# Patient Record
Sex: MALE | Race: BLACK OR AFRICAN AMERICAN | NOT HISPANIC OR LATINO | URBAN - METROPOLITAN AREA
[De-identification: names, ages, dates, MRNs, and addresses within clinical notes are randomized per-mention and may not be internally consistent; named-entity substitution may affect disease eponyms.]

---

## 2018-09-17 ENCOUNTER — EMERGENCY (EMERGENCY)
Facility: HOSPITAL | Age: 48
LOS: 0 days | Discharge: LEFT BEFORE DISCHARGE | End: 2018-09-17
Attending: EMERGENCY MEDICINE | Admitting: EMERGENCY MEDICINE

## 2018-09-17 VITALS
DIASTOLIC BLOOD PRESSURE: 90 MMHG | OXYGEN SATURATION: 98 % | WEIGHT: 225.09 LBS | HEIGHT: 66 IN | TEMPERATURE: 98 F | RESPIRATION RATE: 19 BRPM | HEART RATE: 100 BPM | SYSTOLIC BLOOD PRESSURE: 156 MMHG

## 2018-09-17 DIAGNOSIS — Z91.013 ALLERGY TO SEAFOOD: ICD-10-CM

## 2018-09-17 DIAGNOSIS — Z91.018 ALLERGY TO OTHER FOODS: ICD-10-CM

## 2018-09-17 DIAGNOSIS — R19.7 DIARRHEA, UNSPECIFIED: ICD-10-CM

## 2018-09-17 DIAGNOSIS — Z88.0 ALLERGY STATUS TO PENICILLIN: ICD-10-CM

## 2018-09-17 DIAGNOSIS — Z88.8 ALLERGY STATUS TO OTHER DRUGS, MEDICAMENTS AND BIOLOGICAL SUBSTANCES STATUS: ICD-10-CM

## 2018-09-17 DIAGNOSIS — R10.30 LOWER ABDOMINAL PAIN, UNSPECIFIED: ICD-10-CM

## 2018-09-17 RX ORDER — SODIUM CHLORIDE 9 MG/ML
1000 INJECTION INTRAMUSCULAR; INTRAVENOUS; SUBCUTANEOUS ONCE
Qty: 0 | Refills: 0 | Status: DISCONTINUED | OUTPATIENT
Start: 2018-09-17 | End: 2018-09-17

## 2018-09-17 NOTE — ED PROVIDER NOTE - PROGRESS NOTE DETAILS
patient requesting to eat. patient states pain is gone patient wants to sign out. patient states he does not need labs . patient understands risk The patient wishes to leave against medical advice.  I have discussed the risks, benefits and alternatives (including the possibility of worsening of disease, pain, permanent disability, and/or death) with the patient and his/her family (if available).  The patient voices understanding of these risks, benefits, and alternatives and still wishes to sign out against medical advice.  The patient is awake, alert, oriented  x 3 and has demonstrated capacity to refuse/direct care.  I have advised the patient that they can and should return immediately should they develop any worse/different/additional symptoms, or if they change their mind and want to continue their care.

## 2018-09-17 NOTE — ED PROVIDER NOTE - MEDICAL DECISION MAKING DETAILS
diarrhea, soft abd without tendnerss, he is tolerating po. he refused evalution with labs, and we signed h im out ama.

## 2018-09-17 NOTE — ED PROVIDER NOTE - OBJECTIVE STATEMENT
this is 48 yo male presents to ed for evaluation of abdominal pain. patient states he is having diarrhea for 2 weeks on and off .

## 2018-09-17 NOTE — ED PROVIDER NOTE - ATTENDING CONTRIBUTION TO CARE
lower abd pain with diarrhea no fevers, no vomiting, he states he is hungry and wants to eat here. on exam he has no abd pain or tendnerss, my plan was to work up for diarrhea. check labs however patient refused any intervention, he is aox3, able to comprehend his decisions, he undrestands that he could have electorlyte abn, 2/2 to diarrhea or infectious cuase he understands that without labs we could not evaluate this. he will reutrn if any worsening symptoms.

## 2018-09-17 NOTE — ED PROVIDER NOTE - NS ED ROS FT
Review of Systems:  	•	CONSTITUTIONAL - no fever, no diaphoresis, no chills  	•	SKIN - no rash  	•	HEMATOLOGIC - no bleeding, no bruising  	•	EYES - no eye pain, no blurry vision  	•	ENT - no change in hearing, no sore throat, no ear pain or tinnitus  	•	RESPIRATORY - no shortness of breath, no cough  	•	CARDIAC - no chest pain, no palpitations  	•	GI - no abd pain, no nausea, no vomiting,  diarrhea, no constipation  	•	GENITO-URINARY - no discharge, no dysuria; no hematuria, no increased urinary frequency  	•	MUSCULOSKELETAL - no joint paint, no swelling, no redness  	•	NEUROLOGIC - no weakness, no headache, no paresthesias, no LOC  	•	PSYCH - no anxiety, non suicidal, non homicidal, no hallucination, no depression

## 2020-10-30 ENCOUNTER — EMERGENCY (EMERGENCY)
Facility: HOSPITAL | Age: 50
LOS: 0 days | Discharge: HOME | End: 2020-10-31
Attending: EMERGENCY MEDICINE | Admitting: STUDENT IN AN ORGANIZED HEALTH CARE EDUCATION/TRAINING PROGRAM
Payer: MEDICAID

## 2020-10-30 VITALS
TEMPERATURE: 98 F | SYSTOLIC BLOOD PRESSURE: 118 MMHG | DIASTOLIC BLOOD PRESSURE: 68 MMHG | RESPIRATION RATE: 18 BRPM | OXYGEN SATURATION: 99 % | HEART RATE: 117 BPM | HEIGHT: 66 IN

## 2020-10-30 VITALS — HEART RATE: 97 BPM | SYSTOLIC BLOOD PRESSURE: 119 MMHG | DIASTOLIC BLOOD PRESSURE: 58 MMHG

## 2020-10-30 DIAGNOSIS — Z88.0 ALLERGY STATUS TO PENICILLIN: ICD-10-CM

## 2020-10-30 DIAGNOSIS — R10.9 UNSPECIFIED ABDOMINAL PAIN: ICD-10-CM

## 2020-10-30 DIAGNOSIS — R10.13 EPIGASTRIC PAIN: ICD-10-CM

## 2020-10-30 DIAGNOSIS — R10.32 LEFT LOWER QUADRANT PAIN: ICD-10-CM

## 2020-10-30 DIAGNOSIS — Z88.8 ALLERGY STATUS TO OTHER DRUGS, MEDICAMENTS AND BIOLOGICAL SUBSTANCES: ICD-10-CM

## 2020-10-30 DIAGNOSIS — R10.12 LEFT UPPER QUADRANT PAIN: ICD-10-CM

## 2020-10-30 DIAGNOSIS — Z91.013 ALLERGY TO SEAFOOD: ICD-10-CM

## 2020-10-30 LAB
ALBUMIN SERPL ELPH-MCNC: 4.3 G/DL — SIGNIFICANT CHANGE UP (ref 3.5–5.2)
ALP SERPL-CCNC: 85 U/L — SIGNIFICANT CHANGE UP (ref 30–115)
ALT FLD-CCNC: 7 U/L — SIGNIFICANT CHANGE UP (ref 0–41)
ANION GAP SERPL CALC-SCNC: 10 MMOL/L — SIGNIFICANT CHANGE UP (ref 7–14)
APPEARANCE UR: CLEAR — SIGNIFICANT CHANGE UP
AST SERPL-CCNC: 10 U/L — SIGNIFICANT CHANGE UP (ref 0–41)
BASOPHILS # BLD AUTO: 0.02 K/UL — SIGNIFICANT CHANGE UP (ref 0–0.2)
BASOPHILS NFR BLD AUTO: 0.3 % — SIGNIFICANT CHANGE UP (ref 0–1)
BILIRUB DIRECT SERPL-MCNC: <0.2 MG/DL — SIGNIFICANT CHANGE UP (ref 0–0.2)
BILIRUB INDIRECT FLD-MCNC: >0 MG/DL — LOW (ref 0.2–1.2)
BILIRUB SERPL-MCNC: 0.2 MG/DL — SIGNIFICANT CHANGE UP (ref 0.2–1.2)
BILIRUB UR-MCNC: NEGATIVE — SIGNIFICANT CHANGE UP
BUN SERPL-MCNC: 8 MG/DL — LOW (ref 10–20)
CALCIUM SERPL-MCNC: 9.8 MG/DL — SIGNIFICANT CHANGE UP (ref 8.5–10.1)
CHLORIDE SERPL-SCNC: 105 MMOL/L — SIGNIFICANT CHANGE UP (ref 98–110)
CO2 SERPL-SCNC: 22 MMOL/L — SIGNIFICANT CHANGE UP (ref 17–32)
COLOR SPEC: COLORLESS — SIGNIFICANT CHANGE UP
CREAT SERPL-MCNC: 0.8 MG/DL — SIGNIFICANT CHANGE UP (ref 0.7–1.5)
DIFF PNL FLD: NEGATIVE — SIGNIFICANT CHANGE UP
EOSINOPHIL # BLD AUTO: 0.1 K/UL — SIGNIFICANT CHANGE UP (ref 0–0.7)
EOSINOPHIL NFR BLD AUTO: 1.5 % — SIGNIFICANT CHANGE UP (ref 0–8)
GLUCOSE SERPL-MCNC: 99 MG/DL — SIGNIFICANT CHANGE UP (ref 70–99)
GLUCOSE UR QL: NEGATIVE — SIGNIFICANT CHANGE UP
HCT VFR BLD CALC: 35 % — LOW (ref 42–52)
HGB BLD-MCNC: 11 G/DL — LOW (ref 14–18)
IMM GRANULOCYTES NFR BLD AUTO: 0.2 % — SIGNIFICANT CHANGE UP (ref 0.1–0.3)
KETONES UR-MCNC: NEGATIVE — SIGNIFICANT CHANGE UP
LACTATE SERPL-SCNC: 1.8 MMOL/L — SIGNIFICANT CHANGE UP (ref 0.7–2)
LEUKOCYTE ESTERASE UR-ACNC: NEGATIVE — SIGNIFICANT CHANGE UP
LIDOCAIN IGE QN: 38 U/L — SIGNIFICANT CHANGE UP (ref 7–60)
LYMPHOCYTES # BLD AUTO: 2.97 K/UL — SIGNIFICANT CHANGE UP (ref 1.2–3.4)
LYMPHOCYTES # BLD AUTO: 44.9 % — SIGNIFICANT CHANGE UP (ref 20.5–51.1)
MCHC RBC-ENTMCNC: 25.9 PG — LOW (ref 27–31)
MCHC RBC-ENTMCNC: 31.4 G/DL — LOW (ref 32–37)
MCV RBC AUTO: 82.5 FL — SIGNIFICANT CHANGE UP (ref 80–94)
MONOCYTES # BLD AUTO: 0.48 K/UL — SIGNIFICANT CHANGE UP (ref 0.1–0.6)
MONOCYTES NFR BLD AUTO: 7.3 % — SIGNIFICANT CHANGE UP (ref 1.7–9.3)
NEUTROPHILS # BLD AUTO: 3.04 K/UL — SIGNIFICANT CHANGE UP (ref 1.4–6.5)
NEUTROPHILS NFR BLD AUTO: 45.8 % — SIGNIFICANT CHANGE UP (ref 42.2–75.2)
NITRITE UR-MCNC: NEGATIVE — SIGNIFICANT CHANGE UP
NRBC # BLD: 0 /100 WBCS — SIGNIFICANT CHANGE UP (ref 0–0)
PH UR: 6 — SIGNIFICANT CHANGE UP (ref 5–8)
PLATELET # BLD AUTO: 291 K/UL — SIGNIFICANT CHANGE UP (ref 130–400)
POTASSIUM SERPL-MCNC: 4.8 MMOL/L — SIGNIFICANT CHANGE UP (ref 3.5–5)
POTASSIUM SERPL-SCNC: 4.8 MMOL/L — SIGNIFICANT CHANGE UP (ref 3.5–5)
PROT SERPL-MCNC: 7.4 G/DL — SIGNIFICANT CHANGE UP (ref 6–8)
PROT UR-MCNC: NEGATIVE — SIGNIFICANT CHANGE UP
RBC # BLD: 4.24 M/UL — LOW (ref 4.7–6.1)
RBC # FLD: 14.8 % — HIGH (ref 11.5–14.5)
SODIUM SERPL-SCNC: 137 MMOL/L — SIGNIFICANT CHANGE UP (ref 135–146)
SP GR SPEC: 1 — LOW (ref 1.01–1.03)
UROBILINOGEN FLD QL: SIGNIFICANT CHANGE UP
WBC # BLD: 6.62 K/UL — SIGNIFICANT CHANGE UP (ref 4.8–10.8)
WBC # FLD AUTO: 6.62 K/UL — SIGNIFICANT CHANGE UP (ref 4.8–10.8)

## 2020-10-30 PROCEDURE — 93010 ELECTROCARDIOGRAM REPORT: CPT

## 2020-10-30 PROCEDURE — 76705 ECHO EXAM OF ABDOMEN: CPT | Mod: 26

## 2020-10-30 PROCEDURE — 74177 CT ABD & PELVIS W/CONTRAST: CPT | Mod: 26

## 2020-10-30 PROCEDURE — 99285 EMERGENCY DEPT VISIT HI MDM: CPT

## 2020-10-30 RX ORDER — SODIUM CHLORIDE 9 MG/ML
1000 INJECTION, SOLUTION INTRAVENOUS ONCE
Refills: 0 | Status: COMPLETED | OUTPATIENT
Start: 2020-10-30 | End: 2020-10-30

## 2020-10-30 RX ORDER — MORPHINE SULFATE 50 MG/1
4 CAPSULE, EXTENDED RELEASE ORAL ONCE
Refills: 0 | Status: DISCONTINUED | OUTPATIENT
Start: 2020-10-30 | End: 2020-10-30

## 2020-10-30 RX ADMIN — MORPHINE SULFATE 4 MILLIGRAM(S): 50 CAPSULE, EXTENDED RELEASE ORAL at 22:59

## 2020-10-30 RX ADMIN — SODIUM CHLORIDE 1000 MILLILITER(S): 9 INJECTION, SOLUTION INTRAVENOUS at 18:56

## 2020-10-30 RX ADMIN — MORPHINE SULFATE 4 MILLIGRAM(S): 50 CAPSULE, EXTENDED RELEASE ORAL at 18:56

## 2020-10-30 NOTE — ED PROVIDER NOTE - PHYSICAL EXAMINATION
CONSTITUTIONAL: Well-developed; well-nourished; in no acute distress.   SKIN: warm, dry  HEAD: Normocephalic; atraumatic.  EYES: PERRL, EOMI, normal sclera and conjunctiva   ENT: No nasal discharge; airway clear.  NECK: Supple; non tender.  CARD:  Regular rate and rhythm.   RESP: NO inc WOB   ABD:+TTP mostly in epigastric region but also LUQ and LLQ as well as RLQ   EXT: Normal ROM.    LYMPH: No acute cervical adenopathy.  NEURO: Alert, oriented, grossly unremarkable  PSYCH: Cooperative, appropriate.

## 2020-10-30 NOTE — ED ADULT NURSE REASSESSMENT NOTE - NS ED NURSE REASSESS COMMENT FT1
Pt assessed. Awaiting for CT and US. Plan of care reviewed with pt and verbalized understanding. Will cont to monitor.

## 2020-10-30 NOTE — ED PROVIDER NOTE - ATTENDING CONTRIBUTION TO CARE
49 yo m hx dm, htn, asthma presents w/ abd pain. pain diffuse mid abdomen radiating around. no nausea/vomiting. sx going on for 2-3 days and worsening. pt previous seen at [facility], [place] and pt unsure what was wrong. no diarrhea or constipation. +decreased PO intake. + dysuria    vss  gen- NAD, aaox3  card-rrr  lungs-ctab, no wheezing or rhonchi  abd-mild diffuse abd tenderness, no guarding or rebound, no CVAT b/l  neuro- full str/sensation, cn ii-xii grossly intact, normal coordination and gait    will check belly labs, ctap, ua   r/o hb disease, pancreatitis, colitis

## 2020-10-30 NOTE — ED PROVIDER NOTE - PATIENT PORTAL LINK FT
You can access the FollowMyHealth Patient Portal offered by [facility] by registering at the following website: [email or web address]. By joining [employer]’s FollowMyHealth portal, you will also be able to view your health information using other applications (apps) compatible with our system.

## 2020-10-30 NOTE — ED PROVIDER NOTE - OBJECTIVE STATEMENT
Pt is a 51 yo m hx dm, htn, asthma presents w/ abd pain. Pt reports diffuse abdominal pain for the last 2-3 days. Pt describes pain as epigastric and radiates around to his flank. No nausea/vomiting. Pt reports sxs have been going on for 2-3 days and is getting worse . Pt was previous seen at [facility] for similar pain in [place] and was told he had either pancreatitis or appendicitis ( does not remember) but pt did not have any surgical procedures at this time. Denies any diarrhea or constipation but is endorsing dark green stool and urinary frequency

## 2020-10-30 NOTE — ED PROVIDER NOTE - NSDCPRINTRESULTS_ED_ALL_ED











AYESHA TREJO DO               Sep 24, 2020 08:57
Patient requests all Lab and Radiology Results on their Discharge Instructions

## 2020-10-30 NOTE — ED PROVIDER NOTE - CLINICAL SUMMARY MEDICAL DECISION MAKING FREE TEXT BOX
CT negative for intra abdominal pathology.  VSS.  No signs of sepsis.  Tolerating PO.  D/C home.  Strict return instructions discussed.

## 2020-10-31 LAB
CULTURE RESULTS: SIGNIFICANT CHANGE UP
SPECIMEN SOURCE: SIGNIFICANT CHANGE UP

## 2020-10-31 NOTE — ED ADULT NURSE REASSESSMENT NOTE - NS ED NURSE REASSESS COMMENT FT1
As per MD Juan, pt to be discharged. Pt to discharged back to [address] [place] [address]. Transportation for the pt to get back to home, set up by collaboration of primary RN and ED . As per MD Juan, pt to be discharged. Pt to be discharged back to [address] [place] [address], [facility]. Pt reports he has anxiety which is why he is at [facility]. Pt denies any hallucinations. Pt denies any homicidal or suicidal ideation. Pt reports he does not feel down or depressed. Pt updated on continuing plan of care and verbalized an understanding. Transportation for the pt to get back to home safely, set up by collaboration of primary RN and ED .

## 2020-11-02 ENCOUNTER — EMERGENCY (EMERGENCY)
Facility: HOSPITAL | Age: 50
LOS: 0 days | Discharge: HOME | End: 2020-11-02
Attending: EMERGENCY MEDICINE | Admitting: EMERGENCY MEDICINE
Payer: MEDICAID

## 2020-11-02 VITALS
SYSTOLIC BLOOD PRESSURE: 115 MMHG | RESPIRATION RATE: 17 BRPM | HEART RATE: 113 BPM | HEIGHT: 66 IN | OXYGEN SATURATION: 100 % | DIASTOLIC BLOOD PRESSURE: 61 MMHG | TEMPERATURE: 99 F

## 2020-11-02 DIAGNOSIS — Z91.013 ALLERGY TO SEAFOOD: ICD-10-CM

## 2020-11-02 DIAGNOSIS — R10.9 UNSPECIFIED ABDOMINAL PAIN: ICD-10-CM

## 2020-11-02 DIAGNOSIS — Z88.0 ALLERGY STATUS TO PENICILLIN: ICD-10-CM

## 2020-11-02 DIAGNOSIS — Z91.018 ALLERGY TO OTHER FOODS: ICD-10-CM

## 2020-11-02 PROCEDURE — 99284 EMERGENCY DEPT VISIT MOD MDM: CPT

## 2020-11-02 RX ORDER — DIPHENHYDRAMINE HYDROCHLORIDE AND LIDOCAINE HYDROCHLORIDE AND ALUMINUM HYDROXIDE AND MAGNESIUM HYDRO
30 KIT ONCE
Refills: 0 | Status: COMPLETED | OUTPATIENT
Start: 2020-11-02 | End: 2020-11-02

## 2020-11-02 RX ORDER — ACETAMINOPHEN 500 MG
650 TABLET ORAL EVERY 6 HOURS
Refills: 0 | Status: DISCONTINUED | OUTPATIENT
Start: 2020-11-02 | End: 2020-11-02

## 2020-11-02 RX ORDER — SODIUM CHLORIDE 9 MG/ML
1000 INJECTION INTRAMUSCULAR; INTRAVENOUS; SUBCUTANEOUS ONCE
Refills: 0 | Status: DISCONTINUED | OUTPATIENT
Start: 2020-11-02 | End: 2020-11-02

## 2020-11-02 RX ADMIN — Medication 650 MILLIGRAM(S): at 17:35

## 2020-11-02 RX ADMIN — Medication 650 MILLIGRAM(S): at 18:35

## 2020-11-02 RX ADMIN — DIPHENHYDRAMINE HYDROCHLORIDE AND LIDOCAINE HYDROCHLORIDE AND ALUMINUM HYDROXIDE AND MAGNESIUM HYDRO 30 MILLILITER(S): KIT at 17:35

## 2020-11-02 NOTE — ED PROVIDER NOTE - CARE PROVIDER_API CALL
Yenifer Ureña (MD)  Gastroenterology  [address] [place] [address]  Phone: [phone]  Fax: [phone]  Follow Up Time:

## 2020-11-02 NOTE — ED PROVIDER NOTE - ATTENDING CONTRIBUTION TO CARE
49 yo male  PMH anxiety, psych disease , DM, HTN, asthma c/o abdominal pain for over a week , had similar symptoms early in the summer evaluated at another hospital at the time.  Pain is mid-abdominal , non-radiating  associated with ?nausea.  Denies vomiting, diarrhea , LBM this morning", no black or bloody stools, no weight loss, fever, chills, CP, SOB, no urinary complaint,  Ate breakfast this morning and took his meds.  He was his "gallstone taken out". Patient was seen in this ED on 10/30/20 for same complaints, had  extensive work up including labs, CT scan, and RUQ sono, all were negative ( no gallstones seen ).  Well-appearing male, NAD, PERRL, missing dentitions, mmm, supple neck, nml work of breathing, lungs CTA b/l, RRR, well-perfused extremities, no midline spine or CVA ttp, abdomen soft, no rebound or guarding, no leg edema or calf ttp, awake and alert, cooperative.  Given recent negative work up, no change in symptoms, will give Tylenol, hurricane solution, PO challenge and dispo,  Will give info to follow up with GI.  Patient is happy with the plan. 49 yo male  PMH anxiety, psych disease , DM, HTN, asthma c/o abdominal pain for over a week , had similar symptoms early in the summer evaluated at another hospital at the time.  Pain is mid-abdominal , non-radiating  associated with ?nausea.  Denies vomiting, diarrhea , LBM this morning", no black or bloody stools, no weight loss, fever, chills, CP, SOB, no urinary complaint, no SI/HI. Ate breakfast this morning and took his meds.  He was his "gallstone taken out". Patient was seen in this ED on 10/30/20 for same complaints, had  extensive work up including labs, CT scan, and RUQ sono, all were negative ( no gallstones seen ).  Well-appearing male, NAD, PERRL, missing dentitions, mmm, supple neck, nml work of breathing, lungs CTA b/l, RRR, well-perfused extremities, no midline spine or CVA ttp, abdomen soft, no rebound or guarding, no leg edema or calf ttp, awake and alert, cooperative.  Given recent negative work up, no change in symptoms, will give Tylenol, hurricane solution, PO challenge and dispo,  Will give info to follow up with GI.  Patient is happy with the plan.

## 2020-11-02 NOTE — ED PROVIDER NOTE - PHYSICAL EXAMINATION
CONST: NAD  EYES: Sclera and conjunctiva clear.   ENT: No nasal discharge. Oropharynx normal appearing, no erythema or exudates. No abscess or swelling. Uvula midline.   NECK: Non-tender, no meningeal signs. normal ROM. supple   CARD: S1 S2; No jvd  RESP: Equal BS B/L, No wheezes, rhonchi or rales. No distress  GI: Soft, non-tender, non-distended. no cva tenderness. normal BS  MS: Normal ROM in all extremities. pulses 2 +. no calf tenderness or swelling  SKIN: Warm, dry, no acute rashes. Good turgor  NEURO: A&Ox3, No focal deficits. Strength 5/5 with no sensory deficits. Steady gait. Finger to nose intact. Negative pronator drift

## 2020-11-02 NOTE — ED PROVIDER NOTE - CLINICAL SUMMARY MEDICAL DECISION MAKING FREE TEXT BOX
Patient with "abdominal pain", reassuring exam, negative work up 3 days ago for same symptoms.  Tolerating PO, abdomen without rebound or guarding, afebrile.

## 2020-11-02 NOTE — ED PROVIDER NOTE - OBJECTIVE STATEMENT
50y M pmh dm, htn, asthma presents for eval of abd pain. Pt has multiple weeks of intermittent mild aching generalized abd pain, no aggravating or relieving factors. Pt had neg CT and US. Denies fever, ha, cp, sob, weakness, numbness, dysuria, hematuria, n/v/d/c

## 2020-11-02 NOTE — ED PROVIDER NOTE - PATIENT PORTAL LINK FT
You can access the FollowMyHealth Patient Portal offered by [facility] by registering at the following website: [email or web address]. By joining [employer]’s FollowMyHealth portal, you will also be able to view your health information using other applications (apps) compatible with our system.

## 2020-11-02 NOTE — ED ADULT NURSE NOTE - OBJECTIVE STATEMENT
came in c/o LLQ abdominal pain along with pounding headache. Patient was here with same complaints another day ,got full scan , Denied any Nausea/vomiting/diarrhea .

## 2020-11-02 NOTE — ED ADULT TRIAGE NOTE - CHIEF COMPLAINT QUOTE
Pt BIBA from group home complaining of abdominal pain, headache, and nausea x 1 week. reports decreased appetite.

## 2020-11-23 ENCOUNTER — APPOINTMENT (OUTPATIENT)
Dept: GASTROENTEROLOGY | Facility: CLINIC | Age: 50
End: 2020-11-23

## 2021-12-08 ENCOUNTER — EMERGENCY (EMERGENCY)
Facility: HOSPITAL | Age: 51
LOS: 0 days | Discharge: HOME | End: 2021-12-09
Attending: EMERGENCY MEDICINE | Admitting: EMERGENCY MEDICINE
Payer: MEDICAID

## 2021-12-08 VITALS
HEIGHT: 66 IN | DIASTOLIC BLOOD PRESSURE: 88 MMHG | HEART RATE: 106 BPM | TEMPERATURE: 98 F | SYSTOLIC BLOOD PRESSURE: 152 MMHG | OXYGEN SATURATION: 99 % | RESPIRATION RATE: 19 BRPM | WEIGHT: 160.06 LBS

## 2021-12-08 DIAGNOSIS — F41.9 ANXIETY DISORDER, UNSPECIFIED: ICD-10-CM

## 2021-12-08 DIAGNOSIS — Z91.013 ALLERGY TO SEAFOOD: ICD-10-CM

## 2021-12-08 DIAGNOSIS — Z88.8 ALLERGY STATUS TO OTHER DRUGS, MEDICAMENTS AND BIOLOGICAL SUBSTANCES STATUS: ICD-10-CM

## 2021-12-08 DIAGNOSIS — Z91.018 ALLERGY TO OTHER FOODS: ICD-10-CM

## 2021-12-08 DIAGNOSIS — Z88.0 ALLERGY STATUS TO PENICILLIN: ICD-10-CM

## 2021-12-08 PROCEDURE — 99284 EMERGENCY DEPT VISIT MOD MDM: CPT

## 2021-12-08 NOTE — ED ADULT TRIAGE NOTE - NS ED NURSE DIRECT TO ROOM YN
Spoke to patient using 2 identifiers. Per Larence Heimlich, NP, discussed the following with patient: coronary calcium score is elevated, higher than he would have expected. Has been noncompliant in the past with taking statin therapy. Recently refilled Lipitor 20 mg. Will need to achieve a goal LDL for him of 70 to prevent progressive atherosclerotic heart disease which runs in his family. Pt stated he takes Lipitor about 3-4 weekly. Emphasized to take Lipitor as prescribed. Repeat labs in 3 mos. Patient verbalized understanding.
Venessa: Please call patient coronary calcium score is elevated, higher than I would have expected. He has been noncompliant in the past with taking statin therapy. I recently refilled Lipitor 20 mg. Please see if he is taking Lipitor, repeat labs in 90 days. Will need to achieve a goal LDL for him of 70 to prevent progressive atherosclerotic heart disease which runs in his family. I may also see him in person today to tell him but in the event that I do not please call.
No

## 2021-12-09 RX ORDER — ALPRAZOLAM 0.25 MG
0.5 TABLET ORAL ONCE
Refills: 0 | Status: DISCONTINUED | OUTPATIENT
Start: 2021-12-09 | End: 2021-12-09

## 2021-12-09 RX ADMIN — Medication 0.5 MILLIGRAM(S): at 01:36

## 2021-12-09 NOTE — ED PROVIDER NOTE - OBJECTIVE STATEMENT
50 yo hx of anxiety sts feeling anxious, requesting medication. no cp, sob, ab pain, n, v, fever. no si hi. no avh.

## 2021-12-09 NOTE — ED PROVIDER NOTE - NS ED ROS FT
CONSTITUTIONAL: Negatve   SKIN: Negatve   HEAD: Negatve   EYES: Negatve   ENT: Negatve   NECK: Negatve   CARD:Negatve   RESP:Negatve   ABD: Negatve   EXT: Negatve  LYMPH: Negatve   NEURO: Negatve   PSYCH:: see hpi

## 2021-12-09 NOTE — ED ADULT NURSE NOTE - OBJECTIVE STATEMENT
Pt presented to ED BIBA with complaints of feeling anxious,, hasn't eaten and very hungry.  Pt from [address]. Pt denies paiun cp sob . NAD noted. no other complaints at this time.

## 2021-12-09 NOTE — ED PROVIDER NOTE - PATIENT PORTAL LINK FT
You can access the FollowMyHealth Patient Portal offered by [facility] by registering at the following website: [email or web address]. By joining [employer]’s FollowMyHealth portal, you will also be able to view your health information using other applications (apps) compatible with our system.

## 2021-12-21 ENCOUNTER — EMERGENCY (EMERGENCY)
Facility: HOSPITAL | Age: 51
LOS: 0 days | Discharge: LEFT BEFORE DISCHARGE | End: 2021-12-21
Attending: STUDENT IN AN ORGANIZED HEALTH CARE EDUCATION/TRAINING PROGRAM | Admitting: STUDENT IN AN ORGANIZED HEALTH CARE EDUCATION/TRAINING PROGRAM
Payer: MEDICAID

## 2021-12-21 VITALS
HEART RATE: 106 BPM | TEMPERATURE: 96 F | SYSTOLIC BLOOD PRESSURE: 124 MMHG | DIASTOLIC BLOOD PRESSURE: 74 MMHG | OXYGEN SATURATION: 98 % | RESPIRATION RATE: 20 BRPM | HEIGHT: 66 IN

## 2021-12-21 DIAGNOSIS — Z88.0 ALLERGY STATUS TO PENICILLIN: ICD-10-CM

## 2021-12-21 DIAGNOSIS — Z20.822 CONTACT WITH AND (SUSPECTED) EXPOSURE TO COVID-19: ICD-10-CM

## 2021-12-21 DIAGNOSIS — Z91.013 ALLERGY TO SEAFOOD: ICD-10-CM

## 2021-12-21 DIAGNOSIS — Z88.8 ALLERGY STATUS TO OTHER DRUGS, MEDICAMENTS AND BIOLOGICAL SUBSTANCES: ICD-10-CM

## 2021-12-21 DIAGNOSIS — Z91.018 ALLERGY TO OTHER FOODS: ICD-10-CM

## 2021-12-21 DIAGNOSIS — R07.0 PAIN IN THROAT: ICD-10-CM

## 2021-12-21 DIAGNOSIS — J02.9 ACUTE PHARYNGITIS, UNSPECIFIED: ICD-10-CM

## 2021-12-21 LAB
ALBUMIN SERPL ELPH-MCNC: 4 G/DL — SIGNIFICANT CHANGE UP (ref 3.5–5.2)
ALP SERPL-CCNC: 79 U/L — SIGNIFICANT CHANGE UP (ref 30–115)
ALT FLD-CCNC: 12 U/L — SIGNIFICANT CHANGE UP (ref 0–41)
ANION GAP SERPL CALC-SCNC: 15 MMOL/L — HIGH (ref 7–14)
AST SERPL-CCNC: 35 U/L — SIGNIFICANT CHANGE UP (ref 0–41)
BASOPHILS # BLD AUTO: 0.02 K/UL — SIGNIFICANT CHANGE UP (ref 0–0.2)
BASOPHILS NFR BLD AUTO: 0.3 % — SIGNIFICANT CHANGE UP (ref 0–1)
BILIRUB SERPL-MCNC: 0.3 MG/DL — SIGNIFICANT CHANGE UP (ref 0.2–1.2)
BUN SERPL-MCNC: 16 MG/DL — SIGNIFICANT CHANGE UP (ref 10–20)
CALCIUM SERPL-MCNC: 8.7 MG/DL — SIGNIFICANT CHANGE UP (ref 8.5–10.1)
CHLORIDE SERPL-SCNC: 105 MMOL/L — SIGNIFICANT CHANGE UP (ref 98–110)
CO2 SERPL-SCNC: 20 MMOL/L — SIGNIFICANT CHANGE UP (ref 17–32)
CREAT SERPL-MCNC: 0.9 MG/DL — SIGNIFICANT CHANGE UP (ref 0.7–1.5)
EOSINOPHIL # BLD AUTO: 0.14 K/UL — SIGNIFICANT CHANGE UP (ref 0–0.7)
EOSINOPHIL NFR BLD AUTO: 2 % — SIGNIFICANT CHANGE UP (ref 0–8)
GLUCOSE SERPL-MCNC: 81 MG/DL — SIGNIFICANT CHANGE UP (ref 70–99)
HCT VFR BLD CALC: 38.1 % — LOW (ref 42–52)
HGB BLD-MCNC: 11.8 G/DL — LOW (ref 14–18)
IMM GRANULOCYTES NFR BLD AUTO: 0.3 % — SIGNIFICANT CHANGE UP (ref 0.1–0.3)
LYMPHOCYTES # BLD AUTO: 2.17 K/UL — SIGNIFICANT CHANGE UP (ref 1.2–3.4)
LYMPHOCYTES # BLD AUTO: 31.7 % — SIGNIFICANT CHANGE UP (ref 20.5–51.1)
MCHC RBC-ENTMCNC: 26 PG — LOW (ref 27–31)
MCHC RBC-ENTMCNC: 31 G/DL — LOW (ref 32–37)
MCV RBC AUTO: 83.9 FL — SIGNIFICANT CHANGE UP (ref 80–94)
MONOCYTES # BLD AUTO: 0.69 K/UL — HIGH (ref 0.1–0.6)
MONOCYTES NFR BLD AUTO: 10.1 % — HIGH (ref 1.7–9.3)
NEUTROPHILS # BLD AUTO: 3.81 K/UL — SIGNIFICANT CHANGE UP (ref 1.4–6.5)
NEUTROPHILS NFR BLD AUTO: 55.6 % — SIGNIFICANT CHANGE UP (ref 42.2–75.2)
NRBC # BLD: 0 /100 WBCS — SIGNIFICANT CHANGE UP (ref 0–0)
PLATELET # BLD AUTO: 259 K/UL — SIGNIFICANT CHANGE UP (ref 130–400)
POTASSIUM SERPL-MCNC: 6.6 MMOL/L — CRITICAL HIGH (ref 3.5–5)
POTASSIUM SERPL-SCNC: 6.6 MMOL/L — CRITICAL HIGH (ref 3.5–5)
PROT SERPL-MCNC: 7.2 G/DL — SIGNIFICANT CHANGE UP (ref 6–8)
RAPID RVP RESULT: DETECTED
RBC # BLD: 4.54 M/UL — LOW (ref 4.7–6.1)
RBC # FLD: 18.1 % — HIGH (ref 11.5–14.5)
RV+EV RNA SPEC QL NAA+PROBE: DETECTED
SARS-COV-2 RNA SPEC QL NAA+PROBE: SIGNIFICANT CHANGE UP
SODIUM SERPL-SCNC: 140 MMOL/L — SIGNIFICANT CHANGE UP (ref 135–146)
WBC # BLD: 6.85 K/UL — SIGNIFICANT CHANGE UP (ref 4.8–10.8)
WBC # FLD AUTO: 6.85 K/UL — SIGNIFICANT CHANGE UP (ref 4.8–10.8)

## 2021-12-21 PROCEDURE — 99285 EMERGENCY DEPT VISIT HI MDM: CPT

## 2021-12-21 PROCEDURE — 70491 CT SOFT TISSUE NECK W/DYE: CPT | Mod: 26,MA

## 2021-12-21 RX ORDER — DEXAMETHASONE 0.5 MG/5ML
10 ELIXIR ORAL ONCE
Refills: 0 | Status: COMPLETED | OUTPATIENT
Start: 2021-12-21 | End: 2021-12-21

## 2021-12-21 NOTE — ED PROVIDER NOTE - ATTENDING CONTRIBUTION TO CARE
51M with pmh JACKELINE presents with sore throat, pain with swallowing solids and now liquids. He reports pain is on the bottom of his mouth, however not easily localized. His voice also sounds "funny" to him, but denies tongue swelling, fever, vomiting, stridor, wheezing.     vitals noted in triage, , otherwise wnl.     Gen - NAD, Head - NCAT, Oropharynx - uvula midline, no PTA, no tongue elevation, mild drooling noted, no teeth present, no purulent drainage, patient reporting pain to palpation of submandibular space however no focal lymphadenopathy appreciated, no fluctuance, MMM, Heart - RRR, no m/g/r, Lungs - CTAB, no w/c/r    a/p:  throat pain, drooling, voice change?  pt afebrile, protecting airway, not ill appearing  labs, ct neck soft tissue  decadron  reassess

## 2021-12-21 NOTE — ED PROVIDER NOTE - PHYSICAL EXAMINATION
CONSTITUTIONAL: Well-developed; well-nourished; in no acute distress.   SKIN: warm, dry  HEAD: Normocephalic; atraumatic.  EYES: no conjunctival injection. EOMI.   ENT: No nasal discharge; airway clear. MMM. edentulous. +increased secretions but speaking full sentences   NECK: Supple; non tender.  CARD: S1, S2 normal; Regular rate and rhythm.   RESP: No wheezes, rales or rhonchi.  ABD: soft ntnd.   EXT: Normal ROM.  No lower extremity edema.   LYMPH: No acute cervical adenopathy.  NEURO: Alert, oriented, grossly unremarkable.  PSYCH: Cooperative, appropriate.

## 2021-12-21 NOTE — ED PROVIDER NOTE - OBJECTIVE STATEMENT
52 y/o M PMHx DM, schizophrenia presents to ED with sore throat and cough x 2 days. Pt reports drooling and voice changes but repots may be baseline due to psych meds. No vomiting, no fevers. Pt vaccinated against covid.

## 2021-12-21 NOTE — ED PROVIDER NOTE - PROGRESS NOTE DETAILS
JR: patient endorsed to Dr. Cain- plan f/u labs, ct and reassess Authored by Kathrin Donaldson DO: Pt eloped from ED. Multiple attempts made to find patient at time of elopement. Call placed to inform patient of results, unable to contact patient, attempted to reach patient but phone number left was with his old residence who does not have his phone number.

## 2021-12-24 ENCOUNTER — EMERGENCY (EMERGENCY)
Facility: HOSPITAL | Age: 51
LOS: 0 days | Discharge: HOME | End: 2021-12-24
Attending: STUDENT IN AN ORGANIZED HEALTH CARE EDUCATION/TRAINING PROGRAM | Admitting: STUDENT IN AN ORGANIZED HEALTH CARE EDUCATION/TRAINING PROGRAM
Payer: MEDICAID

## 2021-12-24 VITALS
TEMPERATURE: 99 F | OXYGEN SATURATION: 100 % | HEIGHT: 66 IN | HEART RATE: 120 BPM | DIASTOLIC BLOOD PRESSURE: 67 MMHG | SYSTOLIC BLOOD PRESSURE: 120 MMHG | RESPIRATION RATE: 18 BRPM

## 2021-12-24 VITALS — HEART RATE: 89 BPM

## 2021-12-24 DIAGNOSIS — Z91.013 ALLERGY TO SEAFOOD: ICD-10-CM

## 2021-12-24 DIAGNOSIS — R05.9 COUGH, UNSPECIFIED: ICD-10-CM

## 2021-12-24 DIAGNOSIS — Z88.8 ALLERGY STATUS TO OTHER DRUGS, MEDICAMENTS AND BIOLOGICAL SUBSTANCES STATUS: ICD-10-CM

## 2021-12-24 DIAGNOSIS — Z88.0 ALLERGY STATUS TO PENICILLIN: ICD-10-CM

## 2021-12-24 PROCEDURE — 99283 EMERGENCY DEPT VISIT LOW MDM: CPT

## 2021-12-24 PROCEDURE — 71045 X-RAY EXAM CHEST 1 VIEW: CPT | Mod: 26

## 2021-12-24 NOTE — ED PROVIDER NOTE - PATIENT PORTAL LINK FT
You can access the FollowMyHealth Patient Portal offered by [facility] by registering at the following website: [email or web address]. By joining [employer]’s FollowMyHealth portal, you will also be able to view your health information using other applications (apps) compatible with our system.

## 2021-12-24 NOTE — ED PROVIDER NOTE - ATTENDING CONTRIBUTION TO CARE
50 y/o male with hx Bipolar, Schizophrenia, Anxiety presents for eval of cough. pt was in ER 3 days ago and tested positive for enterovirus. no fevers/sob.    vss  gen- NAD, aaox3  card-rrr  lungs-ctab, no wheezing or rhonchi  neuro- full str/sensation, cn ii-xii grossly intact, normal coordination and gait    well appearing, no resp distress  screening xr

## 2021-12-24 NOTE — ED PROVIDER NOTE - OBJECTIVE STATEMENT
52 y/o male with hx Bipolar, Schizophrenia, Anxiety presents to the ED c/o "I have a cough for 3 days." no cp/ sob/ fever

## 2021-12-31 ENCOUNTER — EMERGENCY (EMERGENCY)
Facility: HOSPITAL | Age: 51
LOS: 0 days | Discharge: HOME | End: 2021-12-31
Attending: STUDENT IN AN ORGANIZED HEALTH CARE EDUCATION/TRAINING PROGRAM | Admitting: STUDENT IN AN ORGANIZED HEALTH CARE EDUCATION/TRAINING PROGRAM
Payer: MEDICAID

## 2021-12-31 VITALS
TEMPERATURE: 98 F | OXYGEN SATURATION: 96 % | RESPIRATION RATE: 18 BRPM | HEIGHT: 66 IN | HEART RATE: 118 BPM | SYSTOLIC BLOOD PRESSURE: 121 MMHG | DIASTOLIC BLOOD PRESSURE: 76 MMHG | WEIGHT: 179.9 LBS

## 2021-12-31 VITALS
RESPIRATION RATE: 17 BRPM | OXYGEN SATURATION: 98 % | TEMPERATURE: 99 F | HEART RATE: 96 BPM | SYSTOLIC BLOOD PRESSURE: 110 MMHG | DIASTOLIC BLOOD PRESSURE: 58 MMHG

## 2021-12-31 DIAGNOSIS — Z88.8 ALLERGY STATUS TO OTHER DRUGS, MEDICAMENTS AND BIOLOGICAL SUBSTANCES: ICD-10-CM

## 2021-12-31 DIAGNOSIS — R07.89 OTHER CHEST PAIN: ICD-10-CM

## 2021-12-31 DIAGNOSIS — R51.9 HEADACHE, UNSPECIFIED: ICD-10-CM

## 2021-12-31 DIAGNOSIS — R00.0 TACHYCARDIA, UNSPECIFIED: ICD-10-CM

## 2021-12-31 DIAGNOSIS — Z91.018 ALLERGY TO OTHER FOODS: ICD-10-CM

## 2021-12-31 DIAGNOSIS — M79.10 MYALGIA, UNSPECIFIED SITE: ICD-10-CM

## 2021-12-31 DIAGNOSIS — I10 ESSENTIAL (PRIMARY) HYPERTENSION: ICD-10-CM

## 2021-12-31 DIAGNOSIS — U07.1 COVID-19: ICD-10-CM

## 2021-12-31 DIAGNOSIS — E11.9 TYPE 2 DIABETES MELLITUS WITHOUT COMPLICATIONS: ICD-10-CM

## 2021-12-31 DIAGNOSIS — Z88.0 ALLERGY STATUS TO PENICILLIN: ICD-10-CM

## 2021-12-31 DIAGNOSIS — R05.1 ACUTE COUGH: ICD-10-CM

## 2021-12-31 DIAGNOSIS — Z91.013 ALLERGY TO SEAFOOD: ICD-10-CM

## 2021-12-31 LAB — SARS-COV-2 RNA SPEC QL NAA+PROBE: DETECTED

## 2021-12-31 PROCEDURE — 99284 EMERGENCY DEPT VISIT MOD MDM: CPT

## 2021-12-31 PROCEDURE — 71045 X-RAY EXAM CHEST 1 VIEW: CPT | Mod: 26

## 2021-12-31 PROCEDURE — 93010 ELECTROCARDIOGRAM REPORT: CPT

## 2021-12-31 RX ORDER — ACETAMINOPHEN 500 MG
650 TABLET ORAL ONCE
Refills: 0 | Status: COMPLETED | OUTPATIENT
Start: 2021-12-31 | End: 2021-12-31

## 2021-12-31 RX ORDER — AZITHROMYCIN 500 MG/1
1 TABLET, FILM COATED ORAL
Qty: 6 | Refills: 0
Start: 2021-12-31 | End: 2022-01-04

## 2021-12-31 RX ADMIN — Medication 650 MILLIGRAM(S): at 07:55

## 2021-12-31 NOTE — ED PROVIDER NOTE - PROGRESS NOTE DETAILS
MQ: Cough, body aches, headache, chills, chest discomfort x 1 week, + rhinovirus, will obtain covid swab, ecg, cxr, and reassess MQ: ecg, cxr nl, will d/c

## 2021-12-31 NOTE — ED PROVIDER NOTE - NSICDXPASTMEDICALHX_GEN_ALL_CORE_FT
PAST MEDICAL HISTORY:  Psychiatric complaint      PAST MEDICAL HISTORY:  DM (diabetes mellitus)     HTN (hypertension)     Psychiatric complaint

## 2021-12-31 NOTE — ED PROVIDER NOTE - NS ED ROS FT
Review of Systems:  •	CONSTITUTIONAL - No fever, No diaphoresis, No weight change  •	SKIN - No rash  •	HEMATOLOGIC - No abnormal bleeding or bruising  •	EYES - No eye pain, No blurred vision  •	ENT - No change in hearing, No sore throat, No neck pain, No rhinorrhea, No ear pain  •	RESPIRATORY - + shortness of breath, + cough  •	CARDIAC -+ chest discomfort, No palpitations  •	GI - No abdominal pain, No nausea, No vomiting, No diarrhea, No constipation, No bright red blood per rectum or melena. No flank pain  •                 - No dysuria, frequency, hematuria.   •	ENDO - No polydypsia, No polyuria, No heat/cold intolerance  •	MUSCULOSKELETAL - No joint paint, No swelling, No back pain, + body aches  •	NEUROLOGIC - No numbness, No focal weakness, + headache, No dizziness  All other systems negative, unless specified in HPI

## 2021-12-31 NOTE — ED PROVIDER NOTE - NSFOLLOWUPINSTRUCTIONS_ED_ALL_ED_FT
[de-identified] : Patient is a 44 year old female who presents with left sided ear fullness and jaw clicking. Patient denies otalgia, otorrhea, tinnitus, and change in hearing. The jaw clicking is long standing and she uses a mouth guard with little relief. The clicking is worse in the morning. She has a history of chronic migraines, she has migraines 4-5 times a week and they last half a day. She takes Advil or Tylenol PM to relieve symptoms.  Cough    Coughing is a reflex that clears your throat and your airways. Coughing helps to heal and protect your lungs. It is normal to cough occasionally, but a cough that happens with other symptoms or lasts a long time may be a sign of a condition that needs treatment. Coughing may be caused by infections, asthma or COPD, smoking, postnasal drip, gastroesophageal reflux, as well as other medical conditions. Take medicines only as instructed by your health care provider. Avoid environments or triggers that causes you to cough at work or at home.    SEEK IMMEDIATE MEDICAL CARE IF YOU HAVE ANY OF THE FOLLOWING SYMPTOMS: coughing up blood, shortness of breath, rapid heart rate, chest pain, unexplained weight loss or night sweats.    Chest Pain    Chest pain can be caused by many different conditions which may or may not be dangerous. Causes include heartburn, lung infections, heart attack, blood clot in lungs, skin infections, strain or damage to muscle, cartilage, or bones, etc. In addition to a history and physical examination, an electrocardiogram (ECG) or other lab tests may have been performed to determine the cause of your chest pain. Follow up with your primary care provider or with a cardiologist as instructed.     SEEK IMMEDIATE MEDICAL CARE IF YOU HAVE ANY OF THE FOLLOWING SYMPTOMS: worsening chest pain, coughing up blood, unexplained back/neck/jaw pain, severe abdominal pain, dizziness or lightheadedness, fainting, shortness of breath, sweaty or clammy skin, vomiting, or racing heart beat. These symptoms may represent a serious problem that is an emergency. Do not wait to see if the symptoms will go away. Get medical help right away. Call 911 and do not drive yourself to the hospital.      Please follow up with your primary care doctor in 1 to 2 weeks.

## 2021-12-31 NOTE — ED PROVIDER NOTE - CLINICAL SUMMARY MEDICAL DECISION MAKING FREE TEXT BOX
.    50 y/o M pmh DM, HTN p/w cough, myalgia, tussive chest dicomfort, sob, HA, subjective fever and chills. Exam as noted. pt nad, no hypoxia, no acute events.. COVID swab sent. EKG sinus tach, no stemi. CXR clear.  IMP: viral illness.  Pt stable for dc w/ continued oupt w/up, pmd f/up, and care as discussed.  Pt understands plan and signs and symptoms for ED return. DC home.     .

## 2021-12-31 NOTE — ED PROVIDER NOTE - PATIENT PORTAL LINK FT
You can access the FollowMyHealth Patient Portal offered by [facility] by registering at the following website: [email or web address]. By joining [employer]’s FollowMyHealth portal, you will also be able to view your health information using other applications (apps) compatible with our system.

## 2021-12-31 NOTE — ED PROVIDER NOTE - OBJECTIVE STATEMENT
Patient is a 52 yo male with PMHx of DM, HTN c/o cough, body aches, headache, and chills x 1 week. Patient started with mild cough, mild midsternal intermittent sharp chest discomfort, increased SOB, mild, diffuse, crampy body aches, diffuse mild dull headache, and chills 1 week ago. For past couple of weeks, has had recurrent ED visits for similar symptoms, was diagnosed over 1 week ago with rhinovirus. Denies fever, abdominal pain, n/v/d. Fully vaccinated for COVID. No known sick contacts.

## 2021-12-31 NOTE — ED PROVIDER NOTE - PHYSICAL EXAMINATION
CONSTITUTIONAL: Well-developed; well-nourished; in no acute distress.   SKIN: warm, dry  HEAD: Normocephalic; atraumatic.  EYES: no conjunctival injection. PERRL.   ENT: No nasal discharge; airway clear.  NECK: Supple; non tender.  CARD: S1, S2 normal; no murmurs, gallops, or rubs. Regular rate and rhythm.   RESP: No respiratory accessory muscle use. No wheezes, rales or rhonchi.  ABD: soft ntnd  EXT: Normal ROM.  No clubbing, cyanosis or edema.   LYMPH: No acute cervical adenopathy.  NEURO: Alert, oriented, grossly unremarkable. Normal sensation and full strength in all 4 extremities.  PSYCH: Cooperative, appropriate.

## 2021-12-31 NOTE — ED PROVIDER NOTE - CARE PLAN
1 Principal Discharge DX:	Cough  Secondary Diagnosis:	Headache  Secondary Diagnosis:	Body aches  Secondary Diagnosis:	Chest discomfort

## 2022-02-08 ENCOUNTER — EMERGENCY (EMERGENCY)
Facility: HOSPITAL | Age: 52
LOS: 0 days | Discharge: HOME | End: 2022-02-09
Attending: EMERGENCY MEDICINE | Admitting: EMERGENCY MEDICINE
Payer: MEDICAID

## 2022-02-08 VITALS
HEART RATE: 105 BPM | SYSTOLIC BLOOD PRESSURE: 149 MMHG | TEMPERATURE: 98 F | HEIGHT: 66 IN | DIASTOLIC BLOOD PRESSURE: 96 MMHG | OXYGEN SATURATION: 99 % | WEIGHT: 149.91 LBS | RESPIRATION RATE: 18 BRPM

## 2022-02-08 DIAGNOSIS — M25.511 PAIN IN RIGHT SHOULDER: ICD-10-CM

## 2022-02-08 DIAGNOSIS — Z91.018 ALLERGY TO OTHER FOODS: ICD-10-CM

## 2022-02-08 DIAGNOSIS — E11.9 TYPE 2 DIABETES MELLITUS WITHOUT COMPLICATIONS: ICD-10-CM

## 2022-02-08 DIAGNOSIS — Z88.8 ALLERGY STATUS TO OTHER DRUGS, MEDICAMENTS AND BIOLOGICAL SUBSTANCES STATUS: ICD-10-CM

## 2022-02-08 DIAGNOSIS — Z88.0 ALLERGY STATUS TO PENICILLIN: ICD-10-CM

## 2022-02-08 DIAGNOSIS — I10 ESSENTIAL (PRIMARY) HYPERTENSION: ICD-10-CM

## 2022-02-08 DIAGNOSIS — F17.200 NICOTINE DEPENDENCE, UNSPECIFIED, UNCOMPLICATED: ICD-10-CM

## 2022-02-08 PROCEDURE — 99284 EMERGENCY DEPT VISIT MOD MDM: CPT

## 2022-02-08 RX ORDER — ACETAMINOPHEN 500 MG
975 TABLET ORAL ONCE
Refills: 0 | Status: COMPLETED | OUTPATIENT
Start: 2022-02-08 | End: 2022-02-08

## 2022-02-08 RX ADMIN — Medication 975 MILLIGRAM(S): at 22:27

## 2022-02-08 NOTE — ED PROVIDER NOTE - PHYSICAL EXAMINATION
VITAL SIGNS: I have reviewed nursing notes and confirm.  CONSTITUTIONAL: Well-developed; well-nourished; in no acute distress.  SKIN: Skin exam is warm and dry, no acute rash.  HEAD: Normocephalic; atraumatic.  EYES: PERRL, EOM intact; conjunctiva and sclera clear.  ENT: No nasal discharge; airway clear.   CARD: RRR, no murmur  RESP: No wheezes, rales or rhonchi.  ABD: Normal bowel sounds; soft; non-distended; non-tender  EXT: Normal ROM. No deformity, swelling, has ttp over anterior R shoulder, good pulses  NEURO: Alert, oriented. Grossly unremarkable. No focal deficits, 5/5 strength at shoulder, elbow, wrist, 5/5  strength  PSYCH: Cooperative, appropriate.

## 2022-02-08 NOTE — ED PROVIDER NOTE - PATIENT PORTAL LINK FT
You can access the FollowMyHealth Patient Portal offered by [facility] by registering at the following website: [email or web address]. By joining [employer]’s FollowMyHealth portal, you will also be able to view your health information using other applications (apps) compatible with our system.

## 2022-02-08 NOTE — ED PROVIDER NOTE - CLINICAL SUMMARY MEDICAL DECISION MAKING FREE TEXT BOX
XR without fracture or dislocation, patient's pain improved with tylenol. Will dc home with follow up with ortho, return precautions, prn tylenol

## 2022-02-08 NOTE — ED PROVIDER NOTE - NSFOLLOWUPINSTRUCTIONS_ED_ALL_ED_FT
Take tylenol as needed for pain.     Call to make an appointment to follow up in the orthopedic clinic.      Shoulder Pain    WHAT YOU NEED TO KNOW:    Shoulder pain is a common problem that can affect your daily activities. Pain can be caused by a problem within your shoulder, such as soreness of a tendon or bursa. A tendon is a cord of tough tissue that connects your muscles to your bones. The bursa is a fluid-filled sac that acts as a cushion between a bone and a tendon. Shoulder pain may also be caused by pain that spreads to your shoulder from another part of your body.      DISCHARGE INSTRUCTIONS:    Return to the emergency department if:   •You have severe pain.      •You cannot move your arm or shoulder.      •You have numbness or tingling in your shoulder or arm.      Contact your healthcare provider if:   •Your pain gets worse or does not go away with treatment.      •You have trouble moving your arm or shoulder.      •You have questions or concerns about your condition or care.      Medicines: You may need any of the following:   •Acetaminophen decreases pain and fever. It is available without a doctor's order. Ask how much to take and how often to take it. Follow directions. Read the labels of all other medicines you are using to see if they also contain acetaminophen, or ask your doctor or pharmacist. Acetaminophen can cause liver damage if not taken correctly. Do not use more than 4 grams (4,000 milligrams) total of acetaminophen in one day.       •NSAIDs, such as ibuprofen, help decrease swelling, pain, and fever. This medicine is available with or without a doctor's order. NSAIDs can cause stomach bleeding or kidney problems in certain people. If you take blood thinner medicine, always ask your healthcare provider if NSAIDs are safe for you. Always read the medicine label and follow directions.      •Take your medicine as directed. Contact your healthcare provider if you think your medicine is not helping or if you have side effects. Tell him of her if you are allergic to any medicine. Keep a list of the medicines, vitamins, and herbs you take. Include the amounts, and when and why you take them. Bring the list or the pill bottles to follow-up visits. Carry your medicine list with you in case of an emergency.      Manage your symptoms:   •Apply ice on your shoulder for 20 to 30 minutes every 2 hours or as directed. Use an ice pack, or put crushed ice in a plastic bag. Cover it with a towel before you apply it to your shoulder. Ice helps prevent tissue damage and decreases swelling and pain.      •Apply heat if ice does not help your symptoms. Apply heat on your shoulder for 20 to 30 minutes every 2 hours for as many days as directed. Heat helps decrease pain and muscle spasms.      •Limit activities as directed. Try to avoid repeated overhead movements.      •Go to physical or occupational therapy as directed. A physical therapist teaches you exercises to help improve movement and strength, and to decrease pain. An occupational therapist teaches you skills to help with your daily activities.       Prevent shoulder pain:   •Maintain a good range of motion in your shoulder. Ask your healthcare provider which exercises you should do on a regular basis after you have healed.       •Stretch and strengthen your shoulder. Use proper technique during exercises and sports.      Follow up with your healthcare provider or orthopedist as directed: Write down your questions so you remember to ask them during your visits.

## 2022-02-08 NOTE — ED PROVIDER NOTE - NS ED ROS FT
Constitutional: no fever, chills, no recent weight loss, change in appetite or malaise  Cardiac: No chest pain, SOB or edema.  Respiratory: No cough or respiratory distress  GI: No nausea, vomiting, diarrhea or abdominal pain.  : No dysuria, frequency, urgency or hematuria  MS: see HPI  Neuro: No headache or weakness. No LOC.  Skin: No skin rash.  Except as documented in the HPI, all other systems are negative.

## 2022-02-08 NOTE — ED PROVIDER NOTE - OBJECTIVE STATEMENT
51-year-old male with history of hypertension, DM 2 psychiatric illness, residing at [facility], presents the ED with right shoulder pain x1 day. Patient notes he developed atraumatic shoulder pain around 10 PM last night.  Pain is described as throbbing, localized to anterior right shoulder.  Pain is not worse with movement, heavy lifting.  Pain does not radiate down the arm.  No associated numbness or tingling to hand, no associated decreased  strength.  Denies recent fall, heavy lifting, new activity.  Patient has not taken any medication for this pain.

## 2022-02-08 NOTE — ED ADULT TRIAGE NOTE - CHIEF COMPLAINT QUOTE
Pt c/o of R shoulder pain. Pt states pain started today when he woke up. Denies any trauma to the area.

## 2022-02-08 NOTE — ED PROVIDER NOTE - MDM PATIENT STATEMENT FOR ADDL TREATMENT
complains of pain/discomfort Patient with one or more new problems requiring additional work-up/treatment.

## 2022-02-08 NOTE — ED PROVIDER NOTE - NSFOLLOWUPCLINICS_GEN_ALL_ED_FT
[facility]  Orthpedic  [address], [place]   Phone: [phone]  Fax:   Follow Up Time: 7-10 Days

## 2022-02-09 PROBLEM — I10 ESSENTIAL (PRIMARY) HYPERTENSION: Chronic | Status: ACTIVE | Noted: 2021-12-31

## 2022-02-09 PROBLEM — E11.9 TYPE 2 DIABETES MELLITUS WITHOUT COMPLICATIONS: Chronic | Status: ACTIVE | Noted: 2021-12-31

## 2022-02-09 PROCEDURE — 73030 X-RAY EXAM OF SHOULDER: CPT | Mod: 26,RT

## 2022-04-18 ENCOUNTER — EMERGENCY (EMERGENCY)
Facility: HOSPITAL | Age: 52
LOS: 0 days | Discharge: HOME | End: 2022-04-19
Attending: STUDENT IN AN ORGANIZED HEALTH CARE EDUCATION/TRAINING PROGRAM | Admitting: STUDENT IN AN ORGANIZED HEALTH CARE EDUCATION/TRAINING PROGRAM
Payer: MEDICAID

## 2022-04-18 VITALS — HEART RATE: 88 BPM

## 2022-04-18 VITALS
HEART RATE: 107 BPM | SYSTOLIC BLOOD PRESSURE: 138 MMHG | RESPIRATION RATE: 18 BRPM | OXYGEN SATURATION: 99 % | DIASTOLIC BLOOD PRESSURE: 85 MMHG | HEIGHT: 66 IN | TEMPERATURE: 100 F

## 2022-04-18 DIAGNOSIS — Z91.013 ALLERGY TO SEAFOOD: ICD-10-CM

## 2022-04-18 DIAGNOSIS — R10.10 UPPER ABDOMINAL PAIN, UNSPECIFIED: ICD-10-CM

## 2022-04-18 DIAGNOSIS — Z88.0 ALLERGY STATUS TO PENICILLIN: ICD-10-CM

## 2022-04-18 DIAGNOSIS — F20.9 SCHIZOPHRENIA, UNSPECIFIED: ICD-10-CM

## 2022-04-18 DIAGNOSIS — I10 ESSENTIAL (PRIMARY) HYPERTENSION: ICD-10-CM

## 2022-04-18 DIAGNOSIS — Z88.8 ALLERGY STATUS TO OTHER DRUGS, MEDICAMENTS AND BIOLOGICAL SUBSTANCES: ICD-10-CM

## 2022-04-18 DIAGNOSIS — E11.9 TYPE 2 DIABETES MELLITUS WITHOUT COMPLICATIONS: ICD-10-CM

## 2022-04-18 LAB
ALBUMIN SERPL ELPH-MCNC: 4.3 G/DL — SIGNIFICANT CHANGE UP (ref 3.5–5.2)
ALP SERPL-CCNC: 77 U/L — SIGNIFICANT CHANGE UP (ref 30–115)
ALT FLD-CCNC: 10 U/L — SIGNIFICANT CHANGE UP (ref 0–41)
ANION GAP SERPL CALC-SCNC: 13 MMOL/L — SIGNIFICANT CHANGE UP (ref 7–14)
AST SERPL-CCNC: 20 U/L — SIGNIFICANT CHANGE UP (ref 0–41)
BASOPHILS # BLD AUTO: 0.02 K/UL — SIGNIFICANT CHANGE UP (ref 0–0.2)
BASOPHILS NFR BLD AUTO: 0.3 % — SIGNIFICANT CHANGE UP (ref 0–1)
BILIRUB SERPL-MCNC: <0.2 MG/DL — SIGNIFICANT CHANGE UP (ref 0.2–1.2)
BUN SERPL-MCNC: 9 MG/DL — LOW (ref 10–20)
CALCIUM SERPL-MCNC: 9.8 MG/DL — SIGNIFICANT CHANGE UP (ref 8.5–10.1)
CHLORIDE SERPL-SCNC: 101 MMOL/L — SIGNIFICANT CHANGE UP (ref 98–110)
CO2 SERPL-SCNC: 22 MMOL/L — SIGNIFICANT CHANGE UP (ref 17–32)
CREAT SERPL-MCNC: 0.8 MG/DL — SIGNIFICANT CHANGE UP (ref 0.7–1.5)
EGFR: 108 ML/MIN/1.73M2 — SIGNIFICANT CHANGE UP
EOSINOPHIL # BLD AUTO: 0.07 K/UL — SIGNIFICANT CHANGE UP (ref 0–0.7)
EOSINOPHIL NFR BLD AUTO: 1.1 % — SIGNIFICANT CHANGE UP (ref 0–8)
GLUCOSE SERPL-MCNC: 89 MG/DL — SIGNIFICANT CHANGE UP (ref 70–99)
HCT VFR BLD CALC: 38.9 % — LOW (ref 42–52)
HGB BLD-MCNC: 12.4 G/DL — LOW (ref 14–18)
IMM GRANULOCYTES NFR BLD AUTO: 0.2 % — SIGNIFICANT CHANGE UP (ref 0.1–0.3)
LACTATE SERPL-SCNC: 2.5 MMOL/L — HIGH (ref 0.7–2)
LIDOCAIN IGE QN: 86 U/L — HIGH (ref 7–60)
LYMPHOCYTES # BLD AUTO: 2.81 K/UL — SIGNIFICANT CHANGE UP (ref 1.2–3.4)
LYMPHOCYTES # BLD AUTO: 42.8 % — SIGNIFICANT CHANGE UP (ref 20.5–51.1)
MCHC RBC-ENTMCNC: 26.1 PG — LOW (ref 27–31)
MCHC RBC-ENTMCNC: 31.9 G/DL — LOW (ref 32–37)
MCV RBC AUTO: 81.7 FL — SIGNIFICANT CHANGE UP (ref 80–94)
MONOCYTES # BLD AUTO: 0.64 K/UL — HIGH (ref 0.1–0.6)
MONOCYTES NFR BLD AUTO: 9.8 % — HIGH (ref 1.7–9.3)
NEUTROPHILS # BLD AUTO: 3.01 K/UL — SIGNIFICANT CHANGE UP (ref 1.4–6.5)
NEUTROPHILS NFR BLD AUTO: 45.8 % — SIGNIFICANT CHANGE UP (ref 42.2–75.2)
NRBC # BLD: 0 /100 WBCS — SIGNIFICANT CHANGE UP (ref 0–0)
PLATELET # BLD AUTO: 283 K/UL — SIGNIFICANT CHANGE UP (ref 130–400)
POTASSIUM SERPL-MCNC: 5.3 MMOL/L — HIGH (ref 3.5–5)
POTASSIUM SERPL-SCNC: 5.3 MMOL/L — HIGH (ref 3.5–5)
PROT SERPL-MCNC: 7.7 G/DL — SIGNIFICANT CHANGE UP (ref 6–8)
RBC # BLD: 4.76 M/UL — SIGNIFICANT CHANGE UP (ref 4.7–6.1)
RBC # FLD: 15.9 % — HIGH (ref 11.5–14.5)
SODIUM SERPL-SCNC: 136 MMOL/L — SIGNIFICANT CHANGE UP (ref 135–146)
WBC # BLD: 6.56 K/UL — SIGNIFICANT CHANGE UP (ref 4.8–10.8)
WBC # FLD AUTO: 6.56 K/UL — SIGNIFICANT CHANGE UP (ref 4.8–10.8)

## 2022-04-18 PROCEDURE — 99285 EMERGENCY DEPT VISIT HI MDM: CPT

## 2022-04-18 PROCEDURE — 76705 ECHO EXAM OF ABDOMEN: CPT | Mod: 26

## 2022-04-18 RX ORDER — SUCRALFATE 1 G
1 TABLET ORAL ONCE
Refills: 0 | Status: COMPLETED | OUTPATIENT
Start: 2022-04-18 | End: 2022-04-18

## 2022-04-18 RX ORDER — FAMOTIDINE 10 MG/ML
20 INJECTION INTRAVENOUS ONCE
Refills: 0 | Status: COMPLETED | OUTPATIENT
Start: 2022-04-18 | End: 2022-04-18

## 2022-04-18 RX ORDER — SODIUM CHLORIDE 9 MG/ML
1000 INJECTION, SOLUTION INTRAVENOUS ONCE
Refills: 0 | Status: COMPLETED | OUTPATIENT
Start: 2022-04-18 | End: 2022-04-18

## 2022-04-18 RX ORDER — DIPHENHYDRAMINE HYDROCHLORIDE AND LIDOCAINE HYDROCHLORIDE AND ALUMINUM HYDROXIDE AND MAGNESIUM HYDRO
10 KIT ONCE
Refills: 0 | Status: COMPLETED | OUTPATIENT
Start: 2022-04-18 | End: 2022-04-18

## 2022-04-18 RX ADMIN — FAMOTIDINE 20 MILLIGRAM(S): 10 INJECTION INTRAVENOUS at 19:35

## 2022-04-18 RX ADMIN — SODIUM CHLORIDE 1000 MILLILITER(S): 9 INJECTION, SOLUTION INTRAVENOUS at 19:35

## 2022-04-18 RX ADMIN — DIPHENHYDRAMINE HYDROCHLORIDE AND LIDOCAINE HYDROCHLORIDE AND ALUMINUM HYDROXIDE AND MAGNESIUM HYDRO 10 MILLILITER(S): KIT at 19:35

## 2022-04-18 RX ADMIN — Medication 1 GRAM(S): at 19:35

## 2022-04-18 NOTE — ED PROVIDER NOTE - NSFOLLOWUPCLINICS_GEN_ALL_ED_FT
A Family Medicine Doctor  Family Medicine  .  [place]   Phone:   Fax:   Follow Up Time: 1-3 Days    [address]   Phone: [phone]  Fax:   Follow Up Time: 1-3 Days

## 2022-04-18 NOTE — ED PROVIDER NOTE - OBJECTIVE STATEMENT
50 y/o M pmh HTN, DM, schizophrenia (as per chart), p/w intermittent upper abd pain x2d. No n/v/d. NO fever, no radiation. NO trauma. Pt has no eye complaints. Pt is poor historian.

## 2022-04-18 NOTE — ED PROVIDER NOTE - NS ED ROS FT
Constitutional:  See HPI  Eyes:  No visual changes  ENMT: No neck pain or stiffness  Cardiac:  No chest pain  Respiratory:  No cough or respiratory distress.   GI:  See hPI  :  No dysuria, frequency or burning.  MS:  No back pain.  Neuro:  No headache   Skin:  No skin rash  Except as documented in the HPI,  all other systems are negative

## 2022-04-18 NOTE — ED PROVIDER NOTE - NSFOLLOWUPINSTRUCTIONS_ED_ALL_ED_FT
No Abdominal Pain    Many things can cause abdominal pain. Usually, abdominal pain is not caused by a disease and will improve without treatment. Your health care provider will do a physical exam and possibly order blood tests and imaging to help determine the seriousness of your pain. However, in many cases, no cause may be found and you may need further testing as an outpatient. Monitor your abdominal pain for any changes.     SEEK IMMEDIATE MEDICAL CARE IF YOU HAVE THE FOLLOWING SYMPTOMS: worsening abdominal pain, vomiting, diarrhea, inability to have bowel movements or pass gas, black or bloody stool, fever accompanying chest pain or back pain, or dizziness/lightheadedness.

## 2022-04-18 NOTE — ED PROVIDER NOTE - PHYSICAL EXAMINATION
CONSTITUTIONAL: NAD  SKIN: Warm dry  HEAD: NCAT  EYES: NL inspection  ENT: MMM  NECK: Supple; non tender.  CARD: RRR  RESP: CTAB  ABD: Soft, + ttp upper abd, no r/g  EXT: no pedal edema  NEURO: Grossly unremarkable, + tremor noted, old as per pt.  PSYCH: Cooperative

## 2022-04-18 NOTE — ED PROVIDER NOTE - PATIENT PORTAL LINK FT
You can access the FollowMyHealth Patient Portal offered by [facility] by registering at the following website: [email or web address]. By joining [facility]’s FollowMyHealth portal, you will also be able to view your health information using other applications (apps) compatible with our system.

## 2022-04-18 NOTE — ED PROVIDER NOTE - PROGRESS NOTE DETAILS
Kacy: On reassessment, pt stating he is hungry and no longer has pain. On exam, abdomen soft, nontender, and nondistended. Will DC

## 2022-04-18 NOTE — ED PROVIDER NOTE - CARE PROVIDER_API CALL
Aren Donaldson (DO)  Gastroenterology  [address] [place] [id number]  Phone: [phone]  Fax: [phone]  Follow Up Time: 1-3 Days

## 2022-04-18 NOTE — ED PROVIDER NOTE - CLINICAL SUMMARY MEDICAL DECISION MAKING FREE TEXT BOX
.    pt w/ abd pain. pain resolved. labs reviewed. US neg for acute pathology. Pt PO tolerant. No acute events. Pt stable for dc w/ continued oupt w/up, pmd f/up, and care as discussed.  Pt understands plan and signs and symptoms for ED return. DC home.     .

## 2022-04-23 ENCOUNTER — INPATIENT (INPATIENT)
Facility: HOSPITAL | Age: 52
LOS: 4 days | Discharge: HOME | End: 2022-04-28
Attending: PSYCHIATRY & NEUROLOGY | Admitting: PSYCHIATRY & NEUROLOGY
Payer: MEDICAID

## 2022-04-23 VITALS
HEIGHT: 66 IN | OXYGEN SATURATION: 99 % | SYSTOLIC BLOOD PRESSURE: 134 MMHG | DIASTOLIC BLOOD PRESSURE: 98 MMHG | TEMPERATURE: 99 F | WEIGHT: 199.96 LBS | RESPIRATION RATE: 16 BRPM | HEART RATE: 100 BPM

## 2022-04-23 DIAGNOSIS — F25.0 SCHIZOAFFECTIVE DISORDER, BIPOLAR TYPE: ICD-10-CM

## 2022-04-23 LAB
ALBUMIN SERPL ELPH-MCNC: 4.5 G/DL — SIGNIFICANT CHANGE UP (ref 3.5–5.2)
ALP SERPL-CCNC: 83 U/L — SIGNIFICANT CHANGE UP (ref 30–115)
ALT FLD-CCNC: 9 U/L — SIGNIFICANT CHANGE UP (ref 0–41)
AMPHET UR-MCNC: NEGATIVE — SIGNIFICANT CHANGE UP
ANION GAP SERPL CALC-SCNC: 15 MMOL/L — HIGH (ref 7–14)
APAP SERPL-MCNC: <5 UG/ML — LOW (ref 10–30)
APPEARANCE UR: CLEAR — SIGNIFICANT CHANGE UP
AST SERPL-CCNC: 18 U/L — SIGNIFICANT CHANGE UP (ref 0–41)
BARBITURATES UR SCN-MCNC: NEGATIVE — SIGNIFICANT CHANGE UP
BASOPHILS # BLD AUTO: 0.01 K/UL — SIGNIFICANT CHANGE UP (ref 0–0.2)
BASOPHILS NFR BLD AUTO: 0.1 % — SIGNIFICANT CHANGE UP (ref 0–1)
BENZODIAZ UR-MCNC: NEGATIVE — SIGNIFICANT CHANGE UP
BILIRUB SERPL-MCNC: 0.3 MG/DL — SIGNIFICANT CHANGE UP (ref 0.2–1.2)
BILIRUB UR-MCNC: NEGATIVE — SIGNIFICANT CHANGE UP
BUN SERPL-MCNC: 7 MG/DL — LOW (ref 10–20)
CALCIUM SERPL-MCNC: 9.7 MG/DL — SIGNIFICANT CHANGE UP (ref 8.5–10.1)
CHLORIDE SERPL-SCNC: 102 MMOL/L — SIGNIFICANT CHANGE UP (ref 98–110)
CO2 SERPL-SCNC: 22 MMOL/L — SIGNIFICANT CHANGE UP (ref 17–32)
COCAINE METAB.OTHER UR-MCNC: NEGATIVE — SIGNIFICANT CHANGE UP
COLOR SPEC: COLORLESS — SIGNIFICANT CHANGE UP
CREAT SERPL-MCNC: 0.8 MG/DL — SIGNIFICANT CHANGE UP (ref 0.7–1.5)
DIFF PNL FLD: NEGATIVE — SIGNIFICANT CHANGE UP
DRUG SCREEN 1, URINE RESULT: SIGNIFICANT CHANGE UP
EGFR: 108 ML/MIN/1.73M2 — SIGNIFICANT CHANGE UP
EOSINOPHIL # BLD AUTO: 0.04 K/UL — SIGNIFICANT CHANGE UP (ref 0–0.7)
EOSINOPHIL NFR BLD AUTO: 0.5 % — SIGNIFICANT CHANGE UP (ref 0–8)
ETHANOL SERPL-MCNC: <10 MG/DL — SIGNIFICANT CHANGE UP
FENTANYL UR QL: NEGATIVE — SIGNIFICANT CHANGE UP
GLUCOSE BLDC GLUCOMTR-MCNC: 111 MG/DL — HIGH (ref 70–99)
GLUCOSE SERPL-MCNC: 136 MG/DL — HIGH (ref 70–99)
GLUCOSE UR QL: NEGATIVE — SIGNIFICANT CHANGE UP
HCT VFR BLD CALC: 39.5 % — LOW (ref 42–52)
HGB BLD-MCNC: 12.4 G/DL — LOW (ref 14–18)
IMM GRANULOCYTES NFR BLD AUTO: 0.3 % — SIGNIFICANT CHANGE UP (ref 0.1–0.3)
KETONES UR-MCNC: NEGATIVE — SIGNIFICANT CHANGE UP
LEUKOCYTE ESTERASE UR-ACNC: NEGATIVE — SIGNIFICANT CHANGE UP
LYMPHOCYTES # BLD AUTO: 2.46 K/UL — SIGNIFICANT CHANGE UP (ref 1.2–3.4)
LYMPHOCYTES # BLD AUTO: 31 % — SIGNIFICANT CHANGE UP (ref 20.5–51.1)
MCHC RBC-ENTMCNC: 25.8 PG — LOW (ref 27–31)
MCHC RBC-ENTMCNC: 31.4 G/DL — LOW (ref 32–37)
MCV RBC AUTO: 82.1 FL — SIGNIFICANT CHANGE UP (ref 80–94)
METHADONE UR-MCNC: NEGATIVE — SIGNIFICANT CHANGE UP
MONOCYTES # BLD AUTO: 0.82 K/UL — HIGH (ref 0.1–0.6)
MONOCYTES NFR BLD AUTO: 10.3 % — HIGH (ref 1.7–9.3)
NEUTROPHILS # BLD AUTO: 4.58 K/UL — SIGNIFICANT CHANGE UP (ref 1.4–6.5)
NEUTROPHILS NFR BLD AUTO: 57.8 % — SIGNIFICANT CHANGE UP (ref 42.2–75.2)
NITRITE UR-MCNC: NEGATIVE — SIGNIFICANT CHANGE UP
NRBC # BLD: 0 /100 WBCS — SIGNIFICANT CHANGE UP (ref 0–0)
OPIATES UR-MCNC: NEGATIVE — SIGNIFICANT CHANGE UP
OXYCODONE UR-MCNC: NEGATIVE — SIGNIFICANT CHANGE UP
PCP UR-MCNC: NEGATIVE — SIGNIFICANT CHANGE UP
PH UR: 6.5 — SIGNIFICANT CHANGE UP (ref 5–8)
PLATELET # BLD AUTO: 216 K/UL — SIGNIFICANT CHANGE UP (ref 130–400)
POTASSIUM SERPL-MCNC: 5.3 MMOL/L — HIGH (ref 3.5–5)
POTASSIUM SERPL-SCNC: 5.3 MMOL/L — HIGH (ref 3.5–5)
PROPOXYPHENE QUALITATIVE URINE RESULT: NEGATIVE — SIGNIFICANT CHANGE UP
PROT SERPL-MCNC: 7.5 G/DL — SIGNIFICANT CHANGE UP (ref 6–8)
PROT UR-MCNC: NEGATIVE — SIGNIFICANT CHANGE UP
RBC # BLD: 4.81 M/UL — SIGNIFICANT CHANGE UP (ref 4.7–6.1)
RBC # FLD: 15.6 % — HIGH (ref 11.5–14.5)
SALICYLATES SERPL-MCNC: <0.3 MG/DL — LOW (ref 4–30)
SARS-COV-2 RNA SPEC QL NAA+PROBE: SIGNIFICANT CHANGE UP
SODIUM SERPL-SCNC: 139 MMOL/L — SIGNIFICANT CHANGE UP (ref 135–146)
SP GR SPEC: 1 — LOW (ref 1.01–1.03)
THC UR QL: NEGATIVE — SIGNIFICANT CHANGE UP
UROBILINOGEN FLD QL: SIGNIFICANT CHANGE UP
WBC # BLD: 7.93 K/UL — SIGNIFICANT CHANGE UP (ref 4.8–10.8)
WBC # FLD AUTO: 7.93 K/UL — SIGNIFICANT CHANGE UP (ref 4.8–10.8)

## 2022-04-23 PROCEDURE — 90791 PSYCH DIAGNOSTIC EVALUATION: CPT

## 2022-04-23 PROCEDURE — 99285 EMERGENCY DEPT VISIT HI MDM: CPT

## 2022-04-23 PROCEDURE — 93010 ELECTROCARDIOGRAM REPORT: CPT

## 2022-04-23 RX ORDER — CLOZAPINE 150 MG/1
400 TABLET, ORALLY DISINTEGRATING ORAL AT BEDTIME
Refills: 0 | Status: DISCONTINUED | OUTPATIENT
Start: 2022-04-24 | End: 2022-04-28

## 2022-04-23 RX ORDER — ATORVASTATIN CALCIUM 80 MG/1
40 TABLET, FILM COATED ORAL AT BEDTIME
Refills: 0 | Status: DISCONTINUED | OUTPATIENT
Start: 2022-04-24 | End: 2022-04-28

## 2022-04-23 RX ORDER — LEVOTHYROXINE SODIUM 125 MCG
25 TABLET ORAL DAILY
Refills: 0 | Status: DISCONTINUED | OUTPATIENT
Start: 2022-04-24 | End: 2022-04-28

## 2022-04-23 RX ORDER — DIVALPROEX SODIUM 500 MG/1
750 TABLET, DELAYED RELEASE ORAL AT BEDTIME
Refills: 0 | Status: DISCONTINUED | OUTPATIENT
Start: 2022-04-24 | End: 2022-04-28

## 2022-04-23 RX ORDER — METOPROLOL TARTRATE 50 MG
25 TABLET ORAL DAILY
Refills: 0 | Status: DISCONTINUED | OUTPATIENT
Start: 2022-04-24 | End: 2022-04-28

## 2022-04-23 RX ORDER — RISPERIDONE 4 MG/1
3 TABLET ORAL AT BEDTIME
Refills: 0 | Status: DISCONTINUED | OUTPATIENT
Start: 2022-04-24 | End: 2022-04-28

## 2022-04-23 RX ORDER — DIVALPROEX SODIUM 500 MG/1
500 TABLET, DELAYED RELEASE ORAL DAILY
Refills: 0 | Status: DISCONTINUED | OUTPATIENT
Start: 2022-04-24 | End: 2022-04-28

## 2022-04-23 RX ORDER — OLANZAPINE 15 MG/1
5 TABLET, FILM COATED ORAL EVERY 6 HOURS
Refills: 0 | Status: DISCONTINUED | OUTPATIENT
Start: 2022-04-24 | End: 2022-04-28

## 2022-04-23 RX ORDER — CLOZAPINE 150 MG/1
300 TABLET, ORALLY DISINTEGRATING ORAL DAILY
Refills: 0 | Status: DISCONTINUED | OUTPATIENT
Start: 2022-04-24 | End: 2022-04-28

## 2022-04-23 RX ORDER — MIDAZOLAM HYDROCHLORIDE 1 MG/ML
2 INJECTION, SOLUTION INTRAMUSCULAR; INTRAVENOUS ONCE
Refills: 0 | Status: DISCONTINUED | OUTPATIENT
Start: 2022-04-23 | End: 2022-04-23

## 2022-04-23 RX ORDER — HYDROXYZINE HCL 10 MG
50 TABLET ORAL EVERY 6 HOURS
Refills: 0 | Status: DISCONTINUED | OUTPATIENT
Start: 2022-04-23 | End: 2022-04-28

## 2022-04-23 RX ORDER — HALOPERIDOL DECANOATE 100 MG/ML
5 INJECTION INTRAMUSCULAR ONCE
Refills: 0 | Status: DISCONTINUED | OUTPATIENT
Start: 2022-04-23 | End: 2022-04-23

## 2022-04-23 RX ADMIN — Medication 50 MILLIGRAM(S): at 17:36

## 2022-04-23 RX ADMIN — MIDAZOLAM HYDROCHLORIDE 2 MILLIGRAM(S): 1 INJECTION, SOLUTION INTRAMUSCULAR; INTRAVENOUS at 19:15

## 2022-04-23 NOTE — ED BEHAVIORAL HEALTH ASSESSMENT NOTE - DETAILS
verbal handoff Patient cut self this morning while experiencing command auditory hallucinations to do so allergies to haldol and thorazine - pt and staff unable to clarify reactions Patient feels as though he might become violent if he were to return to [facility] today; he does not have a plan or intent, states he doesn't want to hurt anyone spoke with [facility] staff

## 2022-04-23 NOTE — ED PROVIDER NOTE - CLINICAL SUMMARY MEDICAL DECISION MAKING FREE TEXT BOX
Patient remained hemodynamically stable during the course of ED stay. Discussed with patient about the results of the diagnostic studies. Psychiatry consult obtained. Patient was evaluated by psychiatry and as recommended, is admitted to P in stable condition.

## 2022-04-23 NOTE — ED PROVIDER NOTE - OBJECTIVE STATEMENT
50 yo M with pmhx of schizophrenia presenting for suicidal attempt - patient tried to cut his left wrist. States he stopped taking his meds this week. Reports having hallucination/voices telling him to kill himself, people, and have sex with others. No cp, sob, fever, chills, abdominal pain, nausea, vomiting, diarrhea, back pain, urinary symptoms, headache, dizziness, paresthesias, or weakness.

## 2022-04-23 NOTE — ED ADULT NURSE NOTE - NSIMPLEMENTINTERV_GEN_ALL_ED
Implemented All Fall Risk Interventions:  [facility] to call system. Call bell, personal items and telephone within reach. Instruct patient to call for assistance. Room bathroom lighting operational. Non-slip footwear when patient is off stretcher. Physically safe environment: no spills, clutter or unnecessary equipment. Stretcher in lowest position, wheels locked, appropriate side rails in place. Provide visual cue, wrist band, yellow gown, etc. Monitor gait and stability. Monitor for mental status changes and reorient to person, place, and time. Review medications for side effects contributing to fall risk. Reinforce activity limits and safety measures with patient and family.

## 2022-04-23 NOTE — ED BEHAVIORAL HEALTH ASSESSMENT NOTE - RISK ASSESSMENT
High Acute Suicide Risk Risk factors: hx of schizophrenia, hx of multiple suicide attempts and IPP hospitalizations, poor social support, poor coping skills, recent act of self harm, active CAH to hurt self and others, hx of treatment resistant schizophrenia      Protective: future oriented, care seeking

## 2022-04-23 NOTE — ED PROVIDER NOTE - NS ED ROS FT
Review of Systems:  	•	CONSTITUTIONAL - no fever, no diaphoresis, no chills  	•	SKIN - no rash  	•	HEMATOLOGIC - no bleeding, no bruising  	•	EYES - no eye pain, no blurry vision  	•	ENT - no congestion  	•	RESPIRATORY - no shortness of breath, no cough  	•	CARDIAC - no chest pain, no palpitations  	•	GI - no abd pain, no nausea, no vomiting, no diarrhea, no constipation  	•	GENITO-URINARY - no dysuria; no hematuria, no increased urinary frequency  	•	MUSCULOSKELETAL - no joint paint, no swelling, no redness  	•	NEUROLOGIC - no weakness, no headache, no paresthesias, no LOC  	•	PSYCH - no anxiety, + suicidal, + homicidal, + hallucination, no depression  	All other ROS are negative except as documented in HPI.

## 2022-04-23 NOTE — ED BEHAVIORAL HEALTH ASSESSMENT NOTE - SUMMARY
SUDHA PARKER is a 51M domiciled at [facility] [address], with PMHx of hypothyroidism, diabetes, constipation, HTN, HLD, PPHx of schizoaffective disorder bipolar type, multiple IPP hospitalizations (last admission was at [place] from 2/22/22-3/3/22) hx of multiple suicide attempts.  Patient was BIBEMS from [facility] after he used a razor to superficially cut his wrist and told staff that he was hearing command auditory hallucinations that told him to do so.      Upon evaluation, patient is suffering from breakthrough psychotic symptoms of command auditory hallucinations to kill self or others, as well as visual hallucinations and depressive symptoms; these are likely related to a decompensation of schizophrenia secondary to psychosocial stressors of his residence.  These issues have led to the patient cutting himself today, which may have been out of frustration or with suicidal intent; however patient's insight is limited at this time.      Patient is requesting, and would benefit from, inpatient psychiatric admission for safety, stabilization and treatment. Patient has been compliant with medication and should be continued on his current regimen.    Plan:  - Admit to IPP at [facility] on 9.39 legal status, pending negative COVID status  - Continue home meds below, and confirm last Invega Sustenna Injection date with [facility] staff  - F/u CBC with diff, CMP  - Lipid panel, a1c, TSH  - Urinalysis, UDS  - PRNs: olanzapine 5mg Q6hrs for agitation, ativan 2mg Q6hrs for anxiety/atarax 50mg Q6hrs for anxiety    Medications:  - Depakote 500mg Qam/750mg Qhs  - Clozaril 300mg Qam/400mg Qhs  - Risperdal 3mg Qhs  - ativan 2mg Qhs  - Invega Sustenna 78mg IM [id number] (per staff pt may have received injection last month but unable to confirm exact date)  - Levothyroxine 25mcg Qdaily  - Metoprolol 25mg Qdaily  - metformin 1000mg Q12:00, Q17:00  - atorvastatin 40mg Qhs  - bisacodyl 5mg Qhs

## 2022-04-23 NOTE — ED PROVIDER NOTE - PHYSICAL EXAMINATION
VITAL SIGNS: I have reviewed nursing notes and confirm.  CONSTITUTIONAL: Well-developed; well-nourished; in no acute distress.  SKIN: +Superficial abrasion to left wrist. Remainder of skin exam is warm and dry, no acute rash.  HEAD: Normocephalic; atraumatic.  EYES: PERRL, EOM intact; conjunctiva and sclera clear.  ENT: No nasal discharge; airway clear.   NECK: Supple; non tender.  CARD: S1, S2 normal; no murmurs, gallops, or rubs. Regular rate and rhythm.  RESP: Clear to auscultation bilaterally. No wheezes, rales or rhonchi.  ABD: Normal bowel sounds; soft; non-distended; non-tender.   EXT: Normal ROM. No edema.  LYMPH: No acute cervical adenopathy.  NEURO: Alert, oriented. Grossly unremarkable. No focal deficits.  PSYCH: Cooperative, appropriate.

## 2022-04-23 NOTE — ED PROVIDER NOTE - PROGRESS NOTE DETAILS
pending EMS tranport - pt calm - signed out to Dr. Cardenas Patient remained stable in ED. Patient is awake, alert, sitting comfortably on the stretcher, appears comfortable. Patient denies any symptoms, is calm/co-operative. Patient was updated on the efforts of getting the transport to transfer to formerly [facility] unit.

## 2022-04-23 NOTE — ED ADULT NURSE NOTE - OBJECTIVE STATEMENT
Pt from [place] psychiatry unit with C/O hearing voices to kill  him self and others ,pt"  noted with self cutting left wrist with shaving razor" .

## 2022-04-23 NOTE — ED ADULT TRIAGE NOTE - CHIEF COMPLAINT QUOTE
Pt. sent in from [address] [place] [address] for cutting left wrist with shaving razor. States he has HI and SI.

## 2022-04-23 NOTE — ED PROVIDER NOTE - ATTENDING APP SHARED VISIT CONTRIBUTION OF CARE
Patient is a 51-year-old male with history of psychiatric disorder sent in from [facility] for self-harm attempt.  He endorsed to them that he had suicidal or homicidal ideation.  On arrival to ED patient is refusing to speak with staff.  Only pointing at superficial injuries on right wrist.    Exam: Normal gait, clear speech, nonfocal neuro exam, regular rate and rhythm, lungs clear to auscultation, soft nontender abdomen, superficial injuries to right wrist, calm  Plan: Labs, EKG, psych evaluation Patient is a 51-year-old male with history of psychiatric disorder sent in from [facility] for self-harm attempt.  He endorsed to them that he had suicidal or homicidal ideation.  On arrival to ED patient is refusing to speak with staff.  Only pointing at superficial injuries on left wrist.    Exam: Normal gait, clear speech, nonfocal neuro exam, regular rate and rhythm, lungs clear to auscultation, soft nontender abdomen, superficial injuries to left wrist, calm  Plan: Labs, EKG, psych evaluation

## 2022-04-23 NOTE — ED BEHAVIORAL HEALTH ASSESSMENT NOTE - CASE SUMMARY
Patient evaluated with resident, agree with note and plan.  NOTE: patient on 3 antipsychotics, medications were confirmed with residence; consider tapering risperdal and raising Invega Sustenna.

## 2022-04-23 NOTE — ED BEHAVIORAL HEALTH ASSESSMENT NOTE - HPI (INCLUDE ILLNESS QUALITY, SEVERITY, DURATION, TIMING, CONTEXT, MODIFYING FACTORS, ASSOCIATED SIGNS AND SYMPTOMS)
SUDHA PARKER is a 51M domiciled at [facility] [address], with PMHx of hypothyroidism, diabetes, constipation, HTN, HLD, PPHx of schizoaffective disorder bipolar type, multiple IPP hospitalizations (last admission was at [place] from 2/22/22-3/3/22) hx of multiple suicide attempts.  Patient was BIBEMS from [facility] after he used a razor to superficially cut his wrist and told staff that he was hearing command auditory hallucinations that told him to do so.      Upon approach, pt has labile affect, he is irritable but cooperative.  Appears to be reacting to internal stimuli.  Thought process is tangential and perseverative.      Patient tells the writer that he cut his wrist today because he as hearing voice that told him to do so; he endorses hearing voices that tell him to kill himself and also hearing voices that "tell him everyone is against him."  He states this has been happening on and off for "a long time".  He endorses that he takes his medications daily; he recalls taking clozapine and Depakote but is unable to remember other medications.  He states that his living situation at [facility] has been stressful due to "all the noise".  Patient cannot elaborate if the noises that are troubling him are from other residents, or from auditory hallucinations; in any case he appears very distressed by this.  Patient asks the writer on 2 accounts whether he is hearing music right now or if it's "the noises again".  He states that recently he has been feeling that he needs to "keep his distance from people because he's afraid he might hurt them."  He tells the writer that he "needs to get away from it all."   He endorses visual hallucinations of "shadows".  He endorses having suicidal thoughts that come and go, denies having a plan at this time or intent.  Denies any plan for hurting or killing someone else at this time, denies intent or any specific person he wants to harm.  He endorses feeling depressed with low mood, hopelessness, insomnia.  Denies manic symptoms.      Patient endorses that he has had multiple suicide attempts by hanging, cutting, and other modalities; when asked about further specifics he becomes irritable and walks out of the room and paces around the ED.  Patient states that has been hospitalized multiple times, including IPP at [place] and [place].      Patient denies alcohol, tobacco, or other substance use.  Patient states he has no family or friends.  He consents for writer to contact [facility] for collateral.      Spoke with [facility] staff who confirm that the pt was discharged from [facility] on 3/3/22 after being there for about 1 week; they state that he was admitted for similar issues of suicidal ideations and auditory hallucinations.  Since discharge he has been having intermittent SI and AH and then today he started to harm himself.  Staff confirms the pt has been compliant with medications.      Medication List:  - Depakote 500mg Qam/750mg Qhs  - Clozaril 300mg Qam/400mg Qhs  - Risperdal 3mg Qhs  - ativan 2mg Qhs  - Invega Sustenna 78mg IM [id number] (per staff pt may have received injection last month but unable to confirm exact date)  - Levothyroxine 25mcg Qdaily  - Metoprolol 25mg Qdaily  - metformin 1000mg Q12:00, Q17:00  - atorvastatin 40mg Qhs  - bisacodyl 5mg Qhs

## 2022-04-23 NOTE — ED ADULT NURSE NOTE - CHIEF COMPLAINT QUOTE
Pt. sent in from [address] [place] [address] for cutting left wrist with shaving razor. States he has HI and SI.

## 2022-04-23 NOTE — ED BEHAVIORAL HEALTH ASSESSMENT NOTE - DESCRIPTION
see HPI pt presented with superficial L Wrist laceration; per ED team the laceration does not require sutures/repair. hypothyroidism, HTN, HLD, constipation

## 2022-04-23 NOTE — ED PROVIDER NOTE - NS ED ATTENDING STATEMENT MOD
This was a shared visit with the AIDEN. I reviewed and verified the documentation and independently performed the documented:

## 2022-04-24 LAB
CHOLEST SERPL-MCNC: 176 MG/DL — SIGNIFICANT CHANGE UP
HDLC SERPL-MCNC: 37 MG/DL — LOW
LIPID PNL WITH DIRECT LDL SERPL: 119 MG/DL — HIGH
NON HDL CHOLESTEROL: 139 MG/DL — HIGH
TRIGL SERPL-MCNC: 98 MG/DL — SIGNIFICANT CHANGE UP
VALPROATE SERPL-MCNC: 29 UG/ML — LOW (ref 50–100)

## 2022-04-24 PROCEDURE — 99231 SBSQ HOSP IP/OBS SF/LOW 25: CPT

## 2022-04-24 RX ORDER — DIPHENHYDRAMINE HCL 50 MG
50 CAPSULE ORAL ONCE
Refills: 0 | Status: COMPLETED | OUTPATIENT
Start: 2022-04-24 | End: 2022-04-24

## 2022-04-24 RX ADMIN — Medication 2 MILLIGRAM(S): at 01:23

## 2022-04-24 RX ADMIN — Medication 2 MILLIGRAM(S): at 20:17

## 2022-04-24 RX ADMIN — CLOZAPINE 400 MILLIGRAM(S): 150 TABLET, ORALLY DISINTEGRATING ORAL at 20:17

## 2022-04-24 RX ADMIN — Medication 25 MICROGRAM(S): at 08:45

## 2022-04-24 RX ADMIN — CLOZAPINE 300 MILLIGRAM(S): 150 TABLET, ORALLY DISINTEGRATING ORAL at 09:32

## 2022-04-24 RX ADMIN — Medication 50 MILLIGRAM(S): at 01:24

## 2022-04-24 RX ADMIN — Medication 50 MILLIGRAM(S): at 01:23

## 2022-04-24 RX ADMIN — RISPERIDONE 3 MILLIGRAM(S): 4 TABLET ORAL at 20:18

## 2022-04-24 RX ADMIN — ATORVASTATIN CALCIUM 40 MILLIGRAM(S): 80 TABLET, FILM COATED ORAL at 20:16

## 2022-04-24 RX ADMIN — Medication 5 MILLIGRAM(S): at 20:18

## 2022-04-24 RX ADMIN — Medication 2 MILLIGRAM(S): at 01:24

## 2022-04-24 RX ADMIN — Medication 25 MILLIGRAM(S): at 09:33

## 2022-04-24 RX ADMIN — DIVALPROEX SODIUM 750 MILLIGRAM(S): 500 TABLET, DELAYED RELEASE ORAL at 20:17

## 2022-04-24 RX ADMIN — DIVALPROEX SODIUM 500 MILLIGRAM(S): 500 TABLET, DELAYED RELEASE ORAL at 09:33

## 2022-04-24 NOTE — PATIENT PROFILE BEHAVIORAL HEALTH - FALL HARM RISK - UNIVERSAL INTERVENTIONS
Bed in lowest position, wheels locked, appropriate side rails in place/Call bell, personal items and telephone in reach/Instruct patient to call for assistance before getting out of bed or chair/Non-slip footwear when patient is out of bed/[facility] to call system/Physically safe environment - no spills, clutter or unnecessary equipment/Purposeful Proactive Rounding/Room/bathroom lighting operational, light cord in reach

## 2022-04-24 NOTE — PROGRESS NOTE BEHAVIORAL HEALTH - NSBHCHARTREVIEWLAB_PSY_A_CORE FT
Valproic Acid Level, Serum: 29.0 ug/mL (04.24.22 @ 08:14)   04-23    139  |  102  |  7<L>  ----------------------------<  136<H>  5.3<H>   |  22  |  0.8    Ca    9.7      23 Apr 2022 10:46    TPro  7.5  /  Alb  4.5  /  TBili  0.3  /  DBili  x   /  AST  18  /  ALT  9   /  AlkPhos  83  04-23                        12.4   7.93  )-----------( 216      ( 23 Apr 2022 10:46 )             39.5

## 2022-04-24 NOTE — H&P ADULT - ASSESSMENT
SCHIZOPHRENIA  SUICIDE IDEATIONS  MANAGEMENT  PER  PSYCHIATRY      DM   FU  FS===COVERAGE    HTN  2 GM NA  DIET    WILL FOLLOW SCHIZOPHRENIA  SUICIDE IDEATIONS  MANAGEMENT  PER  PSYCHIATRY      DM  metformin    FU  FS===COVERAGE    HTN  , toprol xl , 2 GM NA  DIET    hld -- lipitor    WILL FOLLOW

## 2022-04-24 NOTE — PROGRESS NOTE BEHAVIORAL HEALTH - NSBHCHARTREVIEWINVESTIGATE_PSY_A_CORE FT
Ventricular Rate 113 BPM    Atrial Rate 113 BPM    P-R Interval 140 ms    QRS Duration 100 ms    Q-T Interval 346 ms    QTC Calculation(Bazett) 474 ms    P Axis 72 degrees    R Axis 41 degrees    T Axis 10 degrees    Diagnosis Line Sinus tachycardia  Otherwise normal ECG

## 2022-04-24 NOTE — H&P ADULT - HISTORY OF PRESENT ILLNESS
· Chief Complaint: The patient is a 51y Male complaining of psychiatric evaluation.  · HPI Objective Statement: 50 yo M with pmhx of schizophrenia presenting for suicidal attempt - patient tried to cut his left wrist. States he stopped taking his meds this week. Reports having hallucination/voices telling him to kill himself, people, and have sex with others. No cp, sob, fever, chills, abdominal pain, nausea, vomiting, diarrhea, back pain, urinary symptoms, headache, dizziness, paresthesias, or weakness.

## 2022-04-24 NOTE — H&P ADULT - NSHPPHYSICALEXAM_GEN_ALL_CORE
Vital Signs Last 24 Hrs  T(C): 37 (23 Apr 2022 19:00), Max: 37.1 (23 Apr 2022 10:06)  T(F): 98.6 (23 Apr 2022 19:00), Max: 98.8 (23 Apr 2022 13:54)  HR: 88 (23 Apr 2022 19:00) (85 - 100)  BP: 122/70 (23 Apr 2022 19:00) (115/56 - 134/98)  BP(mean): --  RR: 20 (23 Apr 2022 19:00) (16 - 20)  SpO2: 98% (23 Apr 2022 19:00) (98% - 100%)

## 2022-04-24 NOTE — H&P ADULT - NSHPLABSRESULTS_GEN_ALL_CORE
12.4   7.93  )-----------( 216      ( 23 Apr 2022 10:46 )             39.5   04-23    139  |  102  |  7<L>  ----------------------------<  136<H>  5.3<H>   |  22  |  0.8    Ca    9.7      23 Apr 2022 10:46    TPro  7.5  /  Alb  4.5  /  TBili  0.3  /  DBili  x   /  AST  18  /  ALT  9   /  AlkPhos  83  04-23

## 2022-04-25 DIAGNOSIS — E03.9 HYPOTHYROIDISM, UNSPECIFIED: ICD-10-CM

## 2022-04-25 DIAGNOSIS — K59.00 CONSTIPATION, UNSPECIFIED: ICD-10-CM

## 2022-04-25 DIAGNOSIS — I10 ESSENTIAL (PRIMARY) HYPERTENSION: ICD-10-CM

## 2022-04-25 DIAGNOSIS — E11.9 TYPE 2 DIABETES MELLITUS WITHOUT COMPLICATIONS: ICD-10-CM

## 2022-04-25 DIAGNOSIS — E78.5 HYPERLIPIDEMIA, UNSPECIFIED: ICD-10-CM

## 2022-04-25 LAB
A1C WITH ESTIMATED AVERAGE GLUCOSE RESULT: 6.5 % — HIGH (ref 4–5.6)
ESTIMATED AVERAGE GLUCOSE: 140 MG/DL — HIGH (ref 68–114)
GLUCOSE BLDC GLUCOMTR-MCNC: 110 MG/DL — HIGH (ref 70–99)
GLUCOSE BLDC GLUCOMTR-MCNC: 149 MG/DL — HIGH (ref 70–99)
TSH SERPL-MCNC: 4.06 UIU/ML — SIGNIFICANT CHANGE UP (ref 0.27–4.2)

## 2022-04-25 PROCEDURE — 99231 SBSQ HOSP IP/OBS SF/LOW 25: CPT

## 2022-04-25 RX ORDER — METFORMIN HYDROCHLORIDE 850 MG/1
1000 TABLET ORAL
Refills: 0 | Status: DISCONTINUED | OUTPATIENT
Start: 2022-04-25 | End: 2022-04-28

## 2022-04-25 RX ADMIN — DIVALPROEX SODIUM 500 MILLIGRAM(S): 500 TABLET, DELAYED RELEASE ORAL at 08:48

## 2022-04-25 RX ADMIN — ATORVASTATIN CALCIUM 40 MILLIGRAM(S): 80 TABLET, FILM COATED ORAL at 20:30

## 2022-04-25 RX ADMIN — CLOZAPINE 400 MILLIGRAM(S): 150 TABLET, ORALLY DISINTEGRATING ORAL at 20:30

## 2022-04-25 RX ADMIN — Medication 2 MILLIGRAM(S): at 20:31

## 2022-04-25 RX ADMIN — DIVALPROEX SODIUM 750 MILLIGRAM(S): 500 TABLET, DELAYED RELEASE ORAL at 20:30

## 2022-04-25 RX ADMIN — METFORMIN HYDROCHLORIDE 1000 MILLIGRAM(S): 850 TABLET ORAL at 20:30

## 2022-04-25 RX ADMIN — RISPERIDONE 3 MILLIGRAM(S): 4 TABLET ORAL at 20:31

## 2022-04-25 RX ADMIN — OLANZAPINE 5 MILLIGRAM(S): 15 TABLET, FILM COATED ORAL at 13:37

## 2022-04-25 RX ADMIN — Medication 25 MICROGRAM(S): at 06:52

## 2022-04-25 RX ADMIN — Medication 25 MILLIGRAM(S): at 08:48

## 2022-04-25 RX ADMIN — CLOZAPINE 300 MILLIGRAM(S): 150 TABLET, ORALLY DISINTEGRATING ORAL at 08:48

## 2022-04-25 RX ADMIN — Medication 5 MILLIGRAM(S): at 20:30

## 2022-04-25 NOTE — PROGRESS NOTE BEHAVIORAL HEALTH - NSBHFUPIPCHARTREVFT_PSY_A_CORE
SUDHA PARKER is a 51M domiciled at [facility] [address], with PMHx of hypothyroidism, diabetes, constipation, HTN, HLD, PPHx of schizoaffective disorder bipolar type, multiple IPP hospitalizations (last admission was at [place] from 2/22/22-3/3/22) hx of multiple suicide attempts.  Patient was BIBEMS from [facility] after he used a razor to superficially cut his wrist and told staff that he was hearing command auditory hallucinations that told him to do so.      Upon approach, pt has labile affect, he is irritable but cooperative.  Appears to be reacting to internal stimuli.  Thought process is tangential and perseverative.      Patient tells the writer that he cut his wrist today because he as hearing voice that told him to do so; he endorses hearing voices that tell him to kill himself and also hearing voices that "tell him everyone is against him."  He states this has been happening on and off for "a long time".  He endorses that he takes his medications daily; he recalls taking clozapine and Depakote but is unable to remember other medications.  He states that his living situation at [facility] has been stressful due to "all the noise".  Patient cannot elaborate if the noises that are troubling him are from other residents, or from auditory hallucinations; in any case he appears very distressed by this.  Patient asks the writer on 2 accounts whether he is hearing music right now or if it's "the noises again".  He states that recently he has been feeling that he needs to "keep his distance from people because he's afraid he might hurt them."  He tells the writer that he "needs to get away from it all."   He endorses visual hallucinations of "shadows".  He endorses having suicidal thoughts that come and go, denies having a plan at this time or intent.  Denies any plan for hurting or killing someone else at this time, denies intent or any specific person he wants to harm.  He endorses feeling depressed with low mood, hopelessness, insomnia.  Denies manic symptoms.      Patient endorses that he has had multiple suicide attempts by hanging, cutting, and other modalities; when asked about further specifics he becomes irritable and walks out of the room and paces around the ED.  Patient states that has been hospitalized multiple times, including IPP at [place] and [place].      Patient denies alcohol, tobacco, or other substance use.  Patient states he has no family or friends.  He consents for writer to contact [facility] for collateral.      Spoke with [facility] staff who confirm that the pt was discharged from [facility] on 3/3/22 after being there for about 1 week; they state that he was admitted for similar issues of suicidal ideations and auditory hallucinations.  Since discharge he has been having intermittent SI and AH and then today he started to harm himself.  Staff confirms the pt has been compliant with medications.      Medication List:  - Depakote 500mg Qam/750mg Qhs  - Clozaril 300mg Qam/400mg Qhs  - Risperdal 3mg Qhs  - ativan 2mg Qhs  - Invega Sustenna 78mg IM [id number] (per staff pt may have received injection last month but unable to confirm exact date)  - Levothyroxine 25mcg Qdaily  - Metoprolol 25mg Qdaily  - metformin 1000mg Q12:00, Q17:00  - atorvastatin 40mg Qhs  - bisacodyl 5mg Qhs

## 2022-04-26 LAB — GLUCOSE BLDC GLUCOMTR-MCNC: 87 MG/DL — SIGNIFICANT CHANGE UP (ref 70–99)

## 2022-04-26 PROCEDURE — 99231 SBSQ HOSP IP/OBS SF/LOW 25: CPT

## 2022-04-26 RX ADMIN — CLOZAPINE 400 MILLIGRAM(S): 150 TABLET, ORALLY DISINTEGRATING ORAL at 20:14

## 2022-04-26 RX ADMIN — Medication 2 MILLIGRAM(S): at 17:57

## 2022-04-26 RX ADMIN — Medication 2 MILLIGRAM(S): at 20:18

## 2022-04-26 RX ADMIN — DIVALPROEX SODIUM 750 MILLIGRAM(S): 500 TABLET, DELAYED RELEASE ORAL at 20:17

## 2022-04-26 RX ADMIN — Medication 5 MILLIGRAM(S): at 20:14

## 2022-04-26 RX ADMIN — DIVALPROEX SODIUM 500 MILLIGRAM(S): 500 TABLET, DELAYED RELEASE ORAL at 08:24

## 2022-04-26 RX ADMIN — RISPERIDONE 3 MILLIGRAM(S): 4 TABLET ORAL at 20:13

## 2022-04-26 RX ADMIN — METFORMIN HYDROCHLORIDE 1000 MILLIGRAM(S): 850 TABLET ORAL at 20:14

## 2022-04-26 RX ADMIN — ATORVASTATIN CALCIUM 40 MILLIGRAM(S): 80 TABLET, FILM COATED ORAL at 20:14

## 2022-04-26 RX ADMIN — Medication 25 MILLIGRAM(S): at 08:24

## 2022-04-26 RX ADMIN — METFORMIN HYDROCHLORIDE 1000 MILLIGRAM(S): 850 TABLET ORAL at 08:23

## 2022-04-26 RX ADMIN — OLANZAPINE 5 MILLIGRAM(S): 15 TABLET, FILM COATED ORAL at 13:41

## 2022-04-26 RX ADMIN — Medication 25 MICROGRAM(S): at 06:27

## 2022-04-26 RX ADMIN — CLOZAPINE 300 MILLIGRAM(S): 150 TABLET, ORALLY DISINTEGRATING ORAL at 08:23

## 2022-04-26 NOTE — BH SAFETY PLAN - WARNING SIGN 2
I get tired of always putting up with other peoples moods where I live at [place] and I got depressed.

## 2022-04-27 VITALS
DIASTOLIC BLOOD PRESSURE: 77 MMHG | SYSTOLIC BLOOD PRESSURE: 139 MMHG | RESPIRATION RATE: 16 BRPM | TEMPERATURE: 97 F | HEART RATE: 102 BPM

## 2022-04-27 PROCEDURE — 99231 SBSQ HOSP IP/OBS SF/LOW 25: CPT

## 2022-04-27 RX ORDER — BENZTROPINE MESYLATE 1 MG
1 TABLET ORAL
Refills: 0 | Status: DISCONTINUED | OUTPATIENT
Start: 2022-04-27 | End: 2022-04-28

## 2022-04-27 RX ORDER — BENZTROPINE MESYLATE 1 MG
1 TABLET ORAL EVERY 12 HOURS
Refills: 0 | Status: DISCONTINUED | OUTPATIENT
Start: 2022-04-27 | End: 2022-04-28

## 2022-04-27 RX ADMIN — ATORVASTATIN CALCIUM 40 MILLIGRAM(S): 80 TABLET, FILM COATED ORAL at 20:11

## 2022-04-27 RX ADMIN — DIVALPROEX SODIUM 750 MILLIGRAM(S): 500 TABLET, DELAYED RELEASE ORAL at 20:10

## 2022-04-27 RX ADMIN — DIVALPROEX SODIUM 500 MILLIGRAM(S): 500 TABLET, DELAYED RELEASE ORAL at 08:39

## 2022-04-27 RX ADMIN — Medication 1 MILLIGRAM(S): at 14:18

## 2022-04-27 RX ADMIN — CLOZAPINE 400 MILLIGRAM(S): 150 TABLET, ORALLY DISINTEGRATING ORAL at 20:10

## 2022-04-27 RX ADMIN — Medication 25 MICROGRAM(S): at 05:39

## 2022-04-27 RX ADMIN — Medication 5 MILLIGRAM(S): at 20:10

## 2022-04-27 RX ADMIN — Medication 1 MILLIGRAM(S): at 20:10

## 2022-04-27 RX ADMIN — Medication 2 MILLIGRAM(S): at 20:10

## 2022-04-27 RX ADMIN — METFORMIN HYDROCHLORIDE 1000 MILLIGRAM(S): 850 TABLET ORAL at 08:38

## 2022-04-27 RX ADMIN — CLOZAPINE 300 MILLIGRAM(S): 150 TABLET, ORALLY DISINTEGRATING ORAL at 08:37

## 2022-04-27 RX ADMIN — Medication 25 MILLIGRAM(S): at 08:38

## 2022-04-27 RX ADMIN — RISPERIDONE 3 MILLIGRAM(S): 4 TABLET ORAL at 20:10

## 2022-04-27 RX ADMIN — METFORMIN HYDROCHLORIDE 1000 MILLIGRAM(S): 850 TABLET ORAL at 20:11

## 2022-04-27 NOTE — DISCHARGE NOTE BEHAVIORAL HEALTH - MEDICATION SUMMARY - MEDICATIONS TO STOP TAKING
I will STOP taking the medications listed below when I get home from the hospital:    Azithromycin 5 Day Dose Pack 250 mg oral tablet  -- 1 tab(s) by mouth once a day   -- Do not take dairy products, antacids, or iron preparations within one hour of this medication.  Finish all this medication unless otherwise directed by prescriber.

## 2022-04-27 NOTE — DISCHARGE NOTE BEHAVIORAL HEALTH - NSBHDCSIGEVENTSFT_PSY_A_CORE
patient had one episode of smacking himself on the head on admission day; he was placed on 1:1 constant observation and responded well to verbal redirection and had no further episodes

## 2022-04-27 NOTE — DISCHARGE NOTE BEHAVIORAL HEALTH - NS MD DC FALL RISK RISK
For information on Fall & Injury Prevention, visit: [email or web address] OR  [email or web address] OR  https://www.cdc.gov/steadi/patient.html

## 2022-04-27 NOTE — PROGRESS NOTE BEHAVIORAL HEALTH - PROBLEM/PLAN-6
Pt had steroid injection done today. Tonight her blood sugar was 595. Called her primary who told her to take another glyburide at 2330. Rechecked blood sugar at 0030 and it was 525.  
DISPLAY PLAN FREE TEXT

## 2022-04-27 NOTE — DISCHARGE NOTE BEHAVIORAL HEALTH - MEDICATION SUMMARY - MEDICATIONS TO TAKE
I will START or STAY ON the medications listed below when I get home from the hospital:    divalproex sodium 250 mg oral delayed release tablet  -- Take 2 tabs by mouth in the morning and 3 tab(s) by mouth at bedtime x 14 days or until MD tells you otherwise   -- Indication: For Schizoaffective disorder, bipolar type    LORazepam 2 mg oral tablet  -- 1 tab(s) by mouth once a day (at bedtime) x 14 days or until MD tells you otherwise  -- Indication: For Schizoaffective disorder, bipolar type    metFORMIN 1000 mg oral tablet  -- 1 tab(s) by mouth 2 times a day x 14 days or until MD tells you otherwise   -- Indication: For DM (diabetes mellitus)    atorvastatin 40 mg oral tablet  -- 1 tab(s) by mouth once a day (at bedtime) x 14 days or until MD tells you otherwise   -- Indication: For HLD (hyperlipidemia)    risperiDONE 3 mg oral tablet  -- 1 tab(s) by mouth once a day (at bedtime) x 14 days or until MD tells you otherwise    -- Indication: For Schizoaffective disorder, bipolar type    cloZAPine 100 mg oral tablet  -- Take 3 tabs by mouth in the morning and 4 tabs by mouth at bedtime x 14 days or until MD tells you otherwise   -- Indication: For Schizoaffective disorder, bipolar type    metoprolol succinate 25 mg oral tablet, extended release  -- 1 tab(s) by mouth once a day x 14 days or until MD tells you otherwise   -- Indication: For HTN (hypertension)    bisacodyl 5 mg oral delayed release tablet  -- 1 tab(s) by mouth once a day (at bedtime) x 14 days or until MD tells you otherwise   -- Indication: For Constipation    levothyroxine 25 mcg (0.025 mg) oral tablet  -- 1 tab(s) by mouth once a day x 14 days or until MD tells you otherwise   -- Indication: For Hypothyroidism   I will START or STAY ON the medications listed below when I get home from the hospital:    divalproex sodium 250 mg oral delayed release tablet  -- Take 2 tabs by mouth in the morning and 3 tab(s) by mouth at bedtime x 14 days or until MD tells you otherwise   -- Indication: For Schizoaffective disorder, bipolar type    metFORMIN 1000 mg oral tablet  -- 1 tab(s) by mouth 2 times a day x 14 days or until MD tells you otherwise   -- Indication: For DM (diabetes mellitus)    atorvastatin 40 mg oral tablet  -- 1 tab(s) by mouth once a day (at bedtime) x 14 days or until MD tells you otherwise   -- Indication: For HLD (hyperlipidemia)    benztropine 1 mg oral tablet  -- 1 tab(s) by mouth 2 times a day x 14 days or until provider tells you otherwise  -- Indication: For Schizoaffective disorder, bipolar type    risperiDONE 3 mg oral tablet  -- 1 tab(s) by mouth once a day (at bedtime) x 14 days or until MD tells you otherwise    -- Indication: For Schizoaffective disorder, bipolar type    cloZAPine 100 mg oral tablet  -- Take 3 tabs by mouth in the morning and 4 tabs by mouth at bedtime x 14 days or until MD tells you otherwise   -- Indication: For Schizoaffective disorder, bipolar type    metoprolol succinate 25 mg oral tablet, extended release  -- 1 tab(s) by mouth once a day x 14 days or until MD tells you otherwise   -- Indication: For HTN (hypertension)    bisacodyl 5 mg oral delayed release tablet  -- 1 tab(s) by mouth once a day (at bedtime) x 14 days or until MD tells you otherwise   -- Indication: For Constipation    levothyroxine 25 mcg (0.025 mg) oral tablet  -- 1 tab(s) by mouth once a day x 14 days or until MD tells you otherwise   -- Indication: For Hypothyroidism

## 2022-04-27 NOTE — PROGRESS NOTE BEHAVIORAL HEALTH - NSBHADMITIPBHPROVFT_PSY_A_CORE
spoke with Dr. Pearson today 4/27/22 about plan for discharge tomorrow; she expressed understanding and was updated on plan; all questions were answered.

## 2022-04-27 NOTE — PROGRESS NOTE BEHAVIORAL HEALTH - AXIS III
hypothyroidism, diabetes, constipation, HTN, HLD

## 2022-04-27 NOTE — DISCHARGE NOTE BEHAVIORAL HEALTH - HPI (INCLUDE ILLNESS QUALITY, SEVERITY, DURATION, TIMING, CONTEXT, MODIFYING FACTORS, ASSOCIATED SIGNS AND SYMPTOMS)
SUDHA PARKER is a 51M domiciled at [facility] [address], with PMHx of hypothyroidism, diabetes, constipation, HTN, HLD, PPHx of schizoaffective disorder bipolar type, multiple IPP hospitalizations (last admission was at [place] from 2/22/22-3/3/22) hx of multiple suicide attempts.  Patient was BIBEMS from [facility] after he used a razor to superficially cut his wrist and told staff that he was hearing command auditory hallucinations that told him to do so.      Upon approach, pt has labile affect, he is irritable but cooperative.  Appears to be reacting to internal stimuli.  Thought process is tangential and perseverative.      Patient tells the writer that he cut his wrist today because he as hearing voice that told him to do so; he endorses hearing voices that tell him to kill himself and also hearing voices that "tell him everyone is against him."  He states this has been happening on and off for "a long time".  He endorses that he takes his medications daily; he recalls taking clozapine and Depakote but is unable to remember other medications.  He states that his living situation at [facility] has been stressful due to "all the noise".  Patient cannot elaborate if the noises that are troubling him are from other residents, or from auditory hallucinations; in any case he appears very distressed by this.  Patient asks the writer on 2 accounts whether he is hearing music right now or if it's "the noises again".  He states that recently he has been feeling that he needs to "keep his distance from people because he's afraid he might hurt them."  He tells the writer that he "needs to get away from it all."   He endorses visual hallucinations of "shadows".  He endorses having suicidal thoughts that come and go, denies having a plan at this time or intent.  Denies any plan for hurting or killing someone else at this time, denies intent or any specific person he wants to harm.  He endorses feeling depressed with low mood, hopelessness, insomnia.  Denies manic symptoms.      Patient endorses that he has had multiple suicide attempts by hanging, cutting, and other modalities; when asked about further specifics he becomes irritable and walks out of the room and paces around the ED.  Patient states that has been hospitalized multiple times, including IPP at [place] and [place].      Patient denies alcohol, tobacco, or other substance use.  Patient states he has no family or friends.  He consents for writer to contact [facility] for collateral.      Spoke with [facility] staff who confirm that the pt was discharged from [facility] on 3/3/22 after being there for about 1 week; they state that he was admitted for similar issues of suicidal ideations and auditory hallucinations.  Since discharge he has been having intermittent SI and AH and then today he started to harm himself.  Staff confirms the pt has been compliant with medications.      Medication List:  - Depakote 500mg Qam/750mg Qhs  - Clozaril 300mg Qam/400mg Qhs  - Risperdal 3mg Qhs  - ativan 2mg Qhs  - Invega Sustenna 78mg IM [id number] (per staff pt may have received injection last month but unable to confirm exact date)  - Levothyroxine 25mcg Qdaily  - Metoprolol 25mg Qdaily  - metformin 1000mg Q12:00, Q17:00  - atorvastatin 40mg Qhs  - bisacodyl 5mg Qhs

## 2022-04-27 NOTE — DISCHARGE NOTE BEHAVIORAL HEALTH - NSBHDCLABSFT_PSY_A_CORE
Follow recommendations from outpatient provider to obtain regular blood work and testing to monitor potential adverse effects of clozapine

## 2022-04-27 NOTE — PROGRESS NOTE BEHAVIORAL HEALTH - SUMMARY
SUDHA PARKER is a 51M domiciled at [facility] [address], with PMHx of hypothyroidism, diabetes, constipation, HTN, HLD, PPHx of schizoaffective disorder bipolar type, multiple IPP hospitalizations (last admission was at [place] from 2/22/22-3/3/22) hx of multiple suicide attempts.  Patient was BIBEMS from [facility] after he used a razor to superficially cut his wrist and told staff that he was hearing command auditory hallucinations that told him to do so.    Upon interview today patient appears improved in mood and is denying suicidal ideations, homicidal ideations.  He continues to have CAH to kill himself which is likely baseline for him; he endorses better control over these hallucinations.  He continues to require IPP hospitalization for safety and stabilization.
SUDAH PARKER is a 51M domiciled at [facility] [address], with PMHx of hypothyroidism, diabetes, constipation, HTN, HLD, PPHx of schizoaffective disorder bipolar type, multiple IPP hospitalizations (last admission was at [place] from 2/22/22-3/3/22) hx of multiple suicide attempts.  Patient was BIBEMS from [facility] after he used a razor to superficially cut his wrist and told staff that he was hearing command auditory hallucinations that told him to do so.    Upon interview today patient appears improved in mood and is denying suicidal ideations, homicidal ideations, or AVH.  He continues to require IPP hospitalization for safety and stabilization.
SUDHA PARKER is a 51M domiciled at [facility] [address], with PMHx of hypothyroidism, diabetes, constipation, HTN, HLD, PPHx of schizoaffective disorder bipolar type, multiple IPP hospitalizations (last admission was at [place] from 2/22/22-3/3/22) hx of multiple suicide attempts.  Patient was BIBEMS from [facility] after he used a razor to superficially cut his wrist and told staff that he was hearing command auditory hallucinations that told him to do so.    Upon interview today patient appears improved in mood and is denying suicidal ideations, intent or plan; denies homicidal ideations.  Denies CAH to kill self or others and endorses improved ability to ignore auditory hallucinations when they arise.  He continues to require IPP hospitalization for safety and stabilization; will discharge tomorrow pending continued improvement.
SUDHA PARKER is a 51M domiciled at [facility] [address], with PMHx of hypothyroidism, diabetes, constipation, HTN, HLD, PPHx of schizoaffective disorder bipolar type, multiple IPP hospitalizations (last admission was at [place] from 2/22/22-3/3/22) hx of multiple suicide attempts.  Patient was BIBEMS from [facility] after he used a razor to superficially cut his wrist and told staff that he was hearing command auditory hallucinations that told him to do so.  Upon interview today, Pt was unwilling to engage. He denied acute safety concerns or acute distress.

## 2022-04-27 NOTE — PROGRESS NOTE BEHAVIORAL HEALTH - NSBHFUPINTERVALHXFT_PSY_A_CORE
Pt was seen for psychiatric follow up. Upon interview, Pt reported he was tired and didn't wish to speak. He denied any acute safety concerns, pain, or concerns about medication side effects. He refused further questions putting his head under the blanket.
Patient evaluated at bedside, chart reviewed, per nursing staff no overnight events, no PRNs required.      Upon approach, patient is calm and cooperative. Affect is euthymic.  Thought process tangential.      Patient tells the writer that he slept well and has been doing his best to ignore the voices that "come and go"; he states the voices still tell him to kill himself but he is "doing better at ignoring them."  Patient states that his mood also "comes and goes" but overall he is doing well.  Patient denies plan or intent for suicide.  He denies any further head hitting episodes.  Denies HI, plan, or intent.  Denies visual hallucinations.  Denies manic symptoms. depressive symptoms.  He has been spending time watching TV, reading, and listening to music while on the unit.
Patient evaluated at bedside, chart reviewed, per nursing staff no overnight events, no PRNs required.      Upon approach, patient is calm and cooperative, laying in bed.  Thought process linear, affect ranges from depressed to elevated.     Patient endorses that he slept well last night, endorses "good" mood today.  Denies suicidal ideations, plan or intent; continues ot endorse auditory hallucinations that "sound like noise."  Denies CAH.  Denies visual hallucinations. He has been visible on the unit; no further episodes of hitting himself.  Denies manic symptoms and depressive symptoms.  Patient states he wants to go home.
Patient evaluated at bedside, chart reviewed, per nursing staff pt is on 1:1 constant observation for head-banging/self injury.      Upon approach, patient is calm and cooperative.  Poorly related.  Speech is difficult to understand due to poor articulation.  Poor eye contact.  Thought process is linear.     Patient tells the writer that he is feeling better today, stating that he no longer is having suicidal ideations and has not had any urges to cut.  Regarding head-banging episode that occurred last night, pt does not directly answer the question and responds with incoherent mumbling.  He was asked if he wants the 1:1 at bedside and continues to not answer questions directly.  Patient denies SI, HI, AVH.  Denies manic symptoms.  No delusions elicited.

## 2022-04-27 NOTE — PROGRESS NOTE BEHAVIORAL HEALTH - CASE SUMMARY
no behavior issues or s/h ideation.  Pt reported "stiff tongue"; had received haldol IM earlier in admission.  Given po cogentin with good results. Continue tx plan
no behavior issues or overt psychosis.  Continue tx plan and d/c planning
No behavior issues or s/h ideation.  Will continue to assess; pt will return to residence on discharge

## 2022-04-27 NOTE — DISCHARGE NOTE BEHAVIORAL HEALTH - NSBHDCMEDICALFT_PSY_A_CORE
pt was continued on statin for hyperlipidemia, bowel regimen for constipation, levothyroxine for hypothyroidism, metoprolol for hypertension, and metformin for diabetes.  Medical problems were in good control on home regimen.

## 2022-04-27 NOTE — DISCHARGE NOTE BEHAVIORAL HEALTH - NSBHDCHANDOFFFT_PSY_A_CORE
spoke with Dr. Pearson on 4/27/22 regarding treatment course and discharge plan, all questions answered

## 2022-04-27 NOTE — DISCHARGE NOTE BEHAVIORAL HEALTH - NSBHDCMEDSFT_PSY_A_CORE
O-Z Flap Text: The defect edges were debeveled with a #15 scalpel blade.  Given the location of the defect, shape of the defect and the proximity to free margins an O-Z flap was deemed most appropriate.  Using a sterile surgical marker, an appropriate transposition flap was drawn incorporating the defect and placing the expected incisions within the relaxed skin tension lines where possible. The area thus outlined was incised deep to adipose tissue with a #15 scalpel blade.  The skin margins were undermined to an appropriate distance in all directions utilizing iris scissors. Patient was restarted on his home medications:   - Depakote 500mg Qam/750mg Qhs  - Clozaril 300mg Qam/400mg Qhs  - Risperdal 3mg Qhs  - ativan 2mg Qhs    Patient's last injection of Invega Sustenna was confirmed to be on 4/5/22; next injection due 5/3/22.

## 2022-04-27 NOTE — DISCHARGE NOTE BEHAVIORAL HEALTH - NSBHDCSWCOMMENTSFT_PSY_A_CORE
Discharge summary faxed to [facility] ([phone]) and [facility] ([phone]) on 4/28 at 11am

## 2022-04-27 NOTE — DISCHARGE NOTE BEHAVIORAL HEALTH - NSBHDCSUICFCTRMIT_PSY_A_CORE
patient was engaged in group and supportive therapies, engaged in safety planning.  Treatment team explored his internal strengths and provided education on how to utilize external supports

## 2022-04-27 NOTE — PROGRESS NOTE BEHAVIORAL HEALTH - NSBHCHARTREVIEWVS_PSY_A_CORE FT
Vital Signs Last 24 Hrs  T(C): 35.6 (25 Apr 2022 08:23), Max: 36.1 (24 Apr 2022 17:30)  T(F): 96.1 (25 Apr 2022 08:23), Max: 97 (24 Apr 2022 17:30)  HR: 115 (25 Apr 2022 08:23) (100 - 115)  BP: 133/91 (25 Apr 2022 08:23) (132/65 - 137/81)  BP(mean): --  RR: 18 (25 Apr 2022 08:23) (18 - 19)  SpO2: --
Vital Signs Last 24 Hrs  T(C): 35.9 (25 Apr 2022 19:33), Max: 35.9 (25 Apr 2022 19:33)  T(F): 96.6 (25 Apr 2022 19:33), Max: 96.6 (25 Apr 2022 19:33)  HR: 103 (25 Apr 2022 19:33) (103 - 103)  BP: 113/83 (25 Apr 2022 19:33) (113/83 - 113/83)  BP(mean): --  RR: --  SpO2: --
ICU Vital Signs Last 24 Hrs  T(C): 36.8 (24 Apr 2022 00:40), Max: 37 (23 Apr 2022 19:00)  T(F): 98.2 (24 Apr 2022 00:40), Max: 98.6 (23 Apr 2022 19:00)  HR: 89 (24 Apr 2022 00:40) (88 - 89)  BP: 165/92 (24 Apr 2022 00:40) (122/70 - 165/92)  BP(mean): --  ABP: --  ABP(mean): --  RR: 20 (23 Apr 2022 19:00) (20 - 20)  SpO2: 98% (23 Apr 2022 19:00) (98% - 98%)
Vital Signs Last 24 Hrs  T(C): 36.1 (27 Apr 2022 08:41), Max: 36.1 (27 Apr 2022 08:41)  T(F): 96.9 (27 Apr 2022 08:41), Max: 96.9 (27 Apr 2022 08:41)  HR: 99 (27 Apr 2022 08:41) (99 - 115)  BP: 101/64 (27 Apr 2022 08:41) (101/64 - 136/77)  BP(mean): --  RR: 16 (27 Apr 2022 08:41) (16 - 16)  SpO2: --

## 2022-04-28 LAB — GLUCOSE BLDC GLUCOMTR-MCNC: 106 MG/DL — HIGH (ref 70–99)

## 2022-04-28 PROCEDURE — 99238 HOSP IP/OBS DSCHRG MGMT 30/<: CPT

## 2022-04-28 RX ORDER — CLOZAPINE 150 MG/1
3 TABLET, ORALLY DISINTEGRATING ORAL
Qty: 100 | Refills: 0
Start: 2022-04-28 | End: 2022-05-11

## 2022-04-28 RX ORDER — METFORMIN HYDROCHLORIDE 850 MG/1
1 TABLET ORAL
Qty: 28 | Refills: 0
Start: 2022-04-28 | End: 2022-05-11

## 2022-04-28 RX ORDER — METOPROLOL TARTRATE 50 MG
1 TABLET ORAL
Qty: 14 | Refills: 0
Start: 2022-04-28 | End: 2022-05-11

## 2022-04-28 RX ORDER — ATORVASTATIN CALCIUM 80 MG/1
1 TABLET, FILM COATED ORAL
Qty: 14 | Refills: 0
Start: 2022-04-28 | End: 2022-05-11

## 2022-04-28 RX ORDER — BENZTROPINE MESYLATE 1 MG
1 TABLET ORAL
Qty: 28 | Refills: 0
Start: 2022-04-28 | End: 2022-05-11

## 2022-04-28 RX ORDER — LEVOTHYROXINE SODIUM 125 MCG
1 TABLET ORAL
Qty: 14 | Refills: 0
Start: 2022-04-28 | End: 2022-05-11

## 2022-04-28 RX ORDER — DIVALPROEX SODIUM 500 MG/1
3 TABLET, DELAYED RELEASE ORAL
Qty: 72 | Refills: 0
Start: 2022-04-28 | End: 2022-05-11

## 2022-04-28 RX ORDER — RISPERIDONE 4 MG/1
1 TABLET ORAL
Qty: 14 | Refills: 0
Start: 2022-04-28 | End: 2022-05-11

## 2022-04-28 RX ADMIN — Medication 25 MICROGRAM(S): at 05:52

## 2022-04-28 RX ADMIN — Medication 1 MILLIGRAM(S): at 08:22

## 2022-04-28 RX ADMIN — CLOZAPINE 300 MILLIGRAM(S): 150 TABLET, ORALLY DISINTEGRATING ORAL at 08:22

## 2022-04-28 RX ADMIN — DIVALPROEX SODIUM 500 MILLIGRAM(S): 500 TABLET, DELAYED RELEASE ORAL at 08:22

## 2022-04-28 RX ADMIN — METFORMIN HYDROCHLORIDE 1000 MILLIGRAM(S): 850 TABLET ORAL at 08:22

## 2022-04-29 ENCOUNTER — APPOINTMENT (OUTPATIENT)
Dept: GASTROENTEROLOGY | Facility: CLINIC | Age: 52
End: 2022-04-29

## 2022-05-03 DIAGNOSIS — E03.9 HYPOTHYROIDISM, UNSPECIFIED: ICD-10-CM
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
## 2022-05-03 DIAGNOSIS — R45.851 SUICIDAL IDEATIONS: ICD-10-CM

## 2022-05-03 DIAGNOSIS — I10 ESSENTIAL (PRIMARY) HYPERTENSION: ICD-10-CM

## 2022-05-03 DIAGNOSIS — K59.00 CONSTIPATION, UNSPECIFIED: ICD-10-CM

## 2022-05-03 DIAGNOSIS — F25.0 SCHIZOAFFECTIVE DISORDER, BIPOLAR TYPE: ICD-10-CM

## 2022-05-03 DIAGNOSIS — S61.512A LACERATION WITHOUT FOREIGN BODY OF LEFT WRIST, INITIAL ENCOUNTER: ICD-10-CM

## 2022-05-03 DIAGNOSIS — E78.5 HYPERLIPIDEMIA, UNSPECIFIED: ICD-10-CM
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
## 2022-05-03 DIAGNOSIS — Z91.51 PERSONAL HISTORY OF SUICIDAL BEHAVIOR: ICD-10-CM

## 2022-06-20 NOTE — ED PROVIDER NOTE - NS ED ROS FT
Review of Systems:  CONSTITUTIONAL: No fever, No diaphoresis   SKIN: No rash  HEMATOLOGIC: No abnormal bleeding   EYES: No eye pain, No blurred vision  ENT: +sore throat, No neck pain, No rhinorrhea, No ear pain  RESPIRATORY: No shortness of breath, +cough  CARDIAC: No chest pain, No palpitations  GI: No abdominal pain, No nausea, No vomiting  : No dysuria, frequency, hematuria.   MUSCULOSKELETAL: No joint paint, No swelling, No back pain  NEUROLOGIC: No numbness, No focal weakness, No headache, No dizziness  All other systems negative, unless specified in HPI denies

## 2022-06-23 NOTE — ED ADULT NURSE NOTE - NSSEPSISSUSPECTED_ED_A_ED
[employer] [facility]        NAME: Debra Stuart is a 47 y o  female  : 1968    MRN: [id number]  DATE: 2022  TIME: 9:12 PM    Assessment and Plan   Tick bite of left knee, initial encounter [M03 221L, W57  XXXA]  1  Tick bite of left knee, initial encounter           Patient Instructions   Wash your soap and water  Look for signs of infection including redness, swelling, discharge, fevers, chills, or persistent symptoms  Follow up with PCP in 3-5 days  Proceed to  ER if symptoms worsen  Chief Complaint     Chief Complaint   Patient presents with    Tick Removal     Embedded tick to left leg x tonight  History of Present Illness       Patient is a 81-year-old female with significant past medical history of hypertension diabetes presents the office complaining of tick attached to the posterior medial aspect of her left leg noticed about 2 hours ago  Review of Systems   Review of Systems   Constitutional: Negative for fever  Skin: Negative for color change           Current Medications       Current Outpatient Medications:     albuterol (PROVENTIL HFA,VENTOLIN HFA) 90 mcg/act inhaler, Inhale 2 puffs every 4 (four) hours as needed, Disp: , Rfl:     amLODIPine (NORVASC) 10 mg tablet, Take 10 mg by mouth daily, Disp: , Rfl:     hydrochlorothiazide (HYDRODIURIL) 12 5 mg tablet, Take 12 5 mg by mouth daily, Disp: , Rfl:     lisinopril (ZESTRIL) 40 mg tablet, Take 80 mg by mouth daily, Disp: , Rfl:     metoprolol tartrate (LOPRESSOR) 50 mg tablet, Take 50 mg by mouth 2 (two) times a day, Disp: , Rfl:     Current Allergies     Allergies as of 2022 - Reviewed 2022   Allergen Reaction Noted    Latex Hives 2021    Metformin Diarrhea and Other (See Comments) 2021            The following portions of the patient's history were reviewed and updated as appropriate: allergies, current medications, past family history, past medical history, past social history, past surgical history and problem list      Past Medical History:   Diagnosis Date    Diabetes mellitus ([facility] [address] )     Hypertension        History reviewed  No pertinent surgical history  History reviewed  No pertinent family history  Medications have been verified  Objective   /93   Pulse 92   Temp 98 6 °F (37 °C)   Resp 20   SpO2 97%   No LMP recorded  Physical Exam     Physical Exam  Vitals and nursing note reviewed  Constitutional:       Appearance: She is well-developed  HENT:      Head: Normocephalic and atraumatic  Right Ear: External ear normal       Left Ear: External ear normal       Nose: Nose normal    Eyes:      General: Lids are normal       Conjunctiva/sclera: Conjunctivae normal    Skin:     General: Skin is warm and dry  Capillary Refill: Capillary refill takes less than 2 seconds  Comments: Dog tick attached to medial posterior aspect of left leg  No notable surrounding erythema or swelling  Neurological:      Mental Status: She is alert  Dog tick successfully removed fully intact using zurdo dish soap and cotton swab  No

## 2022-07-17 NOTE — ED ADULT TRIAGE NOTE - DOMESTIC TRAVEL HIGH RISK QUESTION
EMERGENCY DEPARTMENT ENCOUNTER    Pt Name: Mónica Carrillo  MRN: [id number]  Pt :   1942  Room Number:    Date of encounter:  2022  PCP: Molina Domínguez MD  ED Provider: PAOLO Duggan    Historian: EMS and medical records      HPI:  Chief Complaint:  ams         Context: Mónica Carrillo is a 80 y.o. female who presents to the ED via EMS who was called to the scene of the patient's residential nursing home by staff when she was found to be unresponsive this morning.  EMS observed her vital signs to be normal with normal glucose.  She responded to an ammonia capsule inhalant.  She arrives pleasantly confused and at her baseline mentation.  She does not know where she is today or why and she denies any discomfort or complaint.    Review of systems is unobtainable due to patient's dementia.      PAST MEDICAL HISTORY  Past Medical History:   Diagnosis Date   • Dementia (HCC)    • Hypertension    • Mood disorder (HCC)    • Thyroid disease          PAST SURGICAL HISTORY  No past surgical history on file.      FAMILY HISTORY  Family History   Problem Relation Age of Onset   • No Known Problems Mother    • No Known Problems Father          SOCIAL HISTORY  Social History     Socioeconomic History   • Marital status:          ALLERGIES  Patient has no known allergies.        REVIEW OF SYSTEMS  Review of Systems   Unobtainable due to dementia.      PHYSICAL EXAM    I have reviewed the triage vital signs and nursing notes.    ED Triage Vitals [22 0835]   Temp Heart Rate Resp BP SpO2   98.7 °F (37.1 °C) 64 16 125/64 95 %      Temp src Heart Rate Source Patient Position BP Location FiO2 (%)   Oral Monitor -- -- --       Physical Exam  GENERAL:   Appears chronically ill elder with normal vital signs.  She is smiling and pleasant and without complaint today.  She is oriented to her name and birthdate only.  HENT: Nares patent.  EYES: No scleral icterus.  CV: Regular rhythm, regular rate.  No  tachycardia.  RESPIRATORY: Normal effort.  No audible wheezes, rales or rhonchi.  ABDOMEN: Soft, nontender  MUSCULOSKELETAL: No deformities.   NEURO: Alert, moves all extremities, follows commands.  SKIN: Warm, dry, no rash visualized.  Patient has a stage II ulcer in the sacral region.  She is wearing padded boots on both heels.        LAB RESULTS  Recent Results (from the past 24 hour(s))   ECG 12 Lead    Collection Time: 07/17/22  8:31 AM   Result Value Ref Range    QT Interval 404 ms    QTC Interval 416 ms   Comprehensive Metabolic Panel    Collection Time: 07/17/22  8:39 AM    Specimen: Blood   Result Value Ref Range    Glucose 103 (H) 65 - 99 mg/dL    BUN 15 8 - 23 mg/dL    Creatinine 0.83 0.57 - 1.00 mg/dL    Sodium 140 136 - 145 mmol/L    Potassium 4.4 3.5 - 5.2 mmol/L    Chloride 105 98 - 107 mmol/L    CO2 29.0 22.0 - 29.0 mmol/L    Calcium 9.3 8.6 - 10.5 mg/dL    Total Protein 8.3 6.0 - 8.5 g/dL    Albumin 3.00 (L) 3.50 - 5.20 g/dL    ALT (SGPT) 16 1 - 33 U/L    AST (SGOT) 23 1 - 32 U/L    Alkaline Phosphatase 95 39 - 117 U/L    Total Bilirubin 0.2 0.0 - 1.2 mg/dL    Globulin 5.3 gm/dL    A/G Ratio 0.6 g/dL    BUN/Creatinine Ratio 18.1 7.0 - 25.0    Anion Gap 6.0 5.0 - 15.0 mmol/L    eGFR 71.8 >60.0 mL/min/1.73   Troponin    Collection Time: 07/17/22  8:39 AM    Specimen: Blood   Result Value Ref Range    Troponin T 0.017 0.000 - 0.030 ng/mL   Magnesium    Collection Time: 07/17/22  8:39 AM    Specimen: Blood   Result Value Ref Range    Magnesium 1.8 1.6 - 2.4 mg/dL   Green Top (Gel)    Collection Time: 07/17/22  8:39 AM   Result Value Ref Range    Extra Tube Hold for add-ons.    Lavender Top    Collection Time: 07/17/22  8:39 AM   Result Value Ref Range    Extra Tube hold for add-on    Gold Top - SST    Collection Time: 07/17/22  8:39 AM   Result Value Ref Range    Extra Tube Hold for add-ons.    Light Blue Top    Collection Time: 07/17/22  8:39 AM   Result Value Ref Range    Extra Tube Hold for add-ons.     CBC Auto Differential    Collection Time: 07/17/22  8:39 AM    Specimen: Blood   Result Value Ref Range    WBC 7.59 3.40 - 10.80 10*3/mm3    RBC 3.36 (L) 3.77 - 5.28 10*6/mm3    Hemoglobin 9.7 (L) 12.0 - 15.9 g/dL    Hematocrit 31.1 (L) 34.0 - 46.6 %    MCV 92.6 79.0 - 97.0 fL    MCH 28.9 26.6 - 33.0 pg    MCHC 31.2 (L) 31.5 - 35.7 g/dL    RDW 18.7 (H) 12.3 - 15.4 %    RDW-SD 63.7 (H) 37.0 - 54.0 fl    MPV 9.2 6.0 - 12.0 fL    Platelets 245 140 - 450 10*3/mm3    Neutrophil % 41.4 (L) 42.7 - 76.0 %    Lymphocyte % 44.5 19.6 - 45.3 %    Monocyte % 7.8 5.0 - 12.0 %    Eosinophil % 5.4 0.3 - 6.2 %    Basophil % 0.5 0.0 - 1.5 %    Immature Grans % 0.4 0.0 - 0.5 %    Neutrophils, Absolute 3.14 1.70 - 7.00 10*3/mm3    Lymphocytes, Absolute 3.38 (H) 0.70 - 3.10 10*3/mm3    Monocytes, Absolute 0.59 0.10 - 0.90 10*3/mm3    Eosinophils, Absolute 0.41 (H) 0.00 - 0.40 10*3/mm3    Basophils, Absolute 0.04 0.00 - 0.20 10*3/mm3    Immature Grans, Absolute 0.03 0.00 - 0.05 10*3/mm3    nRBC 0.0 0.0 - 0.2 /100 WBC   Lactic Acid, Plasma    Collection Time: 07/17/22  8:39 AM    Specimen: Blood   Result Value Ref Range    Lactate 1.1 0.5 - 2.0 mmol/L   Urinalysis With Microscopic If Indicated (No Culture) - Urine, Catheter    Collection Time: 07/17/22  9:29 AM    Specimen: Urine, Catheter   Result Value Ref Range    Color, UA Yellow Yellow, Straw    Appearance, UA Turbid (A) Clear    pH, UA 5.5 5.0 - 8.0    Specific Gravity, UA 1.016 1.001 - 1.030    Glucose, UA Negative Negative    Ketones, UA Negative Negative    Bilirubin, UA Negative Negative    Blood, UA Moderate (2+) (A) Negative    Protein,  mg/dL (2+) (A) Negative    Leuk Esterase, UA Large (3+) (A) Negative    Nitrite, UA Negative Negative    Urobilinogen, UA 0.2 E.U./dL 0.2 - 1.0 E.U./dL       If labs were ordered, I independently reviewed the results.        RADIOLOGY  CT Head Without Contrast    Result Date: 7/17/2022  CT HEAD WO CONTRAST-  Date of Exam: 7/17/2022  8:48 AM  Indication: ams.  Altered mental status.  Comparison: None available.  Technique:  Without contrast, contiguous axial CT images of the head were obtained from skull base to vertex.  Coronal and sagittal reconstructions were performed.  Automated exposure control and iterative reconstruction methods were used.  FINDINGS There is a large region of primarily white matter hypodensity in the left anterior frontal region. This may be due to chronic ischemic change. There are no prior studies available for comparison. Vasogenic edema from neoplasm could have a similar appearance. This could be further evaluated by MRI with and without contrast, if clinically warranted. There is periventricular white matter hypodensity most commonly secondary to chronic small vessel ischemic change. There is mild parenchymal volume loss. The ventricles are appropriate in size and configuration for degree of volume loss and patient age. There is no evidence of mass effect, midline shift, acute hemorrhage. No abnormal fluid collections are identified. Atherosclerotic vascular calcification is present. There is opacification of the left maxillary sinus and associated periosteal thickening. There is an air-fluid level in the left sphenoid sinus. Correlate clinically for acute sinusitis. The mastoid air cells and middle ear appear clear. No acute calvarial defects are seen. The orbits are unremarkable.       1. Large region of white matter hypodensity in the anterior left frontal region may be due to chronic ischemic change. Vasogenic edema from neoplasm could potentially also cause this appearance. There are no prior studies available for comparison. Further evaluation by MRI with and without contrast media value, if clinically warranted. 2. Parenchymal volume loss and probable chronic small vessel ischemic change. 3. Small air-fluid level in the left sphenoid sinus may indicate acute sinusitis. Correlate clinically. There is complete  opacification of the visualized left maxillary sinus, of unknown chronicity. Brain MRI is more sensitive to evaluate for acute or subacute infarcts and to evaluate for intracranial metastatic disease.  This report was finalized on 7/17/2022 9:14 AM by Thanh Rae MD.      XR Chest 1 View    Result Date: 7/17/2022  DATE OF EXAM: 7/17/2022 8:29 AM  PROCEDURE: XR CHEST 1 VW-  INDICATIONS: Weak/Dizzy/AMS triage protocol  COMPARISON: 4/5/2022  TECHNIQUE: Single radiographic view of the chest was obtained.  FINDINGS: There are linear opacities in the right mid and upper lung which may be due to scarring or atelectasis. There is left basilar opacity which is obscured by the overlying enlarged heart border, and may be due to pneumonia or atelectasis. Question small amount of left pleural fluid. No pneumothorax is seen. Cardiomegaly is present. There is pulmonary vascular congestion. Aortic vascular calcification is present.       1. Cardiomegaly with pulmonary vascular congestion. 2. Linear areas of consolidation in the right upper lung may be due to scarring or atelectasis. 3. left basilar opacities mostly obscured by overlying left heart border may be due to pneumonia or atelectasis. Question small left effusion.  This report was finalized on 7/17/2022 8:54 AM by Thanh Rae MD.        PROCEDURES    Procedures    ECG 12 Lead   Preliminary Result   Test Reason : AMS   Blood Pressure :   */*   mmHG   Vent. Rate :  64 BPM     Atrial Rate :  64 BPM      P-R Int : 170 ms          QRS Dur :  88 ms       QT Int : 404 ms       P-R-T Axes :  57 -33   3 degrees      QTc Int : 416 ms      Normal sinus rhythm with sinus arrhythmia   Left axis deviation   Abnormal ECG   When compared with ECG of 04-APR-2022 00:32,   No significant change was found      Referred By: EDMD           Confirmed By:           MEDICATIONS GIVEN IN ER    Medications   sodium chloride 0.9 % flush 10 mL (has no administration in time range)   sodium chloride  0.9 % flush 10 mL (has no administration in time range)   cefTRIAXone (ROCEPHIN) 1 g/100 mL 0.9% NS (MBP) (has no administration in time range)   sodium chloride 0.9 % bolus 500 mL (500 mL Intravenous New Bag 7/17/22 0930)         ED Course as of 07/17/22 1029   Sun Jul 17, 2022   0952 Patient's work-up is most remarkable for urinary tract infection in the context of altered mental status.  She has had no hypotension or tachycardia nor fever.  White count and lactic acid are normal.  She has questionable infiltrate on chest x-ray.  Blood cultures are pending.  Rocephin has been initiated. [MS]      ED Course User Index  [MS] Isabel Chacon APRN           AS OF 10:29 EDT VITALS:    BP - 119/63  HR - 66  TEMP - 98.7 °F (37.1 °C) (Oral)  O2 SATS - 94%                  DIAGNOSIS  Final diagnoses:   Urinary tract infection without hematuria, site unspecified         DISPOSITION    Admitted              Isabel Chacon APRN  07/17/22 1029     No

## 2022-08-12 NOTE — CHART NOTE - NSCHARTNOTEFT_GEN_A_CORE
Pt d/c'd to his residence in [facility].  No s/h ideation ; no behavior issues.
Social Work Discharge Note:    Patient is for discharge today.  He is alert and oriented x3.  Mood has improved.  Anxiety has decreased.  Insight and judgment have improved.  Suicidal/homicidal ideation denied.    Patient will be discharged to his residence – [address] on the [address] of [facility].  He will resume mental health treatment at the [facility] clinic.      The best way to contact patient is to call his residence [phone]    Patient is aware and agreeable to discharge today
Social Work Note:    Juma remains on the unit for continued treatment, safety, and observation.  Treatment team meets with patient daily.  He has been calm, cooperative, and in good behavioral control.  Patient remains disorganized and tangential.  He endorses some auditory hallucinations that “come and go”.  He denies suicidal ideation.  Patient has been medication compliant and denies side effects.  He remains on 1:1 constant observation for safety.    Once stable for discharge, patient will return to his [facility] residence – [address] of [facility].  He will resume mental health treatment at the [facility] clinic.      At this time patient is not psychiatrically stable for discharge.      Mental Status Exam:    Mood – Depressed  Sleep – Good  Appetite – Good  ADLs – Fair  Thought Process – Disorganized/Tangential   Observation – Constant 1:1
Social Work Admit Note:    Patient is a 51 years of age male who was admitted for evaluation of psychotic symptoms and self injurious behavior.  At the time of assessment in the emergency department, patient informed that he cut his wrist today because he was hearing voices that told him to do so.  He said the voices also tell him to kill himself and that everyone is against him.  Patient endorsed having visual hallucinations of “shadows”.  He informed that he has suicidal thoughts that come and go.  Patient verbalized feeling depressed with symptoms such as low mood, hopelessness, and insomnia. He was admitted to [facility] for safety and stabilization.      In the community, patient is domiciled at [address] [facility].  He is single marital status and has no known dependents.  Patient is unemployed; on disability.   He has a past psychiatric history of schizoaffective disorder bipolar type.  He has multiple prior [facility] hospitalizations (most recently at [place] from 2/22/22-3/3/22).  Patient is actively engaged in treatment at the [facility] clinic with psychiatrist Dr. Pearson.  Patient reports he has been compliant with medications and treatment.        Sexual History – Patient identifies as heterosexual     Family relationships and history – Unable to assess    Leisure Activity Assessment – Watching TV    Community Supports –  None known    Employment – Patient is unemployed; on disability     Substance Use Assessment – Patient denies    	  History of suicidality or self- injurious behaviors – Yes    Significant Loses – None identified     Life Goals – Patient verbalized goal of improved mental health.            will continue to meet with patient 1:1 and with treatment team daily.  Discharge plan is for continued mental health treatment in an outpatient setting.
For information on Fall & Injury Prevention, visit: [email or web address] OR  [email or web address] OR  https://www.cdc.gov/steadi/patient.html

## 2022-08-21 ENCOUNTER — EMERGENCY (EMERGENCY)
Facility: HOSPITAL | Age: 52
LOS: 0 days | Discharge: HOME | End: 2022-08-22
Attending: EMERGENCY MEDICINE | Admitting: EMERGENCY MEDICINE

## 2022-08-21 VITALS
TEMPERATURE: 100 F | DIASTOLIC BLOOD PRESSURE: 69 MMHG | HEIGHT: 68 IN | RESPIRATION RATE: 15 BRPM | HEART RATE: 115 BPM | OXYGEN SATURATION: 98 % | WEIGHT: 193.57 LBS | SYSTOLIC BLOOD PRESSURE: 145 MMHG

## 2022-08-21 DIAGNOSIS — Z88.0 ALLERGY STATUS TO PENICILLIN: ICD-10-CM

## 2022-08-21 DIAGNOSIS — Z23 ENCOUNTER FOR IMMUNIZATION: ICD-10-CM

## 2022-08-21 DIAGNOSIS — E03.9 HYPOTHYROIDISM, UNSPECIFIED: ICD-10-CM

## 2022-08-21 DIAGNOSIS — E11.9 TYPE 2 DIABETES MELLITUS WITHOUT COMPLICATIONS: ICD-10-CM

## 2022-08-21 DIAGNOSIS — F31.9 BIPOLAR DISORDER, UNSPECIFIED: ICD-10-CM
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
## 2022-08-21 DIAGNOSIS — Z91.013 ALLERGY TO SEAFOOD: ICD-10-CM

## 2022-08-21 DIAGNOSIS — E78.5 HYPERLIPIDEMIA, UNSPECIFIED: ICD-10-CM

## 2022-08-21 DIAGNOSIS — S41.112A LACERATION WITHOUT FOREIGN BODY OF LEFT UPPER ARM, INITIAL ENCOUNTER: ICD-10-CM
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
## 2022-08-21 DIAGNOSIS — Z79.84 LONG TERM (CURRENT) USE OF ORAL HYPOGLYCEMIC DRUGS: ICD-10-CM

## 2022-08-21 DIAGNOSIS — F25.0 SCHIZOAFFECTIVE DISORDER, BIPOLAR TYPE: ICD-10-CM

## 2022-08-21 DIAGNOSIS — K59.00 CONSTIPATION, UNSPECIFIED: ICD-10-CM

## 2022-08-21 DIAGNOSIS — I10 ESSENTIAL (PRIMARY) HYPERTENSION: ICD-10-CM

## 2022-08-21 DIAGNOSIS — Z91.018 ALLERGY TO OTHER FOODS: ICD-10-CM

## 2022-08-21 DIAGNOSIS — R45.851 SUICIDAL IDEATIONS: ICD-10-CM

## 2022-08-21 DIAGNOSIS — Z88.8 ALLERGY STATUS TO OTHER DRUGS, MEDICAMENTS AND BIOLOGICAL SUBSTANCES STATUS: ICD-10-CM

## 2022-08-21 LAB
ANION GAP SERPL CALC-SCNC: 13 MMOL/L — SIGNIFICANT CHANGE UP (ref 7–14)
APAP SERPL-MCNC: <5 UG/ML — LOW (ref 10–30)
BASOPHILS # BLD AUTO: 0.02 K/UL — SIGNIFICANT CHANGE UP (ref 0–0.2)
BASOPHILS NFR BLD AUTO: 0.3 % — SIGNIFICANT CHANGE UP (ref 0–1)
BUN SERPL-MCNC: 10 MG/DL — SIGNIFICANT CHANGE UP (ref 10–20)
CALCIUM SERPL-MCNC: 9.7 MG/DL — SIGNIFICANT CHANGE UP (ref 8.5–10.1)
CHLORIDE SERPL-SCNC: 102 MMOL/L — SIGNIFICANT CHANGE UP (ref 98–110)
CO2 SERPL-SCNC: 22 MMOL/L — SIGNIFICANT CHANGE UP (ref 17–32)
CREAT SERPL-MCNC: 0.9 MG/DL — SIGNIFICANT CHANGE UP (ref 0.7–1.5)
EGFR: 104 ML/MIN/1.73M2 — SIGNIFICANT CHANGE UP
EOSINOPHIL # BLD AUTO: 0.08 K/UL — SIGNIFICANT CHANGE UP (ref 0–0.7)
EOSINOPHIL NFR BLD AUTO: 1.2 % — SIGNIFICANT CHANGE UP (ref 0–8)
ETHANOL SERPL-MCNC: <10 MG/DL — SIGNIFICANT CHANGE UP
GLUCOSE SERPL-MCNC: 167 MG/DL — HIGH (ref 70–99)
HCT VFR BLD CALC: 36.6 % — LOW (ref 42–52)
HGB BLD-MCNC: 11.5 G/DL — LOW (ref 14–18)
IMM GRANULOCYTES NFR BLD AUTO: 0.3 % — SIGNIFICANT CHANGE UP (ref 0.1–0.3)
LYMPHOCYTES # BLD AUTO: 2.78 K/UL — SIGNIFICANT CHANGE UP (ref 1.2–3.4)
LYMPHOCYTES # BLD AUTO: 41.4 % — SIGNIFICANT CHANGE UP (ref 20.5–51.1)
MCHC RBC-ENTMCNC: 26.1 PG — LOW (ref 27–31)
MCHC RBC-ENTMCNC: 31.4 G/DL — LOW (ref 32–37)
MCV RBC AUTO: 83 FL — SIGNIFICANT CHANGE UP (ref 80–94)
MONOCYTES # BLD AUTO: 0.63 K/UL — HIGH (ref 0.1–0.6)
MONOCYTES NFR BLD AUTO: 9.4 % — HIGH (ref 1.7–9.3)
NEUTROPHILS # BLD AUTO: 3.19 K/UL — SIGNIFICANT CHANGE UP (ref 1.4–6.5)
NEUTROPHILS NFR BLD AUTO: 47.4 % — SIGNIFICANT CHANGE UP (ref 42.2–75.2)
NRBC # BLD: 0 /100 WBCS — SIGNIFICANT CHANGE UP (ref 0–0)
PLATELET # BLD AUTO: 205 K/UL — SIGNIFICANT CHANGE UP (ref 130–400)
POTASSIUM SERPL-MCNC: 4.2 MMOL/L — SIGNIFICANT CHANGE UP (ref 3.5–5)
POTASSIUM SERPL-SCNC: 4.2 MMOL/L — SIGNIFICANT CHANGE UP (ref 3.5–5)
RBC # BLD: 4.41 M/UL — LOW (ref 4.7–6.1)
RBC # FLD: 17.5 % — HIGH (ref 11.5–14.5)
SALICYLATES SERPL-MCNC: <0.3 MG/DL — LOW (ref 4–30)
SARS-COV-2 RNA SPEC QL NAA+PROBE: SIGNIFICANT CHANGE UP
SODIUM SERPL-SCNC: 137 MMOL/L — SIGNIFICANT CHANGE UP (ref 135–146)
WBC # BLD: 6.72 K/UL — SIGNIFICANT CHANGE UP (ref 4.8–10.8)
WBC # FLD AUTO: 6.72 K/UL — SIGNIFICANT CHANGE UP (ref 4.8–10.8)

## 2022-08-21 PROCEDURE — 73130 X-RAY EXAM OF HAND: CPT | Mod: 26,LT

## 2022-08-21 PROCEDURE — 93010 ELECTROCARDIOGRAM REPORT: CPT

## 2022-08-21 PROCEDURE — 70450 CT HEAD/BRAIN W/O DYE: CPT | Mod: 26,MA

## 2022-08-21 PROCEDURE — 99285 EMERGENCY DEPT VISIT HI MDM: CPT

## 2022-08-21 RX ORDER — TETANUS TOXOID, REDUCED DIPHTHERIA TOXOID AND ACELLULAR PERTUSSIS VACCINE, ADSORBED 5; 2.5; 8; 8; 2.5 [IU]/.5ML; [IU]/.5ML; UG/.5ML; UG/.5ML; UG/.5ML
0.5 SUSPENSION INTRAMUSCULAR ONCE
Refills: 0 | Status: COMPLETED | OUTPATIENT
Start: 2022-08-21 | End: 2022-08-21

## 2022-08-21 RX ADMIN — TETANUS TOXOID, REDUCED DIPHTHERIA TOXOID AND ACELLULAR PERTUSSIS VACCINE, ADSORBED 0.5 MILLILITER(S): 5; 2.5; 8; 8; 2.5 SUSPENSION INTRAMUSCULAR at 18:52

## 2022-08-21 NOTE — ED PROVIDER NOTE - NS ED ROS FT
CONST: No fever, chills or bodyaches  EYES: No pain, redness, drainage or visual changes.  ENT: No ear pain or discharge, nasal discharge or congestion. No sore throat  CARD: No chest pain, palpitations  RESP: No SOB, cough, hemoptysis. No hx of asthma or COPD  GI: No abdominal pain, N/V/D  : No urinary symptoms  MS: No joint pain, back pain or extremity pain/injury  SKIN: No rashes  NEURO: No headache, dizziness, paresthesias or LOC  PSYCH: (+) suicidal thoughts

## 2022-08-21 NOTE — ED BEHAVIORAL HEALTH ASSESSMENT NOTE - DETAILS
allergies to haldol and thorazine - pt and staff unable to clarify reactions d/w ed provider per hpi Patient feels as though he might become violent if he were to return to TLR today; he does not have a plan or intent, states he doesn't want to hurt anyone

## 2022-08-21 NOTE — ED PROVIDER NOTE - PHYSICAL EXAMINATION
Physical Exam    Vital Signs: I have reviewed the initial vital signs.  Constitutional: well-nourished, appears stated age, no acute distress  Eyes: Conjunctiva pink, Sclera clear  Cardiovascular: S1 and S2, regular rate, regular rhythm, well-perfused extremities, radial pulses equal and 2+ b/l.   Respiratory: unlabored respiratory effort, clear to auscultation bilaterally no wheezing, rales and rhonchi. pt is speaking full sentences. no accessory muscle use.   Musculoskeletal: FROM of b/l upper and lower extremities.   Integumentary: warm, dry, no rash. (+) multiple new linear cuts to the left ventral forearm.   Neurologic: awake, alert, cranial nerves II-XII grossly intact, extremities’ motor and sensory functions grossly intact. steady gait.   Psychiatric: pt is being aggressive in the ed, and banged his head against the door in his room twice.

## 2022-08-21 NOTE — ED ADULT TRIAGE NOTE - CHIEF COMPLAINT QUOTE
Pt presented to ED with glass in arm. Pt states he hurt himself and he kept getting agitated and upset because no one was believing him. Pt admits to SI/ denies HI. 1:1 initiated in triage.

## 2022-08-21 NOTE — ED PROVIDER NOTE - OBJECTIVE STATEMENT
50 y/o male with a PMH of hypothyroidism, diabetes, constipation, HTN, HLD, PPHx of schizoaffective disorder bipolar type, multiple IPP hospitalizations (last admitted to psych April 2022), and hx of multiple suicide attempts. 50 y/o male with a PMH of hypothyroidism, diabetes, constipation, HTN, HLD, PPHx of schizoaffective disorder bipolar type, multiple IPP hospitalizations (last admitted to psych April 2022), and hx of multiple suicide attempts presents to the ED from [facility] [address] for evaluation of suicidal thoughts. pt reports he was talking to a female friend at [place] and he was telling her about his past and she started laughing at him. pt reports he then used his left hadn to punch a "plastic window". pt reports he took a "can opener" slammed it on a desk and broke the desk. pt reports he wants to hurt himself. pt has his left forearm wrapped with fresh linear cuts on them he reports he did to himself from punching the window. pt is right hand dominant. pt reports he is hearing voices in his head but won't tell me what they say, pt becomes quiet and stares when I ask him. pt is upset and slammed his head twice against the door in his room in the ED. pt reports he will hurt someone if they try to mess with him. pt denies fever, chills, cough, illicit drug use, use of alcohol, chest pain, or sob.

## 2022-08-21 NOTE — ED PROVIDER NOTE - PATIENT PORTAL LINK FT
You can access the FollowMyHealth Patient Portal offered by [facility] by registering at the following website: [email or web address]. By joining [employer]’s FollowMyHealth portal, you will also be able to view your health information using other applications (apps) compatible with our system.

## 2022-08-21 NOTE — ED PROVIDER NOTE - PROGRESS NOTE DETAILS
FF: pt seen by telepsych, still pending collaterals to determine dispo for pt. FF: when I cut off pt dressing over the forearm wounds with my sheers he asked if he can take the sheers from me. pt seen by telepsych, still pending collaterals to determine dispo for pt. pt endorsed to resident latricia. MM: Patient received in sign out. Pending telepsych. MM: Patient received in sign out. Pending telepsych.    AV:  Telepsych is attempting to gain collateral information to assist in disposition. Dr. Kam: received sign out from Dr. Amaya   Pending psych

## 2022-08-21 NOTE — ED BEHAVIORAL HEALTH ASSESSMENT NOTE - DESCRIPTION
pt presented with superficial L Wrist laceration; per ED team the laceration does not require sutures/repair.    see bh note for full ED course. hypothyroidism, HTN, HLD, constipation see HPI

## 2022-08-21 NOTE — ED BEHAVIORAL HEALTH ASSESSMENT NOTE - HPI (INCLUDE ILLNESS QUALITY, SEVERITY, DURATION, TIMING, CONTEXT, MODIFYING FACTORS, ASSOCIATED SIGNS AND SYMPTOMS)
SUDHA PARKER is a 52M domiciled at [facility], with PMHx of hypothyroidism, diabetes, constipation, HTN, HLD, PPHx of schizoaffective disorder bipolar type, multiple IPP hospitalizations (last admission was at [facility] from 2/22/22-3/3/22) hx of multiple suicide attempts.  Patient was BIBEMS from [facility] after he used a piece of glass to superficially cut his l forearm (not requiring stitches/repair) also banged his head on wall in ED.   on interview pt is calm and cooperative. states that a female peer made him angry today when she "brushed him off," he felt insulted, and as a result cut his arm superficially with a piece of glass.   he denies attempting suicide. he denies current si/intent/plan. reports he also broke a window out of anger.   he says he feels better now, no longer feels like committing self harm or violence.   denies cah/hi/vh. says he has chronic voices "in the background" that don't bother him.   denies manic sx's .   Patient endorses that he has had multiple suicide attempts by hanging, cutting, and other modalities   Patient states that has been hospitalized multiple times, including IPP at [facility] and [facility].        Patient denies alcohol, tobacco, or other substance use.  Patient states he has no family or friends.  He consents for writer to contact [facility] for collateral.    [facility] called pt's residence for collateral - see  note.     med list   - Depakote 500mg Qam/750mg Qhs  - Clozaril 300mg Qam/400mg Qhs  - Risperdal 3mg Qhs  - ativan 2mg Qhs  - Invega Sustenna 78mg IM [id number] (per staff pt may have received injection last month but unable to confirm exact date)  - Levothyroxine 25mcg Qdaily  - Metoprolol 25mg Qdaily  - metformin 1000mg Q12:00, Q17:00  - atorvastatin 40mg Qhs  - bisacodyl 5mg Qhs SUDHA PARKER is a 52M domiciled at [facility], with PMHx of hypothyroidism, diabetes, constipation, HTN, HLD, PPHx of schizoaffective disorder bipolar type, multiple IPP hospitalizations (last admission was at [place] from 2/22/22-3/3/22) hx of multiple suicide attempts.  Patient was BIBEMS from [facility] after he used a piece of glass to superficially cut his l forearm (not requiring stitches/repair), self reports breaking a window at residence, and also banged his head on wall in ED.   on interview pt is calm and cooperative. though concrete and oddly related.   states that a female peer made him angry today when she "brushed him off," he felt insulted, and as a result cut his arm superficially with a piece of glass. also broke a window, he says, out of anger.  he denies attempting suicide. he denies current si/intent/plan.   he says he feels better now, no longer feels like committing self harm or violence.   denies cah/hi/vh. says he has chronic voices "in the background" that don't bother him.   denies manic sx's .   Patient endorses that he has had multiple suicide attempts by hanging, cutting, and other modalities  Patient states that has been hospitalized multiple times, including IPP at [place] and [place].    Patient denies alcohol, tobacco, or other substance use.  Patient states he has no family or friends.  He consents for writer to contact [facility] for collateral.    [facility] called pt's residence for collateral - see  note.   med list   - Depakote 500mg Qam/750mg Qhs  - Clozaril 300mg Qam/400mg Qhs  - Risperdal 3mg Qhs  - ativan 2mg Qhs  - Invega Sustenna 78mg IM [id number] (per staff pt may have received injection last month but unable to confirm exact date)  - Levothyroxine 25mcg Qdaily  - Metoprolol 25mg Qdaily  - metformin 1000mg Q12:00, Q17:00  - atorvastatin 40mg Qhs  - bisacodyl 5mg Qhs

## 2022-08-21 NOTE — ED BEHAVIORAL HEALTH ASSESSMENT NOTE - SUMMARY
SUDHA PARKER is a 51M domiciled at [facility], with PMHx of hypothyroidism, diabetes, constipation, HTN, HLD, PPHx of schizoaffective disorder bipolar type, multiple IPP hospitalizations (last admission was at [place] from 2/22/22-3/3/22) hx of multiple suicide attempts.  Patient was BIBEMS from [facility] after he used a piece of glass superficially to cut forearm, and also was known to be banging head on wall today.   on evaluation pt denies si/intent/plan. he seems to have cut himself impulsively and out of frustration when a peer "brushed him off." while it seems unlikely that pt currently has a true elevated suicide risk that would warrant hospitalization, he does have a hx of self harm and violence, and therefore collateral information is necessary to complete risk assessment. hold for collateral from residence (message left at two numbers by [facility]). SUDHA PARKER is a 52M domiciled at [facility], with PMHx of hypothyroidism, diabetes, constipation, HTN, HLD, PPHx of schizoaffective disorder bipolar type, multiple IPP hospitalizations (last admission was at [place] from 2/22/22-3/3/22) hx of multiple suicide attempts.  Patient was BIBEMS from [facility] after he used a piece of glass to superficially cut his l forearm (not requiring stitches/repair), self reports breaking a window at residence, and also banged his head on wall in ED.   on evaluation pt denies si/intent/plan. he seems to have cut himself impulsively and out of frustration when a peer "brushed him off." while it seems unlikely that pt currently has a true elevated suicide risk that would warrant hospitalization, he does have a hx of self harm (both cutting and banging head today) and violence (breaking a window), and therefore collateral information is necessary to complete risk/safety assessment. hold for collateral from residence (message left at two numbers by [facility]).

## 2022-08-21 NOTE — ED BEHAVIORAL HEALTH NOTE - BEHAVIORAL HEALTH NOTE
“Collateral (Name, relationship to patient) has requested that the information provided remain confidential: Yes [  ] No [  ]     Collateral (Name, relationship to patient) has provided information that patient is/may be unaware of: Yes [  ] No [  ]”          “Patient gives permission to obtain collateral from  [facility] residence_____:     (  x) Yes     (  )  No     Rationale for overriding objection               (  ) Lack of capacity. Details: ________               (  ) Assessing risk of danger to self/others. Details: ________            Rationale for selecting specific collateral source               (  ) Potential to impact risk of danger to self/others and no alternative equivalent. Details: _____”          [facility] attempted to outreach and obtain collateral from pt’s residence. [facility] outreached the following numbers [phone] and [phone] and left voicemails on both with a call back number.         ------------------------------------------------     COVID Exposure Screen- collateral (i.e. third-party, chart review, belongings, etc; include EMS and ED staff)      ---------------------------------------------------     1.    Has the patient had a COVID-19 test in the last 90 days? Unknown.      2. Has the patient tested positive for COVID-19 antibodies? Unknown.      3.Has the patient received 2 doses of the COVID-19 vaccine?  Unknown.      4. In the past 10 days, has the patient been around anyone with a positive COVID-19 test?* Unknown.      5.Has the patient been out of [place] within the past 10 days? NO.

## 2022-08-21 NOTE — ED PROVIDER NOTE - NSFOLLOWUPINSTRUCTIONS_ED_ALL_ED_FT
Patient can follow up with outpatient psychiatrist Dr. Pearson, at [facility], [address] [place] [address], phone number [phone].

## 2022-08-21 NOTE — ED BEHAVIORAL HEALTH ASSESSMENT NOTE - RISK ASSESSMENT
Unable to determine Suicide Risk Risk factors: hx of schizophrenia, hx of multiple suicide attempts and IPP hospitalizations, poor social support, poor coping skills, recent act of self harm, active CAH to hurt self and others, hx of treatment resistant schizophrenia      Protective: future oriented, care seeking

## 2022-08-21 NOTE — ED BEHAVIORAL HEALTH NOTE - BEHAVIORAL HEALTH NOTE
===================      PRE-HOSPITAL COURSE      ===================      SOURCE:  Secondhand EMR documentation.       DETAILS:  Patient presents self to ED; chief complaint SI/SIB of cutting arm with glass.     ===========      ED COURSE:      ============      SOURCE:  RN and secondhand EMR documentation.       ARRIVAL:  Patient was cooperative with hospital protocol and allowed for gowning/wanding without incident. Patient presents with fair hygiene/grooming. Patient placed on 1:1 In private room for consult.       BELONGINGS:  None notable.      BEHAVIOR: Blood provided for routine labs without noted incident; urine not provided as of yet. Patient presently denying SI/HI while in ED; endorsed CAH to cut self. Patient is alert, oriented, and makes eye contact; speech of normal volume and rate accompanied by logical thought process. Patient has been in hospital bed while in ED; has eaten food and has been resting in hospital bed.   TREATMENT: Patient did not require medication intervention while in ED; did receive tDap vaccine .5mL IM.   VISITORS:  Patient presently unaccompanied by social supports while in ED.

## 2022-08-22 VITALS
HEART RATE: 100 BPM | SYSTOLIC BLOOD PRESSURE: 135 MMHG | OXYGEN SATURATION: 98 % | TEMPERATURE: 98 F | RESPIRATION RATE: 18 BRPM | DIASTOLIC BLOOD PRESSURE: 85 MMHG

## 2022-08-22 DIAGNOSIS — F19.90 OTHER PSYCHOACTIVE SUBSTANCE USE, UNSPECIFIED, UNCOMPLICATED: ICD-10-CM

## 2022-08-22 DIAGNOSIS — F25.9 SCHIZOAFFECTIVE DISORDER, UNSPECIFIED: ICD-10-CM

## 2022-08-22 PROBLEM — F20.9 SCHIZOPHRENIA, UNSPECIFIED: Chronic | Status: ACTIVE | Noted: 2022-04-24

## 2022-08-22 LAB
COVID-19 SPIKE DOMAIN AB INTERP: POSITIVE
COVID-19 SPIKE DOMAIN ANTIBODY RESULT: >250 U/ML — HIGH
SARS-COV-2 IGG+IGM SERPL QL IA: >250 U/ML — HIGH
SARS-COV-2 IGG+IGM SERPL QL IA: POSITIVE

## 2022-08-22 PROCEDURE — 99214 OFFICE O/P EST MOD 30 MIN: CPT | Mod: GC

## 2022-08-22 RX ORDER — ATORVASTATIN CALCIUM 80 MG/1
40 TABLET, FILM COATED ORAL ONCE
Refills: 0 | Status: COMPLETED | OUTPATIENT
Start: 2022-08-22 | End: 2022-08-22

## 2022-08-22 RX ORDER — BENZTROPINE MESYLATE 1 MG
1 TABLET ORAL ONCE
Refills: 0 | Status: COMPLETED | OUTPATIENT
Start: 2022-08-22 | End: 2022-08-22

## 2022-08-22 RX ORDER — METFORMIN HYDROCHLORIDE 850 MG/1
1000 TABLET ORAL ONCE
Refills: 0 | Status: COMPLETED | OUTPATIENT
Start: 2022-08-22 | End: 2022-08-22

## 2022-08-22 RX ORDER — RISPERIDONE 4 MG/1
3 TABLET ORAL ONCE
Refills: 0 | Status: COMPLETED | OUTPATIENT
Start: 2022-08-22 | End: 2022-08-22

## 2022-08-22 RX ORDER — CLOZAPINE 150 MG/1
300 TABLET, ORALLY DISINTEGRATING ORAL ONCE
Refills: 0 | Status: COMPLETED | OUTPATIENT
Start: 2022-08-22 | End: 2022-08-22

## 2022-08-22 RX ORDER — DIVALPROEX SODIUM 500 MG/1
500 TABLET, DELAYED RELEASE ORAL ONCE
Refills: 0 | Status: COMPLETED | OUTPATIENT
Start: 2022-08-22 | End: 2022-08-22

## 2022-08-22 RX ORDER — DIVALPROEX SODIUM 500 MG/1
750 TABLET, DELAYED RELEASE ORAL ONCE
Refills: 0 | Status: COMPLETED | OUTPATIENT
Start: 2022-08-22 | End: 2022-08-22

## 2022-08-22 RX ADMIN — RISPERIDONE 3 MILLIGRAM(S): 4 TABLET ORAL at 01:49

## 2022-08-22 RX ADMIN — DIVALPROEX SODIUM 750 MILLIGRAM(S): 500 TABLET, DELAYED RELEASE ORAL at 01:50

## 2022-08-22 RX ADMIN — METFORMIN HYDROCHLORIDE 1000 MILLIGRAM(S): 850 TABLET ORAL at 01:50

## 2022-08-22 RX ADMIN — DIVALPROEX SODIUM 500 MILLIGRAM(S): 500 TABLET, DELAYED RELEASE ORAL at 08:05

## 2022-08-22 RX ADMIN — Medication 1 MILLIGRAM(S): at 01:50

## 2022-08-22 RX ADMIN — CLOZAPINE 300 MILLIGRAM(S): 150 TABLET, ORALLY DISINTEGRATING ORAL at 08:06

## 2022-08-22 RX ADMIN — ATORVASTATIN CALCIUM 40 MILLIGRAM(S): 80 TABLET, FILM COATED ORAL at 01:49

## 2022-08-22 NOTE — BH CONSULTATION LIAISON PROGRESS NOTE - NSBHCHARTREVIEWVS_PSY_A_CORE FT
Vital Signs Last 24 Hrs  T(C): 36.6 (22 Aug 2022 07:27), Max: 37.6 (21 Aug 2022 17:04)  T(F): 97.9 (22 Aug 2022 07:27), Max: 99.6 (21 Aug 2022 17:04)  HR: 103 (22 Aug 2022 07:27) (81 - 115)  BP: 139/82 (22 Aug 2022 07:27) (132/81 - 156/96)  BP(mean): 104 (22 Aug 2022 07:27) (104 - 104)  RR: 18 (22 Aug 2022 07:27) (15 - 18)  SpO2: 98% (22 Aug 2022 07:27) (97% - 98%)    Parameters below as of 22 Aug 2022 07:27  Patient On (Oxygen Delivery Method): room air

## 2022-08-22 NOTE — BH CONSULTATION LIAISON PROGRESS NOTE - NSBHASSESSMENTFT_PSY_ALL_CORE
Patient is a 51 yo Male domiciled at [place] [address] with PPHx of schizoaffective disorder bipolar type, substance use disorder, and hx of multiple suicide attempts who presented to ED for episode of self harm and vague passive suicidal ideation. Patient was BIBEMS after he used a piece of glass to superficially cut his l forearm (not requiring stitches/repair), self reports breaking a window at residence, and also banged his head on wall in ED overnight. Patient was seen by telepsych but collateral was not able to be obtained. Collateral was obtained this morning which confirmed    At this point, patient is not considered an acute danger to himself or others.     Patient was informed that he should call 911 or return to the ED if he develops worsening depression or suicidal ideations or if he feels that he is a danger to himself or others. Patient verbalized understanding. Patient is a 51 yo Male domiciled at [place] [address] with PPHx of schizoaffective disorder bipolar type, substance use disorder, and hx of multiple suicide attempts who presented to ED for episode of self harm and vague passive suicidal ideation. Patient was BIBEMS after he used a piece of glass to superficially cut his l forearm (not requiring stitches/repair), self reports breaking a window at residence, and also banged his head on wall in ED overnight. Patient was seen by telepsych but collateral was not able to be obtained.   Today , patient does not appear acutely psychotic , manic or depressed . he denies having current suicidal ideations, intent or plan. it seems that his actions are behavioral in the context of interpersonal conflicts with another client at his residence . Patient seems to be complaint with his medication , is future oriented and seems remorsefully of his actions. he has since been in good behavioral control.   At this point, patient is not considered an acute danger to himself or others and does not need inpatient psychiatric admission. Patient is cleared by the psychiatry team with the plan for him to follow up with his outpatient psychiatrist Dr Pearson and his therapist. In the mean time, he can continue his current medications as precribed . Patient was informed that he should call 911 or return to the ED if he develops worsening depression or suicidal ideations or if he feels that he is a danger to himself or others. Patient verbalized understanding.

## 2022-08-22 NOTE — ED ADULT NURSE REASSESSMENT NOTE - NS ED NURSE REASSESS COMMENT FT1
Pt a&ox4, in no distress. Denies pain. Vitals stable. Safety maintained. Awaiting psych. 1:1 continues.

## 2022-08-22 NOTE — BH SAFETY PLAN - ENVIRONMENT SAFETY 1:
"I can talk to the staff at my home if I want to hurt myself. They helped encourage me not to hurt myself before."

## 2022-08-22 NOTE — BH CONSULTATION LIAISON PROGRESS NOTE - NSBHATTESTCOMMENTATTENDFT_PSY_A_CORE
Mr Levi is a 52 year old man with a history of  schizoaffective disorder  and  substance use disorder,  who presented to ED for the evaluation of possible suicidal ideations. Patient had apparently scratched his arm with a bottle and had started banging his head on the wall while in the ED .   Patient denies having current suicidal ideations, intent or plan. While in the ED he has been in good behavioral control. It appears that his actions at his residence and his initial presentation in the ED were as a result of his poor impuld control and poor frustration tolerance in coping with his perceived stressors. patient appears to have chronic symptoms of psychosis , and seems to have a pattern of behaviors where by he makes suicidal statements and gestures in the context of his stressors .   For now, patient however does not appear to have acute symptoms of psychosis, cirilo or depression. He denies having current suicidal ideations, intent or plan.  At this time, patient is not considered an imminent danger to himself or others and does not need inpatient psychiatric hospitalization. Patient is due for his Invega Sustenna in 2 days , and can continue his other psychotropic medications as prescribed by his outpatient psychiatrist Dr Pearson at his next appointment . We recommend that patient continues supportive psychotherapy to help patient address his stressors and also to help him develop better coping skills and better frustration tolerance .

## 2022-08-22 NOTE — BH CONSULTATION LIAISON PROGRESS NOTE - NSBHCHARTREVIEWLAB_PSY_A_CORE FT
11.5   6.72  )-----------( 205      ( 21 Aug 2022 18:40 )             36.6       08-21    137  |  102  |  10  ----------------------------<  167<H>  4.2   |  22  |  0.9    Ca    9.7      21 Aug 2022 18:40

## 2022-08-22 NOTE — BH CONSULTATION LIAISON PROGRESS NOTE - NSBHCONSULTRECOMMENDOTHER_PSY_A_CORE FT
1. Patient can be discharged back to [place] [address] once medically clear    2. Patient should continue current psychotropic medications    3.  1. Patient can be discharged back to [place] [address] once medically clear    2. Patient should continue current psychotropic medications    3. Patient should continue outpatient Psychiatric Care with Dr. Pearson at [facility]

## 2022-08-22 NOTE — BH CONSULTATION LIAISON PROGRESS NOTE - NSBHCONSULTFOLLOWAFTERCARE_PSY_A_CORE FT
Patient can follow up with outpatient psychiatrist Dr. Pearson, at [facility], [address] [place] [address], phone number [phone].

## 2022-08-22 NOTE — BH CONSULTATION LIAISON PROGRESS NOTE - NSBHFUPINTERVALHXFT_PSY_A_CORE
Patient seen and interviewed with 1:1 at bedside    Collateral staff at [facility] [] who reported that patient no longer resides at [facility] and now lives at [place] [address] since August 4th. Patient has history of suicidal statements without follow through and history of self harming behavior with goal of going to ED. Reported compliance with medication with Invega injection given 7/27 with next dose due 8/24. Patient following up with Dr. Pearson.  Patient seen and interviewed with 1:1 at bedside    Collateral staff at [facility] [[phone]] who reported that patient no longer resides at [facility] and now lives at [place] [address] since August 4th. Patient has history of suicidal statements without follow through and history of self harming behavior with goal of going to ED. Reported compliance with medication with Invega injection given 7/27 with next dose due 8/24. Patient following up with Dr. Pearson.  Patient seen and interviewed with 1:1 at bedside  Patient reports that he did not want to kill himself . he reports that he was upset because a female client had rejected his romantic advances and he decided to try to cut himself . patient reports that he is taking his medication and is following up with Dr Pearson and will be getting his next his injection soon. Patient states " the will tell me when I should go get the shot". patient reports that he wants to go justine. he denies acute symptoms of depression , psychosis or cirilo . He denies having current suicidal thoughts , intent or plan.   According to 1 to 1 staff , patient has been in good behavioral control , not agitated .   Collateral staff at [facility] [[phone]] who reported that patient no longer resides at [facility] and now lives at [place] [address] since August 4th. Patient has history of suicidal statements without follow through and history of self harming behavior with goal of going to ED. Reported compliance with medication with Invega Susstenna injection given 7/27 with next dose due 8/24. Patient following up with Dr. Pearson.

## 2022-09-19 ENCOUNTER — EMERGENCY (EMERGENCY)
Facility: HOSPITAL | Age: 52
LOS: 0 days | Discharge: HOME | End: 2022-09-20
Attending: EMERGENCY MEDICINE | Admitting: EMERGENCY MEDICINE

## 2022-09-19 VITALS
HEART RATE: 103 BPM | HEIGHT: 68 IN | WEIGHT: 220.02 LBS | SYSTOLIC BLOOD PRESSURE: 156 MMHG | OXYGEN SATURATION: 97 % | DIASTOLIC BLOOD PRESSURE: 85 MMHG | TEMPERATURE: 98 F | RESPIRATION RATE: 17 BRPM

## 2022-09-19 DIAGNOSIS — R53.83 OTHER FATIGUE: ICD-10-CM

## 2022-09-19 DIAGNOSIS — T73.0XXA STARVATION, INITIAL ENCOUNTER: ICD-10-CM

## 2022-09-19 DIAGNOSIS — F17.200 NICOTINE DEPENDENCE, UNSPECIFIED, UNCOMPLICATED: ICD-10-CM

## 2022-09-19 DIAGNOSIS — Z88.0 ALLERGY STATUS TO PENICILLIN: ICD-10-CM
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
## 2022-09-19 DIAGNOSIS — R10.9 UNSPECIFIED ABDOMINAL PAIN: ICD-10-CM

## 2022-09-19 DIAGNOSIS — Z79.84 LONG TERM (CURRENT) USE OF ORAL HYPOGLYCEMIC DRUGS: ICD-10-CM

## 2022-09-19 DIAGNOSIS — Z91.013 ALLERGY TO SEAFOOD: ICD-10-CM

## 2022-09-19 DIAGNOSIS — Z88.8 ALLERGY STATUS TO OTHER DRUGS, MEDICAMENTS AND BIOLOGICAL SUBSTANCES STATUS: ICD-10-CM

## 2022-09-19 DIAGNOSIS — F20.9 SCHIZOPHRENIA, UNSPECIFIED: ICD-10-CM

## 2022-09-19 DIAGNOSIS — I10 ESSENTIAL (PRIMARY) HYPERTENSION: ICD-10-CM
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
## 2022-09-19 DIAGNOSIS — F19.19 OTHER PSYCHOACTIVE SUBSTANCE ABUSE WITH UNSPECIFIED PSYCHOACTIVE SUBSTANCE-INDUCED DISORDER: ICD-10-CM

## 2022-09-19 DIAGNOSIS — Z91.014 ALLERGY TO MAMMALIAN MEATS: ICD-10-CM

## 2022-09-19 DIAGNOSIS — E11.9 TYPE 2 DIABETES MELLITUS WITHOUT COMPLICATIONS: ICD-10-CM

## 2022-09-19 PROCEDURE — 99284 EMERGENCY DEPT VISIT MOD MDM: CPT

## 2022-09-20 PROBLEM — F19.90 OTHER PSYCHOACTIVE SUBSTANCE USE, UNSPECIFIED, UNCOMPLICATED: Chronic | Status: ACTIVE | Noted: 2022-08-22

## 2022-09-20 NOTE — ED PROVIDER NOTE - ATTENDING APP SHARED VISIT CONTRIBUTION OF CARE
pt co ab pain, mid, mild. resolved. pt sts hasn't eaten in 24 hours, is hungry and would like food. no n, v, d, fever orchills. no gu sx.    pt in nad, anict, ctab, rrr, ab soft, nt, nd. nml bs.

## 2022-09-20 NOTE — ED PROVIDER NOTE - NS ED ROS FT
Constitutional: no fever, chills, no recent weight loss, change in appetite or malaise  Eyes: no redness/discharge/pain/vision changes  ENT: no rhinorrhea/ear pain/sore throat  Cardiac: No chest pain, SOB or edema.  Respiratory: No cough or respiratory distress  GI: No nausea, vomiting, diarrhea  : No dysuria, frequency, urgency or hematuria  MS: no pain to back or extremities, no loss of ROM, no weakness  Neuro: No headache or weakness. No LOC.  Skin: No skin rash.  Except as documented in the HPI, all other systems are negative.

## 2022-09-20 NOTE — ED PROVIDER NOTE - PHYSICAL EXAMINATION
CONSTITUTIONAL: Well-appearing; well-nourished; in no apparent distress.   NECK: Supple; non-tender; no cervical lymphadenopathy.  CARDIOVASCULAR: Normal S1, S2; no murmurs, rubs, or gallops.   RESPIRATORY: Normal chest excursion with respiration  GI/: non-distended; non-tender; no palpable organomegaly.   MS: No CVA tenderness. Normal ROM in all four extremities; non-tender to palpation; distal pulses are normal.   SKIN: Normal for age and race; warm; dry; good turgor; no apparent lesions or exudate.   NEURO/PSYCH: A & O x 4; grossly unremarkable. mood and manner are appropriate.

## 2022-09-20 NOTE — ED ADULT NURSE NOTE - CAS TRG GEN SKIN CONDITION
1. Test blood sugar 2 time(s) per day. Pair up before breakfast and bedtime or before and 2 hours after the largest meal.  2. Increase Metformin Extended Release to 1000 mg twice per day (2 tablets twice per day).  3. Continue to eat 3 meals and 3 snacks per day. To help gain weight try snacks with 30-45 grams of carbohydrate. Adding protein to meals and snacks will help to stabilize your blood sugar.  4. Continue to avoid sugary beverages, juice and sweets.  5. Include high fiber, whole grain foods instead of processed white foods. Healthier choices are whole grain cereals, whole wheat pasta, brown or wild rice and whole wheat bread. Try [employer]'s brand of whole wheat pasta in a black box.   6. Aim for foods with 3 grams or more fiber per serving. Limit added sugars to 20 grams or less per day.  7. Stay active every day; try not to sit for more than 30 minutes and lift light weights will help build muscle.  8. Next telephone visit on 9/3/2020 at 9:30.    Blood sugar goals:  Before meals   1-2 hours after meals: less 180  Bedtime:     A1c: less than 7.0% (estimated average blood sugar of 154 mg/dl)    REMINDERS  If blood sugar is above 200 that is too high. To lower blood sugar drink a glass of water and move more.  If blood sugars are less than 70 that is too low. Treat with 15 grams of carbohydrate; wait 15 minutes and re-test blood sugar. See Understanding Diabetes handout   Warm

## 2022-09-20 NOTE — ED PROVIDER NOTE - NSICDXPASTMEDICALHX_GEN_ALL_CORE_FT
PAST MEDICAL HISTORY:  DM (diabetes mellitus)     HTN (hypertension)     Schizophrenia     Substance use disorder

## 2022-09-20 NOTE — ED PROVIDER NOTE - OBJECTIVE STATEMENT
pt undomiciled with recent visit to ED for BH issues presents to ED reporting abd pain, but sleeping on presentation. sts has not eaten since last night and has not been able to sleep. requesting hot food and to rest. Denies fever/chill/HA/dizziness/chest pain/palpitation/sob/n/v/d/ black stool/bloody stool/urinary sxs

## 2022-09-20 NOTE — ED PROVIDER NOTE - PATIENT PORTAL LINK FT
You can access the FollowMyHealth Patient Portal offered by [facility] by registering at the following website: [email or web address]. By joining [employer]’s FollowMyHealth portal, you will also be able to view your health information using other applications (apps) compatible with our system.

## 2022-09-20 NOTE — ED PROVIDER NOTE - NSFOLLOWUPINSTRUCTIONS_ED_ALL_ED_FT
Medical Screening Exam  A medical screening exam helps determine whether or not you need emergency medical treatment.    During the medical screening exam, a health care provider does a short physical exam and medical history to assess:    Your current symptoms.  Your overall health.    Depending on your symptoms, you may need additional tests.    What are the possible outcomes of a medical screening exam?  Your medical screening exam may determine that:    You do not need emergency treatment at this time.  You need treatment right away.  You need to be transferred to another medical center.    When should I seek medical care?  If you have a regular health care provider, make an appointment for a follow-up visit with him or her. If you do not have a regular health care provider, ask about resources in your community.    Get help right away if:  Your condition may change over time. If your condition gets worse or you develop new or troubling symptoms before you see your health care provider, go to an emergency department right away.    In an emergency:     Call 911 or have someone drive you to the nearest hospital.  Do not drive yourself.  This information is not intended to replace advice given to you by your health care provider. Make sure you discuss any questions you have with your health care provider.

## 2022-10-07 ENCOUNTER — EMERGENCY (EMERGENCY)
Facility: HOSPITAL | Age: 52
LOS: 0 days | Discharge: HOME | End: 2022-10-07
Attending: STUDENT IN AN ORGANIZED HEALTH CARE EDUCATION/TRAINING PROGRAM | Admitting: STUDENT IN AN ORGANIZED HEALTH CARE EDUCATION/TRAINING PROGRAM

## 2022-10-07 VITALS
HEART RATE: 120 BPM | HEIGHT: 68 IN | RESPIRATION RATE: 20 BRPM | DIASTOLIC BLOOD PRESSURE: 79 MMHG | OXYGEN SATURATION: 96 % | TEMPERATURE: 98 F | SYSTOLIC BLOOD PRESSURE: 128 MMHG

## 2022-10-07 VITALS — OXYGEN SATURATION: 99 % | RESPIRATION RATE: 18 BRPM | HEART RATE: 98 BPM

## 2022-10-07 DIAGNOSIS — Z88.0 ALLERGY STATUS TO PENICILLIN: ICD-10-CM

## 2022-10-07 DIAGNOSIS — R09.81 NASAL CONGESTION: ICD-10-CM

## 2022-10-07 DIAGNOSIS — Z88.8 ALLERGY STATUS TO OTHER DRUGS, MEDICAMENTS AND BIOLOGICAL SUBSTANCES STATUS: ICD-10-CM

## 2022-10-07 DIAGNOSIS — R05.1 ACUTE COUGH: ICD-10-CM

## 2022-10-07 DIAGNOSIS — E78.5 HYPERLIPIDEMIA, UNSPECIFIED: ICD-10-CM

## 2022-10-07 DIAGNOSIS — B34.9 VIRAL INFECTION, UNSPECIFIED: ICD-10-CM

## 2022-10-07 DIAGNOSIS — E11.9 TYPE 2 DIABETES MELLITUS WITHOUT COMPLICATIONS: ICD-10-CM

## 2022-10-07 DIAGNOSIS — I10 ESSENTIAL (PRIMARY) HYPERTENSION: ICD-10-CM

## 2022-10-07 DIAGNOSIS — Z20.822 CONTACT WITH AND (SUSPECTED) EXPOSURE TO COVID-19: ICD-10-CM

## 2022-10-07 DIAGNOSIS — Z79.84 LONG TERM (CURRENT) USE OF ORAL HYPOGLYCEMIC DRUGS: ICD-10-CM

## 2022-10-07 DIAGNOSIS — Z91.018 ALLERGY TO OTHER FOODS: ICD-10-CM

## 2022-10-07 DIAGNOSIS — J02.9 ACUTE PHARYNGITIS, UNSPECIFIED: ICD-10-CM

## 2022-10-07 DIAGNOSIS — Z91.013 ALLERGY TO SEAFOOD: ICD-10-CM

## 2022-10-07 LAB
FLUAV AG NPH QL: SIGNIFICANT CHANGE UP
FLUBV AG NPH QL: SIGNIFICANT CHANGE UP
RSV RNA NPH QL NAA+NON-PROBE: SIGNIFICANT CHANGE UP
SARS-COV-2 RNA SPEC QL NAA+PROBE: SIGNIFICANT CHANGE UP

## 2022-10-07 PROCEDURE — 71046 X-RAY EXAM CHEST 2 VIEWS: CPT | Mod: 26

## 2022-10-07 PROCEDURE — 99284 EMERGENCY DEPT VISIT MOD MDM: CPT

## 2022-10-07 RX ORDER — IBUPROFEN 200 MG
600 TABLET ORAL ONCE
Refills: 0 | Status: COMPLETED | OUTPATIENT
Start: 2022-10-07 | End: 2022-10-07

## 2022-10-07 RX ORDER — DEXAMETHASONE 0.5 MG/5ML
10 ELIXIR ORAL ONCE
Refills: 0 | Status: COMPLETED | OUTPATIENT
Start: 2022-10-07 | End: 2022-10-07

## 2022-10-07 RX ADMIN — Medication 600 MILLIGRAM(S): at 16:05

## 2022-10-07 RX ADMIN — Medication 10 MILLIGRAM(S): at 16:05

## 2022-10-07 NOTE — ED PROVIDER NOTE - PATIENT PORTAL LINK FT
You can access the FollowMyHealth Patient Portal offered by [facility] by registering at the following website: [email or web address]. By joining [employer]’s FollowMyHealth portal, you will also be able to view your health information using other applications (apps) compatible with our system.

## 2022-10-07 NOTE — ED ADULT NURSE NOTE - NSIMPLEMENTINTERV_GEN_ALL_ED
Implemented All Universal Safety Interventions:  [place] to call system. Call bell, personal items and telephone within reach. Instruct patient to call for assistance. Room bathroom lighting operational. Non-slip footwear when patient is off stretcher. Physically safe environment: no spills, clutter or unnecessary equipment. Stretcher in lowest position, wheels locked, appropriate side rails in place.

## 2022-10-07 NOTE — ED PROVIDER NOTE - NS ED ROS FT
Review of Systems:  	•	CONSTITUTIONAL - no fever, no diaphoresis, no chills  	•	SKIN - no rash  	•	HEMATOLOGIC - no bleeding, no bruising  	•	EYES - no eye pain, no blurry vision  	•	ENT - +sore throat, +rhinorrhea, +congestion  	•	RESPIRATORY - no shortness of breath, +cough  	•	CARDIAC - no chest pain, no palpitations  	•	GI - no abd pain, no nausea, no vomiting, no diarrhea, no constipation  	•	GENITO-URINARY - no dysuria; no hematuria, no increased urinary frequency  	•	MUSCULOSKELETAL - no joint paint, no swelling, no redness  	•	NEUROLOGIC - no weakness, no headache, no paresthesias, no LOC  	•	PSYCH - no anxiety, no depression  	All other ROS are negative except as documented in HPI.

## 2022-10-07 NOTE — ED PROVIDER NOTE - CLINICAL SUMMARY MEDICAL DECISION MAKING FREE TEXT BOX
52-year-old male presents with sore throat congestion.  No chest pain shortness of breath fevers chills nausea vomiting.  Well appearing, NAD, non toxic. NCAT PERRLA EOMI neck supple non tender normal wob cta bl rrr abdomen s nt nd no rebound no guarding WWPx4 neuro non focal.  Most likely secondary to viral infection.  Well appearing, NAD, non toxic. NCAT PERRLA EOMI neck supple non tender normal wob cta bl rrr abdomen s nt nd no rebound no guarding WWPx4 neuro non focal

## 2022-10-07 NOTE — ED PROVIDER NOTE - OBJECTIVE STATEMENT
51 yo M with 52-year-old male with past medical history of diabetes, hypertension, hyperlipidemia presenting for evaluation of sore throat, cough, congestion x2 days.  Symptoms are moderate.  No alleviating or aggravating factors. No cp, sob, fever, chills, abdominal pain, nausea, vomiting, diarrhea, back pain, urinary symptoms, headache, dizziness, paresthesias, or weakness.

## 2022-10-07 NOTE — ED PROVIDER NOTE - PHYSICAL EXAMINATION
VITAL SIGNS: I have reviewed nursing notes and confirm.  CONSTITUTIONAL: Patient is in no acute distress.  SKIN: Skin exam is warm and dry, no acute rash.  HEAD: Normocephalic; atraumatic.  EYES: PERRL, EOM intact; conjunctiva and sclera clear.  ENT: Mild erythema to oropharynx.  No exudates appreciated. No nasal discharge; airway clear.   NECK: Supple; non tender.  CARD: S1, S2 normal; no murmurs, gallops, or rubs. Regular rate and rhythm.  RESP: Clear to auscultation bilaterally. No wheezes, rales or rhonchi.  ABD: Normal bowel sounds; soft; non-distended; non-tender.   EXT: Normal ROM. No edema.  LYMPH: No acute cervical adenopathy.  NEURO: Alert, oriented. Grossly unremarkable. No focal deficits.  PSYCH: Cooperative, appropriate.

## 2022-10-31 ENCOUNTER — EMERGENCY (EMERGENCY)
Facility: HOSPITAL | Age: 52
LOS: 0 days | Discharge: HOME | End: 2022-10-31
Attending: STUDENT IN AN ORGANIZED HEALTH CARE EDUCATION/TRAINING PROGRAM | Admitting: STUDENT IN AN ORGANIZED HEALTH CARE EDUCATION/TRAINING PROGRAM

## 2022-10-31 VITALS
HEIGHT: 68 IN | SYSTOLIC BLOOD PRESSURE: 145 MMHG | HEART RATE: 110 BPM | RESPIRATION RATE: 18 BRPM | TEMPERATURE: 98 F | DIASTOLIC BLOOD PRESSURE: 90 MMHG | OXYGEN SATURATION: 100 %

## 2022-10-31 VITALS
SYSTOLIC BLOOD PRESSURE: 139 MMHG | HEART RATE: 99 BPM | DIASTOLIC BLOOD PRESSURE: 85 MMHG | RESPIRATION RATE: 18 BRPM | OXYGEN SATURATION: 100 %

## 2022-10-31 DIAGNOSIS — K62.5 HEMORRHAGE OF ANUS AND RECTUM: ICD-10-CM

## 2022-10-31 DIAGNOSIS — I10 ESSENTIAL (PRIMARY) HYPERTENSION: ICD-10-CM

## 2022-10-31 DIAGNOSIS — K59.00 CONSTIPATION, UNSPECIFIED: ICD-10-CM

## 2022-10-31 DIAGNOSIS — Z79.84 LONG TERM (CURRENT) USE OF ORAL HYPOGLYCEMIC DRUGS: ICD-10-CM

## 2022-10-31 DIAGNOSIS — Z91.018 ALLERGY TO OTHER FOODS: ICD-10-CM

## 2022-10-31 DIAGNOSIS — F41.9 ANXIETY DISORDER, UNSPECIFIED: ICD-10-CM

## 2022-10-31 DIAGNOSIS — E11.9 TYPE 2 DIABETES MELLITUS WITHOUT COMPLICATIONS: ICD-10-CM

## 2022-10-31 DIAGNOSIS — Z88.8 ALLERGY STATUS TO OTHER DRUGS, MEDICAMENTS AND BIOLOGICAL SUBSTANCES STATUS: ICD-10-CM

## 2022-10-31 DIAGNOSIS — Z91.013 ALLERGY TO SEAFOOD: ICD-10-CM

## 2022-10-31 DIAGNOSIS — F31.9 BIPOLAR DISORDER, UNSPECIFIED: ICD-10-CM

## 2022-10-31 DIAGNOSIS — E78.5 HYPERLIPIDEMIA, UNSPECIFIED: ICD-10-CM

## 2022-10-31 DIAGNOSIS — Z88.0 ALLERGY STATUS TO PENICILLIN: ICD-10-CM

## 2022-10-31 PROCEDURE — 99284 EMERGENCY DEPT VISIT MOD MDM: CPT

## 2022-10-31 RX ORDER — POLYETHYLENE GLYCOL 3350 17 G/17G
17 POWDER, FOR SOLUTION ORAL
Qty: 119 | Refills: 0
Start: 2022-10-31 | End: 2022-11-06

## 2022-10-31 NOTE — ED PROVIDER NOTE - PROVIDER TOKENS
FREE:[LAST:[your pmd in 1-3 days],PHONE:[(   )    -],FAX:[(   )    -]],PROVIDER:[TOKEN:[[id number]],FOLLOWUP:[1-3 Days]]

## 2022-10-31 NOTE — ED PROVIDER NOTE - PATIENT PORTAL LINK FT
You can access the FollowMyHealth Patient Portal offered by [facility] by registering at the following website: [email or web address]. By joining [employer]’s FollowMyHealth portal, you will also be able to view your health information using other applications (apps) compatible with our system.

## 2022-10-31 NOTE — ED PROVIDER NOTE - CLINICAL SUMMARY MEDICAL DECISION MAKING FREE TEXT BOX
small amount of blood on TP after straining, normal abd/rectal exam  will send meds for constipation  no e/o gib or acute abd process

## 2022-10-31 NOTE — ED PROVIDER NOTE - PROGRESS NOTE DETAILS
CHELSEA: pt requesting food. tolerating po. Reviewed  necessity for follow up. Counseled on red flags and to return for them.  Patient appears well on discharge.

## 2022-10-31 NOTE — ED PROVIDER NOTE - PHYSICAL EXAMINATION
PHYSICAL EXAM:    GENERAL: Alert, appears stated age, well appearing, non-toxic  SKIN: Warm, pink and dry. MMM.   HEAD: NC, AT   EYE: Normal lids/conjunctiva  ENT: Normal hearing, patent oropharynx  NECK: +supple. No meningismus, or JVD  Pulm: Bilateral BS, normal resp effort, no wheezes, stridor, or retractions  CV: RRR, no M/R/G, 2+and = radial pulses  Abd: soft, non-tender, non-distended, no rebound/guarding. no CVA tenderness.  RECTAL: anorectal area without hemorrhoids/other lesions. stool soft. light brown stool.  Mskel: no erythema, cyanosis, edema. no calf tenderness  Neuro: AAOx3, normal gait

## 2022-10-31 NOTE — ED PROVIDER NOTE - CARE PROVIDER_API CALL
your pmd in 1-3 days,   Phone: (   )    -  Fax: (   )    -  Follow Up Time:     Ronnie Farias)  Gastroenterology; Internal Medicine  [address] [place] [address]  Phone: [phone]  Fax: [phone]  Follow Up Time: 1-3 Days

## 2022-10-31 NOTE — ED PROVIDER NOTE - CARE PROVIDERS DIRECT ADDRESSES
,DirectAddress_Unknown,[email or web address]

## 2022-10-31 NOTE — ED PROVIDER NOTE - OBJECTIVE STATEMENT
52-year-old male with PMH DM on metformin, HTN, HLD, bipolar disorder, anxiety presents with constipation x today, after straining today saw some bright red blood on toilet paper, not in toilet.  no palliating/provoking factors.   No abdominal pain, back pain, nausea, vomiting, diarrhea, urinary symptoms, fever, chest pain, shortness of breath, any other symptoms.

## 2022-10-31 NOTE — ED PROVIDER NOTE - NS ED ROS FT
Review of Systems    Constitutional: (-) fever   Eyes/ENT: (-) vision changes  Cardiovascular: (-) chest pain, (-) syncope (-) palpitations  Respiratory: (-) cough, (-) shortness of breath  Gastrointestinal: (-) vomiting, (-) diarrhea  (-) abdominal pain see hpi   Genitourinary:  (-) dysuria   Musculoskeletal: (-) neck pain, (-) back pain, (-) leg pain/swelling  Integumentary: (-) rash, (-) edema  Neurological: (-) headache, (-) confusion  Hematologic: (-) easy bruising

## 2022-10-31 NOTE — ED ADULT NURSE NOTE - NSIMPLEMENTINTERV_GEN_ALL_ED
Implemented All Universal Safety Interventions:  [place] to call system. Call bell, personal items and telephone within reach. Instruct patient to call for assistance. Room bathroom lighting operational. Non-slip footwear when patient is off stretcher. Physically safe environment: no spills, clutter or unnecessary equipment. Stretcher in lowest position, wheels locked, appropriate side rails in place.

## 2022-11-10 ENCOUNTER — EMERGENCY (EMERGENCY)
Facility: HOSPITAL | Age: 52
LOS: 0 days | Discharge: HOME | End: 2022-11-10
Attending: EMERGENCY MEDICINE | Admitting: EMERGENCY MEDICINE

## 2022-11-10 VITALS
SYSTOLIC BLOOD PRESSURE: 142 MMHG | TEMPERATURE: 97 F | WEIGHT: 190.04 LBS | OXYGEN SATURATION: 98 % | RESPIRATION RATE: 17 BRPM | HEIGHT: 68 IN | HEART RATE: 116 BPM | DIASTOLIC BLOOD PRESSURE: 93 MMHG

## 2022-11-10 VITALS — HEART RATE: 91 BPM

## 2022-11-10 DIAGNOSIS — Z88.0 ALLERGY STATUS TO PENICILLIN: ICD-10-CM

## 2022-11-10 DIAGNOSIS — M25.531 PAIN IN RIGHT WRIST: ICD-10-CM

## 2022-11-10 DIAGNOSIS — E11.9 TYPE 2 DIABETES MELLITUS WITHOUT COMPLICATIONS: ICD-10-CM

## 2022-11-10 DIAGNOSIS — F20.9 SCHIZOPHRENIA, UNSPECIFIED: ICD-10-CM

## 2022-11-10 DIAGNOSIS — Z88.8 ALLERGY STATUS TO OTHER DRUGS, MEDICAMENTS AND BIOLOGICAL SUBSTANCES STATUS: ICD-10-CM

## 2022-11-10 DIAGNOSIS — Z91.014 ALLERGY TO MAMMALIAN MEATS: ICD-10-CM

## 2022-11-10 DIAGNOSIS — I10 ESSENTIAL (PRIMARY) HYPERTENSION: ICD-10-CM

## 2022-11-10 DIAGNOSIS — S62.91XA UNSPECIFIED FRACTURE OF RIGHT WRIST AND HAND, INITIAL ENCOUNTER FOR CLOSED FRACTURE: ICD-10-CM
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
## 2022-11-10 DIAGNOSIS — Z91.013 ALLERGY TO SEAFOOD: ICD-10-CM

## 2022-11-10 DIAGNOSIS — Z79.84 LONG TERM (CURRENT) USE OF ORAL HYPOGLYCEMIC DRUGS: ICD-10-CM
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
## 2022-11-10 PROCEDURE — 99283 EMERGENCY DEPT VISIT LOW MDM: CPT

## 2022-11-10 PROCEDURE — 73110 X-RAY EXAM OF WRIST: CPT | Mod: 26,RT

## 2022-11-10 RX ORDER — IBUPROFEN 200 MG
600 TABLET ORAL ONCE
Refills: 0 | Status: COMPLETED | OUTPATIENT
Start: 2022-11-10 | End: 2022-11-10

## 2022-11-10 RX ADMIN — Medication 600 MILLIGRAM(S): at 20:10

## 2022-11-10 NOTE — ED PROVIDER NOTE - PATIENT PORTAL LINK FT
You can access the FollowMyHealth Patient Portal offered by [facility] by registering at the following website: [email or web address]. By joining [employer]’s FollowMyHealth portal, you will also be able to view your health information using other applications (apps) compatible with our system.
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
## 2022-11-10 NOTE — ED PROVIDER NOTE - CARE PROVIDER_API CALL
Mitchel Santiago (MD)  Orthopaedic Surgery  [address] [place] [address]  Phone: [phone]  Fax: [phone]  Follow Up Time:

## 2022-11-10 NOTE — ED PROVIDER NOTE - CARE PROVIDERS DIRECT ADDRESSES
[email or web address]

## 2022-11-10 NOTE — ED ADULT TRIAGE NOTE - BMI (KG/M2)
28.9 Sski Pregnancy And Lactation Text: This medication is Pregnancy Category D and isn't considered safe during pregnancy. It is excreted in breast milk.

## 2022-11-10 NOTE — ED PROVIDER NOTE - PHYSICAL EXAMINATION
CONST: Well appearing in NAD  EYES: Sclera and conjunctiva clear.   ENT: No nasal discharge. Oropharynx normal appearing, no erythema or exudates. No abscess or swelling. Uvula midline.   NECK: Non-tender, no meningeal signs. normal ROM. supple   CARD: S1 S2; No jvd  RESP: Equal BS B/L, No wheezes, rhonchi or rales. No distress  GI: Soft, non-tender, non-distended. normal BS  MS: Normal ROM in all extremities. pulses 2 +. no calf tenderness or swelling  SKIN: Warm, dry, no acute rashes. Good turgor  NEURO: A&Ox3, No focal deficits. Strength 5/5 with no sensory deficits.
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
## 2022-11-10 NOTE — ED PROVIDER NOTE - NS ED ROS FT
Constitutional: (-) fever  Eyes/ENT: (-) blurry vision, (-) epistaxis  Cardiovascular: (-) chest pain, (-) syncope  Respiratory: (-) cough, (-) shortness of breath  Gastrointestinal: (-) vomiting, (-) diarrhea  : (-) dysuria, (-) hematuria  Musculoskeletal: (+) R wrist pain, (-) neck pain, (-) back pain  Integumentary: (-) rash, (-) edema  Neurological: (-) headache, (-) altered mental status  Allergic/Immunologic: (-) pruritus

## 2022-11-10 NOTE — ED PROVIDER NOTE - CLINICAL SUMMARY MEDICAL DECISION MAKING FREE TEXT BOX
52-year-old male presenting for right wrist pain.  No obvious fractures on x-ray.  Patient placed in Ace bandage and discharged home with strict return precautions.

## 2022-12-06 ENCOUNTER — OUTPATIENT (OUTPATIENT)
Dept: OUTPATIENT SERVICES | Facility: HOSPITAL | Age: 52
LOS: 1 days | Discharge: HOME | End: 2022-12-06

## 2022-12-06 ENCOUNTER — APPOINTMENT (OUTPATIENT)
Dept: OPHTHALMOLOGY | Facility: CLINIC | Age: 52
End: 2022-12-06

## 2022-12-06 PROCEDURE — 92133 CPTRZD OPH DX IMG PST SGM ON: CPT | Mod: 26

## 2022-12-10 ENCOUNTER — EMERGENCY (EMERGENCY)
Facility: HOSPITAL | Age: 52
LOS: 0 days | Discharge: HOME | End: 2022-12-10
Attending: EMERGENCY MEDICINE | Admitting: EMERGENCY MEDICINE

## 2022-12-10 VITALS
OXYGEN SATURATION: 99 % | HEART RATE: 109 BPM | SYSTOLIC BLOOD PRESSURE: 149 MMHG | RESPIRATION RATE: 18 BRPM | DIASTOLIC BLOOD PRESSURE: 91 MMHG

## 2022-12-10 VITALS
HEART RATE: 118 BPM | SYSTOLIC BLOOD PRESSURE: 137 MMHG | OXYGEN SATURATION: 99 % | RESPIRATION RATE: 18 BRPM | DIASTOLIC BLOOD PRESSURE: 96 MMHG | TEMPERATURE: 98 F | HEIGHT: 68 IN

## 2022-12-10 DIAGNOSIS — Z91.013 ALLERGY TO SEAFOOD: ICD-10-CM

## 2022-12-10 DIAGNOSIS — R07.89 OTHER CHEST PAIN: ICD-10-CM

## 2022-12-10 DIAGNOSIS — I10 ESSENTIAL (PRIMARY) HYPERTENSION: ICD-10-CM

## 2022-12-10 DIAGNOSIS — F41.9 ANXIETY DISORDER, UNSPECIFIED: ICD-10-CM

## 2022-12-10 DIAGNOSIS — Z79.84 LONG TERM (CURRENT) USE OF ORAL HYPOGLYCEMIC DRUGS: ICD-10-CM

## 2022-12-10 DIAGNOSIS — Z88.0 ALLERGY STATUS TO PENICILLIN: ICD-10-CM

## 2022-12-10 DIAGNOSIS — Z86.59 PERSONAL HISTORY OF OTHER MENTAL AND BEHAVIORAL DISORDERS: ICD-10-CM

## 2022-12-10 DIAGNOSIS — R00.0 TACHYCARDIA, UNSPECIFIED: ICD-10-CM

## 2022-12-10 DIAGNOSIS — Z88.4 ALLERGY STATUS TO ANESTHETIC AGENT: ICD-10-CM

## 2022-12-10 DIAGNOSIS — Z88.8 ALLERGY STATUS TO OTHER DRUGS, MEDICAMENTS AND BIOLOGICAL SUBSTANCES: ICD-10-CM

## 2022-12-10 DIAGNOSIS — E11.9 TYPE 2 DIABETES MELLITUS WITHOUT COMPLICATIONS: ICD-10-CM

## 2022-12-10 DIAGNOSIS — F20.9 SCHIZOPHRENIA, UNSPECIFIED: ICD-10-CM

## 2022-12-10 DIAGNOSIS — Z88.5 ALLERGY STATUS TO NARCOTIC AGENT: ICD-10-CM

## 2022-12-10 DIAGNOSIS — Z91.014 ALLERGY TO MAMMALIAN MEATS: ICD-10-CM

## 2022-12-10 LAB
ALBUMIN SERPL ELPH-MCNC: 5.1 G/DL — SIGNIFICANT CHANGE UP (ref 3.5–5.2)
ALP SERPL-CCNC: 92 U/L — SIGNIFICANT CHANGE UP (ref 30–115)
ALT FLD-CCNC: 12 U/L — SIGNIFICANT CHANGE UP (ref 0–41)
ANION GAP SERPL CALC-SCNC: 14 MMOL/L — SIGNIFICANT CHANGE UP (ref 7–14)
APTT BLD: 40.1 SEC — HIGH (ref 27–39.2)
AST SERPL-CCNC: 16 U/L — SIGNIFICANT CHANGE UP (ref 0–41)
BASOPHILS # BLD AUTO: 0.02 K/UL — SIGNIFICANT CHANGE UP (ref 0–0.2)
BASOPHILS NFR BLD AUTO: 0.4 % — SIGNIFICANT CHANGE UP (ref 0–1)
BILIRUB SERPL-MCNC: <0.2 MG/DL — SIGNIFICANT CHANGE UP (ref 0.2–1.2)
BUN SERPL-MCNC: 12 MG/DL — SIGNIFICANT CHANGE UP (ref 10–20)
CALCIUM SERPL-MCNC: 10.6 MG/DL — HIGH (ref 8.4–10.4)
CHLORIDE SERPL-SCNC: 103 MMOL/L — SIGNIFICANT CHANGE UP (ref 98–110)
CO2 SERPL-SCNC: 28 MMOL/L — SIGNIFICANT CHANGE UP (ref 17–32)
CREAT SERPL-MCNC: 0.9 MG/DL — SIGNIFICANT CHANGE UP (ref 0.7–1.5)
EGFR: 103 ML/MIN/1.73M2 — SIGNIFICANT CHANGE UP
EOSINOPHIL # BLD AUTO: 0.06 K/UL — SIGNIFICANT CHANGE UP (ref 0–0.7)
EOSINOPHIL NFR BLD AUTO: 1.1 % — SIGNIFICANT CHANGE UP (ref 0–8)
GLUCOSE SERPL-MCNC: 106 MG/DL — HIGH (ref 70–99)
HCT VFR BLD CALC: 41 % — LOW (ref 42–52)
HGB BLD-MCNC: 13 G/DL — LOW (ref 14–18)
IMM GRANULOCYTES NFR BLD AUTO: 0.2 % — SIGNIFICANT CHANGE UP (ref 0.1–0.3)
INR BLD: 1.03 RATIO — SIGNIFICANT CHANGE UP (ref 0.65–1.3)
LYMPHOCYTES # BLD AUTO: 2.42 K/UL — SIGNIFICANT CHANGE UP (ref 1.2–3.4)
LYMPHOCYTES # BLD AUTO: 45 % — SIGNIFICANT CHANGE UP (ref 20.5–51.1)
MAGNESIUM SERPL-MCNC: 1.8 MG/DL — SIGNIFICANT CHANGE UP (ref 1.8–2.4)
MCHC RBC-ENTMCNC: 26.4 PG — LOW (ref 27–31)
MCHC RBC-ENTMCNC: 31.7 G/DL — LOW (ref 32–37)
MCV RBC AUTO: 83.2 FL — SIGNIFICANT CHANGE UP (ref 80–94)
MONOCYTES # BLD AUTO: 0.57 K/UL — SIGNIFICANT CHANGE UP (ref 0.1–0.6)
MONOCYTES NFR BLD AUTO: 10.6 % — HIGH (ref 1.7–9.3)
NEUTROPHILS # BLD AUTO: 2.3 K/UL — SIGNIFICANT CHANGE UP (ref 1.4–6.5)
NEUTROPHILS NFR BLD AUTO: 42.7 % — SIGNIFICANT CHANGE UP (ref 42.2–75.2)
NRBC # BLD: 0 /100 WBCS — SIGNIFICANT CHANGE UP (ref 0–0)
NT-PROBNP SERPL-SCNC: 226 PG/ML — SIGNIFICANT CHANGE UP (ref 0–300)
PLATELET # BLD AUTO: 294 K/UL — SIGNIFICANT CHANGE UP (ref 130–400)
POTASSIUM SERPL-MCNC: 4.3 MMOL/L — SIGNIFICANT CHANGE UP (ref 3.5–5)
POTASSIUM SERPL-SCNC: 4.3 MMOL/L — SIGNIFICANT CHANGE UP (ref 3.5–5)
PROT SERPL-MCNC: 8.3 G/DL — HIGH (ref 6–8)
PROTHROM AB SERPL-ACNC: 11.7 SEC — SIGNIFICANT CHANGE UP (ref 9.95–12.87)
RBC # BLD: 4.93 M/UL — SIGNIFICANT CHANGE UP (ref 4.7–6.1)
RBC # FLD: 17.7 % — HIGH (ref 11.5–14.5)
SODIUM SERPL-SCNC: 145 MMOL/L — SIGNIFICANT CHANGE UP (ref 135–146)
TROPONIN T SERPL-MCNC: <0.01 NG/ML — SIGNIFICANT CHANGE UP
WBC # BLD: 5.38 K/UL — SIGNIFICANT CHANGE UP (ref 4.8–10.8)
WBC # FLD AUTO: 5.38 K/UL — SIGNIFICANT CHANGE UP (ref 4.8–10.8)

## 2022-12-10 PROCEDURE — 99284 EMERGENCY DEPT VISIT MOD MDM: CPT

## 2022-12-10 PROCEDURE — 71045 X-RAY EXAM CHEST 1 VIEW: CPT | Mod: 26

## 2022-12-10 PROCEDURE — 93010 ELECTROCARDIOGRAM REPORT: CPT

## 2022-12-10 RX ORDER — ALPRAZOLAM 0.25 MG
0.25 TABLET ORAL ONCE
Refills: 0 | Status: DISCONTINUED | OUTPATIENT
Start: 2022-12-10 | End: 2022-12-10

## 2022-12-10 RX ORDER — SODIUM CHLORIDE 9 MG/ML
1000 INJECTION INTRAMUSCULAR; INTRAVENOUS; SUBCUTANEOUS ONCE
Refills: 0 | Status: COMPLETED | OUTPATIENT
Start: 2022-12-10 | End: 2022-12-10

## 2022-12-10 RX ADMIN — Medication 0.25 MILLIGRAM(S): at 19:35

## 2022-12-10 RX ADMIN — SODIUM CHLORIDE 1000 MILLILITER(S): 9 INJECTION INTRAMUSCULAR; INTRAVENOUS; SUBCUTANEOUS at 17:02

## 2022-12-10 RX ADMIN — SODIUM CHLORIDE 1000 MILLILITER(S): 9 INJECTION INTRAMUSCULAR; INTRAVENOUS; SUBCUTANEOUS at 19:12

## 2022-12-10 NOTE — ED ADULT NURSE NOTE - OBJECTIVE STATEMENT
Pt presents to the ED c/o midsternal chest pain x 3days which is worst when he lays down at nights. Pt states chest pain is associated with SOB. Pt's skin is warm and dry, EKG was done, pt placed on cardiac monitor, PIV placed. No SOB noted, b/l breath sounds are clear.

## 2022-12-10 NOTE — ED PROVIDER NOTE - OBJECTIVE STATEMENT
52-year-old male past medical history of hypertension, diabetes, schizophrenia presents for evaluation of chest pain.  Patient endorses having an episode of mild aching sternal chest pain while lying in bed x2 days ago that spontaneously resolved after short time.  No inciting factors.  Denies fever, headache, cough, shortness of breath, abdominal pain, weakness, numbness, vomiting, diarrhea.

## 2022-12-10 NOTE — ED PROVIDER NOTE - ATTENDING APP SHARED VISIT CONTRIBUTION OF CARE
53 yo f with pmh of htn, schizophrenia, dm, presents with c/o chest pain x 2 days.  pt says he gets it before he goes to sleep.  pt denies any chest pain now.  denies sob.  no leg pain or swelling.  pt says he feels anxious all the time and has trouble sleeping at night.  pt says he walks the halls of his facility at night because he can't sleep.  pt feels anxious now being in ED.  no abd pain, n/v/d.  pt says he missed his last clozeril injection but will get it on Monday.  exam: nad, ncat, perrl, eomi, mmm, rrr, ctab, abd soft, nt, nd aox3, no calf tenderness, no pitting edema imp: pt with c/o resolved chest pain and anxiety, will check ekg, labs, cxr, trop and trial of xanax

## 2022-12-10 NOTE — ED PROVIDER NOTE - PROGRESS NOTE DETAILS
Pt endorses increased anxiety being in the hospital and 2/2 to pt roommate coding and dying next to him. Requesting to leave.

## 2022-12-10 NOTE — ED ADULT NURSE NOTE - NSIMPLEMENTINTERV_GEN_ALL_ED
Implemented All Universal Safety Interventions:  [place] to call system. Call bell, personal items and telephone within reach. Instruct patient to call for assistance. Room bathroom lighting operational. Non-slip footwear when patient is off stretcher. Physically safe environment: no spills, clutter or unnecessary equipment. Stretcher in lowest position, wheels locked, appropriate side rails in place.

## 2022-12-10 NOTE — ED PROVIDER NOTE - PATIENT PORTAL LINK FT
You can access the FollowMyHealth Patient Portal offered by [facility] by registering at the following website: [email or web address]. By joining [employer]’s FollowMyHealth portal, you will also be able to view your health information using other applications (apps) compatible with our system.
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
## 2022-12-10 NOTE — ED PROVIDER NOTE - PHYSICAL EXAMINATION
CONST: Well appearing in NAD  EYES: Sclera and conjunctiva clear.   ENT: No nasal discharge. Oropharynx normal appearing, no erythema or exudates. No abscess or swelling. Uvula midline.   NECK: Non-tender, no meningeal signs. normal ROM. supple   CARD: tachycardiac S1 S2; No jvd  RESP: Equal BS B/L, No wheezes, rhonchi or rales. No distress  GI: Soft, non-tender, non-distended. no cva tenderness. normal BS  MS: Normal ROM in all extremities. pulses 2 +. no calf tenderness or swelling  SKIN: Warm, dry, no acute rashes. Good turgor  NEURO: A&Ox3, No focal deficits. Strength 5/5 with no sensory deficits.

## 2022-12-29 ENCOUNTER — EMERGENCY (EMERGENCY)
Facility: HOSPITAL | Age: 52
LOS: 0 days | Discharge: HOME | End: 2022-12-29
Attending: EMERGENCY MEDICINE | Admitting: EMERGENCY MEDICINE
Payer: MEDICAID

## 2022-12-29 VITALS
DIASTOLIC BLOOD PRESSURE: 90 MMHG | WEIGHT: 149.91 LBS | SYSTOLIC BLOOD PRESSURE: 163 MMHG | HEIGHT: 68 IN | TEMPERATURE: 98 F | HEART RATE: 118 BPM | RESPIRATION RATE: 17 BRPM | OXYGEN SATURATION: 97 %

## 2022-12-29 DIAGNOSIS — Z91.013 ALLERGY TO SEAFOOD: ICD-10-CM

## 2022-12-29 DIAGNOSIS — G47.00 INSOMNIA, UNSPECIFIED: ICD-10-CM

## 2022-12-29 DIAGNOSIS — Z88.0 ALLERGY STATUS TO PENICILLIN: ICD-10-CM

## 2022-12-29 DIAGNOSIS — E78.5 HYPERLIPIDEMIA, UNSPECIFIED: ICD-10-CM

## 2022-12-29 DIAGNOSIS — E11.9 TYPE 2 DIABETES MELLITUS WITHOUT COMPLICATIONS: ICD-10-CM

## 2022-12-29 DIAGNOSIS — Z88.8 ALLERGY STATUS TO OTHER DRUGS, MEDICAMENTS AND BIOLOGICAL SUBSTANCES STATUS: ICD-10-CM

## 2022-12-29 DIAGNOSIS — Z59.48 OTHER SPECIFIED LACK OF ADEQUATE FOOD: ICD-10-CM

## 2022-12-29 DIAGNOSIS — F19.19 OTHER PSYCHOACTIVE SUBSTANCE ABUSE WITH UNSPECIFIED PSYCHOACTIVE SUBSTANCE-INDUCED DISORDER: ICD-10-CM

## 2022-12-29 DIAGNOSIS — F20.9 SCHIZOPHRENIA, UNSPECIFIED: ICD-10-CM

## 2022-12-29 DIAGNOSIS — Z79.84 LONG TERM (CURRENT) USE OF ORAL HYPOGLYCEMIC DRUGS: ICD-10-CM

## 2022-12-29 PROCEDURE — 99283 EMERGENCY DEPT VISIT LOW MDM: CPT

## 2022-12-29 RX ADMIN — Medication 2 MILLIGRAM(S): at 19:12

## 2022-12-29 SDOH — ECONOMIC STABILITY - FOOD INSECURITY: OTHER SPECIFIED LACK OF ADEQUATE FOOD: Z59.48

## 2022-12-29 NOTE — ED PROVIDER NOTE - OBJECTIVE STATEMENT
51 yo male, pmh of dm, hld, schizophrenia, presents to ed for difficulty sleeping, several days has not been able to take rx ativan. no other complaints, no pain or radiation. Denies fever, chills, cp, sob, neck pain, visual changes, nvd, dizziness, numbness, tingling, si/hi.

## 2022-12-29 NOTE — ED PROVIDER NOTE - PATIENT PORTAL LINK FT
You can access the FollowMyHealth Patient Portal offered by [facility] by registering at the following website: [email or web address]. By joining [employer]’s FollowMyHealth portal, you will also be able to view your health information using other applications (apps) compatible with our system.

## 2022-12-29 NOTE — ED ADULT TRIAGE NOTE - CHIEF COMPLAINT QUOTE
Pt here from [address] c/o  insomnia x 5 days. Pt denies SI/ HI/ AH/ VH. Pt states " I haven't been able to get my meds for 1 month. "

## 2022-12-29 NOTE — ED PROVIDER NOTE - CLINICAL SUMMARY MEDICAL DECISION MAKING FREE TEXT BOX
52-year-old male history as above presenting for evaluation of insomnia.  Patient states that he is unable to sleep as he has not been able to take his home Ativan.  No other acute complaints.  No SI HI AV hallucinations.  Requesting food.  Well appearing, NAD, non toxic. NCAT PERRLA EOMI neck supple non tender normal wob cta bl rrr abdomen s nt nd no rebound no guarding WWPx4 neuro non focal.  iSTOP reviewed.  Patient with active prescription for 30-day supply sent on December 23.  Discussed with patient.  Aware he can pick it up in the morning.  We will give 1 dose here.  Comfortable with discharge and follow-up outpatient, strict return precautions given. Endorses understanding of all of this and aware that they can return at any time for new or concerning symptoms. No further questions or concerns at this time

## 2022-12-29 NOTE — ED ADULT TRIAGE NOTE - NS ED NURSE AMBULANCES
[facility] Size Of Lesion In Cm (Optional): 0 Morphology Per Location (Optional): SCC treated with Mohs by Dr. King on 3/7/2019 Detail Level: Detailed Morphology Per Location (Optional): BCC treated with Mohs by Dr. King on 11/30/2018

## 2022-12-29 NOTE — ED PROVIDER NOTE - PHYSICAL EXAMINATION
Physical Exam    Vital Signs: I have reviewed the initial vital signs.  Constitutional: appears stated age, no acute distress  Eyes: Conjunctiva pink, Sclera clear,  Cardiovascular: S1 and S2, regular rate, regular rhythm, well-perfused extremities, radial pulses equal and 2+, pedal pulses 2+ and equal  Respiratory: unlabored respiratory effort, clear to auscultation bilaterally no wheezing, rales and rhonchi  Gastrointestinal: soft, non-tender abdomen, no pulsatile mass, normal bowl sounds  Musculoskeletal: supple neck, no lower extremity edema, no midline tenderness  Integumentary: warm, dry, no rash  Neurologic: awake, alert, cranial nerves II-XII grossly intact, extremities’ motor and sensory functions grossly intact  Psychiatric: appropriate mood, appropriate affect

## 2023-01-14 ENCOUNTER — EMERGENCY (EMERGENCY)
Facility: HOSPITAL | Age: 53
LOS: 0 days | Discharge: HOME | End: 2023-01-14
Attending: STUDENT IN AN ORGANIZED HEALTH CARE EDUCATION/TRAINING PROGRAM
Payer: MEDICAID

## 2023-01-14 VITALS
DIASTOLIC BLOOD PRESSURE: 98 MMHG | HEIGHT: 68 IN | SYSTOLIC BLOOD PRESSURE: 144 MMHG | TEMPERATURE: 99 F | HEART RATE: 110 BPM | OXYGEN SATURATION: 97 % | RESPIRATION RATE: 18 BRPM

## 2023-01-14 DIAGNOSIS — Z88.0 ALLERGY STATUS TO PENICILLIN: ICD-10-CM

## 2023-01-14 DIAGNOSIS — E11.9 TYPE 2 DIABETES MELLITUS WITHOUT COMPLICATIONS: ICD-10-CM

## 2023-01-14 DIAGNOSIS — R00.2 PALPITATIONS: ICD-10-CM

## 2023-01-14 DIAGNOSIS — Z79.84 LONG TERM (CURRENT) USE OF ORAL HYPOGLYCEMIC DRUGS: ICD-10-CM

## 2023-01-14 DIAGNOSIS — Z88.8 ALLERGY STATUS TO OTHER DRUGS, MEDICAMENTS AND BIOLOGICAL SUBSTANCES STATUS: ICD-10-CM

## 2023-01-14 DIAGNOSIS — F17.200 NICOTINE DEPENDENCE, UNSPECIFIED, UNCOMPLICATED: ICD-10-CM

## 2023-01-14 DIAGNOSIS — R00.0 TACHYCARDIA, UNSPECIFIED: ICD-10-CM

## 2023-01-14 DIAGNOSIS — F41.9 ANXIETY DISORDER, UNSPECIFIED: ICD-10-CM

## 2023-01-14 DIAGNOSIS — F20.9 SCHIZOPHRENIA, UNSPECIFIED: ICD-10-CM

## 2023-01-14 DIAGNOSIS — Z88.5 ALLERGY STATUS TO NARCOTIC AGENT: ICD-10-CM

## 2023-01-14 DIAGNOSIS — Z91.013 ALLERGY TO SEAFOOD: ICD-10-CM

## 2023-01-14 DIAGNOSIS — Z91.014 ALLERGY TO MAMMALIAN MEATS: ICD-10-CM

## 2023-01-14 LAB
ALBUMIN SERPL ELPH-MCNC: 4.6 G/DL — SIGNIFICANT CHANGE UP (ref 3.5–5.2)
ALP SERPL-CCNC: 86 U/L — SIGNIFICANT CHANGE UP (ref 30–115)
ALT FLD-CCNC: 7 U/L — SIGNIFICANT CHANGE UP (ref 0–41)
ANION GAP SERPL CALC-SCNC: 8 MMOL/L — SIGNIFICANT CHANGE UP (ref 7–14)
AST SERPL-CCNC: 12 U/L — SIGNIFICANT CHANGE UP (ref 0–41)
BASOPHILS # BLD AUTO: 0.01 K/UL — SIGNIFICANT CHANGE UP (ref 0–0.2)
BASOPHILS NFR BLD AUTO: 0.1 % — SIGNIFICANT CHANGE UP (ref 0–1)
BILIRUB SERPL-MCNC: 0.2 MG/DL — SIGNIFICANT CHANGE UP (ref 0.2–1.2)
BUN SERPL-MCNC: 10 MG/DL — SIGNIFICANT CHANGE UP (ref 10–20)
CALCIUM SERPL-MCNC: 9.8 MG/DL — SIGNIFICANT CHANGE UP (ref 8.4–10.4)
CHLORIDE SERPL-SCNC: 103 MMOL/L — SIGNIFICANT CHANGE UP (ref 98–110)
CO2 SERPL-SCNC: 25 MMOL/L — SIGNIFICANT CHANGE UP (ref 17–32)
CREAT SERPL-MCNC: 0.9 MG/DL — SIGNIFICANT CHANGE UP (ref 0.7–1.5)
EGFR: 103 ML/MIN/1.73M2 — SIGNIFICANT CHANGE UP
EOSINOPHIL # BLD AUTO: 0.05 K/UL — SIGNIFICANT CHANGE UP (ref 0–0.7)
EOSINOPHIL NFR BLD AUTO: 0.7 % — SIGNIFICANT CHANGE UP (ref 0–8)
GLUCOSE SERPL-MCNC: 118 MG/DL — HIGH (ref 70–99)
HCT VFR BLD CALC: 39.8 % — LOW (ref 42–52)
HGB BLD-MCNC: 12.4 G/DL — LOW (ref 14–18)
IMM GRANULOCYTES NFR BLD AUTO: 0.1 % — SIGNIFICANT CHANGE UP (ref 0.1–0.3)
LYMPHOCYTES # BLD AUTO: 2.24 K/UL — SIGNIFICANT CHANGE UP (ref 1.2–3.4)
LYMPHOCYTES # BLD AUTO: 31 % — SIGNIFICANT CHANGE UP (ref 20.5–51.1)
MCHC RBC-ENTMCNC: 25.8 PG — LOW (ref 27–31)
MCHC RBC-ENTMCNC: 31.2 G/DL — LOW (ref 32–37)
MCV RBC AUTO: 82.9 FL — SIGNIFICANT CHANGE UP (ref 80–94)
MONOCYTES # BLD AUTO: 0.6 K/UL — SIGNIFICANT CHANGE UP (ref 0.1–0.6)
MONOCYTES NFR BLD AUTO: 8.3 % — SIGNIFICANT CHANGE UP (ref 1.7–9.3)
NEUTROPHILS # BLD AUTO: 4.32 K/UL — SIGNIFICANT CHANGE UP (ref 1.4–6.5)
NEUTROPHILS NFR BLD AUTO: 59.8 % — SIGNIFICANT CHANGE UP (ref 42.2–75.2)
NRBC # BLD: 0 /100 WBCS — SIGNIFICANT CHANGE UP (ref 0–0)
PLATELET # BLD AUTO: 263 K/UL — SIGNIFICANT CHANGE UP (ref 130–400)
POTASSIUM SERPL-MCNC: 4.3 MMOL/L — SIGNIFICANT CHANGE UP (ref 3.5–5)
POTASSIUM SERPL-SCNC: 4.3 MMOL/L — SIGNIFICANT CHANGE UP (ref 3.5–5)
PROT SERPL-MCNC: 7.8 G/DL — SIGNIFICANT CHANGE UP (ref 6–8)
RBC # BLD: 4.8 M/UL — SIGNIFICANT CHANGE UP (ref 4.7–6.1)
RBC # FLD: 16.2 % — HIGH (ref 11.5–14.5)
SODIUM SERPL-SCNC: 136 MMOL/L — SIGNIFICANT CHANGE UP (ref 135–146)
TROPONIN T SERPL-MCNC: <0.01 NG/ML — SIGNIFICANT CHANGE UP
WBC # BLD: 7.23 K/UL — SIGNIFICANT CHANGE UP (ref 4.8–10.8)
WBC # FLD AUTO: 7.23 K/UL — SIGNIFICANT CHANGE UP (ref 4.8–10.8)

## 2023-01-14 PROCEDURE — 93010 ELECTROCARDIOGRAM REPORT: CPT

## 2023-01-14 PROCEDURE — 71046 X-RAY EXAM CHEST 2 VIEWS: CPT | Mod: 26

## 2023-01-14 PROCEDURE — 99285 EMERGENCY DEPT VISIT HI MDM: CPT

## 2023-01-14 NOTE — ED PROVIDER NOTE - OBJECTIVE STATEMENT
52 year old male with pmhx of schizophrenia presents to ED after having palpitations.  Patient admits was watching the news tonight and saw that Lisa Snyder  and he developed anxiety.  Patient admits symptoms resolved within a couple minutes.  Patient denies chest pain shortness of breath cough fever abdominal pain or nausea vomiting diarrhea

## 2023-01-14 NOTE — ED PROVIDER NOTE - CONSIDERATION OF ADMISSION OBSERVATION
however no chest pain, no signs of ACS, ekg and trop unremarkable. Consideration of Admission/Observation

## 2023-01-14 NOTE — ED PROVIDER NOTE - CARE PLAN
1 Principal Discharge DX:	Palpitations   Principal Discharge DX:	Palpitations  Assessment and plan of treatment:	Plan- monitor ekg labs reassess.

## 2023-01-14 NOTE — ED PROVIDER NOTE - ATTENDING APP SHARED VISIT CONTRIBUTION OF CARE
51 yo M PMHX HTN, DM, schizophrenia presents to ED for evaluation of palpitations. Pt reports he was watching the news tonight, watched news that Lisa Snyder  and developed anxiety and palpitations. Pt reports this was brief and now has resolved. Denies any symptoms at this time. No fever, nausea, vomiting, chest pain, sob, cough, headache, dizziness, numbness/tingling, abdominal pain, diarrhea,  weakness, edema, calf pain or swelling, or rash.     Vital Signs: I have reviewed the initial vital signs.  Constitutional: Patient in no acute distress.  Integumentary: No rash.  ENT: MMM  NECK: Supple.   Cardiovascular: RRR, radial pulses 2/4 bilaterally.   Respiratory: Breath sounds CTA b/l, no wheezing or crackles, good air exchange, good resp effort and excursion, no accessory muscle use, no stridor.  Gastrointestinal: Abdomen soft, non-tender, non-distended.   Musculoskeletal: FROM, no edema, no calf pain/swelling/erythema.  Neurologic: Awake and alert, follows commands, no FND.   Psych: No SI or HI. cooperative.

## 2023-01-14 NOTE — ED PROVIDER NOTE - PATIENT PORTAL LINK FT
You can access the FollowMyHealth Patient Portal offered by [facility] by registering at the following website: [email or web address]. By joining [employer]’s FollowMyHealth portal, you will also be able to view your health information using other applications (apps) compatible with our system.

## 2023-01-14 NOTE — ED PROVIDER NOTE - CLINICAL SUMMARY MEDICAL DECISION MAKING FREE TEXT BOX
51 yo M presented w/ palpitations that have resolved. No active chest pain. Labs and EKG were ordered and reviewed.  Imaging was ordered and reviewed by me.  No signs of PTX or infiltrates. Patient's records (prior hospital, ED visit) were reviewed. Escalation to admission/observation was considered. However patient feels much better and is comfortable with discharge. Patient to be discharged from ED. Any available test results were discussed with patient and/or family. Verbal instructions given, including instructions to return to ED immediately for any new, worsening, or concerning symptoms. Patient endorsed understanding. Written discharge instructions additionally given, including follow-up plan.

## 2023-01-14 NOTE — ED ADULT NURSE NOTE - NSFALLRSKINDICATORS_ED_ALL_ED
Patient Name: [place]  YOB: 1969          MRN :  [id number]  Date:  11/30/2021  Referring Physician:  Jin Amaral    Discharge Summary  Pt has attended 24 visits in Physical Therapy.    Subjective:   Patient states that he has no Date____________________    Certification From: 76/68/2382  To:2/28/2022 no

## 2023-01-15 VITALS
DIASTOLIC BLOOD PRESSURE: 86 MMHG | RESPIRATION RATE: 18 BRPM | SYSTOLIC BLOOD PRESSURE: 147 MMHG | OXYGEN SATURATION: 100 % | TEMPERATURE: 98 F | HEART RATE: 101 BPM

## 2023-01-17 ENCOUNTER — APPOINTMENT (OUTPATIENT)
Dept: OPHTHALMOLOGY | Facility: CLINIC | Age: 53
End: 2023-01-17

## 2023-03-02 ENCOUNTER — EMERGENCY (EMERGENCY)
Facility: HOSPITAL | Age: 53
LOS: 0 days | Discharge: HOME | End: 2023-03-03
Attending: STUDENT IN AN ORGANIZED HEALTH CARE EDUCATION/TRAINING PROGRAM
Payer: MEDICAID

## 2023-03-02 VITALS
DIASTOLIC BLOOD PRESSURE: 84 MMHG | HEIGHT: 68 IN | HEART RATE: 127 BPM | OXYGEN SATURATION: 99 % | TEMPERATURE: 100 F | RESPIRATION RATE: 18 BRPM | SYSTOLIC BLOOD PRESSURE: 130 MMHG

## 2023-03-02 DIAGNOSIS — Z88.0 ALLERGY STATUS TO PENICILLIN: ICD-10-CM

## 2023-03-02 DIAGNOSIS — Z88.8 ALLERGY STATUS TO OTHER DRUGS, MEDICAMENTS AND BIOLOGICAL SUBSTANCES STATUS: ICD-10-CM

## 2023-03-02 DIAGNOSIS — Z79.84 LONG TERM (CURRENT) USE OF ORAL HYPOGLYCEMIC DRUGS: ICD-10-CM

## 2023-03-02 DIAGNOSIS — R00.0 TACHYCARDIA, UNSPECIFIED: ICD-10-CM

## 2023-03-02 DIAGNOSIS — R51.9 HEADACHE, UNSPECIFIED: ICD-10-CM

## 2023-03-02 DIAGNOSIS — F20.9 SCHIZOPHRENIA, UNSPECIFIED: ICD-10-CM

## 2023-03-02 DIAGNOSIS — R05.9 COUGH, UNSPECIFIED: ICD-10-CM

## 2023-03-02 DIAGNOSIS — R68.83 CHILLS (WITHOUT FEVER): ICD-10-CM

## 2023-03-02 DIAGNOSIS — E11.9 TYPE 2 DIABETES MELLITUS WITHOUT COMPLICATIONS: ICD-10-CM

## 2023-03-02 DIAGNOSIS — I10 ESSENTIAL (PRIMARY) HYPERTENSION: ICD-10-CM

## 2023-03-02 DIAGNOSIS — J02.9 ACUTE PHARYNGITIS, UNSPECIFIED: ICD-10-CM

## 2023-03-02 DIAGNOSIS — Z88.5 ALLERGY STATUS TO NARCOTIC AGENT: ICD-10-CM

## 2023-03-02 DIAGNOSIS — Z91.014 ALLERGY TO MAMMALIAN MEATS: ICD-10-CM

## 2023-03-02 DIAGNOSIS — E03.9 HYPOTHYROIDISM, UNSPECIFIED: ICD-10-CM

## 2023-03-02 DIAGNOSIS — Z88.4 ALLERGY STATUS TO ANESTHETIC AGENT: ICD-10-CM

## 2023-03-02 DIAGNOSIS — Z91.013 ALLERGY TO SEAFOOD: ICD-10-CM

## 2023-03-02 DIAGNOSIS — M79.10 MYALGIA, UNSPECIFIED SITE: ICD-10-CM

## 2023-03-02 DIAGNOSIS — R09.81 NASAL CONGESTION: ICD-10-CM

## 2023-03-02 PROCEDURE — 87086 URINE CULTURE/COLONY COUNT: CPT

## 2023-03-02 PROCEDURE — 85025 COMPLETE CBC W/AUTO DIFF WBC: CPT

## 2023-03-02 PROCEDURE — 80053 COMPREHEN METABOLIC PANEL: CPT

## 2023-03-02 PROCEDURE — 93005 ELECTROCARDIOGRAM TRACING: CPT

## 2023-03-02 PROCEDURE — 99284 EMERGENCY DEPT VISIT MOD MDM: CPT

## 2023-03-02 PROCEDURE — 71045 X-RAY EXAM CHEST 1 VIEW: CPT

## 2023-03-02 PROCEDURE — 99285 EMERGENCY DEPT VISIT HI MDM: CPT | Mod: 25

## 2023-03-02 PROCEDURE — 36415 COLL VENOUS BLD VENIPUNCTURE: CPT

## 2023-03-02 PROCEDURE — 81001 URINALYSIS AUTO W/SCOPE: CPT

## 2023-03-02 RX ORDER — SODIUM CHLORIDE 9 MG/ML
1000 INJECTION INTRAMUSCULAR; INTRAVENOUS; SUBCUTANEOUS ONCE
Refills: 0 | Status: COMPLETED | OUTPATIENT
Start: 2023-03-02 | End: 2023-03-02

## 2023-03-02 RX ORDER — ACETAMINOPHEN 500 MG
650 TABLET ORAL ONCE
Refills: 0 | Status: COMPLETED | OUTPATIENT
Start: 2023-03-02 | End: 2023-03-02

## 2023-03-02 RX ADMIN — SODIUM CHLORIDE 1000 MILLILITER(S): 9 INJECTION INTRAMUSCULAR; INTRAVENOUS; SUBCUTANEOUS at 23:48

## 2023-03-02 RX ADMIN — Medication 650 MILLIGRAM(S): at 23:48

## 2023-03-02 NOTE — ED PROVIDER NOTE - CLINICAL SUMMARY MEDICAL DECISION MAKING FREE TEXT BOX
,    Pt w/myalgia. All available lab tests, imaging tests, and EKGs independently reviewed and interpreted by me. cxr no focal consolidation. No acute ED events. pt felt better. Pt stable for dc w/ outpt f/up, and care as discussed.  Pt understands plan and signs and symptoms for ED return. DC home.     .

## 2023-03-02 NOTE — ED PROVIDER NOTE - PHYSICAL EXAMINATION
CONST: NAD  HEAD:  normocephalic, atraumatic  EYES:  conjunctivae without injection, drainage or discharge  ENMT:  nasal mucosa moist; mouth moist without ulcerations or lesions; throat moist without erythema, exudate, ulcerations or lesions  NECK:  supple  CARDIAC:  regular rate and rhythm, normal S1 and S2, no murmurs, rubs or gallops  RESP:  respiratory rate and effort appear normal for age; lungs are clear to auscultation bilaterally; no rales or wheezes  ABDOMEN:  soft, nontender, nondistended  MUSCULOSKELETAL/NEURO: awake and alert, normal movement, normal tone  SKIN:  normal skin color for age and race, well-perfused; warm and dry

## 2023-03-02 NOTE — ED PROVIDER NOTE - PATIENT PORTAL LINK FT
You can access the FollowMyHealth Patient Portal offered by [facility] by registering at the following website: [email or web address]. By joining [employer]’s FollowMyHealth portal, you will also be able to view your health information using other applications (apps) compatible with our system.

## 2023-03-02 NOTE — ED PROVIDER NOTE - OBJECTIVE STATEMENT
Pt is a 51 y/o male with PMH of schizophrenia, HTN, DM and hypothyroidism presenting for body aches since 5pm. Pt reports chills, myalgias, headache, cough, congestion and sore throat. Did not take any motrin or tylenol. No SOB, chest pain, vomiting or diarrhea.

## 2023-03-02 NOTE — ED PROVIDER NOTE - NSFOLLOWUPINSTRUCTIONS_ED_ALL_ED_FT
Musculoskeletal Pain    WHAT YOU NEED TO KNOW:    What do I need to know about musculoskeletal pain? Musculoskeletal pain can occur in muscles, bones, ligaments, tendons, or nerves. The pain can be dull, achy, or sharp. You may have pain and tenderness to the touch as well. The pain can occur anywhere in your body. Musculoskeletal pain can be from an injury, or a medical condition such as polymyositis.    How is the cause of musculoskeletal pain diagnosed? Your healthcare provider will ask about past medical conditions or injuries. He or she may touch, press, bend, stretch, or move the painful area. Tell your provider if the pain is constant or goes away and comes back. Tell him or her if the pain is dull, sharp, or achy. Also tell him or her if the pain wakes you from sleep. You may also need any of the following tests:  •X-ray, MRI, or CT scan pictures may show an injury or health condition that is causing your pain. Contrast liquid may be given to help the area show up better in the pictures. Tell the healthcare provider if you have ever had an allergic reaction to contrast liquid. Do not enter the MRI room with anything metal. Metal can cause serious injury. Tell the provider if you have any metal in or on your body.      •Ultrasound pictures may show problems in your muscles or tissues that are causing your pain.      How is musculoskeletal pain treated?   •NSAIDs help decrease swelling and pain or fever. This medicine is available with or without a doctor's order. NSAIDs can cause stomach bleeding or kidney problems in certain people. If you take blood thinner medicine, always ask your healthcare provider if NSAIDs are safe for you. Always read the medicine label and follow directions.      •Acetaminophen decreases pain and fever. It is available without a doctor's order. Ask how much to take and how often to take it. Follow directions. Read the labels of all other medicines you are using to see if they also contain acetaminophen, or ask your doctor or pharmacist. Acetaminophen can cause liver damage if not taken correctly.      •Muscle relaxers help relax your muscles to decrease pain and muscle spasms.      •Steroids may be given to decrease redness, pain, and swelling.      What can I do to manage my symptoms?   •Rest as directed. Avoid activity that causes pain. You may be able to return to normal activity when you can move without pain. Follow directions for rest and activity. You are at risk for injury for 3 weeks after your symptoms go away.      •Ice the painful area to decrease pain and swelling. Use an ice pack, or put ice in a plastic bag and cover it with a towel. Always put a cloth between the ice and your skin. Apply the ice as often as directed for the first 24 to 48 hours.      •Apply compression to the area, if directed. Your healthcare provider may want you to use a splint, brace, or elastic bandage. Compression helps decrease pain and swelling in an arm or leg. A splint, brace, or bandage will also help protect the painful area when you move around.  How to Wrap an Elastic Bandage           •Elevate a painful arm or leg to reduce swelling and pain. Elevate your limb while you are sitting or lying. Prop a painful leg on pillows to keep it above the level of your heart.         Elevate Leg           When should I seek immediate care?   •You have severe pain when you move the area.      •You lose feeling in the area.      •You have new or worse pain or swelling in the area. Your skin may feel tight.      When should I call my doctor?   •You have a fever.      •You have pain that does not get better with treatment.      •You have trouble sleeping because of your pain.      •Your painful area becomes more tender, red, and warm to the touch.      •You have less movement of the painful area.      •You have questions or concerns about your condition or care.      CARE AGREEMENT:    You have the right to help plan your care. Learn about your health condition and how it may be treated. Discuss treatment options with your healthcare providers to decide what care you want to receive. You always have the right to refuse treatment

## 2023-03-02 NOTE — ED PROVIDER NOTE - CONSIDERATION OF ADMISSION OBSERVATION
Given HPI, PMH, PE, w/up and ED course admission/ OBS not indicated. Consideration of Admission/Observation

## 2023-03-02 NOTE — ED PROVIDER NOTE - ATTENDING CONTRIBUTION TO CARE
52-year-old male with PMH schizophrenia HTN, DM, hypothyroidism presents for evaluation of feeling weakness and body aches that started 5 PM.  Patient reporting myalgias and chills associated with nasal congestion, sore throat and dry cough. +mild headache.  No vomiting or diarrhea, abdominal pain, rash, focal weakness or paresthesias.  Tolerating p.o. as usual.  No OTC  meds taken.    VITAL SIGNS: noted  CONSTITUTIONAL: Well-developed; well-nourished; in no acute distress  HEAD: Normocephalic; atraumatic  EYES: PERRL, EOM intact; conjunctiva and sclera clear  ENT: No nasal discharge; airway clear. MMM  NECK: Supple; non tender. No anterior cervical lymphadenopathy noted  CARD: S1, S2 normal; no murmurs, gallops, or rubs. Regular rate and rhythm  RESP: CTAB/L, no wheezes, rales or rhonchi  ABD: Normal bowel sounds; soft; non-distended; non-tender; no hepatosplenomegaly. No CVA tenderness  EXT: Normal ROM. No calf tenderness or edema. Distal pulses intact  NEURO: Alert, oriented. Grossly unremarkable. No focal deficits  SKIN: Skin exam is warm and dry, no acute rash  MS: No midline spinal tenderness

## 2023-03-03 VITALS — SYSTOLIC BLOOD PRESSURE: 107 MMHG | HEART RATE: 94 BPM | DIASTOLIC BLOOD PRESSURE: 61 MMHG

## 2023-03-03 LAB
ALBUMIN SERPL ELPH-MCNC: 4 G/DL — SIGNIFICANT CHANGE UP (ref 3.5–5.2)
ALP SERPL-CCNC: 85 U/L — SIGNIFICANT CHANGE UP (ref 30–115)
ALT FLD-CCNC: 7 U/L — SIGNIFICANT CHANGE UP (ref 0–41)
ANION GAP SERPL CALC-SCNC: 12 MMOL/L — SIGNIFICANT CHANGE UP (ref 7–14)
APPEARANCE UR: CLEAR — SIGNIFICANT CHANGE UP
AST SERPL-CCNC: 12 U/L — SIGNIFICANT CHANGE UP (ref 0–41)
BACTERIA # UR AUTO: NEGATIVE — SIGNIFICANT CHANGE UP
BASOPHILS # BLD AUTO: 0.03 K/UL — SIGNIFICANT CHANGE UP (ref 0–0.2)
BASOPHILS NFR BLD AUTO: 0.2 % — SIGNIFICANT CHANGE UP (ref 0–1)
BILIRUB SERPL-MCNC: 0.2 MG/DL — SIGNIFICANT CHANGE UP (ref 0.2–1.2)
BILIRUB UR-MCNC: NEGATIVE — SIGNIFICANT CHANGE UP
BUN SERPL-MCNC: 9 MG/DL — LOW (ref 10–20)
CALCIUM SERPL-MCNC: 9.5 MG/DL — SIGNIFICANT CHANGE UP (ref 8.4–10.5)
CHLORIDE SERPL-SCNC: 97 MMOL/L — LOW (ref 98–110)
CO2 SERPL-SCNC: 26 MMOL/L — SIGNIFICANT CHANGE UP (ref 17–32)
COLOR SPEC: YELLOW — SIGNIFICANT CHANGE UP
CREAT SERPL-MCNC: 0.9 MG/DL — SIGNIFICANT CHANGE UP (ref 0.7–1.5)
DIFF PNL FLD: NEGATIVE — SIGNIFICANT CHANGE UP
EGFR: 103 ML/MIN/1.73M2 — SIGNIFICANT CHANGE UP
EOSINOPHIL # BLD AUTO: 0.08 K/UL — SIGNIFICANT CHANGE UP (ref 0–0.7)
EOSINOPHIL NFR BLD AUTO: 0.5 % — SIGNIFICANT CHANGE UP (ref 0–8)
EPI CELLS # UR: 0 /HPF — SIGNIFICANT CHANGE UP (ref 0–5)
GLUCOSE SERPL-MCNC: 92 MG/DL — SIGNIFICANT CHANGE UP (ref 70–99)
GLUCOSE UR QL: NEGATIVE — SIGNIFICANT CHANGE UP
HCT VFR BLD CALC: 38.3 % — LOW (ref 42–52)
HGB BLD-MCNC: 12 G/DL — LOW (ref 14–18)
HYALINE CASTS # UR AUTO: 0 /LPF — SIGNIFICANT CHANGE UP (ref 0–7)
IMM GRANULOCYTES NFR BLD AUTO: 0.4 % — HIGH (ref 0.1–0.3)
KETONES UR-MCNC: SIGNIFICANT CHANGE UP
LEUKOCYTE ESTERASE UR-ACNC: NEGATIVE — SIGNIFICANT CHANGE UP
LYMPHOCYTES # BLD AUTO: 20.6 % — SIGNIFICANT CHANGE UP (ref 20.5–51.1)
LYMPHOCYTES # BLD AUTO: 3.26 K/UL — SIGNIFICANT CHANGE UP (ref 1.2–3.4)
MCHC RBC-ENTMCNC: 25 PG — LOW (ref 27–31)
MCHC RBC-ENTMCNC: 31.3 G/DL — LOW (ref 32–37)
MCV RBC AUTO: 79.8 FL — LOW (ref 80–94)
MONOCYTES # BLD AUTO: 1.65 K/UL — HIGH (ref 0.1–0.6)
MONOCYTES NFR BLD AUTO: 10.4 % — HIGH (ref 1.7–9.3)
NEUTROPHILS # BLD AUTO: 10.75 K/UL — HIGH (ref 1.4–6.5)
NEUTROPHILS NFR BLD AUTO: 67.9 % — SIGNIFICANT CHANGE UP (ref 42.2–75.2)
NITRITE UR-MCNC: NEGATIVE — SIGNIFICANT CHANGE UP
NRBC # BLD: 0 /100 WBCS — SIGNIFICANT CHANGE UP (ref 0–0)
PH UR: 6 — SIGNIFICANT CHANGE UP (ref 5–8)
PLATELET # BLD AUTO: 234 K/UL — SIGNIFICANT CHANGE UP (ref 130–400)
POTASSIUM SERPL-MCNC: 4.1 MMOL/L — SIGNIFICANT CHANGE UP (ref 3.5–5)
POTASSIUM SERPL-SCNC: 4.1 MMOL/L — SIGNIFICANT CHANGE UP (ref 3.5–5)
PROT SERPL-MCNC: 7.3 G/DL — SIGNIFICANT CHANGE UP (ref 6–8)
PROT UR-MCNC: ABNORMAL
RBC # BLD: 4.8 M/UL — SIGNIFICANT CHANGE UP (ref 4.7–6.1)
RBC # FLD: 16.2 % — HIGH (ref 11.5–14.5)
RBC CASTS # UR COMP ASSIST: 4 /HPF — SIGNIFICANT CHANGE UP (ref 0–4)
SODIUM SERPL-SCNC: 135 MMOL/L — SIGNIFICANT CHANGE UP (ref 135–146)
SP GR SPEC: 1.02 — SIGNIFICANT CHANGE UP (ref 1.01–1.03)
UROBILINOGEN FLD QL: ABNORMAL
WBC # BLD: 15.84 K/UL — HIGH (ref 4.8–10.8)
WBC # FLD AUTO: 15.84 K/UL — HIGH (ref 4.8–10.8)
WBC UR QL: 1 /HPF — SIGNIFICANT CHANGE UP (ref 0–5)

## 2023-03-03 PROCEDURE — 71045 X-RAY EXAM CHEST 1 VIEW: CPT | Mod: 26

## 2023-03-03 PROCEDURE — 93010 ELECTROCARDIOGRAM REPORT: CPT

## 2023-03-03 RX ORDER — SODIUM CHLORIDE 9 MG/ML
1000 INJECTION, SOLUTION INTRAVENOUS ONCE
Refills: 0 | Status: COMPLETED | OUTPATIENT
Start: 2023-03-03 | End: 2023-03-03

## 2023-03-03 RX ADMIN — SODIUM CHLORIDE 1000 MILLILITER(S): 9 INJECTION, SOLUTION INTRAVENOUS at 04:38

## 2023-03-04 LAB
CULTURE RESULTS: SIGNIFICANT CHANGE UP
SPECIMEN SOURCE: SIGNIFICANT CHANGE UP

## 2023-03-06 ENCOUNTER — EMERGENCY (EMERGENCY)
Facility: HOSPITAL | Age: 53
LOS: 0 days | Discharge: HOME | End: 2023-03-07
Attending: EMERGENCY MEDICINE
Payer: MEDICAID

## 2023-03-06 VITALS — HEART RATE: 98 BPM

## 2023-03-06 VITALS
OXYGEN SATURATION: 98 % | HEIGHT: 68 IN | RESPIRATION RATE: 19 BRPM | SYSTOLIC BLOOD PRESSURE: 138 MMHG | HEART RATE: 112 BPM | TEMPERATURE: 98 F | DIASTOLIC BLOOD PRESSURE: 96 MMHG

## 2023-03-06 DIAGNOSIS — Z79.84 LONG TERM (CURRENT) USE OF ORAL HYPOGLYCEMIC DRUGS: ICD-10-CM

## 2023-03-06 DIAGNOSIS — E11.9 TYPE 2 DIABETES MELLITUS WITHOUT COMPLICATIONS: ICD-10-CM
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
## 2023-03-06 DIAGNOSIS — Z91.018 ALLERGY TO OTHER FOODS: ICD-10-CM

## 2023-03-06 DIAGNOSIS — F20.9 SCHIZOPHRENIA, UNSPECIFIED: ICD-10-CM

## 2023-03-06 DIAGNOSIS — S09.90XA UNSPECIFIED INJURY OF HEAD, INITIAL ENCOUNTER: ICD-10-CM

## 2023-03-06 DIAGNOSIS — E03.9 HYPOTHYROIDISM, UNSPECIFIED: ICD-10-CM

## 2023-03-06 DIAGNOSIS — R45.4 IRRITABILITY AND ANGER: ICD-10-CM

## 2023-03-06 DIAGNOSIS — I10 ESSENTIAL (PRIMARY) HYPERTENSION: ICD-10-CM

## 2023-03-06 DIAGNOSIS — Z88.0 ALLERGY STATUS TO PENICILLIN: ICD-10-CM

## 2023-03-06 DIAGNOSIS — Z88.8 ALLERGY STATUS TO OTHER DRUGS, MEDICAMENTS AND BIOLOGICAL SUBSTANCES STATUS: ICD-10-CM

## 2023-03-06 DIAGNOSIS — Z91.013 ALLERGY TO SEAFOOD: ICD-10-CM
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
## 2023-03-06 PROCEDURE — 82962 GLUCOSE BLOOD TEST: CPT

## 2023-03-06 PROCEDURE — 99285 EMERGENCY DEPT VISIT HI MDM: CPT

## 2023-03-06 PROCEDURE — 99283 EMERGENCY DEPT VISIT LOW MDM: CPT

## 2023-03-06 RX ORDER — DIVALPROEX SODIUM 500 MG/1
250 TABLET, DELAYED RELEASE ORAL ONCE
Refills: 0 | Status: COMPLETED | OUTPATIENT
Start: 2023-03-06 | End: 2023-03-06

## 2023-03-06 RX ORDER — DIVALPROEX SODIUM 500 MG/1
500 TABLET, DELAYED RELEASE ORAL ONCE
Refills: 0 | Status: COMPLETED | OUTPATIENT
Start: 2023-03-06 | End: 2023-03-06

## 2023-03-06 RX ORDER — CLOZAPINE 150 MG/1
100 TABLET, ORALLY DISINTEGRATING ORAL AT BEDTIME
Refills: 0 | Status: DISCONTINUED | OUTPATIENT
Start: 2023-03-06 | End: 2023-03-07

## 2023-03-06 RX ORDER — METFORMIN HYDROCHLORIDE 850 MG/1
1000 TABLET ORAL ONCE
Refills: 0 | Status: COMPLETED | OUTPATIENT
Start: 2023-03-06 | End: 2023-03-06

## 2023-03-06 RX ADMIN — CLOZAPINE 100 MILLIGRAM(S): 150 TABLET, ORALLY DISINTEGRATING ORAL at 21:53

## 2023-03-06 RX ADMIN — DIVALPROEX SODIUM 500 MILLIGRAM(S): 500 TABLET, DELAYED RELEASE ORAL at 21:53

## 2023-03-06 RX ADMIN — Medication 2 MILLIGRAM(S): at 21:52

## 2023-03-06 RX ADMIN — DIVALPROEX SODIUM 250 MILLIGRAM(S): 500 TABLET, DELAYED RELEASE ORAL at 21:53

## 2023-03-06 RX ADMIN — METFORMIN HYDROCHLORIDE 1000 MILLIGRAM(S): 850 TABLET ORAL at 21:53

## 2023-03-06 NOTE — ED PROVIDER NOTE - NSFOLLOWUPINSTRUCTIONS_ED_ALL_ED_FT
You have been observed in the ER for nearly 5 hours after the incident, and have no signs of any neurological deficits. No signs of head injury or concussion based on your exam. You have a history of anger issues and need to continue your anger management sessions that you are enrolled in. No new meds need to be added to your regimen. You have gotten all your 9pm medications in the ER today. Follow up with your staff physician as needed. Your blood sugar is normal here. You have eaten dinner without complication here.    Closed Head Injury    Closed head injury in an injury to your head that may or may not involve a traumatic brain injury (TBI). Symptoms of TBI can be short or long lasting and include headache, dizziness, interference with memory or speech, fatigue, confusion, changes in sleep, mood changes, nausea, depression/anxiety, and dulling of senses. Make sure to obtain proper rest which includes getting plenty of sleep, avoiding excessive visual stimulation, and avoiding activities that may cause physical or mental stress. Avoid any situation where there is potential for another head injury including sports.    SEEK MEDICAL CARE IF YOU HAVE THE FOLLOWING SYMPTOMS: unusual drowsiness, vomiting, severe dizziness, seizures, lightheadedness, muscular weakness, different pupil sizes, visual changes, or clear or bloody discharge from your ears or nose.

## 2023-03-06 NOTE — ED PROVIDER NOTE - CLINICAL SUMMARY MEDICAL DECISION MAKING FREE TEXT BOX
52-year-old male from [facility] with past medical history of schizophrenia, DM, HTN, anxiety, HLD, aggressive/anger issues, hypothyroid, presents to the ED for angry outburst after an argument with fellow resident, which led him to bang his head against the wall at 6 PM today.  Patient states that he had borrowed another resident's phone, but had trouble getting the Wi-Fi activated on it.  He therefore lent  the phone to yet another resident who stated he could fix it and get the Wi-Fi activated on the phone.  However pt states this person held onto the phone for 5 to 6 hours just so that he can play with the games/watch cartoons on it.  This made the patient extremely upset and admittedly overreact to the situation, which he states happens at times with his anger management issues.  States he began banging his head against the wall, staff intervened and he requested to come to the ER for a break from the situation.  Denies any use of anticoagulants (which I confirmed based on his list of meds from the facility), denies any LOC, denies any visual changes/numbness/weakness/seizures/incontinence/neck pain/headache/ complaints/nausea/vomiting/diarrhea/abdominal pain/chest pain/shortness of breath.  Patient is now calm and requesting to go back to the facility.    On exam patient NAD, NCAT, PERRL, EOMI.  No tenderness to the skull with palpation.  No midline C-spine tenderness. NO chest wall tenderness. Lungs CTA B. Heart: Regular S1-S2.  Abdomen: Soft, ND/NT, + BS, no mass.  EXT: No calf tenderness, distal pulse intact.  Neuro: Alert and oriented x3, no motor or sensory deficit, gait is normal, no focal neuro deficits. Pt ate in the ER without any complications and blood sugar 103.     A/P patient had a angry outburst after a argument with fellow resident.  Patient at this point very calm and states he is already enrolled in outpatient anger management program.  Is due for his evening doses of medications which we will give here in the ED.  Now observed for about 4+ hours since the incident (which occurred at 6 pm) and has no focal neuro deficits, or neurological complaints.  Patient does not need a CT scan based on his history and evaluation.  Pending transfer back to his facility

## 2023-03-06 NOTE — ED PROVIDER NOTE - IV ALTEPLASE INCLUSION HIDDEN
Monitored by specialist- no acute findings meriting change in the plan   Seeing Dr. Linden Stout with bariatrics show

## 2023-03-06 NOTE — ED PROVIDER NOTE - OBJECTIVE STATEMENT
53 y/o male with a PMH of schizophrenia, HTN, DM and hypothyroidism presents to the ED sent in from [facility] due to aggressive outburst. pt reports he has anger issues and goes to anger management classes which helps him. pt reports h was borrowing a female residents phone and then gave it to another resident to fix but the resident kept the phone for about 5 hours so he got mad and began to bang things, and hit his head on the wall. pt reports this has happened before when he gets angry but now the female resident got the phone back and he has calmed down. pt denies suicidal or homicidal ideations, visual or auditory hallucinations, loc, headache, dizziness, visual changes, neck pain, back pain, chest pain, sob, weakness, numbness, or tingling. I spoke with yina who confirmed story.

## 2023-03-06 NOTE — ED PROVIDER NOTE - ATTENDING APP SHARED VISIT CONTRIBUTION OF CARE
52-year-old male from [facility] with past medical history of schizophrenia, DM, HTN, anxiety, HLD, aggressive/anger issues, hypothyroid, presents to the ED for angry outburst after an argument with fellow resident, which led him to bang his head against the wall at 6 PM today.  Patient states that he had borrowed another resident's phone, but had trouble getting the Wi-Fi activated on it.  He therefore lent  the phone to yet another resident who stated he could fix it and get the Wi-Fi activated on the phone.  However pt states this person held onto the phone for 5 to 6 hours just so that he can play with the games/watch cartoons on it.  This made the patient extremely upset and admittedly overreact to the situation, which he states happens at times with his anger management issues.  States he began banging his head against the wall, staff intervened and he requested to come to the ER for a break from the situation.  Denies any use of anticoagulants (which I confirmed based on his list of meds from the facility), denies any LOC, denies any visual changes/numbness/weakness/seizures/incontinence/neck pain/headache/ complaints/nausea/vomiting/diarrhea/abdominal pain/chest pain/shortness of breath.  Patient is now calm and requesting to go back to the facility.    On exam patient NAD, NCAT, PERRL, EOMI.  No tenderness to the skull with palpation.  No midline C-spine tenderness. NO chest wall tenderness. Lungs CTA B. Heart: Regular S1-S2.  Abdomen: Soft, ND/NT, + BS, no mass.  EXT: No calf tenderness, distal pulse intact.  Neuro: Alert and oriented x3, no motor or sensory deficit, gait is normal, no focal neuro deficits.    A/P patient had a angry outburst after a argument with fellow resident.  Patient at this point very calm and states he is already enrolled in outpatient anger management program.  Is due for his evening doses of medications which we will give here in the ED.  Now observed for about 4+ hours since the incident (which occurred at 6 pm) and has no focal neuro deficits, or neurological complaints.  Patient does not need a CT scan based on his history and evaluation.  Pending transfer back to his facility    ALL: haldol, navane, pcn, thorazine, seafood  Meds: metformin, levothyroxine, lorazepam, atorvastatin, bisacodyl, clozapine, divalproex, docusate, metoprolol, tylenol  [facility]

## 2023-03-06 NOTE — ED PROVIDER NOTE - PHYSICAL EXAMINATION
Physical Exam    Vital Signs: I have reviewed the initial vital signs.  Constitutional: well-nourished, appears stated age, no acute distress  Eyes: Conjunctiva pink, Sclera clear, PERRLA, EOMI without pain.   Cardiovascular: S1 and S2, regular rate, regular rhythm, well-perfused extremities, radial pulses equal and 2+ b/l.   Respiratory: unlabored respiratory effort, clear to auscultation bilaterally no wheezing, rales and rhonchi. pt is speaking full sentences. no accessory muscle use.   Musculoskeletal: supple neck, FROM of b/l upper and lower extremities.   Integumentary: warm, dry, no rash  Neurologic: skull is atraumatic normocephalic. awake, alert, cranial nerves II-XII grossly intact, extremities’ motor and sensory functions grossly intact. steady gait.   Psychiatric: appropriate mood, appropriate affect

## 2023-03-06 NOTE — ED PROVIDER NOTE - PATIENT PORTAL LINK FT
You can access the FollowMyHealth Patient Portal offered by [facility] by registering at the following website: [email or web address]. By joining [email or web address] FollowMyHealth portal, you will also be able to view your health information using other applications (apps) compatible with our system.

## 2023-03-17 ENCOUNTER — INPATIENT (INPATIENT)
Facility: HOSPITAL | Age: 53
LOS: 17 days | Discharge: HOME | DRG: 750 | End: 2023-04-04
Attending: PSYCHIATRY & NEUROLOGY | Admitting: PSYCHIATRY & NEUROLOGY
Payer: MEDICAID

## 2023-03-17 ENCOUNTER — EMERGENCY (EMERGENCY)
Facility: HOSPITAL | Age: 53
LOS: 0 days | Discharge: HOME | End: 2023-03-17
Attending: EMERGENCY MEDICINE
Payer: MEDICAID

## 2023-03-17 VITALS
TEMPERATURE: 97 F | RESPIRATION RATE: 18 BRPM | HEART RATE: 103 BPM | SYSTOLIC BLOOD PRESSURE: 107 MMHG | OXYGEN SATURATION: 99 % | DIASTOLIC BLOOD PRESSURE: 67 MMHG

## 2023-03-17 VITALS
TEMPERATURE: 98 F | OXYGEN SATURATION: 98 % | HEART RATE: 111 BPM | HEIGHT: 68 IN | RESPIRATION RATE: 18 BRPM | SYSTOLIC BLOOD PRESSURE: 163 MMHG | DIASTOLIC BLOOD PRESSURE: 93 MMHG

## 2023-03-17 VITALS
HEART RATE: 112 BPM | HEIGHT: 68 IN | OXYGEN SATURATION: 99 % | RESPIRATION RATE: 20 BRPM | DIASTOLIC BLOOD PRESSURE: 88 MMHG | SYSTOLIC BLOOD PRESSURE: 130 MMHG | TEMPERATURE: 99 F

## 2023-03-17 DIAGNOSIS — F31.9 BIPOLAR DISORDER, UNSPECIFIED: ICD-10-CM

## 2023-03-17 DIAGNOSIS — E11.9 TYPE 2 DIABETES MELLITUS WITHOUT COMPLICATIONS: ICD-10-CM

## 2023-03-17 DIAGNOSIS — F25.0 SCHIZOAFFECTIVE DISORDER, BIPOLAR TYPE: ICD-10-CM

## 2023-03-17 DIAGNOSIS — R45.1 RESTLESSNESS AND AGITATION: ICD-10-CM

## 2023-03-17 DIAGNOSIS — F41.9 ANXIETY DISORDER, UNSPECIFIED: ICD-10-CM

## 2023-03-17 DIAGNOSIS — I10 ESSENTIAL (PRIMARY) HYPERTENSION: ICD-10-CM

## 2023-03-17 DIAGNOSIS — R45.851 SUICIDAL IDEATIONS: ICD-10-CM

## 2023-03-17 DIAGNOSIS — Z91.013 ALLERGY TO SEAFOOD: ICD-10-CM

## 2023-03-17 DIAGNOSIS — Z91.018 ALLERGY TO OTHER FOODS: ICD-10-CM

## 2023-03-17 DIAGNOSIS — E78.5 HYPERLIPIDEMIA, UNSPECIFIED: ICD-10-CM

## 2023-03-17 DIAGNOSIS — E03.9 HYPOTHYROIDISM, UNSPECIFIED: ICD-10-CM

## 2023-03-17 DIAGNOSIS — Z88.0 ALLERGY STATUS TO PENICILLIN: ICD-10-CM

## 2023-03-17 DIAGNOSIS — Z88.8 ALLERGY STATUS TO OTHER DRUGS, MEDICAMENTS AND BIOLOGICAL SUBSTANCES STATUS: ICD-10-CM

## 2023-03-17 DIAGNOSIS — Z79.84 LONG TERM (CURRENT) USE OF ORAL HYPOGLYCEMIC DRUGS: ICD-10-CM

## 2023-03-17 LAB
ALBUMIN SERPL ELPH-MCNC: 4.2 G/DL — SIGNIFICANT CHANGE UP (ref 3.5–5.2)
ALP SERPL-CCNC: 93 U/L — SIGNIFICANT CHANGE UP (ref 30–115)
ALT FLD-CCNC: 8 U/L — SIGNIFICANT CHANGE UP (ref 0–41)
ANION GAP SERPL CALC-SCNC: 12 MMOL/L — SIGNIFICANT CHANGE UP (ref 7–14)
ANION GAP SERPL CALC-SCNC: 13 MMOL/L — SIGNIFICANT CHANGE UP (ref 7–14)
APAP SERPL-MCNC: <5 UG/ML — LOW (ref 10–30)
APAP SERPL-MCNC: <5 UG/ML — LOW (ref 10–30)
APPEARANCE UR: CLEAR — SIGNIFICANT CHANGE UP
AST SERPL-CCNC: 23 U/L — SIGNIFICANT CHANGE UP (ref 0–41)
BASOPHILS # BLD AUTO: 0.01 K/UL — SIGNIFICANT CHANGE UP (ref 0–0.2)
BASOPHILS # BLD AUTO: 0.03 K/UL — SIGNIFICANT CHANGE UP (ref 0–0.2)
BASOPHILS NFR BLD AUTO: 0.2 % — SIGNIFICANT CHANGE UP (ref 0–1)
BASOPHILS NFR BLD AUTO: 0.3 % — SIGNIFICANT CHANGE UP (ref 0–1)
BILIRUB SERPL-MCNC: 0.3 MG/DL — SIGNIFICANT CHANGE UP (ref 0.2–1.2)
BILIRUB UR-MCNC: NEGATIVE — SIGNIFICANT CHANGE UP
BUN SERPL-MCNC: 6 MG/DL — LOW (ref 10–20)
BUN SERPL-MCNC: 9 MG/DL — LOW (ref 10–20)
CALCIUM SERPL-MCNC: 10.1 MG/DL — SIGNIFICANT CHANGE UP (ref 8.4–10.5)
CALCIUM SERPL-MCNC: 9.8 MG/DL — SIGNIFICANT CHANGE UP (ref 8.4–10.5)
CHLORIDE SERPL-SCNC: 100 MMOL/L — SIGNIFICANT CHANGE UP (ref 98–110)
CHLORIDE SERPL-SCNC: 102 MMOL/L — SIGNIFICANT CHANGE UP (ref 98–110)
CO2 SERPL-SCNC: 23 MMOL/L — SIGNIFICANT CHANGE UP (ref 17–32)
CO2 SERPL-SCNC: 23 MMOL/L — SIGNIFICANT CHANGE UP (ref 17–32)
COLOR SPEC: SIGNIFICANT CHANGE UP
CREAT SERPL-MCNC: 0.6 MG/DL — LOW (ref 0.7–1.5)
CREAT SERPL-MCNC: 0.7 MG/DL — SIGNIFICANT CHANGE UP (ref 0.7–1.5)
DIFF PNL FLD: NEGATIVE — SIGNIFICANT CHANGE UP
EGFR: 112 ML/MIN/1.73M2 — SIGNIFICANT CHANGE UP
EGFR: 117 ML/MIN/1.73M2 — SIGNIFICANT CHANGE UP
EOSINOPHIL # BLD AUTO: 0.16 K/UL — SIGNIFICANT CHANGE UP (ref 0–0.7)
EOSINOPHIL # BLD AUTO: 0.18 K/UL — SIGNIFICANT CHANGE UP (ref 0–0.7)
EOSINOPHIL NFR BLD AUTO: 2 % — SIGNIFICANT CHANGE UP (ref 0–8)
EOSINOPHIL NFR BLD AUTO: 2.7 % — SIGNIFICANT CHANGE UP (ref 0–8)
ETHANOL SERPL-MCNC: <10 MG/DL — SIGNIFICANT CHANGE UP
ETHANOL SERPL-MCNC: <10 MG/DL — SIGNIFICANT CHANGE UP
GLUCOSE SERPL-MCNC: 82 MG/DL — SIGNIFICANT CHANGE UP (ref 70–99)
GLUCOSE SERPL-MCNC: 85 MG/DL — SIGNIFICANT CHANGE UP (ref 70–99)
GLUCOSE UR QL: NEGATIVE — SIGNIFICANT CHANGE UP
HCT VFR BLD CALC: 38 % — LOW (ref 42–52)
HCT VFR BLD CALC: 38 % — LOW (ref 42–52)
HGB BLD-MCNC: 12 G/DL — LOW (ref 14–18)
HGB BLD-MCNC: 12.1 G/DL — LOW (ref 14–18)
IMM GRANULOCYTES NFR BLD AUTO: 0.2 % — SIGNIFICANT CHANGE UP (ref 0.1–0.3)
IMM GRANULOCYTES NFR BLD AUTO: 0.2 % — SIGNIFICANT CHANGE UP (ref 0.1–0.3)
KETONES UR-MCNC: SIGNIFICANT CHANGE UP
LEUKOCYTE ESTERASE UR-ACNC: NEGATIVE — SIGNIFICANT CHANGE UP
LYMPHOCYTES # BLD AUTO: 2.67 K/UL — SIGNIFICANT CHANGE UP (ref 1.2–3.4)
LYMPHOCYTES # BLD AUTO: 3.93 K/UL — HIGH (ref 1.2–3.4)
LYMPHOCYTES # BLD AUTO: 42.6 % — SIGNIFICANT CHANGE UP (ref 20.5–51.1)
LYMPHOCYTES # BLD AUTO: 44.4 % — SIGNIFICANT CHANGE UP (ref 20.5–51.1)
MCHC RBC-ENTMCNC: 25.4 PG — LOW (ref 27–31)
MCHC RBC-ENTMCNC: 25.4 PG — LOW (ref 27–31)
MCHC RBC-ENTMCNC: 31.6 G/DL — LOW (ref 32–37)
MCHC RBC-ENTMCNC: 31.8 G/DL — LOW (ref 32–37)
MCV RBC AUTO: 79.7 FL — LOW (ref 80–94)
MCV RBC AUTO: 80.3 FL — SIGNIFICANT CHANGE UP (ref 80–94)
MONOCYTES # BLD AUTO: 0.46 K/UL — SIGNIFICANT CHANGE UP (ref 0.1–0.6)
MONOCYTES # BLD AUTO: 0.75 K/UL — HIGH (ref 0.1–0.6)
MONOCYTES NFR BLD AUTO: 7.7 % — SIGNIFICANT CHANGE UP (ref 1.7–9.3)
MONOCYTES NFR BLD AUTO: 8.1 % — SIGNIFICANT CHANGE UP (ref 1.7–9.3)
NEUTROPHILS # BLD AUTO: 2.7 K/UL — SIGNIFICANT CHANGE UP (ref 1.4–6.5)
NEUTROPHILS # BLD AUTO: 4.32 K/UL — SIGNIFICANT CHANGE UP (ref 1.4–6.5)
NEUTROPHILS NFR BLD AUTO: 44.8 % — SIGNIFICANT CHANGE UP (ref 42.2–75.2)
NEUTROPHILS NFR BLD AUTO: 46.8 % — SIGNIFICANT CHANGE UP (ref 42.2–75.2)
NITRITE UR-MCNC: NEGATIVE — SIGNIFICANT CHANGE UP
NRBC # BLD: 0 /100 WBCS — SIGNIFICANT CHANGE UP (ref 0–0)
NRBC # BLD: 0 /100 WBCS — SIGNIFICANT CHANGE UP (ref 0–0)
PH UR: 6 — SIGNIFICANT CHANGE UP (ref 5–8)
PLATELET # BLD AUTO: 268 K/UL — SIGNIFICANT CHANGE UP (ref 130–400)
PLATELET # BLD AUTO: 285 K/UL — SIGNIFICANT CHANGE UP (ref 130–400)
POTASSIUM SERPL-MCNC: 4.4 MMOL/L — SIGNIFICANT CHANGE UP (ref 3.5–5)
POTASSIUM SERPL-MCNC: 5.3 MMOL/L — HIGH (ref 3.5–5)
POTASSIUM SERPL-SCNC: 4.4 MMOL/L — SIGNIFICANT CHANGE UP (ref 3.5–5)
POTASSIUM SERPL-SCNC: 5.3 MMOL/L — HIGH (ref 3.5–5)
PROT SERPL-MCNC: 7.7 G/DL — SIGNIFICANT CHANGE UP (ref 6–8)
PROT UR-MCNC: NEGATIVE — SIGNIFICANT CHANGE UP
RBC # BLD: 4.73 M/UL — SIGNIFICANT CHANGE UP (ref 4.7–6.1)
RBC # BLD: 4.77 M/UL — SIGNIFICANT CHANGE UP (ref 4.7–6.1)
RBC # FLD: 15.9 % — HIGH (ref 11.5–14.5)
RBC # FLD: 15.9 % — HIGH (ref 11.5–14.5)
SALICYLATES SERPL-MCNC: <0.3 MG/DL — LOW (ref 4–30)
SALICYLATES SERPL-MCNC: <0.3 MG/DL — LOW (ref 4–30)
SODIUM SERPL-SCNC: 135 MMOL/L — SIGNIFICANT CHANGE UP (ref 135–146)
SODIUM SERPL-SCNC: 138 MMOL/L — SIGNIFICANT CHANGE UP (ref 135–146)
SP GR SPEC: 1.01 — SIGNIFICANT CHANGE UP (ref 1.01–1.03)
UROBILINOGEN FLD QL: SIGNIFICANT CHANGE UP
WBC # BLD: 6.01 K/UL — SIGNIFICANT CHANGE UP (ref 4.8–10.8)
WBC # BLD: 9.23 K/UL — SIGNIFICANT CHANGE UP (ref 4.8–10.8)
WBC # FLD AUTO: 6.01 K/UL — SIGNIFICANT CHANGE UP (ref 4.8–10.8)
WBC # FLD AUTO: 9.23 K/UL — SIGNIFICANT CHANGE UP (ref 4.8–10.8)

## 2023-03-17 PROCEDURE — 36415 COLL VENOUS BLD VENIPUNCTURE: CPT

## 2023-03-17 PROCEDURE — 99285 EMERGENCY DEPT VISIT HI MDM: CPT

## 2023-03-17 PROCEDURE — 80307 DRUG TEST PRSMV CHEM ANLYZR: CPT

## 2023-03-17 PROCEDURE — 70450 CT HEAD/BRAIN W/O DYE: CPT | Mod: 26,MA

## 2023-03-17 PROCEDURE — 90792 PSYCH DIAG EVAL W/MED SRVCS: CPT | Mod: GC

## 2023-03-17 PROCEDURE — 80048 BASIC METABOLIC PNL TOTAL CA: CPT

## 2023-03-17 PROCEDURE — 81003 URINALYSIS AUTO W/O SCOPE: CPT

## 2023-03-17 PROCEDURE — 85025 COMPLETE CBC W/AUTO DIFF WBC: CPT

## 2023-03-17 NOTE — ED BEHAVIORAL HEALTH ASSESSMENT NOTE - DESCRIPTION
Sign out from telepsychiatry in the morning. Seen by psychiatry during the day. Domiciled at [facility] for the last 6 months. Previously at [place] [id number] hypothyroidism, HTN, HLD

## 2023-03-17 NOTE — ED BEHAVIORAL HEALTH ASSESSMENT NOTE - HPI (INCLUDE ILLNESS QUALITY, SEVERITY, DURATION, TIMING, CONTEXT, MODIFYING FACTORS, ASSOCIATED SIGNS AND SYMPTOMS)
Juma jennings a 53 y/o male, domiciled at [facility], disabled, single, no children, PMH of HTN, HLD, DM, and per chart hypothyroidism, past psychiatric history of schizoaffective disorder bipolar type, multiple IPP admissions (last confirmed admission was at [facility] in April 2022), per chart hx of multiple suicide attempts, no past substance use history, presents to the ED BIBEMS after handing a note to staff at [facility] stating he wants God to take him following a dispute with an individual at patient's once weekly day program at [facility]. Psychiatry consulted for mental health evaluation.    Upon approach, patient was seated comfortably, noticeably there was near-constant of his lips in a twitching motion that worsened when patient got more anxious, also mild tremor of his thumb and index finger at rest in both hands. Patient also spoke slowly, with impaired articulation, and appeared to have a prolonged stutter when starting some sentences. When asked why he came to the hospital, patient states that he is scared of a man named Cyrus, who gave him 4 pairs of sneakers last summer, because he owes him money. When asked how long he had been scared for, he states since last summer. When asked why he decided to come to the hospital today, patient reports he does not like his current residence at [facility]. He reports he has been there for 6 months, after previously residing and subsequently graduating from [place] [address]. States he does not like [facility] because there is no curfew and people can go in and out at night. States he likes [facility] because there was the 9pm curfew and he felt safer there. Now, patient reports he came to the hospital because he would like to return to [facility]. Reports he has not told staff at [facility] about wanting to go back.    Patient denies that anyone has ever assaulted or accosted him at [facility], but reports sometimes having to turn the water off when at home when he thinks someone is returning back to the residence, in order to hear their footsteps to confirm. He then reports that he has been going to a day program once per week on Thursdays for anger management, and yesterday someone there had given him trouble but patient did not elaborate. When asked again about Cyrus, patient states he has not seen him since the summer and has never actually been threatened by him. States other people can also see him. Patient confirms also hearing voices, but stated he did not know what they were saying. He states he also sees things others don't see, but again could not specify what exactly. He reports sometimes having thoughts that random people on the bus are looking at him even though he does not know them. When asked if he feels depressed, patient reports, "I am scared. I don't want to go back to [facility]." States he has not been sleeping well while at [facility] due to not liking it. Denies suicidal ideation, but showed the flexor surface of his forearms when asked about previous suicide attempts (superficial scars of the left forearm were present). Patient states he did not know the last time he attempted to harm himself. States he was first diagnosed with Bipolar and Schizophrenia when he was 3 years old and has over 40 IPP admissions, with the last being at [facility] but he could not remember when. States he smokes 1 cigarette per day and denies any alcohol, cannabis, or other substance use. Gave consent to speak to [facility] and stated the interviewer can inform them of his desire to go back to [facility]. Juma jennings a 53 y/o male, domiciled at [facility], disabled, single, no children, PMH of HTN, HLD, DM, and per chart hypothyroidism, past psychiatric history of schizoaffective disorder bipolar type, multiple IPP admissions (last confirmed admission was at [facility] in April 2022), per chart hx of multiple suicide attempts, no past substance use history, presents to the ED BIBEMS after handing a note to staff at [facility] stating he wants God to take him following a dispute with an individual at patient's once weekly day program at [facility]. Psychiatry consulted for mental health evaluation.    Upon approach, patient was seated comfortably, noticeably there was near-constant of his lips in a twitching motion that worsened when patient got more anxious, also mild tremor of his thumb and index finger at rest in both hands. Patient also spoke slowly, with impaired articulation, and appeared to have a prolonged stutter when starting some sentences. When asked why he came to the hospital, patient states that he is scared of a man named Cyrus, who gave him 4 pairs of sneakers last summer, because he owes him money. When asked how long he had been scared for, he states since last summer. When asked why he decided to come to the hospital today, patient reports he does not like his current residence at [facility]. He reports he has been there for 6 months, after previously residing and subsequently graduating from [place] [id number]. States he does not like [facility] because there is no curfew and people can go in and out at night. States he likes [facility] because there was the 9pm curfew and he felt safer there. Now, patient reports he came to the hospital because he would like to return to [facility]. Reports he has not told staff at [facility] about wanting to go back.    Patient denies that anyone has ever assaulted or accosted him at [facility], but reports sometimes having to turn the water off when at home when he thinks someone is returning back to the residence, in order to hear their footsteps to confirm. He then reports that he has been going to a day program once per week on Thursdays for anger management, and yesterday someone there had given him trouble but patient did not elaborate. When asked again about Cyrus, patient states he has not seen him since the summer and has never actually been threatened by him. States other people can also see him. Patient confirms also hearing voices, but stated he did not know what they were saying. He states he also sees things others don't see, but again could not specify what exactly. He reports sometimes having thoughts that random people on the bus are looking at him even though he does not know them. When asked if he feels depressed, patient reports, "I am scared. I don't want to go back to [facility]." States he has not been sleeping well while at [facility] due to not liking it. Denies suicidal ideation, but showed the flexor surface of his forearms when asked about previous suicide attempts (superficial scars of the left forearm were present). Patient states he did not know the last time he attempted to harm himself. Denies homicidal ideation or thoughts of wanting to hurt others. States he was first diagnosed with Bipolar and Schizophrenia when he was 3 years old and has over 40 IPP admissions, with the last being at [facility] but he could not remember when. States he smokes 1 cigarette per day and denies any alcohol, cannabis, or other substance use. Gave consent to speak to [facility] and stated the interviewer can inform them of his desire to go back to [facility].    Spoke with [facility]  ([phone]).   -[facility] yesterday patient went to his weekly day program at [facility], where there someone had given him trouble, they started arguing and talking about him. States patient started acting angryand eventually EMS was called and taken to [facility] ED. States patient started banging his head, including on a mirror outside the ED. [facility] cleared him and patient returned to [facility]. Reports patient then handed staff a note stating "please God take me now I don't know better." [facility] staff asked him if he wanted to go back to the ED and patient said yes.  - reports no suicide attempts or self-harm since at least Jan 2023. Reports before then there were incidents where patient broke a plastic spoon and a contreras and scratched himself. Reports patient has been compliant with medication and is following with Dr. Pearson. Reports they were not aware of request to go back to [facility], but will discuss at the next staff meeting.  -Endorses patient has a pattern of where he will get into an argument with someone at [facility], became angry, and go to the ED. State they do not have records of last IPP admission.

## 2023-03-17 NOTE — ED PROVIDER NOTE - CLINICAL SUMMARY MEDICAL DECISION MAKING FREE TEXT BOX
.    51 y/o M, pmh as noted, here for aggression. Pt calm and cooperative in ED. All available lab tests, imaging tests, and EKGs independently reviewed and interpreted by me. Pt seen by psychiatry. Pt cleared by their service for dc w/ outpt fup. Pt stable for DC.    .

## 2023-03-17 NOTE — ED PROVIDER NOTE - OBJECTIVE STATEMENT
52-year-old male with PMH DM, hypothyroidism, schizophrenia presents for evaluation of cutting his left wrist.  Patient expresses suicidal ideation, states he cut himself with a can and he was trying to hurt himself.  States he has thoughts of pushing his bed against the wall and lining and on fire and everything in his room while he is in there.  Feels he wants to harm himself that his meds are not working and his therapy is not working.  Reports mild headache after head banging yesterday, no vomiting or dizziness.  Denies any chest pain, shortness of breath, fevers or chills.

## 2023-03-17 NOTE — ED PROVIDER NOTE - NSFOLLOWUPINSTRUCTIONS_ED_ALL_ED_FT
Anxiety    Generalized anxiety disorder (JACKELINE) is a mental disorder. It is defined as anxiety that is not necessarily related to specific events or activities or is out of proportion to said events. Symptoms include restlessness, fatigue, difficulty concentrations, irritability and difficulty concentrating. It interferes with life functions, including relationships, work, and school. If you were started on a medication make sure to take exactly as prescribed and follow up with a psychiatrist.    SEEK IMMEDIATE MEDICAL CARE IF YOU HAVE THE FOLLOWING SYMPTOMS: thoughts about hurting killing yourself, thoughts about hurting or killing somebody else, hallucinations, or worsening depression.

## 2023-03-17 NOTE — ED BEHAVIORAL HEALTH ASSESSMENT NOTE - OTHER PAST PSYCHIATRIC HISTORY (INCLUDE DETAILS REGARDING ONSET, COURSE OF ILLNESS, INPATIENT/OUTPATIENT TREATMENT)
Psychiatric history of schizoaffective disorder bipolar type, multiple IPP admissions (last confirmed admission was at [facility] in April 2022), per chart hx of multiple suicide attempts. Per chart on multiple medications in the past. In August 2022, psychiatry note signifies patient was on    - Depakote 500mg Qam/750mg Qhs  - Clozaril 300mg Qam/400mg Qhs  - Risperdal 3mg Qhs  - ativan 2mg Qhs  - Invega Sustenna 78mg IM [id number] (per staff pt may have received injection last month but unable to confirm exact date)  - Levothyroxine 25mcg Qdaily

## 2023-03-17 NOTE — ED BEHAVIORAL HEALTH ASSESSMENT NOTE - DETAILS
Denies suicidal ideation, but showed the flexor surface of his forearms when asked about previous suicide attempts (superficial scars of the left forearm were present). Patient states he did not know the last time he attempted to harm himself. None Patient reports allergies to fish, pork, Haldol, Ativan - states reactions are his throat closing up.

## 2023-03-17 NOTE — ED PROVIDER NOTE - PATIENT PORTAL LINK FT
You can access the FollowMyHealth Patient Portal offered by [facility] by registering at the following website: [email or web address]. By joining [facility]’s FollowMyHealth portal, you will also be able to view your health information using other applications (apps) compatible with our system.

## 2023-03-17 NOTE — ED BEHAVIORAL HEALTH ASSESSMENT NOTE - NSBHATTESTCOMMENTATTENDFT_PSY_A_CORE
52 year old male with chronic schizophrenia seen in  the ED-, patient is observed to be at not acute distressed, he complains of being unsafe at [facility]. He denies being suicidal and he is not experiencing any homicidal ideation. During the evaluation, there is notable underlying internal preoccupation, but overall no abnormal behavior indicated for decompensation of schizophrenia or psychosis. His presentation to the ED, was most likely related to poor coping skills not acute decompensation of psychosis. At this time there is no indication for inpatient psychiatry admission. Patient is advised to continue his home medications, [facility] is contacted, and staff is made aware of the situation regarding this patient's request to be transfer to [facility] where he feels safer, although no violence history toward patient is reported. This appears to be related to paranoia, but overall level of functioning will be improved in a place where paranoia is less intense.

## 2023-03-17 NOTE — ED BEHAVIORAL HEALTH ASSESSMENT NOTE - ADDITIONAL DETAILS ALL
Denies suicidal ideation, but showed the flexor surface of his forearms when asked about previous suicide attempts (superficial scars of the left forearm were present). Patient states he did not know the last time he attempted to harm himself.

## 2023-03-17 NOTE — ED PROVIDER NOTE - PROGRESS NOTE DETAILS
MS- Patient endorsed to Dr. Gleason MS- Patient has been stable this morning, sleeping. Psychiatry at bedside in the ED. MS- Patient seen and cleared by psychiatry

## 2023-03-17 NOTE — ED PROVIDER NOTE - OBJECTIVE STATEMENT
52 yold male to ED Pmhx schizophrenia, Htn, Dm Hypothyroidism c/o suicidal ideations and periods of agitation; pt states "he does'nt trust himself around people: pt was in [facility] ED earlier - for same - agitation/aggression; pt stated he was seen by psychiatrist and d/c to go back to [facility]; pt with prior episodes of SI attempts(cutting wrists) denies alcohol or drug abuse; pt non compliant with psych meds x 4 days;

## 2023-03-17 NOTE — ED PROVIDER NOTE - PROGRESS NOTE DETAILS
Spoke with nurse manager TM at [phone], states patient has had 4 visits to ED in the past week with multiple disturbances and facility, outbursts, head banging.  Feels meds are not working well for him.  Patient has been expressing suicidal ideations and today was cutting himself.  Lives at [facility] facility, [phone].  Nurse manager Tia recommending admission at this time. Spoke with telepsychiatry, will evaluate pt. Patient evaluated earlier by telepsych but was unable to complete the interview.  Started to Agitated and wanting to bang her head and was unable to complete evaluation.  Spoke with telepsychiatry attending who requested patient be held in ED until a.m. for further evaluation by daytime psychiatry team. Patient given Ativan 2 mg IM has been comfortable in the ED in no acute distress.  Pt s/o to Dr. Barakat to follow up AM psych, reassess and dispo. Yuval: Sign out received from Dr. Wadsworth.  Patient presented for evaluation of self injures behavior.  Was unable to be evaluated by telepsych due to him banging his head during the interview process.  Psychiatric evaluation deferred to daytime team. Yuval: Endorsed to Dr. Gleason. F/up psych eval

## 2023-03-17 NOTE — ED PROVIDER NOTE - NS ED ROS FT
Constitutional: see HPI  Eyes:  No visual changes, eye pain or discharge.  ENMT:  No hearing changes, pain, discharge or infections. No neck pain or stiffness.  Cardiac:  No chest pain, SOB or edema. No chest pain with exertion.  Respiratory:  No cough or respiratory distress. No hemoptysis. No history of asthma or RAD.  GI:  No nausea, vomiting, diarrhea or abdominal pain.  :  No dysuria, frequency or burning.  MS:  No myalgia, muscle weakness, joint pain or back pain.  Neuro:  No headache or weakness.  No LOC.  Skin:  No skin rash.   Endocrine:+ thyroid disease , no hx diabetes.   PSYCH: expresses SI

## 2023-03-17 NOTE — ED BEHAVIORAL HEALTH ASSESSMENT NOTE - CURRENT MEDICATION
In August 2022, psychiatry note signifies patient was on    - Depakote 500mg Qam/750mg Qhs  - Clozaril 300mg Qam/400mg Qhs  - Risperdal 3mg Qhs  - ativan 2mg Qhs  - Invega Sustenna 78mg IM [id number] (per staff pt may have received injection last month but unable to confirm exact date)  - Levothyroxine 25mcg Qdaily

## 2023-03-17 NOTE — ED PROVIDER NOTE - PHYSICAL EXAMINATION
VITAL SIGNS: noted  CONSTITUTIONAL: Well-developed; well-nourished; in no acute distress  HEAD: Normocephalic; atraumatic, small healing abrasion to top of occiput  EYES: PERRL, EOM intact; conjunctiva and sclera clear  ENT: No nasal discharge; airway clear. MMM  NECK: Supple; non tender.    CARD: S1, S2 normal; no murmurs, gallops, or rubs. Regular rate and rhythm  RESP: CTAB/L, no wheezes, rales or rhonchi  ABD: Normal bowel sounds; soft; non-distended; non-tender; no hepatosplenomegaly. No CVA tenderness  EXT: Normal ROM UE and LE. No calf tenderness or edema. Distal pulses intact  NEURO: Alert, oriented. Grossly unremarkable. No focal deficits  SKIN: Skin exam is warm and dry, several superficial scratches to left wrist, no lacerations or active bleeding. Wrist with FROM  MS: No midline spinal tenderness

## 2023-03-17 NOTE — ED PROVIDER NOTE - CLINICAL SUMMARY MEDICAL DECISION MAKING FREE TEXT BOX
.    Pt w/ self injurious behavior and aggression.  Pt seen by tele-psyche overnight, then by [facility] team.   Recommend admission.  Pt cooperative in ED this morning.  Will admit to psychiatry.    .

## 2023-03-17 NOTE — ED ADULT TRIAGE NOTE - CHIEF COMPLAINT QUOTE
haseeb WALDROP from [address] [place] [address] psych facility for suicidal ideations s/p altercation in program, states he has a plan that he wrote on paper but will not disclose details, pamela soto, was just d/anjana from [facility] for same complaint, t1:1 initiated in triage

## 2023-03-17 NOTE — ED PROVIDER NOTE - CONSIDERATION OF ADMISSION OBSERVATION
Consideration of Admission/Observation Given HPI, PMH, PE, w/up and ED course, will admit/ place in OBS.

## 2023-03-17 NOTE — ED BEHAVIORAL HEALTH ASSESSMENT NOTE - RISK ASSESSMENT
Risk factors - psychotic disorder, mood disorder    Protective factors - residential stability, in treatment

## 2023-03-17 NOTE — ED ADULT NURSE NOTE - EXTENSIONS OF SELF_ADULT
Demetra from [facility] calls stating pt is actively suicidal admitting to DON that he would hang himself with his feeding tube tubing.  They are looking for an order to send him to ER for further evaluation.     Per Dr. Racquel luque to send to ER. Demetra advised of same.   
None

## 2023-03-17 NOTE — ED BEHAVIORAL HEALTH ASSESSMENT NOTE - REFERRAL / APPOINTMENT DETAILS
Follow up with [facility], [address] [place] [id number], [phone], psychiatrist Dr. Pearson

## 2023-03-17 NOTE — ED ADULT NURSE NOTE - CHIEF COMPLAINT QUOTE
haseeb WALDROP from [address] [place] [address] psych facility for suicidal ideations s/p altercation in program, states he has a plan that he wrote on paper but will not disclose details, pamela soto, was just d/anjana from [facility] for same complaint, t1:1 initiated in triage

## 2023-03-17 NOTE — ED BEHAVIORAL HEALTH ASSESSMENT NOTE - SUMMARY
Juma jennings a 51 y/o male, domiciled at [facility], disabled, single, no children, PMH of HTN, HLD, DM, and per chart hypothyroidism, past psychiatric history of schizoaffective disorder bipolar type, multiple IPP admissions (last confirmed admission was at [facility] in April 2022), per chart hx of multiple suicide attempts, no past substance use history, presents to the ED BIBEMS after handing a note to staff at [facility] stating he wants God to take him following a dispute with an individual at patient's once weekly day program at [facility]. Psychiatry consulted for mental health evaluation.    On evaluation, patient does not appear acutely depressed, suicidal, homicidal, psychotic, manic. There does not seem to be an acute decompensation of his primary psychiatric disorder (which seems to be a psychotic disorder). There seems to be a large behavioral component of patient's behavior, namely being afraid of his new residence due to their open door policy and preferring to go back to his old residence at [place] [address]. He does not appear to be a danger to himself or others warranting inpatient psychiatric admission. Patient has outpatient follow up available with his psychiatrist Dr. Pearson. Psychiatrically cleared.    Recommendations:  - Treat and Release  - Follow up with [facility], [address] [place] [id number], [phone], psychiatrist Dr. Pearson

## 2023-03-17 NOTE — ED BEHAVIORAL HEALTH ASSESSMENT NOTE - OTHER
Patient's Residence - [facility] - [phone] Roommate Tardive dyskinetic movements of the upper lip "Scared"

## 2023-03-17 NOTE — ED ADULT TRIAGE NOTE - CHIEF COMPLAINT QUOTE
Pt from [address] [place] [address] for cutting himself, pt has lacerations to L arm, placed on 1:1 observation

## 2023-03-17 NOTE — ED PROVIDER NOTE - INPATIENT RECORD SUMMARY
Patient Instructions by Josie De Guzman MD at 05/18/18 03:48 PM     Author:  Josie De Guzman MD Service:  (none) Author Type:  Physician     Filed:  05/18/18 04:07 PM Encounter Date:  5/18/2018 Status:  Addendum     :  Josie De Guzman MD (Physician)            PATIENT INFORMATION   -- Please call [phone] to schedule your CT  CT Instructions:    IF YOUR INSURANCE REQUIRES PRE AUTHORIZATION, PLEASE WAIT 48-72 HOURS TO CALL TO SCHEDULE YOUR CT. Please call [phone] to schedule your CT. CT Schedulers are available Monday through Friday, 8:00 a.m. to 5:00 p.m. Please proceed to CT/MRI department in the lower level ten minutes prior to to your appointment. This will allow sufficient time for us to prepare for your exam and to begin your exam as close to the appointment time as possible. If you need to change your appointment or have questions, please call us at [phone]. What is CT Scanning? CT scanning, also called computerized tomography or âCATâ scan, is an x-ray test used for diagnosis. Scans are taken from a series of different angles and arranged by a computer to show a cross-sectional view of organs in the body. When is it used? CT scans are used when your health care provider needs more detailed information than regular x-rays provide. How do I prepare for a CT scan? No preparation is necessary unless your health care provider gives you special instructions. For example, if you are having a CT scan of your abdomen or pelvis, you should not eat solid food for five hours before the scan and will probably need to drink oral contrast.  Before the test, you may be asked to remove dental wear, piercings, hair clips, or jewelry you are wearing. If you have a mediport, you will be asked to have the port accessed prior to your CT appointment. What happens during the procedure?   During your scan, you will lie down on a moving table, which will slide you into the scanner. The scanner can move around you to change the angles of the x-rays. Inside the scanning machine, multiple x-ray beams are passed very quickly through your body at different angles. The images are projected onto a TV screen and prepared for your health care provider to examine. A solution of dye (also called contrast) may be injected into a vein, or you may be asked to drink a different type of dye. This allows the scanner to show areas as the dye passes through your body. Your CT scan will last approximately 15 to 30 minutes. The scanning procedure is painless, but you will be required to remain in one position for a period of time. You can talk to the technologist at any time during the procedure. What are the risks associated with this procedure? In this procedure, your body is exposed to a very small amount of radiation. If you are pregnant, you should not have a CT scan without first discussing the possible risks with your health care provider. During this procedure, the dye may cause warm feelings, a flushed face, headache, or a salty taste in the mouth. Rarely, it can cause nausea and vomiting, or more severe reactions. Be sure to tell your health care provider if you are allergic to any medicines or chemicals such as iodine. Follow-Up  -- July 10 8:40 am    Additional Educational Resources: For additional resources regarding your symptoms, diagnosis, or further health information, please visit the Health Resources section on [email or web address] or the Online Health Resources section in [facility].     Make appointment with physical therapy  After IV acyclovir completed      Revision History        User Key Date/Time User Provider Type Action    > [N/A] 05/18/18 04:07 PM Karen Tabares MD Physician Addend     [N/A] 05/18/18 03:55 PM Karen Tabares MD Physician Sign Psychiatry notes reviewed from February 2023 which showed evaluation for suicidal ideation and superficial cuts

## 2023-03-18 DIAGNOSIS — Z91.89 OTHER SPECIFIED PERSONAL RISK FACTORS, NOT ELSEWHERE CLASSIFIED: ICD-10-CM

## 2023-03-18 DIAGNOSIS — F25.0 SCHIZOAFFECTIVE DISORDER, BIPOLAR TYPE: ICD-10-CM

## 2023-03-18 LAB — SARS-COV-2 RNA SPEC QL NAA+PROBE: SIGNIFICANT CHANGE UP

## 2023-03-18 PROCEDURE — 83036 HEMOGLOBIN GLYCOSYLATED A1C: CPT

## 2023-03-18 PROCEDURE — 83735 ASSAY OF MAGNESIUM: CPT

## 2023-03-18 PROCEDURE — 86140 C-REACTIVE PROTEIN: CPT

## 2023-03-18 PROCEDURE — 84443 ASSAY THYROID STIM HORMONE: CPT

## 2023-03-18 PROCEDURE — 80053 COMPREHEN METABOLIC PANEL: CPT

## 2023-03-18 PROCEDURE — 84484 ASSAY OF TROPONIN QUANT: CPT

## 2023-03-18 PROCEDURE — 70450 CT HEAD/BRAIN W/O DYE: CPT

## 2023-03-18 PROCEDURE — 80159 DRUG ASSAY CLOZAPINE: CPT

## 2023-03-18 PROCEDURE — 80164 ASSAY DIPROPYLACETIC ACD TOT: CPT

## 2023-03-18 PROCEDURE — 36415 COLL VENOUS BLD VENIPUNCTURE: CPT

## 2023-03-18 PROCEDURE — 90792 PSYCH DIAG EVAL W/MED SRVCS: CPT | Mod: 95

## 2023-03-18 PROCEDURE — 80061 LIPID PANEL: CPT

## 2023-03-18 PROCEDURE — 93010 ELECTROCARDIOGRAM REPORT: CPT

## 2023-03-18 PROCEDURE — 93005 ELECTROCARDIOGRAM TRACING: CPT

## 2023-03-18 PROCEDURE — 85025 COMPLETE CBC W/AUTO DIFF WBC: CPT

## 2023-03-18 RX ORDER — LEVOTHYROXINE SODIUM 125 MCG
25 TABLET ORAL ONCE
Refills: 0 | Status: COMPLETED | OUTPATIENT
Start: 2023-03-18 | End: 2023-03-18

## 2023-03-18 RX ORDER — METFORMIN HYDROCHLORIDE 850 MG/1
1000 TABLET ORAL ONCE
Refills: 0 | Status: COMPLETED | OUTPATIENT
Start: 2023-03-18 | End: 2023-03-18

## 2023-03-18 RX ORDER — METFORMIN HYDROCHLORIDE 850 MG/1
1000 TABLET ORAL
Refills: 0 | Status: DISCONTINUED | OUTPATIENT
Start: 2023-03-18 | End: 2023-04-04

## 2023-03-18 RX ORDER — METOPROLOL TARTRATE 50 MG
25 TABLET ORAL DAILY
Refills: 0 | Status: DISCONTINUED | OUTPATIENT
Start: 2023-03-18 | End: 2023-04-04

## 2023-03-18 RX ORDER — DIVALPROEX SODIUM 500 MG/1
500 TABLET, DELAYED RELEASE ORAL ONCE
Refills: 0 | Status: COMPLETED | OUTPATIENT
Start: 2023-03-18 | End: 2023-03-18

## 2023-03-18 RX ORDER — METOPROLOL TARTRATE 50 MG
25 TABLET ORAL ONCE
Refills: 0 | Status: COMPLETED | OUTPATIENT
Start: 2023-03-18 | End: 2023-03-18

## 2023-03-18 RX ORDER — CLOZAPINE 150 MG/1
100 TABLET, ORALLY DISINTEGRATING ORAL
Refills: 0 | Status: DISCONTINUED | OUTPATIENT
Start: 2023-03-18 | End: 2023-03-29

## 2023-03-18 RX ORDER — ATORVASTATIN CALCIUM 80 MG/1
40 TABLET, FILM COATED ORAL AT BEDTIME
Refills: 0 | Status: DISCONTINUED | OUTPATIENT
Start: 2023-03-18 | End: 2023-04-04

## 2023-03-18 RX ORDER — ACETAMINOPHEN 500 MG
325 TABLET ORAL EVERY 6 HOURS
Refills: 0 | Status: DISCONTINUED | OUTPATIENT
Start: 2023-03-18 | End: 2023-04-04

## 2023-03-18 RX ORDER — DIVALPROEX SODIUM 500 MG/1
500 TABLET, DELAYED RELEASE ORAL DAILY
Refills: 0 | Status: DISCONTINUED | OUTPATIENT
Start: 2023-03-18 | End: 2023-04-04

## 2023-03-18 RX ORDER — DIVALPROEX SODIUM 500 MG/1
750 TABLET, DELAYED RELEASE ORAL AT BEDTIME
Refills: 0 | Status: DISCONTINUED | OUTPATIENT
Start: 2023-03-18 | End: 2023-04-04

## 2023-03-18 RX ORDER — CLOZAPINE 150 MG/1
100 TABLET, ORALLY DISINTEGRATING ORAL ONCE
Refills: 0 | Status: COMPLETED | OUTPATIENT
Start: 2023-03-18 | End: 2023-03-18

## 2023-03-18 RX ORDER — LEVOTHYROXINE SODIUM 125 MCG
25 TABLET ORAL DAILY
Refills: 0 | Status: DISCONTINUED | OUTPATIENT
Start: 2023-03-18 | End: 2023-04-04

## 2023-03-18 RX ORDER — SENNA PLUS 8.6 MG/1
1 TABLET ORAL DAILY
Refills: 0 | Status: DISCONTINUED | OUTPATIENT
Start: 2023-03-18 | End: 2023-04-04

## 2023-03-18 RX ADMIN — ATORVASTATIN CALCIUM 40 MILLIGRAM(S): 80 TABLET, FILM COATED ORAL at 20:41

## 2023-03-18 RX ADMIN — Medication 2 MILLIGRAM(S): at 18:21

## 2023-03-18 RX ADMIN — Medication 5 MILLIGRAM(S): at 20:41

## 2023-03-18 RX ADMIN — CLOZAPINE 100 MILLIGRAM(S): 150 TABLET, ORALLY DISINTEGRATING ORAL at 20:15

## 2023-03-18 RX ADMIN — Medication 2 MILLIGRAM(S): at 20:16

## 2023-03-18 RX ADMIN — DIVALPROEX SODIUM 750 MILLIGRAM(S): 500 TABLET, DELAYED RELEASE ORAL at 20:13

## 2023-03-18 RX ADMIN — Medication 2 MILLIGRAM(S): at 02:00

## 2023-03-18 RX ADMIN — METFORMIN HYDROCHLORIDE 1000 MILLIGRAM(S): 850 TABLET ORAL at 20:13

## 2023-03-18 NOTE — ED BEHAVIORAL HEALTH NOTE - BEHAVIORAL HEALTH NOTE
==================  PRE-HOSPITAL COURSE  ===================  SOURCE:  RN and secondhand ED documentation  DETAILS: BIB EMS from residence for self harming behavior.   =========  ED COURSE  =========  SOURCE:  RN Neeraj and secondhand ED documentation.  ARRIVAL:  Per chart and RN, patient arrived via EMS. Per RN, patient was calm upon arrival, and cooperative with triage process.  BELONGINGS:  Per RN, patient arrived with belongings. All belongings were provided to hospital security, and patient currently in a gown with a 1:1 staff member.  BEHAVIOR: RN described patient to be calm and cooperative, presenting with linear thought process, AAOx3, presenting with normal mood and congruent affect, remains in behavioral and impulse control, is not violent/aggressive. RN stated that the patient is denies SI/HI/A/VH. RN stated that there are superficial lacerations to L forearm, no other visible marks, bruises, or lacerations on the body. RN stated that the patient appears to be adequately groomed, maintains fair hygiene, and reports fair ADLs, ambulates without assistance.   TREATMENT:  Per chart and RN, patient did not receive PRN medications.   VISITORS:  Per RN, no visitors at bedside.         COVID Exposure Screen- collateral (i.e. third-party, chart review, belongings, etc; include EMS and ED staff)   ---------------------------------------------------  1. Has the patient had a COVID-19 test in the last 90 days? Unknown.   2. Has the patient tested positive for COVID-19 antibodies? Unknown.   3. Has the patient received 2 doses of the COVID-19 vaccine? Unknown  4. In the past 10 days, has the patient been around anyone with a positive COVID-19 test?* Unknown.   5. Has the patient been out of [place] within the past 10 days? Unknown

## 2023-03-18 NOTE — ED BEHAVIORAL HEALTH ASSESSMENT NOTE - SUMMARY
The cami is a 52-year-old man, single, disabled, lives at [facility], with PMH of HTN, HLD, DM, hypothyroidism, with PPH of Schizoaffective Disorder Bipolar Type, multiple prior hospitalizations (most recently to [facility] in 4/2022), multiple suicide attempts, no substance use history, multiple ED visits in the past week for similar presentation, who was BIBEMS from home due to self harm.    The patient presents for self harm with sharp edges of a can in the setting of multiple ED visits in the past week in the context of possible non-adherence to medication and dissatisfaction with current living environment. The patient was noted to be head banging during evaluation. He was noted at previous ED visit to have possible secondary gain and poor coping skills. He was not able to sit for entire evaluation at this time, was provided PO Ativan to help calm. He is NOT psychiatrically cleared and in need of re-evaluation.    Plan:  -- hold to re-assess  -- give Ativan 2mg PO q4h PRN for anxiety/agitation  -- confirm home meds with [facility]

## 2023-03-18 NOTE — BH SAFETY PLAN - ENVIRONMENT SAFETY 1:
One way I would make my environment safe would be to call for help once I get into one of these moods.

## 2023-03-18 NOTE — BH SAFETY PLAN - WARNING SIGN 2
Another warning sign of my stresses would be  Another warning sign of my stresses would be I would pace around a lot.

## 2023-03-18 NOTE — BH SAFETY PLAN - WARNING SIGN 3
Also warning sign of my stresses would be Also warning sign of my stresses would be I would constantly rub my head.

## 2023-03-18 NOTE — BH SAFETY PLAN - INTERNAL COPING STRATEGY 3
Also another  way I would cope with my stresses would be Also another  way I would cope with my stresses would be to watch wrestling, [employer] of [employer].

## 2023-03-18 NOTE — ED BEHAVIORAL HEALTH ASSESSMENT NOTE - OTHER PAST PSYCHIATRIC HISTORY (INCLUDE DETAILS REGARDING ONSET, COURSE OF ILLNESS, INPATIENT/OUTPATIENT TREATMENT)
Diagnoses: Schizoaffective Disorder, Bipolar Type     Inpatient: multiple IPP admissions (last confirmed admission was at [facility] [place] in April 2022)     Outpatient: Dr. Pearson.

## 2023-03-18 NOTE — BH SAFETY PLAN - THE ONE THING THAT IS MOST IMPORTANT TO ME AND WORTH LIVING FOR IS:
The one thing that is most important to me and worth living for would be  The one thing that is most important to me and worth living for would be respect, caring and curiosity.

## 2023-03-18 NOTE — ED BEHAVIORAL HEALTH PROGRESS NOTE - SUMMARY
Juma jennings a 51 y/o male, domiciled at [facility], disabled, single, no children, PMH of HTN, HLD, DM, and per chart hypothyroidism, past psychiatric history of schizoaffective disorder bipolar type, multiple IPP admissions (last confirmed admission was at [facility] in April 2022), per chart hx of multiple suicide attempts, no past substance use history, presents to the ED BIBEMS after handing a note to staff at [facility] stating he wants God to take him following a dispute with an individual at patient's once weekly day program at [facility]. Psychiatry consulted for mental health evaluation.    Telepsychiatry assessment overnight: The patient presents for self harm with sharp edges of a can in the setting of multiple ED visits in the past week in the context of possible non-adherence to medication and dissatisfaction with current living environment. The patient was noted to be head banging during evaluation. He was noted at previous ED visit to have possible secondary gain and poor coping skills. He was not able to sit for entire evaluation at this time, was provided PO Ativan to help calm. He is NOT psychiatrically cleared and in need of re-evaluation.

## 2023-03-18 NOTE — BH SAFETY PLAN - INTERNAL COPING STRATEGY 2
Another way I would cope with my stresses would be  Another way I would cope with my stresses would be to listen some music.

## 2023-03-18 NOTE — ED BEHAVIORAL HEALTH PROGRESS NOTE - MEDICAL ISSUES AND PLAN (INCLUDE STANDING AND PRN MEDICATION)
Lipitor - for HLD, Metoprolol - for HTN, Dulcolax - for constipation, Colace - for constipation, Levothyroxine - for hypothyroidism, Metformin - for DM

## 2023-03-18 NOTE — ED ADULT NURSE NOTE - CHIEF COMPLAINT QUOTE
Pt from [address] [place] [address] for cutting himself, pt has lacerations to L arm, placed on 1:1 observation

## 2023-03-18 NOTE — ED BEHAVIORAL HEALTH PROGRESS NOTE - NSBHATTESTCOMMENTATTENDFT_PSY_A_CORE
Mr Levi ia a 52 year old man with a history of schizoaffective disorder bipolar type who presented to the ED  for the evaluation of worsening depression and suicidal ideations.  Patient had presented to the ED yesterday for similar reasons.   Patient seems to have significant depressed mood , had very poor coping skills. It is not likely that he has suicidal ideations however it seems that he is having significant distress in the context of his worsening paranoia and feeling unsafe in his residence . It is unclear if the debt that he is reporting is based on reality or is a delusion.   At this time, patient is considered a danger to himself and others and needs inpatient psychiatric hospitalization for medication management and symptoms stabilization.  We recommend restarting patient on all his psychotropic and medical medications since staff of his residence report medication compliance. We however recommend a Clozaril and Depakote levels . We also recommend confirming the date that  patient is due for his Invega Deep .

## 2023-03-18 NOTE — BH PATIENT PROFILE - HOME MEDICATIONS
esomeprazole 20 mg oral delayed release capsule , 1 cap(s) orally once a day  metoprolol succinate 50 mg oral tablet, extended release , 1 tab(s) orally once a day  gabapentin 100 mg oral capsule , 1 cap(s) orally 3 times a day  aspirin 81 mg oral tablet, chewable , 1 tab(s) orally once a day  PROzac 40 mg oral capsule , 1 cap(s) orally once a day  oxyCODONE 5 mg oral tablet , 1 tab(s) orally every 6 hours, As Needed  allopurinol 100 mg oral tablet , 1 milligram(s) orally once a day  torsemide 20 mg oral tablet , 1 tab(s) orally once a day  atorvastatin 40 mg oral tablet , 40 milligram(s) orally once a day  hydrALAZINE 50 mg oral tablet , 50 milligram(s) orally 2 times a day  sodium bicarbonate 650 mg oral tablet , 650 milligram(s) orally 2 times a day  Fleet Enema 19 g-7 g rectal enema , 133 milliliter(s) rectally once   MiraLax oral powder for reconstitution , 17 gram(s) orally once a day   benztropine 1 mg oral tablet , 1 tab(s) orally 2 times a day x 14 days or until provider tells you otherwise  cloZAPine 100 mg oral tablet , Take 3 tabs orally in the morning and 4 tabs orally at bedtime x 14 days or until MD tells you otherwise   divalproex sodium 250 mg oral delayed release tablet , Take 2 tabs orally in the morning and 3 tab(s) orally at bedtime x 14 days or until MD tells you otherwise   risperiDONE 3 mg oral tablet , 1 tab(s) orally once a day (at bedtime) x 14 days or until MD tells you otherwise    metoprolol succinate 25 mg oral tablet, extended release , 1 tab(s) orally once a day x 14 days or until MD tells you otherwise   bisacodyl 5 mg oral delayed release tablet , 1 tab(s) orally once a day (at bedtime) x 14 days or until MD tells you otherwise   levothyroxine 25 mcg (0.025 mg) oral tablet , 1 tab(s) orally once a day x 14 days or until MD tells you otherwise   atorvastatin 40 mg oral tablet , 1 tab(s) orally once a day (at bedtime) x 14 days or until MD tells you otherwise   metFORMIN 1000 mg oral tablet , 1 tab(s) orally 2 times a day x 14 days or until MD tells you otherwise

## 2023-03-18 NOTE — BH PATIENT PROFILE - NSVRISKTHREATS_PSY_ALL_CORE
"Pt arrives w/ complaint of cast problem. Pt reports he has a torn ligament on his L hand. Reports he had a cast placed today and that I began to come apart at home. No other complaints aside from needing \"cast tighter\". Pt is A&O x 4. CMS intact.       "
No

## 2023-03-18 NOTE — ED BEHAVIORAL HEALTH ASSESSMENT NOTE - DESCRIPTION
Lives at [facility] for the past 6 months, previously was at [facility] hypothyroidism, HTN, HLD Patient was initially calm and cooperative, though on interview, became more aggressive, was exhibiting self harming behavior by hitting head with palms and banging head against the wall. 2mg Ativan PO was ordered as patient agreed to take.

## 2023-03-18 NOTE — BH PATIENT PROFILE - NSVRISKABUSE_PSY_ALL_CORE
Detail Level: Detailed
Quality 131: Pain Assessment And Follow-Up: Pain assessment using a standardized tool is documented as negative, no follow-up plan required
Quality 130: Documentation Of Current Medications In The Medical Record: Current Medications Documented
Quality 110: Preventive Care And Screening: Influenza Immunization: Influenza Immunization previously received during influenza season
Quality 402: Tobacco Use And Help With Quitting Among Adolescents: Patient screened for tobacco and never smoked
actual
No

## 2023-03-18 NOTE — BH SAFETY PLAN - ENVIRONMENT SAFETY 2:
Another way I would make my environment safe would be to stay away from certain people, places and things.

## 2023-03-18 NOTE — ED BEHAVIORAL HEALTH ASSESSMENT NOTE - DETAILS
spoke to ED attending pending dispo patient reports SI, presented after harming self with sharp edges of can by cutting his wrist Haldol and Thorazine and Navane causes anaphylaxis

## 2023-03-18 NOTE — ED BEHAVIORAL HEALTH PROGRESS NOTE - RISK ASSESSMENT
Upon evaluation this morning, patient presents with depressed mood, anxious disposition, paranoid thoughts that a man named Cyrus will harm him if he does not provide him money, self-mutilation, and distress and fear about returning home. He appears to be a danger to himself for returning home and would benefit from inpatient psychiatric admission for safety, stabilization, and medication management as indicated.

## 2023-03-18 NOTE — ED BEHAVIORAL HEALTH PROGRESS NOTE - DETAILS:
Patient seen and examined. Chart reviewed. Handoff from telepsychiatry in the morning. Patient initially seen in the ED overnight and cleared by the psychiatric team, however per ED team patient cut a soda bottle in half and very superficially scratched his right wrist but did not specify his attention. Patient was sent home from the ED, but was sent back to the ED from his residence. During telepsychiatry evaluation overnight, patient apparently was "became agitated, started slamming his head with his palms, then walked towards the wall and started head banging" and was held for reassessment in the morning.    Upon approach this morning, patient was observed seated in bed, casually eating breakfast. Patient confirms he has fear returning to [place] because he owes a man money from sneakers, but has been unable to get sneakers to him because  Patient seen and examined. Chart reviewed. Handoff from telepsychiatry in the morning. Patient initially seen in the ED overnight and cleared by the psychiatric team, however per ED team patient cut a soda bottle in half and very superficially scratched his right wrist but did not specify his attention. Patient was sent home from the ED, but was sent back to the ED from his residence. During telepsychiatry evaluation overnight, patient apparently was "became agitated, started slamming his head with his palms, then walked towards the wall and started head banging" and was held for reassessment in the morning.    Upon approach this morning, patient was observed seated in bed, casually eating breakfast. Patient confirms he has fear returning to [place] because he owes a man money from sneakers, but has been unable to get the moine Patient seen and examined. Chart reviewed. Handoff from telepsychiatry in the morning. Patient initially seen in the ED overnight and cleared by the psychiatric team, however per ED team patient cut a soda bottle in half and very superficially scratched his right wrist but did not specify his attention. Patient was sent home from the ED, but was sent back to the ED from his residence. During telepsychiatry evaluation overnight, patient apparently was "became agitated, started slamming his head with his palms, then walked towards the wall and started head banging" and was held for reassessment in the morning.     Upon approach this morning, patient was observed seated in bed, 1:1 CO present nearby. Patient was tearful during the exam, appearing scared. Patient confirms he has fear returning to his residence at [place] because he cannot find the man named Cyrus to whom he owes money. States he feels depressed and fearful. Affirms self-harming since being in the ED yesterday. States he does not feel like his medications are working.     Attempted to call [facility] ([phone]). No answer and left voicemail.  Patient seen and examined. Chart reviewed. Handoff from telepsychiatry in the morning. Patient initially seen in the ED overnight and cleared by the psychiatric team, however per ED team patient cut a soda bottle in half and very superficially scratched his right wrist but did not specify his attention. Patient was sent home from the ED, but was sent back to the ED from his residence. During telepsychiatry evaluation overnight, patient apparently was "became agitated, started slamming his head with his palms, then walked towards the wall and started head banging" and was held for reassessment in the morning.     Upon approach this morning, patient was observed seated in bed, 1:1 CO present nearby. Patient was tearful during the exam, appearing scared. Patient confirms he has fear returning to his residence at [place] because he cannot find the man named Cyrus to whom he owes money. States he feels depressed and fearful. Affirms self-harming since being in the ED yesterday. States he does not feel like his medications are working.     Attempted to call [facility] ([phone]). No answer and left voicemail.   Reviewed patient's physical chart which had paperwork from [facility].  [facility] paperwork diagnoses include Schizoaffective disorder, bipolar type, Type 2 DM without complications, Infectious gastroenteritis and colitis, HLD, Essential (primary) HTN.    [facility] current list of medications on paperwork:  (1) Atorvastatin 40mg - 1 tablet at 9am  (2) Bisacodyl EC 5mg (Dulcolax) - 1 tablet at 9pm  (3) Clozapine 100mg 2 tablets - 1 tab at 9am and 1 tab at 9pm  (4) Divalproex Sodium DR 250mg (Depakote) - 1 tablet at 9pm  (5) Divalproex Sodium DR 500mg (Depakote) - 1 tab at 1pm and 1 tab at 9pm  (6) Docusate sodium 100mg (Colace) - 3 tablets at 9am  (7) Levothyroxine 25 micrograms (Synthroid) - 1 tablet at 9am  (8) Lorazepam 2mg (Ativan) - 1 tablet at 9pm  (9) Metformin HCL 1000mg (Glucophage) - 2 tablets per day  (10) Metoprolol Succinate ER 25mg (Lopressor) - 1 tab at 9am  (11) Acetaminophen 325mg - PRN up to 4 tablets per day  (12) Invega Sustenna 156mg/0.5mL (Paliperidone Palmitate) 156mg - 1 injection per month (*Not written when last received)

## 2023-03-18 NOTE — ED BEHAVIORAL HEALTH ASSESSMENT NOTE - CURRENT MEDICATION
In August 2022, psychiatry note signifies patient was on       - Depakote 500mg Qam/750mg Qhs     - Clozaril 300mg Qam/400mg Qhs     - Risperdal 3mg Qhs     - ativan 2mg Qhs     - Invega Sustenna 78mg IM [id number] (per staff pt may have received injection last month but unable to confirm exact date)     - Levothyroxine 25mcg Qdaily

## 2023-03-18 NOTE — ED BEHAVIORAL HEALTH NOTE - BEHAVIORAL HEALTH NOTE
Patient [place] permission to obtain [place] [id number] [place] [facility]:  (  ) Yes  ( x )  No  Rationale for overriding objection            (  ) Lack of capacity. Details: ________            ( x ) Assessing risk of danger to self/others. Details: Pt endorsing SI, brought in for NSSIB, need to assess for safety and ability to return to facility.  Rationale for selecting specific collateral source            (  ) Potential to impact risk of danger to self/others and no alternative equivalent. Details: _____    Collateral ([place] [id number] overnight resident counselor) has requested that the information provided remain confidential: Yes [ x ] No [  ]  Collateral ([place] [address] overnight resident counselor) has provided information that patient is/may be unaware of: Yes [  ] No [ x ]       FOR EACH PERSON  •	NAME: Ana Luisa  •	NUMBER: [phone]  •	RELATIONSHIP: [place] [address] overnight resident counselor  •	RELIABILITY: Good  •	COMMENTS: Counselor reports pt has been off baseline, had multiple ED visits within the last few days, wrote note endorsing passive SI, and cut L wrist with can. Counselor is advocating for inpatient admission. Counselor reports pt is able to return to residence if discharged.      DEMOGRAPHICS 53 yo M, single, domiciled at [facility], disabled, non-caregiver.     HPI (SEE TIMELINE ABOVE)  •	BASELINE FUNCTIONING: able to care for self, attends day program, takes medication by self and is compliant, does not endorse SI  •	DATE HPI STARTED: 3/16  •	DECOMPENSATION: Counselor reports pt came back from day program on 3/16, appeared off baseline, was banging his head on the wall. Staff activated 911, pt was taken to [email or web address] ED, discharged, and returned to residence around 1:45AM on 3/17. Counselor reports pt wrote of a note for staff shortly after returning stating “God please take me out” and asked staff to put note in his file. When questioned by staff about the note, pt requested for medical attention and reported feeling that his medications were not working. Staff activated 911, patient requested EMS to take him to [facility] as he felt [facility] [[email or web address] was not helpful. Counselor reports pt was transported to [facility], discharged, and returned to facility around 6:20PM. Counselor reports shortly after pt took a the lid of a metal can and cut his L wrist.   •	SUICIDALITY: Denies  •	VIOLENCE: Denies  •	SUBSTANCE: Denies    PAST PSYCHIATRIC HISTORY    •	DATE PAST PSYCHIATRIC HISTORY STARTED: Unknown  •	MAIN PSYCHIATRIC DIAGNOSIS: Unknown – face sheet and med list requested  •	PSYCHIATRIC HOSPITALIZATIONS: Unknown  •	PRIOR ILLNESS: Counselor reports pt attends day treatment program and is in treatment with psychiatrist from program. Counselor reports facility supervises pt’s medication and pt appears to be medication compliant.   •	SUICIDALITY: Denies  •	VIOLENCE: Denies  •	SUBSTANCE USE: Denies     OTHER HISTORY  •	CURRENT MEDICATION: face sheet and med list requested  •	MEDICAL HISTORY: face sheet and med list requested  •	ALLERGIES: face sheet and med list requested   •	LEGAL ISSUES: None known  •	FIREARM ACCESS: Denies  •	SOCIAL HISTORY: Unknown  •	FAMILY HISTORY: Unknown

## 2023-03-18 NOTE — BH SAFETY PLAN - INTERNAL COPING STRATEGY 1
One way I would cope with my stresses would be  One way I would cope with my stresses would be to take my medications

## 2023-03-18 NOTE — ED BEHAVIORAL HEALTH PROGRESS NOTE - CASE SUMMARY/FORMULATION (CLEARLY DOCUMENT RATIONALE FOR DISPOSITION CHANGE)
Juma jennings a 53 y/o male, domiciled at [facility], disabled, single, no children, PMH of HTN, HLD, DM, and per chart hypothyroidism, past psychiatric history of schizoaffective disorder bipolar type, multiple IPP admissions (last confirmed admission was at [facility] in April 2022), per chart hx of multiple suicide attempts, no past substance use history, presents to the ED BIBEMS after handing a note to staff at [facility] stating he wants God to take him following a dispute with an individual at patient's once weekly day program at [facility]. Psychiatry consulted for mental health evaluation.    Upon evaluation this morning, patient presents with depressed mood, anxious disposition, paranoid thoughts that a man named Cyrus will harm him if he does not provide him money, self-mutilation, and distress and fear about returning home. He appears to be a danger to himself for returning home and would benefit from inpatient psychiatric admission for safety, stabilization, and medication management as indicated. Juma jennings a 53 y/o male, domiciled at [facility], disabled, single, no children, PMH of HTN, HLD, DM, and per chart hypothyroidism, past psychiatric history of schizoaffective disorder bipolar type, multiple IPP admissions (last confirmed admission was at [facility] in April 2022), per chart hx of multiple suicide attempts, no past substance use history, presents to the ED BIBEMS after handing a note to staff at [facility] stating he wants God to take him following a dispute with an individual at patient's once weekly day program at [facility]. Psychiatry consulted for mental health evaluation.    Upon evaluation this morning, patient presents with depressed mood, anxious disposition, paranoid thoughts that a man named Cyrus will harm him if he does not provide him money, self-mutilation, and distress and fear about returning home. He appears to be a danger to himself for returning home and would benefit from inpatient psychiatric admission for safety, stabilization, and medication management as indicated.    Recommendations:  - Admit to inpatient psychiatry    - On the IPP unit:  -- > Resume home med Clozapine 100mg BID for Schizoaffective disorder, bipolar tyle  -- > Resume home med Depakote 500mg qAM  -- > Resume home med Depakote 750mg qhs  -- > Resume home med Ativan 2mg qhs for anxiety  -- > Will need to resume home med Invega Sustenna 156mg qmonthly injection. Will need to confirm first when last received.  -- > Atorvastatin 40mg qhs for HLD  -- > Dulcolax 5mg qhs for constipation  -- > Colace 100mg 3 tablets qAM for constipation  -- > Levothyroxine 25 micrograms qAM for hypothyroidism  -- > Metformin HCL 1000mg BID for DM  -- > Metoprolol Succinate ER 25mg qAM for HTN  -- > PRN Acetaminophen 325mg q6hrs for pain  -- > For severe agitation not responding to behavioral intervention, may give Haldol 5 mg PO q6hrs PRN, Ativan 2 mg PO q6hrs PRN, Benadryl 50 mg PO q6hrs PRN, with escalation to IM if patient refusing PO and remains an imminent danger to self or others. If IM antipsychotic is administered, please perform follow-up ECG for QTc monitoring.    - Give morning meds while in the ED since patient is pending transport:  (1) Clozapine 100mg - one dose now  (2) Depakote 500mg - one dose now  (3) Levothyroxine 25 micrograms - one dose now  (4) Metformin HCL 1000mg - one dose now  (5) Metoprolol Succinate ER 25mg - one dose now Juma jennings a 51 y/o male, domiciled at [facility], disabled, single, no children, PMH of HTN, HLD, DM, and per chart hypothyroidism, past psychiatric history of schizoaffective disorder bipolar type, multiple IPP admissions (last confirmed admission was at [facility] in April 2022), per chart hx of multiple suicide attempts, no past substance use history, presents to the ED BIBEMS after handing a note to staff at [facility] stating he wants God to take him following a dispute with an individual at patient's once weekly day program at [facility]. Psychiatry consulted for mental health evaluation.    Upon evaluation this morning, patient presents with depressed mood, anxious disposition, paranoid thoughts that a man named Cyrus will harm him if he does not provide him money, self-mutilation, and distress and fear about returning home. He appears to be a danger to himself for returning home and would benefit from inpatient psychiatric admission for safety, stabilization, and medication management as indicated.    Recommendations:  - Admit to inpatient psychiatry    - On the IPP unit:  -- > Resume home med Clozapine 100mg BID for Schizoaffective disorder, bipolar tyle  -- > Resume home med Depakote 500mg qAM  -- > Resume home med Depakote 750mg qhs  -- > Resume home med Ativan 2mg qhs for anxiety  -- > Will need to resume home med Invega Sustenna 156mg qmonthly injection. Will need to confirm first when last received.  -- > Atorvastatin 40mg qhs for HLD  -- > Dulcolax 5mg qhs for constipation  -- > Colace 100mg 3 tablets qAM for constipation  -- > Levothyroxine 25 micrograms qAM for hypothyroidism  -- > Metformin HCL 1000mg BID for DM  -- > Metoprolol Succinate ER 25mg qAM for HTN  -- > PRN Acetaminophen 325mg q6hrs for pain  -- > For severe agitation not responding to behavioral intervention, may give Ativan 2 mg PO q6hrs PRN, with escalation to IM if patient refusing PO and remains an imminent danger to self or others. If IM antipsychotic is administered, please perform follow-up ECG for QTc monitoring.    - Give morning meds while in the ED since patient is pending transport:  (1) Clozapine 100mg - one dose now  (2) Depakote 500mg - one dose now  (3) Levothyroxine 25 micrograms - one dose now  (4) Metformin HCL 1000mg - one dose now  (5) Metoprolol Succinate ER 25mg - one dose now Juma jennings a 53 y/o male, domiciled at [facility], disabled, single, no children, PMH of HTN, HLD, DM, and per chart hypothyroidism, past psychiatric history of schizoaffective disorder bipolar type, multiple IPP admissions (last confirmed admission was at [facility] in April 2022), per chart hx of multiple suicide attempts, no past substance use history, presents to the ED BIBEMS after handing a note to staff at [facility] stating he wants God to take him following a dispute with an individual at patient's once weekly day program at [facility]. Psychiatry consulted for mental health evaluation.    Upon evaluation this morning, patient presents with depressed mood, anxious disposition, paranoid thoughts that a man named Cyrus will harm him if he does not provide him money, self-mutilation, and distress and fear about returning home. He appears to be a danger to himself for returning home and would benefit from inpatient psychiatric admission for safety, stabilization, and medication management as indicated.    Recommendations:  - Admit to inpatient psychiatry    - On the IPP unit:  -- > Resume home med Clozapine 100mg BID for Schizoaffective disorder, bipolar tyle  -- > Resume home med Depakote 500mg qAM  -- > Resume home med Depakote 750mg qhs  -- > Resume home med Ativan 2mg qhs for anxiety  -- > Will need to resume home med Invega Sustenna 156mg qmonthly injection. Will need to confirm first when last received.  -- > Atorvastatin 40mg qhs for HLD  -- > Dulcolax 5mg qhs for constipation  -- > Start PRN Senna 1 tablet qdaily for constipation (Home med is Colace 100mg 3 tablets qAM for constipation)  -- > Levothyroxine 25 micrograms qAM for hypothyroidism  -- > Metformin HCL 1000mg BID for DM  -- > Metoprolol Succinate ER 25mg qAM for HTN  -- > PRN Acetaminophen 325mg q6hrs for pain  -- > For severe agitation not responding to behavioral intervention, may give Ativan 2 mg PO q6hrs PRN, with escalation to IM if patient refusing PO and remains an imminent danger to self or others. If IM antipsychotic is administered, please perform follow-up ECG for QTc monitoring.    - Give morning meds while in the ED since patient is pending transport:  (1) Clozapine 100mg - one dose now  (2) Depakote 500mg - one dose now  (3) Levothyroxine 25 micrograms - one dose now  (4) Metformin HCL 1000mg - one dose now  (5) Metoprolol Succinate ER 25mg - one dose now

## 2023-03-18 NOTE — BH SAFETY PLAN - WARNING SIGN 1
Patient is unable to participate in safety plan  A warning sign of my stresses would be  A warning sign of my stresses would be I wont shower.

## 2023-03-18 NOTE — ED BEHAVIORAL HEALTH ASSESSMENT NOTE - HPI (INCLUDE ILLNESS QUALITY, SEVERITY, DURATION, TIMING, CONTEXT, MODIFYING FACTORS, ASSOCIATED SIGNS AND SYMPTOMS)
The cami is a 52-year-old man, single, disabled, lives at [facility], with PMH of HTN, HLD, DM, hypothyroidism, with PPH of Schizoaffective Disorder Bipolar Type, multiple prior hospitalizations (most recently to [facility] in 4/2022), multiple suicide attempts, no substance use history, multiple ED visits in the past week for similar presentation, who was BIBEMS from home due to self harm.    The patient was seen holding his head in his hands and rocking, limited in following directions. He became agitated, started slamming his head with his palms, then walked towards the wall and started head banging. He was verbally redirectable by staff initially but upon continuation of interview, the patient stopped answering questions and began posturing aggressively towards staff in the room and the interview had to be cut short.    From what could be gathered by the patient, he states that he has not been taking his medications for the past 6 days but couldn't state for what reason. He states that when he was released earlier this morning from the ED, he went back home and went into the garbage area and took out a can and broke it apart and slit his wrist with the sharp edges. When asked why he did it, he was not able to elaborate. He appeared to be internally preoccupied and distracted. He was not able to tolerate the rest of interview.    Collateral from [facility] in separate note.

## 2023-03-18 NOTE — ED BEHAVIORAL HEALTH ASSESSMENT NOTE - RISK ASSESSMENT
Risk: prior hospitalizations, prior SA, poor social support, poor adherence to treatment  Protective: help-seeking tendencies

## 2023-03-18 NOTE — BH SAFETY PLAN - DISTRACTION PLACE 1
One place that is safe and will provide a distraction for me would be  One place that is safe and will provide a distraction for me would be around a lot of people.

## 2023-03-19 LAB
ALBUMIN SERPL ELPH-MCNC: 4.4 G/DL — SIGNIFICANT CHANGE UP (ref 3.5–5.2)
ALP SERPL-CCNC: 96 U/L — SIGNIFICANT CHANGE UP (ref 30–115)
ALT FLD-CCNC: 8 U/L — SIGNIFICANT CHANGE UP (ref 0–41)
ANION GAP SERPL CALC-SCNC: 11 MMOL/L — SIGNIFICANT CHANGE UP (ref 7–14)
AST SERPL-CCNC: 12 U/L — SIGNIFICANT CHANGE UP (ref 0–41)
BASOPHILS # BLD AUTO: 0.03 K/UL — SIGNIFICANT CHANGE UP (ref 0–0.2)
BASOPHILS NFR BLD AUTO: 0.5 % — SIGNIFICANT CHANGE UP (ref 0–1)
BILIRUB SERPL-MCNC: 0.2 MG/DL — SIGNIFICANT CHANGE UP (ref 0.2–1.2)
BUN SERPL-MCNC: 6 MG/DL — LOW (ref 10–20)
CALCIUM SERPL-MCNC: 9.9 MG/DL — SIGNIFICANT CHANGE UP (ref 8.4–10.5)
CHLORIDE SERPL-SCNC: 101 MMOL/L — SIGNIFICANT CHANGE UP (ref 98–110)
CHOLEST SERPL-MCNC: 161 MG/DL — SIGNIFICANT CHANGE UP
CO2 SERPL-SCNC: 27 MMOL/L — SIGNIFICANT CHANGE UP (ref 17–32)
CREAT SERPL-MCNC: 0.7 MG/DL — SIGNIFICANT CHANGE UP (ref 0.7–1.5)
EGFR: 112 ML/MIN/1.73M2 — SIGNIFICANT CHANGE UP
EOSINOPHIL # BLD AUTO: 0.18 K/UL — SIGNIFICANT CHANGE UP (ref 0–0.7)
EOSINOPHIL NFR BLD AUTO: 3.2 % — SIGNIFICANT CHANGE UP (ref 0–8)
GLUCOSE SERPL-MCNC: 97 MG/DL — SIGNIFICANT CHANGE UP (ref 70–99)
HCT VFR BLD CALC: 40 % — LOW (ref 42–52)
HDLC SERPL-MCNC: 27 MG/DL — LOW
HGB BLD-MCNC: 12.5 G/DL — LOW (ref 14–18)
IMM GRANULOCYTES NFR BLD AUTO: 0.2 % — SIGNIFICANT CHANGE UP (ref 0.1–0.3)
LIPID PNL WITH DIRECT LDL SERPL: 111 MG/DL — HIGH
LYMPHOCYTES # BLD AUTO: 2.76 K/UL — SIGNIFICANT CHANGE UP (ref 1.2–3.4)
LYMPHOCYTES # BLD AUTO: 49.4 % — SIGNIFICANT CHANGE UP (ref 20.5–51.1)
MAGNESIUM SERPL-MCNC: 1.6 MG/DL — LOW (ref 1.8–2.4)
MCHC RBC-ENTMCNC: 25.1 PG — LOW (ref 27–31)
MCHC RBC-ENTMCNC: 31.3 G/DL — LOW (ref 32–37)
MCV RBC AUTO: 80.2 FL — SIGNIFICANT CHANGE UP (ref 80–94)
MONOCYTES # BLD AUTO: 0.66 K/UL — HIGH (ref 0.1–0.6)
MONOCYTES NFR BLD AUTO: 11.8 % — HIGH (ref 1.7–9.3)
NEUTROPHILS # BLD AUTO: 1.95 K/UL — SIGNIFICANT CHANGE UP (ref 1.4–6.5)
NEUTROPHILS NFR BLD AUTO: 34.9 % — LOW (ref 42.2–75.2)
NON HDL CHOLESTEROL: 134 MG/DL — HIGH
NRBC # BLD: 0 /100 WBCS — SIGNIFICANT CHANGE UP (ref 0–0)
PLATELET # BLD AUTO: 291 K/UL — SIGNIFICANT CHANGE UP (ref 130–400)
POTASSIUM SERPL-MCNC: 4.5 MMOL/L — SIGNIFICANT CHANGE UP (ref 3.5–5)
POTASSIUM SERPL-SCNC: 4.5 MMOL/L — SIGNIFICANT CHANGE UP (ref 3.5–5)
PROT SERPL-MCNC: 7.8 G/DL — SIGNIFICANT CHANGE UP (ref 6–8)
RBC # BLD: 4.99 M/UL — SIGNIFICANT CHANGE UP (ref 4.7–6.1)
RBC # FLD: 15.9 % — HIGH (ref 11.5–14.5)
SODIUM SERPL-SCNC: 139 MMOL/L — SIGNIFICANT CHANGE UP (ref 135–146)
TRIGL SERPL-MCNC: 116 MG/DL — SIGNIFICANT CHANGE UP
VALPROATE SERPL-MCNC: 57 UG/ML — SIGNIFICANT CHANGE UP (ref 50–100)
WBC # BLD: 5.59 K/UL — SIGNIFICANT CHANGE UP (ref 4.8–10.8)
WBC # FLD AUTO: 5.59 K/UL — SIGNIFICANT CHANGE UP (ref 4.8–10.8)

## 2023-03-19 PROCEDURE — 99232 SBSQ HOSP IP/OBS MODERATE 35: CPT

## 2023-03-19 RX ADMIN — DIVALPROEX SODIUM 500 MILLIGRAM(S): 500 TABLET, DELAYED RELEASE ORAL at 08:33

## 2023-03-19 RX ADMIN — METFORMIN HYDROCHLORIDE 1000 MILLIGRAM(S): 850 TABLET ORAL at 20:02

## 2023-03-19 RX ADMIN — Medication 5 MILLIGRAM(S): at 20:03

## 2023-03-19 RX ADMIN — CLOZAPINE 100 MILLIGRAM(S): 150 TABLET, ORALLY DISINTEGRATING ORAL at 08:32

## 2023-03-19 RX ADMIN — Medication 25 MILLIGRAM(S): at 08:32

## 2023-03-19 RX ADMIN — Medication 2 MILLIGRAM(S): at 20:04

## 2023-03-19 RX ADMIN — Medication 25 MICROGRAM(S): at 08:32

## 2023-03-19 RX ADMIN — METFORMIN HYDROCHLORIDE 1000 MILLIGRAM(S): 850 TABLET ORAL at 08:32

## 2023-03-19 RX ADMIN — ATORVASTATIN CALCIUM 40 MILLIGRAM(S): 80 TABLET, FILM COATED ORAL at 20:03

## 2023-03-19 RX ADMIN — DIVALPROEX SODIUM 750 MILLIGRAM(S): 500 TABLET, DELAYED RELEASE ORAL at 20:02

## 2023-03-19 RX ADMIN — CLOZAPINE 100 MILLIGRAM(S): 150 TABLET, ORALLY DISINTEGRATING ORAL at 20:03

## 2023-03-19 NOTE — BH INPATIENT PSYCHIATRY PROGRESS NOTE - NSBHMETABOLIC_PSY_ALL_CORE_FT
BMI: BMI (kg/m2): 28 (03-18-23 @ 17:42)  HbA1c: A1C with Estimated Average Glucose Result: 6.5 % (04-24-22 @ 08:14)    Glucose: POCT Blood Glucose.: 103 mg/dL (03-06-23 @ 21:05)    BP: 116/74 (03-19-23 @ 08:25) (116/74 - 149/89)  Lipid Panel: Date/Time: 03-19-23 @ 07:55  Cholesterol, Serum: 161  Direct LDL: --  HDL Cholesterol, Serum: 27  Total Cholesterol/HDL Ration Measurement: --  Triglycerides, Serum: 116

## 2023-03-19 NOTE — BH INPATIENT PSYCHIATRY PROGRESS NOTE - NSBHCHARTREVIEWVS_PSY_A_CORE FT
Vital Signs Last 24 Hrs  T(C): 36.2 (03-19-23 @ 08:25), Max: 37.3 (03-18-23 @ 17:42)  T(F): 97.1 (03-19-23 @ 08:25), Max: 99.1 (03-18-23 @ 17:42)  HR: 97 (03-19-23 @ 08:25) (97 - 107)  BP: 116/74 (03-19-23 @ 08:25) (116/74 - 149/89)  BP(mean): --  RR: 18 (03-19-23 @ 08:25) (18 - 18)  SpO2: --

## 2023-03-19 NOTE — CONSULT NOTE ADULT - ASSESSMENT
#schizoaffective/ Bipolar - self injurious behavior/ suicide attempts in past  - medication management and treatment per psych team   - monitor qtc for change in meds   - non compliance with meds   - please check on tdap status and administer if not up to date.     #HTN - stable - cont current meds  #DMII - on metformin  -cont carb consistent diet  #hypothyroidism - on synthroid     recall prn

## 2023-03-19 NOTE — CONSULT NOTE ADULT - SUBJECTIVE AND OBJECTIVE BOX
HOSPITALIST CONSULT for IPP History and Physical     KEITHSUDHA  52y, Male  Allergy: fish (Unknown)  Haldol (Unknown)  Navane (Unknown)  penicillin (Unknown)  pork (Unknown)  Thorazine (Unknown)      CHIEF COMPLAINT:     HPI:    HPI:  The cami is a 52-year-old man, single, disabled, lives at [facility], with PMH of HTN, HLD, DM, hypothyroidism, with PPH of Schizoaffective Disorder Bipolar Type, multiple prior hospitalizations (most recently to [facility] in 4/2022), multiple suicide attempts, no substance use history, multiple ED visits in the past week for similar presentation, who was BIBEMS from home due to self harm.    The patient was seen holding his head in his hands and rocking, limited in following directions. He became agitated, started slamming his head with his palms, then walked towards the wall and started head banging. He was verbally redirectable by staff initially but upon continuation of interview, the patient stopped answering questions and began posturing aggressively towards staff in the room and the interview had to be cut short.    From what could be gathered by the patient, he states that he has not been taking his medications for the past 6 days but couldn't state for what reason. He states that when he was released earlier this morning from the ED, he went back home and went into the garbage area and took out a can and broke it apart and slit his wrist with the sharp edges. When asked why he did it, he was not able to elaborate. He appeared to be internally preoccupied and distracted. He was not able to tolerate the rest of interview.    Collateral from [facility] in separate note. (18 Mar 2023 02:17)    FAMILY HISTORY:    PAST MEDICAL & SURGICAL HISTORY:  DM (diabetes mellitus)      HTN (hypertension)      Schizophrenia      Substance use disorder      No significant past surgical history          SOCIAL HISTORY  Social History:      Home Medications:  allopurinol 100 mg oral tablet: 1 milligram(s) orally once a day (18 Mar 2023 17:24)  aspirin 81 mg oral tablet, chewable: 1 tab(s) orally once a day (18 Mar 2023 17:24)  atorvastatin 40 mg oral tablet: 40 milligram(s) orally once a day (18 Mar 2023 17:24)  esomeprazole 20 mg oral delayed release capsule: 1 cap(s) orally once a day (18 Mar 2023 17:24)  gabapentin 100 mg oral capsule: 1 cap(s) orally 3 times a day (18 Mar 2023 17:24)  hydrALAZINE 50 mg oral tablet: 50 milligram(s) orally 2 times a day (18 Mar 2023 17:24)  metoprolol succinate 50 mg oral tablet, extended release: 1 tab(s) orally once a day (18 Mar 2023 17:24)  oxyCODONE 5 mg oral tablet: 1 tab(s) orally every 6 hours, As Needed (18 Mar 2023 17:24)  PROzac 40 mg oral capsule: 1 cap(s) orally once a day (18 Mar 2023 17:24)  sodium bicarbonate 650 mg oral tablet: 650 milligram(s) orally 2 times a day (18 Mar 2023 17:24)  torsemide 20 mg oral tablet: 1 tab(s) orally once a day (18 Mar 2023 17:24)      ROS  General: Denies fevers, chills, nightsweats, weight loss  HEENT: Denies headache, rhinorrhea, sore throat, eye pain  CV: Denies CP, palpitations  PULM: Denies SOB, cough  GI: Denies abdominal pain, diarrhea  : Denies dysuria, hematuria  MSK: Denies arthralgias  SKIN: Denies rash   NEURO: Denies paresthesias, weakness  PSYCH: Denies depression    VITALS:  T(F): 97.1, Max: 99.1 (03-18-23 @ 17:42)  HR: 97  BP: 116/74  RR: 18Vital Signs Last 24 Hrs  T(C): 36.2 (19 Mar 2023 08:25), Max: 37.3 (18 Mar 2023 17:42)  T(F): 97.1 (19 Mar 2023 08:25), Max: 99.1 (18 Mar 2023 17:42)  HR: 97 (19 Mar 2023 08:25) (97 - 107)  BP: 116/74 (19 Mar 2023 08:25) (116/74 - 149/89)  BP(mean): --  RR: 18 (19 Mar 2023 08:25) (17 - 18)  SpO2: 97% (18 Mar 2023 14:17) (97% - 97%)    Parameters below as of 18 Mar 2023 14:17  Patient On (Oxygen Delivery Method): room air        PHYSICAL EXAM:  Gen: NAD, resting in bed  HEENT: Normocephalic, atraumatic  Neck: supple, no lymphadenopathy  CV: Regular rate & regular rhythm  Lungs: CTABL no wheeze  Abdomen: Soft, NTND+ BS present  Ext: Warm, well perfused no CCE  Neuro: non focal, awake, CN II-XII intact   Skin: no rash, no erythema  Psych: no SI, HI, Hallucination     TESTS & MEASUREMENTS:                        12.5   5.59  )-----------( 291      ( 19 Mar 2023 07:55 )             40.0     03-19    139  |  101  |  6<L>  ----------------------------<  97  4.5   |  27  |  0.7    Ca    9.9      19 Mar 2023 07:55  Mg     1.6     03-19    TPro  7.8  /  Alb  4.4  /  TBili  0.2  /  DBili  x   /  AST  12  /  ALT  8   /  AlkPhos  96  03-19      LIVER FUNCTIONS - ( 19 Mar 2023 07:55 )  Alb: 4.4 g/dL / Pro: 7.8 g/dL / ALK PHOS: 96 U/L / ALT: 8 U/L / AST: 12 U/L / GGT: x               COVID-19 PCR: NotDetec (17 Mar 2023 23:39)      QRS axis to [] ° and NSR at a rate of [] BPM. There was no atrial enlargement. There was no ventricular hypertrophy. There were no ST-T changes and all intervals were normal.      INFECTIOUS DISEASES TESTING      RADIOLOGY & ADDITIONAL TESTS:  I have personally reviewed the last Chest xray  CXR      CT      CARDIOLOGY TESTING  12 Lead ECG:   Ventricular Rate 108 BPM    Atrial Rate 108 BPM    P-R Interval 146 ms    QRS Duration 100 ms    Q-T Interval 342 ms    QTC Calculation(Bazett) 458 ms    P Axis 68 degrees    R Axis 11 degrees    T Axis 33 degrees    Diagnosis Line Sinus tachycardia    otherwise unremarkable    Confirmed by MARLEN MESSER, ESTEFANY ([id number]) on 3/19/2023 8:38:23 AM (03-18-23 @ 19:07)  12 Lead ECG:   Ventricular Rate 107 BPM    Atrial Rate 107 BPM    P-R Interval 144 ms    QRS Duration 98 ms    Q-T Interval 350 ms    QTC Calculation(Bazett) 467 ms    P Axis 71 degrees    R Axis -5 degrees    T Axis 34 degrees    Diagnosis Line Sinus tachycardia  Moderate voltage criteria for LVH, may be normal variant  Nonspecific T wave abnormality  Abnormal ECG    Confirmed by Ernst Ohara ([id number]) on 3/18/2023 8:48:59 AM (03-17-23 @ 21:58)      MEDICATIONS  (STANDING):  atorvastatin 40 milliGRAM(s) Oral at bedtime  bisacodyl 5 milliGRAM(s) Oral at bedtime  cloZAPine 100 milliGRAM(s) Oral once  cloZAPine 100 milliGRAM(s) Oral two times a day  divalproex  milliGRAM(s) Oral once  divalproex  milliGRAM(s) Oral daily  divalproex  milliGRAM(s) Oral at bedtime  levothyroxine 25 MICROGram(s) Oral daily  levothyroxine 25 MICROGram(s) Oral once  LORazepam     Tablet 2 milliGRAM(s) Oral at bedtime  metFORMIN 1000 milliGRAM(s) Oral two times a day  metFORMIN 1000 milliGRAM(s) Oral once  metoprolol succinate ER 25 milliGRAM(s) Oral daily  metoprolol succinate ER 25 milliGRAM(s) Oral once    MEDICATIONS  (PRN):  acetaminophen     Tablet .. 325 milliGRAM(s) Oral every 6 hours PRN Mild Pain (1 - 3), Moderate Pain (4 - 6)  LORazepam     Tablet 2 milliGRAM(s) Oral every 6 hours PRN Agitation  senna 1 Tablet(s) Oral daily PRN Constipation      ANTIBIOTICS:      All available historical data has been reviewed    ASSESSMENT  52y M admitted with Other specified personal risk factor not elsewhere classified        PROBLEMS

## 2023-03-19 NOTE — BH INPATIENT PSYCHIATRY PROGRESS NOTE - CURRENT MEDICATION
MEDICATIONS  (STANDING):  atorvastatin 40 milliGRAM(s) Oral at bedtime  bisacodyl 5 milliGRAM(s) Oral at bedtime  cloZAPine 100 milliGRAM(s) Oral two times a day  cloZAPine 100 milliGRAM(s) Oral once  divalproex  milliGRAM(s) Oral daily  divalproex  milliGRAM(s) Oral at bedtime  divalproex  milliGRAM(s) Oral once  levothyroxine 25 MICROGram(s) Oral daily  levothyroxine 25 MICROGram(s) Oral once  LORazepam     Tablet 2 milliGRAM(s) Oral at bedtime  metFORMIN 1000 milliGRAM(s) Oral two times a day  metFORMIN 1000 milliGRAM(s) Oral once  metoprolol succinate ER 25 milliGRAM(s) Oral daily  metoprolol succinate ER 25 milliGRAM(s) Oral once    MEDICATIONS  (PRN):  acetaminophen     Tablet .. 325 milliGRAM(s) Oral every 6 hours PRN Mild Pain (1 - 3), Moderate Pain (4 - 6)  LORazepam     Tablet 2 milliGRAM(s) Oral every 6 hours PRN Agitation  senna 1 Tablet(s) Oral daily PRN Constipation

## 2023-03-19 NOTE — BH INPATIENT PSYCHIATRY PROGRESS NOTE - PRN MEDS
MEDICATIONS  (PRN):  acetaminophen     Tablet .. 325 milliGRAM(s) Oral every 6 hours PRN Mild Pain (1 - 3), Moderate Pain (4 - 6)  LORazepam     Tablet 2 milliGRAM(s) Oral every 6 hours PRN Agitation  senna 1 Tablet(s) Oral daily PRN Constipation

## 2023-03-19 NOTE — BH INPATIENT PSYCHIATRY PROGRESS NOTE - NSBHASSESSSUMMFT_PSY_ALL_CORE
Mr Levi ia a 52 year old man with a history of schizoaffective disorder bipolar type who presented to the ED  for the evaluation of worsening depression and suicidal ideations.  Patient had presented to the ED yesterday for similar reasons.   Patient seems to have significant depressed mood , had very poor coping skills. It is not likely that he has suicidal ideations however it seems that he is having significant distress in the context of his worsening paranoia and feeling unsafe in his residence . It is unclear if the debt that he is reporting is based on reality or is a delusion.

## 2023-03-20 LAB
A1C WITH ESTIMATED AVERAGE GLUCOSE RESULT: 6.5 % — HIGH (ref 4–5.6)
CLOZAPINE SERPL-MCNC: 109 NG/ML — LOW (ref 350–600)
ESTIMATED AVERAGE GLUCOSE: 140 MG/DL — HIGH (ref 68–114)
TSH SERPL-MCNC: 1.77 UIU/ML — SIGNIFICANT CHANGE UP (ref 0.27–4.2)

## 2023-03-20 PROCEDURE — 99232 SBSQ HOSP IP/OBS MODERATE 35: CPT

## 2023-03-20 RX ADMIN — Medication 2 MILLIGRAM(S): at 20:08

## 2023-03-20 RX ADMIN — DIVALPROEX SODIUM 500 MILLIGRAM(S): 500 TABLET, DELAYED RELEASE ORAL at 08:00

## 2023-03-20 RX ADMIN — CLOZAPINE 100 MILLIGRAM(S): 150 TABLET, ORALLY DISINTEGRATING ORAL at 20:01

## 2023-03-20 RX ADMIN — Medication 25 MICROGRAM(S): at 08:00

## 2023-03-20 RX ADMIN — ATORVASTATIN CALCIUM 40 MILLIGRAM(S): 80 TABLET, FILM COATED ORAL at 20:01

## 2023-03-20 RX ADMIN — METFORMIN HYDROCHLORIDE 1000 MILLIGRAM(S): 850 TABLET ORAL at 08:01

## 2023-03-20 RX ADMIN — METFORMIN HYDROCHLORIDE 1000 MILLIGRAM(S): 850 TABLET ORAL at 20:02

## 2023-03-20 RX ADMIN — DIVALPROEX SODIUM 750 MILLIGRAM(S): 500 TABLET, DELAYED RELEASE ORAL at 20:02

## 2023-03-20 RX ADMIN — Medication 5 MILLIGRAM(S): at 20:01

## 2023-03-20 RX ADMIN — Medication 25 MILLIGRAM(S): at 08:01

## 2023-03-20 RX ADMIN — CLOZAPINE 100 MILLIGRAM(S): 150 TABLET, ORALLY DISINTEGRATING ORAL at 08:00

## 2023-03-20 NOTE — BH INPATIENT PSYCHIATRY ASSESSMENT NOTE - CURRENT MEDICATION
MEDICATIONS  (STANDING):  atorvastatin 40 milliGRAM(s) Oral at bedtime  bisacodyl 5 milliGRAM(s) Oral at bedtime  cloZAPine 100 milliGRAM(s) Oral two times a day  divalproex  milliGRAM(s) Oral daily  divalproex  milliGRAM(s) Oral at bedtime  levothyroxine 25 MICROGram(s) Oral daily  LORazepam     Tablet 2 milliGRAM(s) Oral at bedtime  metFORMIN 1000 milliGRAM(s) Oral two times a day  metoprolol succinate ER 25 milliGRAM(s) Oral daily    MEDICATIONS  (PRN):  acetaminophen     Tablet .. 325 milliGRAM(s) Oral every 6 hours PRN Mild Pain (1 - 3), Moderate Pain (4 - 6)  LORazepam     Tablet 2 milliGRAM(s) Oral every 6 hours PRN Agitation  senna 1 Tablet(s) Oral daily PRN Constipation

## 2023-03-20 NOTE — BH INPATIENT PSYCHIATRY ASSESSMENT NOTE - HPI (INCLUDE ILLNESS QUALITY, SEVERITY, DURATION, TIMING, CONTEXT, MODIFYING FACTORS, ASSOCIATED SIGNS AND SYMPTOMS)
Juma jennings a 51 y/o male, domiciled at [facility], disabled, single, no children, PMH of HTN, HLD, DM, hypothyroidism, past psychiatric history of schizoaffective disorder bipolar type, multiple IPP admissions (last confirmed admission was at [facility] [place] in April 2022), per chart hx of multiple suicide attempts, no past substance use history, presents to the ED BIBEMS after handing a note to staff at [facility] stating he wants God to take him following a dispute with an individual at patient's once weekly day program at [facility].     Upon initial assessment in ED 3/18, patient was seen holding his head in his hands and rocking, limited in following directions. He became agitated, started slamming his head with his palms, then walked towards the wall and started head banging. He was verbally redirectable by staff initially but upon continuation of interview, the patient stopped answering questions and began posturing aggressively towards staff in the room. Later that same day, patient was tearful during the exam, appearing scared. Patient confirms he has fear returning to his residence at [place] because he cannot find the man named Cyrus to whom he owes money. States he feels depressed and fearful. Affirms self-harming since being in the ED yesterday. States he does not feel like his medications are working.     Collateral obtained on 3/20/23 from patient's residence at [facility], director Mala Beverly [phone]. Patient was reportedly sent to ED because a handwritten note was found saying patient wanted God to kill him. Later that same day 3/17, patient was found to cut his wrist using a soda can. A day prior, 3/16, patient was at [facility] and was reported to be aggressive and agitated. He was sent to [facility] but was discharged. Earlier on 3/7, patient was reported to have suicidal ideation and was banging his head on the wall.     Patient's reported updated medications are as follows confirmed by medical director at [facility] and [facility]:  - Lipitor 40mg daily  - Dulcolax 5mg qhs  - Clozaril 500mg daily (100mg 9am, 400mg qhs)  - Depakote 250mg qhs  - Depakote 500mg BID (1pm and 9pm)  - colace 300mg at 9am  - synthroid 25mcg daily  - ativan 2mg qhs  - metformin 1000mg BID  - lopressor 25mg AM  - invega sustenna 156 mg monthly (last given March 10th 2023, next due April 6th 2023) Juma Levi is a 51 y/o male, domiciled at [facility], disabled, single, no children, PMH of HTN, HLD, DM, hypothyroidism, past psychiatric history of schizoaffective disorder bipolar type, multiple IPP admissions (last confirmed admission was at [facility] in April 2022), per chart hx of multiple suicide attempts, no past substance use history, presents to the ED BIBEMS after handing a note to staff at [facility] stating he wants God to take him following a dispute with an individual at patient's once weekly day program at [facility].     Upon initial assessment in ED 3/18, patient was seen holding his head in his hands and rocking, limited in following directions. He became agitated, started slamming his head with his palms, then walked towards the wall and started head banging. He was verbally redirectable by staff initially but upon continuation of interview, the patient stopped answering questions and began posturing aggressively towards staff in the room. Later that same day, patient was tearful during the exam, appearing scared. Patient confirms he has fear returning to his residence at [place] because he cannot find the man named Cyrus to whom he owes money. States he feels depressed and fearful. Affirms self-harming since being in the ED yesterday. States he does not feel like his medications are working.     Collateral obtained on 3/20/23 from patient's residence at [facility], director Mlaa Beverly [phone]. Patient was reportedly sent to ED because a handwritten note was found saying patient wanted God to kill him. Later that same day 3/17, patient was found to cut his wrist using a soda can. A day prior, 3/16, patient was at [facility] and was reported to be aggressive and agitated. He was sent to [facility] but was discharged. Earlier on 3/7, patient was reported to have suicidal ideation and was banging his head on the wall.     Patient's reported updated medications are as follows confirmed by medical director at [facility] and [facility]:  - Lipitor 40mg daily  - Dulcolax 5mg qhs  - Clozaril 500mg daily (100mg 9am, 400mg qhs)  - Depakote 250mg qhs  - Depakote 500mg BID (1pm and 9pm)  - colace 300mg at 9am  - synthroid 25mcg daily  - ativan 2mg qhs  - metformin 1000mg BID  - lopressor 25mg AM  - invega sustenna 156 mg monthly (last given March 10th 2023, next due April 6th 2023)

## 2023-03-20 NOTE — BH INPATIENT PSYCHIATRY ASSESSMENT NOTE - NSBHCHARTREVIEWINVESTIGATE_PSY_A_CORE FT
Ventricular Rate 108 BPM    Atrial Rate 108 BPM    P-R Interval 146 ms    QRS Duration 100 ms    Q-T Interval 342 ms    QTC Calculation(Bazett) 458 ms    P Axis 68 degrees    R Axis 11 degrees    T Axis 33 degrees    Diagnosis Line Sinus tachycardia    otherwise unremarkable    Confirmed by MARLEN MESSER, ESTEFANY ([id number]) on 3/19/2023 8:38:23 AM

## 2023-03-20 NOTE — BH INPATIENT PSYCHIATRY ASSESSMENT NOTE - NSBHASSESSSUMMFT_PSY_ALL_CORE
Juma jennings a 51 y/o male, domiciled at [facility], disabled, single, no children, PMH of HTN, HLD, T2DM, hypothyroidism, past psychiatric history of schizoaffective disorder bipolar type, multiple IPP admissions (last confirmed admission was at [facility] in April 2022), per chart hx of multiple suicide attempts, no past substance use history, presents to the ED BIBEMS after handing a note to staff at [facility] stating he wants God to take him following a dispute with an individual at patient's once weekly day program at [facility].     On approach, patient reports he is thinking a lot more clearly due to the medications he was given. He reports he was in a "panic" while he was at [facility], and was having "bad thoughts" which were distressing to him and telling him he made mistakes and was "no good." He reports to normally hear voices, but stopped taking his medications because he felt they weren't working. Patient eagerly asks about workshops on the unit and says "when you're not busy, the mind is a devil's workshop." Patient reports he is eating well, denies SI, HI and AH, reports feeling safe on the unit.     #Schizoaffective disorder, bipolar type  - patiently currently on clozaril 100mg BID; home dose is Clozaril 500mg daily (100mg 9am, 400mg qhs)  - c/w home Depakote 500mg daily with additional Depakote 750mg qhs  - c/w Dulcolax 5mg qhs for constipation (home colace 300mg at 9am replaced with PRN senna)  - c/w ativan 2mg qhs (home medication)  - c/w invega sustenna 156 mg monthly (last given March 10th 2023, next due April 6th 2023)    #HTN  - c/w home lopressor 25mg AM    #HLD  - Lipitor 40mg daily    #Hypothyroidism  - c/w home synthroid 25mcg daily    #T2DM  - c/w home metformin 1000mg BID Juma jennings a 53 y/o male, domiciled at [facility], disabled, single, no children, PMH of HTN, HLD, T2DM, hypothyroidism, past psychiatric history of schizoaffective disorder bipolar type, multiple IPP admissions (last confirmed admission was at [facility] in April 2022), per chart hx of multiple suicide attempts, no past substance use history, presents to the ED BIBEMS after handing a note to staff at [facility] stating he wants God to take him following a dispute with an individual at patient's once weekly day program at [facility].     On approach, patient reports he is thinking a lot more clearly due to the medications he was given. He reports he was in a "panic" while he was at [facility], and was having "bad thoughts" which were distressing to him and telling him he made mistakes and was "no good." He reports to normally hear voices, but stopped taking his medications because he felt they weren't working. Patient eagerly asks about workshops on the unit and says "when you're not busy, the mind is a devil's workshop." Patient reports he is eating well, denies SI, HI and AH, reports feeling safe on the unit.     #Schizoaffective disorder, bipolar type  - patiently currently on clozaril 100mg BID; home dose is Clozaril 500mg daily (100mg 9am, 400mg qhs)-- clozaril level 109 on 3/19  - c/w home Depakote 500mg daily with additional Depakote 750mg qhs  - c/w Dulcolax 5mg qhs for constipation (home colace 300mg at 9am replaced with PRN senna)  - c/w ativan 2mg qhs (home medication)  - c/w invega sustenna 156 mg monthly (last given March 10th 2023, next due April 6th 2023)    #HTN  - c/w home lopressor 25mg AM    #HLD  - Lipitor 40mg daily    #Hypothyroidism  - c/w home synthroid 25mcg daily    #T2DM  - c/w home metformin 1000mg BID Juma Levi is a 51 y/o male, domiciled at [facility], disabled, single, no children, PMH of HTN, HLD, T2DM, hypothyroidism, past psychiatric history of schizoaffective disorder bipolar type, multiple IPP admissions (last confirmed admission was at [facility] in April 2022), per chart hx of multiple suicide attempts, no past substance use history, presents to the ED BIBEMS after handing a note to staff at [facility] stating he wants God to take him following a dispute with an individual at patient's once weekly day program at [facility].     On approach, patient reports he is thinking a lot more clearly due to the medications he was given. He reports he was in a "panic" while he was at [facility], and was having "bad thoughts" which were distressing to him and telling him he made mistakes and was "no good." He reports to normally hear voices, but stopped taking his medications because he felt they weren't working. Patient eagerly asks about workshops on the unit and says "when you're not busy, the mind is a devil's workshop." Patient reports he is eating well, denies SI, HI and AH, reports feeling safe on the unit.     #Schizoaffective disorder, bipolar type  - patiently currently on clozaril 100mg BID; home dose is Clozaril 500mg daily (100mg 9am, 400mg qhs)-- clozaril level 109 on 3/19  - c/w home Depakote 500mg daily with additional Depakote 750mg qhs  - c/w Dulcolax 5mg qhs for constipation (home colace 300mg at 9am replaced with PRN senna)  - c/w ativan 2mg qhs (home medication)  - c/w invega sustenna 156 mg monthly (last given March 10th 2023, next due April 6th 2023)    #HTN  - c/w home lopressor 25mg AM    #HLD  - Lipitor 40mg daily    #Hypothyroidism  - c/w home synthroid 25mcg daily    #T2DM  - c/w home metformin 1000mg BID Juma Levi is a 51 y/o male, domiciled at [facility], disabled, single, no children, PMH of HTN, HLD, T2DM, hypothyroidism, past psychiatric history of schizoaffective disorder bipolar type, multiple IPP admissions (last confirmed admission was at [facility] in April 2022), per chart hx of multiple suicide attempts, no past substance use history, presents to the ED BIBEMS after handing a note to staff at [facility] stating he wants God to take him following a dispute with an individual at patient's once weekly day program at [facility].     On approach, patient reports he is thinking a lot more clearly due to the medications he was given. He reports he was in a "panic" while he was at [facility], and was having "bad thoughts" which were distressing to him and telling him he made mistakes and was "no good." He reports to normally hear voices, but stopped taking his medications because he felt they weren't working. Patient eagerly asks about workshops on the unit and says "when you're not busy, the mind is a devil's workshop." Patient reports he is eating well, denies SI, HI and AH, reports feeling safe on the unit.     #Schizoaffective disorder, bipolar type  - patiently currently on clozaril 100mg BID; home dose is Clozaril 500mg daily (100mg 9am, 400mg qhs)  - clozaril level 109 on 3/19; ANC 1950 on 3/19-- repeat CBC ordered 3/26  - c/w home Depakote 500mg daily with additional Depakote 750mg qhs  - c/w Dulcolax 5mg qhs for constipation (home colace 300mg at 9am replaced with PRN senna)  - c/w ativan 2mg qhs (home medication)  - c/w invega sustenna 156 mg monthly (last given March 10th 2023, next due April 6th 2023)    #HTN  - c/w home lopressor 25mg AM    #HLD  - Lipitor 40mg daily    #Hypothyroidism  - c/w home synthroid 25mcg daily    #T2DM  - c/w home metformin 1000mg BID Juma Levi is a 53 y/o male, domiciled at [facility], disabled, single, no children, PMH of HTN, HLD, T2DM, hypothyroidism, past psychiatric history of schizoaffective disorder bipolar type, multiple IPP admissions (last confirmed admission was at [facility] in April 2022), per chart hx of multiple suicide attempts, no past substance use history, presents to the ED BIBEMS after handing a note to staff at [facility] stating he wants God to take him following a dispute with an individual at patient's once weekly day program at [facility].     On approach, patient reports he is thinking a lot more clearly due to the medications he was given. He reports he was in a "panic" while he was at [facility], and was having "bad thoughts" which were distressing to him and telling him he made mistakes and was "no good." He reports to normally hear voices, but stopped taking his medications because he felt they weren't working. Patient eagerly asks about workshops on the unit and says "when you're not busy, the mind is a devil's workshop." Patient reports he is eating well, denies SI, HI and AH, reports feeling safe on the unit.     #Schizoaffective disorder, bipolar type  - patiently currently on clozaril 100mg BID; home dose is Clozaril 500mg daily (100mg 9am, 400mg qhs)-- plan to increase to 150mg qhs starting 3/22  - clozaril level 109 on 3/19; ANC 1950 on 3/19-- repeat CBC and clozaril level ordered 3/26  - c/w home Depakote 500mg daily with additional Depakote 750mg qhs; depakote level 57 on 3/19  - c/w Dulcolax 5mg qhs for constipation (home colace 300mg at 9am replaced with PRN senna)  - c/w ativan 2mg qhs (home medication)  - c/w invega sustenna 156 mg monthly (last given March 10th 2023, next due April 6th 2023)    #HTN  - c/w home lopressor 25mg AM    #HLD  - Lipitor 40mg daily    #Hypothyroidism  - c/w home synthroid 25mcg daily    #T2DM  - c/w home metformin 1000mg BID

## 2023-03-20 NOTE — BH INPATIENT PSYCHIATRY ASSESSMENT NOTE - VIOLENCE RISK FACTORS:
Feeling of being under threat and being unable to control threat/Command hallucinations for violent behavior

## 2023-03-20 NOTE — BH INPATIENT PSYCHIATRY ASSESSMENT NOTE - OTHER PAST PSYCHIATRIC HISTORY (INCLUDE DETAILS REGARDING ONSET, COURSE OF ILLNESS, INPATIENT/OUTPATIENT TREATMENT)
Diagnoses: Schizoaffective Disorder, Bipolar Type     Inpatient: multiple IPP admissions (last confirmed admission was at [facility] in April 2022)     Outpatient: Dr. Pearson.

## 2023-03-20 NOTE — BH INPATIENT PSYCHIATRY ASSESSMENT NOTE - NSSUICPROTFACT_PSY_ALL_CORE
Your recent PSA is undetectable and testosterone level is normal.  Okay to use Cialis as prescribed and follow-up in the office in 6 to 12 months. Should have both a EVA and PSA on a yearly basis.     Sincerely,  Adali Lugo MD Identifies reasons for living

## 2023-03-20 NOTE — BH INPATIENT PSYCHIATRY ASSESSMENT NOTE - NSBHMETABOLIC_PSY_ALL_CORE_FT
BMI: BMI (kg/m2): 28 (03-18-23 @ 17:42)  HbA1c: A1C with Estimated Average Glucose Result: 6.5 % (03-19-23 @ 07:55)    Glucose: POCT Blood Glucose.: 103 mg/dL (03-06-23 @ 21:05)    BP: 123/89 (03-20-23 @ 08:58) (116/74 - 149/89)  Lipid Panel: Date/Time: 03-19-23 @ 07:55  Cholesterol, Serum: 161  Direct LDL: --  HDL Cholesterol, Serum: 27  Total Cholesterol/HDL Ration Measurement: --  Triglycerides, Serum: 116   BMI: BMI (kg/m2): 28 (03-18-23 @ 17:42)  HbA1c: A1C with Estimated Average Glucose Result: 6.5 % (03-19-23 @ 07:55)    Glucose: POCT Blood Glucose.: 103 mg/dL (03-06-23 @ 21:05)    BP: 127/74 (03-20-23 @ 16:05) (116/74 - 149/89)  Lipid Panel: Date/Time: 03-19-23 @ 07:55  Cholesterol, Serum: 161  Direct LDL: --  HDL Cholesterol, Serum: 27  Total Cholesterol/HDL Ration Measurement: --  Triglycerides, Serum: 116

## 2023-03-20 NOTE — BH INPATIENT PSYCHIATRY ASSESSMENT NOTE - NSBHCHARTREVIEWLAB_PSY_A_CORE FT
03-19    139  |  101  |  6<L>  ----------------------------<  97  4.5   |  27  |  0.7    Ca    9.9      19 Mar 2023 07:55  Mg     1.6     03-19    TPro  7.8  /  Alb  4.4  /  TBili  0.2  /  DBili  x   /  AST  12  /  ALT  8   /  AlkPhos  96  03-19                         12.5   5.59  )-----------( 291      ( 19 Mar 2023 07:55 )             40.0

## 2023-03-20 NOTE — BH INPATIENT PSYCHIATRY ASSESSMENT NOTE - NSBHCHARTREVIEWVS_PSY_A_CORE FT
Vital Signs Last 24 Hrs  T(C): 36.4 (03-20-23 @ 08:58), Max: 36.4 (03-20-23 @ 08:58)  T(F): 97.5 (03-20-23 @ 08:58), Max: 97.5 (03-20-23 @ 08:58)  HR: 117 (03-20-23 @ 08:58) (117 - 117)  BP: 123/89 (03-20-23 @ 08:58) (123/89 - 123/89)  BP(mean): --  RR: 16 (03-20-23 @ 08:58) (16 - 16)  SpO2: --     Vital Signs Last 24 Hrs  T(C): 36.4 (03-20-23 @ 16:05), Max: 36.4 (03-20-23 @ 08:58)  T(F): 97.6 (03-20-23 @ 16:05), Max: 97.6 (03-20-23 @ 16:05)  HR: 116 (03-20-23 @ 16:05) (116 - 117)  BP: 127/74 (03-20-23 @ 16:05) (123/89 - 127/74)  BP(mean): --  RR: 16 (03-20-23 @ 16:05) (16 - 16)  SpO2: --

## 2023-03-20 NOTE — BH INPATIENT PSYCHIATRY ASSESSMENT NOTE - RISK ASSESSMENT
Risk factors: previous reported suicide attempts, current or past psychiatric diagnoses, possible medication non-compliance  Protective factors: residential stability, identifies reasons for living, help-seeking behaviors, Judaism beliefs against suicide

## 2023-03-20 NOTE — UM REPORT PROGRESS NOTE - NSUMRMPROVIDER_GEN_A_CORE FT
3/20/2023 Karissa Dennison [place]  ID#[id number]  [place]  [phone]    3/20- Faxed clinicals to [phone]. Auth# [id number]. Received a call back from JENNIFER Christensen ([phone]). Approved 6 days from 3/18-3/23. Review on 3/23. Leave clinicals on [facility]. 3/20/2023 Karissa Dennison [place]  ID#[id number]  [place]  [phone]    3/20- Faxed clinicals to [phone]. Auth# [id number]. Received a call back from JENNIFER Christensen ([phone]). Approved 6 days from 3/18-3/23. Review on 3/23. Leave clinicals on [facility] vm. 3/20/2023 Karissa Levi  ID#[id number]  [place]  [phone]    3/20- Faxed clinicals to [phone]. Auth# [id number]. Received a call back from JENNIFER Christensen ([phone]). Approved 6 days from 3/18-3/23. Review on 3/23. Leave clinicals on [facility] .  3/23/23 10:40am Karissa - left updated clinicals on [facility] voicemail [phone] - waiting for determination 3/20/2023 Karissa Levi  ID#[id number]  [place]  [phone]    3/20- Faxed clinicals to [phone]. Auth# [id number]. Received a call back from JENNIFER Christensen ([phone]). Approved 6 days from 3/18-3/23. Review on 3/23. Leave clinicals on [facility] .  3/23/23 10:40am Karissa - left updated clinicals on [facility] voicemail [phone] - waiting for determination  3/23/23 - Karissa - received call from Dev - patient approved for additional 7 days - 3/24-3/30 LCD 3/30 with review that day  3/20/2023 Karissa Dennison [place]  ID#[id number]  [place]  [phone]    3/20- Faxed clinicals to [phone]. [facility]# [id number]. Received a call back from JENNIFER Christensen ([phone]). Approved 6 days from 3/18-3/23. Review on 3/23. [facility]  3/23/23 10:40am [phone] - waiting for determination    3/23/23 - Karissa - received call from Dev - patient approved for additional 7 days - 3/24-3/30 LCD 3/30 with review that day     3/30- Left updated clinicals on [facility] ([phone])   3/20/2023 Karissajustin Dennison [place]  ID#[id number]  [place]  [phone]    3/20- Faxed clinicals to [phone]. Auth# [id number]. Received a call back from JENNIFER Christensen ([phone]). Approved 6 days from 3/18-3/23. Review on 3/23. Leave clinicals on [facility] .  3/23/23 10:40am Karissa - left updated clinicals on [facility] voicemail [phone] - waiting for determination    3/23/23 - Karissa - received call from Dev - patient approved for additional 7 days - 3/24-3/30 LCD 3/30 with review that day     3/30- Kathi approved 7 additional days from 3/31-4/6 review on 4/6 with Kathi ([phone]).

## 2023-03-20 NOTE — BH INPATIENT PSYCHIATRY ASSESSMENT NOTE - NSDCCRITERIA_PSY_ALL_CORE
- No longer danger to self or others  - Compliant with medications  - Proper adjustments to psychotropic medications made

## 2023-03-21 PROCEDURE — 99231 SBSQ HOSP IP/OBS SF/LOW 25: CPT

## 2023-03-21 RX ORDER — CLOZAPINE 150 MG/1
50 TABLET, ORALLY DISINTEGRATING ORAL AT BEDTIME
Refills: 0 | Status: DISCONTINUED | OUTPATIENT
Start: 2023-03-22 | End: 2023-03-24

## 2023-03-21 RX ADMIN — Medication 25 MILLIGRAM(S): at 08:16

## 2023-03-21 RX ADMIN — METFORMIN HYDROCHLORIDE 1000 MILLIGRAM(S): 850 TABLET ORAL at 08:16

## 2023-03-21 RX ADMIN — Medication 5 MILLIGRAM(S): at 20:08

## 2023-03-21 RX ADMIN — DIVALPROEX SODIUM 750 MILLIGRAM(S): 500 TABLET, DELAYED RELEASE ORAL at 20:08

## 2023-03-21 RX ADMIN — ATORVASTATIN CALCIUM 40 MILLIGRAM(S): 80 TABLET, FILM COATED ORAL at 20:08

## 2023-03-21 RX ADMIN — METFORMIN HYDROCHLORIDE 1000 MILLIGRAM(S): 850 TABLET ORAL at 20:08

## 2023-03-21 RX ADMIN — Medication 2 MILLIGRAM(S): at 20:10

## 2023-03-21 RX ADMIN — CLOZAPINE 100 MILLIGRAM(S): 150 TABLET, ORALLY DISINTEGRATING ORAL at 20:09

## 2023-03-21 RX ADMIN — CLOZAPINE 100 MILLIGRAM(S): 150 TABLET, ORALLY DISINTEGRATING ORAL at 08:15

## 2023-03-21 RX ADMIN — DIVALPROEX SODIUM 500 MILLIGRAM(S): 500 TABLET, DELAYED RELEASE ORAL at 08:16

## 2023-03-21 NOTE — BH INPATIENT PSYCHIATRY PROGRESS NOTE - NSBHFUPINTERVALHXFT_PSY_A_CORE
Patient was seen and evaluated this morning. Chart was reviewed and no acute events were noted. No PRN medications were administered overnight. Patient was seen walking around the unit prior to rounds. Upon approach, patient is sitting in bed comfortably. Patient is alert and oriented and engages with interviewer. Patient is cooperative and answers all questions appropriately. Patient reports feeling much better, eating better, and sleeping better. Patient denies any medication side effects. Patient clarifies that at [facility], he had thoughts he wanted to burn a mattress but did not act on this and did not tell anyone at the time that he wanted to do so. Patient denies any current active suicidal ideation, intent or plan and denies any homicidal ideation. Patient denies any persecutory delusions and denies any auditory or visual hallucinations.

## 2023-03-21 NOTE — BH INPATIENT PSYCHIATRY PROGRESS NOTE - NSBHCHARTREVIEWVS_PSY_A_CORE FT
Vital Signs Last 24 Hrs  T(C): 36.7 (03-21-23 @ 09:59), Max: 36.8 (03-20-23 @ 19:54)  T(F): 98.1 (03-21-23 @ 09:59), Max: 98.2 (03-20-23 @ 19:54)  HR: 117 (03-21-23 @ 09:59) (115 - 136)  BP: 131/75 (03-21-23 @ 09:59) (127/74 - 136/91)  BP(mean): --  RR: 18 (03-21-23 @ 09:59) (16 - 18)  SpO2: 98% (03-21-23 @ 09:59) (98% - 99%)

## 2023-03-21 NOTE — BH INPATIENT PSYCHIATRY PROGRESS NOTE - NSBHMETABOLIC_PSY_ALL_CORE_FT
BMI: BMI (kg/m2): 28 (03-18-23 @ 17:42)  HbA1c: A1C with Estimated Average Glucose Result: 6.5 % (03-19-23 @ 07:55)    Glucose: POCT Blood Glucose.: 103 mg/dL (03-06-23 @ 21:05)    BP: 131/75 (03-21-23 @ 09:59) (116/74 - 149/89)  Lipid Panel: Date/Time: 03-19-23 @ 07:55  Cholesterol, Serum: 161  Direct LDL: --  HDL Cholesterol, Serum: 27  Total Cholesterol/HDL Ration Measurement: --  Triglycerides, Serum: 116

## 2023-03-21 NOTE — BH INPATIENT PSYCHIATRY PROGRESS NOTE - NSBHASSESSSUMMFT_PSY_ALL_CORE
Juma Levi is a 51 y/o male, domiciled at [facility], disabled, single, no children, PMH of HTN, HLD, T2DM, hypothyroidism, past psychiatric history of schizoaffective disorder bipolar type, multiple IPP admissions (last confirmed admission was at [facility] in April 2022), per chart hx of multiple suicide attempts, no past substance use history, presents to the ED BIBEMS after handing a note to staff at [facility] stating he wants God to take him following a dispute with an individual at patient's once weekly day program at [facility].     #Schizoaffective disorder, bipolar type  - patiently currently on clozaril 100mg BID; home dose is Clozaril 500mg daily (100mg 9am, 400mg qhs)-- plan to increase to 150mg qhs starting 3/22  - clozaril level 109 on 3/19; ANC 1950 on 3/19-- repeat CBC and clozaril level ordered 3/26  - c/w home Depakote 500mg daily with additional Depakote 750mg qhs; depakote level 57 on 3/19  - c/w Dulcolax 5mg qhs for constipation (home colace 300mg at 9am replaced with PRN senna)  - c/w ativan 2mg qhs (home medication)  - c/w invega sustenna 156 mg monthly (last given March 10th 2023, next due April 6th 2023)    #HTN  - c/w home lopressor 25mg AM    #HLD  - Lipitor 40mg daily    #Hypothyroidism  - c/w home synthroid 25mcg daily    #T2DM  - c/w home metformin 1000mg BID    #Provider Handoff  - plan to increase clozaril bedtime dose to 150mg on 3/22-- slowly titrate back to 400mg qhs (home dose)  - repeat CBC and clozaril level ordered 3/26

## 2023-03-22 PROCEDURE — 99231 SBSQ HOSP IP/OBS SF/LOW 25: CPT

## 2023-03-22 PROCEDURE — 70450 CT HEAD/BRAIN W/O DYE: CPT | Mod: 26

## 2023-03-22 RX ADMIN — Medication 25 MILLIGRAM(S): at 09:16

## 2023-03-22 RX ADMIN — Medication 2 MILLIGRAM(S): at 20:35

## 2023-03-22 RX ADMIN — METFORMIN HYDROCHLORIDE 1000 MILLIGRAM(S): 850 TABLET ORAL at 09:16

## 2023-03-22 RX ADMIN — DIVALPROEX SODIUM 750 MILLIGRAM(S): 500 TABLET, DELAYED RELEASE ORAL at 19:40

## 2023-03-22 RX ADMIN — Medication 5 MILLIGRAM(S): at 19:41

## 2023-03-22 RX ADMIN — Medication 2 MILLIGRAM(S): at 19:41

## 2023-03-22 RX ADMIN — CLOZAPINE 100 MILLIGRAM(S): 150 TABLET, ORALLY DISINTEGRATING ORAL at 09:16

## 2023-03-22 RX ADMIN — CLOZAPINE 50 MILLIGRAM(S): 150 TABLET, ORALLY DISINTEGRATING ORAL at 19:42

## 2023-03-22 RX ADMIN — CLOZAPINE 100 MILLIGRAM(S): 150 TABLET, ORALLY DISINTEGRATING ORAL at 19:41

## 2023-03-22 RX ADMIN — ATORVASTATIN CALCIUM 40 MILLIGRAM(S): 80 TABLET, FILM COATED ORAL at 19:41

## 2023-03-22 RX ADMIN — METFORMIN HYDROCHLORIDE 1000 MILLIGRAM(S): 850 TABLET ORAL at 19:42

## 2023-03-22 RX ADMIN — DIVALPROEX SODIUM 500 MILLIGRAM(S): 500 TABLET, DELAYED RELEASE ORAL at 09:16

## 2023-03-22 RX ADMIN — Medication 25 MICROGRAM(S): at 06:24

## 2023-03-22 NOTE — BH INPATIENT PSYCHIATRY PROGRESS NOTE - NSBHMETABOLIC_PSY_ALL_CORE_FT
BMI: BMI (kg/m2): 28 (03-18-23 @ 17:42)  HbA1c: A1C with Estimated Average Glucose Result: 6.5 % (03-19-23 @ 07:55)    Glucose: POCT Blood Glucose.: 103 mg/dL (03-06-23 @ 21:05)    BP: 117/75 (03-22-23 @ 06:07) (117/75 - 136/91)  Lipid Panel: Date/Time: 03-19-23 @ 07:55  Cholesterol, Serum: 161  Direct LDL: --  HDL Cholesterol, Serum: 27  Total Cholesterol/HDL Ration Measurement: --  Triglycerides, Serum: 116

## 2023-03-22 NOTE — BH INPATIENT PSYCHIATRY PROGRESS NOTE - NSBHFUPINTERVALHXFT_PSY_A_CORE
Patient was seen and evaluated this morning. Chart was reviewed and no acute events were noted. No PRN medications were administered overnight. Patient was seen walking around the unit prior to rounds. Upon approach, patient is sitting in bed comfortably. Patient is alert and oriented and engages with interviewer. Patient is cooperative and answers all questions appropriately. Patient reports feeling much better, eating better, and sleeping better. Patient denies any medication side effects. Patient clarifies that at [facility], he had thoughts he wanted to burn a mattress but did not act on this and did not tell anyone at the time that he wanted to do so. Patient denies any current active suicidal ideation, intent or plan and denies any homicidal ideation. Patient denies any persecutory delusions and denies any auditory or visual hallucinations.

## 2023-03-22 NOTE — BH INPATIENT PSYCHIATRY PROGRESS NOTE - NSBHASSESSSUMMFT_PSY_ALL_CORE
Juma Levi is a 51 y/o male, domiciled at [facility], disabled, single, no children, PMH of HTN, HLD, T2DM, hypothyroidism, past psychiatric history of schizoaffective disorder bipolar type, multiple IPP admissions (last confirmed admission was at [facility] in April 2022), per chart hx of multiple suicide attempts, no past substance use history, presents to the ED BIBEMS after handing a note to staff at [facility] stating he wants God to take him following a dispute with an individual at patient's once weekly day program at [facility].     #Schizoaffective disorder, bipolar type  - patiently currently on clozaril 100mg BID; home dose is Clozaril 500mg daily (100mg 9am, 400mg qhs)-- plan to increase to 150mg qhs starting 3/22  - clozaril level 109 on 3/19; ANC 1950 on 3/19-- repeat CBC and clozaril level ordered 3/26  - c/w home Depakote 500mg daily with additional Depakote 750mg qhs; depakote level 57 on 3/19  - c/w Dulcolax 5mg qhs for constipation (home colace 300mg at 9am replaced with PRN senna)  - c/w ativan 2mg qhs (home medication)  - c/w invega sustenna 156 mg monthly (last given March 10th 2023, next due April 6th 2023)    #HTN  - c/w home lopressor 25mg AM    #HLD  - Lipitor 40mg daily    #Hypothyroidism  - c/w home synthroid 25mcg daily    #T2DM  - c/w home metformin 1000mg BID    #Provider Handoff  - plan to increase clozaril bedtime dose to 150mg on 3/22-- slowly titrate back to 400mg qhs (home dose)  - repeat CBC and clozaril level ordered 3/26 Juma Levi is a 51 y/o male, domiciled at [facility], disabled, single, no children, PMH of HTN, HLD, T2DM, hypothyroidism, past psychiatric history of schizoaffective disorder bipolar type, multiple IPP admissions (last confirmed admission was at [facility] in April 2022), per chart hx of multiple suicide attempts, no past substance use history, presents to the ED BIBEMS after handing a note to staff at [facility] stating he wants God to take him following a dispute with an individual at patient's once weekly day program at [facility].     3–22-2-23.  See HPI.  Patient experienced a brief succinct episode of self-harmful  behavior which he attributed to "voices".  Not clear if they are actually commands or somehow make him feel self-destructive.    #Schizoaffective disorder, bipolar type  - patiently currently on clozaril 100mg BID; home dose is Clozaril 500mg daily (100mg 9am, 400mg qhs)-- plan to increase to 150mg qhs starting 3/22  - clozaril level 109 on 3/19; ANC 1950 on 3/19-- repeat CBC and clozaril level ordered 3/26  - c/w home Depakote 500mg daily with additional Depakote 750mg qhs; depakote level 57 on 3/19  - c/w Dulcolax 5mg qhs for constipation (home colace 300mg at 9am replaced with PRN senna)  - c/w ativan 2mg qhs (home medication)  - c/w invega sustenna 156 mg monthly (last given March 10th 2023, next due April 6th 2023)    #HTN  - c/w home lopressor 25mg AM    #HLD  - Lipitor 40mg daily    #Hypothyroidism  - c/w home synthroid 25mcg daily    #T2DM  - c/w home metformin 1000mg BID    #Provider Handoff  - plan to increase clozaril bedtime dose to 150mg on 3/22-- slowly titrate back to 400mg qhs (home dose)  - repeat CBC and clozaril level ordered 3/26

## 2023-03-22 NOTE — PROVIDER CONTACT NOTE (CHANGE IN STATUS NOTIFICATION) - SITUATION
Pt threw juice and began banging head against wall in hallway of  with no warning signs and completely unprovoked. Staff immediately intervened and pt stated he was hearing voices to harm himself. Also began hitting his own head. Scheduled medication given early.

## 2023-03-22 NOTE — BH INPATIENT PSYCHIATRY PROGRESS NOTE - NSBHCHARTREVIEWVS_PSY_A_CORE FT
Vital Signs Last 24 Hrs  T(C): 36.3 (03-22-23 @ 06:07), Max: 36.7 (03-21-23 @ 15:41)  T(F): 97.4 (03-22-23 @ 06:07), Max: 98 (03-21-23 @ 15:41)  HR: 109 (03-22-23 @ 06:07) (107 - 109)  BP: 117/75 (03-22-23 @ 06:07) (117/75 - 132/69)  BP(mean): --  RR: 20 (03-22-23 @ 06:07) (18 - 20)  SpO2: --

## 2023-03-22 NOTE — BH INPATIENT PSYCHIATRY PROGRESS NOTE - CURRENT MEDICATION
MEDICATIONS  (STANDING):  atorvastatin 40 milliGRAM(s) Oral at bedtime  bisacodyl 5 milliGRAM(s) Oral at bedtime  cloZAPine 50 milliGRAM(s) Oral at bedtime  cloZAPine 100 milliGRAM(s) Oral two times a day  divalproex  milliGRAM(s) Oral daily  divalproex  milliGRAM(s) Oral at bedtime  levothyroxine 25 MICROGram(s) Oral daily  LORazepam     Tablet 2 milliGRAM(s) Oral at bedtime  metFORMIN 1000 milliGRAM(s) Oral two times a day  metoprolol succinate ER 25 milliGRAM(s) Oral daily    MEDICATIONS  (PRN):  acetaminophen     Tablet .. 325 milliGRAM(s) Oral every 6 hours PRN Mild Pain (1 - 3), Moderate Pain (4 - 6)  LORazepam     Tablet 2 milliGRAM(s) Oral every 6 hours PRN Agitation  senna 1 Tablet(s) Oral daily PRN Constipation

## 2023-03-22 NOTE — BH INPATIENT PSYCHIATRY PROGRESS NOTE - NSBHASSESSSUMMFT_PSY_ALL_CORE
Juma Levi is a 51 y/o male, domiciled at [facility], disabled, single, no children, PMH of HTN, HLD, T2DM, hypothyroidism, past psychiatric history of schizoaffective disorder bipolar type, multiple IPP admissions (last confirmed admission was at [facility] in April 2022), per chart hx of multiple suicide attempts, no past substance use history, presents to the ED BIBEMS after handing a note to staff at [facility] stating he wants God to take him following a dispute with an individual at patient's once weekly day program at [facility].     #Schizoaffective disorder, bipolar type  - patiently currently on clozaril 100mg BID; home dose is Clozaril 500mg daily (100mg 9am, 400mg qhs)-- plan to increase to 150mg qhs starting 3/22  - clozaril level 109 on 3/19; ANC 1950 on 3/19-- repeat CBC and clozaril level ordered 3/26  - c/w home Depakote 500mg daily with additional Depakote 750mg qhs; depakote level 57 on 3/19  - c/w Dulcolax 5mg qhs for constipation (home colace 300mg at 9am replaced with PRN senna)  - c/w ativan 2mg qhs (home medication)  - c/w invega sustenna 156 mg monthly (last given March 10th 2023, next due April 6th 2023)    #HTN  - c/w home lopressor 25mg AM    #HLD  - Lipitor 40mg daily    #Hypothyroidism  - c/w home synthroid 25mcg daily    #T2DM  - c/w home metformin 1000mg BID    #Provider Handoff  - plan to increase clozaril bedtime dose to 150mg on 3/22-- slowly titrate back to 400mg qhs (home dose)  - repeat CBC and clozaril level ordered 3/26

## 2023-03-22 NOTE — BH INPATIENT PSYCHIATRY PROGRESS NOTE - NSBHFUPINTERVALHXFT_PSY_A_CORE
Patient was seen and evaluated this morning. Chart was reviewed and no acute events were noted. No PRN medications were administered overnight. Patient was seen walking around the unit prior to rounds. Upon approach, patient is sitting in bed comfortably. Patient is alert and oriented and engages with interviewer. Patient is cooperative and answers all questions appropriately. Patient reports feeling much better, eating better, and sleeping better. Patient denies any medication side effects. Patient clarifies that at [facility], he had thoughts he wanted to burn a mattress but did not act on this and did not tell anyone at the time that he wanted to do so. Patient denies any current active suicidal ideation, intent or plan and denies any homicidal ideation. Patient denies any persecutory delusions and denies any auditory or visual hallucinations.   This a.m. patient reported being in good spirits, feeling like he is in a safe place with "good people".  Midafternoon with no prior he stood up and banged his head  on the  door.   He was easily redirected and was tearful and unhappy reporting that "voices" had made him hit his head.  He received as needed medication and was supervised on a one-to-one basis.  He was tearful and held the hand of writer and other staff members who were reassuring him.  After a period of time he reported the voices were "less" and that he did not want to hurt himself.  He talked of being "bad" and writer and staff discussed the issue of his illness and ways in which she can resist urges to harm self.   He is offered 2 yogurts and a sandwich and gratefully accepts and eats them.  he remains on one-to-one supervision.  This a.m. patient reported being in good spirits, feeling like he is in a safe place with "good people".  Midafternoon with no prior he stood up and banged his head  on the  door.   He was easily redirected and was tearful and unhappy reporting that "voices" had made him hit his head.  Not clear if they are actually commands or somehow make him feel self-destructive.  He received as needed medication and was supervised on a one-to-one basis.  He was tearful and held the hand of writer and other staff members who were reassuring him.  After a period of time he reported the voices were "less" and that he did not want to hurt himself.  He talked of being "bad" and writer and staff discussed the issue of his illness and ways in which she can resist urges to harm self.   He is offered 2 yogurts and a sandwich and gratefully accepts and eats them.  he remains on one-to-one supervision.

## 2023-03-22 NOTE — BH INPATIENT PSYCHIATRY PROGRESS NOTE - NSBHMETABOLIC_PSY_ALL_CORE_FT
BMI: BMI (kg/m2): 28 (03-18-23 @ 17:42)  HbA1c: A1C with Estimated Average Glucose Result: 6.5 % (03-19-23 @ 07:55)    Glucose: POCT Blood Glucose.: 103 mg/dL (03-06-23 @ 21:05)    BP: 117/75 (03-22-23 @ 06:07) (117/75 - 136/91)  Lipid Panel: Date/Time: 03-19-23 @ 07:55  Cholesterol, Serum: 161  Direct LDL: --  HDL Cholesterol, Serum: 27  Total Cholesterol/HDL Ration Measurement: --  Triglycerides, Serum: 116   BMI: BMI (kg/m2): 28 (03-18-23 @ 17:42)  HbA1c: A1C with Estimated Average Glucose Result: 6.5 % (03-19-23 @ 07:55)    Glucose: POCT Blood Glucose.: 103 mg/dL (03-06-23 @ 21:05)    BP: 133/72 (03-22-23 @ 16:02) (117/75 - 136/91)  Lipid Panel: Date/Time: 03-19-23 @ 07:55  Cholesterol, Serum: 161  Direct LDL: --  HDL Cholesterol, Serum: 27  Total Cholesterol/HDL Ration Measurement: --  Triglycerides, Serum: 116

## 2023-03-22 NOTE — CHART NOTE - NSCHARTNOTEFT_GEN_A_CORE
Called by nurse regarding pt hitting his head against a wall 4 times. Seed and examined, sitting at the TV room with 1:1 sitting by him. c/o pain at the forehead. Denies HA, dizziness, blurry vision, cp and SOB.    Vital Signs Last 24 Hrs  T(C): 36.7 (22 Mar 2023 16:02), Max: 36.7 (22 Mar 2023 16:02)  T(F): 98 (22 Mar 2023 16:02), Max: 98 (22 Mar 2023 16:02)  HR: 107 (22 Mar 2023 16:02) (107 - 109)  BP: 133/72 (22 Mar 2023 16:02) (117/75 - 133/72)  BP(mean): --  RR: 18 (22 Mar 2023 16:02) (18 - 20)  SpO2: --    PHYSICAL EXAM:      Constitutional: NAD    Eyes: PERRLA, no conjuctivitis    Head: (+) TTP at forehead, no laceration, redness,    Neck: no lymphadenopathy    Respiratory: +air entry, no rales, no rhonchi, no wheezes    Cardiovascular: +S1 and S2, regular rate and rhythm    Gastrointestinal: +BS, soft, non-tender, not distended    Extremities:  no edema, no calf tenderness    Neurological: sensation intact, ROM equal B/L,     Skin: no rashes, normal turgor    Lymph Nodes: no lymphadenopathy      A/P:  -Cont 1:1  -CT of head w/o cont  -Will f/u

## 2023-03-22 NOTE — BH INPATIENT PSYCHIATRY PROGRESS NOTE - NSBHCHARTREVIEWVS_PSY_A_CORE FT
Vital Signs Last 24 Hrs  T(C): 36.3 (03-22-23 @ 06:07), Max: 36.7 (03-21-23 @ 15:41)  T(F): 97.4 (03-22-23 @ 06:07), Max: 98 (03-21-23 @ 15:41)  HR: 109 (03-22-23 @ 06:07) (107 - 109)  BP: 117/75 (03-22-23 @ 06:07) (117/75 - 132/69)  BP(mean): --  RR: 20 (03-22-23 @ 06:07) (18 - 20)  SpO2: --     Vital Signs Last 24 Hrs  T(C): 36.7 (22 Mar 2023 16:02), Max: 36.7 (22 Mar 2023 16:02)  T(F): 98 (22 Mar 2023 16:02), Max: 98 (22 Mar 2023 16:02)  HR: 107 (22 Mar 2023 16:02) (107 - 107)  BP: 133/72 (22 Mar 2023 16:02) (133/72 - 133/72)  BP(mean): --  RR: 18 (22 Mar 2023 16:02) (18 - 18)  SpO2: --       Vital Signs Last 24 Hrs  T(C): 36.7 (03-22-23 @ 16:02), Max: 36.7 (03-22-23 @ 16:02)  T(F): 98 (03-22-23 @ 16:02), Max: 98 (03-22-23 @ 16:02)  HR: 107 (03-22-23 @ 16:02) (107 - 107)  BP: 133/72 (03-22-23 @ 16:02) (133/72 - 133/72)  BP(mean): --  RR: 18 (03-22-23 @ 16:02) (18 - 18)  SpO2: --

## 2023-03-23 PROCEDURE — 99231 SBSQ HOSP IP/OBS SF/LOW 25: CPT

## 2023-03-23 RX ADMIN — DIVALPROEX SODIUM 500 MILLIGRAM(S): 500 TABLET, DELAYED RELEASE ORAL at 08:19

## 2023-03-23 RX ADMIN — Medication 25 MILLIGRAM(S): at 08:19

## 2023-03-23 RX ADMIN — Medication 25 MICROGRAM(S): at 05:52

## 2023-03-23 RX ADMIN — CLOZAPINE 50 MILLIGRAM(S): 150 TABLET, ORALLY DISINTEGRATING ORAL at 20:14

## 2023-03-23 RX ADMIN — Medication 2 MILLIGRAM(S): at 20:16

## 2023-03-23 RX ADMIN — CLOZAPINE 100 MILLIGRAM(S): 150 TABLET, ORALLY DISINTEGRATING ORAL at 08:19

## 2023-03-23 RX ADMIN — METFORMIN HYDROCHLORIDE 1000 MILLIGRAM(S): 850 TABLET ORAL at 21:13

## 2023-03-23 RX ADMIN — Medication 5 MILLIGRAM(S): at 20:05

## 2023-03-23 RX ADMIN — CLOZAPINE 100 MILLIGRAM(S): 150 TABLET, ORALLY DISINTEGRATING ORAL at 20:14

## 2023-03-23 RX ADMIN — METFORMIN HYDROCHLORIDE 1000 MILLIGRAM(S): 850 TABLET ORAL at 08:19

## 2023-03-23 RX ADMIN — DIVALPROEX SODIUM 750 MILLIGRAM(S): 500 TABLET, DELAYED RELEASE ORAL at 20:15

## 2023-03-23 RX ADMIN — ATORVASTATIN CALCIUM 40 MILLIGRAM(S): 80 TABLET, FILM COATED ORAL at 20:04

## 2023-03-23 NOTE — BH INPATIENT PSYCHIATRY PROGRESS NOTE - NSBHASSESSSUMMFT_PSY_ALL_CORE
Juma Levi is a 51 y/o male, domiciled at [facility], disabled, single, no children, PMH of HTN, HLD, T2DM, hypothyroidism, past psychiatric history of schizoaffective disorder bipolar type, multiple IPP admissions (last confirmed admission was at [facility] in April 2022), per chart hx of multiple suicide attempts, no past substance use history, presents to the ED BIBEMS after handing a note to staff at [facility] stating he wants God to take him following a dispute with an individual at patient's once weekly day program at [facility].     3-22-23: See HPI.  Patient experienced a brief succinct episode of self-harmful  behavior which he attributed to "voices".  Not clear if they are actually commands or somehow make him feel self-destructive.  3-23-23: Patient reports voices and thoughts of his mother's passing and poor support system made harm himself. He denies AVH today.    #Schizoaffective disorder, bipolar type  - patiently currently on clozaril 100mg AM and 150mg qhs; home dose is Clozaril 500mg daily (100mg 9am, 400mg qhs)-- increased night dose to 150mg starting 3/22  - clozaril level 109 on 3/19; ANC 1950 on 3/19-- repeat CBC and clozaril level ordered 3/26  - c/w home Depakote 500mg daily with additional Depakote 750mg qhs; depakote level 57 on 3/19  - c/w Dulcolax 5mg qhs for constipation (home colace 300mg at 9am replaced with PRN senna)  - c/w ativan 2mg qhs (home medication)  - c/w invega sustenna 156 mg monthly (last given March 10th 2023, next due April 6th 2023)    #HTN  - c/w home lopressor 25mg AM    #HLD  - Lipitor 40mg daily    #Hypothyroidism  - c/w home synthroid 25mcg daily    #T2DM  - c/w home metformin 1000mg BID    #Provider Handoff  - increases clozaril bedtime dose to 150mg on 3/22-- slowly titrate back to 400mg qhs (home dose)  - repeat CBC and clozaril level ordered 3/26

## 2023-03-23 NOTE — BH INPATIENT PSYCHIATRY PROGRESS NOTE - NSBHMETABOLIC_PSY_ALL_CORE_FT
BMI: BMI (kg/m2): 28 (03-18-23 @ 17:42)  HbA1c: A1C with Estimated Average Glucose Result: 6.5 % (03-19-23 @ 07:55)    Glucose: POCT Blood Glucose.: 103 mg/dL (03-06-23 @ 21:05)    BP: 109/60 (03-23-23 @ 15:39) (109/60 - 136/91)  Lipid Panel: Date/Time: 03-19-23 @ 07:55  Cholesterol, Serum: 161  Direct LDL: --  HDL Cholesterol, Serum: 27  Total Cholesterol/HDL Ration Measurement: --  Triglycerides, Serum: 116

## 2023-03-23 NOTE — BH INPATIENT PSYCHIATRY PROGRESS NOTE - NSBHCHARTREVIEWVS_PSY_A_CORE FT
Vital Signs Last 24 Hrs  T(C): 35.9 (03-23-23 @ 15:39), Max: 35.9 (03-23-23 @ 15:39)  T(F): 96.7 (03-23-23 @ 15:39), Max: 96.7 (03-23-23 @ 15:39)  HR: 105 (03-23-23 @ 15:39) (97 - 105)  BP: 109/60 (03-23-23 @ 15:39) (109/60 - 116/67)  BP(mean): --  RR: 16 (03-23-23 @ 15:39) (16 - 18)  SpO2: --

## 2023-03-24 PROCEDURE — 99231 SBSQ HOSP IP/OBS SF/LOW 25: CPT

## 2023-03-24 RX ORDER — CLOZAPINE 150 MG/1
100 TABLET, ORALLY DISINTEGRATING ORAL AT BEDTIME
Refills: 0 | Status: DISCONTINUED | OUTPATIENT
Start: 2023-03-24 | End: 2023-03-27

## 2023-03-24 RX ADMIN — METFORMIN HYDROCHLORIDE 1000 MILLIGRAM(S): 850 TABLET ORAL at 20:03

## 2023-03-24 RX ADMIN — ATORVASTATIN CALCIUM 40 MILLIGRAM(S): 80 TABLET, FILM COATED ORAL at 20:02

## 2023-03-24 RX ADMIN — CLOZAPINE 100 MILLIGRAM(S): 150 TABLET, ORALLY DISINTEGRATING ORAL at 20:03

## 2023-03-24 RX ADMIN — Medication 2 MILLIGRAM(S): at 20:04

## 2023-03-24 RX ADMIN — Medication 5 MILLIGRAM(S): at 20:02

## 2023-03-24 RX ADMIN — CLOZAPINE 100 MILLIGRAM(S): 150 TABLET, ORALLY DISINTEGRATING ORAL at 20:02

## 2023-03-24 RX ADMIN — DIVALPROEX SODIUM 500 MILLIGRAM(S): 500 TABLET, DELAYED RELEASE ORAL at 08:28

## 2023-03-24 RX ADMIN — Medication 25 MICROGRAM(S): at 06:03

## 2023-03-24 RX ADMIN — Medication 25 MILLIGRAM(S): at 08:28

## 2023-03-24 RX ADMIN — METFORMIN HYDROCHLORIDE 1000 MILLIGRAM(S): 850 TABLET ORAL at 08:27

## 2023-03-24 RX ADMIN — CLOZAPINE 100 MILLIGRAM(S): 150 TABLET, ORALLY DISINTEGRATING ORAL at 08:29

## 2023-03-24 RX ADMIN — DIVALPROEX SODIUM 750 MILLIGRAM(S): 500 TABLET, DELAYED RELEASE ORAL at 20:02

## 2023-03-24 NOTE — BH INPATIENT PSYCHIATRY PROGRESS NOTE - NSBHFUPINTERVALHXFT_PSY_A_CORE
Patient was seen and evaluated this morning. Chart was reviewed and no acute events were noted. No PRN medications were administered overnight.  Patient was seen and evaluated this morning. Chart was reviewed and no acute events were noted. No PRN medications were administered overnight. Upon approach, patient is lying in bed sleeping comfortably. Patient is somnolent and minimally engages with interviewer. Patient remained in bed until noon, and reports he will get up for lunch. Patient reports that he broke his sleep last night and when he breaks his sleep it is very hard for him to go back to sleep. He denies any thoughts of self-harm, or suicidal ideations. He denies any current auditory hallucinations. No acute complaints at this time.  Patient was seen and evaluated this morning. Chart was reviewed and no acute events were noted. No PRN medications were administered overnight. Upon approach, patient is lying in bed sleeping comfortably. Patient is somnolent and minimally engages with interviewer. Patient remained in bed until noon, and reports he will get up for lunch. Patient reports that he broke his sleep last night and when he breaks his sleep it is very hard for him to go back to sleep. He reports he was "asleep but I was awake" and that it happens quite often at night at home. He denies any thoughts of self-harm, or suicidal ideations. He denies any current auditory hallucinations. No acute complaints at this time.

## 2023-03-24 NOTE — BH INPATIENT PSYCHIATRY PROGRESS NOTE - NSBHCHARTREVIEWVS_PSY_A_CORE FT
Vital Signs Last 24 Hrs  T(C): 36.1 (03-24-23 @ 08:19), Max: 36.1 (03-24-23 @ 08:19)  T(F): 97 (03-24-23 @ 08:19), Max: 97 (03-24-23 @ 08:19)  HR: 91 (03-24-23 @ 08:19) (91 - 105)  BP: 135/63 (03-24-23 @ 08:19) (109/60 - 135/63)  BP(mean): --  RR: 18 (03-24-23 @ 08:19) (16 - 18)  SpO2: --     Vital Signs Last 24 Hrs  T(C): 36.1 (03-24-23 @ 08:19), Max: 36.1 (03-24-23 @ 08:19)  T(F): 97 (03-24-23 @ 08:19), Max: 97 (03-24-23 @ 08:19)  HR: 91 (03-24-23 @ 08:19) (91 - 96)  BP: 135/63 (03-24-23 @ 08:19) (121/79 - 135/63)  BP(mean): --  RR: 18 (03-24-23 @ 08:19) (18 - 18)  SpO2: --

## 2023-03-24 NOTE — BH INPATIENT PSYCHIATRY PROGRESS NOTE - NSBHASSESSSUMMFT_PSY_ALL_CORE
Juma Levi is a 51 y/o male, domiciled at [facility], disabled, single, no children, PMH of HTN, HLD, T2DM, hypothyroidism, past psychiatric history of schizoaffective disorder bipolar type, multiple IPP admissions (last confirmed admission was at [facility] in April 2022), per chart hx of multiple suicide attempts, no past substance use history, presents to the ED BIBEMS after handing a note to staff at [facility] stating he wants God to take him following a dispute with an individual at patient's once weekly day program at [facility].     3-22-23: See HPI.  Patient experienced a brief succinct episode of self-harmful  behavior which he attributed to "voices".  Not clear if they are actually commands or somehow make him feel self-destructive.  3-23-23: Patient reports voices and thoughts of his mother's passing and poor support system made harm himself. He denies AVH today.    #Schizoaffective disorder, bipolar type  - patiently currently on clozaril 100mg AM and 150mg qhs; home dose is Clozaril 500mg daily (100mg 9am, 400mg qhs)-- increased night dose to 150mg starting 3/22  - clozaril level 109 on 3/19; ANC 1950 on 3/19-- repeat CBC and clozaril level ordered 3/26  - c/w home Depakote 500mg daily with additional Depakote 750mg qhs; depakote level 57 on 3/19  - c/w Dulcolax 5mg qhs for constipation (home colace 300mg at 9am replaced with PRN senna)  - c/w ativan 2mg qhs (home medication)  - c/w invega sustenna 156 mg monthly (last given March 10th 2023, next due April 6th 2023)    #HTN  - c/w home lopressor 25mg AM    #HLD  - Lipitor 40mg daily    #Hypothyroidism  - c/w home synthroid 25mcg daily    #T2DM  - c/w home metformin 1000mg BID    #Provider Handoff  - increases clozaril bedtime dose to 150mg on 3/22-- slowly titrate back to 400mg qhs (home dose)  - repeat CBC and clozaril level ordered 3/26 Juma Levi is a 53 y/o male, domiciled at [facility], disabled, single, no children, PMH of HTN, HLD, T2DM, hypothyroidism, past psychiatric history of schizoaffective disorder bipolar type, multiple IPP admissions (last confirmed admission was at [facility] in April 2022), per chart hx of multiple suicide attempts, no past substance use history, presents to the ED BIBEMS after handing a note to staff at [facility] stating he wants God to take him following a dispute with an individual at patient's once weekly day program at [facility].     3-22-23: See HPI.  Patient experienced a brief succinct episode of self-harmful  behavior which he attributed to "voices".  Not clear if they are actually commands or somehow make him feel self-destructive.  3-23-23: Patient reports voices and thoughts of his mother's passing and poor support system made him harm himself yesterday. He denies AVH today.  3-24-23: Patient remained in bed until noon. Poor sleep. Denies AVH or suicidal ideation.    #Schizoaffective disorder, bipolar type  - patiently currently on clozaril 100mg AM and 150mg qhs; home dose is Clozaril 500mg daily (100mg 9am, 400mg qhs)-- increased night dose to 150mg starting 3/22  - clozaril level 109 on 3/19; ANC 1950 on 3/19-- repeat CBC and clozaril level ordered 3/26  - c/w home Depakote 500mg daily with additional Depakote 750mg qhs; depakote level 57 on 3/19  - c/w Dulcolax 5mg qhs for constipation (home colace 300mg at 9am replaced with PRN senna)  - c/w ativan 2mg qhs (home medication)  - c/w invega sustenna 156 mg monthly (last given March 10th 2023, next due April 6th 2023)    #HTN  - c/w home lopressor 25mg AM    #HLD  - Lipitor 40mg daily    #Hypothyroidism  - c/w home synthroid 25mcg daily    #T2DM  - c/w home metformin 1000mg BID    #Provider Handoff  - increases clozaril bedtime dose to 150mg on 3/22-- slowly titrate back to 400mg qhs (home dose)  - repeat CBC and clozaril level ordered 3/26 Juma Levi is a 51 y/o male, domiciled at [facility], disabled, single, no children, PMH of HTN, HLD, T2DM, hypothyroidism, past psychiatric history of schizoaffective disorder bipolar type, multiple IPP admissions (last confirmed admission was at [facility] in April 2022), per chart hx of multiple suicide attempts, no past substance use history, presents to the ED BIBEMS after handing a note to staff at [facility] stating he wants God to take him following a dispute with an individual at patient's once weekly day program at [facility].     3-22-23: See HPI.  Patient experienced a brief succinct episode of self-harmful  behavior which he attributed to "voices".  Not clear if they are actually commands or somehow make him feel self-destructive.  3-23-23: Patient reports voices and thoughts of his mother's passing and poor support system made him harm himself yesterday. He denies AVH today.  3-24-23: Patient remained in bed until noon. Poor sleep. Denies AVH or suicidal ideation.    #Schizoaffective disorder, bipolar type  - patiently currently on clozaril 100mg AM and 150mg qhs; home dose is Clozaril 500mg daily (100mg 9am, 400mg qhs)-- increased night dose to 150mg starting 3/22, increased to 200mg 3/24  - clozaril level 109 on 3/19; ANC 1950 on 3/19-- repeat CBC and clozaril level ordered 3/26  - c/w home Depakote 500mg daily with additional Depakote 750mg qhs; depakote level 57 on 3/19  - c/w Dulcolax 5mg qhs for constipation (home colace 300mg at 9am replaced with PRN senna)  - c/w ativan 2mg qhs (home medication)  - c/w invega sustenna 156 mg monthly (last given March 10th 2023, next due April 6th 2023)    #HTN  - c/w home lopressor 25mg AM    #HLD  - Lipitor 40mg daily    #Hypothyroidism  - c/w home synthroid 25mcg daily    #T2DM  - c/w home metformin 1000mg BID    #Provider Handoff  - increases clozaril bedtime dose to 200mg on 3/24-- slowly titrate back to 400mg qhs (home dose)  - repeat CBC and clozaril level ordered 3/26

## 2023-03-24 NOTE — BH INPATIENT PSYCHIATRY PROGRESS NOTE - NSBHMETABOLIC_PSY_ALL_CORE_FT
BMI: BMI (kg/m2): 28 (03-18-23 @ 17:42)  HbA1c: A1C with Estimated Average Glucose Result: 6.5 % (03-19-23 @ 07:55)    Glucose: POCT Blood Glucose.: 103 mg/dL (03-06-23 @ 21:05)    BP: 135/63 (03-24-23 @ 08:19) (109/60 - 135/63)  Lipid Panel: Date/Time: 03-19-23 @ 07:55  Cholesterol, Serum: 161  Direct LDL: --  HDL Cholesterol, Serum: 27  Total Cholesterol/HDL Ration Measurement: --  Triglycerides, Serum: 116

## 2023-03-25 RX ADMIN — DIVALPROEX SODIUM 500 MILLIGRAM(S): 500 TABLET, DELAYED RELEASE ORAL at 09:00

## 2023-03-25 RX ADMIN — DIVALPROEX SODIUM 750 MILLIGRAM(S): 500 TABLET, DELAYED RELEASE ORAL at 20:02

## 2023-03-25 RX ADMIN — Medication 25 MILLIGRAM(S): at 09:00

## 2023-03-25 RX ADMIN — METFORMIN HYDROCHLORIDE 1000 MILLIGRAM(S): 850 TABLET ORAL at 20:02

## 2023-03-25 RX ADMIN — Medication 2 MILLIGRAM(S): at 20:02

## 2023-03-25 RX ADMIN — CLOZAPINE 100 MILLIGRAM(S): 150 TABLET, ORALLY DISINTEGRATING ORAL at 20:02

## 2023-03-25 RX ADMIN — CLOZAPINE 100 MILLIGRAM(S): 150 TABLET, ORALLY DISINTEGRATING ORAL at 09:00

## 2023-03-25 RX ADMIN — METFORMIN HYDROCHLORIDE 1000 MILLIGRAM(S): 850 TABLET ORAL at 09:00

## 2023-03-25 RX ADMIN — Medication 25 MICROGRAM(S): at 06:22

## 2023-03-25 RX ADMIN — Medication 5 MILLIGRAM(S): at 20:02

## 2023-03-25 RX ADMIN — ATORVASTATIN CALCIUM 40 MILLIGRAM(S): 80 TABLET, FILM COATED ORAL at 20:01

## 2023-03-26 RX ADMIN — CLOZAPINE 100 MILLIGRAM(S): 150 TABLET, ORALLY DISINTEGRATING ORAL at 20:04

## 2023-03-26 RX ADMIN — Medication 25 MICROGRAM(S): at 06:32

## 2023-03-26 RX ADMIN — Medication 5 MILLIGRAM(S): at 20:03

## 2023-03-26 RX ADMIN — DIVALPROEX SODIUM 500 MILLIGRAM(S): 500 TABLET, DELAYED RELEASE ORAL at 08:15

## 2023-03-26 RX ADMIN — Medication 25 MILLIGRAM(S): at 08:14

## 2023-03-26 RX ADMIN — ATORVASTATIN CALCIUM 40 MILLIGRAM(S): 80 TABLET, FILM COATED ORAL at 20:03

## 2023-03-26 RX ADMIN — DIVALPROEX SODIUM 750 MILLIGRAM(S): 500 TABLET, DELAYED RELEASE ORAL at 20:04

## 2023-03-26 RX ADMIN — METFORMIN HYDROCHLORIDE 1000 MILLIGRAM(S): 850 TABLET ORAL at 08:14

## 2023-03-26 RX ADMIN — METFORMIN HYDROCHLORIDE 1000 MILLIGRAM(S): 850 TABLET ORAL at 20:03

## 2023-03-26 RX ADMIN — CLOZAPINE 100 MILLIGRAM(S): 150 TABLET, ORALLY DISINTEGRATING ORAL at 08:15

## 2023-03-27 LAB
BASOPHILS # BLD AUTO: 0.03 K/UL — SIGNIFICANT CHANGE UP (ref 0–0.2)
BASOPHILS NFR BLD AUTO: 0.4 % — SIGNIFICANT CHANGE UP (ref 0–1)
CLOZAPINE SERPL-MCNC: 316 NG/ML — LOW (ref 350–600)
EOSINOPHIL # BLD AUTO: 0.2 K/UL — SIGNIFICANT CHANGE UP (ref 0–0.7)
EOSINOPHIL NFR BLD AUTO: 2.4 % — SIGNIFICANT CHANGE UP (ref 0–8)
HCT VFR BLD CALC: 36.7 % — LOW (ref 42–52)
HGB BLD-MCNC: 11.4 G/DL — LOW (ref 14–18)
IMM GRANULOCYTES NFR BLD AUTO: 0.7 % — HIGH (ref 0.1–0.3)
LYMPHOCYTES # BLD AUTO: 3.34 K/UL — SIGNIFICANT CHANGE UP (ref 1.2–3.4)
LYMPHOCYTES # BLD AUTO: 40.8 % — SIGNIFICANT CHANGE UP (ref 20.5–51.1)
MCHC RBC-ENTMCNC: 25.3 PG — LOW (ref 27–31)
MCHC RBC-ENTMCNC: 31.1 G/DL — LOW (ref 32–37)
MCV RBC AUTO: 81.6 FL — SIGNIFICANT CHANGE UP (ref 80–94)
MONOCYTES # BLD AUTO: 0.77 K/UL — HIGH (ref 0.1–0.6)
MONOCYTES NFR BLD AUTO: 9.4 % — HIGH (ref 1.7–9.3)
NEUTROPHILS # BLD AUTO: 3.78 K/UL — SIGNIFICANT CHANGE UP (ref 1.4–6.5)
NEUTROPHILS NFR BLD AUTO: 46.3 % — SIGNIFICANT CHANGE UP (ref 42.2–75.2)
NRBC # BLD: 0 /100 WBCS — SIGNIFICANT CHANGE UP (ref 0–0)
PLATELET # BLD AUTO: 277 K/UL — SIGNIFICANT CHANGE UP (ref 130–400)
RBC # BLD: 4.5 M/UL — LOW (ref 4.7–6.1)
RBC # FLD: 16.1 % — HIGH (ref 11.5–14.5)
WBC # BLD: 8.18 K/UL — SIGNIFICANT CHANGE UP (ref 4.8–10.8)
WBC # FLD AUTO: 8.18 K/UL — SIGNIFICANT CHANGE UP (ref 4.8–10.8)

## 2023-03-27 PROCEDURE — 99233 SBSQ HOSP IP/OBS HIGH 50: CPT

## 2023-03-27 RX ORDER — CLOZAPINE 150 MG/1
150 TABLET, ORALLY DISINTEGRATING ORAL AT BEDTIME
Refills: 0 | Status: DISCONTINUED | OUTPATIENT
Start: 2023-03-27 | End: 2023-03-29

## 2023-03-27 RX ADMIN — Medication 5 MILLIGRAM(S): at 20:00

## 2023-03-27 RX ADMIN — Medication 25 MILLIGRAM(S): at 08:24

## 2023-03-27 RX ADMIN — CLOZAPINE 150 MILLIGRAM(S): 150 TABLET, ORALLY DISINTEGRATING ORAL at 20:00

## 2023-03-27 RX ADMIN — Medication 25 MICROGRAM(S): at 06:26

## 2023-03-27 RX ADMIN — METFORMIN HYDROCHLORIDE 1000 MILLIGRAM(S): 850 TABLET ORAL at 20:00

## 2023-03-27 RX ADMIN — CLOZAPINE 100 MILLIGRAM(S): 150 TABLET, ORALLY DISINTEGRATING ORAL at 20:00

## 2023-03-27 RX ADMIN — ATORVASTATIN CALCIUM 40 MILLIGRAM(S): 80 TABLET, FILM COATED ORAL at 20:00

## 2023-03-27 RX ADMIN — METFORMIN HYDROCHLORIDE 1000 MILLIGRAM(S): 850 TABLET ORAL at 08:25

## 2023-03-27 RX ADMIN — DIVALPROEX SODIUM 750 MILLIGRAM(S): 500 TABLET, DELAYED RELEASE ORAL at 20:00

## 2023-03-27 RX ADMIN — DIVALPROEX SODIUM 500 MILLIGRAM(S): 500 TABLET, DELAYED RELEASE ORAL at 08:24

## 2023-03-27 RX ADMIN — CLOZAPINE 100 MILLIGRAM(S): 150 TABLET, ORALLY DISINTEGRATING ORAL at 08:24

## 2023-03-27 NOTE — BH INPATIENT PSYCHIATRY PROGRESS NOTE - NSBHCHARTREVIEWVS_PSY_A_CORE FT
Vital Signs Last 24 Hrs  T(C): 35.7 (03-27-23 @ 08:39), Max: 35.8 (03-26-23 @ 15:51)  T(F): 96.3 (03-27-23 @ 08:39), Max: 96.4 (03-26-23 @ 15:51)  HR: 82 (03-27-23 @ 08:39) (82 - 101)  BP: 136/80 (03-27-23 @ 08:39) (127/79 - 136/80)  BP(mean): --  RR: 18 (03-27-23 @ 08:39) (18 - 18)  SpO2: --     Vital Signs Last 24 Hrs  T(C): 35.7 (03-27-23 @ 08:39), Max: 35.7 (03-27-23 @ 08:39)  T(F): 96.3 (03-27-23 @ 08:39), Max: 96.3 (03-27-23 @ 08:39)  HR: 82 (03-27-23 @ 08:39) (82 - 82)  BP: 136/80 (03-27-23 @ 08:39) (136/80 - 136/80)  BP(mean): --  RR: 18 (03-27-23 @ 08:39) (18 - 18)  SpO2: --

## 2023-03-27 NOTE — BH INPATIENT PSYCHIATRY PROGRESS NOTE - CURRENT MEDICATION
MEDICATIONS  (STANDING):  atorvastatin 40 milliGRAM(s) Oral at bedtime  bisacodyl 5 milliGRAM(s) Oral at bedtime  cloZAPine 100 milliGRAM(s) Oral two times a day  cloZAPine 100 milliGRAM(s) Oral at bedtime  divalproex  milliGRAM(s) Oral daily  divalproex  milliGRAM(s) Oral at bedtime  levothyroxine 25 MICROGram(s) Oral daily  metFORMIN 1000 milliGRAM(s) Oral two times a day  metoprolol succinate ER 25 milliGRAM(s) Oral daily    MEDICATIONS  (PRN):  acetaminophen     Tablet .. 325 milliGRAM(s) Oral every 6 hours PRN Mild Pain (1 - 3), Moderate Pain (4 - 6)  senna 1 Tablet(s) Oral daily PRN Constipation   MEDICATIONS  (STANDING):  atorvastatin 40 milliGRAM(s) Oral at bedtime  bisacodyl 5 milliGRAM(s) Oral at bedtime  cloZAPine 150 milliGRAM(s) Oral at bedtime  cloZAPine 100 milliGRAM(s) Oral two times a day  divalproex  milliGRAM(s) Oral daily  divalproex  milliGRAM(s) Oral at bedtime  levothyroxine 25 MICROGram(s) Oral daily  metFORMIN 1000 milliGRAM(s) Oral two times a day  metoprolol succinate ER 25 milliGRAM(s) Oral daily    MEDICATIONS  (PRN):  acetaminophen     Tablet .. 325 milliGRAM(s) Oral every 6 hours PRN Mild Pain (1 - 3), Moderate Pain (4 - 6)  senna 1 Tablet(s) Oral daily PRN Constipation

## 2023-03-27 NOTE — BH INPATIENT PSYCHIATRY PROGRESS NOTE - PRN MEDS
MEDICATIONS  (PRN):  acetaminophen     Tablet .. 325 milliGRAM(s) Oral every 6 hours PRN Mild Pain (1 - 3), Moderate Pain (4 - 6)  senna 1 Tablet(s) Oral daily PRN Constipation

## 2023-03-27 NOTE — BH INPATIENT PSYCHIATRY PROGRESS NOTE - NSBHMETABOLIC_PSY_ALL_CORE_FT
BMI: BMI (kg/m2): 28 (03-18-23 @ 17:42)  HbA1c: A1C with Estimated Average Glucose Result: 6.5 % (03-19-23 @ 07:55)    Glucose: POCT Blood Glucose.: 103 mg/dL (03-06-23 @ 21:05)    BP: 136/80 (03-27-23 @ 08:39) (113/77 - 136/80)  Lipid Panel: Date/Time: 03-19-23 @ 07:55  Cholesterol, Serum: 161  Direct LDL: --  HDL Cholesterol, Serum: 27  Total Cholesterol/HDL Ration Measurement: --  Triglycerides, Serum: 116

## 2023-03-27 NOTE — BH INPATIENT PSYCHIATRY PROGRESS NOTE - NSBHASSESSSUMMFT_PSY_ALL_CORE
Juma Levi is a 51 y/o male, domiciled at [facility], disabled, single, no children, PMH of HTN, HLD, T2DM, hypothyroidism, past psychiatric history of schizoaffective disorder bipolar type, multiple IPP admissions (last confirmed admission was at [facility] in April 2022), per chart hx of multiple suicide attempts, no past substance use history, presents to the ED BIBEMS after handing a note to staff at [facility] stating he wants God to take him following a dispute with an individual at patient's once weekly day program at [facility].     3-22-23: See HPI.  Patient experienced a brief succinct episode of self-harmful  behavior which he attributed to "voices".  Not clear if they are actually commands or somehow make him feel self-destructive.  3-23-23: Patient reports voices and thoughts of his mother's passing and poor support system made him harm himself yesterday. He denies AVH today.  3-24-23: Patient remained in bed until noon. Poor sleep. Denies AVH or suicidal ideation.    #Schizoaffective disorder, bipolar type  - patiently currently on clozaril 100mg AM and 250mg qhs; home dose is Clozaril 500mg daily (100mg 9am, 400mg qhs)-- increased night dose to 150mg 3/22, 200mg 3/24, 250mg 3/27  - clozaril level 109 on 3/19; ANC 1950 on 3/19, ANC 3780 3/27  - c/w home Depakote 500mg daily with additional Depakote 750mg qhs; depakote level 57 on 3/19  - c/w Dulcolax 5mg qhs for constipation (home colace 300mg at 9am replaced with PRN senna)  - c/w ativan 2mg qhs (home medication)  - c/w invega sustenna 156 mg monthly (last given March 10th 2023, next due April 6th 2023)    #HTN  - c/w home lopressor 25mg AM    #HLD  - Lipitor 40mg daily    #Hypothyroidism  - c/w home synthroid 25mcg daily    #T2DM  - c/w home metformin 1000mg BID    #Provider Handoff  - increases clozaril bedtime dose to 250mg on 3/27-- slowly titrate back to 400mg qhs (home dose)

## 2023-03-27 NOTE — BH INPATIENT PSYCHIATRY PROGRESS NOTE - OTHER
patient missing many teeth likely secondary to missing teeth flat affect patient staring without blinking for long period of interview much less productive than yesterday patient does not appear internally preoccupied

## 2023-03-27 NOTE — BH INPATIENT PSYCHIATRY PROGRESS NOTE - NSBHFUPINTERVALHXFT_PSY_A_CORE
Patient was seen and evaluated this morning. Chart was reviewed and no acute events were noted. No PRN medications were administered overnight. Upon approach, patient is lying in bed comfortably. Patient lethargic in morning, but responsive and engages in groups throughout the day.     Spoke with Dr. Pearson [phone] at [facility]. Patient is very psychotic when not at full dose of clozaril (100mg AM + 400mg qhs). Informed that patient frequently stays on inpatient units for brief periods of time. Patient was seen and evaluated this morning. Chart was reviewed and no acute events were noted. No PRN medications were administered overnight. Upon approach, patient is lying in bed comfortably. Patient lethargic in morning. Patient has very flat affect, minimally responsive. In middle of interview patient got into bed and put blanket over his head. When asked if he wanted provider to leave, he responded no.     Spoke with Dr. Pearson [phone] at [facility]. She reports patient is very psychotic when not at full dose of clozaril (100mg AM + 400mg qhs). Informed that patient frequently stays on inpatient units for brief periods of time.

## 2023-03-28 PROCEDURE — 99231 SBSQ HOSP IP/OBS SF/LOW 25: CPT

## 2023-03-28 RX ORDER — DIPHENHYDRAMINE HCL 50 MG
25 CAPSULE ORAL EVERY 4 HOURS
Refills: 0 | Status: DISCONTINUED | OUTPATIENT
Start: 2023-03-28 | End: 2023-04-04

## 2023-03-28 RX ORDER — HYDROXYZINE HCL 10 MG
50 TABLET ORAL EVERY 6 HOURS
Refills: 0 | Status: DISCONTINUED | OUTPATIENT
Start: 2023-03-28 | End: 2023-04-04

## 2023-03-28 RX ADMIN — METFORMIN HYDROCHLORIDE 1000 MILLIGRAM(S): 850 TABLET ORAL at 20:21

## 2023-03-28 RX ADMIN — METFORMIN HYDROCHLORIDE 1000 MILLIGRAM(S): 850 TABLET ORAL at 08:46

## 2023-03-28 RX ADMIN — Medication 50 MILLIGRAM(S): at 20:19

## 2023-03-28 RX ADMIN — Medication 25 MILLIGRAM(S): at 21:28

## 2023-03-28 RX ADMIN — CLOZAPINE 150 MILLIGRAM(S): 150 TABLET, ORALLY DISINTEGRATING ORAL at 20:20

## 2023-03-28 RX ADMIN — DIVALPROEX SODIUM 750 MILLIGRAM(S): 500 TABLET, DELAYED RELEASE ORAL at 20:20

## 2023-03-28 RX ADMIN — Medication 25 MICROGRAM(S): at 06:30

## 2023-03-28 RX ADMIN — Medication 325 MILLIGRAM(S): at 21:28

## 2023-03-28 RX ADMIN — Medication 325 MILLIGRAM(S): at 22:28

## 2023-03-28 RX ADMIN — CLOZAPINE 100 MILLIGRAM(S): 150 TABLET, ORALLY DISINTEGRATING ORAL at 20:20

## 2023-03-28 RX ADMIN — ATORVASTATIN CALCIUM 40 MILLIGRAM(S): 80 TABLET, FILM COATED ORAL at 20:19

## 2023-03-28 RX ADMIN — DIVALPROEX SODIUM 500 MILLIGRAM(S): 500 TABLET, DELAYED RELEASE ORAL at 08:46

## 2023-03-28 RX ADMIN — Medication 5 MILLIGRAM(S): at 20:20

## 2023-03-28 RX ADMIN — Medication 25 MILLIGRAM(S): at 08:46

## 2023-03-28 RX ADMIN — CLOZAPINE 100 MILLIGRAM(S): 150 TABLET, ORALLY DISINTEGRATING ORAL at 08:45

## 2023-03-28 NOTE — BH INPATIENT PSYCHIATRY PROGRESS NOTE - NSBHCHARTREVIEWVS_PSY_A_CORE FT
Vital Signs Last 24 Hrs  T(C): --  T(F): --  HR: 101 (03-28-23 @ 09:59) (101 - 101)  BP: 123/83 (03-28-23 @ 09:59) (123/83 - 123/83)  BP(mean): --  RR: 18 (03-28-23 @ 09:59) (18 - 18)  SpO2: --     Vital Signs Last 24 Hrs  T(C): 35.6 (03-28-23 @ 16:04), Max: 35.6 (03-28-23 @ 16:04)  T(F): 96.1 (03-28-23 @ 16:04), Max: 96.1 (03-28-23 @ 16:04)  HR: 88 (03-28-23 @ 20:28) (88 - 102)  BP: 134/69 (03-28-23 @ 20:28) (116/66 - 134/69)  BP(mean): --  RR: 19 (03-28-23 @ 20:28) (18 - 19)  SpO2: --

## 2023-03-28 NOTE — BH INPATIENT PSYCHIATRY PROGRESS NOTE - NSBHASSESSSUMMFT_PSY_ALL_CORE
Juma Levi is a 51 y/o male, domiciled at [facility], disabled, single, no children, PMH of HTN, HLD, T2DM, hypothyroidism, past psychiatric history of schizoaffective disorder bipolar type, multiple IPP admissions (last confirmed admission was at [facility] in April 2022), per chart hx of multiple suicide attempts, no past substance use history, presents to the ED BIBEMS after handing a note to staff at [facility] stating he wants God to take him following a dispute with an individual at patient's once weekly day program at [facility].     3-22-23: See HPI.  Patient experienced a brief succinct episode of self-harmful  behavior which he attributed to "voices".  Not clear if they are actually commands or somehow make him feel self-destructive.  3-23-23: Patient reports voices and thoughts of his mother's passing and poor support system made him harm himself yesterday. He denies AVH today.  3-24-23: Patient remained in bed until noon. Poor sleep. Denies AVH or suicidal ideation.  3-28-23: more noticeable tardive dyskinesia; reporting blurry vision     #Schizoaffective disorder, bipolar type  - patiently currently on clozaril 100mg AM and 250mg qhs; home dose is Clozaril 500mg daily (100mg 9am, 400mg qhs)-- increased night dose to 150mg 3/22, 200mg 3/24, 250mg 3/27  - clozaril level 109 on 3/19; ANC 1950 on 3/19, ANC 3780 3/27  - c/w home Depakote 500mg daily with additional Depakote 750mg qhs; depakote level 57 on 3/19  - c/w Dulcolax 5mg qhs for constipation (home colace 300mg at 9am replaced with PRN senna)  - c/w ativan 2mg qhs (home medication)  - c/w invega sustenna 156 mg monthly (last given March 10th 2023, next due April 6th 2023)    #HTN  - c/w home lopressor 25mg AM    #HLD  - Lipitor 40mg daily    #Hypothyroidism  - c/w home synthroid 25mcg daily    #T2DM  - c/w home metformin 1000mg BID    #Provider Handoff  - increased clozaril bedtime dose to 250mg on 3/27-- slowly titrate back to 400mg qhs (home dose)

## 2023-03-28 NOTE — BH INPATIENT PSYCHIATRY PROGRESS NOTE - PRN MEDS
MEDICATIONS  (PRN):  acetaminophen     Tablet .. 325 milliGRAM(s) Oral every 6 hours PRN Mild Pain (1 - 3), Moderate Pain (4 - 6)  senna 1 Tablet(s) Oral daily PRN Constipation   MEDICATIONS  (PRN):  acetaminophen     Tablet .. 325 milliGRAM(s) Oral every 6 hours PRN Mild Pain (1 - 3), Moderate Pain (4 - 6)  diphenhydrAMINE 25 milliGRAM(s) Oral every 4 hours PRN periporal movements and discomfort  hydrOXYzine hydrochloride 50 milliGRAM(s) Oral every 6 hours PRN Anxiety  senna 1 Tablet(s) Oral daily PRN Constipation

## 2023-03-28 NOTE — BH INPATIENT PSYCHIATRY PROGRESS NOTE - NSBHMETABOLIC_PSY_ALL_CORE_FT
BMI: BMI (kg/m2): 28 (03-18-23 @ 17:42)  HbA1c: A1C with Estimated Average Glucose Result: 6.5 % (03-19-23 @ 07:55)    Glucose: POCT Blood Glucose.: 103 mg/dL (03-06-23 @ 21:05)    BP: 123/83 (03-28-23 @ 09:59) (122/63 - 136/80)  Lipid Panel: Date/Time: 03-19-23 @ 07:55  Cholesterol, Serum: 161  Direct LDL: --  HDL Cholesterol, Serum: 27  Total Cholesterol/HDL Ration Measurement: --  Triglycerides, Serum: 116   BMI: BMI (kg/m2): 28 (03-18-23 @ 17:42)  HbA1c: A1C with Estimated Average Glucose Result: 6.5 % (03-19-23 @ 07:55)    Glucose: POCT Blood Glucose.: 103 mg/dL (03-06-23 @ 21:05)    BP: 134/69 (03-28-23 @ 20:28) (116/66 - 136/80)  Lipid Panel: Date/Time: 03-19-23 @ 07:55  Cholesterol, Serum: 161  Direct LDL: --  HDL Cholesterol, Serum: 27  Total Cholesterol/HDL Ration Measurement: --  Triglycerides, Serum: 116

## 2023-03-28 NOTE — BH INPATIENT PSYCHIATRY PROGRESS NOTE - NSBHFUPINTERVALHXFT_PSY_A_CORE
Patient was seen and evaluated this morning. Chart was reviewed and no acute events were noted. No PRN medications were administered overnight. Upon approach, patient is lying in bed sleeping comfortably. Patient wakes up and is alert and oriented and engages with interviewer. Patient is cooperative and answers all questions appropriately. Patient reports that his vision has been blurry the last two days. He reports it has also been similarly blurry a couple of years ago. He reports his stomach hurts, but reports having bowel movements as recent as earlier this morning. He reports some trembling of his lips, but says that this has been going on for a while. He denies any auditory hallucinations. He denies any suicidal ideation or intent. He reports his mood is good.

## 2023-03-28 NOTE — DOWNTIME INTERRUPTION NOTE - WHICH MANUAL FORMS INITIATED?
See paper records for clinical information entered during sunrise downtime.
Please refer to paper records for clinical information entered during [facility] downtime

## 2023-03-28 NOTE — BH INPATIENT PSYCHIATRY PROGRESS NOTE - CURRENT MEDICATION
MEDICATIONS  (STANDING):  atorvastatin 40 milliGRAM(s) Oral at bedtime  bisacodyl 5 milliGRAM(s) Oral at bedtime  cloZAPine 100 milliGRAM(s) Oral two times a day  cloZAPine 150 milliGRAM(s) Oral at bedtime  divalproex  milliGRAM(s) Oral daily  divalproex  milliGRAM(s) Oral at bedtime  levothyroxine 25 MICROGram(s) Oral daily  metFORMIN 1000 milliGRAM(s) Oral two times a day  metoprolol succinate ER 25 milliGRAM(s) Oral daily    MEDICATIONS  (PRN):  acetaminophen     Tablet .. 325 milliGRAM(s) Oral every 6 hours PRN Mild Pain (1 - 3), Moderate Pain (4 - 6)  senna 1 Tablet(s) Oral daily PRN Constipation   MEDICATIONS  (STANDING):  atorvastatin 40 milliGRAM(s) Oral at bedtime  bisacodyl 5 milliGRAM(s) Oral at bedtime  cloZAPine 100 milliGRAM(s) Oral two times a day  cloZAPine 150 milliGRAM(s) Oral at bedtime  divalproex  milliGRAM(s) Oral daily  divalproex  milliGRAM(s) Oral at bedtime  levothyroxine 25 MICROGram(s) Oral daily  metFORMIN 1000 milliGRAM(s) Oral two times a day  metoprolol succinate ER 25 milliGRAM(s) Oral daily    MEDICATIONS  (PRN):  acetaminophen     Tablet .. 325 milliGRAM(s) Oral every 6 hours PRN Mild Pain (1 - 3), Moderate Pain (4 - 6)  diphenhydrAMINE 25 milliGRAM(s) Oral every 4 hours PRN periporal movements and discomfort  hydrOXYzine hydrochloride 50 milliGRAM(s) Oral every 6 hours PRN Anxiety  senna 1 Tablet(s) Oral daily PRN Constipation

## 2023-03-28 NOTE — BH INPATIENT PSYCHIATRY PROGRESS NOTE - OTHER
much more productive than yesterday patient missing many teeth patient does not appear internally preoccupied Tardive-like dyskinetic movements of the upper lip and tongue

## 2023-03-29 PROCEDURE — 99231 SBSQ HOSP IP/OBS SF/LOW 25: CPT

## 2023-03-29 RX ORDER — CLOZAPINE 150 MG/1
300 TABLET, ORALLY DISINTEGRATING ORAL AT BEDTIME
Refills: 0 | Status: DISCONTINUED | OUTPATIENT
Start: 2023-03-29 | End: 2023-03-31

## 2023-03-29 RX ORDER — CLOZAPINE 150 MG/1
100 TABLET, ORALLY DISINTEGRATING ORAL DAILY
Refills: 0 | Status: DISCONTINUED | OUTPATIENT
Start: 2023-03-29 | End: 2023-04-04

## 2023-03-29 RX ORDER — DIPHENHYDRAMINE HCL 50 MG
50 CAPSULE ORAL ONCE
Refills: 0 | Status: COMPLETED | OUTPATIENT
Start: 2023-03-29 | End: 2023-03-29

## 2023-03-29 RX ADMIN — Medication 25 MILLIGRAM(S): at 08:27

## 2023-03-29 RX ADMIN — METFORMIN HYDROCHLORIDE 1000 MILLIGRAM(S): 850 TABLET ORAL at 08:25

## 2023-03-29 RX ADMIN — DIVALPROEX SODIUM 750 MILLIGRAM(S): 500 TABLET, DELAYED RELEASE ORAL at 20:09

## 2023-03-29 RX ADMIN — Medication 25 MICROGRAM(S): at 06:34

## 2023-03-29 RX ADMIN — Medication 1 MILLIGRAM(S): at 15:42

## 2023-03-29 RX ADMIN — Medication 2 MILLIGRAM(S): at 20:08

## 2023-03-29 RX ADMIN — METFORMIN HYDROCHLORIDE 1000 MILLIGRAM(S): 850 TABLET ORAL at 20:08

## 2023-03-29 RX ADMIN — ATORVASTATIN CALCIUM 40 MILLIGRAM(S): 80 TABLET, FILM COATED ORAL at 20:08

## 2023-03-29 RX ADMIN — CLOZAPINE 100 MILLIGRAM(S): 150 TABLET, ORALLY DISINTEGRATING ORAL at 08:17

## 2023-03-29 RX ADMIN — Medication 5 MILLIGRAM(S): at 20:09

## 2023-03-29 RX ADMIN — Medication 50 MILLIGRAM(S): at 15:42

## 2023-03-29 RX ADMIN — DIVALPROEX SODIUM 500 MILLIGRAM(S): 500 TABLET, DELAYED RELEASE ORAL at 08:18

## 2023-03-29 RX ADMIN — CLOZAPINE 300 MILLIGRAM(S): 150 TABLET, ORALLY DISINTEGRATING ORAL at 20:09

## 2023-03-29 NOTE — BH INPATIENT PSYCHIATRY PROGRESS NOTE - NSBHMETABOLIC_PSY_ALL_CORE_FT
BMI: BMI (kg/m2): 28 (03-18-23 @ 17:42)  HbA1c: A1C with Estimated Average Glucose Result: 6.5 % (03-19-23 @ 07:55)    Glucose: POCT Blood Glucose.: 103 mg/dL (03-06-23 @ 21:05)    BP: 134/69 (03-28-23 @ 20:28) (116/66 - 136/80)  Lipid Panel: Date/Time: 03-19-23 @ 07:55  Cholesterol, Serum: 161  Direct LDL: --  HDL Cholesterol, Serum: 27  Total Cholesterol/HDL Ration Measurement: --  Triglycerides, Serum: 116

## 2023-03-29 NOTE — BH INPATIENT PSYCHIATRY PROGRESS NOTE - NSBHCHARTREVIEWVS_PSY_A_CORE FT
Vital Signs Last 24 Hrs  T(C): 35.6 (03-28-23 @ 16:04), Max: 35.6 (03-28-23 @ 16:04)  T(F): 96.1 (03-28-23 @ 16:04), Max: 96.1 (03-28-23 @ 16:04)  HR: 88 (03-28-23 @ 20:28) (88 - 102)  BP: 134/69 (03-28-23 @ 20:28) (116/66 - 134/69)  BP(mean): --  RR: 19 (03-28-23 @ 20:28) (19 - 19)  SpO2: --

## 2023-03-29 NOTE — BH INPATIENT PSYCHIATRY PROGRESS NOTE - PRN MEDS
MEDICATIONS  (PRN):  acetaminophen     Tablet .. 325 milliGRAM(s) Oral every 6 hours PRN Mild Pain (1 - 3), Moderate Pain (4 - 6)  diphenhydrAMINE 25 milliGRAM(s) Oral every 4 hours PRN periporal movements and discomfort  hydrOXYzine hydrochloride 50 milliGRAM(s) Oral every 6 hours PRN Anxiety  LORazepam     Tablet 1 milliGRAM(s) Oral two times a day PRN agitation/anxiety  senna 1 Tablet(s) Oral daily PRN Constipation

## 2023-03-29 NOTE — BH INPATIENT PSYCHIATRY PROGRESS NOTE - NSBHASSESSSUMMFT_PSY_ALL_CORE
Juma Levi is a 51 y/o male, domiciled at [facility], disabled, single, no children, PMH of HTN, HLD, T2DM, hypothyroidism, past psychiatric history of schizoaffective disorder bipolar type, multiple IPP admissions (last confirmed admission was at [facility] in April 2022), per chart hx of multiple suicide attempts, no past substance use history, presents to the ED BIBEMS after handing a note to staff at [facility] stating he wants God to take him following a dispute with an individual at patient's once weekly day program at [facility].     Patient has been showing improvement in mood and thought process. Plan to continue to titrate medication back to home dose, requiring further hospitalization at present time.    3-22-23: See HPI.  Patient experienced a brief succinct episode of self-harmful  behavior which he attributed to "voices".  Not clear if they are actually commands or somehow make him feel self-destructive.  3-23-23: Patient reports voices and thoughts of his mother's passing and poor support system made him harm himself yesterday. He denies AVH today.  3-24-23: Patient remained in bed until noon. Poor sleep. Denies AVH or suicidal ideation.  3-28-23: more noticeable tardive dyskinesia; reporting blurry vision     #Schizoaffective disorder, bipolar type  - patiently currently on clozaril 100mg AM and 250mg qhs; home dose is Clozaril 500mg daily (100mg 9am, 400mg qhs)-- increased night dose to 150mg 3/22, 200mg 3/24, 250mg 3/27  - clozaril level 109 on 3/19; ANC 1950 on 3/19, ANC 3780 3/27  - c/w home Depakote 500mg daily with additional Depakote 750mg qhs; depakote level 57 on 3/19  - c/w Dulcolax 5mg qhs for constipation (home colace 300mg at 9am replaced with PRN senna)  - c/w ativan 2mg qhs (home medication); 1mg PRN BID added (3/29)  - c/w invega sustenna 156 mg monthly (last given March 10th 2023, next due April 6th 2023)    #HTN  - c/w home lopressor 25mg AM    #HLD  - Lipitor 40mg daily    #Hypothyroidism  - c/w home synthroid 25mcg daily    #T2DM  - c/w home metformin 1000mg BID    #Provider Handoff  - increased clozaril bedtime dose to 250mg on 3/27-- slowly titrate back to 400mg qhs (home dose)

## 2023-03-29 NOTE — BH INPATIENT PSYCHIATRY PROGRESS NOTE - OTHER
Tardive-like dyskinetic movements of the upper lip and tongue   patient missing many teeth patient does not appear internally preoccupied much more productive than yesterday

## 2023-03-29 NOTE — BH INPATIENT PSYCHIATRY PROGRESS NOTE - NSBHFUPINTERVALHXFT_PSY_A_CORE
Patient was seen and evaluated this morning. Chart was reviewed and no acute events were noted. Patient received Benadryl and Atarax yesterday evening for episode of anxiety as reported by covering physician.    Upon approach, patient is lying in bed sleeping comfortably. Patient wakes up and is alert and oriented and engages with interviewer. Patient is cooperative and answers all questions appropriately. Patient reports "alright" mood with no recent depressed feelings or passive death wishes or suicidal ideations/intent/plan. He denies hearing any voices as well. He reports improvement in vision but still notes some blurriness throughout the day, and he is recommended to follow up with an eye doctor outpatient. He recalls "writing a poem" yesterday afternoon and concentrating really hard, and then suddenly feeling very anxious at which point he received the PRNs, which he reports helped.    On review of chart, Ativan had fallen off and was reordered today along with a PRN for severe anxiety.

## 2023-03-29 NOTE — BH INPATIENT PSYCHIATRY PROGRESS NOTE - CURRENT MEDICATION
MEDICATIONS  (STANDING):  atorvastatin 40 milliGRAM(s) Oral at bedtime  bisacodyl 5 milliGRAM(s) Oral at bedtime  cloZAPine 100 milliGRAM(s) Oral daily  cloZAPine 300 milliGRAM(s) Oral at bedtime  divalproex  milliGRAM(s) Oral daily  divalproex  milliGRAM(s) Oral at bedtime  levothyroxine 25 MICROGram(s) Oral daily  LORazepam     Tablet 2 milliGRAM(s) Oral at bedtime  metFORMIN 1000 milliGRAM(s) Oral two times a day  metoprolol succinate ER 25 milliGRAM(s) Oral daily    MEDICATIONS  (PRN):  acetaminophen     Tablet .. 325 milliGRAM(s) Oral every 6 hours PRN Mild Pain (1 - 3), Moderate Pain (4 - 6)  diphenhydrAMINE 25 milliGRAM(s) Oral every 4 hours PRN periporal movements and discomfort  hydrOXYzine hydrochloride 50 milliGRAM(s) Oral every 6 hours PRN Anxiety  LORazepam     Tablet 1 milliGRAM(s) Oral two times a day PRN agitation/anxiety  senna 1 Tablet(s) Oral daily PRN Constipation

## 2023-03-30 PROCEDURE — 99231 SBSQ HOSP IP/OBS SF/LOW 25: CPT

## 2023-03-30 RX ADMIN — METFORMIN HYDROCHLORIDE 1000 MILLIGRAM(S): 850 TABLET ORAL at 20:18

## 2023-03-30 RX ADMIN — ATORVASTATIN CALCIUM 40 MILLIGRAM(S): 80 TABLET, FILM COATED ORAL at 20:17

## 2023-03-30 RX ADMIN — Medication 25 MICROGRAM(S): at 06:25

## 2023-03-30 RX ADMIN — DIVALPROEX SODIUM 500 MILLIGRAM(S): 500 TABLET, DELAYED RELEASE ORAL at 09:01

## 2023-03-30 RX ADMIN — Medication 5 MILLIGRAM(S): at 20:17

## 2023-03-30 RX ADMIN — CLOZAPINE 300 MILLIGRAM(S): 150 TABLET, ORALLY DISINTEGRATING ORAL at 20:17

## 2023-03-30 RX ADMIN — DIVALPROEX SODIUM 750 MILLIGRAM(S): 500 TABLET, DELAYED RELEASE ORAL at 20:18

## 2023-03-30 RX ADMIN — Medication 25 MILLIGRAM(S): at 09:01

## 2023-03-30 RX ADMIN — CLOZAPINE 100 MILLIGRAM(S): 150 TABLET, ORALLY DISINTEGRATING ORAL at 09:00

## 2023-03-30 RX ADMIN — Medication 2 MILLIGRAM(S): at 20:16

## 2023-03-30 RX ADMIN — METFORMIN HYDROCHLORIDE 1000 MILLIGRAM(S): 850 TABLET ORAL at 09:01

## 2023-03-30 NOTE — BH INPATIENT PSYCHIATRY PROGRESS NOTE - NSBHFUPINTERVALHXFT_PSY_A_CORE
Patient was seen and evaluated this morning. Chart was reviewed, yesterday afternoon was banging head on nurses station window; given IM ativan and benadryl at that time as patient chose IM formulation for faster relief over PO. No PRN medications were administered overnight. Upon approach, patient is lying in bed sleeping comfortably. Patient is very somnolent and mumbles to provider. Returned to patient later in the morning and he was alert and oriented and engages with interviewer. Patient is cooperative and answers all questions appropriately. He reports yesterday he was over stimulated because of the events going on in the unit including karaoke. He reports he had many thoughts telling him to hurt himself. He reports doing much better now. Eating and sleeping well. Patient denies any current passive or active suicidal ideation. Patient denies any persecutory delusions and denies any auditory or visual hallucinations.

## 2023-03-30 NOTE — BH INPATIENT PSYCHIATRY PROGRESS NOTE - NSBHASSESSSUMMFT_PSY_ALL_CORE
Juma Levi is a 53 y/o male, domiciled at [facility], disabled, single, no children, PMH of HTN, HLD, T2DM, hypothyroidism, past psychiatric history of schizoaffective disorder bipolar type, multiple IPP admissions (last confirmed admission was at [facility] in April 2022), per chart hx of multiple suicide attempts, no past substance use history, presents to the ED BIBEMS after handing a note to staff at [facility] stating he wants God to take him following a dispute with an individual at patient's once weekly day program at [facility].     Patient has been showing improvement in mood and thought process. Plan to continue to titrate medication back to home dose, requiring further hospitalization at present time.    3-22-23: See HPI.  Patient experienced a brief succinct episode of self-harmful  behavior which he attributed to "voices".  Not clear if they are actually commands or somehow make him feel self-destructive.  3-23-23: Patient reports voices and thoughts of his mother's passing and poor support system made him harm himself yesterday. He denies AVH today.  3-24-23: Patient remained in bed until noon. Poor sleep. Denies AVH or suicidal ideation.  3-28-23: more noticeable tardive dyskinesia; reporting blurry vision   3-29-23: increased clozaril 250mgs qhs to 300mg qhs. Restarted ativan 2mg qhs standing for insomnia    #Schizoaffective disorder, bipolar type  - patiently currently on clozaril 100mg AM and 300mg qhs; home dose is Clozaril 500mg daily (100mg 9am, 400mg qhs)-- increased night dose to 150mg 3/22, 200mg 3/24, 250mg 3/27, 300mg 3/29  - clozaril level 109 on 3/19; ANC 1950 on 3/19, ANC 3780 3/27  - c/w home Depakote 500mg daily with additional Depakote 750mg qhs; depakote level 57 on 3/19  - c/w Dulcolax 5mg qhs for constipation (home colace 300mg at 9am replaced with PRN senna)  - c/w ativan 2mg qhs (home medication); 1mg PRN BID added (3/29)  - c/w invega sustenna 156 mg monthly (last given March 10th 2023, next due April 6th 2023)    #HTN  - c/w home lopressor 25mg AM    #HLD  - Lipitor 40mg daily    #Hypothyroidism  - c/w home synthroid 25mcg daily    #T2DM  - c/w home metformin 1000mg BID

## 2023-03-30 NOTE — BH INPATIENT PSYCHIATRY PROGRESS NOTE - NSBHCHARTREVIEWVS_PSY_A_CORE FT
Vital Signs Last 24 Hrs  T(C): 35.7 (03-29-23 @ 16:29), Max: 35.7 (03-29-23 @ 16:29)  T(F): 96.3 (03-29-23 @ 16:29), Max: 96.3 (03-29-23 @ 16:29)  HR: 98 (03-29-23 @ 16:29) (98 - 98)  BP: 98/76 (03-29-23 @ 16:29) (98/76 - 98/76)  BP(mean): --  RR: 18 (03-29-23 @ 16:29) (18 - 18)  SpO2: --

## 2023-03-30 NOTE — BH INPATIENT PSYCHIATRY PROGRESS NOTE - NSBHMETABOLIC_PSY_ALL_CORE_FT
BMI: BMI (kg/m2): 28 (03-18-23 @ 17:42)  HbA1c: A1C with Estimated Average Glucose Result: 6.5 % (03-19-23 @ 07:55)    Glucose: POCT Blood Glucose.: 103 mg/dL (03-06-23 @ 21:05)    BP: 98/76 (03-29-23 @ 16:29) (98/76 - 134/69)  Lipid Panel: Date/Time: 03-19-23 @ 07:55  Cholesterol, Serum: 161  Direct LDL: --  HDL Cholesterol, Serum: 27  Total Cholesterol/HDL Ration Measurement: --  Triglycerides, Serum: 116

## 2023-03-31 LAB
CRP SERPL-MCNC: 8.7 MG/L — HIGH
TROPONIN T SERPL-MCNC: <0.01 NG/ML — SIGNIFICANT CHANGE UP

## 2023-03-31 PROCEDURE — 99231 SBSQ HOSP IP/OBS SF/LOW 25: CPT

## 2023-03-31 RX ORDER — CLOZAPINE 150 MG/1
350 TABLET, ORALLY DISINTEGRATING ORAL AT BEDTIME
Refills: 0 | Status: COMPLETED | OUTPATIENT
Start: 2023-03-31 | End: 2023-04-01

## 2023-03-31 RX ORDER — CLOZAPINE 150 MG/1
400 TABLET, ORALLY DISINTEGRATING ORAL AT BEDTIME
Refills: 0 | Status: DISCONTINUED | OUTPATIENT
Start: 2023-04-02 | End: 2023-04-04

## 2023-03-31 RX ADMIN — DIVALPROEX SODIUM 500 MILLIGRAM(S): 500 TABLET, DELAYED RELEASE ORAL at 10:15

## 2023-03-31 RX ADMIN — METFORMIN HYDROCHLORIDE 1000 MILLIGRAM(S): 850 TABLET ORAL at 20:13

## 2023-03-31 RX ADMIN — CLOZAPINE 100 MILLIGRAM(S): 150 TABLET, ORALLY DISINTEGRATING ORAL at 10:15

## 2023-03-31 RX ADMIN — Medication 25 MILLIGRAM(S): at 10:16

## 2023-03-31 RX ADMIN — METFORMIN HYDROCHLORIDE 1000 MILLIGRAM(S): 850 TABLET ORAL at 10:15

## 2023-03-31 RX ADMIN — Medication 2 MILLIGRAM(S): at 20:14

## 2023-03-31 RX ADMIN — ATORVASTATIN CALCIUM 40 MILLIGRAM(S): 80 TABLET, FILM COATED ORAL at 20:12

## 2023-03-31 RX ADMIN — DIVALPROEX SODIUM 750 MILLIGRAM(S): 500 TABLET, DELAYED RELEASE ORAL at 20:13

## 2023-03-31 RX ADMIN — Medication 25 MICROGRAM(S): at 06:27

## 2023-03-31 RX ADMIN — Medication 5 MILLIGRAM(S): at 20:12

## 2023-03-31 RX ADMIN — CLOZAPINE 350 MILLIGRAM(S): 150 TABLET, ORALLY DISINTEGRATING ORAL at 20:11

## 2023-03-31 NOTE — BH INPATIENT PSYCHIATRY PROGRESS NOTE - NSBHCHARTREVIEWVS_PSY_A_CORE FT
Vital Signs Last 24 Hrs  T(C): 36.2 (03-30-23 @ 20:25), Max: 36.2 (03-30-23 @ 20:25)  T(F): 97.2 (03-30-23 @ 20:25), Max: 97.2 (03-30-23 @ 20:25)  HR: 104 (03-30-23 @ 20:25) (104 - 111)  BP: 109/72 (03-30-23 @ 20:25) (109/72 - 126/63)  BP(mean): --  RR: 18 (03-30-23 @ 20:25) (18 - 18)  SpO2: 99% (03-30-23 @ 16:19) (99% - 99%)

## 2023-03-31 NOTE — BH INPATIENT PSYCHIATRY PROGRESS NOTE - NSBHFUPINTERVALHXFT_PSY_A_CORE
Patient was seen and evaluated this morning. Chart was reviewed and no acute events were noted. No PRN medications were administered overnight. Upon approach, patient is lying in bed sleeping comfortably. Patient is somnolent and provider returned in afternoon. Patient found walking in unit and is alert and oriented and engages with interviewer. Patient is cooperative and answers all questions appropriately. Patient reports good sleep. Patient reports appetite slightly decreased and would like ensures. Patient requests fruit and cheese platter but told this likely not be available. Reports good bowel movements. Patient denies any current passive or active suicidal ideation. Patient denies any persecutory delusions and denies any auditory or visual hallucinations.

## 2023-03-31 NOTE — BH INPATIENT PSYCHIATRY PROGRESS NOTE - NSBHMETABOLIC_PSY_ALL_CORE_FT
BMI: BMI (kg/m2): 28 (03-18-23 @ 17:42)  HbA1c: A1C with Estimated Average Glucose Result: 6.5 % (03-19-23 @ 07:55)    Glucose: POCT Blood Glucose.: 103 mg/dL (03-06-23 @ 21:05)    BP: 109/72 (03-30-23 @ 20:25) (98/76 - 134/69)  Lipid Panel: Date/Time: 03-19-23 @ 07:55  Cholesterol, Serum: 161  Direct LDL: --  HDL Cholesterol, Serum: 27  Total Cholesterol/HDL Ration Measurement: --  Triglycerides, Serum: 116

## 2023-03-31 NOTE — BH INPATIENT PSYCHIATRY PROGRESS NOTE - NSBHPROGMEDSE_PSY_A_CORE FT
reports blurry vision, stomach hurting, possibly worsening tardive dyskinesia 
reports blurry vision, stomach hurting, possibly worsening tardive dyskinesia 
possible effect on appetite; although unclear if patient just want certain foods

## 2023-03-31 NOTE — BH INPATIENT PSYCHIATRY PROGRESS NOTE - NSBHASSESSSUMMFT_PSY_ALL_CORE
Juma Levi is a 51 y/o male, domiciled at [facility], disabled, single, no children, PMH of HTN, HLD, T2DM, hypothyroidism, past psychiatric history of schizoaffective disorder bipolar type, multiple IPP admissions (last confirmed admission was at [facility] in April 2022), per chart hx of multiple suicide attempts, no past substance use history, presents to the ED BIBEMS after handing a note to staff at [facility] stating he wants God to take him following a dispute with an individual at patient's once weekly day program at [facility].     Patient has been showing improvement in mood and thought process. Plan to continue to titrate medication back to home dose, requiring further hospitalization at present time.    3-22-23: See HPI.  Patient experienced a brief succinct episode of self-harmful  behavior which he attributed to "voices".  Not clear if they are actually commands or somehow make him feel self-destructive.  3-23-23: Patient reports voices and thoughts of his mother's passing and poor support system made him harm himself yesterday. He denies AVH today.  3-24-23: Patient remained in bed until noon. Poor sleep. Denies AVH or suicidal ideation.  3-28-23: more noticeable tardive dyskinesia; reporting blurry vision   3-29-23: increased clozaril 250mgs qhs to 300mg qhs. Restarted ativan 2mg qhs standing for insomnia  3-31-23: trop and CRP ordered today; increase clozaril 300mg qhs to 350 qhs-- plan for 400mg qhs 4/2    #Schizoaffective disorder, bipolar type  - patiently currently on clozaril 100mg AM and 350mg qhs; home dose is Clozaril 500mg daily (100mg 9am, 400mg qhs)-- increased night dose to 150mg 3/22, 200mg 3/24, 250mg 3/27, 300mg 3/29, 350mg 3/31  - clozaril level 109 on 3/19; ANC 1950 on 3/19, ANC 3780 3/27  - c/w home Depakote 500mg daily with additional Depakote 750mg qhs; depakote level 57 on 3/19  - c/w Dulcolax 5mg qhs for constipation (home colace 300mg at 9am replaced with PRN senna)  - c/w ativan 2mg qhs (home medication); 1mg PRN BID added (3/29)  - c/w invega sustenna 156 mg monthly (last given March 10th 2023, next due April 6th 2023)    #HTN  - c/w home lopressor 25mg AM    #HLD  - Lipitor 40mg daily    #Hypothyroidism  - c/w home synthroid 25mcg daily    #T2DM  - c/w home metformin 1000mg BID

## 2023-04-01 RX ADMIN — Medication 25 MICROGRAM(S): at 05:36

## 2023-04-01 RX ADMIN — CLOZAPINE 350 MILLIGRAM(S): 150 TABLET, ORALLY DISINTEGRATING ORAL at 20:07

## 2023-04-01 RX ADMIN — METFORMIN HYDROCHLORIDE 1000 MILLIGRAM(S): 850 TABLET ORAL at 20:07

## 2023-04-01 RX ADMIN — CLOZAPINE 100 MILLIGRAM(S): 150 TABLET, ORALLY DISINTEGRATING ORAL at 08:12

## 2023-04-01 RX ADMIN — DIVALPROEX SODIUM 750 MILLIGRAM(S): 500 TABLET, DELAYED RELEASE ORAL at 20:05

## 2023-04-01 RX ADMIN — Medication 25 MILLIGRAM(S): at 08:12

## 2023-04-01 RX ADMIN — Medication 5 MILLIGRAM(S): at 20:09

## 2023-04-01 RX ADMIN — Medication 2 MILLIGRAM(S): at 20:06

## 2023-04-01 RX ADMIN — ATORVASTATIN CALCIUM 40 MILLIGRAM(S): 80 TABLET, FILM COATED ORAL at 20:05

## 2023-04-01 RX ADMIN — METFORMIN HYDROCHLORIDE 1000 MILLIGRAM(S): 850 TABLET ORAL at 08:12

## 2023-04-01 RX ADMIN — DIVALPROEX SODIUM 500 MILLIGRAM(S): 500 TABLET, DELAYED RELEASE ORAL at 08:12

## 2023-04-02 PROCEDURE — 99231 SBSQ HOSP IP/OBS SF/LOW 25: CPT

## 2023-04-02 RX ADMIN — Medication 2 MILLIGRAM(S): at 20:15

## 2023-04-02 RX ADMIN — Medication 325 MILLIGRAM(S): at 07:04

## 2023-04-02 RX ADMIN — METFORMIN HYDROCHLORIDE 1000 MILLIGRAM(S): 850 TABLET ORAL at 20:13

## 2023-04-02 RX ADMIN — Medication 25 MILLIGRAM(S): at 08:43

## 2023-04-02 RX ADMIN — ATORVASTATIN CALCIUM 40 MILLIGRAM(S): 80 TABLET, FILM COATED ORAL at 20:13

## 2023-04-02 RX ADMIN — DIVALPROEX SODIUM 500 MILLIGRAM(S): 500 TABLET, DELAYED RELEASE ORAL at 08:43

## 2023-04-02 RX ADMIN — CLOZAPINE 100 MILLIGRAM(S): 150 TABLET, ORALLY DISINTEGRATING ORAL at 08:43

## 2023-04-02 RX ADMIN — Medication 325 MILLIGRAM(S): at 06:34

## 2023-04-02 RX ADMIN — METFORMIN HYDROCHLORIDE 1000 MILLIGRAM(S): 850 TABLET ORAL at 08:44

## 2023-04-02 RX ADMIN — Medication 5 MILLIGRAM(S): at 20:13

## 2023-04-02 RX ADMIN — CLOZAPINE 400 MILLIGRAM(S): 150 TABLET, ORALLY DISINTEGRATING ORAL at 20:13

## 2023-04-02 RX ADMIN — DIVALPROEX SODIUM 750 MILLIGRAM(S): 500 TABLET, DELAYED RELEASE ORAL at 20:14

## 2023-04-02 RX ADMIN — Medication 25 MICROGRAM(S): at 06:30

## 2023-04-03 LAB
BASOPHILS # BLD AUTO: 0.03 K/UL — SIGNIFICANT CHANGE UP (ref 0–0.2)
BASOPHILS NFR BLD AUTO: 0.4 % — SIGNIFICANT CHANGE UP (ref 0–1)
EOSINOPHIL # BLD AUTO: 0.17 K/UL — SIGNIFICANT CHANGE UP (ref 0–0.7)
EOSINOPHIL NFR BLD AUTO: 2.1 % — SIGNIFICANT CHANGE UP (ref 0–8)
HCT VFR BLD CALC: 38.7 % — LOW (ref 42–52)
HGB BLD-MCNC: 12 G/DL — LOW (ref 14–18)
IMM GRANULOCYTES NFR BLD AUTO: 1.1 % — HIGH (ref 0.1–0.3)
LYMPHOCYTES # BLD AUTO: 3.58 K/UL — HIGH (ref 1.2–3.4)
LYMPHOCYTES # BLD AUTO: 43.4 % — SIGNIFICANT CHANGE UP (ref 20.5–51.1)
MCHC RBC-ENTMCNC: 25.4 PG — LOW (ref 27–31)
MCHC RBC-ENTMCNC: 31 G/DL — LOW (ref 32–37)
MCV RBC AUTO: 82 FL — SIGNIFICANT CHANGE UP (ref 80–94)
MONOCYTES # BLD AUTO: 0.68 K/UL — HIGH (ref 0.1–0.6)
MONOCYTES NFR BLD AUTO: 8.2 % — SIGNIFICANT CHANGE UP (ref 1.7–9.3)
NEUTROPHILS # BLD AUTO: 3.7 K/UL — SIGNIFICANT CHANGE UP (ref 1.4–6.5)
NEUTROPHILS NFR BLD AUTO: 44.8 % — SIGNIFICANT CHANGE UP (ref 42.2–75.2)
NRBC # BLD: 0 /100 WBCS — SIGNIFICANT CHANGE UP (ref 0–0)
PLATELET # BLD AUTO: 242 K/UL — SIGNIFICANT CHANGE UP (ref 130–400)
RBC # BLD: 4.72 M/UL — SIGNIFICANT CHANGE UP (ref 4.7–6.1)
RBC # FLD: 16.9 % — HIGH (ref 11.5–14.5)
WBC # BLD: 8.25 K/UL — SIGNIFICANT CHANGE UP (ref 4.8–10.8)
WBC # FLD AUTO: 8.25 K/UL — SIGNIFICANT CHANGE UP (ref 4.8–10.8)

## 2023-04-03 PROCEDURE — 99231 SBSQ HOSP IP/OBS SF/LOW 25: CPT

## 2023-04-03 PROCEDURE — 93010 ELECTROCARDIOGRAM REPORT: CPT

## 2023-04-03 RX ORDER — SODIUM BICARBONATE 1 MEQ/ML
650 SYRINGE (ML) INTRAVENOUS
Qty: 0 | Refills: 0 | DISCHARGE

## 2023-04-03 RX ORDER — FLUOXETINE HCL 10 MG
1 CAPSULE ORAL
Qty: 0 | Refills: 0 | DISCHARGE

## 2023-04-03 RX ORDER — SENNA PLUS 8.6 MG/1
1 TABLET ORAL
Qty: 0 | Refills: 0 | DISCHARGE
Start: 2023-04-03

## 2023-04-03 RX ORDER — ESOMEPRAZOLE MAGNESIUM 40 MG/1
1 CAPSULE, DELAYED RELEASE ORAL
Qty: 0 | Refills: 0 | DISCHARGE

## 2023-04-03 RX ORDER — ASPIRIN/CALCIUM CARB/MAGNESIUM 324 MG
1 TABLET ORAL
Qty: 0 | Refills: 0 | DISCHARGE

## 2023-04-03 RX ORDER — DIVALPROEX SODIUM 500 MG/1
1 TABLET, DELAYED RELEASE ORAL
Qty: 0 | Refills: 0 | DISCHARGE
Start: 2023-04-03

## 2023-04-03 RX ORDER — GABAPENTIN 400 MG/1
1 CAPSULE ORAL
Qty: 0 | Refills: 0 | DISCHARGE

## 2023-04-03 RX ORDER — HYDRALAZINE HCL 50 MG
50 TABLET ORAL
Qty: 0 | Refills: 0 | DISCHARGE

## 2023-04-03 RX ORDER — ALLOPURINOL 300 MG
1 TABLET ORAL
Qty: 0 | Refills: 0 | DISCHARGE

## 2023-04-03 RX ORDER — OXYCODONE HYDROCHLORIDE 5 MG/1
1 TABLET ORAL
Qty: 0 | Refills: 0 | DISCHARGE

## 2023-04-03 RX ORDER — CLOZAPINE 150 MG/1
1 TABLET, ORALLY DISINTEGRATING ORAL
Qty: 0 | Refills: 0 | DISCHARGE
Start: 2023-04-03

## 2023-04-03 RX ORDER — ATORVASTATIN CALCIUM 80 MG/1
1 TABLET, FILM COATED ORAL
Qty: 0 | Refills: 0 | DISCHARGE
Start: 2023-04-03

## 2023-04-03 RX ORDER — DIVALPROEX SODIUM 500 MG/1
3 TABLET, DELAYED RELEASE ORAL
Qty: 0 | Refills: 0 | DISCHARGE
Start: 2023-04-03

## 2023-04-03 RX ORDER — ATORVASTATIN CALCIUM 80 MG/1
40 TABLET, FILM COATED ORAL
Qty: 0 | Refills: 0 | DISCHARGE

## 2023-04-03 RX ORDER — CLOZAPINE 150 MG/1
2 TABLET, ORALLY DISINTEGRATING ORAL
Qty: 0 | Refills: 0 | DISCHARGE
Start: 2023-04-03

## 2023-04-03 RX ORDER — METOPROLOL TARTRATE 50 MG
1 TABLET ORAL
Qty: 0 | Refills: 0 | DISCHARGE

## 2023-04-03 RX ORDER — METFORMIN HYDROCHLORIDE 850 MG/1
1 TABLET ORAL
Qty: 0 | Refills: 0 | DISCHARGE
Start: 2023-04-03

## 2023-04-03 RX ORDER — METOPROLOL TARTRATE 50 MG
1 TABLET ORAL
Qty: 0 | Refills: 0 | DISCHARGE
Start: 2023-04-03

## 2023-04-03 RX ORDER — LEVOTHYROXINE SODIUM 125 MCG
1 TABLET ORAL
Qty: 0 | Refills: 0 | DISCHARGE
Start: 2023-04-03

## 2023-04-03 RX ADMIN — ATORVASTATIN CALCIUM 40 MILLIGRAM(S): 80 TABLET, FILM COATED ORAL at 19:29

## 2023-04-03 RX ADMIN — Medication 25 MICROGRAM(S): at 06:23

## 2023-04-03 RX ADMIN — Medication 5 MILLIGRAM(S): at 19:29

## 2023-04-03 RX ADMIN — Medication 2 MILLIGRAM(S): at 19:30

## 2023-04-03 RX ADMIN — Medication 25 MILLIGRAM(S): at 08:20

## 2023-04-03 RX ADMIN — DIVALPROEX SODIUM 750 MILLIGRAM(S): 500 TABLET, DELAYED RELEASE ORAL at 19:28

## 2023-04-03 RX ADMIN — METFORMIN HYDROCHLORIDE 1000 MILLIGRAM(S): 850 TABLET ORAL at 19:28

## 2023-04-03 RX ADMIN — METFORMIN HYDROCHLORIDE 1000 MILLIGRAM(S): 850 TABLET ORAL at 08:20

## 2023-04-03 RX ADMIN — DIVALPROEX SODIUM 500 MILLIGRAM(S): 500 TABLET, DELAYED RELEASE ORAL at 08:20

## 2023-04-03 RX ADMIN — CLOZAPINE 100 MILLIGRAM(S): 150 TABLET, ORALLY DISINTEGRATING ORAL at 08:20

## 2023-04-03 RX ADMIN — CLOZAPINE 400 MILLIGRAM(S): 150 TABLET, ORALLY DISINTEGRATING ORAL at 19:28

## 2023-04-03 NOTE — BH INPATIENT PSYCHIATRY DISCHARGE NOTE - NSDCCPCAREPLAN_GEN_ALL_CORE_FT
PRINCIPAL DISCHARGE DIAGNOSIS  Diagnosis: Schizoaffective disorder, bipolar type  Assessment and Plan of Treatment:

## 2023-04-03 NOTE — BH INPATIENT PSYCHIATRY DISCHARGE NOTE - HOSPITAL COURSE
Juma Levi is a 53 y/o male, domiciled at [facility], disabled, single, no children, PMH of HTN, HLD, T2DM, hypothyroidism, past psychiatric history of schizoaffective disorder bipolar type, multiple IPP admissions (last confirmed admission was at [facility] in April 2022), per chart hx of multiple suicide attempts, no past substance use history, presents to the ED BIBEMS after handing a note to staff at [facility] stating he wants God to take him following a dispute with an individual at patient's once weekly day program at [facility].     Pt was seen, evaluated, chart reviewed.  As per nursing staff, no events overnight. On evaluation, pt reports that he is doing well. Offered no new complaints. Is compliant with medication, denies negative side effects. Endorsed good sleep and appetite. Denied A/V hallucinations. Denied paranoia. Denied suicidal/homicidal ideation, intent or plan. Pt is for discharge today. Agreeable with outpatient follow up.     3-22-23: See HPI.  Patient experienced a brief succinct episode of self-harmful  behavior which he attributed to "voices".  Not clear if they are actually commands or somehow make him feel self-destructive.  3-23-23: Patient reports voices and thoughts of his mother's passing and poor support system made him harm himself yesterday. He denies AVH today.  3-24-23: Patient remained in bed until noon. Poor sleep. Denies AVH or suicidal ideation.  3-28-23: more noticeable tardive dyskinesia; reporting blurry vision   3-29-23: increased clozaril 250mgs qhs to 300mg qhs. Restarted ativan 2mg qhs standing for insomnia  3-31-23: trop and CRP ordered today; increase clozaril 300mg qhs to 350 qhs-- plan for 400mg qhs 4/2 4-02-23: increased clozaril 350 qhs to 400mg qhs    #Schizoaffective disorder, bipolar type  - patiently currently on clozaril 100mg AM and 400mg qhs (home dose)-- increased night dose to 150mg 3/22, 200mg 3/24, 250mg 3/27, 300mg 3/29, 350mg 3/31, 400mg 4/2  - clozaril level 109 on 3/19; ANC 1950 on 3/19, ANC 3780 3/27  - c/w home Depakote 500mg daily with additional Depakote 750mg qhs; depakote level 57 on 3/19  - c/w Dulcolax 5mg qhs for constipation (home colace 300mg at 9am replaced with PRN senna)  - c/w ativan 2mg qhs (home medication); 1mg PRN BID added (3/29)  - c/w invega sustenna 156 mg monthly (last given March 10th 2023, next due April 6th 2023)     Juma Levi is a 53 y/o male, domiciled at [facility], disabled, single, no children, PMH of HTN, HLD, T2DM, hypothyroidism, past psychiatric history of schizoaffective disorder bipolar type, multiple IPP admissions (last confirmed admission was at [facility] in April 2022), per chart hx of multiple suicide attempts, no past substance use history, presents to the ED BIBEMS after handing a note to staff at [facility] stating he wants God to take him following a dispute with an individual at patient's once weekly day program at [facility].     Pt was seen, evaluated, chart reviewed.  As per nursing staff, no events overnight. On evaluation, pt reports that he is doing well. Offered no new complaints. Is compliant with medication, denies negative side effects. Endorsed good sleep and appetite. Denied A/V hallucinations. Denied paranoia. Denied suicidal/homicidal ideation, intent or plan. Pt is for discharge today. Agreeable with outpatient follow up.     3-22-23: See HPI.  Patient experienced a brief succinct episode of self-harmful  behavior which he attributed to "voices".  Not clear if they are actually commands or somehow make him feel self-destructive.  3-23-23: Patient reports voices and thoughts of his mother's passing and poor support system made him harm himself yesterday. He denies AVH today.  3-24-23: Patient remained in bed until noon. Poor sleep. Denies AVH or suicidal ideation.  3-28-23: more noticeable tardive dyskinesia; reporting blurry vision   3-29-23: increased clozaril 250mgs qhs to 300mg qhs. Restarted ativan 2mg qhs standing for insomnia  3-31-23: trop and CRP ordered today; increase clozaril 300mg qhs to 350 qhs-- plan for 400mg qhs 4/2 4-02-23: increase clozaril 350mg qhs to 400mg qhs  4-03-23: trop neg; CRP 8.7; EKG normal; pt denies any chest pain, afebrile, WBC WNL    #Schizoaffective disorder, bipolar type  - patiently currently on clozaril 100mg AM and 400mg qhs; (home dose)-- increased night dose to 150mg 3/22, 200mg 3/24, 250mg 3/27, 300mg 3/29, 350mg 3/31, 400mg 4/2  - clozaril level 109 on 3/19, 316 on 3/27; ANC 1950 on 3/19, ANC 3780 3/27, ANC 3700 4/3  - c/w home Depakote 500mg daily with additional Depakote 750mg qhs; depakote level 57 on 3/19  - c/w Dulcolax 5mg qhs for constipation (home colace 300mg at 9am replaced with PRN senna)  - c/w ativan 2mg qhs (home medication); 1mg PRN BID added (3/29)  - c/w invega sustenna 156 mg monthly (last given March 10th 2023, next due April 6th 2023)

## 2023-04-03 NOTE — BH INPATIENT PSYCHIATRY PROGRESS NOTE - NSTXCOPEDATEEST_PSY_ALL_CORE
109
20-Mar-2023

## 2023-04-03 NOTE — BH INPATIENT PSYCHIATRY PROGRESS NOTE - NSTXSUICIDDATETRGT_PSY_ALL_CORE
03-Apr-2023
27-Mar-2023
03-Apr-2023
27-Mar-2023
03-Apr-2023
27-Mar-2023

## 2023-04-03 NOTE — BH INPATIENT PSYCHIATRY PROGRESS NOTE - NSBHMSETHTPROC_PSY_A_CORE
Linear/Normal reasoning
Linear/Normal reasoning
Illogical/Impaired reasoning
Linear/Normal reasoning

## 2023-04-03 NOTE — BH INPATIENT PSYCHIATRY PROGRESS NOTE - NSBHMSESPEECH_PSY_A_CORE
SUBJECTIVE:   CC: Lencho Patel is an 62 year old male who presents for preventative health visit.       Patient has been advised of split billing requirements and indicates understanding: Yes  Healthy Habits:     Getting at least 3 servings of Calcium per day:  Yes    Bi-annual eye exam:  Yes    Dental care twice a year:  Yes    Sleep apnea or symptoms of sleep apnea:  None    Diet:  Diabetic    Frequency of exercise:  2-3 days/week    Duration of exercise:  Greater than 60 minutes    Taking medications regularly:  Yes    Medication side effects:  None    PHQ-2 Total Score: 0    Additional concerns today:  No          Pt would like to check his PSA levels.         Today's PHQ-2 Score:   PHQ-2 (  [employer]) 2020   Q1: Little interest or pleasure in doing things 0   Q2: Feeling down, depressed or hopeless 0   PHQ-2 Score 0   Q1: Little interest or pleasure in doing things Not at all   Q2: Feeling down, depressed or hopeless Not at all   PHQ-2 Score 0       Abuse: Current or Past(Physical, Sexual or Emotional)- No  Do you feel safe in your environment? Yes    Have you ever done Advance Care Planning? (For example, a Health Directive, POLST, or a discussion with a medical provider or your loved ones about your wishes): No, advance care planning information given to patient to review.  Patient plans to discuss their wishes with loved ones or provider.      Social History     Tobacco Use     Smoking status: Former Smoker     Packs/day: 0.50     Years: 15.00     Pack years: 7.50     Types: Cigarettes     Quit date: 2014     Years since quittin.7     Smokeless tobacco: Never Used   Substance Use Topics     Alcohol use: Yes     Alcohol/week: 0.0 standard drinks     Comment: Socially     If you drink alcohol do you typically have >3 drinks per day or >7 drinks per week? No    Alcohol Use 2020   Prescreen: >3 drinks/day or >7 drinks/week? No   Prescreen: >3 drinks/day or >7 drinks/week? -   AUDIT SCORE  
-       Last PSA:   PSA   Date Value Ref Range Status   07/10/2017 0.58 0 - 4 ug/L Final     Comment:     Assay Method:  Chemiluminescence using Siemens Vista analyzer       Reviewed orders with patient. Reviewed health maintenance and updated orders accordingly - Yes      Reviewed and updated as needed this visit by clinical staff                 Reviewed and updated as needed this visit by Provider                    Due for recheck of diabetic surveillance labs.  He continues on metformin monotherapy.    Review of Systems  CONSTITUTIONAL: NEGATIVE for fever, chills, change in weight  INTEGUMENTARY/SKIN: NEGATIVE for worrisome rashes, moles or lesions  EYES: NEGATIVE for vision changes or irritation  ENT: NEGATIVE for ear, mouth and throat problems  RESP: NEGATIVE for significant cough or SOB  CV: NEGATIVE for chest pain, palpitations or peripheral edema  GI: NEGATIVE for nausea, abdominal pain, heartburn, or change in bowel habits   male: negative for dysuria, hematuria, decreased urinary stream, erectile dysfunction, urethral discharge  MUSCULOSKELETAL: NEGATIVE for significant arthralgias or myalgia  NEURO: NEGATIVE for weakness, dizziness or paresthesias  PSYCHIATRIC: NEGATIVE for changes in mood or affect    OBJECTIVE:   There were no vitals taken for this visit.    Physical Exam  GENERAL: healthy, alert and no distress  NECK: no adenopathy, no asymmetry, masses, or scars and thyroid normal to palpation  RESP: lungs clear to auscultation - no rales, rhonchi or wheezes  CV: regular rate and rhythm, normal S1 S2, no S3 or S4, no murmur, click or rub, no peripheral edema and peripheral pulses strong  ABDOMEN: soft, nontender, no hepatosplenomegaly, no masses and bowel sounds normal  MS: no gross musculoskeletal defects noted, no edema        ASSESSMENT/PLAN:   1. Encounter for routine adult health examination without abnormal findings  Discussed cardiac disease risk factor modification including screening for 
"and treating HTN, lipids, DM, and avoidance of tobacco.  Also discussed age appropriate cancer screening recommendations including testicular, prostate, colon and lung cancer as dictated by age group.  Recommended low fat, low salt diet and moderation in any alcohol intake.  Recommended always using seatbelts when in a car.  Recommended never driving after drinking or riding with someone who has been drinking as well.     2. Screening for hyperlipidemia      3. Type 2 diabetes mellitus without complication, without long-term current use of insulin (H)    - HEMOGLOBIN A1C  - BASIC METABOLIC PANEL  - Lipid panel reflex to direct LDL Fasting  - Albumin Random Urine Quantitative with Creat Ratio  - TSH with free T4 reflex  - metFORMIN (GLUCOPHAGE) 1000 MG tablet; TAKE 1 TABLET BY MOUTH TWICE DAILY WITH MEALS  Dispense: 180 tablet; Refill: 3    4. Hypertriglyceridemia    - fenofibrate (TRIGLIDE/LOFIBRA) 160 MG tablet; TAKE 1 TABLET(160 MG) BY MOUTH DAILY  Dispense: 90 tablet; Refill: 3    5. Acute bronchitis with symptoms > 10 days    - albuterol (PROAIR HFA/PROVENTIL HFA/VENTOLIN HFA) 108 (90 Base) MCG/ACT inhaler; Inhale 1-2 puffs into the lungs every 4 hours as needed for shortness of breath / dyspnea  Dispense: 1 Inhaler; Refill: 11    6. Screening for prostate cancer    - PSA, screen    7. Encounter for immunization    - C RIV4 (FLUBLOK) VACCINE RECOMBINANT DNA PRSRV ANTIBIO FREE, IM [31283]    Patient has been advised of split billing requirements and indicates understanding: Yes  COUNSELING:   Reviewed preventive health counseling, as reflected in patient instructions    Estimated body mass index is 22.77 kg/m  as calculated from the following:    Height as of 8/19/19: 1.702 m (5' 7\").    Weight as of 8/19/19: 66 kg (145 lb 6.4 oz).         He reports that he quit smoking about 6 years ago. His smoking use included cigarettes. He has a 7.50 pack-year smoking history. He has never used smokeless "
tobacco.      Counseling Resources:  ATP IV Guidelines  Pooled Cohorts Equation Calculator  FRAX Risk Assessment  ICSI Preventive Guidelines  Dietary Guidelines for Americans, 2010  USDA's MyPlate  ASA Prophylaxis  Lung CA Screening    Jose Maxwell MD  [facility]  
Abnormal as indicated, otherwise normal...
detailed exam
warm and dry
Abnormal as indicated, otherwise normal...

## 2023-04-03 NOTE — BH INPATIENT PSYCHIATRY PROGRESS NOTE - NSBHATTESTBILLING_PSY_A_CORE
57827-Ubidriesxt OBS or IP - low complexity OR 25-34 mins
20802-Jgwgqrwdur OBS or IP - low complexity OR 25-34 mins
90003-Blwfjjeast OBS or IP - low complexity OR 25-34 mins
93322-Sgqqvbyzqn OBS or IP - low complexity OR 25-34 mins
47589-Nngehykpaj OBS or IP - low complexity OR 25-34 mins
67599-Bxdnnogylw OBS or IP - low complexity OR 25-34 mins
14738-Ftwxdgvnop OBS or IP - low complexity OR 25-34 mins
38536-Rggtnsgndw OBS or IP - low complexity OR 25-34 mins
98858-Vtagquklye OBS or IP - low complexity OR 25-34 mins
84800-Jbzxpfjqlk OBS or IP - low complexity OR 25-34 mins

## 2023-04-03 NOTE — BH TREATMENT PLAN - NSTXSUICIDINTERRN_PSY_ALL_CORE
RN to assess patients behavior and be alert for increased signs of worsening mood, impulsivity and agitation.  RN will monitor patient and provide support and comfort and provedie safety on unit.   RN to encourage medication compliance. Provide support and education as needed on Dx, coping skills, medication, and safety planning.   RN to Encourage daily ADL's. RN to Encourage group attendance  RN will assess and intervene for any suicidal ideations. Safety planning

## 2023-04-03 NOTE — BH INPATIENT PSYCHIATRY DISCHARGE NOTE - NSBHMETABOLIC_PSY_ALL_CORE_FT
BMI: BMI (kg/m2): 28 (03-18-23 @ 17:42)  HbA1c: A1C with Estimated Average Glucose Result: 6.5 % (03-19-23 @ 07:55)    Glucose: POCT Blood Glucose.: 103 mg/dL (03-06-23 @ 21:05)    BP: 118/64 (04-02-23 @ 16:09) (115/63 - 139/71)  Lipid Panel: Date/Time: 03-19-23 @ 07:55  Cholesterol, Serum: 161  Direct LDL: --  HDL Cholesterol, Serum: 27  Total Cholesterol/HDL Ration Measurement: --  Triglycerides, Serum: 116

## 2023-04-03 NOTE — BH INPATIENT PSYCHIATRY PROGRESS NOTE - NSBHMETABOLIC_PSY_ALL_CORE_FT
BMI: BMI (kg/m2): 28 (03-18-23 @ 17:42)  HbA1c: A1C with Estimated Average Glucose Result: 6.5 % (03-19-23 @ 07:55)    Glucose: POCT Blood Glucose.: 103 mg/dL (03-06-23 @ 21:05)    BP: 118/56 (04-03-23 @ 09:42) (115/63 - 139/71)  Lipid Panel: Date/Time: 03-19-23 @ 07:55  Cholesterol, Serum: 161  Direct LDL: --  HDL Cholesterol, Serum: 27  Total Cholesterol/HDL Ration Measurement: --  Triglycerides, Serum: 116

## 2023-04-03 NOTE — BH INPATIENT PSYCHIATRY DISCHARGE NOTE - NSBHDCMEDICALFT_PSY_A_CORE
#HTN  - c/w home lopressor 25mg AM    #HLD  - Lipitor 40mg daily    #Hypothyroidism  - c/w home synthroid 25mcg daily    #T2DM  - c/w home metformin 1000mg BID

## 2023-04-03 NOTE — BH INPATIENT PSYCHIATRY PROGRESS NOTE - NSTXPSYCHODATEEST_PSY_ALL_CORE
18-Mar-2023

## 2023-04-03 NOTE — BH INPATIENT PSYCHIATRY PROGRESS NOTE - NSBHFUPINTERVALHXFT_PSY_A_CORE
Patient was seen and evaluated this morning. Chart was reviewed and no acute events were noted. No PRN medications were administered overnight. Upon approach, patient is sitting in bed comfortably. Patient is alert and oriented and engages with interviewer. Patient is cooperative and answers all questions appropriately. Patient reports sleeping and eating well over the weekend. He reports no side effects from medications. He denies any chest pain, shortness or breath. Patient denies any current passive or active suicidal ideation. Patient denies any persecutory delusions and denies any auditory or visual hallucinations.

## 2023-04-03 NOTE — BH INPATIENT PSYCHIATRY DISCHARGE NOTE - NSDCMRMEDTOKEN_GEN_ALL_CORE_FT
allopurinol 100 mg oral tablet: 1 milligram(s) orally once a day  aspirin 81 mg oral tablet, chewable: 1 tab(s) orally once a day  atorvastatin 40 mg oral tablet: 1 tab(s) orally once a day (at bedtime) x 14 days or until MD tells you otherwise   atorvastatin 40 mg oral tablet: 40 milligram(s) orally once a day  benztropine 1 mg oral tablet: 1 tab(s) orally 2 times a day x 14 days or until provider tells you otherwise  bisacodyl 5 mg oral delayed release tablet: 1 tab(s) orally once a day (at bedtime) x 14 days or until MD tells you otherwise   cloZAPine 100 mg oral tablet: Take 3 tabs orally in the morning and 4 tabs orally at bedtime x 14 days or until MD tells you otherwise   divalproex sodium 250 mg oral delayed release tablet: Take 2 tabs orally in the morning and 3 tab(s) orally at bedtime x 14 days or until MD tells you otherwise   esomeprazole 20 mg oral delayed release capsule: 1 cap(s) orally once a day  Fleet Enema 19 g-7 g rectal enema: 133 milliliter(s) rectally once   gabapentin 100 mg oral capsule: 1 cap(s) orally 3 times a day  hydrALAZINE 50 mg oral tablet: 50 milligram(s) orally 2 times a day  levothyroxine 25 mcg (0.025 mg) oral tablet: 1 tab(s) orally once a day x 14 days or until MD tells you otherwise   metFORMIN 1000 mg oral tablet: 1 tab(s) orally 2 times a day x 14 days or until MD tells you otherwise   metoprolol succinate 25 mg oral tablet, extended release: 1 tab(s) orally once a day x 14 days or until MD tells you otherwise   metoprolol succinate 50 mg oral tablet, extended release: 1 tab(s) orally once a day  MiraLax oral powder for reconstitution: 17 gram(s) orally once a day   oxyCODONE 5 mg oral tablet: 1 tab(s) orally every 6 hours, As Needed  PROzac 40 mg oral capsule: 1 cap(s) orally once a day  risperiDONE 3 mg oral tablet: 1 tab(s) orally once a day (at bedtime) x 14 days or until MD tells you otherwise    sodium bicarbonate 650 mg oral tablet: 650 milligram(s) orally 2 times a day  torsemide 20 mg oral tablet: 1 tab(s) orally once a day   atorvastatin 40 mg oral tablet: 1 tab(s) orally once a day (at bedtime)  bisacodyl 5 mg oral delayed release tablet: 1 tab(s) orally once a day (at bedtime)  cloZAPine 100 mg oral tablet: 1 tab(s) orally once a day  cloZAPine 200 mg oral tablet: 2 tab(s) orally once a day (at bedtime)  divalproex sodium 250 mg oral delayed release tablet: 3 tab(s) orally once a day (at bedtime)  divalproex sodium 500 mg oral delayed release tablet: 1 tab(s) orally once a day  levothyroxine 25 mcg (0.025 mg) oral tablet: 1 tab(s) orally once a day  LORazepam 2 mg oral tablet: 1 tab(s) orally once a day (at bedtime)  metFORMIN 1000 mg oral tablet: 1 tab(s) orally 2 times a day  metoprolol succinate 25 mg oral tablet, extended release: 1 tab(s) orally once a day  MiraLax oral powder for reconstitution: 17 gram(s) orally once a day   senna leaf extract oral tablet: 1 tab(s) orally once a day As needed Constipation   atorvastatin 40 mg oral tablet: 1 tab(s) orally once a day (at bedtime) MDD: 40mg  bisacodyl 5 mg oral delayed release tablet: 1 tab(s) orally once a day (at bedtime) MDD: 5mg  cloZAPine 100 mg oral tablet: 1 tab(s) orally once a day MDD: 500mg  cloZAPine 200 mg oral tablet: 2 tab(s) orally once a day (at bedtime) MDD: 500mg  divalproex sodium 250 mg oral delayed release tablet: 3 tab(s) orally once a day (at bedtime) MDD: 1250mg  divalproex sodium 500 mg oral delayed release tablet: 1 tab(s) orally once a day MDD: 1250mg  levothyroxine 25 mcg (0.025 mg) oral tablet: 1 tab(s) orally once a day  LORazepam 2 mg oral tablet: 1 tab(s) orally once a day (at bedtime)  metFORMIN 1000 mg oral tablet: 1 tab(s) orally 2 times a day MDD: 2000mg  metoprolol succinate 25 mg oral tablet, extended release: 1 tab(s) orally once a day MDD: 25mg  MiraLax oral powder for reconstitution: 17 gram(s) orally once a day as needed for  constipation

## 2023-04-03 NOTE — BH INPATIENT PSYCHIATRY PROGRESS NOTE - NSBHFUPMEDSE_PSY_A_CORE
None known
Yes
Yes
None known
Yes
None known

## 2023-04-03 NOTE — BH TREATMENT PLAN - NSTXPSYCHOGOAL_PSY_ALL_CORE
Will be able to report experiencing hallucinations to staff
Will be able to report experiencing hallucinations to staff
Will engage in a 15 minute conversation with no irrational content

## 2023-04-03 NOTE — BH INPATIENT PSYCHIATRY PROGRESS NOTE - LEVEL OF CONSCIOUSNESS
Alert
Other
Alert
FAMILY HISTORY:  Father  Still living? Unknown  FH: hypertension, Age at diagnosis: Age Unknown  FHx: diabetes mellitus, Age at diagnosis: Age Unknown    Mother  Still living? Unknown  FH: hypertension, Age at diagnosis: Age Unknown  FHx: diabetes mellitus, Age at diagnosis: Age Unknown

## 2023-04-03 NOTE — BH INPATIENT PSYCHIATRY PROGRESS NOTE - NSTXPSYCHOGOAL_PSY_ALL_CORE
Will be able to report experiencing hallucinations to staff
Will engage in a 15 minute conversation with no irrational content
Will be able to report experiencing hallucinations to staff
Will engage in a 15 minute conversation with no irrational content
Will be able to report experiencing hallucinations to staff
Will engage in a 15 minute conversation with no irrational content
Will be able to report experiencing hallucinations to staff

## 2023-04-03 NOTE — BH INPATIENT PSYCHIATRY PROGRESS NOTE - NSTXCOPEDATETRGT_PSY_ALL_CORE
03-Apr-2023
17-Apr-2023
03-Apr-2023

## 2023-04-03 NOTE — BH INPATIENT PSYCHIATRY PROGRESS NOTE - NSBHMSEMOVE_PSY_A_CORE
No abnormal movements
Tremors/Other
Other
No abnormal movements
Other
No abnormal movements
Other

## 2023-04-03 NOTE — BH INPATIENT PSYCHIATRY PROGRESS NOTE - NSBHATTESTCOMMENTATTENDFT_PSY_A_CORE
Case discussed with resident I concur with findings and plan
Discussed case at length with resident.  I agree with findings and plan.
I have discussed case with resident and I concur with findings and plan.
   Case discussed with resident.  I concur with the findings and plan.
Case discussed with resident I concur with findings and plan
Discussed case with resident. I concur with findings and plan.
I HAVE DISCUSSED CASE WITH RESIDENT. I CONCUR WITH FINDINGS AND PLAN.

## 2023-04-03 NOTE — BH INPATIENT PSYCHIATRY PROGRESS NOTE - NSBHATTESTTYPEVISIT_PSY_A_CORE
Attending with Resident/Fellow/Student
Attending Only
Attending Only
Attending with Resident/Fellow/Student

## 2023-04-03 NOTE — BH DISCHARGE NOTE NURSING/SOCIAL WORK/PSYCH REHAB - PATIENT PORTAL LINK FT
You can access the FollowMyHealth Patient Portal offered by [facility] by registering at the following website: [email or web address]. By joining [facility] [employer]’s FollowMyHealth portal, you will also be able to view your health information using other applications (apps) compatible with our system.

## 2023-04-03 NOTE — BH INPATIENT PSYCHIATRY PROGRESS NOTE - NSBHMSEKNOWHOW_PSY_ALL_CORE
Vocabulary
Current Events
Vocabulary

## 2023-04-03 NOTE — BH INPATIENT PSYCHIATRY PROGRESS NOTE - NSBHCHARTREVIEWINVESTIGATE_PSY_A_CORE FT
Ventricular Rate 108 BPM    Atrial Rate 108 BPM    P-R Interval 146 ms    QRS Duration 100 ms    Q-T Interval 342 ms    QTC Calculation(Bazett) 458 ms    P Axis 68 degrees    R Axis 11 degrees    T Axis 33 degrees    Diagnosis Line Sinus tachycardia    otherwise unremarkable    Confirmed by MARLEN MESSER, ESTEFANY ([id number]) on 3/19/2023 8:38:23 AM
  ACC: [id number] EXAM:  CT BRAIN   ORDERED BY: RADHA KISSTOÑO     PROCEDURE DATE:  03/22/2023          INTERPRETATION:  CLINICAL INDICATION: Hit head against the wall.   Schizophrenia    Technique: CT of the head was performed without contrast.    Multiple contiguous axial images were acquired from the skullbase to the   vertex without the administration of intravenous contrast.  Coronal and   sagittal reformations were made.    COMPARISON:  prior head CT dated 3/17/2023    FINDINGS:    The ventricles and sulci are unremarkable in appearance.     There is no intraparenchymal hematoma, mass effect or midline shift. No   abnormal extra-axial fluid collections are present.    The calvarium is intact. Scattered sinus mucosal thickening in the   ethmoid and left maxillary and sphenoid sinuses as partially visualized.    IMPRESSION:  Stable exam. No acute intracranial pathology.    --- End of Report ---            RICHARD PALMER MD; Attending Radiologist  This document has been electronically signed. Mar 22 2023 10:16PM  
Ventricular Rate 108 BPM    Atrial Rate 108 BPM    P-R Interval 146 ms    QRS Duration 100 ms    Q-T Interval 342 ms    QTC Calculation(Bazett) 458 ms    P Axis 68 degrees    R Axis 11 degrees    T Axis 33 degrees    Diagnosis Line Sinus tachycardia    otherwise unremarkable    Confirmed by MARLEN MESSER, ESTEFANY ([id number]) on 3/19/2023 8:38:23 AM
Ventricular Rate 108 BPM    Atrial Rate 108 BPM    P-R Interval 146 ms    QRS Duration 100 ms    Q-T Interval 342 ms    QTC Calculation(Bazett) 458 ms    P Axis 68 degrees    R Axis 11 degrees    T Axis 33 degrees    Diagnosis Line Sinus tachycardia    otherwise unremarkable    Confirmed by MARLEN MESSER, ESTEFANY ([id number]) on 3/19/2023 8:38:23 AM
Ventricular Rate 94 BPM    Atrial Rate 94 BPM    P-R Interval 176 ms    QRS Duration 104 ms    Q-T Interval 370 ms    QTC Calculation(Bazett) 462 ms    P Axis 67 degrees    R Axis 21 degrees    T Axis 43 degrees    Diagnosis Line Normal sinus rhythm  Normal ECG    Confirmed by MIREYA MESSER, MARK ([id number]) on 4/3/2023 10:07:57 AM

## 2023-04-03 NOTE — BH TREATMENT PLAN - NSTXCOPEGOAL_PSY_ALL_CORE
Refill request on Propanolol   [facility] on [address]  
Identify and utilize 2 coping skills that meet their needs

## 2023-04-03 NOTE — BH INPATIENT PSYCHIATRY PROGRESS NOTE - NSBHMSETHTCONTENT_PSY_A_CORE
Unremarkable
Unremarkable
Suicidality
Unremarkable

## 2023-04-03 NOTE — BH INPATIENT PSYCHIATRY PROGRESS NOTE - NSBHMSEINTELL_PSY_A_CORE
Below Average

## 2023-04-03 NOTE — BH INPATIENT PSYCHIATRY PROGRESS NOTE - NSDCCRITERIA_PSY_ALL_CORE
- No longer danger to self or others  - Compliant with medications  - Proper adjustments to psychotropic medications made  
- No longer danger to self or others  - Compliant with medications  - Proper adjustments to psychotropic medications made  
thoughts will more organized   able to take care for self 
- No longer danger to self or others  - Compliant with medications  - Proper adjustments to psychotropic medications made  

## 2023-04-03 NOTE — BH INPATIENT PSYCHIATRY DISCHARGE NOTE - OTHER PAST PSYCHIATRIC HISTORY (INCLUDE DETAILS REGARDING ONSET, COURSE OF ILLNESS, INPATIENT/OUTPATIENT TREATMENT)
Diagnoses: Schizoaffective Disorder, Bipolar Type     Inpatient: multiple IPP admissions (last confirmed admission was at [facility] in April 2022)     Outpatient: Dr. Pearson.

## 2023-04-03 NOTE — BH INPATIENT PSYCHIATRY PROGRESS NOTE - NSICDXBHPRIMARYDX_PSY_ALL_CORE
Schizoaffective disorder, bipolar type   F25.0  

## 2023-04-03 NOTE — BH TREATMENT PLAN - NSTXCOPEINTERSW_PSY_ALL_CORE
SW will meet with patient to provide support, education, collateral and referrals. Patient will be encouraged to attend groups.
SW will meet with patient to provide support, education, referrals and collateral contacts.
SW will meet with patient to provide support, education, referrals and collateral contacts.

## 2023-04-03 NOTE — BH DISCHARGE NOTE NURSING/SOCIAL WORK/PSYCH REHAB - NSDCNEXTLEVELDATE_PSY_ALL_CORE
Anesthesia Evaluation     Patient summary reviewed and Nursing notes reviewed   NPO Solid Status: > 8 hours             Airway   Mallampati: II  TM distance: >3 FB  Neck ROM: full  no difficulty expected  Dental - normal exam   (+) poor dentition    Comment: 3 teeth, 1 loose      Pulmonary - negative pulmonary ROS and normal exam   Cardiovascular - normal exam    (+) pacemaker pacemaker,     ROS comment: Complete heart block    Neuro/Psych  (+) syncope,    GI/Hepatic/Renal/Endo - negative ROS     Musculoskeletal (-) negative ROS    Abdominal  - normal exam   Substance History   (+) drug use      Comment: Marijuana every other evening   OB/GYN negative ob/gyn ROS         Other                          Anesthesia Plan    ASA 4     general     intravenous induction     Anesthetic plan, risks, benefits, and alternatives have been provided, discussed and informed consent has been obtained with: patient.    Plan discussed with CRNA.      
04-Apr-2023 02:00

## 2023-04-03 NOTE — BH DISCHARGE NOTE NURSING/SOCIAL WORK/PSYCH REHAB - NSDCVIVACCINE_GEN_ALL_CORE_FT
Tdap; 21-Aug-2022 18:52; Kathrin Lebron (RN); [employer]; [id number] (Exp. Date: 22-Sep-2024); IntraMuscular; Deltoid Right.; 0.5 milliLiter(s); VIS (VIS Published: 09-May-2013, VIS Presented: 21-Aug-2022);

## 2023-04-03 NOTE — BH TREATMENT PLAN - NSTXPSYCHOINTERRN_PSY_ALL_CORE
RN will assess for command auditory hallucination and delsuional thoughts  RN will provide daily medication regimen  RN will offer PRN medication for worsening hallucinations  RN will ensure the enviroment is safe  RN will educate on coping skills/ encourage distractions to help manage auditory hallucination

## 2023-04-03 NOTE — BH INPATIENT PSYCHIATRY DISCHARGE NOTE - HPI (INCLUDE ILLNESS QUALITY, SEVERITY, DURATION, TIMING, CONTEXT, MODIFYING FACTORS, ASSOCIATED SIGNS AND SYMPTOMS)
Juma Levi is a 53 y/o male, domiciled at [facility], disabled, single, no children, PMH of HTN, HLD, DM, hypothyroidism, past psychiatric history of schizoaffective disorder bipolar type, multiple IPP admissions (last confirmed admission was at [facility] in April 2022), per chart hx of multiple suicide attempts, no past substance use history, presents to the ED BIBEMS after handing a note to staff at [facility] stating he wants God to take him following a dispute with an individual at patient's once weekly day program at [facility].     Upon initial assessment in ED 3/18, patient was seen holding his head in his hands and rocking, limited in following directions. He became agitated, started slamming his head with his palms, then walked towards the wall and started head banging. He was verbally redirectable by staff initially but upon continuation of interview, the patient stopped answering questions and began posturing aggressively towards staff in the room. Later that same day, patient was tearful during the exam, appearing scared. Patient confirms he has fear returning to his residence at [place] because he cannot find the man named Cyrus to whom he owes money. States he feels depressed and fearful. Affirms self-harming since being in the ED yesterday. States he does not feel like his medications are working.     Collateral obtained on 3/20/23 from patient's residence at [facility], director Mala Beverly [phone]. Patient was reportedly sent to ED because a handwritten note was found saying patient wanted God to kill him. Later that same day 3/17, patient was found to cut his wrist using a soda can. A day prior, 3/16, patient was at [facility] and was reported to be aggressive and agitated. He was sent to [facility] but was discharged. Earlier on 3/7, patient was reported to have suicidal ideation and was banging his head on the wall.     Patient's reported updated medications are as follows confirmed by medical director at [facility] and [facility]:  - Lipitor 40mg daily  - Dulcolax 5mg qhs  - Clozaril 500mg daily (100mg 9am, 400mg qhs)  - Depakote 250mg qhs  - Depakote 500mg BID (1pm and 9pm)  - colace 300mg at 9am  - synthroid 25mcg daily  - ativan 2mg qhs  - metformin 1000mg BID  - lopressor 25mg AM  - invega sustenna 156 mg monthly (last given March 10th 2023, next due April 6th 2023)

## 2023-04-03 NOTE — BH TREATMENT PLAN - NSTXPLANTHERAPYSESSIONSFT_PSY_ALL_CORE
03-22-23  Type of therapy: Self esteem,Transition planning  Type of session: Group  Level of patient participation: Engaged,Participates  Duration of participation: 45 minutes  Therapy conducted by: Social work  Therapy Summary: Patient attended the Social Work Group with SW and peers. The topic of setting and achieving goals was discussed and patients were asked to identify their own personal goals, including career, social/ family and attitude. The effects of mental health on goal obtainment were explored as well as the importance of mental health treatment follow up. Patients offered support and feedback while  facilitated the discussion.      Juma participated and discussed his goal to "be more social, meet new people" and continue to work on his mental health. He was open to peer feedback and support.    03-22-23  Type of therapy: Coping skills  Type of session: Group  Level of patient participation: Attentive,Engaged,Participates  Duration of participation: 45 minutes  Therapy conducted by: Social work  Therapy Summary: Patient attended the [Patient] Support Group with  and peers. The benefits and types of social supports were discussed as well as ways to build a strong support network.  Patients provided support and feedback while  facilitated the discussion.    Juma participated and discussed his desire to build his social support system.    03-27-23  Type of therapy: Coping skills,Creative arts therapy,Inspiration and motiviation,Music therapy,Self esteem,Stress management  Type of session: Group  Level of patient participation: Engaged,Participates  Duration of participation: 45 minutes  Therapy conducted by: Psych rehab  Therapy Summary: Pt may need remindres to remain focused .    03-28-23  Type of therapy: Dietary group,Educational/vocational,Health and fitness  Type of session: Group  Level of patient participation: Attentive,Engaged,Participates  Duration of participation: 30 minutes  Therapy conducted by: Nursing  Therapy Summary: Pt attended and participated in group.    03-28-23  Type of therapy: Psychoeducation  Type of session: Group  Level of patient participation: Participates  Duration of participation: 60 minutes  Therapy conducted by: Social work  Therapy Summary: Group session facilitated by SW intern to assist in developing greater insight into personal values and belief systems. Reflective questions were asked to prompt discussion and self-reflection. Through interactive group work participants gained greater insight into how their values and belief systems impact behavior and relationships.     Juma was able to explore and identify how his values have impacted his life and relationships in the past and identified the benefits of developing greater insight and the importance of self-reflection. Juma was an active and engaged participant in the group exercise and discussion.    03-28-23  Type of therapy: Daily living skills  Type of session: Group  Level of patient participation: Participates  Duration of participation: 60 minutes  Therapy conducted by: Social work  Therapy Summary: Group session facilitated by SW intern to highlight the importance of self-care during IPP hospitalization and post discharge.     Juma was engaged and present during the group session. Juma is able to recognize the importance of self-care and endorses realistic goals post discharge to maintain healthy habits when in the community.  
  03-28-23  Type of therapy: Dietary group,Educational/vocational,Health and fitness  Type of session: Group  Level of patient participation: Attentive,Engaged,Participates  Duration of participation: 30 minutes  Therapy conducted by: Nursing  Therapy Summary: Pt attended and participated in group.    03-28-23  Type of therapy: Psychoeducation  Type of session: Group  Level of patient participation: Participates  Duration of participation: 60 minutes  Therapy conducted by: Social work  Therapy Summary: Group session facilitated by SW intern to assist in developing greater insight into personal values and belief systems. Reflective questions were asked to prompt discussion and self-reflection. Through interactive group work participants gained greater insight into how their values and belief systems impact behavior and relationships.     Juma was able to explore and identify how his values have impacted his life and relationships in the past and identified the benefits of developing greater insight and the importance of self-reflection. Juma was an active and engaged participant in the group exercise and discussion.    03-28-23  Type of therapy: Daily living skills  Type of session: Group  Level of patient participation: Participates  Duration of participation: 60 minutes  Therapy conducted by: Social work  Therapy Summary: Group session facilitated by SW intern to highlight the importance of self-care during IPP hospitalization and post discharge.     Juma was engaged and present during the group session. Juma is able to recognize the importance of self-care and endorses realistic goals post discharge to maintain healthy habits when in the community.    03-29-23  Type of therapy: Dialectical behavior therapy,Coping skills  Type of session: Group  Level of patient participation: Participates  Duration of participation: 45 minutes  Therapy conducted by: Social work  Therapy Summary: Patient attended the Crisis Skills Group with  and peers. “Urge Surfing” was taught which helps with managing unwanted behaviors by acknowledging the urge, riding it out, managing triggers and using delay and distraction skills.     Juma participated in the group dialogue but, at times, sat quietly with his eyes closed. He appeared open to the therapeutic intervention. He discussed ways he can "delay and distract" such as writing poetry and listening to music. He remained present for the duration of the group.    03-31-23  Type of therapy: Dialectical behavior therapy,Coping skills  Type of session: Group  Level of patient participation: Engaged,Participates  Duration of participation: 45 minutes  Therapy conducted by: Social work  Therapy Summary: Patient attended the Crisis Skills Group with  and peers. The TIPP Skill was reviewed which identifies ways to help us cope with extreme emotions (by “tipping” our body chemistry). The following steps were reviewed: “Temperature, Intense Exercise, Paced Breathing, and Progressive Muscle Relaxation”.     Juma was an active participant. He appeared open to the therapeutic intervention. He required reminders, at times, to not interrupt peers when it was their time to share. He feels he can use "progressive muscle relaxation" to help cope with extreme emotions and remained present for the groups duration.    03-31-23  Type of therapy: Inspiration and motiviation,Self esteem  Type of session: Group  Level of patient participation: Attentive,Engaged,Participates  Duration of participation: 45 minutes  Therapy conducted by: Social work  Therapy Summary: Patient attended the Social Work group with SW and peers. The three good people, strengths-spotting exercise was reviewed to help patients enter the "strength-spotting mindset". Patients were encouraged to consider their own strengths and how they can be used to help overcome challenges. SW facilitated the group and patients provided feedback and support.    04-03-23  Type of therapy: Coping skills,Creative arts therapy,Leisure development,Stress management  Type of session: Group  Level of patient participation: Engaged,Participates  Duration of participation: 45 minutes  Therapy conducted by: Psych rehab  Therapy Summary: Pt is attending RT sessions , pt enjoys art and writing poetry.    04-03-23  Type of therapy: Dialectical behavior therapy,Coping skills  Type of session: Group  Level of patient participation: Engaged,Participates  Duration of participation: 45 minutes  Therapy conducted by: Social work  Therapy Summary: Patient attended the Crisis Skills Group with  and peers. The DBT Wise Mind ACCEPTS skill was reviewed (a Distress Tolerance skill that uses distraction to temporarily distance yourself from a distressing situation or emotion). The following skills were reviewed: “activities, contributing, comparisons, emotions, pushing away, thoughts, sensations”.     Juma was an active participant. He appeared open to the therapeutic intervention. He feels he can use activities and comparisons/ "gratitude" to help cope with stressors. He is encouraged to attend future sessions.  
  03-20-23  Type of therapy: Coping skills,Inspiration and motiviation,Leisure development,Self esteem,Stress management  Type of session: Individual  Level of patient participation: Participated with encouragement  Duration of participation: Less than 15 minutes  Therapy conducted by: Psych rehab  Therapy Summary: Pt will  need increased encouragement .    03-20-23  Type of therapy: Dialectical behavior therapy,Coping skills  Type of session: Group  Level of patient participation: Attentive,Engaged,Participates  Duration of participation: 45 minutes  Therapy conducted by: Social work  Therapy Summary: Patient attended the Crisis Skills Group with  and peers. The STOP Skill was reviewed which is a distress tolerance DBT Skill that helps one respond to stressors by staying in control of emotions and not acting solely on impulse. The following steps were reviewed: “Stop, Take a step back, Observe, Proceed effectively”.     Juma was an active participant. He appeared open to the therapeutic intervention. He was able to identify benefits of using the STOP skill. He remained present for the duration of the group and engaged well with his peers.    03-20-23  Type of therapy: Inspiration and motiviation,Self esteem  Type of session: Group  Level of patient participation: Attentive,Engaged,Participates  Duration of participation: 45 minutes  Therapy conducted by: Social work  Therapy Summary: Patient attended the Social Work group with SW and peers. The three good people, strengths-spotting exercise was reviewed to help patients enter the "strength-spotting mindset". Patients were encouraged to consider their own strengths and how they can be used to help overcome challenges. SW facilitated the group and patients provided feedback and support.     Juma was an active participant. He identified his mother as a person he admires (for her strength) as she was able to ask for help for her family when needed. He offered support and feedback to peers when appropriate.

## 2023-04-03 NOTE — BH DISCHARGE NOTE NURSING/SOCIAL WORK/PSYCH REHAB - NSCDUDCCRISIS_PSY_A_CORE
[facility]  1 (863) [facility] hospital[facility] ([phone])  Text "WELL" to 60827  Website: www.[employer]/.[facility] 1 (346) 621-HOPE (9841) Website: www.[facility]hor[facility].org/.National Suicide Prevention Lifeline 7 (329) 420-5299/.  [employer]  1 (725) LIFENET (712-5392)/988 Suicide and Crisis Lifeline

## 2023-04-03 NOTE — BH INPATIENT PSYCHIATRY PROGRESS NOTE - NSBHCONSBHPROVDETAILS_PSY_A_CORE  FT
Information obtained regarding patient's medications from [facility] director and [facility] clinic
Information obtained regarding patient's medications from [facility] director and [facility] clinic
Information obtained regarding patient's medications from [facility] director and [facility] clinic
Information obtained regarding patient's medications from [facility] director and [facility] clinic
Information obtained regarding patient's medications from [facility] director and [facility] clinic
Information obtained regarding patient's medications from [facility] director and [facility] clinic
Information obtained regarding patient's medications from [facility] director and [facility] clinic
Information obtained regarding patient's medications from [facility] director and [facility] clinic
Information obtained regarding patient's medications from [facility] director and [facility] clinic
Information obtained regarding patient's medications from [facility] director and [facility] clinic
Information obtained regarding patient's medications from [facility] director and [facility]

## 2023-04-03 NOTE — BH INPATIENT PSYCHIATRY PROGRESS NOTE - OTHER
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

## 2023-04-03 NOTE — BH INPATIENT PSYCHIATRY PROGRESS NOTE - NSBHCHARTREVIEWVS_PSY_A_CORE FT
Vital Signs Last 24 Hrs  T(C): 37.2 (04-03-23 @ 09:42), Max: 37.2 (04-03-23 @ 09:42)  T(F): 98.9 (04-03-23 @ 09:42), Max: 98.9 (04-03-23 @ 09:42)  HR: 90 (04-03-23 @ 09:42) (89 - 95)  BP: 118/56 (04-03-23 @ 09:42) (118/56 - 129/65)  BP(mean): --  RR: 18 (04-03-23 @ 09:42) (16 - 18)  SpO2: --

## 2023-04-03 NOTE — BH INPATIENT PSYCHIATRY PROGRESS NOTE - CURRENT MEDICATION
MEDICATIONS  (STANDING):  atorvastatin 40 milliGRAM(s) Oral at bedtime  bisacodyl 5 milliGRAM(s) Oral at bedtime  cloZAPine 400 milliGRAM(s) Oral at bedtime  cloZAPine 100 milliGRAM(s) Oral daily  divalproex  milliGRAM(s) Oral at bedtime  divalproex  milliGRAM(s) Oral daily  levothyroxine 25 MICROGram(s) Oral daily  LORazepam     Tablet 2 milliGRAM(s) Oral at bedtime  metFORMIN 1000 milliGRAM(s) Oral two times a day  metoprolol succinate ER 25 milliGRAM(s) Oral daily    MEDICATIONS  (PRN):  acetaminophen     Tablet .. 325 milliGRAM(s) Oral every 6 hours PRN Mild Pain (1 - 3), Moderate Pain (4 - 6)  diphenhydrAMINE 25 milliGRAM(s) Oral every 4 hours PRN periporal movements and discomfort  hydrOXYzine hydrochloride 50 milliGRAM(s) Oral every 6 hours PRN Anxiety  LORazepam     Tablet 1 milliGRAM(s) Oral two times a day PRN agitation/anxiety  senna 1 Tablet(s) Oral daily PRN Constipation

## 2023-04-03 NOTE — BH INPATIENT PSYCHIATRY PROGRESS NOTE - NSTXPSYCHODATETRGT_PSY_ALL_CORE
08-Apr-2023
27-Mar-2023
08-Apr-2023
08-Apr-2023
27-Mar-2023
08-Apr-2023
27-Mar-2023
08-Apr-2023
08-Apr-2023

## 2023-04-03 NOTE — BH INPATIENT PSYCHIATRY DISCHARGE NOTE - NSBHDCSIGEVENTSFT_PSY_A_CORE
3-22-23: patient banged head against wall requiring head CT which was benign (due to AH)  3-29-23: patient banged head against nursing window requiring PRNs (due to [facility])

## 2023-04-03 NOTE — BH INPATIENT PSYCHIATRY PROGRESS NOTE - NSBHASSESSSUMMFT_PSY_ALL_CORE
Juma Levi is a 51 y/o male, domiciled at [facility], disabled, single, no children, PMH of HTN, HLD, T2DM, hypothyroidism, past psychiatric history of schizoaffective disorder bipolar type, multiple IPP admissions (last confirmed admission was at [facility] in April 2022), per chart hx of multiple suicide attempts, no past substance use history, presents to the ED BIBEMS after handing a note to staff at [facility] stating he wants God to take him following a dispute with an individual at patient's once weekly day program at [facility].     Patient has been showing improvement in mood and thought process. Plan to continue to titrate medication back to home dose, requiring further hospitalization at present time.    3-22-23: See HPI.  Patient experienced a brief succinct episode of self-harmful  behavior which he attributed to "voices".  Not clear if they are actually commands or somehow make him feel self-destructive.  3-23-23: Patient reports voices and thoughts of his mother's passing and poor support system made him harm himself yesterday. He denies AVH today.  3-24-23: Patient remained in bed until noon. Poor sleep. Denies AVH or suicidal ideation.  3-28-23: more noticeable tardive dyskinesia; reporting blurry vision   3-29-23: increased clozaril 250mgs qhs to 300mg qhs. Restarted ativan 2mg qhs standing for insomnia  3-31-23: trop and CRP ordered today; increase clozaril 300mg qhs to 350 qhs-- plan for 400mg qhs 4/2 4-02-23: increase clozaril 350mg qhs to 400mg qhs  4-03-23: trop neg; CRP 8.7; EKG normal; pt denies any chest pain, afebrile, WBC WNL    #Schizoaffective disorder, bipolar type  - patiently currently on clozaril 100mg AM and 400mg qhs; (home dose)-- increased night dose to 150mg 3/22, 200mg 3/24, 250mg 3/27, 300mg 3/29, 350mg 3/31, 400mg 4/2  - clozaril level 109 on 3/19, 316 on 3/27; ANC 1950 on 3/19, ANC 3780 3/27, ANC 3700 4/3  - c/w home Depakote 500mg daily with additional Depakote 750mg qhs; depakote level 57 on 3/19  - c/w Dulcolax 5mg qhs for constipation (home colace 300mg at 9am replaced with PRN senna)  - c/w ativan 2mg qhs (home medication); 1mg PRN BID added (3/29)  - c/w invega sustenna 156 mg monthly (last given March 10th 2023, next due April 6th 2023)    #HTN  - c/w home lopressor 25mg AM    #HLD  - Lipitor 40mg daily    #Hypothyroidism  - c/w home synthroid 25mcg daily    #T2DM  - c/w home metformin 1000mg BID

## 2023-04-04 VITALS
TEMPERATURE: 98 F | DIASTOLIC BLOOD PRESSURE: 75 MMHG | RESPIRATION RATE: 18 BRPM | SYSTOLIC BLOOD PRESSURE: 132 MMHG | HEART RATE: 99 BPM

## 2023-04-04 PROCEDURE — 99238 HOSP IP/OBS DSCHRG MGMT 30/<: CPT

## 2023-04-04 RX ORDER — CLOZAPINE 150 MG/1
1 TABLET, ORALLY DISINTEGRATING ORAL
Qty: 14 | Refills: 0
Start: 2023-04-04 | End: 2023-04-17

## 2023-04-04 RX ORDER — DIVALPROEX SODIUM 500 MG/1
3 TABLET, DELAYED RELEASE ORAL
Qty: 42 | Refills: 0
Start: 2023-04-04 | End: 2023-04-17

## 2023-04-04 RX ORDER — DIVALPROEX SODIUM 500 MG/1
1 TABLET, DELAYED RELEASE ORAL
Qty: 14 | Refills: 0
Start: 2023-04-04 | End: 2023-04-17

## 2023-04-04 RX ORDER — POLYETHYLENE GLYCOL 3350 17 G/17G
17 POWDER, FOR SOLUTION ORAL
Qty: 1 | Refills: 0
Start: 2023-04-04 | End: 2023-04-17

## 2023-04-04 RX ORDER — ATORVASTATIN CALCIUM 80 MG/1
1 TABLET, FILM COATED ORAL
Qty: 14 | Refills: 0
Start: 2023-04-04 | End: 2023-04-17

## 2023-04-04 RX ORDER — CLOZAPINE 150 MG/1
2 TABLET, ORALLY DISINTEGRATING ORAL
Qty: 28 | Refills: 0
Start: 2023-04-04 | End: 2023-04-17

## 2023-04-04 RX ORDER — LEVOTHYROXINE SODIUM 125 MCG
1 TABLET ORAL
Qty: 14 | Refills: 0
Start: 2023-04-04 | End: 2023-04-17

## 2023-04-04 RX ORDER — METFORMIN HYDROCHLORIDE 850 MG/1
1 TABLET ORAL
Qty: 28 | Refills: 0
Start: 2023-04-04 | End: 2023-04-17

## 2023-04-04 RX ORDER — METOPROLOL TARTRATE 50 MG
1 TABLET ORAL
Qty: 14 | Refills: 0
Start: 2023-04-04 | End: 2023-04-17

## 2023-04-04 RX ADMIN — Medication 50 MILLIGRAM(S): at 00:09

## 2023-04-04 RX ADMIN — CLOZAPINE 100 MILLIGRAM(S): 150 TABLET, ORALLY DISINTEGRATING ORAL at 08:22

## 2023-04-04 RX ADMIN — Medication 25 MICROGRAM(S): at 05:10

## 2023-04-04 RX ADMIN — DIVALPROEX SODIUM 500 MILLIGRAM(S): 500 TABLET, DELAYED RELEASE ORAL at 08:22

## 2023-04-04 RX ADMIN — METFORMIN HYDROCHLORIDE 1000 MILLIGRAM(S): 850 TABLET ORAL at 08:23

## 2023-04-04 RX ADMIN — Medication 25 MILLIGRAM(S): at 08:23

## 2023-04-07 DIAGNOSIS — F17.210 NICOTINE DEPENDENCE, CIGARETTES, UNCOMPLICATED: ICD-10-CM

## 2023-04-07 DIAGNOSIS — E03.9 HYPOTHYROIDISM, UNSPECIFIED: ICD-10-CM

## 2023-04-07 DIAGNOSIS — E11.9 TYPE 2 DIABETES MELLITUS WITHOUT COMPLICATIONS: ICD-10-CM

## 2023-04-07 DIAGNOSIS — I10 ESSENTIAL (PRIMARY) HYPERTENSION: ICD-10-CM

## 2023-04-07 DIAGNOSIS — F22 DELUSIONAL DISORDERS: ICD-10-CM

## 2023-04-07 DIAGNOSIS — K00.0 ANODONTIA: ICD-10-CM

## 2023-04-07 DIAGNOSIS — E78.5 HYPERLIPIDEMIA, UNSPECIFIED: ICD-10-CM

## 2023-04-07 DIAGNOSIS — F19.99 OTHER PSYCHOACTIVE SUBSTANCE USE, UNSPECIFIED WITH UNSPECIFIED PSYCHOACTIVE SUBSTANCE-INDUCED DISORDER: ICD-10-CM

## 2023-04-07 DIAGNOSIS — S40.812A ABRASION OF LEFT UPPER ARM, INITIAL ENCOUNTER: ICD-10-CM

## 2023-04-07 DIAGNOSIS — Z91.89 OTHER SPECIFIED PERSONAL RISK FACTORS, NOT ELSEWHERE CLASSIFIED: ICD-10-CM

## 2023-04-07 DIAGNOSIS — Z91.014 ALLERGY TO MAMMALIAN MEATS: ICD-10-CM

## 2023-04-07 DIAGNOSIS — F25.0 SCHIZOAFFECTIVE DISORDER, BIPOLAR TYPE: ICD-10-CM

## 2023-04-07 DIAGNOSIS — Z91.013 ALLERGY TO SEAFOOD: ICD-10-CM

## 2023-04-07 DIAGNOSIS — R44.0 AUDITORY HALLUCINATIONS: ICD-10-CM
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
## 2023-04-07 DIAGNOSIS — H53.8 OTHER VISUAL DISTURBANCES: ICD-10-CM
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
## 2023-04-07 DIAGNOSIS — Z88.0 ALLERGY STATUS TO PENICILLIN: ICD-10-CM

## 2023-04-07 DIAGNOSIS — G47.00 INSOMNIA, UNSPECIFIED: ICD-10-CM

## 2023-04-07 NOTE — BH SOCIAL WORK CONFIRMATION FOLLOW UP NOTE - NSLINKEDTOLOC_PSY_ALL_CORE
Juma returned to his residence at [facility]. He missed his outpatient appointment at [facility] clinic on 4/5, [phone]. [facility] staff were made aware, stating Juma was doing well. Juma kept his rescheduled appointment at [facility] on 4/6.

## 2023-04-21 ENCOUNTER — EMERGENCY (EMERGENCY)
Facility: HOSPITAL | Age: 53
LOS: 0 days | Discharge: HOME | End: 2023-04-21
Attending: EMERGENCY MEDICINE
Payer: MEDICAID

## 2023-04-21 VITALS
DIASTOLIC BLOOD PRESSURE: 74 MMHG | TEMPERATURE: 98 F | OXYGEN SATURATION: 99 % | HEART RATE: 99 BPM | HEIGHT: 68 IN | SYSTOLIC BLOOD PRESSURE: 145 MMHG | WEIGHT: 190.04 LBS | RESPIRATION RATE: 14 BRPM

## 2023-04-21 DIAGNOSIS — Z91.018 ALLERGY TO OTHER FOODS: ICD-10-CM

## 2023-04-21 DIAGNOSIS — Z91.013 ALLERGY TO SEAFOOD: ICD-10-CM
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
## 2023-04-21 DIAGNOSIS — T73.0XXA STARVATION, INITIAL ENCOUNTER: ICD-10-CM

## 2023-04-21 DIAGNOSIS — Z87.898 PERSONAL HISTORY OF OTHER SPECIFIED CONDITIONS: ICD-10-CM

## 2023-04-21 DIAGNOSIS — Z88.8 ALLERGY STATUS TO OTHER DRUGS, MEDICAMENTS AND BIOLOGICAL SUBSTANCES: ICD-10-CM

## 2023-04-21 DIAGNOSIS — Z87.891 PERSONAL HISTORY OF NICOTINE DEPENDENCE: ICD-10-CM

## 2023-04-21 DIAGNOSIS — R10.33 PERIUMBILICAL PAIN: ICD-10-CM

## 2023-04-21 DIAGNOSIS — I10 ESSENTIAL (PRIMARY) HYPERTENSION: ICD-10-CM

## 2023-04-21 DIAGNOSIS — F20.9 SCHIZOPHRENIA, UNSPECIFIED: ICD-10-CM

## 2023-04-21 DIAGNOSIS — Z79.84 LONG TERM (CURRENT) USE OF ORAL HYPOGLYCEMIC DRUGS: ICD-10-CM

## 2023-04-21 DIAGNOSIS — Z88.0 ALLERGY STATUS TO PENICILLIN: ICD-10-CM
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
## 2023-04-21 DIAGNOSIS — E11.9 TYPE 2 DIABETES MELLITUS WITHOUT COMPLICATIONS: ICD-10-CM

## 2023-04-21 LAB
ALBUMIN SERPL ELPH-MCNC: 4.6 G/DL — SIGNIFICANT CHANGE UP (ref 3.5–5.2)
ALP SERPL-CCNC: 96 U/L — SIGNIFICANT CHANGE UP (ref 30–115)
ALT FLD-CCNC: 13 U/L — SIGNIFICANT CHANGE UP (ref 0–41)
ANION GAP SERPL CALC-SCNC: 13 MMOL/L — SIGNIFICANT CHANGE UP (ref 7–14)
AST SERPL-CCNC: 31 U/L — SIGNIFICANT CHANGE UP (ref 0–41)
BASOPHILS # BLD AUTO: 0.02 K/UL — SIGNIFICANT CHANGE UP (ref 0–0.2)
BASOPHILS NFR BLD AUTO: 0.4 % — SIGNIFICANT CHANGE UP (ref 0–1)
BILIRUB SERPL-MCNC: 0.5 MG/DL — SIGNIFICANT CHANGE UP (ref 0.2–1.2)
BUN SERPL-MCNC: 9 MG/DL — LOW (ref 10–20)
CALCIUM SERPL-MCNC: 10 MG/DL — SIGNIFICANT CHANGE UP (ref 8.4–10.5)
CHLORIDE SERPL-SCNC: 102 MMOL/L — SIGNIFICANT CHANGE UP (ref 98–110)
CO2 SERPL-SCNC: 24 MMOL/L — SIGNIFICANT CHANGE UP (ref 17–32)
CREAT SERPL-MCNC: 0.9 MG/DL — SIGNIFICANT CHANGE UP (ref 0.7–1.5)
EGFR: 103 ML/MIN/1.73M2 — SIGNIFICANT CHANGE UP
EOSINOPHIL # BLD AUTO: 0.06 K/UL — SIGNIFICANT CHANGE UP (ref 0–0.7)
EOSINOPHIL NFR BLD AUTO: 1.1 % — SIGNIFICANT CHANGE UP (ref 0–8)
GLUCOSE SERPL-MCNC: 100 MG/DL — HIGH (ref 70–99)
HCT VFR BLD CALC: 42.3 % — SIGNIFICANT CHANGE UP (ref 42–52)
HGB BLD-MCNC: 13.3 G/DL — LOW (ref 14–18)
IMM GRANULOCYTES NFR BLD AUTO: 0.2 % — SIGNIFICANT CHANGE UP (ref 0.1–0.3)
LACTATE SERPL-SCNC: 1.5 MMOL/L — SIGNIFICANT CHANGE UP (ref 0.7–2)
LIDOCAIN IGE QN: 49 U/L — SIGNIFICANT CHANGE UP (ref 7–60)
LYMPHOCYTES # BLD AUTO: 2.26 K/UL — SIGNIFICANT CHANGE UP (ref 1.2–3.4)
LYMPHOCYTES # BLD AUTO: 39.7 % — SIGNIFICANT CHANGE UP (ref 20.5–51.1)
MCHC RBC-ENTMCNC: 25.8 PG — LOW (ref 27–31)
MCHC RBC-ENTMCNC: 31.4 G/DL — LOW (ref 32–37)
MCV RBC AUTO: 82 FL — SIGNIFICANT CHANGE UP (ref 80–94)
MONOCYTES # BLD AUTO: 0.53 K/UL — SIGNIFICANT CHANGE UP (ref 0.1–0.6)
MONOCYTES NFR BLD AUTO: 9.3 % — SIGNIFICANT CHANGE UP (ref 1.7–9.3)
NEUTROPHILS # BLD AUTO: 2.81 K/UL — SIGNIFICANT CHANGE UP (ref 1.4–6.5)
NEUTROPHILS NFR BLD AUTO: 49.3 % — SIGNIFICANT CHANGE UP (ref 42.2–75.2)
NRBC # BLD: 0 /100 WBCS — SIGNIFICANT CHANGE UP (ref 0–0)
PLATELET # BLD AUTO: 266 K/UL — SIGNIFICANT CHANGE UP (ref 130–400)
PMV BLD: 9.7 FL — SIGNIFICANT CHANGE UP (ref 7.4–10.4)
POTASSIUM SERPL-MCNC: 6.5 MMOL/L — CRITICAL HIGH (ref 3.5–5)
POTASSIUM SERPL-SCNC: 6.5 MMOL/L — CRITICAL HIGH (ref 3.5–5)
PROT SERPL-MCNC: 8.3 G/DL — HIGH (ref 6–8)
RBC # BLD: 5.16 M/UL — SIGNIFICANT CHANGE UP (ref 4.7–6.1)
RBC # FLD: 18.4 % — HIGH (ref 11.5–14.5)
SODIUM SERPL-SCNC: 139 MMOL/L — SIGNIFICANT CHANGE UP (ref 135–146)
TROPONIN T SERPL-MCNC: <0.01 NG/ML — SIGNIFICANT CHANGE UP
WBC # BLD: 5.69 K/UL — SIGNIFICANT CHANGE UP (ref 4.8–10.8)
WBC # FLD AUTO: 5.69 K/UL — SIGNIFICANT CHANGE UP (ref 4.8–10.8)

## 2023-04-21 PROCEDURE — 83690 ASSAY OF LIPASE: CPT

## 2023-04-21 PROCEDURE — 85025 COMPLETE CBC W/AUTO DIFF WBC: CPT

## 2023-04-21 PROCEDURE — 90792 PSYCH DIAG EVAL W/MED SRVCS: CPT | Mod: 95

## 2023-04-21 PROCEDURE — 83605 ASSAY OF LACTIC ACID: CPT

## 2023-04-21 PROCEDURE — 84484 ASSAY OF TROPONIN QUANT: CPT

## 2023-04-21 PROCEDURE — 93010 ELECTROCARDIOGRAM REPORT: CPT

## 2023-04-21 PROCEDURE — 93005 ELECTROCARDIOGRAM TRACING: CPT

## 2023-04-21 PROCEDURE — 36415 COLL VENOUS BLD VENIPUNCTURE: CPT

## 2023-04-21 PROCEDURE — 99284 EMERGENCY DEPT VISIT MOD MDM: CPT | Mod: 25

## 2023-04-21 PROCEDURE — 80053 COMPREHEN METABOLIC PANEL: CPT

## 2023-04-21 PROCEDURE — 99285 EMERGENCY DEPT VISIT HI MDM: CPT

## 2023-04-21 PROCEDURE — 96374 THER/PROPH/DIAG INJ IV PUSH: CPT

## 2023-04-21 RX ORDER — FAMOTIDINE 10 MG/ML
20 INJECTION INTRAVENOUS ONCE
Refills: 0 | Status: COMPLETED | OUTPATIENT
Start: 2023-04-21 | End: 2023-04-21

## 2023-04-21 RX ADMIN — FAMOTIDINE 20 MILLIGRAM(S): 10 INJECTION INTRAVENOUS at 15:54

## 2023-04-21 RX ADMIN — Medication 30 MILLILITER(S): at 15:55

## 2023-04-21 NOTE — ED PROVIDER NOTE - PATIENT PORTAL LINK FT
You can access the FollowMyHealth Patient Portal offered by [facility] by registering at the following website: [email or web address]. By joining [employer]’s FollowMyHealth portal, you will also be able to view your health information using other applications (apps) compatible with our system. You can access the FollowMyHealth Patient Portal offered by [facility] by registering at the following website: [email or web address]. By joining [employer]’s FollowMyHealth portal, you will also be able to view your health information using other applications (apps) compatible with our system.

## 2023-04-21 NOTE — ED PROVIDER NOTE - PROGRESS NOTE DETAILS
pt waiting for transport back to [facility] and nurse informs me that he came up to the nurse's station stating he wanted to cut himself/kill himself and made actions with plastic knife and threw his food on the floor.  pt will now be on 1:1 and psych consult chachak: received sign out from Dr. Villalobos. Patient was pending transport back to his facility but then stated that he wanted to cut himself with a spork.  On reassessment patient now denying this.  And states that he does not want to hurt or kill himself or anybody else.  Not hearing command auditory hallucinations.  Clinically do not feel needs emergent psychiatry consult now.  We will transport back to his facility.

## 2023-04-21 NOTE — ED PROVIDER NOTE - PHYSICAL EXAMINATION
VITAL SIGNS: I have reviewed nursing notes and confirm.  CONSTITUTIONAL: Well-developed; well-nourished; in no acute distress.  SKIN: Skin exam is warm and dry, no acute rash.  HEAD: Normocephalic; atraumatic.  EYES: PERRL, EOM intact; conjunctiva and sclera clear.  ENT: No nasal discharge; airway clear. TMs clear.  NECK: Supple; non tender.  CARD: S1, S2 normal; no murmurs, gallops, or rubs. Regular rate and rhythm.  RESP: No wheezes, rales or rhonchi.  ABD: Normal bowel sounds; soft; non-distended; non-tender;  EXT: Normal ROM. No clubbing, cyanosis or edema.  NEURO: aox3, cn2-12 grossly normal,   PSYCH: Cooperative, appropriate. no si or hi

## 2023-04-21 NOTE — ED PROVIDER NOTE - NSFOLLOWUPINSTRUCTIONS_ED_ALL_ED_FT
PLEASE RESUME CLOZARIL DOSES IN THE EVENING.      F/U WITH DR. MEZA    Abdominal Pain    Many things can cause abdominal pain. Many times, abdominal pain is not caused by a disease and will improve without treatment. Your health care provider will do a physical exam to determine if there is a dangerous cause of your pain; blood tests and imaging may help determine the cause of your pain. However, in many cases, no cause may be found and you may need further testing as an outpatient. Monitor your abdominal pain for any changes.     SEEK IMMEDIATE MEDICAL CARE IF YOU HAVE ANY OF THE FOLLOWING SYMPTOMS: worsening abdominal pain, uncontrollable vomiting, profuse diarrhea, inability to have bowel movements or pass gas, black or bloody stools, fever accompanying chest pain or back pain, or fainting. These symptoms may represent a serious problem that is an emergency. Do not wait to see if the symptoms will go away. Get medical help right away. Call 911 and do not drive yourself to the hospital.

## 2023-04-21 NOTE — ED PROVIDER NOTE - OBJECTIVE STATEMENT
51 yo m with pmh of schizophrenia, dm, htn, presents with c/o periumbical abd pain.  pt says started today.  no n/v/d, no fever or chills.  no dysuria.  pt says missed his clozaril last night.  no si or hi.  now wants to eat.  denies cp or sob.

## 2023-04-21 NOTE — ED PROVIDER NOTE - CLINICAL SUMMARY MEDICAL DECISION MAKING FREE TEXT BOX
Pt with c/o periumbilical pain, benign abd exam, labs reassuring.  pt hungry and wants transport back to [facility].  attempted to call [facility] and was unable to speak to anybody regarding his direct patient care.

## 2023-04-21 NOTE — ED ADULT NURSE REASSESSMENT NOTE - NS ED NURSE REASSESS COMMENT FT1
PT is discharged to current address. PT walked out of the ER without waiting for transport. PT is A&Ox4 and walk steady gait. No IV line.

## 2023-04-21 NOTE — ED PROVIDER NOTE - NSFOLLOWUPCLINICS_GEN_ALL_ED_FT
[facility]  OP Mental Health  [address] [place] [id number]  Phone: [phone]  Fax:

## 2023-04-24 ENCOUNTER — INPATIENT (INPATIENT)
Facility: HOSPITAL | Age: 53
LOS: 14 days | Discharge: HOME | DRG: 750 | End: 2023-05-09
Attending: PSYCHIATRY & NEUROLOGY | Admitting: PSYCHIATRY & NEUROLOGY
Payer: MEDICAID

## 2023-04-24 VITALS — HEIGHT: 68 IN

## 2023-04-24 LAB
ALBUMIN SERPL ELPH-MCNC: 4.4 G/DL — SIGNIFICANT CHANGE UP (ref 3.5–5.2)
ALP SERPL-CCNC: 82 U/L — SIGNIFICANT CHANGE UP (ref 30–115)
ALT FLD-CCNC: 8 U/L — SIGNIFICANT CHANGE UP (ref 0–41)
ANION GAP SERPL CALC-SCNC: 10 MMOL/L — SIGNIFICANT CHANGE UP (ref 7–14)
APAP SERPL-MCNC: <5 UG/ML — LOW (ref 10–30)
APPEARANCE UR: CLEAR — SIGNIFICANT CHANGE UP
AST SERPL-CCNC: 14 U/L — SIGNIFICANT CHANGE UP (ref 0–41)
BASOPHILS # BLD AUTO: 0.02 K/UL — SIGNIFICANT CHANGE UP (ref 0–0.2)
BASOPHILS NFR BLD AUTO: 0.3 % — SIGNIFICANT CHANGE UP (ref 0–1)
BILIRUB DIRECT SERPL-MCNC: <0.2 MG/DL — SIGNIFICANT CHANGE UP (ref 0–0.3)
BILIRUB INDIRECT FLD-MCNC: >0.1 MG/DL — LOW (ref 0.2–1.2)
BILIRUB SERPL-MCNC: 0.3 MG/DL — SIGNIFICANT CHANGE UP (ref 0.2–1.2)
BILIRUB UR-MCNC: NEGATIVE — SIGNIFICANT CHANGE UP
BUN SERPL-MCNC: 11 MG/DL — SIGNIFICANT CHANGE UP (ref 10–20)
CALCIUM SERPL-MCNC: 9.6 MG/DL — SIGNIFICANT CHANGE UP (ref 8.4–10.5)
CHLORIDE SERPL-SCNC: 96 MMOL/L — LOW (ref 98–110)
CO2 SERPL-SCNC: 24 MMOL/L — SIGNIFICANT CHANGE UP (ref 17–32)
COLOR SPEC: COLORLESS — SIGNIFICANT CHANGE UP
CREAT SERPL-MCNC: 0.7 MG/DL — SIGNIFICANT CHANGE UP (ref 0.7–1.5)
DIFF PNL FLD: NEGATIVE — SIGNIFICANT CHANGE UP
EGFR: 112 ML/MIN/1.73M2 — SIGNIFICANT CHANGE UP
EOSINOPHIL # BLD AUTO: 0.11 K/UL — SIGNIFICANT CHANGE UP (ref 0–0.7)
EOSINOPHIL NFR BLD AUTO: 1.5 % — SIGNIFICANT CHANGE UP (ref 0–8)
ETHANOL SERPL-MCNC: <10 MG/DL — SIGNIFICANT CHANGE UP
GLUCOSE SERPL-MCNC: 86 MG/DL — SIGNIFICANT CHANGE UP (ref 70–99)
GLUCOSE UR QL: NEGATIVE — SIGNIFICANT CHANGE UP
HCT VFR BLD CALC: 37.2 % — LOW (ref 42–52)
HGB BLD-MCNC: 11.7 G/DL — LOW (ref 14–18)
IMM GRANULOCYTES NFR BLD AUTO: 0.3 % — SIGNIFICANT CHANGE UP (ref 0.1–0.3)
KETONES UR-MCNC: NEGATIVE — SIGNIFICANT CHANGE UP
LEUKOCYTE ESTERASE UR-ACNC: NEGATIVE — SIGNIFICANT CHANGE UP
LYMPHOCYTES # BLD AUTO: 3.24 K/UL — SIGNIFICANT CHANGE UP (ref 1.2–3.4)
LYMPHOCYTES # BLD AUTO: 44.4 % — SIGNIFICANT CHANGE UP (ref 20.5–51.1)
MCHC RBC-ENTMCNC: 25.2 PG — LOW (ref 27–31)
MCHC RBC-ENTMCNC: 31.5 G/DL — LOW (ref 32–37)
MCV RBC AUTO: 80.2 FL — SIGNIFICANT CHANGE UP (ref 80–94)
MONOCYTES # BLD AUTO: 0.7 K/UL — HIGH (ref 0.1–0.6)
MONOCYTES NFR BLD AUTO: 9.6 % — HIGH (ref 1.7–9.3)
NEUTROPHILS # BLD AUTO: 3.21 K/UL — SIGNIFICANT CHANGE UP (ref 1.4–6.5)
NEUTROPHILS NFR BLD AUTO: 43.9 % — SIGNIFICANT CHANGE UP (ref 42.2–75.2)
NITRITE UR-MCNC: NEGATIVE — SIGNIFICANT CHANGE UP
NRBC # BLD: 0 /100 WBCS — SIGNIFICANT CHANGE UP (ref 0–0)
PH UR: 6 — SIGNIFICANT CHANGE UP (ref 5–8)
PLATELET # BLD AUTO: 245 K/UL — SIGNIFICANT CHANGE UP (ref 130–400)
PMV BLD: 10.3 FL — SIGNIFICANT CHANGE UP (ref 7.4–10.4)
POTASSIUM SERPL-MCNC: 4.1 MMOL/L — SIGNIFICANT CHANGE UP (ref 3.5–5)
POTASSIUM SERPL-SCNC: 4.1 MMOL/L — SIGNIFICANT CHANGE UP (ref 3.5–5)
PROT SERPL-MCNC: 7.6 G/DL — SIGNIFICANT CHANGE UP (ref 6–8)
PROT UR-MCNC: NEGATIVE — SIGNIFICANT CHANGE UP
RBC # BLD: 4.64 M/UL — LOW (ref 4.7–6.1)
RBC # FLD: 17.7 % — HIGH (ref 11.5–14.5)
SALICYLATES SERPL-MCNC: <0.3 MG/DL — LOW (ref 4–30)
SARS-COV-2 RNA SPEC QL NAA+PROBE: SIGNIFICANT CHANGE UP
SODIUM SERPL-SCNC: 130 MMOL/L — LOW (ref 135–146)
SP GR SPEC: 1 — LOW (ref 1.01–1.03)
UROBILINOGEN FLD QL: SIGNIFICANT CHANGE UP
VALPROATE SERPL-MCNC: 67 UG/ML — SIGNIFICANT CHANGE UP (ref 50–100)
WBC # BLD: 7.3 K/UL — SIGNIFICANT CHANGE UP (ref 4.8–10.8)
WBC # FLD AUTO: 7.3 K/UL — SIGNIFICANT CHANGE UP (ref 4.8–10.8)

## 2023-04-24 RX ORDER — CLOZAPINE 150 MG/1
100 TABLET, ORALLY DISINTEGRATING ORAL ONCE
Refills: 0 | Status: COMPLETED | OUTPATIENT
Start: 2023-04-24 | End: 2023-04-24

## 2023-04-24 NOTE — ED ADULT TRIAGE NOTE - CHIEF COMPLAINT QUOTE
Pt presents to the ED BIBEMS from [address] w/ of SI/HI. PT was banging his head into the wall and endorses hearing voices to harm himself. Pt currently denies pain. Pt refused VS in triage. Pt presents to the ED BIBEMS from [address] w/ of SI/HI. PT was banging his head into the wall and endorses hearing voices to harm himself. Pt currently denies pain. Pt is currently calm. Pt refused VS in triage. 1:1 initiated in triage.

## 2023-04-25 DIAGNOSIS — F25.9 SCHIZOAFFECTIVE DISORDER, UNSPECIFIED: ICD-10-CM

## 2023-04-25 LAB — GLUCOSE BLDC GLUCOMTR-MCNC: 105 MG/DL — HIGH (ref 70–99)

## 2023-04-25 PROCEDURE — 86140 C-REACTIVE PROTEIN: CPT

## 2023-04-25 PROCEDURE — 93306 TTE W/DOPPLER COMPLETE: CPT

## 2023-04-25 PROCEDURE — 80159 DRUG ASSAY CLOZAPINE: CPT

## 2023-04-25 PROCEDURE — 84443 ASSAY THYROID STIM HORMONE: CPT

## 2023-04-25 PROCEDURE — 70450 CT HEAD/BRAIN W/O DYE: CPT | Mod: 26

## 2023-04-25 PROCEDURE — 99223 1ST HOSP IP/OBS HIGH 75: CPT

## 2023-04-25 PROCEDURE — 85025 COMPLETE CBC W/AUTO DIFF WBC: CPT

## 2023-04-25 PROCEDURE — 72125 CT NECK SPINE W/O DYE: CPT | Mod: 26

## 2023-04-25 PROCEDURE — 70450 CT HEAD/BRAIN W/O DYE: CPT | Mod: 26,77

## 2023-04-25 PROCEDURE — 70450 CT HEAD/BRAIN W/O DYE: CPT

## 2023-04-25 PROCEDURE — 72125 CT NECK SPINE W/O DYE: CPT

## 2023-04-25 PROCEDURE — 93005 ELECTROCARDIOGRAM TRACING: CPT

## 2023-04-25 PROCEDURE — 84484 ASSAY OF TROPONIN QUANT: CPT

## 2023-04-25 PROCEDURE — 80048 BASIC METABOLIC PNL TOTAL CA: CPT

## 2023-04-25 PROCEDURE — 80164 ASSAY DIPROPYLACETIC ACD TOT: CPT

## 2023-04-25 RX ORDER — METFORMIN HYDROCHLORIDE 850 MG/1
1000 TABLET ORAL
Refills: 0 | Status: DISCONTINUED | OUTPATIENT
Start: 2023-04-25 | End: 2023-05-09

## 2023-04-25 RX ORDER — ACETAMINOPHEN 500 MG
650 TABLET ORAL EVERY 6 HOURS
Refills: 0 | Status: DISCONTINUED | OUTPATIENT
Start: 2023-04-25 | End: 2023-05-09

## 2023-04-25 RX ORDER — IBUPROFEN 200 MG
400 TABLET ORAL EVERY 8 HOURS
Refills: 0 | Status: DISCONTINUED | OUTPATIENT
Start: 2023-04-25 | End: 2023-05-09

## 2023-04-25 RX ORDER — CLOZAPINE 150 MG/1
200 TABLET, ORALLY DISINTEGRATING ORAL AT BEDTIME
Refills: 0 | Status: DISCONTINUED | OUTPATIENT
Start: 2023-04-25 | End: 2023-04-25

## 2023-04-25 RX ORDER — LEVOTHYROXINE SODIUM 125 MCG
25 TABLET ORAL DAILY
Refills: 0 | Status: DISCONTINUED | OUTPATIENT
Start: 2023-04-25 | End: 2023-05-09

## 2023-04-25 RX ORDER — METOPROLOL TARTRATE 50 MG
25 TABLET ORAL DAILY
Refills: 0 | Status: DISCONTINUED | OUTPATIENT
Start: 2023-04-25 | End: 2023-05-09

## 2023-04-25 RX ORDER — OLANZAPINE 15 MG/1
5 TABLET, FILM COATED ORAL EVERY 8 HOURS
Refills: 0 | Status: DISCONTINUED | OUTPATIENT
Start: 2023-04-25 | End: 2023-05-09

## 2023-04-25 RX ORDER — DIVALPROEX SODIUM 500 MG/1
750 TABLET, DELAYED RELEASE ORAL AT BEDTIME
Refills: 0 | Status: DISCONTINUED | OUTPATIENT
Start: 2023-04-25 | End: 2023-05-09

## 2023-04-25 RX ORDER — DIVALPROEX SODIUM 500 MG/1
500 TABLET, DELAYED RELEASE ORAL DAILY
Refills: 0 | Status: DISCONTINUED | OUTPATIENT
Start: 2023-04-25 | End: 2023-05-09

## 2023-04-25 RX ORDER — ATORVASTATIN CALCIUM 80 MG/1
40 TABLET, FILM COATED ORAL AT BEDTIME
Refills: 0 | Status: DISCONTINUED | OUTPATIENT
Start: 2023-04-25 | End: 2023-05-09

## 2023-04-25 RX ORDER — CLOZAPINE 150 MG/1
200 TABLET, ORALLY DISINTEGRATING ORAL AT BEDTIME
Refills: 0 | Status: DISCONTINUED | OUTPATIENT
Start: 2023-04-25 | End: 2023-04-27

## 2023-04-25 RX ORDER — DIPHENHYDRAMINE HCL 50 MG
50 CAPSULE ORAL EVERY 6 HOURS
Refills: 0 | Status: DISCONTINUED | OUTPATIENT
Start: 2023-04-25 | End: 2023-05-09

## 2023-04-25 RX ORDER — OLANZAPINE 15 MG/1
5 TABLET, FILM COATED ORAL ONCE
Refills: 0 | Status: COMPLETED | OUTPATIENT
Start: 2023-04-25 | End: 2023-04-25

## 2023-04-25 RX ORDER — CLOZAPINE 150 MG/1
100 TABLET, ORALLY DISINTEGRATING ORAL DAILY
Refills: 0 | Status: DISCONTINUED | OUTPATIENT
Start: 2023-04-25 | End: 2023-05-09

## 2023-04-25 RX ADMIN — DIVALPROEX SODIUM 500 MILLIGRAM(S): 500 TABLET, DELAYED RELEASE ORAL at 16:34

## 2023-04-25 RX ADMIN — OLANZAPINE 5 MILLIGRAM(S): 15 TABLET, FILM COATED ORAL at 14:25

## 2023-04-25 RX ADMIN — Medication 650 MILLIGRAM(S): at 21:53

## 2023-04-25 RX ADMIN — Medication 25 MILLIGRAM(S): at 16:34

## 2023-04-25 RX ADMIN — METFORMIN HYDROCHLORIDE 1000 MILLIGRAM(S): 850 TABLET ORAL at 20:00

## 2023-04-25 RX ADMIN — Medication 2 MILLIGRAM(S): at 17:23

## 2023-04-25 RX ADMIN — Medication 650 MILLIGRAM(S): at 22:22

## 2023-04-25 RX ADMIN — ATORVASTATIN CALCIUM 40 MILLIGRAM(S): 80 TABLET, FILM COATED ORAL at 20:00

## 2023-04-25 RX ADMIN — CLOZAPINE 200 MILLIGRAM(S): 150 TABLET, ORALLY DISINTEGRATING ORAL at 20:01

## 2023-04-25 RX ADMIN — CLOZAPINE 100 MILLIGRAM(S): 150 TABLET, ORALLY DISINTEGRATING ORAL at 00:11

## 2023-04-25 RX ADMIN — OLANZAPINE 5 MILLIGRAM(S): 15 TABLET, FILM COATED ORAL at 16:03

## 2023-04-25 RX ADMIN — Medication 2 MILLIGRAM(S): at 20:00

## 2023-04-25 RX ADMIN — Medication 50 MILLIGRAM(S): at 17:22

## 2023-04-25 RX ADMIN — Medication 5 MILLIGRAM(S): at 20:01

## 2023-04-25 RX ADMIN — Medication 2 MILLIGRAM(S): at 09:35

## 2023-04-25 RX ADMIN — DIVALPROEX SODIUM 750 MILLIGRAM(S): 500 TABLET, DELAYED RELEASE ORAL at 20:01

## 2023-04-25 NOTE — ED PROVIDER NOTE - PHYSICAL EXAMINATION
VITAL SIGNS: I have reviewed nursing notes and confirm.  CONSTITUTIONAL: 53 yo M laying on stretcher; in no acute distress.  SKIN: Skin exam is warm and dry, no acute rash.  HEAD: Normocephalic; atraumatic.  EYES: PERRL, EOM intact; conjunctiva and sclera clear.  ENT: No nasal discharge; airway clear.  NECK: Supple; non tender.  CARD: S1, S2 normal; no murmurs, gallops, or rubs. Regular rate and rhythm.  RESP: No wheezes, rales or rhonchi. Speaking in full sentences.   ABD: Normal bowel sounds; soft; non-distended; non-tender; No rebound or guarding. No CVA tenderness.  EXT: Normal ROM. No clubbing, cyanosis or edema.  NEURO: Alert, oriented. Grossly unremarkable. No focal deficits.   PSYCH: Cooperative. (+) auditory hallucinations. No HI/SI.

## 2023-04-25 NOTE — ED BEHAVIORAL HEALTH PROGRESS NOTE - DETAILS:
52 year old male domiciled at [place] with pphx of SAFD, bipolar type and pmh of HTN, HLD, DM, HypoT that presented due to ED. To note patient has multiple hospitalization (last DC April 3, 2023 from ), multiple previous SA, no previous self harm, no legal/violence hx, no substance use hx.    Collateral was obtained from [facility] and day time staff worker stated that pt had been compliant with his medications and has been doing much better since his IPP discharge earlier this month. Collateral stated pt has been helpful at residence with helping to clean and cook. Collateral is unsure of what could have prompted pt's acute behavioral changes. Hospital chart was accessed and VPA level is 67, further confirming medication compliance.    Pt was reevaluated at bedside accompanied by 1:1 observer. Immediately on approach, pt recognized attending and was cooperative with evaluation. Since his last hospital visit early April 2023, pt appears to be more stable than before. Today he states the he believed he had not taken his clozaril for 4 days prior to ED visit, which prompted him to have a stomach ache and begin auditory hallucinations. These hallucinations including ideas of hurting others by "burning the beds" and also hurt himself with commands to cut his wrist. Pt states that for the last 4 days he has been tossing and turning in bed at night and pacing a lot. He indicates that sometimes he does not feel like he is in control of his body and has found himself trying to cut his wrist with a pen. Pt then stated he wanted IPP admission, where we then attempted to provide him outside coping strategies such as telling residence staff when hallucinations start so that they could provide him help/medications. Pt believes the staff would not help him and would prefer to go to a different facility upon discharge. However he does not feel like is currently ready to be discharged, and if he is sent him he poses a threat to himself. Upon feeling that he was not receiving adequate help with this evaluation, pt began banging his head on the wall and stating "the devil is talking to me". Security was called and staff members spoke to him to calm him down. He was given ativan 2mg IM for agitation (Pt allergic to haldol per chart and per verbal confirmation with pt) 52 year old male domiciled at [place] with pphx schizoaffective disorder, bipolar type and pmh of HTN, HLD, DM, HypoT that presented due to ED. To note patient has multiple hospitalization (last DC April 3, 2023 from ), multiple previous SA, no previous self harm, no legal/violence hx, no substance use hx.  Patient is on clozaril, depakote and Invega Sustenna 156mg (last dose should be on 4/6/23, pending confirmation).      Collateral was obtained from [facility] and day time staff worker stated that pt had been compliant with his medications and has been doing much better since his IPP discharge earlier this month. Collateral stated pt has been helpful at residence with helping to clean and cook. Collateral is unsure of what could have prompted pt's acute behavioral changes. Hospital chart was accessed and VPA level is 67, further confirming medication compliance with depakote, pending Clozaril level.    Pt was reevaluated at bedside accompanied by 1:1 observer. Immediately on approach, pt recognized attending and was cooperative with evaluation. Since his last hospital visit early April 2023, pt appears to be more stable than before. Today he states the he believed he had not taken his clozaril for 4 days prior to ED visit, which prompted him to have a stomach ache and begin auditory hallucinations. These hallucinations including ideas of hurting others by "burning the beds" and also hurt himself with commands to cut his wrist. Pt states that for the last 4 days he has been tossing and turning in bed at night and pacing a lot. He indicates that sometimes he does not feel like he is in control of his body and has found himself trying to cut his wrist with a pen. Pt then stated he wanted IPP admission, where we then attempted to provide him outside coping strategies such as telling residence staff when hallucinations start so that they could provide him help/medications. Pt believes the staff would not help him and would prefer to go to a different facility upon discharge. However he does not feel like is currently ready to be discharged, and if he is sent him he poses a threat to himself. Upon feeling that he was not receiving adequate help with this evaluation, pt began banging his head on the wall and stating "the devil is talking to me". Security was called and staff members spoke to him to calm him down. He was given ativan 2mg IM for agitation (Pt allergic to haldol per chart and per verbal confirmation with pt) 52 year old male domiciled at [place] with pphx schizoaffective disorder, bipolar type and pmh of HTN, HLD, DM, HypoT that presented due to ED. To note patient has multiple hospitalization (last DC April 3, 2023 from ), multiple previous SA, no previous self harm, no legal/violence hx, no substance use hx.  Patient is on clozaril, depakote and Invega Sustenna 156mg (last dose on 4/6/23, next dose due 5/3/23). Full medication list in assessment.    Collateral was obtained from [facility] and day time staff worker stated that pt had been compliant with his medications and has been doing much better since his IPP discharge earlier this month. Collateral stated pt has been helpful at residence with helping to clean and cook. Collateral is unsure of what could have prompted pt's acute behavioral changes. Hospital chart was accessed and VPA level is 67, further confirming medication compliance with depakote, pending Clozaril level. Home medication list confirmed with [employer]  [facility].    Pt was reevaluated at bedside accompanied by 1:1 observer. Immediately on approach, pt recognized attending and was cooperative with evaluation. Since his last hospital visit early April 2023, pt appears to be more stable than before. Today he states the he believed he had not taken his clozaril for 4 days prior to ED visit, which prompted him to have a stomach ache and begin auditory hallucinations. These hallucinations including ideas of hurting others by "burning the beds" and also hurt himself with commands to cut his wrist. Pt states that for the last 4 days he has been tossing and turning in bed at night and pacing a lot. He indicates that sometimes he does not feel like he is in control of his body and has found himself trying to cut his wrist with a pen. Pt then stated he wanted IPP admission, where we then attempted to provide him outside coping strategies such as telling residence staff when hallucinations start so that they could provide him help/medications. Pt believes the staff would not help him and would prefer to go to a different facility upon discharge. However he does not feel like is currently ready to be discharged, and if he is sent him he poses a threat to himself. Upon feeling that he was not receiving adequate help with this evaluation, pt began banging his head on the wall and stating "the devil is talking to me". Security was called and staff members spoke to him to calm him down. He was given ativan 2mg IM for agitation (Pt allergic to haldol per chart and per verbal confirmation with pt).

## 2023-04-25 NOTE — BH INPATIENT PSYCHIATRY ASSESSMENT NOTE - NSBHASSESSSUMMFT_PSY_ALL_CORE
Juma Levi is a 51 y/o male, domiciled at [facility], disabled, single, no children, PMH of HTN, HLD, DM, hypothyroidism, past psychiatric history of schizoaffective disorder bipolar type, multiple IPP admissions (last admission was at [facility] in April 2023), per chart hx of multiple suicide attempts, no past substance use history, presents to the ED for worsening command auditory hallucinations.    Patient currently reports distressing auditory hallucinations. Reports the TV is talking to him. Has hit his head multiple times on the wall, requiring head CT. Confirmed that patient received 100mg clozaril 4/23/23 AM. Per chart, patient received clozaril 100mg 12:11 AM 4/25/23. Plan is to continue with clozaril 100mg am dose and clozaril 200mg qhs, with plan to titrate back to 400mg qhs. Patient will benefit from continued inpatient hospitalization for medical management and adjustment in order to re-titrate back to home dose of clozaril.    #Schizoaffective disorder, bipolar type  - c/w clozaril 100mg AM and 200mg qhs-- plan to increase by 50mg qhs every 2 days if patient tolerates (until reaching 100mg AM + 400mg qhs home dose)  - patient home dose clozaril 100mg AM and 400mg qhs-- will need to titrate back to this dose  - c/w home Depakote 500mg daily with additional Depakote 750mg qhs; depakote level 67 (WNL) on 4/24   - c/w Dulcolax 5mg qhs for constipation   - c/w ativan 2mg qhs (home medication)  - c/w invega sustenna 156 mg monthly (was given April 6th 2023--need to check to see if patient last received)    #HTN  - c/w home lopressor 25mg AM    #HLD  - c/w Lipitor 40mg daily    #Hypothyroidism  - c/w home synthroid 25mcg daily    #T2DM  - c/w home metformin 1000mg BID   Juma Levi is a 53 y/o male, domiciled at [facility], disabled, single, no children, PMH of HTN, HLD, DM, hypothyroidism, past psychiatric history of schizoaffective disorder bipolar type, multiple IPP admissions (last admission was at [facility] in April 2023), per chart hx of multiple suicide attempts, no past substance use history, presents to the ED for worsening command auditory hallucinations.    Patient currently reports distressing auditory hallucinations. Reports the TV is talking to him. Has hit his head multiple times on the wall, requiring head CT. Confirmed that patient received 100mg clozaril 4/23/23 AM. Per chart, patient received clozaril 100mg 12:11 AM 4/25/23. Plan is to continue with clozaril 100mg am dose and clozaril 200mg qhs, with plan to titrate back to 400mg qhs. Plan to follow up with head CT; ANC, trop, CRP. Patient will benefit from continued inpatient hospitalization for medical management and adjustment in order to re-titrate back to home dose of clozaril.    #Schizoaffective disorder, bipolar type  - c/w clozaril 100mg AM and 200mg qhs-- plan to increase by 50mg qhs every 2 days if patient tolerates (until reaching 100mg AM + 400mg qhs home dose)  - patient home dose clozaril 100mg AM and 400mg qhs-- will need to titrate back to this dose  - c/w home Depakote 500mg daily with additional Depakote 750mg qhs; depakote level 67 (WNL) on 4/24   - c/w Dulcolax 5mg qhs for constipation   - c/w ativan 2mg qhs (home medication)  - c/w invega sustenna 156 mg monthly (was given April 6th 2023--need to check to see if patient last received)    #HTN  - c/w home lopressor 25mg AM    #HLD  - c/w Lipitor 40mg daily    #Hypothyroidism  - c/w home synthroid 25mcg daily    #T2DM  - c/w home metformin 1000mg BID

## 2023-04-25 NOTE — ED BEHAVIORAL HEALTH ASSESSMENT NOTE - DIFFERENTIAL
Schizoaffective Disorder Bipolar Type  R/o Adjustment Disorder with Disturbance of Conduct Schizoaffective Disorder Bipolar Type

## 2023-04-25 NOTE — ED BEHAVIORAL HEALTH ASSESSMENT NOTE - RISK ASSESSMENT
Risk: prior hospitalizations, prior SA, poor social support, poor adherence to treatment  Protective: help-seeking tendencies Risk: prior hospitalizations, prior SA, poor social support, poor adherence to treatment  Protective: help-seeking tendencies  RM: hold for collateral

## 2023-04-25 NOTE — BH INPATIENT PSYCHIATRY ASSESSMENT NOTE - HPI (INCLUDE ILLNESS QUALITY, SEVERITY, DURATION, TIMING, CONTEXT, MODIFYING FACTORS, ASSOCIATED SIGNS AND SYMPTOMS)
Juma Levi is a 51 y/o male, domiciled at [facility], disabled, single, no children, PMH of HTN, HLD, DM, hypothyroidism, past psychiatric history of schizoaffective disorder bipolar type, multiple IPP admissions (last admission was at [facility] in April 2023), per chart hx of multiple suicide attempts, no past substance use history, presents to the ED for worsening auditory hallucinations.    Initial ED Assessment:  On interview, patient states he has been having worsening AH that people are talking about him. He states cAH to hurt people and burn his beach. He states God is trying to kill him. He states poor eating due to paranoia that people are poisoning him. States he is eating. States poor sleep. States not having clozaril recently due to no refill for the past 4 days. Reports HI in general and that is why he is in the hospital. Denies SI.  Verbally consents to talk to residence.     [facility]: [phone]—states patient told counselor he wasn’t feeling good. Opened a sharp can and he asked to call 911. While waiting patient started banging head against wall. Person at [place] states he has limited information since everything locked away and would have more in the morning. States patient did have trouble getting his Clozaril but for unk amount of time.    Current Assessment:  Upon approach, patient is lying in bed, hands covering face and forehead, appears distressed. Patient is alert, engages minimally with interviewer. Patient makes minimal eye contact and reports "the TV is talking to me!" Per nursing staff, patient was hitting his head against the nurses station earlier to interview. Patient was unable to engage in meaningful interview due to nature of auditory hallucinations. Patient agreeable to having constant observation overnight due to nature of self injurious behavior. Unclear in patient currently endorsing suicidal or homicidal ideation, however, he is endorsing command auditory hallucinations.     Collateral obtained on 4/25/23 from patient's residence at [facility], director Mala Beverly [phone]. She reports that patient was "doing better than usual yesterday" and was helping in the kitchen, friendly with other staff. She reports that patient started banging his head due to auditory hallucinations later in the night, which prompted him to come to the ED to get evaluated. She reports that patient may have missed some doses of clozaril a week or week and half ago, but that patient has in fact been taking his medications the last few days prior to arriving to the ED.     Spoke with Jaiden, residential counselor at [facility], [phone]. He reports that patient was "great yesterday, helping clean, helping with lunch." He reports the incident with patient hitting his head happened after he had left. He confirms that patient did not get clozaril on 4/24/23. Last dose was 100mg in the morning of 4/23/23.    Juma Levi is a 51 y/o male, domiciled at [facility], disabled, single, no children, PMH of HTN, HLD, DM, hypothyroidism, past psychiatric history of schizoaffective disorder bipolar type, multiple IPP admissions (last admission was at [facility] in April 2023), per chart hx of multiple suicide attempts, no past substance use history, presents to the ED for worsening auditory hallucinations.    Initial ED Assessment:  On interview, patient states he has been having worsening AH that people are talking about him. He states cAH to hurt people and burn his beach. He states God is trying to kill him. He states poor eating due to paranoia that people are poisoning him. States he is eating. States poor sleep. States not having clozaril recently due to no refill for the past 4 days. Reports HI in general and that is why he is in the hospital. Denies SI.  Verbally consents to talk to residence.     [facility]: [phone]—states patient told counselor he wasn’t feeling good. Opened a sharp can and he asked to call 911. While waiting patient started banging head against wall. Person at [place] states he has limited information since everything locked away and would have more in the morning. States patient did have trouble getting his Clozaril but for unk amount of time.    Current Assessment:  Upon approach, patient is lying in bed, hands covering face and forehead, appears distressed. Patient is alert, engages minimally with interviewer. Patient makes minimal eye contact and reports "the TV is talking to me!" Per nursing staff, patient was hitting his head against the nurses station earlier to interview. Patient was unable to engage in meaningful interview due to nature of auditory hallucinations. Patient agreeable to having constant observation overnight due to nature of self injurious behavior. Unclear in patient currently endorsing suicidal or homicidal ideation, however, he is endorsing command auditory hallucinations. Required zyprexa 5mg PO and zyprexa 5mg IM due to agitation, hitting head against walls.    Collateral obtained on 4/25/23 from patient's residence at [facility], director Mala Beverly [phone]. She reports that patient was "doing better than usual yesterday" and was helping in the kitchen, friendly with other staff. She reports that patient started banging his head due to auditory hallucinations later in the night, which prompted him to come to the ED to get evaluated. She reports that patient may have missed some doses of clozaril a week or week and half ago, but that patient has in fact been taking his medications the last few days prior to arriving to the ED.     Spoke with Jaiden, residential counselor at [facility], [phone]. He reports that patient was "great yesterday, helping clean, helping with lunch." He reports the incident with patient hitting his head happened after he had left. He confirms that patient did not get clozaril on 4/24/23. Last dose was 100mg in the morning of 4/23/23.

## 2023-04-25 NOTE — BH INPATIENT PSYCHIATRY ASSESSMENT NOTE - RISK ASSESSMENT
Risk factors: Current and Past Psychiatric Diagnoses, Presenting Symptoms, Treatment Related Factors, Activating Events/Stressors, Mood disorder, Psychotic disorder  Static risk factors include male gender, prior SA  Protective factors include residential stability, seeking out treatment/hopefullness.

## 2023-04-25 NOTE — ED BEHAVIORAL HEALTH ASSESSMENT NOTE - DETAILS
patient reports SI, presented after harming self with sharp edges of can by cutting his wrist spoke to ED attending pending dispo Haldol and Thorazine and Navane causes anaphylaxis see HPI Denies current SI

## 2023-04-25 NOTE — BH INPATIENT PSYCHIATRY ASSESSMENT NOTE - NSDCCRITERIA_PSY_ALL_CORE
- medication compliance  - less disorganized thoughts and actions  - improved impulsivity/self-injurious behavior

## 2023-04-25 NOTE — BH PATIENT PROFILE - COPING CONTROL TECHNIQUES, PROFILE
Patient : Rachid Miramontes Age: 89 year old Sex: male   MRN: [id number] Encounter Date: 10/7/2022      History     Chief Complaint   Patient presents with   • Back Pain     89-year-old male, PMHx HTN, arthritis, GERD, presents to ED with low back pain going on for the past 4 to 5 months however worsened 1 week ago.  Patient states a week ago he was attempting to move a cement bag when he bent over and felt lower back pain.  He states he has been putting icy hot on his back with minimal to no relief of pain.  States pain is worse when he has been laying down for long time, states pain improves when he stands up and is walking around.  He states ambulatory with no difficulties.  Denies any injury or trauma to back.  Patient denies any radiation of pain to his legs.  Denies fevers, saddle anesthesia, bowel/bladder incontinence, paresthesias, progressive weakness in lower extremities, abdominal pain, N/V, chest pain, SOB, dysuria, hematuria.  Patient not anticoagulated.  Denies history of IVDU, history of malignancy.          Allergies   Allergen Reactions   • Nitrofurantoin SWELLING   • Amlodipine PRURITUS       Discharge Medication List as of 10/7/2022  4:51 PM      Prior to Admission Medications    Details   acetaminophen-codeine (TYLENOL/CODEINE #3) 300-30 MG per tablet Delegates - In compliance with state law, the Prescription Drug Monitoring Program must be reviewed prior to signing any order for a controlled substance. PDMP Reviewed by Delegate - No Unexpected ActivityTake 1 tablet by mouth every 8 hours as needed ( Pain).Eprescribe, Disp-15 tablet, R-0      ALPRAZolam (XANAX) 0.5 MG tablet Take 0.5 mg by mouth nightly as needed for Sleep.Historical Med      tamsulosin (FLOMAX) 0.4 MG Cap Take 0.4 mg by mouth daily after a meal.Historical Med      ondansetron (ZOFRAN ODT) 4 MG disintegrating tablet Place 4 mg onto the tongue every 8 hours as needed for Nausea.Historical Med      levothyroxine 75 MCG tablet Take 75  mcg by mouth daily.Historical Med      losartan (COZAAR) 50 MG tablet Take 50 mg by mouth daily.Historical Med      atorvastatin (LIPITOR) 20 MG tablet Take 20 mg by mouth daily.Historical Med      loratadine (CLARITIN) 10 MG tablet Take 10 mg by mouth daily. 1 tab by mouth everyday as neededHistorical Med      fluticasone (FLONASE) 50 MCG/ACT nasal spray Spray 2 sprays in each nostril daily. Takes as neededHistorical Med      furosemide (LASIX) 20 MG tablet Take 20 mg by mouth daily.Historical Med      lactulose (CHRONULAC) 10 GM/15ML solution Take 20 g by mouth 3 times daily.Historical Med         New Prescriptions    Details   lidocaine (LIDODERM) 5 % patch Place 1 patch onto the skin every 12 hours. Remove patch 12 hours after applyingEprescribe, Disp-10 patch, R-0             Past Medical History:   Diagnosis Date   • Arthritis    • At risk for sleep apnea    • Essential (primary) hypertension    • Gastroesophageal reflux disease    • High cholesterol    • Open wound 04/2021    left hand   • Thyroid condition        Past Surgical History:   Procedure Laterality Date   • CATARACT EXTRAC W/ INTRAOCULAR LENS IMP&ANT VIT,BILATERL     • EYE SURGERY     • NO PAST SURGERIES     • PROSTATE SURGERY     • SEVERING OF TARSORRHAPHY Bilateral     eyelid surgery       Family History   Family history unknown: Yes       Social History     Tobacco Use   • Smoking status: Never Smoker   • Smokeless tobacco: Never Used   Vaping Use   • Vaping Use: never used   Substance Use Topics   • Alcohol use: Not Currently   • Drug use: Never       Review of Systems   Constitutional: Negative.  Negative for activity change, chills and fever.   Eyes: Negative for visual disturbance.   Respiratory: Negative for cough, chest tightness and shortness of breath.    Cardiovascular: Negative for chest pain.   Gastrointestinal: Negative for abdominal pain, diarrhea, nausea and vomiting.   Genitourinary: Negative for decreased urine volume, dysuria,  hematuria and urgency.   Musculoskeletal: Positive for back pain. Negative for gait problem, myalgias and neck pain.   Skin: Negative for rash.   Neurological: Negative for dizziness, weakness, numbness and headaches.   Hematological: Does not bruise/bleed easily.       Physical Exam     ED Triage Vitals   ED Triage Vitals Group      Temp 10/07/22 1104 98.1 °F (36.7 °C)      Heart Rate 10/07/22 1103 61      Resp 10/07/22 1103 15      BP 10/07/22 1103 (!) 147/74      SpO2 10/07/22 1103 96 %      EtCO2 mmHg --       Height --       Weight --       Weight Scale Used --       BMI (Calculated) --       IBW/kg (Calculated) --        Physical Exam  Vitals and nursing note reviewed.   Constitutional:       General: He is not in acute distress.     Appearance: Normal appearance. He is not ill-appearing or toxic-appearing.   HENT:      Head: Normocephalic and atraumatic.      Neck: Normal range of motion.   Eyes:      Extraocular Movements: Extraocular movements intact.      Conjunctiva/sclera: Conjunctivae normal.      Pupils: Pupils are equal, round, and reactive to light.   Cardiovascular:      Rate and Rhythm: Normal rate and regular rhythm.      Pulses: Normal pulses.      Heart sounds: Normal heart sounds.      Comments: DP and PT pulses 2+ symmetric and equal bilaterally.  Pulmonary:      Effort: Pulmonary effort is normal.      Breath sounds: Normal breath sounds.   Abdominal:      General: Abdomen is flat. Bowel sounds are normal.      Palpations: Abdomen is soft.      Tenderness: There is no abdominal tenderness. There is no right CVA tenderness or left CVA tenderness.   Musculoskeletal:      Thoracic back: No swelling, deformity, tenderness or bony tenderness.      Lumbar back: Spasms and tenderness present. No swelling, deformity or bony tenderness. Negative right straight leg raise test and negative left straight leg raise test.      Comments: No midline spinal TTP.  There is mild bilateral paraspinal lumbar  tenderness, patient reports pain is worse with movements and when patient goes from supine to sitting he reports increased pain.  No bony step offs, no signs of trauma. SLR negative bilaterally.    Skin:     General: Skin is warm and dry.      Capillary Refill: Capillary refill takes less than 2 seconds.   Neurological:      General: No focal deficit present.      Mental Status: He is alert and oriented to person, place, and time. Mental status is at baseline.      Sensory: Sensation is intact.      Motor: Motor function is intact.      Coordination: Coordination is intact.      Gait: Gait is intact.      Comments: Ankle plantar dorsiflexion and flexion intact and symmetric in bilateral lower extremities. Sensation in L4-S1 intact in bilateral lower extremities. Patellar reflexes intact and symmetric bilaterally. Pt ambulating well with no difficulties.    Psychiatric:         Mood and Affect: Mood normal.         Behavior: Behavior normal.         Thought Content: Thought content normal.         Judgment: Judgment normal.         ED Course     Procedures    Lab Results     Results for orders placed or performed during the hospital encounter of 10/07/22   URINALYSIS, MACROSCOPIC -POINT OF CARE   Result Value Ref Range    COLOR - POINT OF CARE Yellow     APPEARANCE, URINALYSIS - POINT OF CARE Clear     GLUCOSE, URINALYSIS - POINT OF CARE Negative Negative mg/dL    BILIRUBIN, URINALYSIS - POINT OF CARE Negative Negative    KETONES, URINALYSIS - POINT OF CARE Negative Negative mg/dL    SPECIFIC GRAVITY, URINALYSIS - POINT OF CARE 1.015 1.005 - 1.030 NULL    OCCULT BLOOD, URINALYSIS - POINT OF CARE Negative Negative    PH, URINALYSIS - POINT OF CARE 5.5 5.0 - 7.0 Units    PROTEIN, URINALYSIS - POINT OF CARE Negative Negative mg/dL    UROBILINOGEN, URINALYSIS - POINT OF CARE 0.2 0.2, 1.0 mg/dL    NITRITE, URINALYSIS - POINT OF CARE Negative Negative    WBC ESTERASE, URINALYSIS - POINT OF CARE Negative Negative       EKG  Results       Radiology Results     Imaging Results          XR LUMBAR SPINE AP LAT OBLIQUES MIN 4 VIEWS (Final result)  Result time 10/07/22 16:18:16   Procedure changed from XR LUMBAR SPINE 2 OR 3 VIEWS     Final result                 Impression:      1.   Mild age indeterminate superior endplate height loss is seen at T11  and T12 vertebral bodies.  These may be chronic.  However dedicated CT or  MRI lumbar spine examination is advised.  2.   Degenerative changes in the lumbar spine.    Electronically Signed by: KEVAN CARREON   Signed on: 10/7/2022 4:18 PM                Narrative:    EXAM: XR LUMBAR SPINE AP LAT OBLIQUES MIN 4 VIEWS    CLINICAL INDICATION: Low back pain.    COMPARISON: None.    FINDINGS:    Diffuse demineralized bone which may represent osteopenia or osteoporosis.   Consider DEXA scan.    5 non-rib bearing lumbar vertebra.    Mild grade 1 anterolisthesis of L4 on L5.  Mild age indeterminate superior  endplate height loss is seen at T11 and T12 vertebral bodies.    No evidence of spondylolysis.    Disc height loss is seen at L5-S1.  Facet joint hypertrophy at and L3-L4  through L5-S1.    Atherosclerosis of the abdominal aorta.                                ED Medication Orders (From admission, onward)    None          ED Course as of 10/07/22 1939   Fri Oct 07, 2022   1642 XR LUMBAR SPINE AP LAT OBLIQUES MIN 4 VIEWS  Discussed results with pt and son at bedside, mild age indeterminate superior endplate height loss at T11 T12, may be chronic. There are no neurological symptoms concerning for cord compression, there was no direct injury/trauma, likely chronic and outpt f/u with PCP. [RG]      ED Course User Index  [RG] Corina Zabala PA-C       MDM  Number of Diagnoses or Management Options  Acute bilateral low back pain without sciatica  Back muscle spasm  Degenerative lumbar disc  Diagnosis management comments: Pt is a 89 year old male who presents to the ED with  low back pain going on for  the past 4 to 5 months however worsened 1 week ago. History as above.    VSS. On exam,  there is mild bilateral paraspinal lumbar tenderness, patient does report that pain is worse with movements.  When patient goes from a supine to sitting position, it appears he has muscle spasms and reports increased pain with movement, however he states once he is sitting up and or walking around his pain does improve. No central spinal tenderness. Straight leg raise is negative bilaterally. Ankle plantar dorsiflexion and flexion intact and symmetric in bilateral lower extremities. Sensation in L4-S1 intact in bilateral lower extremities. Patellar reflexes intact and symmetric bilaterally.     Pt does not endorse any red flag symptoms of low back pain.  There is no history of IV drug use or blood thinner use, no history of cancer. No history of trauma or injury. There is no saddle anesthesia, no loss of bladder or bowel control. The pt has a clear provoking event and as such, I suspect this to be mechanical low back pain  +/- muscle spasms.  X-ray lumbar spine with mild age-indeterminate superior endplate height loss at T11 and T12 vertebral bodies, these may be chronic, also degenerative changes.  There is no direct injury or trauma to back, I doubt acute compression fracture.  Symptoms less likely due to mass,  cauda equina, spinal epidural abscess or epidural hematoma.    The above findings and rationale were discussed with the pt and son who is at bedside. There are no neurological symptoms concerning for cord compression. Pt will be discharged home with a course of Lidoderm patches, he states he has tylenol at home.  Patient was given a copy of x-ray report findings.  Pt is instructed to remain active, as tolerated.  Pt is instructed to follow-up with their PCP as additional imaging and/or therapy may be warranted, he was also given appropriate follow-up information for orthopedic spine as well as neurosurgery.  ER return  precautions were given, to return at any point, specifically if there is loss of bowel or bladder control, worsening of pain, fevers, saddle anesthesia, progressive weakness of the lower extremities.  Pt agreeable and verbalized understanding to discharge instructions. All questions answered.       Clinical Impression     ED Diagnosis   1. Acute bilateral low back pain without sciatica     2. Back muscle spasm     3. Degenerative lumbar disc         Disposition        Discharge 10/7/2022  4:43 PM  Rachid Miramontes discharge to home/self care.                         Corina Zabala PA-C  10/07/22 1943     listen to music talk to people

## 2023-04-25 NOTE — ED CDU PROVIDER INITIAL DAY NOTE - CLINICAL SUMMARY MEDICAL DECISION MAKING FREE TEXT BOX
52-year-old male with past medical history of schizoaffective disorder bipolar type, HTN, HLD, DM and hypothyroidism presents to the ED sent in from [facility] for evaluation of commanding auditory hallucination. denies medical complaints. exam unremarkable. labs with mild hyponatremia, otherwise within normal limits. ekg with normal QTc. telepsych evaluated patient. placed in edou pending psych reassessment with collateral.

## 2023-04-25 NOTE — BH PATIENT PROFILE - NSICDXPASTMEDICALHX_GEN_ALL_CORE_FT
Quique cook
PAST MEDICAL HISTORY:  DM (diabetes mellitus)     HTN (hypertension)     Schizophrenia     Substance use disorder

## 2023-04-25 NOTE — ED BEHAVIORAL HEALTH PROGRESS NOTE - PRN MEDS RECIEVED IN ED DETAILS FREE TEXT
Pt received Ativan 2mg IM at approximately 9:40am today (4/25) because pt became agitated and started banging his head on the wall. (Pt allergic to haldol per chart and per verbal confirmation with pt)

## 2023-04-25 NOTE — ED ADULT NURSE REASSESSMENT NOTE - NS ED NURSE REASSESS COMMENT FT1
Pt. received alert and responsive. No respiratory distress noted. 1:1 constant observation ongoing. Will continue to monitor.

## 2023-04-25 NOTE — BH PATIENT PROFILE - HOME MEDICATIONS
LORazepam 2 mg oral tablet , 1 tab(s) orally once a day (at bedtime) MDD: 2mg  levothyroxine 25 mcg (0.025 mg) oral tablet , 1 tab(s) orally once a day  MiraLax oral powder for reconstitution , 17 gram(s) orally once a day as needed for  constipation  bisacodyl 5 mg oral delayed release tablet , 1 tab(s) orally once a day (at bedtime) MDD: 5mg  metoprolol succinate 25 mg oral tablet, extended release , 1 tab(s) orally once a day MDD: 25mg  cloZAPine 200 mg oral tablet , 2 tab(s) orally once a day (at bedtime) MDD: 500mg  cloZAPine 100 mg oral tablet , 1 tab(s) orally once a day MDD: 500mg  atorvastatin 40 mg oral tablet , 1 tab(s) orally once a day (at bedtime) MDD: 40mg  metFORMIN 1000 mg oral tablet , 1 tab(s) orally 2 times a day MDD: 2000mg  divalproex sodium 500 mg oral delayed release tablet , 1 tab(s) orally once a day MDD: 1250mg  divalproex sodium 250 mg oral delayed release tablet , 3 tab(s) orally once a day (at bedtime) MDD: 1250mg

## 2023-04-25 NOTE — BH INPATIENT PSYCHIATRY ASSESSMENT NOTE - NSBHMEDSOTHERFT_PSY_A_CORE
atorvastatin 40 mg oral tablet: 1 tab(s) orally once a day (at bedtime) MDD: 40mg  bisacodyl 5 mg oral delayed release tablet: 1 tab(s) orally once a day (at bedtime) MDD: 5mg  cloZAPine 100 mg oral tablet: 1 tab(s) orally once a day MDD: 500mg  cloZAPine 200 mg oral tablet: 2 tab(s) orally once a day (at bedtime) MDD: 500mg  divalproex sodium 250 mg oral delayed release tablet: 3 tab(s) orally once a day (at bedtime) MDD: 1250mg  divalproex sodium 500 mg oral delayed release tablet: 1 tab(s) orally once a day MDD: 1250mg  levothyroxine 25 mcg (0.025 mg) oral tablet: 1 tab(s) orally once a day  LORazepam 2 mg oral tablet: 1 tab(s) orally once a day (at bedtime)  metFORMIN 1000 mg oral tablet: 1 tab(s) orally 2 times a day MDD: 2000mg  metoprolol succinate 25 mg oral tablet, extended release: 1 tab(s) orally once a day MDD: 25mg  MiraLax oral powder for reconstitution: 17 gram(s) orally once a day as needed for  constipation

## 2023-04-25 NOTE — ED CDU PROVIDER INITIAL DAY NOTE - OBJECTIVE STATEMENT
52-year-old male with past medical history of schizoaffective disorder bipolar type, HTN, HLD, DM and hypothyroidism presents to the ED sent in from [facility] for evaluation of worsening auditory hallucinations, episode of self-harm.  Patient states he has not received his closet pain over the last 4 days has had worsening command auditory hallucinations which are telling him to hurt himself.  Patient states they have been telling him to burn the beds in his facility.  Patient aware that he actually might act on these voices which prompted him to come to ED for help.  While waiting for EMS at his facility he did begin to bang his head against the wall.  He denies alcohol or illicit drug use.  He denies other complaints. Pt denies fever, chills, nausea, vomiting, abdominal pain, diarrhea, headache, dizziness, weakness, chest pain, SOB, back pain, LOC, urinary symptoms, cough, calf pain/swelling.

## 2023-04-25 NOTE — CHART NOTE - NSCHARTNOTEFT_GEN_A_CORE
Received a call to evaluate pt head s/p hitting glass with head x 3  Pt admitted for auditory hallucination , known history of banging head to wall.  Per EMR pt was hitting inside wall of ambulance prior to arrival to ED  similar episodes on prior admissions on 3/23, 3/17 CT head negative at that time  Pt reports hits his head because it helps with pain   PT denies blurry vision, headache, nausea, vomiting, neck pain, dizziness  Pt denies taking blood thinners or asa     On exam:   ICU Vital Signs Last 24 Hrs  T(C): 35.9 (25 Apr 2023 14:00), Max: 37.1 (25 Apr 2023 00:27)  T(F): 96.7 (25 Apr 2023 14:00), Max: 98.7 (25 Apr 2023 00:27)  HR: 114 (25 Apr 2023 14:00) (92 - 114)  BP: 129/80 (25 Apr 2023 14:00) (112/67 - 147/94)  BP(mean): 97 (24 Apr 2023 22:11) (97 - 97)  RR: 18 (25 Apr 2023 07:27) (18 - 18)  SpO2: 98% (25 Apr 2023 07:27) (98% - 98%)    O2 Parameters below as of 25 Apr 2023 07:27  Patient On (Oxygen Delivery Method): room air      VITALS:     GENERAL: NAD,  walking around  HEAD:  Atraumatic, Normocephalic, no erythema, no bumps, bruising, no tenderness  EYES: EOMI, PERRLA, conjunctiva and sclera clear  ENT: Moist mucous membranes  NECK: Supple, No JVD, no c-spine tenderness, range of motion intact    Plan:     CT head and c-spine non contrast stat  called radiology [id number] informed of stat CT scan , will send transportation   informed stuff pt has to be on 1:1 and or possible sedated  plan discussed with attending Received a call to evaluate pt head s/p hitting glass with head x 3  Pt admitted for auditory hallucination , known history of banging head to wall.  Per EMR pt was hitting inside wall of ambulance prior to arrival to ED  similar episodes on prior admissions on 3/23, 3/17 CT head negative at that time  Pt reports hits his head because it helps with pain   PT denies blurry vision, headache, nausea, vomiting, neck pain, dizziness  Pt denies taking blood thinners or asa     On exam:   ICU Vital Signs Last 24 Hrs  T(C): 35.9 (25 Apr 2023 14:00), Max: 37.1 (25 Apr 2023 00:27)  T(F): 96.7 (25 Apr 2023 14:00), Max: 98.7 (25 Apr 2023 00:27)  HR: 114 (25 Apr 2023 14:00) (92 - 114)  BP: 129/80 (25 Apr 2023 14:00) (112/67 - 147/94)  BP(mean): 97 (24 Apr 2023 22:11) (97 - 97)  RR: 18 (25 Apr 2023 07:27) (18 - 18)  SpO2: 98% (25 Apr 2023 07:27) (98% - 98%)    O2 Parameters below as of 25 Apr 2023 07:27  Patient On (Oxygen Delivery Method): room air      VITALS:     GENERAL: NAD,  walking around  HEAD:  Atraumatic, Normocephalic, no erythema, no bumps, bruising, no tenderness  EYES: EOMI, PERRLA, conjunctiva and sclera clear  ENT: Moist mucous membranes  NECK: Supple, No JVD, no c-spine tenderness, range of motion intact    Plan:     CT head and c-spine non contrast stat  called radiology [id number] informed of stat CT scan , will send transportation   informed stuff pt has to be on 1:1 and or possible sedated  plan discussed with attending      Addendum   -while pt was getting CT head, pt jumped off the table  CT head the visualized portion of the brain demonstrates no evidence of acute   intracranial pathology.  -spoke to psychiatrist [id number] will sedate pt and attempt CT head and C-spine again   -hospitalist aware   -will reorder CT head/c-spine , RN to send pt to CT when pt more calm Received a call to evaluate pt head s/p hitting glass with head x 3  Pt admitted for auditory hallucination , known history of banging head to wall.  Per EMR pt was hitting inside wall of ambulance prior to arrival to ED  similar episodes on prior admissions on 3/23, 3/17 CT head negative at that time  Pt reports hits his head because it helps with pain   PT denies blurry vision, headache, nausea, vomiting, neck pain, dizziness  Pt denies taking blood thinners or asa     On exam:   ICU Vital Signs Last 24 Hrs  T(C): 35.9 (25 Apr 2023 14:00), Max: 37.1 (25 Apr 2023 00:27)  T(F): 96.7 (25 Apr 2023 14:00), Max: 98.7 (25 Apr 2023 00:27)  HR: 114 (25 Apr 2023 14:00) (92 - 114)  BP: 129/80 (25 Apr 2023 14:00) (112/67 - 147/94)  BP(mean): 97 (24 Apr 2023 22:11) (97 - 97)  RR: 18 (25 Apr 2023 07:27) (18 - 18)  SpO2: 98% (25 Apr 2023 07:27) (98% - 98%)    O2 Parameters below as of 25 Apr 2023 07:27  Patient On (Oxygen Delivery Method): room air      VITALS:     GENERAL: NAD,  walking around  HEAD:  Atraumatic, Normocephalic, no erythema, no bumps, bruising, no tenderness  EYES: EOMI, PERRLA, conjunctiva and sclera clear  ENT: Moist mucous membranes  NECK: Supple, No JVD, no c-spine tenderness, range of motion intact    Plan:     CT head and c-spine non contrast stat  called radiology [id number] informed of stat CT scan , will send transportation   informed stuff pt has to be on 1:1 and or possible sedated  plan discussed with attending      Addendum   -while pt was getting CT head, pt jumped off the table  CT head the visualized portion of the brain demonstrates no evidence of acute     Addendum   CT head and C-spine performed 5:45 Pm will follow up results  intracranial pathology.  -spoke to psychiatrist [id number] will sedate pt and attempt CT head and C-spine again   -hospitalist aware   -will reorder CT head/c-spine , RN to send pt to CT when pt more calm Received a call to evaluate pt head s/p hitting glass with head x 3  Pt admitted for auditory hallucination , known history of banging head to wall.  Per EMR pt was hitting inside wall of ambulance prior to arrival to ED  similar episodes on prior admissions on 3/23, 3/17 CT head negative at that time  Pt reports hits his head because it helps with pain   PT denies blurry vision, headache, nausea, vomiting, neck pain, dizziness  Pt denies taking blood thinners or asa     On exam:   ICU Vital Signs Last 24 Hrs  T(C): 35.9 (25 Apr 2023 14:00), Max: 37.1 (25 Apr 2023 00:27)  T(F): 96.7 (25 Apr 2023 14:00), Max: 98.7 (25 Apr 2023 00:27)  HR: 114 (25 Apr 2023 14:00) (92 - 114)  BP: 129/80 (25 Apr 2023 14:00) (112/67 - 147/94)  BP(mean): 97 (24 Apr 2023 22:11) (97 - 97)  RR: 18 (25 Apr 2023 07:27) (18 - 18)  SpO2: 98% (25 Apr 2023 07:27) (98% - 98%)    O2 Parameters below as of 25 Apr 2023 07:27  Patient On (Oxygen Delivery Method): room air      VITALS:     GENERAL: NAD,  walking around  HEAD:  Atraumatic, Normocephalic, no erythema, no bumps, bruising, no tenderness  EYES: EOMI, PERRLA, conjunctiva and sclera clear  ENT: Moist mucous membranes  NECK: Supple, No JVD, no c-spine tenderness, range of motion intact    Plan:     CT head and c-spine non contrast stat  called radiology [id number] informed of stat CT scan , will send transportation   informed stuff pt has to be on 1:1 and or possible sedated  plan discussed with attending      Addendum   -while pt was getting CT head, pt jumped off the table  CT head the visualized portion of the brain demonstrates no evidence of acute   intracranial pathology.  -spoke to psychiatrist [id number] will sedate pt and attempt CT head and C-spine again   -hospitalist aware   -will reorder CT head/c-spine , RN to send pt to CT when pt more calm      Addendum   CT head and C-spine performed 5:45 Pm will follow up results Received a call to evaluate pt head s/p hitting glass with head x 3  Pt admitted for auditory hallucination , known history of banging head to wall.  Per EMR pt was hitting inside wall of ambulance prior to arrival to ED  similar episodes on prior admissions on 3/23, 3/17 CT head negative at that time  Pt reports hits his head because it helps with pain   PT denies blurry vision, headache, nausea, vomiting, neck pain, dizziness  Pt denies taking blood thinners or asa     On exam:   ICU Vital Signs Last 24 Hrs  T(C): 35.9 (25 Apr 2023 14:00), Max: 37.1 (25 Apr 2023 00:27)  T(F): 96.7 (25 Apr 2023 14:00), Max: 98.7 (25 Apr 2023 00:27)  HR: 114 (25 Apr 2023 14:00) (92 - 114)  BP: 129/80 (25 Apr 2023 14:00) (112/67 - 147/94)  BP(mean): 97 (24 Apr 2023 22:11) (97 - 97)  RR: 18 (25 Apr 2023 07:27) (18 - 18)  SpO2: 98% (25 Apr 2023 07:27) (98% - 98%)    O2 Parameters below as of 25 Apr 2023 07:27  Patient On (Oxygen Delivery Method): room air      VITALS:     GENERAL: NAD,  walking around  HEAD:  Atraumatic, Normocephalic, no erythema, no bumps, bruising, no tenderness  EYES: EOMI, PERRLA, conjunctiva and sclera clear  ENT: Moist mucous membranes  NECK: Supple, No JVD, no c-spine tenderness, range of motion intact    Plan:     CT head and c-spine non contrast stat  called radiology [id number] informed of stat CT scan , will send transportation   informed stuff pt has to be on 1:1 and or possible sedated  plan discussed with attending      Addendum   -while pt was getting CT head, pt jumped off the table  CT head the visualized portion of the brain demonstrates no evidence of acute   intracranial pathology.  -spoke to psychiatrist [id number] will sedate pt and attempt CT head and C-spine again   -hospitalist aware   -will reorder CT head/c-spine , RN to send pt to CT when pt more calm      Addendum   CT head and C-spine performed 5:45 Pm will follow up results    Addendum called [id number] spoke to radiology will expedite reading CT head Received a call to evaluate pt head s/p hitting glass with head x 3  Pt admitted for auditory hallucination , known history of banging head to wall.  Per EMR pt was hitting inside wall of ambulance prior to arrival to ED  similar episodes on prior admissions on 3/23, 3/17 CT head negative at that time  Pt reports hits his head because it helps with pain   PT denies blurry vision, headache, nausea, vomiting, neck pain, dizziness  Pt denies taking blood thinners or asa     On exam:   ICU Vital Signs Last 24 Hrs  T(C): 35.9 (25 Apr 2023 14:00), Max: 37.1 (25 Apr 2023 00:27)  T(F): 96.7 (25 Apr 2023 14:00), Max: 98.7 (25 Apr 2023 00:27)  HR: 114 (25 Apr 2023 14:00) (92 - 114)  BP: 129/80 (25 Apr 2023 14:00) (112/67 - 147/94)  BP(mean): 97 (24 Apr 2023 22:11) (97 - 97)  RR: 18 (25 Apr 2023 07:27) (18 - 18)  SpO2: 98% (25 Apr 2023 07:27) (98% - 98%)    O2 Parameters below as of 25 Apr 2023 07:27  Patient On (Oxygen Delivery Method): room air      VITALS:     GENERAL: NAD,  walking around  HEAD:  Atraumatic, Normocephalic, no erythema, no bumps, bruising, no tenderness  EYES: EOMI, PERRLA, conjunctiva and sclera clear  ENT: Moist mucous membranes  NECK: Supple, No JVD, no c-spine tenderness, range of motion intact    Plan:     CT head and c-spine non contrast stat  called radiology [id number] informed of stat CT scan , will send transportation   informed stuff pt has to be on 1:1 and or possible sedated  plan discussed with attending      Addendum   -while pt was getting CT head, pt jumped off the table  CT head the visualized portion of the brain demonstrates no evidence of acute   intracranial pathology.  -spoke to psychiatrist [id number] will sedate pt and attempt CT head and C-spine again   -hospitalist aware   -will reorder CT head/c-spine , RN to send pt to CT when pt more calm      Addendum   CT head and C-spine performed 5:45 Pm will follow up results    Addendum called [id number] spoke to radiology will expedite reading CT head  will sign out to night surgery PA to follow up

## 2023-04-25 NOTE — ED BEHAVIORAL HEALTH PROGRESS NOTE - SUMMARY
52 year old male domiciled at [place] with pphx of SAFD, bipolar type and pmh of HTN, HLD, DM, HypoT that presented due to ED. To note patient has multiple hospitalization (last DC April 3, 2023 from ), multiple previous SA, no previous self harm, no legal/violence hx, no substance use hx.    Pt displayed agitation toward the end of the evaluation where he began to bang his head on the wall. He appears to pose a threat to himself and others as he experiences command auditory hallucinations of suicidality and homicidal ideations. Pt believes he has not been compliant with medications which is causing acute decompensation. However collateral and lab values indicate that he has been compliant, which make acute decompensation less likely. At this time we recommend continuing home medications, and to give Ativan 2mg q6hr prn severe agitation; Hydroxyzine 25mg q6hr prn anxiety; monitor QTc, monitor for sedation, attempt verbal redirection which pt has responded to before. Pt will be admitted to IPP for further management. 52 year old male domiciled at [place] with pphx of SAFD, bipolar type and pmh of HTN, HLD, DM, HypoT that presented due to ED. To note patient has multiple hospitalization (last DC April 3, 2023 from ), multiple previous SA, no previous self harm, no legal/violence hx, no substance use hx.    Pt displayed agitation toward the end of the evaluation where he began to bang his head on the wall. He appears to pose a threat to himself and others as he experiences command auditory hallucinations of suicidality and homicidal ideations. Pt believes he has not been compliant with medications which is causing acute decompensation. However collateral and lab values indicate that he has been compliant,  although there is questionable decompensation, his current behavior deems him unsafe to be discharged. He is reporting command hallucination, his behavior is unpredictable, he is also reporting suicidal ideation. At this time we recommend continuing home medications, and to give Ativan 2mg q6hr prn severe agitation; Hydroxyzine 25mg q6hr prn anxiety; monitor QTc, monitor for sedation, attempt verbal redirection which pt has responded to before. Pt will be admitted to IPP for further management. 52 year old male domiciled at [place] with pphx of SAFD, bipolar type and pmh of HTN, HLD, DM, HypoT that presented due to ED. To note patient has multiple hospitalization (last DC April 3, 2023 from ), multiple previous SA, no previous self harm, no legal/violence hx, no substance use hx.    Pt displayed agitation toward the end of the evaluation where he began to bang his head on the wall. He appears to pose a threat to himself and others as he experiences command auditory hallucinations of suicidality and homicidal ideations. Pt believes he has not been compliant with medications which is causing acute decompensation. However collateral and lab values indicate that he has been compliant,  although there is questionable decompensation, his current behavior deems him unsafe to be discharged. He is reporting command hallucination, his behavior is unpredictable, he is also reporting suicidal ideation. At this time we recommend continuing home medications, and to give Ativan 2mg q6hr prn severe agitation; Hydroxyzine 25mg q6hr prn anxiety; monitor QTc, monitor for sedation, attempt verbal redirection which pt has responded to before. Pt will be admitted to IPP for further management.    Complete medication list confirmed via phone call with [facility]:  Clozaril 100mg po in morning  Clozaril 200mg po 2 tablets at night  Ativan 2mg po at night  Depakote 500mg po in morning  Depakote 150mg po 3 tablets at night  Lipitor 40mg po in morning  Dulcolax 5mg po at night  Colace 100 mg po in morning  Synthroid 25 mcg po in morning  Metformin 1000mg BID  Lopressor 25mg po in morning  Invega Sustenna 156mg IM q4 weeks(last dose on 4/6/23, next dose due 5/3/23)

## 2023-04-25 NOTE — ED CDU PROVIDER DISPOSITION NOTE - CLINICAL COURSE
52-year-old male presented with worsening auditory hallucinations and stating he wanted to hurt himself.  On one-to-one.  Psych evaluated pt , state admission to psych.

## 2023-04-25 NOTE — ED BEHAVIORAL HEALTH ASSESSMENT NOTE - ADDITIONAL DETAILS ALL
patient reports SI, presented after harming self with sharp edges of can by cutting his wrist denies current SI

## 2023-04-25 NOTE — ED BEHAVIORAL HEALTH ASSESSMENT NOTE - CURRENT MEDICATION
In August 2022, psychiatry note signifies patient was on       - Depakote 500mg Qam/750mg Qhs     - Clozaril 300mg Qam/400mg Qhs     - Risperdal 3mg Qhs     - ativan 2mg Qhs     - Invega Sustenna 78mg IM [id number] (per staff pt may have received injection last month but unable to confirm exact date)     - Levothyroxine 25mcg Qdaily Unclear current medication  Patient discharged on 4/3 on Atorvastatin 40mg qdaily, Bisacodyl 5mg qdaily, Clozapine 100mg/400mg BID, Depakote 500/750mg BID, Levothyroxine 25mcg qdaily, Ativan 2mg qHS, Metformin 1000mg BID, Metoprolol 25mg qdaily, Miralax 17 gram prn constipation; Invega Sustenna 156mg ALCANTARA IM on 4/6/23

## 2023-04-25 NOTE — BH INPATIENT PSYCHIATRY ASSESSMENT NOTE - OTHER PAST PSYCHIATRIC HISTORY (INCLUDE DETAILS REGARDING ONSET, COURSE OF ILLNESS, INPATIENT/OUTPATIENT TREATMENT)
Per Chart Review and updated as necessary    Diagnoses: Schizoaffective Disorder, Bipolar Type     Inpatient: multiple IPP admissions (last confirmed admission was at [facility] in April 2023)     Outpatient: Dr. Pearson.

## 2023-04-25 NOTE — BH INPATIENT PSYCHIATRY ASSESSMENT NOTE - NSBHCHARTREVIEWLAB_PSY_A_CORE FT
11.7   7.30  )-----------( 245      ( 24 Apr 2023 22:29 )             37.2   04-24    130<L>  |  96<L>  |  11  ----------------------------<  86  4.1   |  24  |  0.7    Ca    9.6      24 Apr 2023 22:29    TPro  7.6  /  Alb  4.4  /  TBili  0.3  /  DBili  <0.2  /  AST  14  /  ALT  8   /  AlkPhos  82  04-24

## 2023-04-25 NOTE — ED PROVIDER NOTE - PROGRESS NOTE DETAILS
MM: Patient noted to be having outbursts of yelling and hitting the wall, psych at bedside, patient able to be deescalated.

## 2023-04-25 NOTE — ED CDU PROVIDER INITIAL DAY NOTE - PHYSICAL EXAMINATION
VITAL SIGNS: I have reviewed nursing notes and confirm.  CONSTITUTIONAL: 51 yo M laying on stretcher; in no acute distress.  SKIN: Skin exam is warm and dry, no acute rash.  HEAD: Normocephalic; atraumatic.  EYES: PERRL, EOM intact; conjunctiva and sclera clear.  ENT: No nasal discharge; airway clear.  NECK: Supple; non tender.  CARD: S1, S2 normal; no murmurs, gallops, or rubs. Regular rate and rhythm.  RESP: No wheezes, rales or rhonchi. Speaking in full sentences.   ABD: Normal bowel sounds; soft; non-distended; non-tender; No rebound or guarding. No CVA tenderness.  EXT: Normal ROM. No clubbing, cyanosis or edema.  NEURO: Alert, oriented. Grossly unremarkable. No focal deficits.   PSYCH: Cooperative. (+) auditory hallucinations. No HI/SI.

## 2023-04-25 NOTE — ED BEHAVIORAL HEALTH ASSESSMENT NOTE - HPI (INCLUDE ILLNESS QUALITY, SEVERITY, DURATION, TIMING, CONTEXT, MODIFYING FACTORS, ASSOCIATED SIGNS AND SYMPTOMS)
51 yo male, currently domiciled at [place] with pphx of SAFD, bipolar type and pmh of HTN, HLD, DM, HypoT that presented due to SI/HI. To note patient has multiple hospitalization (last DC April 3, 2023 from ), multiple previous SA, no previous self harm, no legal/violence hx, no substance use hx.      Received Clozapine 100mg in the ED at 2339. Patient discharged on 4/3 on Atorvastatin 40mg qdaily, Bisacodyl 5mg qdaily, Clozapine 100mg/400mg BID, Depakote 500/750mg BID, Levothyroxine 25mcg qdaily, Ativan 2mg qHS, Metformin 1000mg BID, Metoprolol 25mg qdaily, Miralax 17 gram prn constipation; Invega Sustenna 156mg ALCANTARA IM on 4/6/23     On interview, patient states he has been having worsening AH that people are talking about him. He states cAH to hurt people and burn his beach. He states God is trying to kill him. He states poor eating due to paranoia that people are poisoning him. States he is eating. States poor sleep. States not having clozaril recently due to no refill for the past 4 days. Reports HI in general and that is why he is in the hospital. Denies SI.      [facility] [facility]: [phone]—states patient told counselor he wasn’t feeling good. Opened a sharp can and he asked to call 911. While waiting patient started banging head against wall. Person at [place] states he has limited information since everything locked away and would have more in the morning. States patient did have trouble getting his Clozaril but for unk amount of time. 53 yo male, currently domiciled at [place] with pphx of SAFD, bipolar type and pmh of HTN, HLD, DM, HypoT that presented due to SI/HI. To note patient has multiple hospitalization (last DC April 3, 2023 from ), multiple previous SA, no previous self harm, no legal/violence hx, no substance use hx.      Received Clozapine 100mg in the ED at 2339. Patient discharged on 4/3 on Atorvastatin 40mg qdaily, Bisacodyl 5mg qdaily, Clozapine 100mg/400mg BID, Depakote 500/750mg BID, Levothyroxine 25mcg qdaily, Ativan 2mg qHS, Metformin 1000mg BID, Metoprolol 25mg qdaily, Miralax 17 gram prn constipation; Invega Sustenna 156mg ALCANTARA IM on 4/6/23     On interview, patient states he has been having worsening AH that people are talking about him. He states cAH to hurt people and burn his beach. He states God is trying to kill him. He states poor eating due to paranoia that people are poisoning him. States he is eating. States poor sleep. States not having clozaril recently due to no refill for the past 4 days. Reports HI in general and that is why he is in the hospital. Denies SI.  Verbally consents to talk to residence.     [facility] [facility]: [phone]—states patient told counselor he wasn’t feeling good. Opened a sharp can and he asked to call 911. While waiting patient started banging head against wall. Person at [place] states he has limited information since everything locked away and would have more in the morning. States patient did have trouble getting his Clozaril but for unk amount of time.

## 2023-04-25 NOTE — ED BEHAVIORAL HEALTH ASSESSMENT NOTE - OTHER PAST PSYCHIATRIC HISTORY (INCLUDE DETAILS REGARDING ONSET, COURSE OF ILLNESS, INPATIENT/OUTPATIENT TREATMENT)
Diagnoses: Schizoaffective Disorder, Bipolar Type     Inpatient: multiple IPP admissions (last confirmed admission was at [facility] in April 2022)     Outpatient: Dr. Pearson. Per Chart Review and updated as necessary    Diagnoses: Schizoaffective Disorder, Bipolar Type     Inpatient: multiple IPP admissions (last confirmed admission was at [facility] in April 2023)     Outpatient: Dr. Pearson.

## 2023-04-25 NOTE — BH INPATIENT PSYCHIATRY ASSESSMENT NOTE - NSBHMSEPERCOMMAND_PSY_A_CORE
Curettage Text: The wound bed was treated with curettage after the biopsy was performed.
Lab: -[id number]
Bill For Surgical Tray: no
Consent: Written consent was obtained and risk was reviewed  per signed  consent form. Biopsy was performed with patient verbal permission.
Anesthesia Type: 2% lidocaine with epinephrine and a 1:10 solution of 8.4% sodium bicarbonate
Size Of Lesion In Cm: 0.5
Additional Anesthesia Volume In Cc (Will Not Render If 0): 0
Cryotherapy Text: The wound bed was treated with cryotherapy after the biopsy was performed.
Lab Facility: [id number]
Render Post-Care Instructions In Note?: yes
Wound Care: Petrolatum
Depth Of Biopsy: dermis
Electrodesiccation Text: The wound bed was treated with electrodesiccation after the biopsy was performed.
Type Of Destruction Used: Curettage
Biopsy Type: H and E
Electrodesiccation And Curettage Text: The wound bed was treated with electrodesiccation and curettage after the biopsy was performed.
Billing Type: Third-Party Bill
Post-care instructions were given and  reviewed with the patient in detail. Patient is to keep the biopsy site dry overnight, and then apply healing ointment daily until healed. Patient is instructed to call for any questions or problems.
Hemostasis: Aluminum Chloride
Dressing: bandage
Silver Nitrate Text: The wound bed was treated with silver nitrate after the biopsy was performed.
Biopsy Method: Personna blade
Detail Level: Detailed
Notification Instructions: Patient will be notified of biopsy results. However, patient instructed to call the office if not contacted within 2 weeks.
Size Of Lesion In Cm: 0.2
Yes

## 2023-04-25 NOTE — ED BEHAVIORAL HEALTH ASSESSMENT NOTE - DESCRIPTION (FIRST USE, LAST USE, QUANTITY, FREQUENCY, DURATION)
alert/oriented to person, place, time/situation/answers all questions appropriately
smokes 1 cigarette a day

## 2023-04-25 NOTE — BH INPATIENT PSYCHIATRY ASSESSMENT NOTE - NSBHCHARTREVIEWINVESTIGATE_PSY_A_CORE FT
Ventricular Rate 86 BPM    Atrial Rate 86 BPM    P-R Interval 156 ms    QRS Duration 104 ms    Q-T Interval 380 ms    QTC Calculation(Bazett) 454 ms    P Axis 70 degrees    R Axis 6 degrees    T Axis 9 degrees    Diagnosis Line Normal sinus rhythm  Minimal voltage criteria for LVH, may be normal variant  Nonspecific T wave abnormality  Abnormal ECG    Confirmed by AKIRA ROJAS MD ([id number]) on 4/25/2023 6:59:40 AM      ACC: [id number] EXAM:  CT BRAIN   ORDERED BY: MELANY ALLAN     PROCEDURE DATE:  04/25/2023          INTERPRETATION:  Clinical History / Reason for exam: Head trauma. Banging   head on wall.    Technique: Noncontrast head CT.  Contiguous unenhanced CTaxial images of   the head from the base to the vertex with coronal and sagittal reformats.    Please note the patient was combative and jumped off the table during the   scan. The top of the brain/head is not included.    Comparison: CT head dated 3/20/2023    Findings:    The visualized ventricles and cortical sulci are normal in size and   configuration.   There is no evidence of acute intracranial hemorrhage,   extra-axial fluid collection or midline shift.  Gray-white matter   differentiation is grossly maintained.    The visualized paranasal sinuses and mastoids are clear.    IMPRESSION:    1.  Limited study; the patient was combative and jumped off the table   during the scan. The top of the brain/head is not included.    2.  The visualized portion of the brain demonstrates no evidence of acute   intracranial pathology.    --- End of Report ---            DANIS SIEGEL MD; Attending Radiologist  This document has been electronically signed. Apr 25 2023  4:04PM

## 2023-04-25 NOTE — BH INPATIENT PSYCHIATRY ASSESSMENT NOTE - OTHER
very impaired; patient repeatedly banging his head against walls Patient unable to participate in meaning interview due to distressing auditory hallucinations poor; patient is looking down with hands covering his face for most of the interview

## 2023-04-25 NOTE — ED BEHAVIORAL HEALTH ASSESSMENT NOTE - NSBHMSEREMMEM_PSY_A_CORE
Chief Complaint   Patient presents with     Covid Concern     Pt has fever, cough and pain on R side of lung.     Radha Mcclure, CAM 10:11 AM  11/18/2020      
Normal

## 2023-04-25 NOTE — BH INPATIENT PSYCHIATRY ASSESSMENT NOTE - NSBHMETABOLIC_PSY_ALL_CORE_FT
BMI: BMI (kg/m2): 29.7 (04-25-23 @ 14:00)  HbA1c: A1C with Estimated Average Glucose Result: 6.5 % (03-19-23 @ 07:55)    Glucose: POCT Blood Glucose.: 105 mg/dL (04-25-23 @ 08:30)    BP: 136/91 (04-25-23 @ 15:36) (112/67 - 147/94)  Lipid Panel: Date/Time: 03-19-23 @ 07:55  Cholesterol, Serum: 161  Direct LDL: --  HDL Cholesterol, Serum: 27  Total Cholesterol/HDL Ration Measurement: --  Triglycerides, Serum: 116

## 2023-04-25 NOTE — ED CDU PROVIDER INITIAL DAY NOTE - PROGRESS NOTE DETAILS
52-year-old male presented with worsening auditory hallucinations and stating he wanted to hurt himself.  On one-to-one.  Patient not agitated at this time.  Continues to state hearing voices and speaking to famous dividual's.  Pending psych reevaluation.  Will reassess. patient resting, no new complaints at this time. pending psych reevaluation. pt seen by psych this morning, admission to [facility], bed available.

## 2023-04-25 NOTE — ED PROVIDER NOTE - OBJECTIVE STATEMENT
52-year-old male with past medical history of schizoaffective disorder bipolar type, HTN, HLD, DM and hypothyroidism presents to the ED sent in from [facility] for evaluation of worsening auditory hallucinations, episode of self-harm.  Patient states he has not received his closet pain over the last 4 days has had worsening command auditory hallucinations which are telling him to hurt himself.  Patient states they have been telling him to burn the beds in his facility.  Patient aware that he actually might act on these voices which prompted him to come to ED for help.  While waiting for EMS at his facility he did begin to bang his head against the wall.  He denies alcohol or illicit drug use.  He denies other complaints. Pt denies fever, chills, nausea, vomiting, abdominal pain, diarrhea, headache, dizziness, weakness, chest pain, SOB, back pain, LOC, urinary symptoms, cough, calf pain/swelling. 52-year-old male with past medical history of schizoaffective disorder bipolar type, HTN, HLD, DM and hypothyroidism presents to the ED sent in from [facility] for evaluation of worsening auditory hallucinations, episode of self-harm.  Patient states he has not received his  clozapine over the last 4 days has had worsening command auditory hallucinations which are telling him to hurt himself.  Patient states they have been telling him to burn the beds in his facility.  Patient aware that he actually might act on these voices which prompted him to come to ED for help.  While waiting for EMS at his facility he did begin to bang his head against the wall.  He denies alcohol or illicit drug use.  He denies other complaints. Pt denies fever, chills, nausea, vomiting, abdominal pain, diarrhea, headache, dizziness, weakness, chest pain, SOB, back pain, LOC, urinary symptoms, cough, calf pain/swelling.

## 2023-04-25 NOTE — ED BEHAVIORAL HEALTH PROGRESS NOTE - IS SUICIDALITY/HOMICIDALITY RISK SCREENING/ASSESSMENT INCOMPLETE OR NEED TO BE REDONE?
No (assessment complete) Yes, patient with ongoing suicidality/homicidality (repeat/updated assessment)...

## 2023-04-25 NOTE — ED BEHAVIORAL HEALTH PROGRESS NOTE - LACKING INFO FOR PSYCH DISPO DETAILS FREE TEXT
Reassessment for obtaining collateral from residence [facility] and to determine if IPP admission necessary

## 2023-04-25 NOTE — BH INPATIENT PSYCHIATRY ASSESSMENT NOTE - NSBHCHARTREVIEWVS_PSY_A_CORE FT
Vital Signs Last 24 Hrs  T(C): 35.9 (04-25-23 @ 14:00), Max: 37.1 (04-25-23 @ 00:27)  T(F): 96.7 (04-25-23 @ 14:00), Max: 98.7 (04-25-23 @ 00:27)  HR: 124 (04-25-23 @ 15:36) (92 - 124)  BP: 136/91 (04-25-23 @ 15:36) (112/67 - 147/94)  BP(mean): 97 (04-24-23 @ 22:11) (97 - 97)  RR: 16 (04-25-23 @ 15:36) (16 - 18)  SpO2: 98% (04-25-23 @ 07:27) (98% - 98%)

## 2023-04-25 NOTE — BH INPATIENT PSYCHIATRY ASSESSMENT NOTE - CURRENT MEDICATION
MEDICATIONS  (STANDING):  atorvastatin 40 milliGRAM(s) Oral at bedtime  bisacodyl 5 milliGRAM(s) Oral at bedtime  cloZAPine 200 milliGRAM(s) Oral at bedtime  cloZAPine 100 milliGRAM(s) Oral daily  divalproex  milliGRAM(s) Oral at bedtime  divalproex  milliGRAM(s) Oral daily  levothyroxine 25 MICROGram(s) Oral daily  LORazepam     Tablet 2 milliGRAM(s) Oral at bedtime  metFORMIN 1000 milliGRAM(s) Oral two times a day  metoprolol tartrate 25 milliGRAM(s) Oral daily    MEDICATIONS  (PRN):  OLANZapine 5 milliGRAM(s) Oral every 8 hours PRN agitation   MEDICATIONS  (STANDING):  atorvastatin 40 milliGRAM(s) Oral at bedtime  bisacodyl 5 milliGRAM(s) Oral at bedtime  cloZAPine 100 milliGRAM(s) Oral daily  cloZAPine 200 milliGRAM(s) Oral at bedtime  divalproex  milliGRAM(s) Oral at bedtime  divalproex  milliGRAM(s) Oral daily  levothyroxine 25 MICROGram(s) Oral daily  LORazepam     Tablet 2 milliGRAM(s) Oral at bedtime  metFORMIN 1000 milliGRAM(s) Oral two times a day  metoprolol tartrate 25 milliGRAM(s) Oral daily    MEDICATIONS  (PRN):  diphenhydrAMINE 50 milliGRAM(s) Oral every 6 hours PRN agitation  LORazepam     Tablet 2 milliGRAM(s) Oral every 6 hours PRN agitation  OLANZapine 5 milliGRAM(s) Oral every 8 hours PRN agitation

## 2023-04-25 NOTE — ED BEHAVIORAL HEALTH ASSESSMENT NOTE - SUMMARY
The cami is a 52-year-old man, single, disabled, lives at [facility], with PMH of HTN, HLD, DM, hypothyroidism, with PPH of Schizoaffective Disorder Bipolar Type, multiple prior hospitalizations (most recently to [facility] in 4/2022), multiple suicide attempts, no substance use history, multiple ED visits in the past week for similar presentation, who was BIBEMS from home due to self harm.    The patient presents for self harm with sharp edges of a can in the setting of multiple ED visits in the past week in the context of possible non-adherence to medication and dissatisfaction with current living environment. The patient was noted to be head banging during evaluation. He was noted at previous ED visit to have possible secondary gain and poor coping skills. He was not able to sit for entire evaluation at this time, was provided PO Ativan to help calm. He is NOT psychiatrically cleared and in need of re-evaluation.    Plan:  -- hold to re-assess  -- give Ativan 2mg PO q4h PRN for anxiety/agitation  -- confirm home meds with [facility] 51 yo male, currently domiciled at [place] with pphx of SAFD, bipolar type and pmh of HTN, HLD, DM, HypoT that presented due to SI/HI. To note patient has multiple hospitalization (last DC April 3, 2023 from ), multiple previous SA, no previous self harm, no legal/violence hx, no substance use hx.      Patient appears acutely decompensated in the setting of not receiving his clozaril medication at his facility. It is currently unclear his medication and how long he has been without his medication. Patient reporting cAH and HI in addition. Patient is danger to self and others at this time. In addition, patient with hypoNa which should be worked up/treated prior to admission. At this time will hold for additional collateral from facility.      Plan:    -Hold for collateral   -Needs medical treatment for hypoNa   -Continue Home Medication which includes:   -PRNs: Ativan 2mg q6hr prn severe agitation; Hydroxyzine 25mg q6hr prn anxiety; monitor QTc, monitor for sedation, attempt verbal redirection; per chart allergy to Haldol, Thorazine  -Labs:   ---CBC: unremarkable   ---CMP: Na 130   ---VPA level 67   ---TSH/T4:    ---EKG: completed   ---BAL: neg 51 yo male, currently domiciled at [place] with pphx of SAFD, bipolar type and pmh of HTN, HLD, DM, HypoT that presented due to SI/HI. To note patient has multiple hospitalization (last DC April 3, 2023 from ), multiple previous SA, no previous self harm, no legal/violence hx, no substance use hx.      Patient appears acutely decompensated in the setting of not receiving his clozaril medication at his facility. It is currently unclear his medication and how long he has been without his medication. Patient reporting cAH and HI in addition. Patient is danger to self and others at this time. In addition, patient with hypoNa which should be worked up/treated prior to admission. At this time will hold for additional collateral from facility.      Plan:    -Hold for collateral   -Needs medical treatment for hypoNa   -Continue Home Medication which includes: unclear, will need additional collateral  -PRNs: Ativan 2mg q6hr prn severe agitation; Hydroxyzine 25mg q6hr prn anxiety; monitor QTc, monitor for sedation, attempt verbal redirection; per chart allergy to Haldol, Thorazine  -Labs:   ---CBC: unremarkable   ---CMP: Na 130   ---VPA level 67   ---TSH/T4:    ---EKG: completed   ---BAL: neg

## 2023-04-25 NOTE — ED ADULT NURSE NOTE - CHIEF COMPLAINT QUOTE
Pt presents to the ED BIBEMS from [facility] w/ of SI/HI. PT was banging his head into the wall and endorses hearing voices to harm himself. Pt currently denies pain. Pt is currently calm. Pt refused VS in triage. 1:1 initiated in triage.

## 2023-04-25 NOTE — ED BEHAVIORAL HEALTH ASSESSMENT NOTE - DESCRIPTION
See BT note hypothyroidism, HTN, HLD Lives at [facility] for the past 6 months, previously was at [facility] Lives at [facility], previously was at [facility]

## 2023-04-25 NOTE — ED BEHAVIORAL HEALTH PROGRESS NOTE - NSBHADMITTIME_PSY_A_CORE
HPI:  This very pleasant 36 year old female presents with a family history of cancer (-see below-). She would like to discuss breast self exam concerns as well as genetics, strategy for breast followup/high risk screening and role of prophylactic surgery.     Currently, the patient noted indentation of the skin of the left lateral breast.  She notes a thickening of the tissue above this area.  She states that she brought this to the attention of her PCP but exam was negative.  She does not see the indentation currently    The patient denies any palpable masses, skin changes, nipple discharge, or breast pain.     Previous biopsy: No    GYN History:      1st birth at age 33  Breast feeding Yes  Menarche: at age 12  LMP: very irregular but more regular since childbirth  OCP Use: no  HRT Use: no     Family History of Malignancy:  Sister diagnosed with breast cancer at age 38 in mid May-IDC grade 3, ER/NH- and Her-2+, Clinical Stage 2. She is currently receiving neoadjuvant chemotherapy.  She had extensive genetics testing and was found to have FANCM mutation.   Mother with breast cancer at age 37 and  at age 39. MA (age 50s) and MGM (age not known) had breast CA but survived.  PA (age 50s) and PGM (age-70s) breast CA     Imaging Work Up: Reviewed and demonstrated:    BILATERAL DIGITAL SCREENING MAMMOGRAM WITH CAD: 2019  CLINICAL HISTORY:Routine annual screening mammogram.    COMPARISON:  Comparison is made to exams dated:  5/10/2017 mamm[facility] - [facility] and 2016 mammogram - [facility].    FINDINGS:  There are scattered fibroglandular elements in both breasts.    There are benign calcifications in the left breast.    No significant masses, calcifications, or other findings are seen in either breast.    Current study was also evaluated with a Computer Aided Detection (CAD) system.    There has been no significant interval change.  IMPRESSION:  BENIGN  There is no mammographic evidence of malignancy. Follow-up with ACR/ACS guidelines.    -- High Risk Statement: Patient has elevated lifetime risk of breast cancer according to the Tyrer-Cuzick risk assessment model. Consider genetic screening if applicable and/or adjunct screening MRI as clinically indicated.  MAMMOGRAPHY BI-RADS: 2 BENIGN     Past Medical History:   Diagnosis Date   • Infertility, female    • PCOS (polycystic ovarian syndrome)      Past Surgical History:   Procedure Laterality Date   •  section, low transverse     • Tonsillectomy       Current Outpatient Medications   Medication Sig Dispense Refill   • labetalol (NORMODYNE) 100 MG tablet Take 100 mg by mouth daily.     • levothyroxine 50 MCG tablet Take 50 mcg by mouth daily.     • metFORMIN (GLUCOPHAGE-XR) 750 MG 24 hr tablet TAKE 2 TABLETS BY MOUTH ONCE DAILY WITH EVENING MEAL       No current facility-administered medications for this visit.          ALLERGIES: no known allergies.     Social History     Socioeconomic History   • Marital status: Not on file     Spouse name: Not on file   • Number of children: Not on file   • Years of education: Not on file   • Highest education level: Not on file   Occupational History   • Not on file   Social Needs   • Financial resource strain: Not on file   • Food insecurity:     Worry: Not on file     Inability: Not on file   • Transportation needs:     Medical: Not on file     Non-medical: Not on file   Tobacco Use   • Smoking status: Never Smoker   • Smokeless tobacco: Never Used   Substance and Sexual Activity   • Alcohol use: Yes   • Drug use: Not on file   • Sexual activity: Not on file   Lifestyle   • Physical activity:     Days per week: Not on file     Minutes per session: Not on file   • Stress: Not on file   Relationships   • Social connections:     Talks on phone: Not on file     Gets together: Not on file     Attends Confucianism service: Not on file     Active member of club or  organization: Not on file     Attends meetings of clubs or organizations: Not on file     Relationship status: Not on file   • Intimate partner violence:     Fear of current or ex partner: Not on file     Emotionally abused: Not on file     Physically abused: Not on file     Forced sexual activity: Not on file   Other Topics Concern   • Not on file   Social History Narrative   • Not on file     Review of Symptoms:   General: No n/f/v/c, no recent weight loss  Eyes:  No recent vision changes  ENT: No new epistaxis  Cardiovascular:  No chest pain or palpitations   Respiratory:  No new onset shortness of breath   Gastrointestinal:  No new diarrhea or constipation, no new abdominal pain  Genitourinary:  No dysuria  Musculoskeletal:  No new injuries  Neurologic:  No new memory loss  Endocrine: No new hot or cold intolerance    Visit Vitals  BP (!) 178/90   Pulse 80   Temp 98 °F (36.7 °C)   Ht 5' 8\" (1.727 m)   Wt 124.7 kg (275 lb)   BMI 41.81 kg/m²     Physical Exam:  General: NAD  Head: Atraumatic  Eyes: No scleral icterus  Mouth: Moist mucous membranes  CV: Regular rate and rhythm  Respiratory: Normal respiratory effort  Lymph: No supraclavicular, posterior/anterior cervical LAD  Breast: Symmetric Breasts          Right breast: No dominant masses, ND, or skin changes.           Left breast: No dominant masses, ND, or skin changes. Left breast site of patient concern (UOQ) shows no skin change.  The area of \"lump\" is an area of linear tissue in the lateral subcuticular area that is consistent clinically with fibrosi          Axilla: No axillary lymphadenopathy bilaterally.   Focused Ultrasound: of the areas of patient concern show normal tissue  Abdomen: Soft, non tender  Genitourinary: No flank tenderness  Musculoskeletal: No significant calf swelling  Neurologic: No gross deficits     Assessment:   This very pleasant 36 year old female presents with a family history of cancer to discuss breast self exam findings as  well as genetics, strategy for breast followup/high risk screening and considerations for prophylactic surgery. The patient's sister was recently diagnosed with breast cancer at age 38-IDC grade 3, ER/NC- and Her-2+, Clinical Stage 2. She is currently receiving neoadjuvant chemotherapy.  She had extensive genetics testing and was found to have FANCM mutation. Her mother was diagnosed with advanced breast cancer at age 37 and did not survive.     A detailed discussion (>50% for counseling and coordination of care) ensued with the patient.    We discussed obtaining an MRI for baseline evaluation and in light of breast density.  We will time the  MRI for mid-cycle.  We discussed the possibility of additional biopsies secondary to the findings of these images, and she accepts the slightly higher false positive rate of this imaging modality.     The patient will consult with Genetics as soon as possible.  We will await testing and counseling results.    The patient was reassured that her current clinical breast exam is negative    I answered all questions and the patient verbalized understanding the content of our discussion. I acknowledge discussing the current standard of care and surgical treatment options according to the NCCN guidelines.      We will see the patient back once these evaluations are completed and at 6 mos intervals if no immediate intervention is indicated    I spent 50 minutes in the evaluation of this patient.  > 50% of which was counseling and/or coordination of care.       45

## 2023-04-25 NOTE — ED PROVIDER NOTE - CLINICAL SUMMARY MEDICAL DECISION MAKING FREE TEXT BOX
52-year-old male with past medical history of schizoaffective disorder bipolar type, HTN, HLD, DM and hypothyroidism presents to the ED sent in from [facility] for evaluation of commanding auditory hallucination. denies medical complaints. exam unremarkable. labs with mild hyponatremia, otherwise within normal limits. ekg with normal QTc. telepsych evaluated patient. placed in edou pending psych reassessment with collateral.

## 2023-04-25 NOTE — ED BEHAVIORAL HEALTH PROGRESS NOTE - NSBHATTESTCOMMENTATTENDFT_PSY_A_CORE
52 year old male, from [facility] with underlying schizoaffective disorder, bipolar type and PMH of HTN, HLD, DM, Hypothyroidism, who was sent to the ED from [facility] for agitation. Depakote level on arrival to ED was 67. Patient was recently discharged from the hospital on April 4, 2023. It appears since his discharged reportedly he was doing well, however, the past day, he has been having instantaneous worsening episode of agitation, accompanied with banging of head and suicidal thoughts. Patient stated, he believe he has not been taking his Clozaril for the past 4 days. And in actuality, these behaviors began about more than 4 days ago, soon after a GI infection. He complains of severe insomnia, he complains of increasing command auditory visual hallucination of the devil telling him to kill himself. In the ED after patient was told he will be discharged; he suddenly began to hit his head strenuously hard against the wall and needed to be medicated. Additionally, he further notes he is unclear what he will do to himself, especially having had multiple cans collected. He specifically noted, he thought about slitting his wrists. At this time, patient appears unpredictable, he is deemed unsafe to be discharged, especially given current behavior and report of active psychosis. Plans is to admit him to inpatient psychiatry unit, under legal 9:39. We will restart his home medication of Clozaril, Depakote and so forth. For agitation, consider Zyprexa 5mg po/IM every 8hrs. No indication for psychiatric 1:1 in the IPP, continue 1:1 while in the ED.  He was supposed to receive his next Invega Sustenna 156 mg  on 4/6/23 pending Confirmation.  52 year old male, from [facility] with underlying schizoaffective disorder, bipolar type and PMH of HTN, HLD, DM, Hypothyroidism, who was sent to the ED from [facility] for agitation. Depakote level on arrival to ED was 67. Patient was recently discharged from the hospital on April 4, 2023. It appears since his discharged reportedly he was doing well, however, the past day, he has been having instantaneous worsening episode of agitation, accompanied with banging of head and suicidal thoughts. Patient stated, he believe he has not been taking his Clozaril for the past 4 days. And in actuality, these behaviors began about more than 4 days ago, soon after a GI infection. He complains of severe insomnia, he complains of increasing command auditory visual hallucination of the devil telling him to kill himself. In the ED after patient was told he will be discharged; he suddenly began to hit his head strenuously hard against the wall and needed to be medicated. Additionally, he further notes he is unclear what he will do to himself, especially having had multiple cans collected. He specifically noted, he thought about slitting his wrists. At this time, patient appears unpredictable, he is deemed unsafe to be discharged, especially given current behavior and report of active psychosis. Plans is to admit him to inpatient psychiatry unit, under legal 9:39. We will restart his home medication of Clozaril, Depakote and so forth. For agitation, consider Zyprexa 5mg po/IM every 8hrs. No indication for psychiatric 1:1 in the IPP, continue 1:1 while in the ED.  Next due dose of Invega Sustenna 156 mg  on 5/3/23 as per collateral information from [facility].

## 2023-04-25 NOTE — BH PATIENT PROFILE - NSPROEXTENSIONSOFSELF_GEN_A_NUR
Patient presents today with complaints of back pain, describes as cramping, that started last night and pressure in pelvic/vaginal area. She has been taking warm baths to soothe the back pain  S:Sharee Lujan is here for a work in obstetrical visit. Today she is 30w6d weeks EGA. She is having pressure and discharge as well. States hasn't had sex in 2 months. Is going to [facility] next week for growth. She  does not have vaginal bleeding, leaking of fluid, contractions. She does not have blurred vision, SOB, or increased swelling in legs or face. Pt does feel fetal movement regularly. O:   Baby moving when measuring fundal height  Vitals:    20 1454   BP: 124/78   Pulse: 122   Weight: 144 lb (65.3 kg)     Pt is A&Ox3, in no acute distress. Normocephalic, atraumatic. PERRL. Resp even and non-labored. Skin pink, warm & dry. Gravid abdomen. DAVIS's well. Gait steady. See OB flowsheet. A: Normal IUP at 30w6d wks      Diagnosis Orders   1. Pelvic pressure in pregnancy, antepartum, third trimester     2. Back pain affecting pregnancy in third trimester     3. 30 weeks gestation of pregnancy     4. Vaginal discharge         P:   Pt counseled on PIH precautions, Kick count and  labor  Continue with routine prenatal care. RTC in 2 wk for prenatal visit    MEDICATIONS:  No orders of the defined types were placed in this encounter. ORDERS:  No orders of the defined types were placed in this encounter. No none

## 2023-04-25 NOTE — CHART NOTE - NSCHARTNOTEFT_GEN_A_CORE
< from: CT Head No Cont (04.25.23 @ 17:42) >    INTERPRETATION:  CLINICAL INDICATIONS:  Trauma.    TECHNIQUE:  Noncontrast head and cervical spine CT was performed.    Multiple contiguous axial images were obtained from the skull base to the   vertex without the use of intravenous contrast. Reformatted coronal and   sagittal images were were subsequently obtained and reviewed.    Axial images were obtained through the cervical spine using multislice   helical technique.  Reformatted coronal and sagittal images were were   subsequently obtained and reviewed.    COMPARISON: Head dated 4/25/2023 at 3:43 PM. CT neck dated 12/21/2021.    FINDINGS:    CT BRAIN:    There is no evidence for acute intracranial hemorrhage, mass effect or   midline shift.    The basal cisterns are patent. There is no hydrocephalus.    There is no extra-axial collection.    The visualized orbits are unremarkable.    The calvarium is intact, without depressed fracture.    The visualized paranasal sinuses and mastoid air cells are clear.    CT CERVICAL SPINE:    There is no prevertebral soft tissue swelling. There is no splaying of   the spinousprocesses. The occipital condyles are normal. Lateral masses   of C1 align normally with C2. No lucent fracture line is identified.   There is no spondylolisthesis. Incidentally noted os odontoideum adjacent   to the tip of the odontoid on the right.    There are mild multilevel degenerative changes present including anterior   osteophyte formation, bilateral uncinate spurring as well as multilevel   facet arthrosis.    At C3-C4 there is disc osteophyte complex, bilateral uncinate spurring   and facet arthrosis contributing to moderate right and mild left   neuroforaminal narrowing.    The spinal canal contents are suboptimally evaluated inherent to CT   technique though grossly unremarkable.    Miscellaneous:  None.    IMPRESSION:    CT HEAD:  No acute intracranial pathology or hemorrhage. No acute calvarial   fracture.    CT CERVICAL SPINE:  No acute fracture or subluxation.    < end of copied text >          above reviewed   no intervention   c/w medical care

## 2023-04-25 NOTE — ED BEHAVIORAL HEALTH NOTE - BEHAVIORAL HEALTH NOTE
==================             PRE-HOSPITAL COURSE             ===================            SOURCE:  Secondhand EMR documentation.             DETAILS:  Patient BIBEMS from [facility]; chief complaint of SIB.           ===========      ED COURSE:            ===========             SOURCE:  RN and secondhand EMR documentation.              ARRIVAL:  Patient noted to be cooperative with ED protocol. Patient gowned, wanded, and placed in rodriguez on 1:1 for consult. Patient presents with good hygiene/grooming.              BELONGINGS:  None notable.             BEHAVIOR: Blood/urine provided for routine labs without noted incident. No SI/HI/AH/VH elicited per RN. Patient is alert, orientedx4, and makes eye contact; speech of normal volume and rate accompanied by logical thought process. Patient has been in hospital bed while in ED; presents as calm and interacting appropriately with ED staff.         TREATMENT: Patient has received 100mg Clozaril PO.     Visitors: Patient presently unaccompanied by social supports while in ED.

## 2023-04-25 NOTE — BH PATIENT PROFILE - LIVES WITH
Note: Rehab x 2 per week.  Call to patient. Patient denies any complaints related to anticoagulation therapy at this time. Patient reports no change in medication, diet or health. Reinforced with patient to call clinic with any medication changes as this can impact INR. Reinforced signs and symptoms bleeding/clotting with patient. Patient aware to seek medical care if signs and symptoms develop.     Dosing instructions given to patient verbally over the phone. Advised to call the clinic with any questions or concerns. Patient verbalizes understanding. Has clinic number.    Anticoagulation Summary  As of 2020    INR goal:  2.0-3.0   TTR:  62.0 % (1 y)   INR used for dosin.8 (2020)   Warfarin maintenance plan:  7.5 mg (5 mg x 1.5) every M, , ; 5 mg (5 mg x 1) all other days   Weekly warfarin total:  42.5 mg   Plan last modified:  Kisha Heller RN (2020)   Next INR check:  2020   Priority:  Follow-Up - 2 Weeks   Target end date:  Indefinite    Indications    Paroxysmal atrial fibrillation (CMS/HCC) [I48.0]  Encounter for therapeutic drug monitoring [Z51.81]  H/O mechanical aortic valve replacement [Z95.2]  Long term (current) use of anticoagulants [Z79.01]             Anticoagulation Episode Summary     INR check location:  Home Draw    Preferred lab:      Send INR reminders to:  [facility]ANK[facility] (OPEN ENROLLMENT) ACS CARD/EP    Comments:  *Next STAC due 21; [facility], cc'd lab 2020; Range 2.0-3.0 d/t hx of bleeds; AMIO dose decrease 20; Effective 20: Call Home# first, then Cell#; Will need to be bridged with Lovenox if hold warfarin      Anticoagulation Care Providers     Provider Role Specialty Phone number    Emrereynaldo Aranda MD Responsible Internal Medicine- Interventional Cardiology [phone]          Supervising provider: Douglas Quintero MD       alone

## 2023-04-25 NOTE — ED ADULT NURSE NOTE - OBJECTIVE STATEMENT
Pt alert and oriented x3. Airway patent with equal respirations. No distress noted. Arom x 4 extremitites. Pt has SI ideations and reports banging his head on the wall.

## 2023-04-25 NOTE — BH INPATIENT PSYCHIATRY ASSESSMENT NOTE - DETAILS
patient currently experiencing auditory hallucinations, unable to answer questions regarding current suicidality

## 2023-04-26 LAB — CLOZAPINE SERPL-MCNC: <68 NG/ML — LOW (ref 350–600)

## 2023-04-26 PROCEDURE — 99233 SBSQ HOSP IP/OBS HIGH 50: CPT

## 2023-04-26 PROCEDURE — 99232 SBSQ HOSP IP/OBS MODERATE 35: CPT

## 2023-04-26 RX ADMIN — CLOZAPINE 100 MILLIGRAM(S): 150 TABLET, ORALLY DISINTEGRATING ORAL at 09:34

## 2023-04-26 RX ADMIN — CLOZAPINE 200 MILLIGRAM(S): 150 TABLET, ORALLY DISINTEGRATING ORAL at 20:36

## 2023-04-26 RX ADMIN — Medication 2 MILLIGRAM(S): at 20:36

## 2023-04-26 RX ADMIN — DIVALPROEX SODIUM 500 MILLIGRAM(S): 500 TABLET, DELAYED RELEASE ORAL at 09:34

## 2023-04-26 RX ADMIN — DIVALPROEX SODIUM 750 MILLIGRAM(S): 500 TABLET, DELAYED RELEASE ORAL at 20:37

## 2023-04-26 RX ADMIN — Medication 25 MICROGRAM(S): at 09:35

## 2023-04-26 RX ADMIN — METFORMIN HYDROCHLORIDE 1000 MILLIGRAM(S): 850 TABLET ORAL at 20:37

## 2023-04-26 RX ADMIN — METFORMIN HYDROCHLORIDE 1000 MILLIGRAM(S): 850 TABLET ORAL at 09:33

## 2023-04-26 RX ADMIN — Medication 25 MILLIGRAM(S): at 15:15

## 2023-04-26 RX ADMIN — ATORVASTATIN CALCIUM 40 MILLIGRAM(S): 80 TABLET, FILM COATED ORAL at 20:36

## 2023-04-26 RX ADMIN — Medication 5 MILLIGRAM(S): at 20:36

## 2023-04-26 NOTE — BH SOCIAL WORK INITIAL PSYCHOSOCIAL EVALUATION - OTHER PAST PSYCHIATRIC HISTORY (INCLUDE DETAILS REGARDING ONSET, COURSE OF ILLNESS, INPATIENT/OUTPATIENT TREATMENT)
Schizoaffective Disorder, bipolar type. Patient has numerous IPP episodes, most recent SI March 2023

## 2023-04-26 NOTE — UM REPORT PROGRESS NOTE - NSUMRMPROVIDER_GEN_A_CORE FT
23 - Karissa   Patient:Juma Levi   : 1970  INSURANCE: [employer]  ID# [id number]  [phone]      Spoke to Deepika [facility] # [id number]. Patient is approved 8 days form - with review due on  with Hien [phone] 23 - Karissa   Patient:Juma Levi   : 1970  INSURANCE: [employer]  ID# [id number]  [phone]      Spoke to Deep[facility] # [id number]. Patient is approved 8 days form - with review due on  with Hien [phone]  23- Karissa - Concurrent review completed with Hien - patient approved for additional 7 days - LCD 23 - review on  with Hien [phone] 23 - Karissa   Patient:Juma Levi   : 1970  INSURANCE: [employer]  ID# [id number]  [phone]      Spoke to Deepika [facility] # [id number]. Patient is approved 8 days form - with review due on  with Hien [phone]  23- Karissa - Concurrent review completed with Hien - patient approved for additional 7 days - LCD 23 - review on  with Hien [phone]  23 - Karissa - left discharge clinicals Hien

## 2023-04-26 NOTE — BH INPATIENT PSYCHIATRY PROGRESS NOTE - OTHER
at times appeared depressed, at times appeared euthymic   slightly impaired poor; patient is looking down with hands covering his face for most of the interview  reports voices have stopped.

## 2023-04-26 NOTE — BH INPATIENT PSYCHIATRY PROGRESS NOTE - NSBHMETABOLIC_PSY_ALL_CORE_FT
BMI: BMI (kg/m2): 29.7 (04-25-23 @ 14:00)  HbA1c: A1C with Estimated Average Glucose Result: 6.5 % (03-19-23 @ 07:55)    Glucose: POCT Blood Glucose.: 105 mg/dL (04-25-23 @ 08:30)    BP: 118/68 (04-26-23 @ 09:30) (112/67 - 147/94)  Lipid Panel: Date/Time: 03-19-23 @ 07:55  Cholesterol, Serum: 161  Direct LDL: --  HDL Cholesterol, Serum: 27  Total Cholesterol/HDL Ration Measurement: --  Triglycerides, Serum: 116

## 2023-04-26 NOTE — BH INPATIENT PSYCHIATRY PROGRESS NOTE - NSBHCHARTREVIEWVS_PSY_A_CORE FT
Vital Signs Last 24 Hrs  T(C): 36.2 (25 Apr 2023 19:48), Max: 36.2 (25 Apr 2023 19:48)  T(F): 97.1 (25 Apr 2023 19:48), Max: 97.1 (25 Apr 2023 19:48)  HR: 97 (26 Apr 2023 09:30) (95 - 97)  BP: 118/68 (26 Apr 2023 09:30) (115/61 - 118/68)  BP(mean): --  RR: 18 (25 Apr 2023 19:48) (18 - 18)  SpO2: --

## 2023-04-26 NOTE — BH INPATIENT PSYCHIATRY PROGRESS NOTE - NSBHFUPINTERVALHXFT_PSY_A_CORE
Met with patient who was readmitted yesterday after a period of time off the unit, was admitted because he was banging his head.  While being the x-ray to determine any bone damage, he became agitated and a psychiatric emergency code was called and staff from treatment unit brought the patient back from x-ray area.  He received medications including as needed medications calmed down markedly and has been sleeping most of the time since then.  Until further assessment he will remain on one-to-one supervision.

## 2023-04-26 NOTE — BH INPATIENT PSYCHIATRY PROGRESS NOTE - NSBHASSESSSUMMFT_PSY_ALL_CORE
Juma Levi is a 53 y/o male, domiciled at [facility], disabled, single, no children, PMH of HTN, HLD, DM, hypothyroidism, past psychiatric history of schizoaffective disorder bipolar type, multiple IPP admissions (last admission was at [facility] in April 2023), per chart hx of multiple suicide attempts, no past substance use history, presents to the ED for worsening command auditory hallucinations.    Patient currently reports distressing auditory hallucinations. Reports the TV is talking to him. Has hit his head multiple times on the wall, requiring head CT. Confirmed that patient received 100mg clozaril 4/23/23 AM. Per chart, patient received clozaril 100mg 12:11 AM 4/25/23. Plan is to continue with clozaril 100mg am dose and clozaril 200mg qhs, with plan to titrate back to 400mg qhs. Plan to follow up with head CT; ANC, trop, CRP. Patient will benefit from continued inpatient hospitalization for medical management and adjustment in order to re-titrate back to home dose of clozaril.    04/26/2023  patient is sleepy on interview today.  He is however, and better related than he was yesterday.  Reports voices are "better" but not gone.    #Schizoaffective disorder, bipolar type  - c/w clozaril 100mg AM and 200mg qhs-- plan to increase by 50mg qhs every 2 days if patient tolerates (until reaching 100mg AM + 400mg qhs home dose)  - patient home dose clozaril 100mg AM and 400mg qhs-- will need to titrate back to this dose  - c/w home Depakote 500mg daily with additional Depakote 750mg qhs; depakote level 67 (WNL) on 4/24   - c/w Dulcolax 5mg qhs for constipation   - c/w ativan 2mg qhs (home medication)  - c/w invega sustenna 156 mg monthly (was given April 6th 2023--need to check to see if patient last received)    #HTN  - c/w home lopressor 25mg AM    #HLD  - c/w Lipitor 40mg daily    #Hypothyroidism  - c/w home synthroid 25mcg daily    #T2DM  - c/w home metformin 1000mg BID

## 2023-04-26 NOTE — BH INPATIENT PSYCHIATRY PROGRESS NOTE - ADDITIONAL DETAILS / COMMENTS
Patient is calmer and better related than yesterday.  Reports he is still "afraid"  but glad he is safe.

## 2023-04-26 NOTE — BH INPATIENT PSYCHIATRY PROGRESS NOTE - NSBHFUPINTERVALCCFT_PSY_A_CORE
"I am sorry I am sorry" (writer advised patient that he was yelling at him yesterday in the emergency department but seemed not really to be seeing him)

## 2023-04-26 NOTE — BH INPATIENT PSYCHIATRY PROGRESS NOTE - CURRENT MEDICATION
MEDICATIONS  (STANDING):  atorvastatin 40 milliGRAM(s) Oral at bedtime  bisacodyl 5 milliGRAM(s) Oral at bedtime  cloZAPine 200 milliGRAM(s) Oral at bedtime  cloZAPine 100 milliGRAM(s) Oral daily  divalproex  milliGRAM(s) Oral at bedtime  divalproex  milliGRAM(s) Oral daily  levothyroxine 25 MICROGram(s) Oral daily  LORazepam     Tablet 2 milliGRAM(s) Oral at bedtime  metFORMIN 1000 milliGRAM(s) Oral two times a day  metoprolol tartrate 25 milliGRAM(s) Oral daily    MEDICATIONS  (PRN):  acetaminophen     Tablet .. 650 milliGRAM(s) Oral every 6 hours PRN Moderate Pain (4 - 6)  diphenhydrAMINE 50 milliGRAM(s) Oral every 6 hours PRN agitation  ibuprofen  Tablet. 400 milliGRAM(s) Oral every 8 hours PRN Severe Pain (7 - 10)  LORazepam     Tablet 2 milliGRAM(s) Oral every 6 hours PRN agitation  OLANZapine 5 milliGRAM(s) Oral every 8 hours PRN agitation

## 2023-04-27 LAB
ANION GAP SERPL CALC-SCNC: 15 MMOL/L — HIGH (ref 7–14)
BASOPHILS # BLD AUTO: 0.02 K/UL — SIGNIFICANT CHANGE UP (ref 0–0.2)
BASOPHILS NFR BLD AUTO: 0.3 % — SIGNIFICANT CHANGE UP (ref 0–1)
BUN SERPL-MCNC: 4 MG/DL — LOW (ref 10–20)
CALCIUM SERPL-MCNC: 9.6 MG/DL — SIGNIFICANT CHANGE UP (ref 8.4–10.5)
CHLORIDE SERPL-SCNC: 100 MMOL/L — SIGNIFICANT CHANGE UP (ref 98–110)
CO2 SERPL-SCNC: 21 MMOL/L — SIGNIFICANT CHANGE UP (ref 17–32)
CREAT SERPL-MCNC: 0.7 MG/DL — SIGNIFICANT CHANGE UP (ref 0.7–1.5)
EGFR: 112 ML/MIN/1.73M2 — SIGNIFICANT CHANGE UP
EOSINOPHIL # BLD AUTO: 0.23 K/UL — SIGNIFICANT CHANGE UP (ref 0–0.7)
EOSINOPHIL NFR BLD AUTO: 3 % — SIGNIFICANT CHANGE UP (ref 0–8)
GLUCOSE SERPL-MCNC: 155 MG/DL — HIGH (ref 70–99)
HCT VFR BLD CALC: 38.8 % — LOW (ref 42–52)
HGB BLD-MCNC: 12 G/DL — LOW (ref 14–18)
IMM GRANULOCYTES NFR BLD AUTO: 0.3 % — SIGNIFICANT CHANGE UP (ref 0.1–0.3)
LYMPHOCYTES # BLD AUTO: 3.54 K/UL — HIGH (ref 1.2–3.4)
LYMPHOCYTES # BLD AUTO: 45.8 % — SIGNIFICANT CHANGE UP (ref 20.5–51.1)
MCHC RBC-ENTMCNC: 25.5 PG — LOW (ref 27–31)
MCHC RBC-ENTMCNC: 30.9 G/DL — LOW (ref 32–37)
MCV RBC AUTO: 82.4 FL — SIGNIFICANT CHANGE UP (ref 80–94)
MONOCYTES # BLD AUTO: 0.59 K/UL — SIGNIFICANT CHANGE UP (ref 0.1–0.6)
MONOCYTES NFR BLD AUTO: 7.6 % — SIGNIFICANT CHANGE UP (ref 1.7–9.3)
NEUTROPHILS # BLD AUTO: 3.33 K/UL — SIGNIFICANT CHANGE UP (ref 1.4–6.5)
NEUTROPHILS NFR BLD AUTO: 43 % — SIGNIFICANT CHANGE UP (ref 42.2–75.2)
NRBC # BLD: 0 /100 WBCS — SIGNIFICANT CHANGE UP (ref 0–0)
PLATELET # BLD AUTO: 212 K/UL — SIGNIFICANT CHANGE UP (ref 130–400)
PMV BLD: 9.7 FL — SIGNIFICANT CHANGE UP (ref 7.4–10.4)
POTASSIUM SERPL-MCNC: 4.5 MMOL/L — SIGNIFICANT CHANGE UP (ref 3.5–5)
POTASSIUM SERPL-SCNC: 4.5 MMOL/L — SIGNIFICANT CHANGE UP (ref 3.5–5)
RBC # BLD: 4.71 M/UL — SIGNIFICANT CHANGE UP (ref 4.7–6.1)
RBC # FLD: 18.3 % — HIGH (ref 11.5–14.5)
SODIUM SERPL-SCNC: 136 MMOL/L — SIGNIFICANT CHANGE UP (ref 135–146)
TROPONIN T SERPL-MCNC: <0.01 NG/ML — SIGNIFICANT CHANGE UP
WBC # BLD: 7.73 K/UL — SIGNIFICANT CHANGE UP (ref 4.8–10.8)
WBC # FLD AUTO: 7.73 K/UL — SIGNIFICANT CHANGE UP (ref 4.8–10.8)

## 2023-04-27 PROCEDURE — 70450 CT HEAD/BRAIN W/O DYE: CPT | Mod: 26

## 2023-04-27 RX ORDER — CLOZAPINE 150 MG/1
300 TABLET, ORALLY DISINTEGRATING ORAL AT BEDTIME
Refills: 0 | Status: DISCONTINUED | OUTPATIENT
Start: 2023-04-27 | End: 2023-04-28

## 2023-04-27 RX ORDER — OLANZAPINE 15 MG/1
5 TABLET, FILM COATED ORAL ONCE
Refills: 0 | Status: COMPLETED | OUTPATIENT
Start: 2023-04-27 | End: 2023-04-27

## 2023-04-27 RX ADMIN — DIVALPROEX SODIUM 500 MILLIGRAM(S): 500 TABLET, DELAYED RELEASE ORAL at 08:29

## 2023-04-27 RX ADMIN — Medication 2 MILLIGRAM(S): at 20:51

## 2023-04-27 RX ADMIN — ATORVASTATIN CALCIUM 40 MILLIGRAM(S): 80 TABLET, FILM COATED ORAL at 20:52

## 2023-04-27 RX ADMIN — Medication 25 MICROGRAM(S): at 08:30

## 2023-04-27 RX ADMIN — CLOZAPINE 100 MILLIGRAM(S): 150 TABLET, ORALLY DISINTEGRATING ORAL at 08:29

## 2023-04-27 RX ADMIN — Medication 5 MILLIGRAM(S): at 20:51

## 2023-04-27 RX ADMIN — METFORMIN HYDROCHLORIDE 1000 MILLIGRAM(S): 850 TABLET ORAL at 08:30

## 2023-04-27 RX ADMIN — METFORMIN HYDROCHLORIDE 1000 MILLIGRAM(S): 850 TABLET ORAL at 20:52

## 2023-04-27 RX ADMIN — DIVALPROEX SODIUM 750 MILLIGRAM(S): 500 TABLET, DELAYED RELEASE ORAL at 20:52

## 2023-04-27 RX ADMIN — Medication 25 MILLIGRAM(S): at 08:30

## 2023-04-27 RX ADMIN — CLOZAPINE 300 MILLIGRAM(S): 150 TABLET, ORALLY DISINTEGRATING ORAL at 20:52

## 2023-04-27 NOTE — BH INPATIENT PSYCHIATRY PROGRESS NOTE - NSBHFUPINTERVALHXFT_PSY_A_CORE
Patient was seen and evaluated this morning. Chart was reviewed and no acute events were noted. No PRN medications were administered overnight. Upon approach, patient is sleeping in bed comfortably. Patient is alert and oriented and engages with interviewer. Patient is cooperative and answers all questions appropriately. Patient reports feeling a lot better today than when he first came in. He reports he had a good day yesterday, without any auditory hallucinations. He reports yesterday he was able to "listen to music, dance, eat snacks," which he enjoyed. He reports that he was able to occupy his mind with various things, which he believes helped stop the auditory hallucinations. He goes on to mention "the mind is the devil's workshop" when talking about how he was feeling prior to admission. Patient denies any current passive or active suicidal ideation, intent or plan and denies any homicidal ideation. Patient denies any persecutory delusions and denies any auditory or visual hallucinations. Denies any side effects from medication. Reports eating and sleeping well.

## 2023-04-27 NOTE — BH INPATIENT PSYCHIATRY PROGRESS NOTE - OTHER
slightly impaired poor; patient is looking down with hands covering his face for most of the interview  reports voices have stopped.  at times appeared depressed, at times appeared euthymic denies AH

## 2023-04-27 NOTE — BH INPATIENT PSYCHIATRY PROGRESS NOTE - NSBHCHARTREVIEWVS_PSY_A_CORE FT
Vital Signs Last 24 Hrs  T(C): 36.6 (04-27-23 @ 08:12), Max: 36.6 (04-27-23 @ 08:12)  T(F): 97.8 (04-27-23 @ 08:12), Max: 97.8 (04-27-23 @ 08:12)  HR: 79 (04-27-23 @ 08:12) (79 - 107)  BP: 127/77 (04-27-23 @ 08:12) (127/77 - 134/71)  BP(mean): --  RR: 18 (04-27-23 @ 08:12) (18 - 18)  SpO2: --

## 2023-04-27 NOTE — BH INPATIENT PSYCHIATRY PROGRESS NOTE - ADDITIONAL DETAILS / COMMENTS
Patient is calmer and better related than yesterday.  Reports he is still "afraid"  but glad he is safe. Patient is calmer and better related

## 2023-04-27 NOTE — BH INPATIENT PSYCHIATRY PROGRESS NOTE - NSBHMETABOLIC_PSY_ALL_CORE_FT
BMI: BMI (kg/m2): 29.7 (04-25-23 @ 14:00)  HbA1c: A1C with Estimated Average Glucose Result: 6.5 % (03-19-23 @ 07:55)    Glucose: POCT Blood Glucose.: 105 mg/dL (04-25-23 @ 08:30)    BP: 127/77 (04-27-23 @ 08:12) (112/67 - 147/94)  Lipid Panel: Date/Time: 03-19-23 @ 07:55  Cholesterol, Serum: 161  Direct LDL: --  HDL Cholesterol, Serum: 27  Total Cholesterol/HDL Ration Measurement: --  Triglycerides, Serum: 116

## 2023-04-27 NOTE — CHART NOTE - NSCHARTNOTEFT_GEN_A_CORE
Pt seen again: asleep but arousable, still with some mumbled/garbled speech making it difficult to comprehend, denies HA, neck pain. Able to follow commands (raise leg, squeeze hand)    Vital Signs Last 24 Hrs  T(C): 36.2 (27 Apr 2023 21:14), Max: 36.6 (27 Apr 2023 08:12)  T(F): 97.1 (27 Apr 2023 21:14), Max: 97.8 (27 Apr 2023 08:12)  HR: 114 (27 Apr 2023 21:14) (79 - 114)  BP: 118/55 (27 Apr 2023 21:14) (118/55 - 127/77)  BP(mean): --  RR: 18 (27 Apr 2023 21:14) (16 - 18)  SpO2: 98% (27 Apr 2023 21:14) (98% - 98%)    Parameters below as of 27 Apr 2023 21:14  Patient On (Oxygen Delivery Method): room air    CT Head No Cont (04.27.23 @ 21:46) >    INTERPRETATION:  CLINICAL INDICATION: Trauma; hit head on wall    TECHNIQUE: CT of the head was performed without the administration of   intravenous contrast. Coronal and sagittal reformats were obtained. There   is streak artifact.    COMPARISON: CT head 4/25/2023    FINDINGS:    The ventricles, basal cisterns and sulcal pattern are within normal   limits.    There is no acute intracranial hemorrhage, extra-axial fluid collections   or midline shift.    There is no depressed calvarial fracture. The mastoid air cells are   aerated.  The paranasal sinuses are aerated.    Beam hardening artifact is noted overlying the brain stem and posterior   fossa which is inherent to CT in this location.    IMPRESSION:   There is streak artifact, limiting evaluation.  No definite evidence of acute intracranial pathology.    ================================  No acute intervention

## 2023-04-27 NOTE — CHART NOTE - NSCHARTNOTEFT_GEN_A_CORE
Called by RN that pt was noted to be banging his forehead on the wall at approx 7pm - RN was able to restrain him and prevent further injury. Per RN pt did not have any bleeding, bruising noted on forehead. Pt was placed on a 1:1 at that time and was noted to be walking the hallways without any issues, speaking with others but with somewhat low and garbled speech, which according to his current nurse, he sometimes has.  Pt medicated at approx 730 PM with his usual meds (ativan, clozaril, depakote). Per PCA who is 1:1, pt just recently fell asleep    Vital Signs Last 24 Hrs  T(C): 36.6 (27 Apr 2023 08:12), Max: 36.6 (27 Apr 2023 08:12)  T(F): 97.8 (27 Apr 2023 08:12), Max: 97.8 (27 Apr 2023 08:12)  HR: 104 (27 Apr 2023 15:48) (79 - 104)  BP: 126/74 (27 Apr 2023 15:48) (126/74 - 127/77)  RR: 16 (27 Apr 2023 15:48) (16 - 18)    Pt seen:  Asleep but arousable, A&Ox2 (name, birthdate; did not ask "place") with garbled speech  HEENT: no ecchymosis/hematoma/erythema/tenderness noted to forehead  Neuro: motor grossly intact; pupils small, equally reactive, able to follow simple commands (ex: lay on back)    Plan:  - continue 1:1  - will get Ct head noncon urgent to R/o any pathology

## 2023-04-27 NOTE — BH INPATIENT PSYCHIATRY PROGRESS NOTE - CURRENT MEDICATION
MEDICATIONS  (STANDING):  atorvastatin 40 milliGRAM(s) Oral at bedtime  bisacodyl 5 milliGRAM(s) Oral at bedtime  cloZAPine 200 milliGRAM(s) Oral at bedtime  cloZAPine 100 milliGRAM(s) Oral daily  divalproex  milliGRAM(s) Oral at bedtime  divalproex  milliGRAM(s) Oral daily  levothyroxine 25 MICROGram(s) Oral daily  LORazepam     Tablet 2 milliGRAM(s) Oral at bedtime  metFORMIN 1000 milliGRAM(s) Oral two times a day  metoprolol tartrate 25 milliGRAM(s) Oral daily    MEDICATIONS  (PRN):  acetaminophen     Tablet .. 650 milliGRAM(s) Oral every 6 hours PRN Moderate Pain (4 - 6)  diphenhydrAMINE 50 milliGRAM(s) Oral every 6 hours PRN agitation  ibuprofen  Tablet. 400 milliGRAM(s) Oral every 8 hours PRN Severe Pain (7 - 10)  LORazepam     Tablet 2 milliGRAM(s) Oral every 6 hours PRN agitation  OLANZapine 5 milliGRAM(s) Oral every 8 hours PRN agitation   MEDICATIONS  (STANDING):  atorvastatin 40 milliGRAM(s) Oral at bedtime  bisacodyl 5 milliGRAM(s) Oral at bedtime  cloZAPine 300 milliGRAM(s) Oral at bedtime  cloZAPine 100 milliGRAM(s) Oral daily  divalproex  milliGRAM(s) Oral at bedtime  divalproex  milliGRAM(s) Oral daily  levothyroxine 25 MICROGram(s) Oral daily  LORazepam     Tablet 2 milliGRAM(s) Oral at bedtime  metFORMIN 1000 milliGRAM(s) Oral two times a day  metoprolol tartrate 25 milliGRAM(s) Oral daily    MEDICATIONS  (PRN):  acetaminophen     Tablet .. 650 milliGRAM(s) Oral every 6 hours PRN Moderate Pain (4 - 6)  diphenhydrAMINE 50 milliGRAM(s) Oral every 6 hours PRN agitation  ibuprofen  Tablet. 400 milliGRAM(s) Oral every 8 hours PRN Severe Pain (7 - 10)  LORazepam     Tablet 2 milliGRAM(s) Oral every 6 hours PRN agitation  OLANZapine 5 milliGRAM(s) Oral every 8 hours PRN agitation

## 2023-04-27 NOTE — BH INPATIENT PSYCHIATRY PROGRESS NOTE - NSBHASSESSSUMMFT_PSY_ALL_CORE
Juam Levi is a 51 y/o male, domiciled at [facility], disabled, single, no children, PMH of HTN, HLD, DM, hypothyroidism, past psychiatric history of schizoaffective disorder bipolar type, multiple IPP admissions (last admission was at [facility] in April 2023), per chart hx of multiple suicide attempts, no past substance use history, presents to the ED for worsening command auditory hallucinations.    Patient currently denies any auditory hallucinations. Two days ago patient hit his head multiple times on the wall, requiring head CT. Confirmed that patient received 100mg clozaril 4/23/23 AM. Per chart, patient received clozaril 100mg 12:11 AM 4/25/23. Plan is to continue with clozaril 100mg am dose and increase to clozaril 300mg qhs tonight, with plan to titrate back to 400mg qhs tomorrow night. Patient will benefit from continued inpatient hospitalization for medical management and adjustment in order to re-titrate back to home dose of clozaril.     4-27-23: clozaril level 4/25 <68-- to note, he did not receive any clozaril the 4/24; increase from clozaril 200mg qhs to 300mg qhs; plan to increase to 400mg qhs (home dose) following night    #Schizoaffective disorder, bipolar type  - c/w clozaril 100mg AM and 300mg qhs-- plan to increase by 100mg qhs every 2 days if patient tolerates (until reaching 100mg AM + 400mg qhs home dose)       - patient home dose clozaril 100mg AM and 400mg qhs-- will need to titrate back to this dose       - clozaril level 4/25 <68-- to note, he did not receive any clozaril the 4/24  - c/w home Depakote 500mg daily with additional Depakote 750mg qhs; depakote level 67 (WNL) on 4/24   - c/w Dulcolax 5mg qhs for constipation   - c/w ativan 2mg qhs (home medication)  - c/w invega sustenna 156 mg monthly (was given April 6th 2023--need to check to see if patient last received)    #HTN  - c/w home lopressor 25mg AM    #HLD  - c/w Lipitor 40mg daily    #Hypothyroidism  - c/w home synthroid 25mcg daily    #T2DM  - c/w home metformin 1000mg BID   Juma Levi is a 53 y/o male, domiciled at [facility], disabled, single, no children, PMH of HTN, HLD, DM, hypothyroidism, past psychiatric history of schizoaffective disorder bipolar type, multiple IPP admissions (last admission was at [facility] in April 2023), per chart hx of multiple suicide attempts, no past substance use history, presents to the ED for worsening command auditory hallucinations.    Patient currently denies any auditory hallucinations. Two days ago patient hit his head multiple times on the wall, requiring head CT. Confirmed that patient received 100mg clozaril 4/23/23 AM. Per chart, patient received clozaril 100mg 12:11 AM 4/25/23. Plan is to continue with clozaril 100mg am dose and increase to clozaril 300mg qhs tonight, with plan to titrate back to 400mg qhs tomorrow night. Patient will benefit from continued inpatient hospitalization for medical management and adjustment in order to re-titrate back to home dose of clozaril.     4-27-23: clozaril level 4/25 <68-- to note, he did not receive any clozaril the 4/24; increase from clozaril 200mg qhs to 300mg qhs; plan to increase to 400mg qhs (home dose) following night    #Schizoaffective disorder, bipolar type  - c/w clozaril 100mg AM and 300mg qhs-- plan to increase by 100mg qhs every 2 days if patient tolerates (until reaching 100mg AM + 400mg qhs home dose)       - patient home dose clozaril 100mg AM and 400mg qhs-- will need to titrate back to this dose       - clozaril level 4/25 <68-- to note, he did not receive any clozaril the 4/24  - c/w home Depakote 500mg daily with additional Depakote 750mg qhs; depakote level 67 (WNL) on 4/24   - c/w Dulcolax 5mg qhs for constipation   - c/w ativan 2mg qhs (home medication)  - c/w invega sustenna 156 mg monthly (was given April 6th 2023--need to check to see if patient last received)    # Hyponatremia-- ACTIVE  - possibly 2/2 dehydration  - f/u repeat BMP   - avoid over-correction of more than 6-8 meq in 24 hrs  - if Na not improved plan to obtain urine lytes , serum and urine osm and re-consult medicine    #HTN  - c/w home lopressor 25mg AM    #HLD  - c/w Lipitor 40mg daily    #Hypothyroidism  - c/w home synthroid 25mcg daily    #T2DM  - c/w home metformin 1000mg BID

## 2023-04-28 LAB
CRP SERPL-MCNC: 18.8 MG/L — HIGH
TSH SERPL-MCNC: 2.31 UIU/ML — SIGNIFICANT CHANGE UP (ref 0.27–4.2)

## 2023-04-28 RX ORDER — CLOZAPINE 150 MG/1
400 TABLET, ORALLY DISINTEGRATING ORAL AT BEDTIME
Refills: 0 | Status: DISCONTINUED | OUTPATIENT
Start: 2023-04-28 | End: 2023-05-09

## 2023-04-28 RX ORDER — OLANZAPINE 15 MG/1
5 TABLET, FILM COATED ORAL ONCE
Refills: 0 | Status: COMPLETED | OUTPATIENT
Start: 2023-04-28 | End: 2023-04-28

## 2023-04-28 RX ADMIN — CLOZAPINE 400 MILLIGRAM(S): 150 TABLET, ORALLY DISINTEGRATING ORAL at 20:51

## 2023-04-28 RX ADMIN — OLANZAPINE 5 MILLIGRAM(S): 15 TABLET, FILM COATED ORAL at 20:37

## 2023-04-28 RX ADMIN — METFORMIN HYDROCHLORIDE 1000 MILLIGRAM(S): 850 TABLET ORAL at 10:14

## 2023-04-28 RX ADMIN — Medication 25 MILLIGRAM(S): at 10:14

## 2023-04-28 RX ADMIN — Medication 5 MILLIGRAM(S): at 20:51

## 2023-04-28 RX ADMIN — Medication 2 MILLIGRAM(S): at 20:51

## 2023-04-28 RX ADMIN — CLOZAPINE 100 MILLIGRAM(S): 150 TABLET, ORALLY DISINTEGRATING ORAL at 10:01

## 2023-04-28 RX ADMIN — ATORVASTATIN CALCIUM 40 MILLIGRAM(S): 80 TABLET, FILM COATED ORAL at 20:51

## 2023-04-28 RX ADMIN — DIVALPROEX SODIUM 750 MILLIGRAM(S): 500 TABLET, DELAYED RELEASE ORAL at 20:51

## 2023-04-28 RX ADMIN — Medication 25 MICROGRAM(S): at 10:13

## 2023-04-28 RX ADMIN — METFORMIN HYDROCHLORIDE 1000 MILLIGRAM(S): 850 TABLET ORAL at 20:52

## 2023-04-28 RX ADMIN — OLANZAPINE 5 MILLIGRAM(S): 15 TABLET, FILM COATED ORAL at 12:19

## 2023-04-28 RX ADMIN — Medication 2 MILLIGRAM(S): at 15:30

## 2023-04-28 RX ADMIN — DIVALPROEX SODIUM 500 MILLIGRAM(S): 500 TABLET, DELAYED RELEASE ORAL at 10:13

## 2023-04-28 NOTE — BH INPATIENT PSYCHIATRY PROGRESS NOTE - CURRENT MEDICATION
MEDICATIONS  (STANDING):  atorvastatin 40 milliGRAM(s) Oral at bedtime  bisacodyl 5 milliGRAM(s) Oral at bedtime  cloZAPine 300 milliGRAM(s) Oral at bedtime  cloZAPine 100 milliGRAM(s) Oral daily  divalproex  milliGRAM(s) Oral daily  divalproex  milliGRAM(s) Oral at bedtime  levothyroxine 25 MICROGram(s) Oral daily  LORazepam     Tablet 2 milliGRAM(s) Oral at bedtime  metFORMIN 1000 milliGRAM(s) Oral two times a day  metoprolol tartrate 25 milliGRAM(s) Oral daily    MEDICATIONS  (PRN):  acetaminophen     Tablet .. 650 milliGRAM(s) Oral every 6 hours PRN Moderate Pain (4 - 6)  diphenhydrAMINE 50 milliGRAM(s) Oral every 6 hours PRN agitation  ibuprofen  Tablet. 400 milliGRAM(s) Oral every 8 hours PRN Severe Pain (7 - 10)  LORazepam     Tablet 2 milliGRAM(s) Oral every 6 hours PRN agitation  OLANZapine 5 milliGRAM(s) Oral every 8 hours PRN agitation   MEDICATIONS  (STANDING):  atorvastatin 40 milliGRAM(s) Oral at bedtime  bisacodyl 5 milliGRAM(s) Oral at bedtime  cloZAPine 100 milliGRAM(s) Oral daily  cloZAPine 300 milliGRAM(s) Oral at bedtime  divalproex  milliGRAM(s) Oral at bedtime  divalproex  milliGRAM(s) Oral daily  levothyroxine 25 MICROGram(s) Oral daily  LORazepam     Tablet 2 milliGRAM(s) Oral at bedtime  metFORMIN 1000 milliGRAM(s) Oral two times a day  metoprolol tartrate 25 milliGRAM(s) Oral daily    MEDICATIONS  (PRN):  acetaminophen     Tablet .. 650 milliGRAM(s) Oral every 6 hours PRN Moderate Pain (4 - 6)  diphenhydrAMINE 50 milliGRAM(s) Oral every 6 hours PRN agitation  ibuprofen  Tablet. 400 milliGRAM(s) Oral every 8 hours PRN Severe Pain (7 - 10)  LORazepam     Tablet 2 milliGRAM(s) Oral every 6 hours PRN agitation  OLANZapine 5 milliGRAM(s) Oral every 8 hours PRN agitation   MEDICATIONS  (STANDING):  atorvastatin 40 milliGRAM(s) Oral at bedtime  bisacodyl 5 milliGRAM(s) Oral at bedtime  cloZAPine 100 milliGRAM(s) Oral daily  cloZAPine 300 milliGRAM(s) Oral at bedtime  divalproex  milliGRAM(s) Oral daily  divalproex  milliGRAM(s) Oral at bedtime  levothyroxine 25 MICROGram(s) Oral daily  LORazepam     Tablet 2 milliGRAM(s) Oral at bedtime  metFORMIN 1000 milliGRAM(s) Oral two times a day  metoprolol tartrate 25 milliGRAM(s) Oral daily    MEDICATIONS  (PRN):  acetaminophen     Tablet .. 650 milliGRAM(s) Oral every 6 hours PRN Moderate Pain (4 - 6)  diphenhydrAMINE 50 milliGRAM(s) Oral every 6 hours PRN agitation  ibuprofen  Tablet. 400 milliGRAM(s) Oral every 8 hours PRN Severe Pain (7 - 10)  LORazepam     Tablet 2 milliGRAM(s) Oral every 6 hours PRN agitation  OLANZapine 5 milliGRAM(s) Oral every 8 hours PRN agitation   MEDICATIONS  (STANDING):  atorvastatin 40 milliGRAM(s) Oral at bedtime  bisacodyl 5 milliGRAM(s) Oral at bedtime  cloZAPine 300 milliGRAM(s) Oral at bedtime  cloZAPine 100 milliGRAM(s) Oral daily  divalproex  milliGRAM(s) Oral at bedtime  divalproex  milliGRAM(s) Oral daily  levothyroxine 25 MICROGram(s) Oral daily  LORazepam     Tablet 2 milliGRAM(s) Oral at bedtime  metFORMIN 1000 milliGRAM(s) Oral two times a day  metoprolol tartrate 25 milliGRAM(s) Oral daily    MEDICATIONS  (PRN):  acetaminophen     Tablet .. 650 milliGRAM(s) Oral every 6 hours PRN Moderate Pain (4 - 6)  diphenhydrAMINE 50 milliGRAM(s) Oral every 6 hours PRN agitation  ibuprofen  Tablet. 400 milliGRAM(s) Oral every 8 hours PRN Severe Pain (7 - 10)  LORazepam     Tablet 2 milliGRAM(s) Oral every 6 hours PRN agitation  OLANZapine 5 milliGRAM(s) Oral every 8 hours PRN agitation

## 2023-04-28 NOTE — BH INPATIENT PSYCHIATRY PROGRESS NOTE - NSBHMETABOLIC_PSY_ALL_CORE_FT
BMI: BMI (kg/m2): 29.7 (04-25-23 @ 14:00)  HbA1c: A1C with Estimated Average Glucose Result: 6.5 % (03-19-23 @ 07:55)    Glucose: POCT Blood Glucose.: 105 mg/dL (04-25-23 @ 08:30)    BP: 134/79 (04-28-23 @ 08:14) (115/61 - 136/91)  Lipid Panel: Date/Time: 03-19-23 @ 07:55  Cholesterol, Serum: 161  Direct LDL: --  HDL Cholesterol, Serum: 27  Total Cholesterol/HDL Ration Measurement: --  Triglycerides, Serum: 116   BMI: BMI (kg/m2): 29.7 (04-25-23 @ 14:00)  HbA1c: A1C with Estimated Average Glucose Result: 6.5 % (03-19-23 @ 07:55)    Glucose: POCT Blood Glucose.: 105 mg/dL (04-25-23 @ 08:30)    BP: 97/59 (04-28-23 @ 15:59) (97/59 - 134/79)  Lipid Panel: Date/Time: 03-19-23 @ 07:55  Cholesterol, Serum: 161  Direct LDL: --  HDL Cholesterol, Serum: 27  Total Cholesterol/HDL Ration Measurement: --  Triglycerides, Serum: 116

## 2023-04-28 NOTE — BH INPATIENT PSYCHIATRY PROGRESS NOTE - NSBHFUPINTERVALHXFT_PSY_A_CORE
Patient was seen and evaluated this morning. Chart was reviewed--last night around 7pm patient banging forehead on wall, head CT negative. No PRN medications were administered overnight. Upon approach, patient is dancing in room with 1:1 and computer. Patient is alert and engages with interviewer. Patient is minimally cooperative. Patient reports feeling okay now. Reports that he was having hallucinations last night which caused him to bang his head.  Patient was seen and evaluated this morning. Chart was reviewed--last night around 7pm patient banging forehead on wall, head CT negative. No PRN medications were administered overnight. Upon approach, patient is dancing in room with 1:1 and computer. Patient is alert and engages with interviewer. Patient is minimally cooperative. Patient reports feeling okay now. Reports that he was having hallucinations last night which caused him to bang his head. He reports he feels that "everyone hates me even though I like them." He reports that he doesn't want to go back to his residence because he feels everyone hates him there. When told that the 1:1 will be discontinued, and the computer will be removed from his room, he walks away from provider and into the hallway. He reports he does not need the computer. He denies any current auditory hallucinations. He denies any side effects from his medications. Patient was seen and evaluated this morning. Chart was reviewed--last night around 7pm patient banging forehead on wall, head CT negative. No PRN medications were administered overnight. Upon approach, patient is dancing in room with 1:1 and computer. Patient is alert and engages with interviewer. Patient is minimally cooperative. Patient reports feeling okay now. Reports that he was having hallucinations last night which caused him to bang his head. He reports he feels that "everyone hates me even though I like them." He reports that he doesn't want to go back to his residence because he feels everyone hates him there. When told that the 1:1 will be discontinued, and the computer will be removed from his room, he walks away from provider and into the hallway. He reports he does not need the computer. He denies any current auditory hallucinations. He denies any side effects from his medications.    Spoke to Dr. Pearson [phone] at [facility]. She reports patient is likely not entirely compliant with medications, including clozaril. She reports patient suffers from AH, and possibly depression as well. She recommends increasing next invega sustenna dose to 234mg and administer prior to discharge.  Patient was seen and evaluated this morning. Chart was reviewed--last night around 7pm patient banging forehead on wall, head CT negative. No PRN medications were administered overnight. Upon approach, patient is dancing in room with 1:1 and computer. Patient is alert and engages with interviewer. Patient is minimally cooperative. Patient reports feeling okay now. Reports that he was having hallucinations last night which caused him to bang his head. He reports he feels that "everyone hates me even though I like them." He reports that he doesn't want to go back to his residence because he feels everyone hates him there. When told that the 1:1 will be discontinued, and the computer will be removed from his room, he walks away from provider and into the hallway. He reports he does not need the computer. He denies any current auditory hallucinations. He denies any side effects from his medications.    Spoke to Dr. Pearson [phone] at [facility]. She reports patient is likely not entirely compliant with medications, including clozaril. She reports patient suffers from AH which result in hitting his head. She recommends increasing next invega sustenna dose to 234mg and administer prior to discharge.

## 2023-04-28 NOTE — BH INPATIENT PSYCHIATRY PROGRESS NOTE - NSBHCONSBHPROVDETAILS_PSY_A_CORE  FT
Attempted to reach Dr. Pearson [phone] at [facility].  left with callback number. Spoke to Dr. Pearson [phone] at [facility]. She reports patient is likely not entirely compliant with medications, including clozaril. She reports patient suffers from AH, and possibly depression as well. She recommends increasing next invega sustenna dose to 234mg and administer prior to discharge.  Spoke to Dr. Pearson [phone] at [facility]. She reports patient is likely not entirely compliant with medications, including clozaril. She reports patient suffers from AH which result in hitting his head. She recommends increasing next invega sustenna dose to 234mg and administer prior to discharge.

## 2023-04-28 NOTE — BH INPATIENT PSYCHIATRY PROGRESS NOTE - NSBHCHARTREVIEWVS_PSY_A_CORE FT
Vital Signs Last 24 Hrs  T(C): 36.3 (04-28-23 @ 08:14), Max: 36.3 (04-28-23 @ 08:14)  T(F): 97.3 (04-28-23 @ 08:14), Max: 97.3 (04-28-23 @ 08:14)  HR: 118 (04-28-23 @ 08:14) (104 - 118)  BP: 134/79 (04-28-23 @ 08:14) (118/55 - 134/79)  BP(mean): --  RR: 18 (04-28-23 @ 08:14) (16 - 18)  SpO2: 98% (04-27-23 @ 21:14) (98% - 98%)     Vital Signs Last 24 Hrs  T(C): 36.3 (04-28-23 @ 08:14), Max: 36.3 (04-28-23 @ 08:14)  T(F): 97.3 (04-28-23 @ 08:14), Max: 97.3 (04-28-23 @ 08:14)  HR: 112 (04-28-23 @ 15:59) (112 - 118)  BP: 97/59 (04-28-23 @ 15:59) (97/59 - 134/79)  BP(mean): --  RR: 16 (04-28-23 @ 15:59) (16 - 18)  SpO2: 98% (04-27-23 @ 21:14) (98% - 98%)

## 2023-04-28 NOTE — BH INPATIENT PSYCHIATRY PROGRESS NOTE - NSBHASSESSSUMMFT_PSY_ALL_CORE
Juma Levi is a 51 y/o male, domiciled at [facility], disabled, single, no children, PMH of HTN, HLD, DM, hypothyroidism, past psychiatric history of schizoaffective disorder bipolar type, multiple IPP admissions (last admission was at [facility] [place] in April 2023), per chart hx of multiple suicide attempts, no past substance use history, presents to the ED for worsening command auditory hallucinations.    Patient currently denies any auditory hallucinations. Patient hit his head on the wall last night requiring head CT (WNL). Confirmed that patient received 100mg clozaril 4/23/23 AM. Per chart, patient received clozaril 100mg 12:11 AM 4/25/23. Plan is to continue with clozaril 100mg am dose and increase to clozaril 400mg qhs tonight. Will check trough level 5/1/23 prior to discharge. Patient will benefit from continued inpatient hospitalization for medical management and adjustment in order to re-titrate back to home dose of clozaril.     4-27-23: clozaril level 4/25 <68-- to note, he did not receive any clozaril the 4/24; increase from clozaril 200mg qhs to 300mg qhs; plan to increase to 400mg qhs (home dose) following night  4-28-23: increase clozaril 300mg qhs to 400mg qhs; check trough level 5/1/23 AM    #Schizoaffective disorder, bipolar type  - c/w clozaril 100mg AM and 300mg qhs-- plan to increase by 100mg qhs every 2 days if patient tolerates (until reaching 100mg AM + 400mg qhs home dose)       - patient home dose clozaril 100mg AM and 400mg qhs-- will need to titrate back to this dose       - clozaril level 4/25 <68-- to note, he did not receive any clozaril the 4/24  - c/w home Depakote 500mg daily with additional Depakote 750mg qhs; depakote level 67 (WNL) on 4/24   - c/w Dulcolax 5mg qhs for constipation   - c/w ativan 2mg qhs (home medication)  - c/w invega sustenna 156 mg monthly (was given April 6th 2023--need to check to see if patient last received)    # Hyponatremia-- RESOLVED 4/28  - possibly 2/2 dehydration  - 4/28- Na 136    #HTN  - c/w home lopressor 25mg AM    #HLD  - c/w Lipitor 40mg daily    #Hypothyroidism  - c/w home synthroid 25mcg daily    #T2DM  - c/w home metformin 1000mg BID   Juma Levi is a 51 y/o male, domiciled at [facility], disabled, single, no children, PMH of HTN, HLD, DM, hypothyroidism, past psychiatric history of schizoaffective disorder bipolar type, multiple IPP admissions (last admission was at [facility] [place] in April 2023), per chart hx of multiple suicide attempts, no past substance use history, presents to the ED for worsening command auditory hallucinations.    Patient currently denies any auditory hallucinations. Patient hit his head on the wall last night requiring head CT (WNL). Confirmed that patient received 100mg clozaril 4/23/23 AM. Per chart, patient received clozaril 100mg 12:11 AM 4/25/23. Plan is to continue with clozaril 100mg am dose and increase to clozaril 400mg qhs tonight. Will check trough level 5/1/23 prior to discharge. Please include on discharge invega sustenna 156 mg due 5/4/23. Patient will benefit from continued inpatient hospitalization for medical management and adjustment in order to re-titrate back to home dose of clozaril.     4-27-23: clozaril level 4/25 <68-- to note, he did not receive any clozaril the 4/24; increase from clozaril 200mg qhs to 300mg qhs; plan to increase to 400mg qhs (home dose) following night  4-28-23: increase clozaril 300mg qhs to 400mg qhs; check trough level 5/1/23 AM    #Schizoaffective disorder, bipolar type  - c/w clozaril 100mg AM and 300mg qhs-- plan to increase by 100mg qhs every 2 days if patient tolerates (until reaching 100mg AM + 400mg qhs home dose)       - patient home dose clozaril 100mg AM and 400mg qhs-- will need to titrate back to this dose       - clozaril level 4/25 <68-- to note, he did not receive any clozaril the 4/24  - c/w home Depakote 500mg daily with additional Depakote 750mg qhs; depakote level 67 (WNL) on 4/24   - c/w Dulcolax 5mg qhs for constipation   - c/w ativan 2mg qhs (home medication)  - c/w invega sustenna 156 mg monthly (confirmed was given April 6th 2023--NEXT DUE 5/4/23)    # Hyponatremia-- RESOLVED 4/28  - possibly 2/2 dehydration  - 4/28- Na 136    #HTN  - c/w home lopressor 25mg AM    #HLD  - c/w Lipitor 40mg daily    #Hypothyroidism  - c/w home synthroid 25mcg daily    #T2DM  - c/w home metformin 1000mg BID   Juma Levi is a 51 y/o male, domiciled at [facility], disabled, single, no children, PMH of HTN, HLD, DM, hypothyroidism, past psychiatric history of schizoaffective disorder bipolar type, multiple IPP admissions (last admission was at [facility] in April 2023), per chart hx of multiple suicide attempts, no past substance use history, presents to the ED for worsening command auditory hallucinations.    Patient currently denies any auditory hallucinations. Patient hit his head on the wall last night requiring head CT (WNL). Confirmed that patient received 100mg clozaril 4/23/23 AM. Per chart, patient received clozaril 100mg 12:11 AM 4/25/23. Plan is to continue with clozaril 100mg am dose and increase to clozaril 400mg qhs tonight. Will check trough level 5/1/23 prior to discharge. Prior to discharge will administer invega sustenna 234 mg (156mg due 5/4/23). Patient will benefit from continued inpatient hospitalization for medical management and adjustment in order to re-titrate back to home dose of clozaril.     4-27-23: clozaril level 4/25 <68-- to note, he did not receive any clozaril the 4/24; increase from clozaril 200mg qhs to 300mg qhs; plan to increase to 400mg qhs (home dose) following night  4-28-23: increase clozaril 300mg qhs to 400mg qhs; check trough level 5/1/23 AM    #Schizoaffective disorder, bipolar type  - c/w clozaril 100mg AM and 300mg qhs-- plan to increase by 100mg qhs every 2 days if patient tolerates (until reaching 100mg AM + 400mg qhs home dose)       - patient home dose clozaril 100mg AM and 400mg qhs-- will need to titrate back to this dose       - clozaril level 4/25 <68-- to note, he did not receive any clozaril the 4/24  - c/w home Depakote 500mg daily with additional Depakote 750mg qhs; depakote level 67 (WNL) on 4/24   - c/w Dulcolax 5mg qhs for constipation   - c/w ativan 2mg qhs (home medication)  - c/w invega sustenna 156 mg monthly (confirmed was given April 6th 2023--NEXT DUE 5/4/23)    # Hyponatremia-- RESOLVED 4/28  - possibly 2/2 dehydration  - 4/28- Na 136    #HTN  - c/w home lopressor 25mg AM    #HLD  - c/w Lipitor 40mg daily    #Hypothyroidism  - c/w home synthroid 25mcg daily    #T2DM  - c/w home metformin 1000mg BID   Juma Levi is a 53 y/o male, domiciled at [facility], disabled, single, no children, PMH of HTN, HLD, DM, hypothyroidism, past psychiatric history of schizoaffective disorder bipolar type, multiple IPP admissions (last admission was at [facility] in April 2023), per chart hx of multiple suicide attempts, no past substance use history, presents to the ED for worsening command auditory hallucinations.    Patient currently denies any auditory hallucinations. Patient hit his head on the wall last night requiring head CT (WNL). Confirmed that patient received 100mg clozaril 4/23/23 AM. Per chart, patient received clozaril 100mg 12:11 AM 4/25/23. Plan is to continue with clozaril 100mg am dose and increase to clozaril 400mg qhs tonight. Will check trough level 5/1/23 prior to discharge. Prior to discharge will administer invega sustenna 234 mg (156mg due 5/4/23). Patient will benefit from continued inpatient hospitalization for medical management and adjustment in order to re-titrate back to home dose of clozaril.     4-27-23: clozaril level 4/25 <68-- to note, he did not receive any clozaril the 4/24; increase from clozaril 200mg qhs to 300mg qhs; plan to increase to 400mg qhs (home dose) following night  4-28-23: increase clozaril 300mg qhs to 400mg qhs; check trough level 5/1/23 AM    #Schizoaffective disorder, bipolar type  - increase to clozaril 100mg AM and 400mg qhs (patient home dose clozaril 100mg AM and 400mg qhs)       - clozaril level 4/25 <68-- to note, he did not receive any clozaril the 4/24; check trough level 5/1/23 AM  - c/w home Depakote 500mg daily with additional Depakote 750mg qhs; depakote level 67 (WNL) on 4/24   - c/w Dulcolax 5mg qhs for constipation   - c/w ativan 2mg qhs (home medication)  - c/w invega sustenna 156 mg monthly (confirmed was given April 6th 2023--NEXT DUE 5/4/23)    # Hyponatremia-- RESOLVED 4/28  - possibly 2/2 dehydration  - 4/28- Na 136    #HTN  - c/w home lopressor 25mg AM    #HLD  - c/w Lipitor 40mg daily    #Hypothyroidism  - c/w home synthroid 25mcg daily    #T2DM  - c/w home metformin 1000mg BID

## 2023-04-29 PROCEDURE — 99231 SBSQ HOSP IP/OBS SF/LOW 25: CPT | Mod: GC

## 2023-04-29 RX ADMIN — Medication 25 MICROGRAM(S): at 14:54

## 2023-04-29 RX ADMIN — Medication 5 MILLIGRAM(S): at 20:20

## 2023-04-29 RX ADMIN — CLOZAPINE 400 MILLIGRAM(S): 150 TABLET, ORALLY DISINTEGRATING ORAL at 20:21

## 2023-04-29 RX ADMIN — METFORMIN HYDROCHLORIDE 1000 MILLIGRAM(S): 850 TABLET ORAL at 20:20

## 2023-04-29 RX ADMIN — Medication 25 MILLIGRAM(S): at 14:55

## 2023-04-29 RX ADMIN — ATORVASTATIN CALCIUM 40 MILLIGRAM(S): 80 TABLET, FILM COATED ORAL at 20:20

## 2023-04-29 RX ADMIN — Medication 2 MILLIGRAM(S): at 20:22

## 2023-04-29 RX ADMIN — METFORMIN HYDROCHLORIDE 1000 MILLIGRAM(S): 850 TABLET ORAL at 14:54

## 2023-04-29 RX ADMIN — DIVALPROEX SODIUM 750 MILLIGRAM(S): 500 TABLET, DELAYED RELEASE ORAL at 20:20

## 2023-04-29 RX ADMIN — DIVALPROEX SODIUM 500 MILLIGRAM(S): 500 TABLET, DELAYED RELEASE ORAL at 14:54

## 2023-04-29 RX ADMIN — CLOZAPINE 100 MILLIGRAM(S): 150 TABLET, ORALLY DISINTEGRATING ORAL at 14:53

## 2023-04-29 NOTE — BH INPATIENT PSYCHIATRY PROGRESS NOTE - NSBHMETABOLIC_PSY_ALL_CORE_FT
BMI: BMI (kg/m2): 29.7 (04-25-23 @ 14:00)  HbA1c: A1C with Estimated Average Glucose Result: 6.5 % (03-19-23 @ 07:55)    Glucose: POCT Blood Glucose.: 105 mg/dL (04-25-23 @ 08:30)    BP: 97/59 (04-28-23 @ 15:59) (97/59 - 134/79)  Lipid Panel: Date/Time: 03-19-23 @ 07:55  Cholesterol, Serum: 161  Direct LDL: --  HDL Cholesterol, Serum: 27  Total Cholesterol/HDL Ration Measurement: --  Triglycerides, Serum: 116

## 2023-04-29 NOTE — BH INPATIENT PSYCHIATRY PROGRESS NOTE - NSBHASSESSSUMMFT_PSY_ALL_CORE
Juma Levi is a 53 y/o male, domiciled at [facility], disabled, single, no children, PMH of HTN, HLD, DM, hypothyroidism, past psychiatric history of schizoaffective disorder bipolar type, multiple IPP admissions (last admission was at [facility] in April 2023), per chart hx of multiple suicide attempts, no past substance use history, presents to the ED for worsening command auditory hallucinations.    Patient currently denies any auditory hallucinations but reported active last night and nursning indicated he banged his head once more which required him to be put on 1:1 once more. Plan to continue with clozaril titration, now on home dose of clozaril 100mg am, 400mg qhs tonight. Will check trough level 5/1/23 prior to discharge. Prior to discharge will administer invega sustenna 234 mg (156mg due 5/4/23). Patient will benefit from continued inpatient hospitalization for medical management and adjustment in order to re-titrate back to home dose of clozaril.     4-27-23: clozaril level 4/25 <68-- to note, he did not receive any clozaril the 4/24; increase from clozaril 200mg qhs to 300mg qhs; plan to increase to 400mg qhs (home dose) following night  4-28-23: increase clozaril 300mg qhs to 400mg qhs; check trough level 5/1/23 AM    #Schizoaffective disorder, bipolar type  - increase to clozaril 100mg AM and 400mg qhs (patient home dose clozaril 100mg AM and 400mg qhs)       - clozaril level 4/25 <68-- to note, he did not receive any clozaril the 4/24; check trough level 5/1/23 AM  - c/w home Depakote 500mg daily with additional Depakote 750mg qhs; depakote level 67 (WNL) on 4/24   - c/w Dulcolax 5mg qhs for constipation   - c/w ativan 2mg qhs (home medication)  - c/w invega sustenna 156 mg monthly (confirmed was given April 6th 2023--NEXT DUE 5/4/23)    # Hyponatremia-- RESOLVED 4/28  - possibly 2/2 dehydration  - 4/28- Na 136    #HTN  - c/w home lopressor 25mg AM    #HLD  - c/w Lipitor 40mg daily    #Hypothyroidism  - c/w home synthroid 25mcg daily    #T2DM  - c/w home metformin 1000mg BID

## 2023-04-29 NOTE — BH INPATIENT PSYCHIATRY PROGRESS NOTE - NSBHFUPINTERVALHXFT_PSY_A_CORE
Chart reviewed. Patient seen and evaluated at bedside. Nursing reports that patient began banging head again last night and therefore patient placed on 1:1    On encounter patient was sitting in bed, appearing somewhat disheveled and unkempt, eating breakfast with his hands. Patient's speech somewhat slurred and difficult to understand. Patient reports today that he would like to leave, reporting the he needs to enact Karma on others who were messing with him outside and he reports that he need to burn their house down. He reports that he is always a good bear because "that is my nature" but he reports that he has to get even. He reports that despite this thought, he feels safe in here and does not wish to harm anyone. He denied si/hi but reports that he had "auditory and visual hallucinations" last night which impaired his sleep but he would not elaborate further. Patient got up and started walking away, interview subsequently terminated.

## 2023-04-29 NOTE — BH INPATIENT PSYCHIATRY PROGRESS NOTE - NSBHCONSBHPROVDETAILS_PSY_A_CORE  FT
Spoke to Dr. Pearson [phone] at [facility]. She reports patient is likely not entirely compliant with medications, including clozaril. She reports patient suffers from AH which result in hitting his head. She recommends increasing next invega sustenna dose to 234mg and administer prior to discharge.

## 2023-04-29 NOTE — BH INPATIENT PSYCHIATRY PROGRESS NOTE - CURRENT MEDICATION
MEDICATIONS  (STANDING):  atorvastatin 40 milliGRAM(s) Oral at bedtime  bisacodyl 5 milliGRAM(s) Oral at bedtime  cloZAPine 400 milliGRAM(s) Oral at bedtime  cloZAPine 100 milliGRAM(s) Oral daily  divalproex  milliGRAM(s) Oral at bedtime  divalproex  milliGRAM(s) Oral daily  levothyroxine 25 MICROGram(s) Oral daily  LORazepam     Tablet 2 milliGRAM(s) Oral at bedtime  metFORMIN 1000 milliGRAM(s) Oral two times a day  metoprolol tartrate 25 milliGRAM(s) Oral daily    MEDICATIONS  (PRN):  acetaminophen     Tablet .. 650 milliGRAM(s) Oral every 6 hours PRN Moderate Pain (4 - 6)  diphenhydrAMINE 50 milliGRAM(s) Oral every 6 hours PRN agitation  ibuprofen  Tablet. 400 milliGRAM(s) Oral every 8 hours PRN Severe Pain (7 - 10)  LORazepam     Tablet 2 milliGRAM(s) Oral every 6 hours PRN agitation  OLANZapine 5 milliGRAM(s) Oral every 8 hours PRN agitation

## 2023-04-29 NOTE — BH INPATIENT PSYCHIATRY PROGRESS NOTE - NSBHCHARTREVIEWVS_PSY_A_CORE FT
Vital Signs Last 24 Hrs  T(C): --  T(F): --  HR: 112 (04-28-23 @ 15:59) (112 - 112)  BP: 97/59 (04-28-23 @ 15:59) (97/59 - 97/59)  BP(mean): --  RR: 16 (04-28-23 @ 15:59) (16 - 16)  SpO2: --

## 2023-04-29 NOTE — BH INPATIENT PSYCHIATRY PROGRESS NOTE - OTHER
at times appeared depressed, at times appeared euthymic denies AH presently, reported active last night

## 2023-04-30 PROCEDURE — 99231 SBSQ HOSP IP/OBS SF/LOW 25: CPT

## 2023-04-30 RX ADMIN — DIVALPROEX SODIUM 500 MILLIGRAM(S): 500 TABLET, DELAYED RELEASE ORAL at 08:25

## 2023-04-30 RX ADMIN — CLOZAPINE 400 MILLIGRAM(S): 150 TABLET, ORALLY DISINTEGRATING ORAL at 22:04

## 2023-04-30 RX ADMIN — Medication 2 MILLIGRAM(S): at 22:03

## 2023-04-30 RX ADMIN — CLOZAPINE 100 MILLIGRAM(S): 150 TABLET, ORALLY DISINTEGRATING ORAL at 08:25

## 2023-04-30 RX ADMIN — ATORVASTATIN CALCIUM 40 MILLIGRAM(S): 80 TABLET, FILM COATED ORAL at 22:02

## 2023-04-30 RX ADMIN — Medication 25 MILLIGRAM(S): at 08:25

## 2023-04-30 RX ADMIN — METFORMIN HYDROCHLORIDE 1000 MILLIGRAM(S): 850 TABLET ORAL at 08:25

## 2023-04-30 RX ADMIN — DIVALPROEX SODIUM 750 MILLIGRAM(S): 500 TABLET, DELAYED RELEASE ORAL at 22:04

## 2023-04-30 RX ADMIN — METFORMIN HYDROCHLORIDE 1000 MILLIGRAM(S): 850 TABLET ORAL at 22:07

## 2023-04-30 RX ADMIN — Medication 25 MICROGRAM(S): at 08:25

## 2023-04-30 RX ADMIN — Medication 5 MILLIGRAM(S): at 22:03

## 2023-04-30 NOTE — BH INPATIENT PSYCHIATRY PROGRESS NOTE - NSBHCHARTREVIEWVS_PSY_A_CORE FT
Vital Signs Last 24 Hrs  T(C): --  T(F): --  HR: 112 (04-29-23 @ 14:55) (112 - 112)  BP: 106/63 (04-29-23 @ 14:55) (106/63 - 106/63)  BP(mean): --  RR: --  SpO2: --

## 2023-04-30 NOTE — BH INPATIENT PSYCHIATRY PROGRESS NOTE - NSBHMETABOLIC_PSY_ALL_CORE_FT
BMI: BMI (kg/m2): 29.7 (04-25-23 @ 14:00)  HbA1c: A1C with Estimated Average Glucose Result: 6.5 % (03-19-23 @ 07:55)    Glucose: POCT Blood Glucose.: 105 mg/dL (04-25-23 @ 08:30)    BP: 106/63 (04-29-23 @ 14:55) (97/59 - 134/79)  Lipid Panel: Date/Time: 03-19-23 @ 07:55  Cholesterol, Serum: 161  Direct LDL: --  HDL Cholesterol, Serum: 27  Total Cholesterol/HDL Ration Measurement: --  Triglycerides, Serum: 116

## 2023-04-30 NOTE — BH INPATIENT PSYCHIATRY PROGRESS NOTE - NSBHFUPINTERVALHXFT_PSY_A_CORE
Patient seen and interviewed , 1 to 1 at bedside .   On approach, patient was observed to be sleeping, very difficult to arouse . When he eventually opened his eyes , he reports that he has no fresh complaints , and then covered his face again  with the blanket . As per staff on constant observation, patient has been sleeping most of the morning but got up to eat .   According to nursing staff, there has been no observed head banging in the last day , patient is compliant with his medications and but is often observed to be talking to himself.

## 2023-04-30 NOTE — BH INPATIENT PSYCHIATRY PROGRESS NOTE - NSBHASSESSSUMMFT_PSY_ALL_CORE
Mr Levi is a 52 year old man  with a history of Schizoaffective disorder bipolar type, who was admitted to the inpatient psychiatric floor for the management of  command auditory hallucinations.  Patient continues to have symptoms of psychosis , disorganised behavior and thought. It does not seem that patient is able to take care of himself .   At this time, patient continues to be a danger to himself and others and  continues to need inpatient psychiatric hospitalization for medication management and symptoms stabilization.    Plan:   1. Continue psychotropic medications as prescribed   - Clozaril 100mg p.o daily , 400mg P.O bedtime   - Depakote 500mg p.o daily , 750mg P.o bedtime   - Invega Sustenna 156mg I.M monthly, sure 5/4/23  2. Monitor Depakote level 5/1/23, Clozaril level 5/1/23   3. Continue medical medication as prescribed

## 2023-04-30 NOTE — BH INPATIENT PSYCHIATRY PROGRESS NOTE - CURRENT MEDICATION
MEDICATIONS  (STANDING):  atorvastatin 40 milliGRAM(s) Oral at bedtime  bisacodyl 5 milliGRAM(s) Oral at bedtime  cloZAPine 100 milliGRAM(s) Oral daily  cloZAPine 400 milliGRAM(s) Oral at bedtime  divalproex  milliGRAM(s) Oral daily  divalproex  milliGRAM(s) Oral at bedtime  levothyroxine 25 MICROGram(s) Oral daily  LORazepam     Tablet 2 milliGRAM(s) Oral at bedtime  metFORMIN 1000 milliGRAM(s) Oral two times a day  metoprolol tartrate 25 milliGRAM(s) Oral daily    MEDICATIONS  (PRN):  acetaminophen     Tablet .. 650 milliGRAM(s) Oral every 6 hours PRN Moderate Pain (4 - 6)  diphenhydrAMINE 50 milliGRAM(s) Oral every 6 hours PRN agitation  ibuprofen  Tablet. 400 milliGRAM(s) Oral every 8 hours PRN Severe Pain (7 - 10)  LORazepam     Tablet 2 milliGRAM(s) Oral every 6 hours PRN agitation  OLANZapine 5 milliGRAM(s) Oral every 8 hours PRN agitation

## 2023-04-30 NOTE — BH INPATIENT PSYCHIATRY PROGRESS NOTE - NSBHCONSBHPROVDETAILS_PSY_A_CORE  FT
Spoke to Dr. Pearson [phone] at [facility]. She reports patient is likely not entirely compliant with medications, including clozaril. She reports patient suffers from AH which result in hitting his head. She recommends increasing next invega sustenna dose to 234mg and administer prior to discharge.

## 2023-05-01 PROCEDURE — 93010 ELECTROCARDIOGRAM REPORT: CPT

## 2023-05-01 RX ADMIN — DIVALPROEX SODIUM 500 MILLIGRAM(S): 500 TABLET, DELAYED RELEASE ORAL at 12:53

## 2023-05-01 RX ADMIN — Medication 5 MILLIGRAM(S): at 20:18

## 2023-05-01 RX ADMIN — DIVALPROEX SODIUM 750 MILLIGRAM(S): 500 TABLET, DELAYED RELEASE ORAL at 20:17

## 2023-05-01 RX ADMIN — Medication 25 MILLIGRAM(S): at 12:52

## 2023-05-01 RX ADMIN — Medication 2 MILLIGRAM(S): at 20:17

## 2023-05-01 RX ADMIN — METFORMIN HYDROCHLORIDE 1000 MILLIGRAM(S): 850 TABLET ORAL at 20:17

## 2023-05-01 RX ADMIN — CLOZAPINE 100 MILLIGRAM(S): 150 TABLET, ORALLY DISINTEGRATING ORAL at 12:53

## 2023-05-01 RX ADMIN — ATORVASTATIN CALCIUM 40 MILLIGRAM(S): 80 TABLET, FILM COATED ORAL at 20:17

## 2023-05-01 RX ADMIN — CLOZAPINE 400 MILLIGRAM(S): 150 TABLET, ORALLY DISINTEGRATING ORAL at 20:18

## 2023-05-01 RX ADMIN — METFORMIN HYDROCHLORIDE 1000 MILLIGRAM(S): 850 TABLET ORAL at 12:52

## 2023-05-01 RX ADMIN — Medication 25 MICROGRAM(S): at 12:52

## 2023-05-01 NOTE — BH INPATIENT PSYCHIATRY PROGRESS NOTE - NSBHASSESSSUMMFT_PSY_ALL_CORE
Mr Levi is a 52 year old man  with a history of Schizoaffective disorder bipolar type, who was admitted to the inpatient psychiatric floor for the management of  command auditory hallucinations.  Patient continues to have symptoms of psychosis , disorganised behavior and thought. It does not seem that patient is able to take care of himself .   At this time, patient continues to be a danger to himself and others and  continues to need inpatient psychiatric hospitalization for medication management and symptoms stabilization.    Plan:   1. Continue psychotropic medications as prescribed   - Clozaril 100mg p.o daily , 400mg P.O bedtime   - Depakote 500mg p.o daily , 750mg P.o bedtime   - Invega Sustenna 156mg I.M monthly, sure 5/4/23  2. Monitor Depakote level 5/1/23, Clozaril level 5/1/23   3. Continue medical medication as prescribed  Mr Levi is a 52 year old man  with a history of Schizoaffective disorder bipolar type, who was admitted to the inpatient psychiatric floor for the management of  command auditory hallucinations.    Patient continues to have symptoms of psychosis , disorganised behavior and thought. It does not seem that patient is able to take care of himself .   At this time, patient continues to be a danger to himself and others and  continues to need inpatient psychiatric hospitalization for medication management and symptoms stabilization. Patient continues to present with disorganized behavior and irritability which was reported to be off from baseline from staff who knows patient. Patient should have blood tests to measure levels of Depakote and Clozaril but patient is refusing secondary to fear of needles.    #Schizoaffective Disorder, bipolar type  Continue psychotropic medications as prescribed   - Clozaril 100mg p.o daily , 400mg P.O bedtime   - Depakote 500mg p.o daily , 750mg P.o bedtime   - Invega Sustenna 156mg I.M monthly, next dose 5/4/23  - Monitor Depakote level 5/1/23, Clozaril level 5/1/23, levels ordered for 5/1    # Hyponatremia, currently resolved  - BMP 4/27 Na 136    # H/O HTN    # H/O HLD  - C/w Atorvastatin 40 mg PO qhs    # H/O hypothyroidism  - C/w Synthroid 25 mcg PO once daily

## 2023-05-01 NOTE — BH INPATIENT PSYCHIATRY PROGRESS NOTE - NSBHFUPINTERVALHXFT_PSY_A_CORE
Patient seen and interviewed , 1 to 1 at bedside with computer.    On approach, patient was observed to be sleeping. Patient woke up when computer was removed from his room and he appeared acutely uncomfortable. He said that he should just be discharged but that if he went back to [facility], he would burn the place down. Patient did not provide any explanation for why he wanted to do this or where he would prefer to go otherwise. Patient appeared uncomfortable and disorganized, leaving the room twice during the interview and needing to be directed into returning to his room.

## 2023-05-01 NOTE — BH INPATIENT PSYCHIATRY PROGRESS NOTE - NSBHCONSBHPROVDETAILS_PSY_A_CORE  FT
Spoke to Dr. Pearson [phone] at [facility]. She reports patient is likely not entirely compliant with medications, including clozaril. She reports patient suffers from AH which result in hitting his head. She recommends increasing next invega sustenna dose to 234mg and administer prior to discharge.

## 2023-05-01 NOTE — BH INPATIENT PSYCHIATRY PROGRESS NOTE - NSBHMETABOLIC_PSY_ALL_CORE_FT
BMI: BMI (kg/m2): 29.7 (04-25-23 @ 14:00)  HbA1c: A1C with Estimated Average Glucose Result: 6.5 % (03-19-23 @ 07:55)    Glucose: POCT Blood Glucose.: 105 mg/dL (04-25-23 @ 08:30)    BP: 106/63 (04-29-23 @ 14:55) (97/59 - 106/63)  Lipid Panel: Date/Time: 03-19-23 @ 07:55  Cholesterol, Serum: 161  Direct LDL: --  HDL Cholesterol, Serum: 27  Total Cholesterol/HDL Ration Measurement: --  Triglycerides, Serum: 116

## 2023-05-02 LAB
BASOPHILS # BLD AUTO: 0.03 K/UL — SIGNIFICANT CHANGE UP (ref 0–0.2)
BASOPHILS NFR BLD AUTO: 0.4 % — SIGNIFICANT CHANGE UP (ref 0–1)
CLOZAPINE SERPL-MCNC: 503 NG/ML — SIGNIFICANT CHANGE UP (ref 350–600)
EOSINOPHIL # BLD AUTO: 0.35 K/UL — SIGNIFICANT CHANGE UP (ref 0–0.7)
EOSINOPHIL NFR BLD AUTO: 5.2 % — SIGNIFICANT CHANGE UP (ref 0–8)
HCT VFR BLD CALC: 36.4 % — LOW (ref 42–52)
HGB BLD-MCNC: 11.2 G/DL — LOW (ref 14–18)
IMM GRANULOCYTES NFR BLD AUTO: 0.7 % — HIGH (ref 0.1–0.3)
LYMPHOCYTES # BLD AUTO: 3 K/UL — SIGNIFICANT CHANGE UP (ref 1.2–3.4)
LYMPHOCYTES # BLD AUTO: 44.8 % — SIGNIFICANT CHANGE UP (ref 20.5–51.1)
MCHC RBC-ENTMCNC: 25.2 PG — LOW (ref 27–31)
MCHC RBC-ENTMCNC: 30.8 G/DL — LOW (ref 32–37)
MCV RBC AUTO: 82 FL — SIGNIFICANT CHANGE UP (ref 80–94)
MONOCYTES # BLD AUTO: 0.65 K/UL — HIGH (ref 0.1–0.6)
MONOCYTES NFR BLD AUTO: 9.7 % — HIGH (ref 1.7–9.3)
NEUTROPHILS # BLD AUTO: 2.61 K/UL — SIGNIFICANT CHANGE UP (ref 1.4–6.5)
NEUTROPHILS NFR BLD AUTO: 39.2 % — LOW (ref 42.2–75.2)
NRBC # BLD: 0 /100 WBCS — SIGNIFICANT CHANGE UP (ref 0–0)
PLATELET # BLD AUTO: 236 K/UL — SIGNIFICANT CHANGE UP (ref 130–400)
PMV BLD: 9.6 FL — SIGNIFICANT CHANGE UP (ref 7.4–10.4)
RBC # BLD: 4.44 M/UL — LOW (ref 4.7–6.1)
RBC # FLD: 18.2 % — HIGH (ref 11.5–14.5)
VALPROATE SERPL-MCNC: 80 UG/ML — SIGNIFICANT CHANGE UP (ref 50–100)
WBC # BLD: 6.69 K/UL — SIGNIFICANT CHANGE UP (ref 4.8–10.8)
WBC # FLD AUTO: 6.69 K/UL — SIGNIFICANT CHANGE UP (ref 4.8–10.8)

## 2023-05-02 RX ADMIN — METFORMIN HYDROCHLORIDE 1000 MILLIGRAM(S): 850 TABLET ORAL at 08:30

## 2023-05-02 RX ADMIN — METFORMIN HYDROCHLORIDE 1000 MILLIGRAM(S): 850 TABLET ORAL at 20:27

## 2023-05-02 RX ADMIN — Medication 25 MICROGRAM(S): at 05:54

## 2023-05-02 RX ADMIN — DIVALPROEX SODIUM 500 MILLIGRAM(S): 500 TABLET, DELAYED RELEASE ORAL at 08:31

## 2023-05-02 RX ADMIN — ATORVASTATIN CALCIUM 40 MILLIGRAM(S): 80 TABLET, FILM COATED ORAL at 20:29

## 2023-05-02 RX ADMIN — Medication 5 MILLIGRAM(S): at 20:29

## 2023-05-02 RX ADMIN — Medication 25 MILLIGRAM(S): at 08:30

## 2023-05-02 RX ADMIN — Medication 2 MILLIGRAM(S): at 20:26

## 2023-05-02 RX ADMIN — CLOZAPINE 100 MILLIGRAM(S): 150 TABLET, ORALLY DISINTEGRATING ORAL at 08:31

## 2023-05-02 RX ADMIN — DIVALPROEX SODIUM 750 MILLIGRAM(S): 500 TABLET, DELAYED RELEASE ORAL at 20:28

## 2023-05-02 RX ADMIN — CLOZAPINE 400 MILLIGRAM(S): 150 TABLET, ORALLY DISINTEGRATING ORAL at 20:28

## 2023-05-02 NOTE — BH INPATIENT PSYCHIATRY DISCHARGE NOTE - NSBHDCMEDICALFT_PSY_A_CORE
# H/O HTN    # H/O HLD  - C/w Atorvastatin 40 mg PO qhs    # H/O hypothyroidism  - C/w Synthroid 25 mcg PO once daily

## 2023-05-02 NOTE — BH INPATIENT PSYCHIATRY PROGRESS NOTE - NSBHFUPINTERVALHXFT_PSY_A_CORE
Chart reviewed. Per nursing, no acute events overnight and patient had harmed himself in any way. Patient has been compliante with medications since last evaluation. No PRNs required. Of note, since last evaluation, patient was reported to be tachycardic and EKG was obtained.    Upon approach today, patient was watching TV in the day room with 1:1 nearby. Patient was polite, calm, and cooperative and willing to leave the room for interview. Patient reported "OK" mood, sleep, and appetite. Patient denied any recent suicidal or homicidal ideation, or any audio or visual hallucinations. Patient denied any side effects to his medications and had no acute concerns currently. Patient did request that he no longer have the 1:1 reporting that he has not recently engaged in any self-harming behavior and that the staff can be used for people who need it more.

## 2023-05-02 NOTE — BH INPATIENT PSYCHIATRY PROGRESS NOTE - NSBHCHARTREVIEWVS_PSY_A_CORE FT
Vital Signs Last 24 Hrs  T(C): 36.8 (05-01-23 @ 15:35), Max: 36.8 (05-01-23 @ 15:35)  T(F): 98.2 (05-01-23 @ 15:35), Max: 98.2 (05-01-23 @ 15:35)  HR: 112 (05-01-23 @ 19:17) (112 - 115)  BP: 115/71 (05-01-23 @ 19:17) (100/57 - 121/81)  BP(mean): --  RR: 16 (05-01-23 @ 15:35) (16 - 16)  SpO2: --

## 2023-05-02 NOTE — BH INPATIENT PSYCHIATRY DISCHARGE NOTE - NSDCMRMEDTOKEN_GEN_ALL_CORE_FT
atorvastatin 40 mg oral tablet: 1 tab(s) orally once a day (at bedtime) MDD: 40mg  bisacodyl 5 mg oral delayed release tablet: 1 tab(s) orally once a day (at bedtime) MDD: 5mg  cloZAPine 100 mg oral tablet: 1 tab(s) orally once a day MDD: 500mg  cloZAPine 200 mg oral tablet: 2 tab(s) orally once a day (at bedtime) MDD: 500mg  divalproex sodium 250 mg oral delayed release tablet: 3 tab(s) orally once a day (at bedtime) MDD: 1250mg  divalproex sodium 500 mg oral delayed release tablet: 1 tab(s) orally once a day MDD: 1250mg  levothyroxine 25 mcg (0.025 mg) oral tablet: 1 tab(s) orally once a day  LORazepam 2 mg oral tablet: 1 tab(s) orally once a day (at bedtime) MDD: 2mg  metFORMIN 1000 mg oral tablet: 1 tab(s) orally 2 times a day MDD: 2000mg  metoprolol succinate 25 mg oral tablet, extended release: 1 tab(s) orally once a day MDD: 25mg  MiraLax oral powder for reconstitution: 17 gram(s) orally once a day as needed for  constipation

## 2023-05-02 NOTE — BH INPATIENT PSYCHIATRY DISCHARGE NOTE - HPI (INCLUDE ILLNESS QUALITY, SEVERITY, DURATION, TIMING, CONTEXT, MODIFYING FACTORS, ASSOCIATED SIGNS AND SYMPTOMS)
Juma Levi is a 51 y/o male, domiciled at [facility], disabled, single, no children, PMH of HTN, HLD, DM, hypothyroidism, past psychiatric history of schizoaffective disorder bipolar type, multiple IPP admissions (last admission was at [facility] in April 2023), per chart hx of multiple suicide attempts, no past substance use history, presents to the ED for worsening auditory hallucinations.    Initial ED Assessment:  On interview, patient states he has been having worsening AH that people are talking about him. He states cAH to hurt people and burn his beach. He states God is trying to kill him. He states poor eating due to paranoia that people are poisoning him. States he is eating. States poor sleep. States not having clozaril recently due to no refill for the past 4 days. Reports HI in general and that is why he is in the hospital. Denies SI.  Verbally consents to talk to residence.     [facility]: [phone]—states patient told counselor he wasn’t feeling good. Opened a sharp can and he asked to call 911. While waiting patient started banging head against wall. Person at [facility] states he has limited information since everything locked away and would have more in the morning. States patient did have trouble getting his Clozaril but for unk amount of time.    Current Assessment:  Upon approach, patient is lying in bed, hands covering face and forehead, appears distressed. Patient is alert, engages minimally with interviewer. Patient makes minimal eye contact and reports "the TV is talking to me!" Per nursing staff, patient was hitting his head against the nurses station earlier to interview. Patient was unable to engage in meaningful interview due to nature of auditory hallucinations. Patient agreeable to having constant observation overnight due to nature of self injurious behavior. Unclear in patient currently endorsing suicidal or homicidal ideation, however, he is endorsing command auditory hallucinations. Required zyprexa 5mg PO and zyprexa 5mg IM due to agitation, hitting head against walls.    Collateral obtained on 4/25/23 from patient's residence at [facility], director Mala Beverly [phone]. She reports that patient was "doing better than usual yesterday" and was helping in the kitchen, friendly with other staff. She reports that patient started banging his head due to auditory hallucinations later in the night, which prompted him to come to the ED to get evaluated. She reports that patient may have missed some doses of clozaril a week or week and half ago, but that patient has in fact been taking his medications the last few days prior to arriving to the ED.     Spoke with Jaiden, residential counselor at [facility], [phone]. He reports that patient was "great yesterday, helping clean, helping with lunch." He reports the incident with patient hitting his head happened after he had left. He confirms that patient did not get clozaril on 4/24/23. Last dose was 100mg in the morning of 4/23/23.

## 2023-05-02 NOTE — BH INPATIENT PSYCHIATRY DISCHARGE NOTE - NSDCCPCAREPLAN_GEN_ALL_CORE_FT
PRINCIPAL DISCHARGE DIAGNOSIS  Diagnosis: Schizoaffective disorder  Assessment and Plan of Treatment:      PRINCIPAL DISCHARGE DIAGNOSIS  Diagnosis: Schizoaffective disorder  Assessment and Plan of Treatment: Patient should follow up with established outpatient Psychiatrist

## 2023-05-02 NOTE — BH INPATIENT PSYCHIATRY PROGRESS NOTE - NSBHASSESSSUMMFT_PSY_ALL_CORE
Mr Levi is a 52 year old man  with a history of Schizoaffective disorder bipolar type, who was admitted to the inpatient psychiatric floor for the management of  command auditory hallucinations.    Patient continues to have symptoms of psychosis , disorganised behavior and thought. It does not seem that patient is able to take care of himself .   At this time, patient continues to be a danger to himself and others and  continues to need inpatient psychiatric hospitalization for medication management and symptoms stabilization. Patient presented originally with disorganized behavior and irritability which was reported to be off from baseline from staff who knows patient, with episodes of self harm by hitting his head against the wall on multiple occasions, requiring a 1:1. Patient has not engaged in this behavior since last evaluation and a trial of removing 1:1 was started on 5/2. Patient was more agreeable to blood tests today with labs sent out on 5/2 for Clozaril level, Depakote level, and CBC with differential to determine effects of medication on patient's health. Patient also had noted tachycardia yesterday which will be addressed with medical team on 5/2.    #Schizoaffective Disorder, bipolar type  Continue psychotropic medications as prescribed   - Clozaril 100mg p.o daily , 400mg P.O bedtime   - Depakote 500mg p.o daily , 750mg P.o bedtime   - Invega Sustenna 156mg I.M monthly, next dose 5/4/23  - Monitor Depakote level, Clozaril level, CBC obtained for 5/2  Patient had 1:1 discontinued on 5/2    #Tachycardic   - Patient had noted tachycardic on two vital checks on 5/1  - EKG obtained on 5/1 showed heart rate to 104  - Will discuss with Medical team    # Hyponatremia, currently resolved  - BMP 4/27 Na 136    # H/O HTN    # H/O HLD  - C/w Atorvastatin 40 mg PO qhs    # H/O hypothyroidism  - C/w Synthroid 25 mcg PO once daily

## 2023-05-02 NOTE — BH INPATIENT PSYCHIATRY DISCHARGE NOTE - HOSPITAL COURSE
Mr Levi is a 52 year old man  with a history of Schizoaffective disorder bipolar type, who was admitted to the inpatient psychiatric floor for the management of command auditory hallucinations.    At time of admission, patient was a danger to himself and others and  required need inpatient psychiatric hospitalization for medication management and symptoms stabilization. Patient presented originally with disorganized behavior and irritability which was reported to be off from baseline from staff who knows patient, with episodes of self harm by hitting his head against the wall on multiple occasions, requiring a 1:1. Patient has not engaged in this behavior since 5/1 and has been polite and cooperative on the unit since. Patient's 1:1 was discontinued on 5/2. Patient was more agreeable to blood tests today with labs sent out on 5/2 for Clozaril level, Depakote level, and CBC with differential to determine effects of medication on patient's health.    #Schizoaffective Disorder, bipolar type  Continue psychotropic medications as prescribed   - Clozaril 100mg p.o daily , 400mg P.O bedtime   - Depakote 500mg p.o daily , 750mg P.o bedtime   - Invega Sustenna 156mg I.M monthly, next dose 5/4/23  - Depakote level, Clozaril level, CBC obtained for 5/2  Patient had 1:1 discontinued on 5/2 Mr Levi is a 52 year old man  with a history of Schizoaffective disorder bipolar type, who was admitted to the inpatient psychiatric floor for the management of  command auditory hallucinations.    Patient continues to have symptoms of psychosis , disorganised behavior and thought. It does not seem that patient is able to take care of himself .   At this time, patient continues to be a danger to himself and others and  continues to need inpatient psychiatric hospitalization for medication management and symptoms stabilization. Patient presented originally with disorganized behavior and irritability which was reported to be off from baseline from staff who knows patient, with episodes of self harm by hitting his head against the wall on multiple occasions, requiring a 1:1. Patient has not engaged in this behavior since last evaluation and a trial of removing 1:1 was started on 5/2. Patient was more agreeable to blood tests today with labs sent out on 5/2 for Clozaril level, Depakote level, and CBC with differential to determine effects of medication on patient's health.  5/3 : Patient was at first somewhat irritable and resistant to taking medications however became more cheerful during the day wish to discuss discharge was accepting and compliant with medications.  5/4: Patient has been free of self-injurious behavior or other problems of the behavior patient for 3 days. Patient asked about when he can return home and reported that he longer had any desire to burn down [place] [facility]. Patient was counselled that he needed to have a meeting with his facility before discharge.  5/5: Patient has no behavorial concerns and awaits meeting with [facility]    #Schizoaffective Disorder, bipolar type  Continue psychotropic medications as prescribed   - Clozaril 100mg p.o daily , 400mg P.O bedtime   - Depakote 500mg p.o daily , 750mg P.o bedtime   - Invega Sustenna 156mg I.M monthly, next dose 5/4/23  - Depakote level 5/2 80, wnl  - Clozaril level 5/2 503, wnl  - CBC 5/2 showed ANC of 2610  Patient had 1:1 discontinued on 5/2 Mr Levi is a 52 year old man  with a history of Schizoaffective disorder bipolar type, who was admitted to the inpatient psychiatric floor for the management of  command auditory hallucinations.    Patient continues to have symptoms of psychosis , disorganised behavior and thought. It does not seem that patient is able to take care of himself .   At this time, patient continues to be a danger to himself and others and  continues to need inpatient psychiatric hospitalization for medication management and symptoms stabilization. Patient presented originally with disorganized behavior and irritability which was reported to be off from baseline from staff who knows patient, with episodes of self harm by hitting his head against the wall on multiple occasions, requiring a 1:1. Patient has not engaged in this behavior since last evaluation and a trial of removing 1:1 was started on 5/2. Patient was more agreeable to blood tests today with labs sent out on 5/2 for Clozaril level, Depakote level, and CBC with differential to determine effects of medication on patient's health.  5/3 : Patient was at first somewhat irritable and resistant to taking medications however became more cheerful during the day wish to discuss discharge was accepting and compliant with medications.  5/4: Patient has been free of self-injurious behavior or other problems of the behavior patient for 3 days. Patient asked about when he can return home and reported that he longer had any desire to [facility] down [place] [facility]. Patient was counselled that he needed to have a meeting with his facility before discharge.  5/5: Patient has no behavorial concerns and awaits meeting with [facility]    #Schizoaffective Disorder, bipolar type  Continue psychotropic medications as prescribed   - Clozaril 100mg p.o daily , 400mg P.O bedtime   - Ativan PO 2 mg at bedtime  - Depakote 500mg p.o daily , 750mg P.o bedtime   - Invega Sustenna 156mg I.M monthly, next dose 5/4/23  - Depakote level 5/2 80, wnl  - Clozaril level 5/2 503, wnl  - CBC 5/2 showed ANC of 2610  Patient had 1:1 discontinued on 5/2

## 2023-05-02 NOTE — BH INPATIENT PSYCHIATRY PROGRESS NOTE - NSBHCONSBHPROVDETAILS_PSY_A_CORE  FT
Spoke to Dr. Pearson [phone] at [facility]. She reports patient is likely not entirely compliant with medications, including clozaril. She reports patient suffers from AH which result in hitting his head. She recommends increasing next invega sustenna dose to 234mg and administer prior to discharge.

## 2023-05-02 NOTE — BH INPATIENT PSYCHIATRY PROGRESS NOTE - NSBHMETABOLIC_PSY_ALL_CORE_FT
BMI: BMI (kg/m2): 29.7 (04-25-23 @ 14:00)  HbA1c: A1C with Estimated Average Glucose Result: 6.5 % (03-19-23 @ 07:55)    Glucose: POCT Blood Glucose.: 105 mg/dL (04-25-23 @ 08:30)    BP: 115/71 (05-01-23 @ 19:17) (100/57 - 121/81)  Lipid Panel: Date/Time: 03-19-23 @ 07:55  Cholesterol, Serum: 161  Direct LDL: --  HDL Cholesterol, Serum: 27  Total Cholesterol/HDL Ration Measurement: --  Triglycerides, Serum: 116

## 2023-05-03 PROCEDURE — 99232 SBSQ HOSP IP/OBS MODERATE 35: CPT

## 2023-05-03 RX ADMIN — DIVALPROEX SODIUM 750 MILLIGRAM(S): 500 TABLET, DELAYED RELEASE ORAL at 20:27

## 2023-05-03 RX ADMIN — METFORMIN HYDROCHLORIDE 1000 MILLIGRAM(S): 850 TABLET ORAL at 20:27

## 2023-05-03 RX ADMIN — ATORVASTATIN CALCIUM 40 MILLIGRAM(S): 80 TABLET, FILM COATED ORAL at 20:27

## 2023-05-03 RX ADMIN — Medication 25 MICROGRAM(S): at 06:36

## 2023-05-03 RX ADMIN — Medication 5 MILLIGRAM(S): at 20:27

## 2023-05-03 RX ADMIN — CLOZAPINE 400 MILLIGRAM(S): 150 TABLET, ORALLY DISINTEGRATING ORAL at 16:39

## 2023-05-03 RX ADMIN — Medication 2 MILLIGRAM(S): at 20:27

## 2023-05-03 NOTE — BH INPATIENT PSYCHIATRY PROGRESS NOTE - NSBHFUPINTERVALHXFT_PSY_A_CORE
Patient has been free of self-injurious behavior or other problems of the behavior patient for 2 days.  When approached earlier today he was reluctant to speak.  When approached again late in the afternoon  patient was friendly and cheerful and stated "okay I will take the medication, which he had refused all day.  He was concerned about discharge and we advised him that since he was not harming himself and compliant with medication we would be happy to have him go home as soon as possible.   He had virtually no complaints and denied any thoughts of self-harm or tormenting voices.

## 2023-05-03 NOTE — BH INPATIENT PSYCHIATRY PROGRESS NOTE - NSBHCHARTREVIEWVS_PSY_A_CORE FT
Vital Signs Last 24 Hrs  T(C): 35.8 (05-02-23 @ 19:25), Max: 35.8 (05-02-23 @ 19:25)  T(F): 96.4 (05-02-23 @ 19:25), Max: 96.4 (05-02-23 @ 19:25)  HR: 121 (05-02-23 @ 19:25) (121 - 121)  BP: 119/58 (05-02-23 @ 19:25) (119/58 - 119/58)  BP(mean): --  RR: 16 (05-02-23 @ 19:25) (16 - 16)  SpO2: --

## 2023-05-03 NOTE — BH INPATIENT PSYCHIATRY PROGRESS NOTE - NSBHMETABOLIC_PSY_ALL_CORE_FT
BMI: BMI (kg/m2): 29.7 (04-25-23 @ 14:00)  HbA1c: A1C with Estimated Average Glucose Result: 6.5 % (03-19-23 @ 07:55)    Glucose: POCT Blood Glucose.: 105 mg/dL (04-25-23 @ 08:30)    BP: 119/58 (05-02-23 @ 19:25) (100/57 - 130/61)  Lipid Panel: Date/Time: 03-19-23 @ 07:55  Cholesterol, Serum: 161  Direct LDL: --  HDL Cholesterol, Serum: 27  Total Cholesterol/HDL Ration Measurement: --  Triglycerides, Serum: 116

## 2023-05-03 NOTE — BH INPATIENT PSYCHIATRY PROGRESS NOTE - ADDITIONAL DETAILS / COMMENTS
Currently demonstrating fair insight and judgment with respect to taking medication and keeping himself safe.

## 2023-05-03 NOTE — BH INPATIENT PSYCHIATRY PROGRESS NOTE - NSBHCONSBHPROVDETAILS_PSY_A_CORE  FT
Spoke to Dr. Pearson [phone] at [facility]. She reports patient is likely not entirely compliant with medications, including clozaril. She reports patient suffers from AH which result in hitting his head. She recommends increasing next invega sustenna dose to 234mg and administer prior to discharge.

## 2023-05-03 NOTE — BH INPATIENT PSYCHIATRY PROGRESS NOTE - CURRENT MEDICATION
MEDICATIONS  (STANDING):  atorvastatin 40 milliGRAM(s) Oral at bedtime  bisacodyl 5 milliGRAM(s) Oral at bedtime  cloZAPine 100 milliGRAM(s) Oral daily  cloZAPine 400 milliGRAM(s) Oral at bedtime  divalproex  milliGRAM(s) Oral daily  divalproex  milliGRAM(s) Oral at bedtime  levothyroxine 25 MICROGram(s) Oral daily  metFORMIN 1000 milliGRAM(s) Oral two times a day  metoprolol tartrate 25 milliGRAM(s) Oral daily    MEDICATIONS  (PRN):  acetaminophen     Tablet .. 650 milliGRAM(s) Oral every 6 hours PRN Moderate Pain (4 - 6)  diphenhydrAMINE 50 milliGRAM(s) Oral every 6 hours PRN agitation  ibuprofen  Tablet. 400 milliGRAM(s) Oral every 8 hours PRN Severe Pain (7 - 10)  OLANZapine 5 milliGRAM(s) Oral every 8 hours PRN agitation

## 2023-05-03 NOTE — BH INPATIENT PSYCHIATRY PROGRESS NOTE - NSBHASSESSSUMMFT_PSY_ALL_CORE
Mr Levi is a 52 year old man  with a history of Schizoaffective disorder bipolar type, who was admitted to the inpatient psychiatric floor for the management of  command auditory hallucinations.    Patient continues to have symptoms of psychosis , disorganised behavior and thought. It does not seem that patient is able to take care of himself .   At this time, patient continues to be a danger to himself and others and  continues to need inpatient psychiatric hospitalization for medication management and symptoms stabilization. Patient presented originally with disorganized behavior and irritability which was reported to be off from baseline from staff who knows patient, with episodes of self harm by hitting his head against the wall on multiple occasions, requiring a 1:1. Patient has not engaged in this behavior since last evaluation and a trial of removing 1:1 was started on 5/2. Patient was more agreeable to blood tests today with labs sent out on 5/2 for Clozaril level, Depakote level, and CBC with differential to determine effects of medication on patient's health. Patient also had noted tachycardia yesterday which will be addressed with medical team on 5/2.  5/3   Patient was at first somewhat irritable and resistant to taking medications however became more cheerful during the day wish to discuss discharge was accepting and compliant with medications.    #Schizoaffective Disorder, bipolar type  Continue psychotropic medications as prescribed   - Clozaril 100mg p.o daily , 400mg P.O bedtime   - Depakote 500mg p.o daily , 750mg P.o bedtime   - Invega Sustenna 156mg I.M monthly, next dose 5/4/23  - Monitor Depakote level, Clozaril level, CBC obtained for 5/2  Patient had 1:1 discontinued on 5/2    #Tachycardic   - Patient had noted tachycardic on two vital checks on 5/1  - EKG obtained on 5/1 showed heart rate to 104  - Will discuss with Medical team    # Hyponatremia, currently resolved  - BMP 4/27 Na 136    # H/O HTN    # H/O HLD  - C/w Atorvastatin 40 mg PO qhs    # H/O hypothyroidism  - C/w Synthroid 25 mcg PO once daily

## 2023-05-04 RX ORDER — PALIPERIDONE 1.5 MG/1
234 TABLET, EXTENDED RELEASE ORAL ONCE
Refills: 0 | Status: COMPLETED | OUTPATIENT
Start: 2023-05-04 | End: 2023-05-04

## 2023-05-04 RX ADMIN — METFORMIN HYDROCHLORIDE 1000 MILLIGRAM(S): 850 TABLET ORAL at 20:24

## 2023-05-04 RX ADMIN — DIVALPROEX SODIUM 500 MILLIGRAM(S): 500 TABLET, DELAYED RELEASE ORAL at 08:26

## 2023-05-04 RX ADMIN — ATORVASTATIN CALCIUM 40 MILLIGRAM(S): 80 TABLET, FILM COATED ORAL at 20:24

## 2023-05-04 RX ADMIN — METFORMIN HYDROCHLORIDE 1000 MILLIGRAM(S): 850 TABLET ORAL at 08:26

## 2023-05-04 RX ADMIN — Medication 25 MICROGRAM(S): at 06:14

## 2023-05-04 RX ADMIN — PALIPERIDONE 234 MILLIGRAM(S): 1.5 TABLET, EXTENDED RELEASE ORAL at 12:21

## 2023-05-04 RX ADMIN — CLOZAPINE 400 MILLIGRAM(S): 150 TABLET, ORALLY DISINTEGRATING ORAL at 20:23

## 2023-05-04 RX ADMIN — Medication 25 MILLIGRAM(S): at 08:26

## 2023-05-04 RX ADMIN — Medication 5 MILLIGRAM(S): at 20:24

## 2023-05-04 RX ADMIN — Medication 2 MILLIGRAM(S): at 20:20

## 2023-05-04 RX ADMIN — DIVALPROEX SODIUM 750 MILLIGRAM(S): 500 TABLET, DELAYED RELEASE ORAL at 20:22

## 2023-05-04 RX ADMIN — CLOZAPINE 100 MILLIGRAM(S): 150 TABLET, ORALLY DISINTEGRATING ORAL at 08:26

## 2023-05-04 NOTE — BH INPATIENT PSYCHIATRY PROGRESS NOTE - CURRENT MEDICATION
MEDICATIONS  (STANDING):  atorvastatin 40 milliGRAM(s) Oral at bedtime  bisacodyl 5 milliGRAM(s) Oral at bedtime  cloZAPine 400 milliGRAM(s) Oral at bedtime  cloZAPine 100 milliGRAM(s) Oral daily  divalproex  milliGRAM(s) Oral at bedtime  divalproex  milliGRAM(s) Oral daily  levothyroxine 25 MICROGram(s) Oral daily  LORazepam     Tablet 2 milliGRAM(s) Oral at bedtime  metFORMIN 1000 milliGRAM(s) Oral two times a day  metoprolol tartrate 25 milliGRAM(s) Oral daily    MEDICATIONS  (PRN):  acetaminophen     Tablet .. 650 milliGRAM(s) Oral every 6 hours PRN Moderate Pain (4 - 6)  diphenhydrAMINE 50 milliGRAM(s) Oral every 6 hours PRN agitation  ibuprofen  Tablet. 400 milliGRAM(s) Oral every 8 hours PRN Severe Pain (7 - 10)  OLANZapine 5 milliGRAM(s) Oral every 8 hours PRN agitation

## 2023-05-04 NOTE — BH INPATIENT PSYCHIATRY PROGRESS NOTE - NSBHFUPINTERVALHXFT_PSY_A_CORE
Chart reviewed. Per nursing, no acute events overnight and patient had harmed himself in any way. Patient has been compliante with medications since last evaluation. No PRNs required.    Upon approach today, patient was seen lying in bed and was woken up from sleep for interview. Patient was polite, calm, and cooperative and willing to leave the room for interview. Patient reported "fine" mood and did not appear in any acute distress. Patient has been free of self-injurious behavior or other problems of the behavior patient for 3 days. Patient asked about when he can return home and reported that he longer had any desire to burn [facility] [place] [facility]. Patient was counselled that he needed to have a meeting with his facility before discharge.

## 2023-05-04 NOTE — BH INPATIENT PSYCHIATRY PROGRESS NOTE - NSBHASSESSSUMMFT_PSY_ALL_CORE
Mr Levi is a 52 year old man  with a history of Schizoaffective disorder bipolar type, who was admitted to the inpatient psychiatric floor for the management of  command auditory hallucinations.    Patient continues to have symptoms of psychosis , disorganised behavior and thought. It does not seem that patient is able to take care of himself .   At this time, patient continues to be a danger to himself and others and  continues to need inpatient psychiatric hospitalization for medication management and symptoms stabilization. Patient presented originally with disorganized behavior and irritability which was reported to be off from baseline from staff who knows patient, with episodes of self harm by hitting his head against the wall on multiple occasions, requiring a 1:1. Patient has not engaged in this behavior since last evaluation and a trial of removing 1:1 was started on 5/2. Patient was more agreeable to blood tests today with labs sent out on 5/2 for Clozaril level, Depakote level, and CBC with differential to determine effects of medication on patient's health.  5/3 : Patient was at first somewhat irritable and resistant to taking medications however became more cheerful during the day wish to discuss discharge was accepting and compliant with medications.  5/4: Patient has been free of self-injurious behavior or other problems of the behavior patient for 3 days. Patient asked about when he can return home and reported that he longer had any desire to [facility] [place] [facility]. Patient was counselled that he needed to have a meeting with his facility before discharge.    #Schizoaffective Disorder, bipolar type  Continue psychotropic medications as prescribed   - Clozaril 100mg p.o daily , 400mg P.O bedtime   - Depakote 500mg p.o daily , 750mg P.o bedtime   - Invega Sustenna 156mg I.M monthly, next dose 5/4/23  - Depakote level 5/2 80, wnl  - Clozaril level 5/2 503, wnl  - CBC 5/2 showed ANC of 2610  Patient had 1:1 discontinued on 5/2    #Tachycardic   - Patient had noted tachycardic on two vital checks on 5/1  - EKG obtained on 5/1 showed heart rate to 104  - CRP 4/28 18.8  - Hospitalist consulted, will follow up    # Hyponatremia, currently resolved  - BMP 4/27 Na 136    # H/O HTN    # H/O HLD  - C/w Atorvastatin 40 mg PO qhs    # H/O hypothyroidism  - C/w Synthroid 25 mcg PO once daily

## 2023-05-04 NOTE — BH INPATIENT PSYCHIATRY PROGRESS NOTE - NSBHMSEEYE_PSY_A_CORE
Hemoglobin A1C (%)   Date Value   07/08/2018 6.2 (H)     Continued improvement of his A1c. He lost 8 pounds. He plans to lose more. He has not had any lows but he doesn't check his blood sugars. He is currently on metformin 1000 mg bid and glimepiride 2 mg daily. He was advised to watch out for low sugars as he loses weight. I will recheck in 6 mos.    Fair

## 2023-05-04 NOTE — BH INPATIENT PSYCHIATRY PROGRESS NOTE - NSBHCHARTREVIEWVS_PSY_A_CORE FT
Vital Signs Last 24 Hrs  T(C): 36.4 (05-04-23 @ 08:26), Max: 36.4 (05-04-23 @ 08:26)  T(F): 97.5 (05-04-23 @ 08:26), Max: 97.5 (05-04-23 @ 08:26)  HR: 98 (05-04-23 @ 08:26) (98 - 98)  BP: 130/80 (05-04-23 @ 08:26) (130/80 - 130/80)  BP(mean): --  RR: 16 (05-04-23 @ 08:26) (16 - 16)  SpO2: --

## 2023-05-04 NOTE — BH INPATIENT PSYCHIATRY PROGRESS NOTE - NSBHCONSBHPROVDETAILS_PSY_A_CORE  FT
Spoke to Dr. Pearson [phone] at [facility]. She reports patient is likely not entirely compliant with medications, including clozaril. She reports patient suffers from AH which result in hitting his head. She recommends increasing next invega sustenna dose to 234mg and administer prior to discharge.

## 2023-05-04 NOTE — BH INPATIENT PSYCHIATRY PROGRESS NOTE - NSBHMETABOLIC_PSY_ALL_CORE_FT
BMI: BMI (kg/m2): 29.7 (04-25-23 @ 14:00)  HbA1c: A1C with Estimated Average Glucose Result: 6.5 % (03-19-23 @ 07:55)    Glucose: POCT Blood Glucose.: 105 mg/dL (04-25-23 @ 08:30)    BP: 130/80 (05-04-23 @ 08:26) (100/57 - 130/80)  Lipid Panel: Date/Time: 03-19-23 @ 07:55  Cholesterol, Serum: 161  Direct LDL: --  HDL Cholesterol, Serum: 27  Total Cholesterol/HDL Ration Measurement: --  Triglycerides, Serum: 116

## 2023-05-05 RX ADMIN — Medication 25 MICROGRAM(S): at 06:23

## 2023-05-05 RX ADMIN — METFORMIN HYDROCHLORIDE 1000 MILLIGRAM(S): 850 TABLET ORAL at 09:30

## 2023-05-05 RX ADMIN — CLOZAPINE 400 MILLIGRAM(S): 150 TABLET, ORALLY DISINTEGRATING ORAL at 21:33

## 2023-05-05 RX ADMIN — DIVALPROEX SODIUM 500 MILLIGRAM(S): 500 TABLET, DELAYED RELEASE ORAL at 09:29

## 2023-05-05 RX ADMIN — Medication 2 MILLIGRAM(S): at 21:32

## 2023-05-05 RX ADMIN — ATORVASTATIN CALCIUM 40 MILLIGRAM(S): 80 TABLET, FILM COATED ORAL at 21:33

## 2023-05-05 RX ADMIN — METFORMIN HYDROCHLORIDE 1000 MILLIGRAM(S): 850 TABLET ORAL at 21:33

## 2023-05-05 RX ADMIN — Medication 25 MILLIGRAM(S): at 09:31

## 2023-05-05 RX ADMIN — DIVALPROEX SODIUM 750 MILLIGRAM(S): 500 TABLET, DELAYED RELEASE ORAL at 21:33

## 2023-05-05 RX ADMIN — CLOZAPINE 100 MILLIGRAM(S): 150 TABLET, ORALLY DISINTEGRATING ORAL at 09:31

## 2023-05-05 RX ADMIN — Medication 5 MILLIGRAM(S): at 21:33

## 2023-05-05 NOTE — BH INPATIENT PSYCHIATRY PROGRESS NOTE - CURRENT MEDICATION
MEDICATIONS  (STANDING):  atorvastatin 40 milliGRAM(s) Oral at bedtime  bisacodyl 5 milliGRAM(s) Oral at bedtime  cloZAPine 100 milliGRAM(s) Oral daily  cloZAPine 400 milliGRAM(s) Oral at bedtime  divalproex  milliGRAM(s) Oral at bedtime  divalproex  milliGRAM(s) Oral daily  levothyroxine 25 MICROGram(s) Oral daily  LORazepam     Tablet 2 milliGRAM(s) Oral at bedtime  metFORMIN 1000 milliGRAM(s) Oral two times a day  metoprolol tartrate 25 milliGRAM(s) Oral daily    MEDICATIONS  (PRN):  acetaminophen     Tablet .. 650 milliGRAM(s) Oral every 6 hours PRN Moderate Pain (4 - 6)  diphenhydrAMINE 50 milliGRAM(s) Oral every 6 hours PRN agitation  ibuprofen  Tablet. 400 milliGRAM(s) Oral every 8 hours PRN Severe Pain (7 - 10)  OLANZapine 5 milliGRAM(s) Oral every 8 hours PRN agitation   MEDICATIONS  (STANDING):  atorvastatin 40 milliGRAM(s) Oral at bedtime  bisacodyl 5 milliGRAM(s) Oral at bedtime  cloZAPine 400 milliGRAM(s) Oral at bedtime  cloZAPine 100 milliGRAM(s) Oral daily  divalproex  milliGRAM(s) Oral at bedtime  divalproex  milliGRAM(s) Oral daily  levothyroxine 25 MICROGram(s) Oral daily  LORazepam     Tablet 2 milliGRAM(s) Oral at bedtime  metFORMIN 1000 milliGRAM(s) Oral two times a day  metoprolol tartrate 25 milliGRAM(s) Oral daily    MEDICATIONS  (PRN):  acetaminophen     Tablet .. 650 milliGRAM(s) Oral every 6 hours PRN Moderate Pain (4 - 6)  diphenhydrAMINE 50 milliGRAM(s) Oral every 6 hours PRN agitation  ibuprofen  Tablet. 400 milliGRAM(s) Oral every 8 hours PRN Severe Pain (7 - 10)  OLANZapine 5 milliGRAM(s) Oral every 8 hours PRN agitation

## 2023-05-05 NOTE — BH TREATMENT PLAN - NSTXDISORGINTERRN_PSY_ALL_CORE
RN to encourage verbalization of feelings.  RN to encourage medication compliance and provide support and education as needed on Dx, coping skills, medication, and safety planning.  RN to encourage daily ADL's  RN to encourage group attendance  RN to assess and intervene for any coping needs of patient
RN to assess patients behavior and be alert for increased signs of impulsivity/agitation.  RN to redirect patient and provide low stimulating and calming activites  RN to encourage medication compliance and provide support and education as needed on Dx, coping skills, medication, and safety planning.  RN to Encourage daily ADL's  RN to Encourage group attendance  RN will assess and intervene for any disorganized behavior

## 2023-05-05 NOTE — BH INPATIENT PSYCHIATRY PROGRESS NOTE - NSBHCHARTREVIEWVS_PSY_A_CORE FT
Vital Signs Last 24 Hrs  T(C): 36.7 (05-05-23 @ 08:31), Max: 36.7 (05-04-23 @ 15:51)  T(F): 98 (05-05-23 @ 08:31), Max: 98.1 (05-04-23 @ 15:51)  HR: 95 (05-05-23 @ 08:31) (95 - 113)  BP: 123/77 (05-05-23 @ 08:31) (113/81 - 127/91)  BP(mean): --  RR: 17 (05-05-23 @ 08:31) (16 - 17)  SpO2: --

## 2023-05-05 NOTE — BH TREATMENT PLAN - NSTXDISORGINTERPR_PSY_ALL_CORE
Rt will offer support and encourage pt to attend groups.
Rt will offer support and encourage pts attendance in therapeutic groups .

## 2023-05-05 NOTE — BH INPATIENT PSYCHIATRY PROGRESS NOTE - NSBHCONSBHPROVDETAILS_PSY_A_CORE  FT
Spoke to Dr. Pearson [phone] at [facility]. She reports patient is likely not entirely compliant with medications, including clozaril. She reports patient suffers from AH which result in hitting his head. She recommends increasing next invega sustenna dose to 234mg and administer prior to discharge.

## 2023-05-05 NOTE — BH TREATMENT PLAN - NSTXPLANTHERAPYSESSIONSFT_PSY_ALL_CORE
04-26-23  Type of therapy: Coping skills,Mindfulness,Stress management  Type of session: Group  Level of patient participation: Engaged,Participates  Duration of participation: 30 minutes  Therapy conducted by: Social work  Therapy Summary: Patient attended the Patient Education Group with  and peers.  reviewed physical, mental and soothing grounding techniques to help quiet distressing thoughts. The 5-4-3-2-1 method was also taught.  facilitated the discussion while patients provided feedback and support.     Juma arrived late to the group but showed interest in joining his peers. He was an active participant and appeared open to the therapeutic intervention.  He feels he can use poetry/ writing to help manage distressing thoughts and anxiety. He is encouraged to attend future sessions.    04-26-23  Type of therapy: Coping skills,Focus on community resources  Type of session: Group  Level of patient participation: Attentive,Engaged,Participates  Duration of participation: 45 minutes  Therapy conducted by: Social work  Therapy Summary: Patient attended the [Patient] Support Group with  and peers. The benefits and types of social supports were discussed as well as ways to build a strong support network.  Patients provided support and feedback while  facilitated the discussion.    04-27-23  Type of therapy: Coping skills,Creative arts therapy,Leisure development,Stress management  Type of session: Group  Level of patient participation: Participates  Duration of participation: 45 minutes  Therapy conducted by: Psych rehab  Therapy Summary: Pt will engage but may need reminders to remain focused .  
  04-28-23  Type of therapy: Anger management,Coping skills  Type of session: Group  Level of patient participation: Participates  Duration of participation: 30 minutes  Therapy conducted by: Social work  Therapy Summary: Patient attended the Social Work group with SW and peers. The “Anger Management Skills” worksheet was reviewed which covers techniques such as: Learning to recognize anger, taking a timeout, deep breathing, exercise, expressing anger in a healthy manner, thinking of consequences, and visualization. SW facilitated the group and patients provided feedback and support.    Juma was an active participant. He appeared open to reviewing the materials presented and was able to express the benefits of utilizing anger management techniques. He left the group early (later telling SW he was preoccupied with thoughts of his future discharge plans) and is encouraged to attend future sessions.    04-28-23  Type of therapy: Inspiration and motiviation,Stress management  Type of session: Group  Level of patient participation: Attentive,Engaged,Participates  Duration of participation: 45 minutes  Therapy conducted by: Social work  Therapy Summary: Patient attended the Support Group with  and peers. The “Building New Habits” worksheet was reviewed which provides practical guidance for creating and maintaining healthy new habits. Some of the tips include starting with small changes, updating one’s environment, creating accountability and celebrating successes. Patients were asked to consider which new habit he/ she plans to work on, moving forward. SW facilitated the group and patients provided feedback and support.    05-04-23  Type of therapy: Dialectical behavior therapy,Coping skills  Type of session: Group  Level of patient participation: Participates  Duration of participation: 45 minutes  Therapy conducted by: Social work  Therapy Summary: Patient attended the Crisis Skills Group with  and peers. The DBT Wise Mind ACCEPTS skill was reviewed (a Distress Tolerance skill that uses distraction to temporarily distance yourself from a distressing situation or emotion). The following skills were reviewed: “activities, contributing, comparisons, emotions, pushing away, thoughts, sensations”. Patients offered support and feedback while  facilitated the group.    Juma participated and appeared open to the therapeutic intervention. He is encouraged to attend future sessions.    05-04-23  Type of therapy: Dialectical behavior therapy,Coping skills  Type of session: Group  Level of patient participation: Attentive,Engaged,Participates  Duration of participation: 45 minutes  Therapy conducted by: Social work  Therapy Summary: Patient attended the Social Work Group with  and peers. The topic of building a routine was discussed, as well as the (mental health) benefits of creating daily structure. Patients were given a sample routine worksheet to complete at their leisure. Patients offered support and feedback while  facilitated the discussion.      Juma was an active participant and agreed with the benefits of having a routine. When discharged, he plans to continue attending groups at his residence such as "anger management" and "art therapy". He engaged well with peers.    05-05-23  Type of therapy: Coping skills,Inspiration and motiviation,Leisure development,Stress management  Type of session: Group  Level of patient participation: Engaged,Participates  Duration of participation: 30 minutes  Therapy conducted by: Psych rehab  Therapy Summary: Pt attens RT sessions , pt usally enjoys music and writing poetry , mood seems calmer and has maintained behavioral control in RT sessions .

## 2023-05-05 NOTE — BH TREATMENT PLAN - NSTXCOPEINTERSW_PSY_ALL_CORE
SW will continue to meet with patient to provide support and education. Patient has been less agitated and is able to accept direction from staff without banging his head. Patient will be encouraged to use coping skills when frustrated.
SW will meet with patient to provide support, education, collateral and referrals. SW will encourage patient to attend groups to gain coping skills.

## 2023-05-05 NOTE — BH TREATMENT PLAN - NSTXPATIENTPARTICIPATE_PSY_ALL_CORE
Patient participated in identification of needs/problems/goals for treatment/Patient participated in development of after care plan
No, patient unwilling to participate

## 2023-05-05 NOTE — BH INPATIENT PSYCHIATRY PROGRESS NOTE - NSBHMETABOLIC_PSY_ALL_CORE_FT
BMI: BMI (kg/m2): 29.7 (04-25-23 @ 14:00)  HbA1c: A1C with Estimated Average Glucose Result: 6.5 % (03-19-23 @ 07:55)    Glucose: POCT Blood Glucose.: 105 mg/dL (04-25-23 @ 08:30)    BP: 123/77 (05-05-23 @ 08:31) (113/81 - 130/80)  Lipid Panel: Date/Time: 03-19-23 @ 07:55  Cholesterol, Serum: 161  Direct LDL: --  HDL Cholesterol, Serum: 27  Total Cholesterol/HDL Ration Measurement: --  Triglycerides, Serum: 116

## 2023-05-05 NOTE — BH INPATIENT PSYCHIATRY PROGRESS NOTE - NSBHASSESSSUMMFT_PSY_ALL_CORE
Mr Levi is a 52 year old man  with a history of Schizoaffective disorder bipolar type, who was admitted to the inpatient psychiatric floor for the management of  command auditory hallucinations.    Patient continues to have symptoms of psychosis , disorganised behavior and thought. It does not seem that patient is able to take care of himself .   At this time, patient continues to be a danger to himself and others and  continues to need inpatient psychiatric hospitalization for medication management and symptoms stabilization. Patient presented originally with disorganized behavior and irritability which was reported to be off from baseline from staff who knows patient, with episodes of self harm by hitting his head against the wall on multiple occasions, requiring a 1:1. Patient has not engaged in this behavior since last evaluation and a trial of removing 1:1 was started on 5/2. Patient was more agreeable to blood tests today with labs sent out on 5/2 for Clozaril level, Depakote level, and CBC with differential to determine effects of medication on patient's health.  5/3 : Patient was at first somewhat irritable and resistant to taking medications however became more cheerful during the day wish to discuss discharge was accepting and compliant with medications.  5/4: Patient has been free of self-injurious behavior or other problems of the behavior patient for 3 days. Patient asked about when he can return home and reported that he longer had any desire to burn down [place] [facility]. Patient was counselled that he needed to have a meeting with his facility before discharge.    #Schizoaffective Disorder, bipolar type  Continue psychotropic medications as prescribed   - Clozaril 100mg p.o daily , 400mg P.O bedtime   - Depakote 500mg p.o daily , 750mg P.o bedtime   - Invega Sustenna 156mg I.M monthly, next dose 5/4/23  - Depakote level 5/2 80, wnl  - Clozaril level 5/2 503, wnl  - CBC 5/2 showed ANC of 2610  Patient had 1:1 discontinued on 5/2    #Tachycardic   - Patient had noted tachycardic on two vital checks on 5/1  - EKG obtained on 5/1 showed heart rate to 104  - CRP 4/28 18.8  - Hospitalist consulted, will follow up    # Hyponatremia, currently resolved  - BMP 4/27 Na 136    # H/O HTN    # H/O HLD  - C/w Atorvastatin 40 mg PO qhs    # H/O hypothyroidism  - C/w Synthroid 25 mcg PO once daily Mr Levi is a 52 year old man  with a history of Schizoaffective disorder bipolar type, who was admitted to the inpatient psychiatric floor for the management of  command auditory hallucinations.    Patient continues to have symptoms of psychosis , disorganised behavior and thought. It does not seem that patient is able to take care of himself .   At this time, patient continues to be a danger to himself and others and  continues to need inpatient psychiatric hospitalization for medication management and symptoms stabilization. Patient presented originally with disorganized behavior and irritability which was reported to be off from baseline from staff who knows patient, with episodes of self harm by hitting his head against the wall on multiple occasions, requiring a 1:1. Patient has not engaged in this behavior since last evaluation and a trial of removing 1:1 was started on 5/2. Patient was more agreeable to blood tests today with labs sent out on 5/2 for Clozaril level, Depakote level, and CBC with differential to determine effects of medication on patient's health.  5/3 : Patient was at first somewhat irritable and resistant to taking medications however became more cheerful during the day wish to discuss discharge was accepting and compliant with medications.  5/4: Patient has been free of self-injurious behavior or other problems of the behavior patient for 3 days. Patient asked about when he can return home and reported that he longer had any desire to burn down [place] [facility]. Patient was counselled that he needed to have a meeting with his facility before discharge.  5/5: Patient has no behavorial concerns and awaits meeting with [facility] before discharge    #Schizoaffective Disorder, bipolar type  Continue psychotropic medications as prescribed   - Clozaril 100mg p.o daily , 400mg P.O bedtime   - Depakote 500mg p.o daily , 750mg P.o bedtime   - Invega Sustenna 156mg I.M monthly, next dose 5/4/23  - Depakote level 5/2 80, wnl  - Clozaril level 5/2 503, wnl  - CBC 5/2 showed ANC of 2610  Patient had 1:1 discontinued on 5/2    #Tachycardic   - Patient had noted tachycardic on two vital checks on 5/1  - EKG obtained on 5/1 showed heart rate to 104  - CRP 4/28 18.8  - Hospitalist consulted, will follow up    # Hyponatremia, currently resolved  - BMP 4/27 Na 136    # H/O HTN    # H/O HLD  - C/w Atorvastatin 40 mg PO qhs    # H/O hypothyroidism  - C/w Synthroid 25 mcg PO once daily

## 2023-05-05 NOTE — BH INPATIENT PSYCHIATRY PROGRESS NOTE - NSBHATTESTCOMMENTATTENDFT_PSY_A_CORE
Interviewed the patient with the resident Dr. Frias. Patient was seen in his room, 1:1 in place. He presents superficially related, with intermittent eye contact, speech is mumbling and at times hard to understand. He states he would "burn their house down because what they were doing was bad". It is unclear who he was referring to. Agree with resident's assessment and plan.
Case discussed with resident physician and patient was seen. I agree with the above assessment and plan. 
Case discussed with resident physician and patient was seen. I agree with the above assessment and plan. 
Case discussed with resident. I concur with findings and plan.
Reviewed case with resident. I concur with findings and plan.

## 2023-05-05 NOTE — BH TREATMENT PLAN - NSDCCRITERIA_PSY_ALL_CORE
- medication compliance  - less disorganized thoughts and actions  - improved impulsivity/self-injurious behavior  
- medication compliance  - less disorganized thoughts and actions  - improved impulsivity/self-injurious behavior

## 2023-05-05 NOTE — BH INPATIENT PSYCHIATRY PROGRESS NOTE - NSBHFUPINTERVALHXFT_PSY_A_CORE
Chart reviewed. Per nursing, no acute events overnight and patient had harmed himself in any way. Patient has been compliante with medications since last evaluation. No PRNs required.    Upon approach today, patient was seen lying in bed and was woken up from sleep for interview. Patient was polite, calm, and cooperative and willing to leave the room for interview. Patient reported "fine" mood and did not appear in any acute distress. Patient has been free of self-injurious behavior or other problems of the behavior patient for 3 days. Patient asked about when he can return home and reported that he longer had any desire to burn down [place] [facility]. Patient was counselled that he needed to have a meeting with his facility before discharge. Chart reviewed. Per nursing, no acute events overnight and patient had harmed himself in any way. Patient has been compliante with medications since last evaluation. No PRNs required.    Upon approach today, patient was seen lying in bed and was woken up from sleep for interview. Patient was calm, and cooperative. Patient reported "fine" mood and did not appear in any acute distress. Patient has been free of self-injurious behavior or other problems of the behavior patient for multiple days. Patient was updated that he will need a meeting with [place] [facility] before he can be discharged back.

## 2023-05-06 RX ADMIN — CLOZAPINE 400 MILLIGRAM(S): 150 TABLET, ORALLY DISINTEGRATING ORAL at 21:53

## 2023-05-06 RX ADMIN — Medication 5 MILLIGRAM(S): at 21:53

## 2023-05-06 RX ADMIN — ATORVASTATIN CALCIUM 40 MILLIGRAM(S): 80 TABLET, FILM COATED ORAL at 21:52

## 2023-05-06 RX ADMIN — Medication 25 MICROGRAM(S): at 06:43

## 2023-05-06 RX ADMIN — CLOZAPINE 100 MILLIGRAM(S): 150 TABLET, ORALLY DISINTEGRATING ORAL at 10:28

## 2023-05-06 RX ADMIN — METFORMIN HYDROCHLORIDE 1000 MILLIGRAM(S): 850 TABLET ORAL at 10:29

## 2023-05-06 RX ADMIN — Medication 25 MILLIGRAM(S): at 10:29

## 2023-05-06 RX ADMIN — Medication 2 MILLIGRAM(S): at 21:52

## 2023-05-06 RX ADMIN — DIVALPROEX SODIUM 500 MILLIGRAM(S): 500 TABLET, DELAYED RELEASE ORAL at 10:29

## 2023-05-06 RX ADMIN — DIVALPROEX SODIUM 750 MILLIGRAM(S): 500 TABLET, DELAYED RELEASE ORAL at 21:54

## 2023-05-06 RX ADMIN — METFORMIN HYDROCHLORIDE 1000 MILLIGRAM(S): 850 TABLET ORAL at 21:54

## 2023-05-07 RX ADMIN — METFORMIN HYDROCHLORIDE 1000 MILLIGRAM(S): 850 TABLET ORAL at 21:31

## 2023-05-07 RX ADMIN — CLOZAPINE 400 MILLIGRAM(S): 150 TABLET, ORALLY DISINTEGRATING ORAL at 21:30

## 2023-05-07 RX ADMIN — Medication 50 MILLIGRAM(S): at 23:09

## 2023-05-07 RX ADMIN — ATORVASTATIN CALCIUM 40 MILLIGRAM(S): 80 TABLET, FILM COATED ORAL at 21:29

## 2023-05-07 RX ADMIN — Medication 5 MILLIGRAM(S): at 21:29

## 2023-05-07 RX ADMIN — DIVALPROEX SODIUM 500 MILLIGRAM(S): 500 TABLET, DELAYED RELEASE ORAL at 09:53

## 2023-05-07 RX ADMIN — Medication 2 MILLIGRAM(S): at 21:29

## 2023-05-07 RX ADMIN — DIVALPROEX SODIUM 750 MILLIGRAM(S): 500 TABLET, DELAYED RELEASE ORAL at 21:30

## 2023-05-07 RX ADMIN — Medication 25 MILLIGRAM(S): at 09:54

## 2023-05-07 RX ADMIN — METFORMIN HYDROCHLORIDE 1000 MILLIGRAM(S): 850 TABLET ORAL at 09:54

## 2023-05-07 RX ADMIN — CLOZAPINE 100 MILLIGRAM(S): 150 TABLET, ORALLY DISINTEGRATING ORAL at 09:53

## 2023-05-07 RX ADMIN — Medication 25 MICROGRAM(S): at 06:19

## 2023-05-08 PROCEDURE — 93306 TTE W/DOPPLER COMPLETE: CPT | Mod: 26

## 2023-05-08 RX ORDER — POLYETHYLENE GLYCOL 3350 17 G/17G
17 POWDER, FOR SOLUTION ORAL
Qty: 1 | Refills: 0
Start: 2023-05-08 | End: 2023-05-21

## 2023-05-08 RX ORDER — CLOZAPINE 150 MG/1
1 TABLET, ORALLY DISINTEGRATING ORAL
Qty: 14 | Refills: 0
Start: 2023-05-08 | End: 2023-05-21

## 2023-05-08 RX ORDER — ATORVASTATIN CALCIUM 80 MG/1
1 TABLET, FILM COATED ORAL
Qty: 14 | Refills: 0
Start: 2023-05-08 | End: 2023-05-21

## 2023-05-08 RX ORDER — LEVOTHYROXINE SODIUM 125 MCG
1 TABLET ORAL
Qty: 14 | Refills: 0
Start: 2023-05-08 | End: 2023-05-21

## 2023-05-08 RX ORDER — DIVALPROEX SODIUM 500 MG/1
3 TABLET, DELAYED RELEASE ORAL
Qty: 42 | Refills: 0
Start: 2023-05-08 | End: 2023-05-21

## 2023-05-08 RX ORDER — METOPROLOL TARTRATE 50 MG
1 TABLET ORAL
Qty: 14 | Refills: 0
Start: 2023-05-08 | End: 2023-05-21

## 2023-05-08 RX ORDER — DIVALPROEX SODIUM 500 MG/1
1 TABLET, DELAYED RELEASE ORAL
Qty: 14 | Refills: 0
Start: 2023-05-08 | End: 2023-05-21

## 2023-05-08 RX ORDER — METFORMIN HYDROCHLORIDE 850 MG/1
1 TABLET ORAL
Qty: 28 | Refills: 0
Start: 2023-05-08 | End: 2023-05-21

## 2023-05-08 RX ORDER — CLOZAPINE 150 MG/1
2 TABLET, ORALLY DISINTEGRATING ORAL
Qty: 28 | Refills: 0
Start: 2023-05-08 | End: 2023-05-21

## 2023-05-08 RX ADMIN — Medication 50 MILLIGRAM(S): at 05:32

## 2023-05-08 RX ADMIN — Medication 25 MICROGRAM(S): at 05:32

## 2023-05-08 RX ADMIN — Medication 5 MILLIGRAM(S): at 20:06

## 2023-05-08 RX ADMIN — OLANZAPINE 5 MILLIGRAM(S): 15 TABLET, FILM COATED ORAL at 00:18

## 2023-05-08 RX ADMIN — CLOZAPINE 400 MILLIGRAM(S): 150 TABLET, ORALLY DISINTEGRATING ORAL at 20:05

## 2023-05-08 RX ADMIN — CLOZAPINE 100 MILLIGRAM(S): 150 TABLET, ORALLY DISINTEGRATING ORAL at 08:41

## 2023-05-08 RX ADMIN — METFORMIN HYDROCHLORIDE 1000 MILLIGRAM(S): 850 TABLET ORAL at 20:05

## 2023-05-08 RX ADMIN — DIVALPROEX SODIUM 500 MILLIGRAM(S): 500 TABLET, DELAYED RELEASE ORAL at 08:41

## 2023-05-08 RX ADMIN — Medication 25 MILLIGRAM(S): at 08:42

## 2023-05-08 RX ADMIN — ATORVASTATIN CALCIUM 40 MILLIGRAM(S): 80 TABLET, FILM COATED ORAL at 20:06

## 2023-05-08 RX ADMIN — DIVALPROEX SODIUM 750 MILLIGRAM(S): 500 TABLET, DELAYED RELEASE ORAL at 20:05

## 2023-05-08 RX ADMIN — METFORMIN HYDROCHLORIDE 1000 MILLIGRAM(S): 850 TABLET ORAL at 08:42

## 2023-05-08 RX ADMIN — Medication 2 MILLIGRAM(S): at 20:05

## 2023-05-08 NOTE — BH INPATIENT PSYCHIATRY PROGRESS NOTE - NSBHFUPINTERVALHXFT_PSY_A_CORE
Chart reviewed. Per nursing, no acute events overnight and patient had harmed himself in any way. Patient has been compliante with medications since last evaluation. Overnight, patient requested 5 mg PO Zyprexa at 0018.    Upon approach today, patient was seen walking around the milieu. Patient was calm, and cooperative. Patient reported "good" mood and did not appear in any acute distress. Patient has been free of self-injurious behavior or other problems of the behavior patient for multiple days. Patient is aware that he will need a meeting with [facility] before he can be discharged back.

## 2023-05-08 NOTE — BH INPATIENT PSYCHIATRY PROGRESS NOTE - NSBHMETABOLIC_PSY_ALL_CORE_FT
BMI: BMI (kg/m2): 29.7 (04-25-23 @ 14:00)  HbA1c: A1C with Estimated Average Glucose Result: 6.5 % (03-19-23 @ 07:55)    Glucose: POCT Blood Glucose.: 105 mg/dL (04-25-23 @ 08:30)    BP: 101/50 (05-08-23 @ 08:25) (95/73 - 146/75)  Lipid Panel: Date/Time: 03-19-23 @ 07:55  Cholesterol, Serum: 161  Direct LDL: --  HDL Cholesterol, Serum: 27  Total Cholesterol/HDL Ration Measurement: --  Triglycerides, Serum: 116

## 2023-05-08 NOTE — BH DISCHARGE NOTE NURSING/SOCIAL WORK/PSYCH REHAB - NSDCVIVACCINE_GEN_ALL_CORE_FT
Tdap; 21-Aug-2022 18:52; Kathrin Lebron (RN); [employer]; [id number] (Exp. Date: 22-Sep-2024); IntraMuscular; Deltoid Right.; 0.5 milliLiter(s); VIS (VIS Published: 09-May-2013, VIS Presented: 21-Aug-2022);

## 2023-05-08 NOTE — BH INPATIENT PSYCHIATRY PROGRESS NOTE - NSBHCONSBHPROVDETAILS_PSY_A_CORE  FT
Spoke to Dr. Pearson [phone] at [facility]. She reports patient is likely not entirely compliant with medications, including clozaril. She reports patient suffers from AH which result in hitting his head. She recommends increasing next invega sustenna dose to 234mg and administer prior to discharge.

## 2023-05-08 NOTE — BH INPATIENT PSYCHIATRY PROGRESS NOTE - CURRENT MEDICATION
MEDICATIONS  (STANDING):  atorvastatin 40 milliGRAM(s) Oral at bedtime  bisacodyl 5 milliGRAM(s) Oral at bedtime  cloZAPine 400 milliGRAM(s) Oral at bedtime  cloZAPine 100 milliGRAM(s) Oral daily  divalproex  milliGRAM(s) Oral daily  divalproex  milliGRAM(s) Oral at bedtime  levothyroxine 25 MICROGram(s) Oral daily  LORazepam     Tablet 2 milliGRAM(s) Oral at bedtime  metFORMIN 1000 milliGRAM(s) Oral two times a day  metoprolol tartrate 25 milliGRAM(s) Oral daily    MEDICATIONS  (PRN):  acetaminophen     Tablet .. 650 milliGRAM(s) Oral every 6 hours PRN Moderate Pain (4 - 6)  diphenhydrAMINE 50 milliGRAM(s) Oral every 6 hours PRN agitation  ibuprofen  Tablet. 400 milliGRAM(s) Oral every 8 hours PRN Severe Pain (7 - 10)  OLANZapine 5 milliGRAM(s) Oral every 8 hours PRN agitation

## 2023-05-08 NOTE — BH DISCHARGE NOTE NURSING/SOCIAL WORK/PSYCH REHAB - FACILITY ADDRESS
[address] [place] [address] - Building   [address] [facility] 4 [phone]

## 2023-05-08 NOTE — BH DISCHARGE NOTE NURSING/SOCIAL WORK/PSYCH REHAB - NSCDUDCCRISIS_PSY_A_CORE
[facility] Well  1 (773) [facility] ([phone])  Text "WELL" to 56028  Website: www.[employer]/.National Suicide Prevention Lifeline 3 (938) 476-2554/.  [employer]  1 (693) LIFENET (345-0023)/988 Suicide and Crisis Lifeline

## 2023-05-08 NOTE — BH INPATIENT PSYCHIATRY PROGRESS NOTE - NSBHCHARTREVIEWVS_PSY_A_CORE FT
Vital Signs Last 24 Hrs  T(C): 35.5 (05-08-23 @ 08:25), Max: 35.8 (05-07-23 @ 17:01)  T(F): 95.9 (05-08-23 @ 08:25), Max: 96.4 (05-07-23 @ 17:01)  HR: 106 (05-08-23 @ 08:25) (102 - 106)  BP: 101/50 (05-08-23 @ 08:25) (101/50 - 146/75)  BP(mean): --  RR: 16 (05-08-23 @ 08:25) (16 - 18)  SpO2: --

## 2023-05-08 NOTE — BH INPATIENT PSYCHIATRY PROGRESS NOTE - NSBHASSESSSUMMFT_PSY_ALL_CORE
Mr Levi is a 52 year old man  with a history of Schizoaffective disorder bipolar type, who was admitted to the inpatient psychiatric floor for the management of  command auditory hallucinations.    Patient continues to have symptoms of psychosis , disorganised behavior and thought. It does not seem that patient is able to take care of himself .   At this time, patient continues to be a danger to himself and others and  continues to need inpatient psychiatric hospitalization for medication management and symptoms stabilization. Patient presented originally with disorganized behavior and irritability which was reported to be off from baseline from staff who knows patient, with episodes of self harm by hitting his head against the wall on multiple occasions, requiring a 1:1. Patient has not engaged in this behavior since last evaluation and a trial of removing 1:1 was started on 5/2. Patient was more agreeable to blood tests today with labs sent out on 5/2 for Clozaril level, Depakote level, and CBC with differential to determine effects of medication on patient's health.    Currently, patient has no behavorial concerns and awaits meeting with [facility] before discharge.    5/3 : Patient was at first somewhat irritable and resistant to taking medications however became more cheerful during the day wish to discuss discharge was accepting and compliant with medications.  5/4: Patient has been free of self-injurious behavior or other problems of the behavior patient for 3 days. Patient asked about when he can return home and reported that he longer had any desire to burn down [place] [facility]. Patient was counselled that he needed to have a meeting with his facility before discharge.  5/5-8: Patient has no behavorial concerns and awaits meeting with [facility] before discharge    #Schizoaffective Disorder, bipolar type  Continue psychotropic medications as prescribed   - Clozaril 100mg p.o daily , 400mg P.O bedtime   - Depakote 500mg p.o daily , 750mg P.o bedtime   - Invega Sustenna 156mg I.M monthly, next dose 5/4/23  - Depakote level 5/2 80, wnl  - Clozaril level 5/2 503, wnl  - CBC 5/2 showed ANC of 2610  Patient had 1:1 discontinued on 5/2    #Tachycardic   - Patient had noted tachycardic on two vital checks on 5/1  - EKG obtained on 5/1 showed heart rate to 104  - CRP 4/28 18.8  - Hospitalist consulted, will follow up    # Hyponatremia, currently resolved  - BMP 4/27 Na 136    # H/O HTN    # H/O HLD  - C/w Atorvastatin 40 mg PO qhs    # H/O hypothyroidism  - C/w Synthroid 25 mcg PO once daily

## 2023-05-09 VITALS
SYSTOLIC BLOOD PRESSURE: 124 MMHG | HEART RATE: 97 BPM | TEMPERATURE: 98 F | RESPIRATION RATE: 16 BRPM | DIASTOLIC BLOOD PRESSURE: 72 MMHG

## 2023-05-09 LAB — TSH SERPL-MCNC: 4.92 UIU/ML — HIGH (ref 0.27–4.2)

## 2023-05-09 RX ADMIN — CLOZAPINE 100 MILLIGRAM(S): 150 TABLET, ORALLY DISINTEGRATING ORAL at 08:32

## 2023-05-09 RX ADMIN — DIVALPROEX SODIUM 500 MILLIGRAM(S): 500 TABLET, DELAYED RELEASE ORAL at 08:31

## 2023-05-09 RX ADMIN — Medication 25 MILLIGRAM(S): at 08:31

## 2023-05-09 RX ADMIN — METFORMIN HYDROCHLORIDE 1000 MILLIGRAM(S): 850 TABLET ORAL at 08:31

## 2023-05-09 RX ADMIN — Medication 25 MICROGRAM(S): at 05:56

## 2023-05-09 NOTE — BH INPATIENT PSYCHIATRY PROGRESS NOTE - NSTXCOPEDATEEST_PSY_ALL_CORE
28-Apr-2023

## 2023-05-09 NOTE — BH INPATIENT PSYCHIATRY PROGRESS NOTE - GENERAL APPEARANCE
Developmentally delayed

## 2023-05-09 NOTE — BH INPATIENT PSYCHIATRY PROGRESS NOTE - NSTXDISORGDATETRGT_PSY_ALL_CORE
04-May-2023
02-May-2023
04-May-2023
12-May-2023
12-May-2023
02-May-2023
12-May-2023

## 2023-05-09 NOTE — BH INPATIENT PSYCHIATRY PROGRESS NOTE - NSTXDISORGGOAL_PSY_ALL_CORE
Will identify 2 coping skills that assist in organizing

## 2023-05-09 NOTE — BH INPATIENT PSYCHIATRY PROGRESS NOTE - NSBHCHARTREVIEWLAB_PSY_A_CORE FT
Urinalysis (04.24.23 @ 22:37)   Glucose Qualitative, Urine: Negative  Blood, Urine: Negative  pH Urine: 6.0  Color: Colorless  Urine Appearance: Clear  Bilirubin: Negative  Ketone - Urine: Negative  Specific Gravity: 1.004  Protein, Urine: Negative  Urobilinogen: <2 mg/dL  Nitrite: Negative  Leukocyte Esterase Concentration: Negativeomplete Blood Count + Automated Diff (04.24.23 @ 22:29)   WBC Count: 7.30 K/uL  RBC Count: 4.64 M/uL  Hemoglobin: 11.7 g/dL  Hematocrit: 37.2 %  Mean Cell Volume: 80.2 fL  Mean Cell Hemoglobin: 25.2 pg  Mean Cell Hemoglobin Conc: 31.5 g/dL  Red Cell Distrib Width: 17.7 %  Platelet Count - Automated: 245: slide estimate K/uL  MPV: 10.3 fL  Auto Neutrophil #: 3.21 K/uL  Auto Lymphocyte #: 3.24 K/uL  Auto Monocyte #: 0.70 K/uL  Auto Eosinophil #: 0.11 K/uL  Auto Basophil #: 0.02 K/uL  Auto Neutrophil %: 43.9: Differential percentages must be correlated with absolute numbers for   clinical significance. %                    11.7   7.30  )-----------( 245      ( 24 Apr 2023 22:29 )             37.2   04-24    130<L>  |  96<L>  |  11  ----------------------------<  86  4.1   |  24  |  0.7    Ca    9.6      24 Apr 2023 22:29    TPro  7.6  /  Alb  4.4  /  TBili  0.3  /  DBili  <0.2  /  AST  14  /  ALT  8   /  AlkPhos  82  04-24  
Complete Blood Count + Automated Diff (04.27.23 @ 17:09)   WBC Count: 7.73 K/uL  RBC Count: 4.71 M/uL  Hemoglobin: 12.0 g/dL  Hematocrit: 38.8 %  Mean Cell Volume: 82.4 fL  Mean Cell Hemoglobin: 25.5 pg  Mean Cell Hemoglobin Conc: 30.9 g/dL  Red Cell Distrib Width: 18.3 %  Platelet Count - Automated: 212 K/uL  MPV: 9.7 fL  Auto Neutrophil #: 3.33 K/uL  Auto Lymphocyte #: 3.54 K/uL  Auto Monocyte #: 0.59 K/uL  Auto Eosinophil #: 0.23 K/uL  Auto Basophil #: 0.02 K/uL  Auto Neutrophil %: 43.0: Differential percentages must be correlated with absolute numbers for   clinical significance. %  Auto Lymphocyte %: 45.8 %  Auto Monocyte %: 7.6 %  Auto Eosinophil %: 3.0 %  Auto Basophil %: 0.3 %  Auto Immature Granulocyte %: 0.3:    Basic Metabolic Panel (04.27.23 @ 17:09)   Sodium, Serum: 136 mmol/L  Potassium, Serum: 4.5: Slighty Hemolyzed use with Caution mmol/L  Chloride, Serum: 100 mmol/L  Carbon Dioxide, Serum: 21 mmol/L  Anion Gap, Serum: 15 mmol/L  Blood Urea Nitrogen, Serum: 4 mg/dL  Creatinine, Serum: 0.7 mg/dL  Glucose, Serum: 155 mg/dL  Calcium, Total Serum: 9.6 mg/dL  eGFR: 112: 
Clozapine Level (04.25.23 @ 11:20)   Clozapine Level, Result: <68
                      12.0   7.73  )-----------( 212      ( 27 Apr 2023 17:09 )             38.8   04-27    136  |  100  |  4<L>  ----------------------------<  155<H>  4.5   |  21  |  0.7    Ca    9.6      27 Apr 2023 17:09    
Complete Blood Count + Automated Diff (04.27.23 @ 17:09)   WBC Count: 7.73 K/uL  RBC Count: 4.71 M/uL  Hemoglobin: 12.0 g/dL  Hematocrit: 38.8 %  Mean Cell Volume: 82.4 fL  Mean Cell Hemoglobin: 25.5 pg  Mean Cell Hemoglobin Conc: 30.9 g/dL  Red Cell Distrib Width: 18.3 %  Platelet Count - Automated: 212 K/uL  MPV: 9.7 fL  Auto Neutrophil #: 3.33 K/uL  Auto Lymphocyte #: 3.54 K/uL  Auto Monocyte #: 0.59 K/uL  Auto Eosinophil #: 0.23 K/uL  Auto Basophil #: 0.02 K/uL  Auto Neutrophil %: 43.0: Differential percentages must be correlated with absolute numbers for   clinical significance. %  Auto Lymphocyte %: 45.8 %  Auto Monocyte %: 7.6 %  Auto Eosinophil %: 3.0 %  Auto Basophil %: 0.3 %  Auto Immature Granulocyte %: 0.3:    Basic Metabolic Panel (04.27.23 @ 17:09)   Sodium, Serum: 136 mmol/L  Potassium, Serum: 4.5: Slighty Hemolyzed use with Caution mmol/L  Chloride, Serum: 100 mmol/L  Carbon Dioxide, Serum: 21 mmol/L  Anion Gap, Serum: 15 mmol/L  Blood Urea Nitrogen, Serum: 4 mg/dL  Creatinine, Serum: 0.7 mg/dL  Glucose, Serum: 155 mg/dL  Calcium, Total Serum: 9.6 mg/dL  eGFR: 112: 
Complete Blood Count + Automated Diff (04.27.23 @ 17:09)   WBC Count: 7.73 K/uL  RBC Count: 4.71 M/uL  Hemoglobin: 12.0 g/dL  Hematocrit: 38.8 %  Mean Cell Volume: 82.4 fL  Mean Cell Hemoglobin: 25.5 pg  Mean Cell Hemoglobin Conc: 30.9 g/dL  Red Cell Distrib Width: 18.3 %  Platelet Count - Automated: 212 K/uL  MPV: 9.7 fL  Auto Neutrophil #: 3.33 K/uL  Auto Lymphocyte #: 3.54 K/uL  Auto Monocyte #: 0.59 K/uL  Auto Eosinophil #: 0.23 K/uL  Auto Basophil #: 0.02 K/uL  Auto Neutrophil %: 43.0: Differential percentages must be correlated with absolute numbers for   clinical significance. %  Auto Lymphocyte %: 45.8 %  Auto Monocyte %: 7.6 %  Auto Eosinophil %: 3.0 %  Auto Basophil %: 0.3 %  Auto Immature Granulocyte %: 0.3:    Basic Metabolic Panel (04.27.23 @ 17:09)   Sodium, Serum: 136 mmol/L  Potassium, Serum: 4.5: Slighty Hemolyzed use with Caution mmol/L  Chloride, Serum: 100 mmol/L  Carbon Dioxide, Serum: 21 mmol/L  Anion Gap, Serum: 15 mmol/L  Blood Urea Nitrogen, Serum: 4 mg/dL  Creatinine, Serum: 0.7 mg/dL  Glucose, Serum: 155 mg/dL  Calcium, Total Serum: 9.6 mg/dL  eGFR: 112:

## 2023-05-09 NOTE — BH INPATIENT PSYCHIATRY PROGRESS NOTE - NSTXCOPEDATETRGT_PSY_ALL_CORE
19-May-2023

## 2023-05-09 NOTE — BH INPATIENT PSYCHIATRY PROGRESS NOTE - NSTXDISORGDATEEST_PSY_ALL_CORE
28-Apr-2023
25-Apr-2023
28-Apr-2023
25-Apr-2023
28-Apr-2023

## 2023-05-09 NOTE — BH INPATIENT PSYCHIATRY PROGRESS NOTE - NSBHCHARTREVIEWINVESTIGATE_PSY_A_CORE FT
< from: 12 Lead ECG (05.01.23 @ 14:17) >      Ventricular Rate 104 BPM    Atrial Rate 104 BPM    P-R Interval 168 ms    QRS Duration 102 ms    Q-T Interval 370 ms    QTC Calculation(Bazett) 486 ms    P Axis 75 degrees    R Axis 35 degrees    T Axis 52 degrees    Diagnosis Line Sinus tachycardia  Nonspecific T wave abnormality  Abnormal ECG    Confirmed by MIREYA MESSER, MARK ([id number]) on 5/1/2023 3:55:21 PM    < end of copied text >    
          < from: 12 Lead ECG (04.24.23 @ 22:15) >    Ventricular Rate 86 BPM    Atrial Rate 86 BPM    P-R Interval 156 ms    QRS Duration 104 ms    Q-T Interval 380 ms    QTC Calculation(Bazett) 454 ms    P Axis 70 degrees    R Axis 6 degrees    T Axis 9 degrees    Diagnosis Line Normal sinus rhythm  Minimal voltage criteria for LVH, may be normal variant  Nonspecific T wave abnormality  Abnormal ECG    < end of copied text >    Ventricular Rate 86 BPM    Atrial Rate 86 BPM    P-R Interval 156 ms    QRS Duration 104 ms    Q-T Interval 380 ms    QTC Calculation(Bazett) 454 ms    P Axis 70 degrees    R Axis 6 degrees    T Axis 9 degrees    Diagnosis Line Normal sinus rhythm  Minimal voltage criteria for LVH, may be normal variant  Nonspecific T wave abnormality  Abnormal ECG    Confirmed by AKIRA ROJAS MD ([id number]) on 4/25/2023 6:59:40 AM      ACC: [id number] EXAM:  CT BRAIN   ORDERED BY: MELANY ALLAN     PROCEDURE DATE:  04/25/2023          INTERPRETATION:  Clinical History / Reason for exam: Head trauma. Banging   head on wall.    Technique: Noncontrast head CT.  Contiguous unenhanced CTaxial images of   the head from the base to the vertex with coronal and sagittal reformats.    Please note the patient was combative and jumped off the table during the   scan. The top of the brain/head is not included.    Comparison: CT head dated 3/20/2023    Findings:    The visualized ventricles and cortical sulci are normal in size and   configuration.   There is no evidence of acute intracranial hemorrhage,   extra-axial fluid collection or midline shift.  Gray-white matter   differentiation is grossly maintained.    The visualized paranasal sinuses and mastoids are clear.    IMPRESSION:    1.  Limited study; the patient was combative and jumped off the table   during the scan. The top of the brain/head is not included.    2.  The visualized portion of the brain demonstrates no evidence of acute   intracranial pathology.    --- End of Report ---            DANIS SIEGEL MD; Attending Radiologist  This document has been electronically signed. Apr 25 2023  4:04PM  
< from: 12 Lead ECG (05.01.23 @ 14:17) >      Ventricular Rate 104 BPM    Atrial Rate 104 BPM    P-R Interval 168 ms    QRS Duration 102 ms    Q-T Interval 370 ms    QTC Calculation(Bazett) 486 ms    P Axis 75 degrees    R Axis 35 degrees    T Axis 52 degrees    Diagnosis Line Sinus tachycardia  Nonspecific T wave abnormality  Abnormal ECG    Confirmed by MIREYA MESSER, MARK ([id number]) on 5/1/2023 3:55:21 PM    < end of copied text >    
< from: 12 Lead ECG (05.01.23 @ 14:17) >      Ventricular Rate 104 BPM    Atrial Rate 104 BPM    P-R Interval 168 ms    QRS Duration 102 ms    Q-T Interval 370 ms    QTC Calculation(Bazett) 486 ms    P Axis 75 degrees    R Axis 35 degrees    T Axis 52 degrees    Diagnosis Line Sinus tachycardia  Nonspecific T wave abnormality  Abnormal ECG    Confirmed by MARK GOMES MD ([id number]) on 5/1/2023 3:55:21 PM    < end of copied text >    < from: TTE Echo Complete w/o Contrast w/ Doppler (05.08.23 @ 15:12) >    Summary:   1. Normal global left ventricular systolic function.   2. LV Ejection Fraction by Blackwood's Method with a biplane EF of 52 %.   3. Basal and mid anterior septum and basal and mid inferior septum are   abnormal as described above.   4. Normal right ventricular size and function.   5. Normal left atrial size.   6. Normal right atrial size.   7. No evidence of mitral valve regurgitation.   8. Adequate TR velocity was not obtained to accurately assess RVSP.   9. There is no evidence of pericardial effusion.    PHYSICIAN INTERPRETATION:  Left Ventricle: The left ventricular internal cavity size is normal.   There is no left ventricular hypertrophy. Global LV systolic function was   normal. Spectral Doppler shows normal pattern of LV diastolic filling.   Normal LV filling pressures.    < end of copied text >        
Ventricular Rate 86 BPM    Atrial Rate 86 BPM    P-R Interval 156 ms    QRS Duration 104 ms    Q-T Interval 380 ms    QTC Calculation(Bazett) 454 ms    P Axis 70 degrees    R Axis 6 degrees    T Axis 9 degrees    Diagnosis Line Normal sinus rhythm  Minimal voltage criteria for LVH, may be normal variant  Nonspecific T wave abnormality  Abnormal ECG    Confirmed by AKIRA ROJAS MD ([id number]) on 4/25/2023 6:59:40 AM      ACC: [id number] EXAM:  CT CERVICAL SPINE   ORDERED BY: MELANY ALLAN     ACC: [id number] EXAM:  CT BRAIN   ORDERED BY: MELANY ALLAN     PROCEDURE DATE:  04/25/2023          INTERPRETATION:  CLINICAL INDICATIONS:  Trauma.    TECHNIQUE:  Noncontrast head and cervical spine CT was performed.    Multiple contiguous axial images were obtained from the skull base to the   vertex without the use of intravenous contrast. Reformatted coronal and   sagittal images were were subsequently obtained and reviewed.    Axial images were obtained through the cervical spine using multislice   helical technique.  Reformatted coronal and sagittal images were were   subsequently obtained and reviewed.    COMPARISON: Head dated 4/25/2023 at 3:43 PM. CT neck dated 12/21/2021.    FINDINGS:    CT BRAIN:    There is no evidence for acute intracranial hemorrhage, mass effect or   midline shift.    The basal cisterns are patent. There is no hydrocephalus.    There is no extra-axial collection.    The visualized orbits are unremarkable.    The calvarium is intact, without depressed fracture.    The visualized paranasal sinuses and mastoid air cells are clear.    CT CERVICAL SPINE:    There is no prevertebral soft tissue swelling. There is no splaying of   the spinousprocesses. The occipital condyles are normal. Lateral masses   of C1 align normally with C2. No lucent fracture line is identified.   There is no spondylolisthesis. Incidentally noted os odontoideum adjacent   to the tip of the odontoid on the right.    There are mild multilevel degenerative changes present including anterior   osteophyte formation, bilateral uncinate spurring as well as multilevel   facet arthrosis.    At C3-C4 there is disc osteophyte complex, bilateral uncinate spurring   and facet arthrosis contributing to moderate right and mild left   neuroforaminal narrowing.    The spinal canal contents are suboptimally evaluated inherent to CT   technique though grossly unremarkable.    Miscellaneous:  None.    IMPRESSION:    CT HEAD:  No acute intracranial pathology or hemorrhage. No acute calvarial   fracture.    CT CERVICAL SPINE:  No acute fracture or subluxation.    --- End of Report ---            LILIA MORALES MD; Attending Radiologist  This document has been electronically signed. Apr 25 2023  7:17PM
< from: 12 Lead ECG (05.01.23 @ 14:17) >      Ventricular Rate 104 BPM    Atrial Rate 104 BPM    P-R Interval 168 ms    QRS Duration 102 ms    Q-T Interval 370 ms    QTC Calculation(Bazett) 486 ms    P Axis 75 degrees    R Axis 35 degrees    T Axis 52 degrees    Diagnosis Line Sinus tachycardia  Nonspecific T wave abnormality  Abnormal ECG    Confirmed by MIREYA MESSER, MARK ([id number]) on 5/1/2023 3:55:21 PM    < end of copied text >    
< from: 12 Lead ECG (04.24.23 @ 22:15) >      Ventricular Rate 86 BPM    Atrial Rate 86 BPM    P-R Interval 156 ms    QRS Duration 104 ms    Q-T Interval 380 ms    QTC Calculation(Bazett) 454 ms    P Axis 70 degrees    R Axis 6 degrees    T Axis 9 degrees    Diagnosis Line Normal sinus rhythm  Minimal voltage criteria for LVH, may be normal variant  Nonspecific T wave abnormality  Abnormal ECG    Confirmed by BOB MESSER, AKIRA ([id number]) on 4/25/2023 6:59:40 AM    < end of copied text >    
Ventricular Rate 86 BPM    Atrial Rate 86 BPM    P-R Interval 156 ms    QRS Duration 104 ms    Q-T Interval 380 ms    QTC Calculation(Bazett) 454 ms    P Axis 70 degrees    R Axis 6 degrees    T Axis 9 degrees    Diagnosis Line Normal sinus rhythm  Minimal voltage criteria for LVH, may be normal variant  Nonspecific T wave abnormality  Abnormal ECG    Confirmed by AKIRA ROJAS MD ([id number]) on 4/25/2023 6:59:40 AM

## 2023-05-09 NOTE — BH INPATIENT PSYCHIATRY PROGRESS NOTE - NSBHASSESSSUMMFT_PSY_ALL_CORE
Mr Levi is a 52 year old man  with a history of Schizoaffective disorder bipolar type, who was admitted to the inpatient psychiatric floor for the management of  command auditory hallucinations after reporting that he wanted to "burn down" his home facility [facility]. Patient originally presented with symptoms of psychosis, disorganised behavior and thought and required inpatient psychiatric hospitalization for medication management and symptoms stabilization.     Patient presented originally with disorganized behavior and irritability which was reported to be off from baseline from staff who knows patient, with episodes of self harm by hitting his head against the wall on multiple occasions, requiring a 1:1. Per patient's outpatient provider, patient was reported to not be fully compliant with his medications so Clozaril was increased to his home dose over multiple days. By 5/1 when patient was back on home dose, patient was more stable and was no longer injuring himself. 1:1 was discontinued on 5/2 and did not need to be restarted.     Patient has been free of self-injurious behavior or other problems of the behavior patient for one week. Patient asked about when he can return home and reported that he longer had any desire to burn down [facility]. Patient was counselled that he needed to have a meeting with his facility before discharge. The meeting was held virtually on 5/9 and patient was approved to return. Patient is stable for discharge.    #Schizoaffective Disorder, bipolar type  Continue psychotropic medications as prescribed   - Clozaril 100mg p.o daily , 400mg P.O bedtime   - Depakote 500mg p.o daily , 750mg P.o bedtime   - Invega Sustenna I.M monthly given on 5/4/23, dose increased to 234 mg IM based on recommendation from outpatient Provider  - Depakote level 5/2 80, wnl  - Clozaril level 5/2 503, wnl  - CBC 5/2 showed ANC of 2610  Patient had 1:1 discontinued on 5/2  Patient is currently stable for discharge    #Tachycardia  - Patient had noted tachycardic on two vital checks on 5/1  - EKG obtained on 5/1 showed heart rate to 104  - CRP 4/28 18.8  - Discussed with Hospitalist, Echo performed 5/8 - no abnormal findings    # Hyponatremia, currently resolved  - BMP 4/27 Na 136    # H/O HTN    # H/O HLD  - C/w Atorvastatin 40 mg PO qhs    # H/O hypothyroidism  - C/w Synthroid 25 mcg PO once daily

## 2023-05-09 NOTE — BH INPATIENT PSYCHIATRY PROGRESS NOTE - NSTXCOPEGOAL_PSY_ALL_CORE
Identify and utilize 2 coping skills that meet their needs
no

## 2023-05-09 NOTE — BH INPATIENT PSYCHIATRY PROGRESS NOTE - NSBHFUPINTERVALHXFT_PSY_A_CORE
Chart reviewed. Per nursing, no acute events overnight and patient had harmed himself in any way. Patient has been compliant with medications since last evaluation. No PRN medications required since last evaluation.    Upon approach today, patient was seen walking around the milieu. Patient was calm, and cooperative. Patient reported "good" mood and did not appear in any acute distress. Patient appeared excited to return back to [facility], with patient having a virtual meeting this morning to facilitate his return to [facility] which was approved. Patient reported that he spoke to the team and reported that he had no desire to harm the facility or others. Patient denied any desire to harm himself or others and denied his auditory or visual hallucinations.     Patient has been free of self-injurious behavior or other problems of the behavior patient for multiple days. Patient is aware that he will need a meeting with [facility] before he can be discharged back.

## 2023-05-09 NOTE — BH INPATIENT PSYCHIATRY PROGRESS NOTE - PRN MEDS
MEDICATIONS  (PRN):  acetaminophen     Tablet .. 650 milliGRAM(s) Oral every 6 hours PRN Moderate Pain (4 - 6)  diphenhydrAMINE 50 milliGRAM(s) Oral every 6 hours PRN agitation  ibuprofen  Tablet. 400 milliGRAM(s) Oral every 8 hours PRN Severe Pain (7 - 10)  OLANZapine 5 milliGRAM(s) Oral every 8 hours PRN agitation  
MEDICATIONS  (PRN):  acetaminophen     Tablet .. 650 milliGRAM(s) Oral every 6 hours PRN Moderate Pain (4 - 6)  diphenhydrAMINE 50 milliGRAM(s) Oral every 6 hours PRN agitation  ibuprofen  Tablet. 400 milliGRAM(s) Oral every 8 hours PRN Severe Pain (7 - 10)  OLANZapine 5 milliGRAM(s) Oral every 8 hours PRN agitation  
MEDICATIONS  (PRN):  acetaminophen     Tablet .. 650 milliGRAM(s) Oral every 6 hours PRN Moderate Pain (4 - 6)  diphenhydrAMINE 50 milliGRAM(s) Oral every 6 hours PRN agitation  ibuprofen  Tablet. 400 milliGRAM(s) Oral every 8 hours PRN Severe Pain (7 - 10)  LORazepam     Tablet 2 milliGRAM(s) Oral every 6 hours PRN agitation  OLANZapine 5 milliGRAM(s) Oral every 8 hours PRN agitation  
MEDICATIONS  (PRN):  acetaminophen     Tablet .. 650 milliGRAM(s) Oral every 6 hours PRN Moderate Pain (4 - 6)  diphenhydrAMINE 50 milliGRAM(s) Oral every 6 hours PRN agitation  ibuprofen  Tablet. 400 milliGRAM(s) Oral every 8 hours PRN Severe Pain (7 - 10)  OLANZapine 5 milliGRAM(s) Oral every 8 hours PRN agitation  
MEDICATIONS  (PRN):  acetaminophen     Tablet .. 650 milliGRAM(s) Oral every 6 hours PRN Moderate Pain (4 - 6)  diphenhydrAMINE 50 milliGRAM(s) Oral every 6 hours PRN agitation  ibuprofen  Tablet. 400 milliGRAM(s) Oral every 8 hours PRN Severe Pain (7 - 10)  LORazepam     Tablet 2 milliGRAM(s) Oral every 6 hours PRN agitation  OLANZapine 5 milliGRAM(s) Oral every 8 hours PRN agitation  
MEDICATIONS  (PRN):  acetaminophen     Tablet .. 650 milliGRAM(s) Oral every 6 hours PRN Moderate Pain (4 - 6)  diphenhydrAMINE 50 milliGRAM(s) Oral every 6 hours PRN agitation  ibuprofen  Tablet. 400 milliGRAM(s) Oral every 8 hours PRN Severe Pain (7 - 10)  LORazepam     Tablet 2 milliGRAM(s) Oral every 6 hours PRN agitation  OLANZapine 5 milliGRAM(s) Oral every 8 hours PRN agitation  
MEDICATIONS  (PRN):  acetaminophen     Tablet .. 650 milliGRAM(s) Oral every 6 hours PRN Moderate Pain (4 - 6)  diphenhydrAMINE 50 milliGRAM(s) Oral every 6 hours PRN agitation  ibuprofen  Tablet. 400 milliGRAM(s) Oral every 8 hours PRN Severe Pain (7 - 10)  OLANZapine 5 milliGRAM(s) Oral every 8 hours PRN agitation  
MEDICATIONS  (PRN):  acetaminophen     Tablet .. 650 milliGRAM(s) Oral every 6 hours PRN Moderate Pain (4 - 6)  diphenhydrAMINE 50 milliGRAM(s) Oral every 6 hours PRN agitation  ibuprofen  Tablet. 400 milliGRAM(s) Oral every 8 hours PRN Severe Pain (7 - 10)  LORazepam     Tablet 2 milliGRAM(s) Oral every 6 hours PRN agitation  OLANZapine 5 milliGRAM(s) Oral every 8 hours PRN agitation  
MEDICATIONS  (PRN):  acetaminophen     Tablet .. 650 milliGRAM(s) Oral every 6 hours PRN Moderate Pain (4 - 6)  diphenhydrAMINE 50 milliGRAM(s) Oral every 6 hours PRN agitation  ibuprofen  Tablet. 400 milliGRAM(s) Oral every 8 hours PRN Severe Pain (7 - 10)  OLANZapine 5 milliGRAM(s) Oral every 8 hours PRN agitation  
MEDICATIONS  (PRN):  acetaminophen     Tablet .. 650 milliGRAM(s) Oral every 6 hours PRN Moderate Pain (4 - 6)  diphenhydrAMINE 50 milliGRAM(s) Oral every 6 hours PRN agitation  ibuprofen  Tablet. 400 milliGRAM(s) Oral every 8 hours PRN Severe Pain (7 - 10)  LORazepam     Tablet 2 milliGRAM(s) Oral every 6 hours PRN agitation  OLANZapine 5 milliGRAM(s) Oral every 8 hours PRN agitation  

## 2023-05-09 NOTE — BH INPATIENT PSYCHIATRY PROGRESS NOTE - NSBHATTESTTYPEVISIT_PSY_A_CORE
Attending with Resident/Fellow/Student
Attending Only
Attending Only
Attending with Resident/Fellow/Student
Attending Only
Attending with Resident/Fellow/Student

## 2023-05-09 NOTE — BH INPATIENT PSYCHIATRY PROGRESS NOTE - NSBHCONTPROVIDER_PSY_ALL_CORE
No, attempted...
Yes...
No, attempted...

## 2023-05-09 NOTE — BH INPATIENT PSYCHIATRY PROGRESS NOTE - NSBHCHARTREVIEWVS_PSY_A_CORE FT
Vital Signs Last 24 Hrs  T(C): 36.4 (05-09-23 @ 07:40), Max: 36.7 (05-08-23 @ 19:50)  T(F): 97.6 (05-09-23 @ 07:40), Max: 98.1 (05-08-23 @ 19:50)  HR: 97 (05-09-23 @ 07:40) (97 - 105)  BP: 124/72 (05-09-23 @ 07:40) (124/72 - 140/91)  BP(mean): --  RR: 16 (05-09-23 @ 07:40) (16 - 16)  SpO2: --

## 2023-05-09 NOTE — BH INPATIENT PSYCHIATRY PROGRESS NOTE - NSBHMSEAFFRANGE_PSY_A_CORE
155
Full
Constricted

## 2023-05-09 NOTE — BH INPATIENT PSYCHIATRY PROGRESS NOTE - NSBHFUPMEDSE_PSY_A_CORE
None known
Unable to assess
None known

## 2023-05-09 NOTE — BH INPATIENT PSYCHIATRY PROGRESS NOTE - NSBHCONSBHPROVDETAILS_PSY_A_CORE  FT
Spoke to Dr. Pearson [phone] at [facility]. She reports patient is likely not entirely compliant with medications, including clozaril. She reports patient suffers from AH which result in hitting his head. She recommends increasing next invega sustenna dose to 234mg and administer prior to discharge.

## 2023-05-09 NOTE — BH INPATIENT PSYCHIATRY PROGRESS NOTE - NSBHMSEMUSCLE_PSY_A_CORE
Abnormal muscle tone/strength
Abnormal muscle tone/strength
Normal muscle tone/strength
Abnormal muscle tone/strength
Normal muscle tone/strength
Abnormal muscle tone/strength
Abnormal muscle tone/strength
Normal muscle tone/strength
Abnormal muscle tone/strength

## 2023-05-09 NOTE — BH INPATIENT PSYCHIATRY PROGRESS NOTE - NSICDXBHPRIMARYDX_PSY_ALL_CORE
Schizoaffective disorder   F25.9  

## 2023-05-09 NOTE — BH INPATIENT PSYCHIATRY PROGRESS NOTE - NSBHATTESTBILLING_PSY_A_CORE
26366-Nzbphzjqpm OBS or IP - low complexity OR 25-34 mins
80863-Pkqxuqubcv OBS or IP - low complexity OR 25-34 mins
[id number]-Sdddvaaxsy OBS or IP - low complexity OR 25-34 mins
31527-Gfjwcxstjk OBS or IP - low complexity OR 25-34 mins
53478-Xmohdinsqo OBS or IP - low complexity OR 25-34 mins
[id number]-Cuytvxdvzp OBS or IP - low complexity OR 25-34 mins
[id number]-Wvanxyubsn OBS or IP - low complexity OR 25-34 mins
71934-Zxmrhrcjiu OBS or IP - low complexity OR 25-34 mins
46501-Vqdhfhmstt OBS or IP - low complexity OR 25-34 mins
[id number]-Mwyfquaamd OBS or IP - low complexity OR 25-34 mins

## 2023-05-09 NOTE — BH INPATIENT PSYCHIATRY PROGRESS NOTE - NSBHMSEINTELL_PSY_A_CORE
Average
Below Average
Below Average
Average
Below Average
Average

## 2023-05-11 DIAGNOSIS — E11.9 TYPE 2 DIABETES MELLITUS WITHOUT COMPLICATIONS: ICD-10-CM

## 2023-05-11 DIAGNOSIS — F25.0 SCHIZOAFFECTIVE DISORDER, BIPOLAR TYPE: ICD-10-CM

## 2023-05-11 DIAGNOSIS — E87.1 HYPO-OSMOLALITY AND HYPONATREMIA: ICD-10-CM

## 2023-05-11 DIAGNOSIS — Z88.0 ALLERGY STATUS TO PENICILLIN: ICD-10-CM

## 2023-05-11 DIAGNOSIS — Z91.014 ALLERGY TO MAMMALIAN MEATS: ICD-10-CM

## 2023-05-11 DIAGNOSIS — I10 ESSENTIAL (PRIMARY) HYPERTENSION: ICD-10-CM

## 2023-05-11 DIAGNOSIS — E03.9 HYPOTHYROIDISM, UNSPECIFIED: ICD-10-CM

## 2023-05-11 DIAGNOSIS — Z88.8 ALLERGY STATUS TO OTHER DRUGS, MEDICAMENTS AND BIOLOGICAL SUBSTANCES: ICD-10-CM

## 2023-05-11 DIAGNOSIS — Z79.84 LONG TERM (CURRENT) USE OF ORAL HYPOGLYCEMIC DRUGS: ICD-10-CM

## 2023-05-11 DIAGNOSIS — Z91.013 ALLERGY TO SEAFOOD: ICD-10-CM

## 2023-05-11 DIAGNOSIS — R45.851 SUICIDAL IDEATIONS: ICD-10-CM

## 2023-05-12 NOTE — BH SOCIAL WORK CONFIRMATION FOLLOW UP NOTE - NSLINKEDTOLOC_PSY_ALL_CORE
Juma attended his outpatient mental health appointment at [facility] as scheduled.

## 2023-06-02 NOTE — ED PROVIDER NOTE - NSCAREINITIATED _GEN_ER
I forgot to inform the patient that I placed a mammogram order for her this year at her appointment today. She can schedule it at her convience.    Flaca Rae(Attending)

## 2023-06-07 ENCOUNTER — EMERGENCY (EMERGENCY)
Facility: HOSPITAL | Age: 53
LOS: 0 days | Discharge: HOME | End: 2023-06-08
Attending: EMERGENCY MEDICINE
Payer: MEDICAID

## 2023-06-07 VITALS
OXYGEN SATURATION: 99 % | HEART RATE: 109 BPM | RESPIRATION RATE: 18 BRPM | SYSTOLIC BLOOD PRESSURE: 151 MMHG | TEMPERATURE: 98 F | DIASTOLIC BLOOD PRESSURE: 82 MMHG | WEIGHT: 169.98 LBS | HEIGHT: 66 IN

## 2023-06-07 DIAGNOSIS — R45.851 SUICIDAL IDEATIONS: ICD-10-CM

## 2023-06-07 DIAGNOSIS — Z91.013 ALLERGY TO SEAFOOD: ICD-10-CM

## 2023-06-07 DIAGNOSIS — I10 ESSENTIAL (PRIMARY) HYPERTENSION: ICD-10-CM

## 2023-06-07 DIAGNOSIS — Z88.0 ALLERGY STATUS TO PENICILLIN: ICD-10-CM

## 2023-06-07 DIAGNOSIS — E78.5 HYPERLIPIDEMIA, UNSPECIFIED: ICD-10-CM

## 2023-06-07 DIAGNOSIS — E03.9 HYPOTHYROIDISM, UNSPECIFIED: ICD-10-CM

## 2023-06-07 DIAGNOSIS — R44.0 AUDITORY HALLUCINATIONS: ICD-10-CM

## 2023-06-07 DIAGNOSIS — Z20.822 CONTACT WITH AND (SUSPECTED) EXPOSURE TO COVID-19: ICD-10-CM

## 2023-06-07 DIAGNOSIS — Z91.018 ALLERGY TO OTHER FOODS: ICD-10-CM

## 2023-06-07 DIAGNOSIS — Z88.8 ALLERGY STATUS TO OTHER DRUGS, MEDICAMENTS AND BIOLOGICAL SUBSTANCES: ICD-10-CM

## 2023-06-07 DIAGNOSIS — F43.20 ADJUSTMENT DISORDER, UNSPECIFIED: ICD-10-CM

## 2023-06-07 DIAGNOSIS — E11.9 TYPE 2 DIABETES MELLITUS WITHOUT COMPLICATIONS: ICD-10-CM

## 2023-06-07 DIAGNOSIS — F25.9 SCHIZOAFFECTIVE DISORDER, UNSPECIFIED: ICD-10-CM

## 2023-06-07 DIAGNOSIS — R00.0 TACHYCARDIA, UNSPECIFIED: ICD-10-CM

## 2023-06-07 DIAGNOSIS — F31.9 BIPOLAR DISORDER, UNSPECIFIED: ICD-10-CM

## 2023-06-07 DIAGNOSIS — R45.850 HOMICIDAL IDEATIONS: ICD-10-CM

## 2023-06-07 DIAGNOSIS — Z79.84 LONG TERM (CURRENT) USE OF ORAL HYPOGLYCEMIC DRUGS: ICD-10-CM

## 2023-06-07 LAB
ALBUMIN SERPL ELPH-MCNC: 4.5 G/DL — SIGNIFICANT CHANGE UP (ref 3.5–5.2)
ALP SERPL-CCNC: 97 U/L — SIGNIFICANT CHANGE UP (ref 30–115)
ALT FLD-CCNC: 8 U/L — SIGNIFICANT CHANGE UP (ref 0–41)
ANION GAP SERPL CALC-SCNC: 13 MMOL/L — SIGNIFICANT CHANGE UP (ref 7–14)
APAP SERPL-MCNC: <5 UG/ML — LOW (ref 10–30)
APPEARANCE UR: CLEAR — SIGNIFICANT CHANGE UP
AST SERPL-CCNC: 13 U/L — SIGNIFICANT CHANGE UP (ref 0–41)
BASOPHILS # BLD AUTO: 0.02 K/UL — SIGNIFICANT CHANGE UP (ref 0–0.2)
BASOPHILS NFR BLD AUTO: 0.3 % — SIGNIFICANT CHANGE UP (ref 0–1)
BILIRUB DIRECT SERPL-MCNC: <0.2 MG/DL — SIGNIFICANT CHANGE UP (ref 0–0.3)
BILIRUB INDIRECT FLD-MCNC: SIGNIFICANT CHANGE UP MG/DL (ref 0.2–1.2)
BILIRUB SERPL-MCNC: <0.2 MG/DL — SIGNIFICANT CHANGE UP (ref 0.2–1.2)
BILIRUB UR-MCNC: NEGATIVE — SIGNIFICANT CHANGE UP
BUN SERPL-MCNC: 4 MG/DL — LOW (ref 10–20)
CALCIUM SERPL-MCNC: 9.8 MG/DL — SIGNIFICANT CHANGE UP (ref 8.4–10.5)
CHLORIDE SERPL-SCNC: 98 MMOL/L — SIGNIFICANT CHANGE UP (ref 98–110)
CO2 SERPL-SCNC: 26 MMOL/L — SIGNIFICANT CHANGE UP (ref 17–32)
COLOR SPEC: COLORLESS — SIGNIFICANT CHANGE UP
CREAT SERPL-MCNC: 0.7 MG/DL — SIGNIFICANT CHANGE UP (ref 0.7–1.5)
DIFF PNL FLD: NEGATIVE — SIGNIFICANT CHANGE UP
EGFR: 112 ML/MIN/1.73M2 — SIGNIFICANT CHANGE UP
EOSINOPHIL # BLD AUTO: 0.06 K/UL — SIGNIFICANT CHANGE UP (ref 0–0.7)
EOSINOPHIL NFR BLD AUTO: 0.8 % — SIGNIFICANT CHANGE UP (ref 0–8)
ETHANOL SERPL-MCNC: <10 MG/DL — SIGNIFICANT CHANGE UP
GLUCOSE SERPL-MCNC: 113 MG/DL — HIGH (ref 70–99)
GLUCOSE UR QL: NEGATIVE — SIGNIFICANT CHANGE UP
HCT VFR BLD CALC: 37.1 % — LOW (ref 42–52)
HGB BLD-MCNC: 11.9 G/DL — LOW (ref 14–18)
IMM GRANULOCYTES NFR BLD AUTO: 0.1 % — SIGNIFICANT CHANGE UP (ref 0.1–0.3)
KETONES UR-MCNC: NEGATIVE — SIGNIFICANT CHANGE UP
LEUKOCYTE ESTERASE UR-ACNC: NEGATIVE — SIGNIFICANT CHANGE UP
LYMPHOCYTES # BLD AUTO: 2.57 K/UL — SIGNIFICANT CHANGE UP (ref 1.2–3.4)
LYMPHOCYTES # BLD AUTO: 33.5 % — SIGNIFICANT CHANGE UP (ref 20.5–51.1)
MCHC RBC-ENTMCNC: 26.1 PG — LOW (ref 27–31)
MCHC RBC-ENTMCNC: 32.1 G/DL — SIGNIFICANT CHANGE UP (ref 32–37)
MCV RBC AUTO: 81.4 FL — SIGNIFICANT CHANGE UP (ref 80–94)
MONOCYTES # BLD AUTO: 0.64 K/UL — HIGH (ref 0.1–0.6)
MONOCYTES NFR BLD AUTO: 8.3 % — SIGNIFICANT CHANGE UP (ref 1.7–9.3)
NEUTROPHILS # BLD AUTO: 4.37 K/UL — SIGNIFICANT CHANGE UP (ref 1.4–6.5)
NEUTROPHILS NFR BLD AUTO: 57 % — SIGNIFICANT CHANGE UP (ref 42.2–75.2)
NITRITE UR-MCNC: NEGATIVE — SIGNIFICANT CHANGE UP
NRBC # BLD: 0 /100 WBCS — SIGNIFICANT CHANGE UP (ref 0–0)
PH UR: 7 — SIGNIFICANT CHANGE UP (ref 5–8)
PLATELET # BLD AUTO: 278 K/UL — SIGNIFICANT CHANGE UP (ref 130–400)
PMV BLD: 10.3 FL — SIGNIFICANT CHANGE UP (ref 7.4–10.4)
POTASSIUM SERPL-MCNC: 3.9 MMOL/L — SIGNIFICANT CHANGE UP (ref 3.5–5)
POTASSIUM SERPL-SCNC: 3.9 MMOL/L — SIGNIFICANT CHANGE UP (ref 3.5–5)
PROT SERPL-MCNC: 7.8 G/DL — SIGNIFICANT CHANGE UP (ref 6–8)
PROT UR-MCNC: NEGATIVE — SIGNIFICANT CHANGE UP
RBC # BLD: 4.56 M/UL — LOW (ref 4.7–6.1)
RBC # FLD: 17.4 % — HIGH (ref 11.5–14.5)
SALICYLATES SERPL-MCNC: <0.3 MG/DL — LOW (ref 4–30)
SARS-COV-2 RNA SPEC QL NAA+PROBE: SIGNIFICANT CHANGE UP
SODIUM SERPL-SCNC: 137 MMOL/L — SIGNIFICANT CHANGE UP (ref 135–146)
SP GR SPEC: 1 — LOW (ref 1.01–1.03)
UROBILINOGEN FLD QL: SIGNIFICANT CHANGE UP
VALPROATE SERPL-MCNC: 48 UG/ML — LOW (ref 50–100)
WBC # BLD: 7.67 K/UL — SIGNIFICANT CHANGE UP (ref 4.8–10.8)
WBC # FLD AUTO: 7.67 K/UL — SIGNIFICANT CHANGE UP (ref 4.8–10.8)

## 2023-06-07 PROCEDURE — 36415 COLL VENOUS BLD VENIPUNCTURE: CPT

## 2023-06-07 PROCEDURE — 80076 HEPATIC FUNCTION PANEL: CPT

## 2023-06-07 PROCEDURE — 99285 EMERGENCY DEPT VISIT HI MDM: CPT

## 2023-06-07 PROCEDURE — 93010 ELECTROCARDIOGRAM REPORT: CPT

## 2023-06-07 PROCEDURE — 80159 DRUG ASSAY CLOZAPINE: CPT

## 2023-06-07 PROCEDURE — 80307 DRUG TEST PRSMV CHEM ANLYZR: CPT

## 2023-06-07 PROCEDURE — 90792 PSYCH DIAG EVAL W/MED SRVCS: CPT | Mod: 95

## 2023-06-07 PROCEDURE — 87635 SARS-COV-2 COVID-19 AMP PRB: CPT

## 2023-06-07 PROCEDURE — 99285 EMERGENCY DEPT VISIT HI MDM: CPT | Mod: 25

## 2023-06-07 PROCEDURE — 80164 ASSAY DIPROPYLACETIC ACD TOT: CPT

## 2023-06-07 PROCEDURE — 82962 GLUCOSE BLOOD TEST: CPT

## 2023-06-07 PROCEDURE — 85025 COMPLETE CBC W/AUTO DIFF WBC: CPT

## 2023-06-07 PROCEDURE — 81003 URINALYSIS AUTO W/O SCOPE: CPT

## 2023-06-07 PROCEDURE — 93005 ELECTROCARDIOGRAM TRACING: CPT

## 2023-06-07 PROCEDURE — 80048 BASIC METABOLIC PNL TOTAL CA: CPT

## 2023-06-07 RX ORDER — CLOZAPINE 150 MG/1
400 TABLET, ORALLY DISINTEGRATING ORAL ONCE
Refills: 0 | Status: COMPLETED | OUTPATIENT
Start: 2023-06-07 | End: 2023-06-07

## 2023-06-07 RX ORDER — VALPROIC ACID (AS SODIUM SALT) 250 MG/5ML
750 SOLUTION, ORAL ORAL ONCE
Refills: 0 | Status: COMPLETED | OUTPATIENT
Start: 2023-06-07 | End: 2023-06-07

## 2023-06-07 NOTE — ED PROVIDER NOTE - NSDCPRINTRESULTS_ED_ALL_ED
OPERATIVE REPORT  PATIENT NAME: Ekta Camacho    :  1949  MRN: [id number]  Pt Location: [facility] OR ROOM 01    SURGERY DATE: 2017    Surgeon(s) and Role:     * Erika Flores DPM - Primary     * Aster Josue - Assisting    Preop Diagnosis:  Other hammer toe(s) (acquired), right foot [M20 41]  Acquired deformity of musculoskeletal system [M95 9]    Post-Op Diagnosis Codes:     * Other hammer toe(s) (acquired), right foot [M20 41]     * Acquired deformity of musculoskeletal system [M95 9]    Procedure(s) (LRB):  TAILORS BUNIONECTOMY (Right)  FOOT 2, 3 HAMMERTOE REPAIR (Right)    Specimen(s):  * No specimens in log *    Estimated Blood Loss:   Minimal    Drains: none        Anesthesia Type:   Choice with 14 cc of 1:1 mix 1% lidocaine plain and 0 25% marcaine plain    Operative Indications: Other hammer toe(s) (acquired), right foot [M20 41]  Acquired deformity of musculoskeletal system [M95 9]    Operative Findings:  C/w diagnosis    Complications:   None    Materials:  0 062 K-wire (x2)   2-0 Vicryl, 4-0 nylon    Procedure and Technique:    Under mild sedation the patient was brought into the operating room and placed on the operating room table in the supine positition  A pneumatic ankle tourniquet was then placed around the patients  right ankle with ample webril padding  A time out was performed to confirm the correct patient, procedure and site with all parties in agreement  Following IV sedation a block was performed consisting of 14 ml of 1% Lidocaine and 0 25% marcaine  in a 1:1 mixture  The foot was then scrubbed, prepped and draped in the usual aseptic manner  An esmarch bandage was utilized to exsanguinate the patients foot and the pneumatic ankle tourniquet was then inflated  The esmarch bandage was removed and the foot was placed on the Operating room table  Attention was directed to the lateral 5th metatarsophalangeal joint   A linear incision was made approximately 4 cm in length overlying the 5th metatarsal head  Dissection was continued deep down to the subcutaneous tissues using blunt dissection until the capsule was appreciated  A 15 blade was then used to make an incision through the capsule in order to expose the 5th metatarsal head  The periosteal and capsular tissues were then reflected dorsally and plantarly thus exposing the lateral exostosis off of the 5th metatarsal head creating a painful tailor's bunion deformity  A sagittal saw was then used to remove the prominence over the lateral aspect of the 5th metatarsal head  Adequate removal was confirmed via C-arm fluoroscopy  The incision was then irrigated with normal sterile saline  The periosteal tissues were then reapproximated using 2-0 Vicryl  The skin was reapproximated using 4-0 nylon in a horizontal mattress fashion  Attention was then directed to the right 2nd toe  A hammertoe deformity was present  A  dorsal linear incision was made from the metatarsal- phalangeal joint to the proximal interphalangeal joint  The incision was then deepened via sharp dissection through the subcutaneous tissues, ligating all venous vessels as necessary  Dissection was carried to the level of the EDL tendon  The tendon was then transected at that level  The PIPJ was then exposed and the medial and lateral collateral ligaments were incised to expose the head of the proximal phalanx  By use of sagittal saw, the head of the proximal phalanx was resected  Push test showed that there was still a dorsal flexion contracture was still present at the metatarsal-phalangeal joint  A capsulotomy was then performed at the metatarsalphalangeal joint and a mcglamery elevator was used to release adhesions off the plantar aspect of the metatarsal phalangeal joint  A push test now showed a more normal rectus position of the digit  The wound was then flushed with copious amounts of normal saline solution   At this time, a 0 062 K-wire was used to maintain corrected positition of the hammertoe, inserting the the wire in the proximal , middle  And distal phalanx and through the metatarsal head  The wound was then flushed with copious amounts of normal sterile solution  Fixation and placement of K-wire was confirmed via C-arm fluoroscopy and noted to be in satisfactory position in both AP and lateral planes The extensor tendon was then reapproximated and maintained a lengthened position with 2-0 Vicryl   Subcutaneous tissue was then reapproximated with 3-0 Vicryl  Skin edges were reapproximated with 4-0 Nylon in a horizontal mattress suture technique  Attention was then directed to the right 3rd toe  A hammertoe deformity was present  A  dorsal linear incision was made from the metatarsal- phalangeal joint to the proximal interphalangeal joint  The incision was then deepened via sharp dissection through the subcutaneous tissues, ligating all venous vessels as necessary  Dissection was carried to the level of the EDL tendon  The tendon was then transected at that level  The PIPJ was then exposed and the medial and lateral collateral ligaments were incised to expose the head of the proximal phalanx  By use of sagittal saw, the head of the proximal phalanx was resected  Push test showed that there was still a dorsal flexion contracture was still present at the metatarsal-phalangeal joint  A capsulotomy was then performed at the metatarsalphalangeal joint and a mcglamery elevator was used to release adhesions off the plantar aspect of the metatarsal phalangeal joint  A push test now showed a more normal rectus position of the digit  The wound was then flushed with copious amounts of normal saline solution  At this time, a 0 062 K-wire was used to maintain corrected positition of the hammertoe, inserting the the wire in the proximal, middle and distal phalanx and through the metatarsal head maintaining correct alignment   The wound was then flushed with copious amounts of normal sterile solution  Fixation and placement of K-wire was confirmed via C-arm fluoroscopy and noted to be in satisfactory position in both AP and lateral planes The extensor tendon was then reapproximated and maintained a lengthened position with 2-0 Vicryl  Subcutaneous tissue was then reapproximated with 3-0 Vicryl  Skin edges were reapproximated with 4-0 Nylon in a horizontal mattress suture technique  All incisions were then dressed with Adaptic dressing dressing, Kerlix and Ace  The tourniquet was deflated at 70 minutes and a prompt hyperemic response was noted to all digits of the right foot   The patient tolerated the procedure and anesthesia well was then transferred to PACU with vital signs stable    Patient Disposition:  PACU     SIGNATURE: Mac Sánchez  DATE: November 13, 2017  TIME: 2:39 PM Patient requests all Lab, Cardiology, and Radiology Results on their Discharge Instructions

## 2023-06-07 NOTE — ED BEHAVIORAL HEALTH ASSESSMENT NOTE - SUMMARY
Pt is a 53 yo M, single, non-caregiver, disabled, domiciled at [facility]([phone]), PMH significant for DM, HLD, HTN, hypothyroidism, per psyckes carries various mood and psychotic disorders(, schizoaffective disorder, MDD, Schizophrenia, Bipolar disorder), multiple substance related disorders(cannabis, other psychoactive substances) and antisocial personality disorder, per dave is a high utilizer of ED/inpatient services, has well documented h/o presenting to the ED endorsing SI for secondary gain, multiple prior psych admissions (most recently at [facility] from 4/25-5/923), per chart has multiple suicide attempts, in outpt treatment at [facility], on clozaril 100 mg/400 mg AM/PM, Depakote 500 mg/750 mg AM/PM, Ativan 2 mg qHS, and Invega [id number] mg q4WKS (last dose receive 6/4), who self presents to the ED expressing SI/HI/AH/VH/PI in the setting of feeling frustrated with his residence.     Based on the history obtained from the pt and collateral and on pt's MSE, his current presentation is consistent with previous ones in which there's been a strong suspicion for secondary gain(likely to avoid returning to his residence). This is evidenced by his expressed dissatisfaction with his residence, the fact his reported symptoms are ill-defined and inconsistent with his MSE and his behaviors, and that there are numerous inconsistencies among the histories obtained by the pt vs his . On exam, the pt presents as future-oriented and help-seeking and exhibits robust energy and motivation toward having his needs known and met, which is inconsistent with an individual with true suicidal intent. In addition, despite endorsing AH/VH/PI, he evidences no signs or symptoms of internal preoccupation, thought-blocking, suspiciousness, or guardedness on exam. The pt also endorsed decompensating in the setting of med non-adherence, but collateral from his residence indicates that he has been functioning at baseline with no acute safety concerns and that he has been adherent with his medications with the exception of tonight. Given that the pt evidences no signs or symptoms of an acute psychiatric condition that would potentially be mitigated by psychiatric hospitalization, there is currently no modifiable indication for psychiatric admission. As such, there are no acute contraindications to discharge from a pyschiatric stnadpoint. Pt is a 53 yo M, single, non-caregiver, disabled, domiciled at [facility]([phone]), PMH significant for DM, HLD, HTN, hypothyroidism, per psyckes carries various mood and psychotic disorders(, schizoaffective disorder, MDD, Schizophrenia, Bipolar disorder), multiple substance related disorders(cannabis, other psychoactive substances) and antisocial personality disorder, per dave is a high utilizer of ED/inpatient services, has well documented h/o presenting to the ED endorsing SI for secondary gain, multiple prior psych admissions (most recently at [facility] from 4/25-5/923), per chart has multiple suicide attempts, in outpt treatment at [facility], on clozaril 100 mg/400 mg AM/PM, Depakote 500 mg/750 mg AM/PM, Ativan 2 mg qHS, and Invega [id number] mg q4WKS (last dose receive 6/4), who self presents to the ED expressing SI/HI/AH/VH/PI in the setting of feeling frustrated with his residence.     Based on the history obtained from the pt and collateral and on pt's MSE, his current presentation is consistent with previous ones in which there's been a strong suspicion for secondary gain(likely to avoid returning to his residence). This is evidenced by his expressed dissatisfaction with his residence, the fact his reported symptoms are ill-defined and inconsistent with his MSE and his behaviors, and that there are numerous inconsistencies among the histories obtained by the pt vs his . On exam, the pt presents as future-oriented and help-seeking and exhibits robust energy and motivation toward having his needs known and met, which is inconsistent with an individual with true suicidal intent. In addition, despite endorsing AH/VH/PI, he evidences no signs or symptoms of internal preoccupation, thought-blocking, suspiciousness, or guardedness on exam. The pt also endorsed decompensating in the setting of med non-adherence, but collateral from his residence indicates that he has been functioning at baseline with no acute safety concerns and that he has been adherent with his medications with the exception of tonight. Given the above, coupled with the fact that the pt has remained in good behavioral control since arriving to the ED and he evidences no signs or symptoms of an acute psychiatric condition that would potentially be mitigated by psychiatric hospitalization, there is currently no modifiable indication for psychiatric admission. To mitigate the pt's risk of harm while in the ED, he engaged in safety planning, was given his nighttime meds, was instructed to follow up with his outpt team and to continue taking his outpt meds, and was instructed to reach out to staff for acute safety concerns in the future. There are no acute contraindications to discharge from a psychiatric standpoint Pt is a 51 yo M, single, non-caregiver, disabled, domiciled at [facility]([phone]), PMH significant for DM, HLD, HTN, hypothyroidism, per psyckes carries various mood and psychotic disorders(, schizoaffective disorder, MDD, Schizophrenia, Bipolar disorder), multiple substance related disorders(cannabis, other psychoactive substances) and antisocial personality disorder, per dave is a high utilizer of ED/inpatient services, has well documented h/o presenting to the ED endorsing SI for secondary gain, multiple prior psych admissions (most recently at [facility] from 4/25-5/923), per chart has multiple suicide attempts, in outpt treatment at [facility], on clozaril 100 mg/400 mg AM/PM, Depakote 500 mg/750 mg AM/PM, Ativan 2 mg qHS, and Invega [id number] mg q4WKS (last dose receive 6/4), who self presents to the ED expressing SI/HI/AH/VH/PI in the setting of feeling frustrated with his residence.     Based on the history obtained from the pt and collateral and on pt's MSE, his current presentation is consistent with previous ED visits, in which there's been a strong index of suspicion for secondary gain(likely to avoid returning to his residence). This is evidenced by his expressed dissatisfaction with his residence, the fact his reported symptoms are ill-defined and inconsistent with his MSE and his behaviors, and that there are numerous inconsistencies among the histories obtained by the pt vs his . On exam, the pt presents as future-oriented and help-seeking and exhibits robust energy and motivation toward having his needs known and met, which is inconsistent with an individual with true suicidal intent. In addition, despite endorsing AH/VH/PI, he evidences no signs or symptoms of internal preoccupation, thought-blocking, suspiciousness, or guardedness on exam. The pt also endorsed recently decompensating in the setting of med non-adherence, but collateral from his residence indicates that he has been functioning at baseline with no acute safety concerns and that he has been adherent with his medications with the exception of tonight. Given the above, coupled with the fact there seems to be significant behavioral component to his presentation and he evidences no signs or symptoms of an acute psychiatric condition that would potentially be mitigated by psychiatric hospitalization, there is currently no modifiable indication for psychiatric admission. To mitigate the pt's risk of harm while in the ED, he engaged in safety planning, was given his nighttime meds, was instructed to follow up with his outpt team and to continue taking his outpt meds, and was instructed to reach out to staff for acute safety concerns in the future. There are no acute contraindications to discharge from a psychiatric standpoint

## 2023-06-07 NOTE — ED BEHAVIORAL HEALTH ASSESSMENT NOTE - OTHER
manager at [facility]idence Endorses A/VH, but exhibits no e/o responding to internal stimuli on exam [place] Chronically limited

## 2023-06-07 NOTE — ED PROVIDER NOTE - ATTENDING APP SHARED VISIT CONTRIBUTION OF CARE
52-year-old male with past medical history of schizoaffective disorder bipolar type, HTN, HLD, DM and hypothyroidism Who presents for suicidal and homicidal ideation claiming that he wants to throw hot boiling water on people and himself.  States that the voices are telling him to do this.  States that he was frustrated as facility which prompted him to feel this way.  On exam here slight tangential thought process.  There is auditory hallucinations.  He does have passive homicidal and suicidal ideations at this time.  Plan is to obtain labs and consult psychiatry

## 2023-06-07 NOTE — ED BEHAVIORAL HEALTH NOTE - BEHAVIORAL HEALTH NOTE
===================     PRE-HOSPITAL COURSE     ===================     SOURCE: RN and secondhand ED documentation      DETAILS: Pt self- presented      ============     ED COURSE     ============     SOURCE: RN and secondhand ED documentation      ARRIVAL: Pt self-presented      BELONGINGS: No belongings of note. All belongings were provided to hospital security, and patient currently in a gown with a 1:1 staff member.     BEHAVIOR: Per RN, pt has been calm, cooperative and in behavioral control. RN reports pt is presenting due to AH, possibly command in nature. RN reports pt is talking about his anger management classes and everything he has learned.  RN reports pt is AOx4, good eye contact and good grooming/hygiene. RN denies pt endorsing SI.      TREATMENT: No medications given or required      VISITORS: No on at bedside

## 2023-06-07 NOTE — ED ADULT NURSE NOTE - HPI (INCLUDE ILLNESS QUALITY, SEVERITY, DURATION, TIMING, CONTEXT, MODIFYING FACTORS, ASSOCIATED SIGNS AND SYMPTOMS)
Pt from [facility] c/o suicidal and homicidal ideations that started today. Pt states he has a plan and he wants to hurt the people he lives with. hx of bipolar disorder and schizophrenia. Pt claims he is  hearing voices. denies drug/alcohol use- 1:1 initiated

## 2023-06-07 NOTE — ED ADULT NURSE NOTE - NSHOSCREENINGQ3_ED_ALL_ED
From: Mirlande Galo  To: Lindsey Garibay  Sent: 12/4/2020 12:08 PM CST  Subject: Lab Test or Test Related Question    I have a question about COMPREHENSIVE METABOLIC PANEL resulted on 12/3/20, 5:06 PM    My father is a type 1 diabetic. Should I be concerned that my results often show high glucose and protein? He seems to think I often match the signs of a diabetic because I become fatigued and feel 'out of it'/sick when I haven't eaten for a few hours or I eat carbs.     Thank you! Mirlande   No

## 2023-06-07 NOTE — ED BEHAVIORAL HEALTH ASSESSMENT NOTE - HPI (INCLUDE ILLNESS QUALITY, SEVERITY, DURATION, TIMING, CONTEXT, MODIFYING FACTORS, ASSOCIATED SIGNS AND SYMPTOMS)
Pt is a 53 yo M, single, non-caregiver, disabled, domiciled at [facility]([phone]), PMH significant for DM, HLD, HTN, hypothyroidism, per psyckes carries various mood and psychotic disorders(, schizoaffective disorder, MDD, Schizophrenia, Bipolar disorder), multiple substance related disorders(cannabis, other psychoactive substances) and antisocial personality disorder, per dave is a high utilizer of ED/inpatient services, has well documented h/o presenting to the ED endorsing SI for secondary gain, multiple prior psych admissions (most recently at [facility] from 4/25-5/923), per chart has multiple suicide attempts, in outpt treatment at [facility], on clozaril 100 mg/400 mg AM/PM, Depakote 500 mg/750 mg AM/PM, Ativan 2 mg qHS, and Invega [id number] mg q4WKS (last dose receive 6/4), who self presents to the ED expressing SI/HI/AH/VH/PI in the setting of feeling frustrated with his residence.     On assessment, pt presents as concrete and irritable, but is otherwise future-oriented, with no e/o internal preoccupation or thought-blocking, and states he wants to be admitted to the hospital because he is dissatisfied with his residence and does not think his medications have been effective. Pt states another resident upset him a few days ago and tonight he became upset with staff because "they kept giving [him] orders", after which he threatened to kill himself and to burn down the residence, which he states he didn't actually mean. When asked about recent psychiatric symptoms, pt endorses recent SI/HI/AH/VH/PI, but is vague in his description of these symptoms and is unable to provide details about the duration, frequency, or intensity of these symptoms. In addition, he exhibits no e/o responding to internal stimuli or being distracted during the interview, despite stating he was experiencing A/VH while talking with this writer. The pt also currently denies SI/HI or urges to harm himself or others and cites his Yazidi beliefs and fear of death as protective factors. Pt attributes his recent decompensation to not being adherent with any of his medications for the last week and perseverates on wanting to be admitted to the hospital to restart meds and to get away from his residence. Pt gave telepsych team consent to contact his residence for collateral.    BTCM spoke with the Manager at [facility], Nini, who stated that the pt has been functioning at baseline, has not exhibited an acute decompensation, has not presented as an imminent threat of harm, and has been adherent with all of his medications with the exception of tonight because he abruptly left the residence in the setting of feeling frustrated before taking his meds. Tia states that the pt tends to overutilize ED/inpatient services as a way of coping with feeling distressed with his residence and states that the staff feels safe with him returning tonight if psych cleared. (See  note for details)

## 2023-06-07 NOTE — ED PROVIDER NOTE - OBJECTIVE STATEMENT
52-year-old male with past medical history of schizoaffective disorder bipolar type, HTN, HLD, DM and hypothyroidism presents to the ED sent in from [facility] c/o SI/HI and auditory hallucinations that started today. Pt states he is frustrated with the people he lives with and wants to hurt them or himself. He does not give a clear plan. He states he refused all his medications today as well. He denies alcohol/illicit drug use. He denies other complaints. Pt denies fever, chills, nausea, vomiting, abdominal pain, diarrhea, headache, dizziness, weakness, chest pain, SOB, back pain, LOC, trauma, urinary symptoms, cough.

## 2023-06-07 NOTE — ED BEHAVIORAL HEALTH ASSESSMENT NOTE - NSBHMSEINTELL_PSY_A_CORE
Robotic sigmoidectomy for sigmoid colon cancer. Primary anastomosis performed with 28 EEA stapler. Two intact donuts. Anastomosis in tact confirmed via colonoscopy, negative leak test. Prior loop colostomy reversed with side to side anastomosis. Hemostasis achieved. Below Average

## 2023-06-07 NOTE — ED ADULT NURSE NOTE - NS_BH TRG QUESTION8_ED_ALL_ED
Cardiology Progress Note    Admit Date: 2/23/2022  Attending Cardiologist: Dr. Andres Dowell:     -NSTEMI, pt presented with c/o chest pain and diaphoresis, troponin up to 5618 on 2/24 AM.  -Nonischemic cardiomyopathy, on Coreg, Losartan, Aldactone and Torsemide as outpatient.  -Cardiac catheterization 10/2020 DR YUNG [facility]) with findings as follows:  · LM: 40-50% distal LM  · LAD: Large caliber with diagonal branches. 50-60% mid LAD following large septal, 40% distal, long bridging segment as well in distal LAD  · LCx: Non-dominant, large OM branches, mild-moderate diffuse disease  · RCA: Dominant, feeds a posterior descending artery and smaller posterolateral branch, 20-30% mid vessel  -S/p implantation of dual-chamber [employer]onic AICD for primary prevention by Dr. Washington Quiet 11/22/2021.  -CKD. -Hx HTN, hypotensive prior to presentation s/p SL NTG x 2. On Coreg, Losartan as outpatient. -HLD  -Hx traumatic subarachnoid hemorrhage in 2016.     Primary cardiologist is Dr. Tammi Lobo:     Addendum: Independently seen and evaluated. Agree with below. With his known history of CAD, agree with proceeding on with coronary angiography. He also has had few episodes of nonsustained VT in addition to his abnormal electrocardiogram, and troponin. We will proceed with coronary angiography. All questions answered. He agrees with the plan. -Pt in agreement with proceeding with cardiac catheterization today as planned, pt NPO for procedure.  -Continue ASA, Crestor, Aldactone.  -Will hold BB this AM, plan to resume post-procedure. Subjective:     No new complaints. Denies recurrent chest pain overnight.     Objective:      Patient Vitals for the past 8 hrs:   Temp Pulse Resp BP SpO2   02/24/22 0801 98 °F (36.7 °C) 92 18 (!) 119/90 96 %   02/24/22 0503 97.2 °F (36.2 °C) 87 18 (!) 128/91 98 %         Patient Vitals for the past 96 hrs:   Weight   02/23/22 1413 83.9 kg (185 lb)   02/23/22 0756 83.9 kg (185 lb) Current Facility-Administered Medications   Medication Dose Route Frequency Last Admin    lactated Ringers infusion  100 mL/hr IntraVENous CONTINUOUS      [Held by provider] carvediloL (COREG) tablet 12.5 mg  12.5 mg Oral BID WITH MEALS      acetaminophen (TYLENOL) tablet 650 mg  650 mg Oral Q4H PRN      heparin 25,000 units in  ml infusion  11-25 Units/kg/hr IntraVENous TITRATE 15 Units/kg/hr at 02/24/22 0619    rosuvastatin (CRESTOR) tablet 20 mg  20 mg Oral QHS 20 mg at 02/23/22 2343    [Held by provider] torsemide (DEMADEX) tablet 20 mg  20 mg Oral BID      erythromycin (ILOTYCIN) 5 mg/gram (0.5 %) ophthalmic ointment   Both Eyes Q6H Given at 02/23/22 2343    spironolactone (ALDACTONE) tablet 12.5 mg  12.5 mg Oral DAILY      aspirin chewable tablet 81 mg  81 mg Oral DAILY      nitroglycerin (NITROSTAT) tablet 0.4 mg  0.4 mg SubLINGual PRN           Intake/Output Summary (Last 24 hours) at 2/24/2022 3402  Last data filed at 2/23/2022 0930  Gross per 24 hour   Intake 50 ml   Output --   Net 50 ml       Physical Exam:  General:  alert, cooperative, no distress, appears stated age  Neck:  supple  Lungs:  clear to auscultation bilaterally  Heart:  regular rate and rhythm  Abdomen:  abdomen is soft without significant tenderness, masses, organomegaly or guarding  Extremities:  atraumatic, no significant pitting edema    Visit Vitals  BP (!) 119/90 (BP 1 Location: Right upper arm, BP Patient Position: At rest;Lying left side)   Pulse 92   Temp 98 °F (36.7 °C)   Resp 18   Ht 5' 9\" (1.753 m)   Wt 83.9 kg (185 lb)   SpO2 96%   BMI 27.32 kg/m²       Data Review:     Labs: Results:       Chemistry Recent Labs     02/24/22  0458 02/23/22  2130 02/23/22  0815   GLU 92  --  155*     --  137   K 4.1  --  4.2     --  105   CO2 24  --  26   BUN 27*  --  28*   CREA 1.53*  --  1.84*   CA 7.4*  --  7.6*   MG 2.4 1.8 1.7   AGAP 8  --  6   BUCR 18  --  15   ALB 2.5*  --   --       CBC w/Diff Recent Labs     02/24/22  0458 02/23/22  0815   WBC 10.5 9.5   RBC 4.57 4.44   HGB 13.3 13.0   HCT 42.0 40.8    150   GRANS 70 76*   LYMPH 22 16*   EOS 1 2      Coagulation Recent Labs     02/24/22  0458 02/24/22  0142   APTT 41.6* 42.1*       Lipid Panel Lab Results   Component Value Date/Time    Cholesterol, total 143 02/24/2022 04:58 AM    HDL Cholesterol 37 (L) 02/24/2022 04:58 AM    LDL, calculated 91 02/24/2022 04:58 AM    VLDL, calculated 15 02/24/2022 04:58 AM    Triglyceride 75 02/24/2022 04:58 AM    CHOL/HDL Ratio 3.9 02/24/2022 04:58 AM      Liver Enzymes Recent Labs     02/24/22  0458   ALB 2.5*      Thyroid Studies Lab Results   Component Value Date/Time    TSH 0.72 02/28/2016 07:15 PM          Signed By: Praveen Stack PA-C     February 24, 2022 Charley (includes Bipolar Disorder)/Anxiety (includes Panic, OCD)

## 2023-06-07 NOTE — ED ADULT TRIAGE NOTE - CHIEF COMPLAINT QUOTE
Pt from [facility] c/o suicidal and homicidal ideations that started today. Pt states he has a plan and he wants to hurt the people he lives with. hx of bipolar disorder and schizophrenia. Pt claims hes hearing voices. denies drug/alcohol use- 1:1 initiated

## 2023-06-07 NOTE — ED ADULT NURSE NOTE - NSFALLUNIVINTERV_ED_ALL_ED
Bed/Stretcher in lowest position, wheels locked, appropriate side rails in place/Call bell, personal items and telephone in reach/Instruct patient to call for assistance before getting out of bed/chair/stretcher/Non-slip footwear applied when patient is off stretcher/Rock to call system/Physically safe environment - no spills, clutter or unnecessary equipment/Purposeful proactive rounding/Room/bathroom lighting operational, light cord in reach

## 2023-06-07 NOTE — ED BEHAVIORAL HEALTH ASSESSMENT NOTE - DETAILS
After hours See hpi Haldol and Thorazine and Navane causes anaphylaxis Admitted to [facility] 4/25 - 5/9/23 Pt self presented See separate doc

## 2023-06-07 NOTE — ED PROVIDER NOTE - PATIENT PORTAL LINK FT
You can access the FollowMyHealth Patient Portal offered by [facility] by registering at the following website: [email or web address]. By joining [employer]’s FollowMyHealth portal, you will also be able to view your health information using other applications (apps) compatible with our system.

## 2023-06-07 NOTE — ED BEHAVIORAL HEALTH ASSESSMENT NOTE - SAFETY PLAN ADDT'L DETAILS
Safety plan discussed with.../Education provided regarding environmental safety / lethal means restriction/Provision of National Suicide Prevention Lifeline 7-758-696-KDBQ (4424)

## 2023-06-07 NOTE — ED BEHAVIORAL HEALTH NOTE - BEHAVIORAL HEALTH NOTE
[place] [employer] [place]: [phone] “Collateral (Name, relationship to patient) has requested that the information provided remain confidential: Yes [  ] No [  ]    Collateral (Name, relationship to patient) has provided information that patient is/may be unaware of: Yes [  ] No [  ]”          “Patient gives permission to obtain collateral from _____:    (  ) Yes    (  )  No    Rationale for overriding objection              (  ) Lack of capacity. Details: ________              (  ) Assessing risk of danger to self/others. Details: ________          Rationale for selecting specific collateral source              (  ) Potential to impact risk of danger to self/others and no alternative equivalent. Details: _____”               ========================    FOR EACH COLLATERAL    ========================    NAME:  Nini, manager of [employer]    NUMBER: [phone]    RELATIONSHIP:     RELIABILITY:     COMMENTS:     ========================    PATIENT DEMOGRAPHICS:    ========================    HPI    BASELINE FUNCTIONING:    DATE HPI STARTED:    DECOMPENSATION:    SUICIDALITY    VIOLENCE:    SUBSTANCE:    ========================    PAST PSYCHIATRIC HISTORY    ========================    DATE PAST PSYCHIATRIC HISTORY STARTED:     MAIN PSYCHIATRIC DIAGNOSIS:    PSYCHIATRIC HOSPITALIZATIONS:    PRIOR ILLNESS:    SUICIDALITY:    VIOLENCE:    SUBSTANCE USE:    ==============    OTHER HISTORY    ==============    CURRENT MEDICATION:    MEDICAL HISTORY:    ALLERGIES    LEGAL ISSUES:    FIREARM ACCESS:    SOCIAL HISTORY:    FAMILY HISTORY:    DEVELOPMENTAL HISTORY:      ------------------------------------------------    COVID Exposure Screen- collateral (i.e. third-party, chart review, belongings, etc; include EMS and ED staff)     ---------------------------------------------------    1.    Has the patient had a COVID-19 test in the last 90 days? Unknown.     2. Has the patient tested positive for COVID-19 antibodies? Unknown.     3.Has the patient received 2 doses of the COVID-19 vaccine?  Unknown.     4. In the past 10 days, has the patient been around anyone with a positive COVID-19 test?* Unknown.     5.Has the patient been out of [place] within the past 10 days? Unknown. Collateral (Nini, manager, [employer]) has requested that the information provided remain confidential: Yes [  ] No [ x ]    Collateral l (manager Nini, [employer] [facility]) has provided information that patient is/may be unaware of: Yes [  ] No [ x ]        Patient gives permission to obtain collateral from (manager Nini, [employer]):    (  ) Yes    ( x )  No    Rationale for overriding objection              (  ) Lack of capacity. Details: ________              ( x ) Assessing risk of danger to self/others. Details: ________          Rationale for selecting specific collateral source              ( x ) Potential to impact risk of danger to self/others and no alternative equivalent. Details: _____               ========================    FOR EACH COLLATERAL    ========================    NAME:  manager Jony Terrazas [facility]    NUMBER: [phone]    RELATIONSHIP: manager - [facility]    RELIABILITY: reliable historian     COMMENTS: BTCM spoke to collateral on first attempt.     ========================    PATIENT DEMOGRAPHICS:    ========================    HPI    BASELINE FUNCTIONING:    DATE HPI STARTED:    DECOMPENSATION:    SUICIDALITY    VIOLENCE:    SUBSTANCE:    ========================    PAST PSYCHIATRIC HISTORY    ========================    DATE PAST PSYCHIATRIC HISTORY STARTED:     MAIN PSYCHIATRIC DIAGNOSIS:    PSYCHIATRIC HOSPITALIZATIONS:    PRIOR ILLNESS:    SUICIDALITY:    VIOLENCE:    SUBSTANCE USE:    ==============    OTHER HISTORY    ==============    CURRENT MEDICATION:    MEDICAL HISTORY:    ALLERGIES    LEGAL ISSUES:    FIREARM ACCESS:    SOCIAL HISTORY:    FAMILY HISTORY:    DEVELOPMENTAL HISTORY:      ------------------------------------------------    COVID Exposure Screen- collateral (i.e. third-party, chart review, belongings, etc; include EMS and ED staff)     ---------------------------------------------------    1.    Has the patient had a COVID-19 test in the last 90 days? Unknown.     2. Has the patient tested positive for COVID-19 antibodies? Unknown.     3.Has the patient received 2 doses of the COVID-19 vaccine?  Unknown.     4. In the past 10 days, has the patient been around anyone with a positive COVID-19 test?* Unknown.     5.Has the patient been out of [place] within the past 10 days? Unknown. Collateral (Nini, , [facility]) has requested that the information provided remain confidential: Yes [  ] No [ x ]    Collateral (manager Nini, [facility]) has provided information that patient is/may be unaware of: Yes [  ] No [ x ]        Patient gives permission to obtain collateral from (Nini, , [facility]):    (  ) Yes    ( x )  No    Rationale for overriding objection              (  ) Lack of capacity. Details: ________              ( x ) Assessing risk of danger to self/others. Details: ________          Rationale for selecting specific collateral source              ( x ) Potential to impact risk of danger to self/others and no alternative equivalent. Details: _____               ========================    FOR EACH COLLATERAL    ========================    NAME:  manager Nini - [facility]    NUMBER: [phone]    RELATIONSHIP: manager - [facility]    RELIABILITY: reliable historian     COMMENTS: BTCM spoke to collateral on first attempt.     ========================    PATIENT DEMOGRAPHICS    ========================    HPI    BASELINE FUNCTIONINyo male domiciled at [facility] residence, hx schizophrenia,     DATE HPI STARTED:    DECOMPENSATION:    SUICIDALITY    VIOLENCE:    SUBSTANCE:    ========================    PAST PSYCHIATRIC HISTORY    ========================    DATE PAST PSYCHIATRIC HISTORY STARTED:     MAIN PSYCHIATRIC DIAGNOSIS:    PSYCHIATRIC HOSPITALIZATIONS:    PRIOR ILLNESS:    SUICIDALITY:    VIOLENCE:    SUBSTANCE USE:    ==============    OTHER HISTORY    ==============    CURRENT MEDICATION:    MEDICAL HISTORY:    ALLERGIES    LEGAL ISSUES:    FIREARM ACCESS:    SOCIAL HISTORY:    FAMILY HISTORY:    DEVELOPMENTAL HISTORY:      ------------------------------------------------    COVID Exposure Screen- collateral (i.e. third-party, chart review, belongings, etc; include EMS and ED staff)     ---------------------------------------------------    1.    Has the patient had a COVID-19 test in the last 90 days? Unknown.     2. Has the patient tested positive for COVID-19 antibodies? Unknown.     3.Has the patient received 2 doses of the COVID-19 vaccine?  Unknown.     4. In the past 10 days, has the patient been around anyone with a positive COVID-19 test?* Unknown.     5.Has the patient been out of [place] within the past 10 days? Unknown. Collateral (Nini, manager, [facility]) has requested that the information provided remain confidential: Yes [  ] No [ x ]    Collateral (Nini, manager, [facility]) has provided information that patient is/may be unaware of: Yes [  ] No [ x ]        Patient gives permission to obtain collateral from (Nini, manager, [facility]):    (  ) Yes    ( x )  No    Rationale for overriding objection              (  ) Lack of capacity. Details: ________              ( x ) Assessing risk of danger to self/others. Details: ________          Rationale for selecting specific collateral source              ( x ) Potential to impact risk of danger to self/others and no alternative equivalent. Details: _____               ========================    FOR EACH COLLATERAL    ========================    NAME:  manager Nini - [facility]    NUMBER: [phone]    RELATIONSHIP: manager - [facility]    RELIABILITY: reliable historian     COMMENTS: BTCM spoke to collateral on first attempt. Collateral verbalized no acute safety concerns and advocated for pt to return to his residence in order to enhance coping strategies.     ========================    PATIENT DEMOGRAPHICS    ========================    HPI    BASELINE FUNCTIONINyo male domiciled at [facility] residence; single, no children; pphx of schizophrenia; pmh of HTN, HLD, DM; hx multiple prior IPP admissions, multiple previous SA, no previous self harm, no legal/violence hx, no substance use hx.      DATE HPI STARTED: today.     DECOMPENSATION: collateral states that pt was reporting complaints of "not feeling well." Collateral denies reports of SI/HI/AVH today. Per collateral, pt expressed frustration with peers at the residence today. Collateral states that pt refused his daily medications today and visited the ED.     SUICIDALITY: collateral denies.     VIOLENCE: collateral denies.      SUBSTANCE:  collateral denies.       ========================    PAST PSYCHIATRIC HISTORY    ========================    DATE PAST PSYCHIATRIC HISTORY STARTED: pt has multiple prior hospitalizations, multiple prior suicide attempts.     MAIN PSYCHIATRIC DIAGNOSIS: schizophrenia     PSYCHIATRIC HOSPITALIZATIONS:  last discharge from [facility] April 3, 2023 from [facility].     PRIOR ILLNESS: collateral denies.     SUICIDALITY: collateral denies.     VIOLENCE: collateral denies.      SUBSTANCE:  collateral denies.     ==============    OTHER HISTORY    ==============    CURRENT MEDICATION: Clozapine 100mg 4 tabs PM, Divalproex sodium 250mg 2 tabs PM, Lorazepam 2mg PM    MEDICAL HISTORY: HTN, DM     ALLERGIES: Haldol, Navane, penicillin, Thorazine, fish, pork     LEGAL ISSUES: collateral denies.     FIREARM ACCESS: collateral denies.     SOCIAL HISTORY: collateral denies.     FAMILY HISTORY: collateral denies.     DEVELOPMENTAL HISTORY: collateral denies.       ------------------------------------------------    COVID Exposure Screen- collateral (i.e. third-party, chart review, belongings, etc; include EMS and ED staff)     ---------------------------------------------------    1.    Has the patient had a COVID-19 test in the last 90 days? Unknown.     2. Has the patient tested positive for COVID-19 antibodies? Unknown.     3.Has the patient received 2 doses of the COVID-19 vaccine?  Unknown.     4. In the past 10 days, has the patient been around anyone with a positive COVID-19 test?* Unknown.     5.Has the patient been out of [place] within the past 10 days? Unknown.

## 2023-06-07 NOTE — ED PROVIDER NOTE - CLINICAL SUMMARY MEDICAL DECISION MAKING FREE TEXT BOX
52-year-old male with past medical history of schizoaffective disorder bipolar type, HTN, HLD, DM and hypothyroidism Who presents for suicidal and homicidal ideation claiming that he wants to throw hot boiling water on people and himself.  States that the voices are telling him to do this.  States that he was frustrated as facility which prompted him to feel this way.  On exam here slight tangential thought process.  There is auditory hallucinations.  He does have passive homicidal and suicidal ideations at this time.  Plan is to obtain labs and consult psychiatry    cleared by psych for dc.

## 2023-06-07 NOTE — ED PROVIDER NOTE - PHYSICAL EXAMINATION
VITAL SIGNS: I have reviewed nursing notes and confirm.  CONSTITUTIONAL: 53 yo M sitting on chair; in no acute distress.  SKIN: Skin exam is warm and dry, no acute rash.  HEAD: Normocephalic; atraumatic.  EYES: PERRL, EOM intact; conjunctiva and sclera clear.  ENT: No nasal discharge; airway clear.   CARD: S1, S2 normal; no murmurs, gallops, or rubs. Regular rate and rhythm.  RESP: No wheezes, rales or rhonchi. Speaking in full sentences.   EXT: Normal ROM.   NEURO: Alert, oriented. Grossly unremarkable. No focal deficits.   PSYCH: Cooperative. (+) SI/HI/auditory hallucinations.

## 2023-06-07 NOTE — ED BEHAVIORAL HEALTH ASSESSMENT NOTE - NSSUICPROTFACT_PSY_ALL_CORE
Identifies reasons for living/Supportive social network of family or friends/Fear of death or the actual act of killing self/Positive therapeutic relationships/Religion beliefs

## 2023-06-07 NOTE — ED PROVIDER NOTE - NSFOLLOWUPCLINICS_GEN_ALL_ED_FT
[facility]  OP [facility]  [address] [place] [id number]  Phone: [phone]  Fax:   Follow Up Time: 1-3 Days

## 2023-06-08 VITALS
HEART RATE: 92 BPM | DIASTOLIC BLOOD PRESSURE: 70 MMHG | OXYGEN SATURATION: 100 % | RESPIRATION RATE: 18 BRPM | SYSTOLIC BLOOD PRESSURE: 140 MMHG | TEMPERATURE: 98 F

## 2023-06-08 DIAGNOSIS — F43.20 ADJUSTMENT DISORDER, UNSPECIFIED: ICD-10-CM

## 2023-06-08 LAB — CLOZAPINE SERPL-MCNC: 539 NG/ML — SIGNIFICANT CHANGE UP (ref 350–600)

## 2023-06-08 RX ADMIN — CLOZAPINE 400 MILLIGRAM(S): 150 TABLET, ORALLY DISINTEGRATING ORAL at 00:39

## 2023-06-08 RX ADMIN — Medication 750 MILLIGRAM(S): at 00:39

## 2023-06-08 RX ADMIN — Medication 2 MILLIGRAM(S): at 00:46

## 2023-06-08 NOTE — ED ADULT NURSE REASSESSMENT NOTE - NS ED NURSE REASSESS COMMENT FT1
transported to [facility] via ambulance , pt calm , no agitation noted ,denies thought of harming self and others this time , report given to EMS

## 2023-06-15 ENCOUNTER — EMERGENCY (EMERGENCY)
Facility: HOSPITAL | Age: 53
LOS: 0 days | Discharge: HOME | End: 2023-06-15
Attending: EMERGENCY MEDICINE
Payer: MEDICAID

## 2023-06-15 VITALS
TEMPERATURE: 97 F | HEIGHT: 66 IN | HEART RATE: 113 BPM | DIASTOLIC BLOOD PRESSURE: 72 MMHG | WEIGHT: 179.9 LBS | RESPIRATION RATE: 15 BRPM | SYSTOLIC BLOOD PRESSURE: 137 MMHG | OXYGEN SATURATION: 99 %

## 2023-06-15 DIAGNOSIS — N39.0 URINARY TRACT INFECTION, SITE NOT SPECIFIED: ICD-10-CM

## 2023-06-15 DIAGNOSIS — Z88.0 ALLERGY STATUS TO PENICILLIN: ICD-10-CM

## 2023-06-15 DIAGNOSIS — Z88.8 ALLERGY STATUS TO OTHER DRUGS, MEDICAMENTS AND BIOLOGICAL SUBSTANCES: ICD-10-CM

## 2023-06-15 DIAGNOSIS — E03.9 HYPOTHYROIDISM, UNSPECIFIED: ICD-10-CM

## 2023-06-15 DIAGNOSIS — E11.9 TYPE 2 DIABETES MELLITUS WITHOUT COMPLICATIONS: ICD-10-CM

## 2023-06-15 DIAGNOSIS — F31.9 BIPOLAR DISORDER, UNSPECIFIED: ICD-10-CM

## 2023-06-15 DIAGNOSIS — R30.0 DYSURIA: ICD-10-CM

## 2023-06-15 DIAGNOSIS — I10 ESSENTIAL (PRIMARY) HYPERTENSION: ICD-10-CM

## 2023-06-15 DIAGNOSIS — Z91.018 ALLERGY TO OTHER FOODS: ICD-10-CM

## 2023-06-15 DIAGNOSIS — F25.9 SCHIZOAFFECTIVE DISORDER, UNSPECIFIED: ICD-10-CM

## 2023-06-15 DIAGNOSIS — E78.5 HYPERLIPIDEMIA, UNSPECIFIED: ICD-10-CM

## 2023-06-15 DIAGNOSIS — Z91.013 ALLERGY TO SEAFOOD: ICD-10-CM

## 2023-06-15 DIAGNOSIS — Z79.84 LONG TERM (CURRENT) USE OF ORAL HYPOGLYCEMIC DRUGS: ICD-10-CM

## 2023-06-15 LAB
APPEARANCE UR: ABNORMAL
BILIRUB UR-MCNC: ABNORMAL
COLOR SPEC: ABNORMAL
DIFF PNL FLD: NEGATIVE — SIGNIFICANT CHANGE UP
GLUCOSE UR QL: NEGATIVE — SIGNIFICANT CHANGE UP
KETONES UR-MCNC: ABNORMAL
LEUKOCYTE ESTERASE UR-ACNC: ABNORMAL
NITRITE UR-MCNC: NEGATIVE — SIGNIFICANT CHANGE UP
PH UR: 6 — SIGNIFICANT CHANGE UP (ref 5–8)
PROT UR-MCNC: ABNORMAL
SP GR SPEC: 1.03 — HIGH (ref 1.01–1.03)
UROBILINOGEN FLD QL: ABNORMAL

## 2023-06-15 PROCEDURE — 81001 URINALYSIS AUTO W/SCOPE: CPT

## 2023-06-15 PROCEDURE — 99284 EMERGENCY DEPT VISIT MOD MDM: CPT

## 2023-06-15 PROCEDURE — 87086 URINE CULTURE/COLONY COUNT: CPT

## 2023-06-15 PROCEDURE — 99283 EMERGENCY DEPT VISIT LOW MDM: CPT

## 2023-06-15 RX ORDER — NITROFURANTOIN MACROCRYSTAL 50 MG
1 CAPSULE ORAL
Qty: 10 | Refills: 0
Start: 2023-06-15 | End: 2023-06-19

## 2023-06-15 NOTE — ED PROVIDER NOTE - NSFOLLOWUPINSTRUCTIONS_ED_ALL_ED_FT
Please make sure to follow up with your primary care doctor in 3 days.    Urinary Tract Infection    A urinary tract infection (UTI) is an infection of any part of the urinary tract, which includes the kidneys, ureters, bladder, and urethra. Risk factors include ignoring your need to urinate, wiping back to front if female, being an uncircumcised male, and having diabetes or a weak immune system. Symptoms include frequent urination, pain or burning with urination, foul smelling urine, cloudy urine, pain in the lower abdomen, blood in the urine, and fever. If you were prescribed an antibiotic medicine, take it as told by your health care provider. Do not stop taking the antibiotic even if you start to feel better.    SEEK IMMEDIATE MEDICAL CARE IF YOU HAVE THE FOLLOWING SYMPTOMS: severe back or abdominal pain, inability to keep fluids or medicine down, dizziness/lightheadedness, or a change in mental status.

## 2023-06-15 NOTE — ED PROVIDER NOTE - OBJECTIVE STATEMENT
52-year-old male with medical history of schizoaffective disorder, bipolar, hypertension, hyperlipidemia, diabetes, and hypothyroidism who presents with dysuria.  Reports symptoms started a few days ago.  Denies fever, shortness breath, chest pain, nausea, vomiting, abdominal pain, hematuria, testicular pain, penile pain, and change in bowel movement.

## 2023-06-15 NOTE — ED PROVIDER NOTE - PATIENT PORTAL LINK FT
You can access the FollowMyHealth Patient Portal offered by [facility] by registering at the following website: [email or web address]. By joining [employer]’s FollowMyHealth portal, you will also be able to view your health information using other applications (apps) compatible with our system.

## 2023-06-15 NOTE — ED PROVIDER NOTE - ATTENDING APP SHARED VISIT CONTRIBUTION OF CARE
53 yo m with schizoaff disorder presents with c/o dysuria x 2 days.  no fever, no abd pain, no flank pain.  no testicular pain or swelling exam: nad, ncat, perrl, eomi, mmm, rrr, ctab, abd soft, nt, nd aox3, imp: pt with dysuria, +UA for uti, will start on abx.  f/u with pmd outpt

## 2023-06-15 NOTE — ED PROVIDER NOTE - PHYSICAL EXAMINATION
CONSTITUTIONAL: Well-appearing; in no apparent distress.   ENT: Hearing is intact with good acuity to spoken voice.  Patient is speaking clearly, not muffled and airway is intact.   RESPIRATORY: No signs of respiratory distress. Lung sounds are clear in all lobes bilaterally without rales, rhonchi, or wheezes.  CARDIOVASCULAR: Regular rate and rhythm.   GI: Abdomen is soft, non-tender, and without distention. Bowel sounds are present and normoactive in all four quadrants. No masses are noted.   BACK: No evidence of trauma or deformity. No CVA tenderness bilaterally. Normal ROM.   NEURO: A & O x 3. Normal speech. No focal deficit.  PSYCHOLOGICAL: Appropriate mood and affect. Good judgement and insight.

## 2023-06-17 LAB
CULTURE RESULTS: SIGNIFICANT CHANGE UP
SPECIMEN SOURCE: SIGNIFICANT CHANGE UP

## 2023-07-07 NOTE — ED ADULT TRIAGE NOTE - HEIGHT IN CM
167.64 Abdomen soft, non-tender and non-distended, no rebound, no guarding and no masses. no hepatosplenomegaly.

## 2023-07-10 ENCOUNTER — EMERGENCY (EMERGENCY)
Facility: HOSPITAL | Age: 53
LOS: 0 days | Discharge: HOME | End: 2023-07-11
Attending: EMERGENCY MEDICINE
Payer: MEDICAID

## 2023-07-10 VITALS
RESPIRATION RATE: 18 BRPM | HEART RATE: 95 BPM | OXYGEN SATURATION: 98 % | SYSTOLIC BLOOD PRESSURE: 163 MMHG | DIASTOLIC BLOOD PRESSURE: 102 MMHG | HEIGHT: 66 IN | TEMPERATURE: 96 F

## 2023-07-10 DIAGNOSIS — Z88.8 ALLERGY STATUS TO OTHER DRUGS, MEDICAMENTS AND BIOLOGICAL SUBSTANCES: ICD-10-CM

## 2023-07-10 DIAGNOSIS — Z79.84 LONG TERM (CURRENT) USE OF ORAL HYPOGLYCEMIC DRUGS: ICD-10-CM

## 2023-07-10 DIAGNOSIS — R10.12 LEFT UPPER QUADRANT PAIN: ICD-10-CM

## 2023-07-10 DIAGNOSIS — Z91.013 ALLERGY TO SEAFOOD: ICD-10-CM

## 2023-07-10 DIAGNOSIS — Z91.014 ALLERGY TO MAMMALIAN MEATS: ICD-10-CM

## 2023-07-10 DIAGNOSIS — Z88.0 ALLERGY STATUS TO PENICILLIN: ICD-10-CM

## 2023-07-10 PROCEDURE — 99282 EMERGENCY DEPT VISIT SF MDM: CPT

## 2023-07-10 PROCEDURE — 99283 EMERGENCY DEPT VISIT LOW MDM: CPT

## 2023-07-10 NOTE — ED PROVIDER NOTE - OBJECTIVE STATEMENT
53 yo co luq pain for 30 min, dull ache, resolved. no fever, chills, n, v, d. no dark or bloody stools. no cp or sob.  pt is feeling b adan and asking to go home.

## 2023-07-10 NOTE — ED ADULT TRIAGE NOTE - CHIEF COMPLAINT QUOTE
Pt presents to th ED BIBEMs from [address] w/ c/o of LUQ abdominal pain x1 day. Pt describes pain as a sharp pain that comes and goes. Pt denies trauma to the area, Pt denies NVD. PMHx of schizoaffective, bipolar, DM, HLD, HTN

## 2023-07-10 NOTE — ED PROVIDER NOTE - PATIENT PORTAL LINK FT
You can access the FollowMyHealth Patient Portal offered by [facility] by registering at the following website: [email or web address]. By joining [employer]’s FollowMyHealth portal, you will also be able to view your health information using other applications (apps) compatible with our system.

## 2023-07-10 NOTE — ED PROVIDER NOTE - HIV OFFER
Previously Declined (within the last year) Azithromycin Pregnancy And Lactation Text: This medication is considered safe during pregnancy and is also secreted in breast milk.

## 2023-07-11 NOTE — ED ADULT NURSE NOTE - NS_SISCREENINGSR_GEN_ALL_ED
Gave patient discharge instructions. He states, understanding.  Patient discharged to home     Nahomy Shanks RN  06/19/21 7325 Negative

## 2023-07-11 NOTE — ED ADULT NURSE NOTE - NS ED NURSE DISCH DISPOSITION
Called patient. Due for OV annual with stress test and labs.  Attempt to transfer to scheduling, no answer. Will message scheduling to call patient back.   Discharged

## 2023-07-11 NOTE — ED ADULT NURSE NOTE - NSFALLUNIVINTERV_ED_ALL_ED
Bed/Stretcher in lowest position, wheels locked, appropriate side rails in place/Call bell, personal items and telephone in reach/Instruct patient to call for assistance before getting out of bed/chair/stretcher/Non-slip footwear applied when patient is off stretcher/[place] to call system/Physically safe environment - no spills, clutter or unnecessary equipment/Purposeful proactive rounding/Room/bathroom lighting operational, light cord in reach

## 2023-07-11 NOTE — ED ADULT NURSE NOTE - CHIEF COMPLAINT QUOTE
Pt presents to th ED BIBEMs from [address] w/ c/o of LUQ abdominal pain x1 day. Pt describes pain as a sharp pain that comes and goes. Pt denies trauma to the area, Pt denies NVD. PMHx of schizoaffective, bipolar, DM, HLD, HTN

## 2023-07-23 ENCOUNTER — INPATIENT (INPATIENT)
Facility: HOSPITAL | Age: 53
LOS: 4 days | Discharge: HOME | DRG: 750 | End: 2023-07-28
Attending: PSYCHIATRY & NEUROLOGY | Admitting: PSYCHIATRY & NEUROLOGY
Payer: MEDICAID

## 2023-07-23 VITALS
HEART RATE: 111 BPM | DIASTOLIC BLOOD PRESSURE: 80 MMHG | SYSTOLIC BLOOD PRESSURE: 160 MMHG | RESPIRATION RATE: 18 BRPM | OXYGEN SATURATION: 99 % | WEIGHT: 160.06 LBS | HEIGHT: 66 IN | TEMPERATURE: 98 F

## 2023-07-23 LAB
ALBUMIN SERPL ELPH-MCNC: 4.2 G/DL — SIGNIFICANT CHANGE UP (ref 3.5–5.2)
ALP SERPL-CCNC: 76 U/L — SIGNIFICANT CHANGE UP (ref 30–115)
ALT FLD-CCNC: 5 U/L — SIGNIFICANT CHANGE UP (ref 0–41)
ANION GAP SERPL CALC-SCNC: 12 MMOL/L — SIGNIFICANT CHANGE UP (ref 7–14)
APAP SERPL-MCNC: <5 UG/ML — LOW (ref 10–30)
AST SERPL-CCNC: 11 U/L — SIGNIFICANT CHANGE UP (ref 0–41)
BASOPHILS # BLD AUTO: 0.02 K/UL — SIGNIFICANT CHANGE UP (ref 0–0.2)
BASOPHILS NFR BLD AUTO: 0.4 % — SIGNIFICANT CHANGE UP (ref 0–1)
BILIRUB SERPL-MCNC: <0.2 MG/DL — SIGNIFICANT CHANGE UP (ref 0.2–1.2)
BUN SERPL-MCNC: 7 MG/DL — LOW (ref 10–20)
CALCIUM SERPL-MCNC: 9.6 MG/DL — SIGNIFICANT CHANGE UP (ref 8.4–10.5)
CHLORIDE SERPL-SCNC: 104 MMOL/L — SIGNIFICANT CHANGE UP (ref 98–110)
CO2 SERPL-SCNC: 25 MMOL/L — SIGNIFICANT CHANGE UP (ref 17–32)
CREAT SERPL-MCNC: 0.8 MG/DL — SIGNIFICANT CHANGE UP (ref 0.7–1.5)
EGFR: 107 ML/MIN/1.73M2 — SIGNIFICANT CHANGE UP
EOSINOPHIL # BLD AUTO: 0.11 K/UL — SIGNIFICANT CHANGE UP (ref 0–0.7)
EOSINOPHIL NFR BLD AUTO: 2.1 % — SIGNIFICANT CHANGE UP (ref 0–8)
ETHANOL SERPL-MCNC: <10 MG/DL — SIGNIFICANT CHANGE UP
GLUCOSE SERPL-MCNC: 166 MG/DL — HIGH (ref 70–99)
HCT VFR BLD CALC: 37.8 % — LOW (ref 42–52)
HGB BLD-MCNC: 11.9 G/DL — LOW (ref 14–18)
IMM GRANULOCYTES NFR BLD AUTO: 0.2 % — SIGNIFICANT CHANGE UP (ref 0.1–0.3)
LYMPHOCYTES # BLD AUTO: 2.21 K/UL — SIGNIFICANT CHANGE UP (ref 1.2–3.4)
LYMPHOCYTES # BLD AUTO: 42.4 % — SIGNIFICANT CHANGE UP (ref 20.5–51.1)
MCHC RBC-ENTMCNC: 26.6 PG — LOW (ref 27–31)
MCHC RBC-ENTMCNC: 31.5 G/DL — LOW (ref 32–37)
MCV RBC AUTO: 84.4 FL — SIGNIFICANT CHANGE UP (ref 80–94)
MONOCYTES # BLD AUTO: 0.56 K/UL — SIGNIFICANT CHANGE UP (ref 0.1–0.6)
MONOCYTES NFR BLD AUTO: 10.7 % — HIGH (ref 1.7–9.3)
NEUTROPHILS # BLD AUTO: 2.3 K/UL — SIGNIFICANT CHANGE UP (ref 1.4–6.5)
NEUTROPHILS NFR BLD AUTO: 44.2 % — SIGNIFICANT CHANGE UP (ref 42.2–75.2)
NRBC # BLD: 0 /100 WBCS — SIGNIFICANT CHANGE UP (ref 0–0)
PLATELET # BLD AUTO: 239 K/UL — SIGNIFICANT CHANGE UP (ref 130–400)
PMV BLD: 9.9 FL — SIGNIFICANT CHANGE UP (ref 7.4–10.4)
POTASSIUM SERPL-MCNC: 4.7 MMOL/L — SIGNIFICANT CHANGE UP (ref 3.5–5)
POTASSIUM SERPL-SCNC: 4.7 MMOL/L — SIGNIFICANT CHANGE UP (ref 3.5–5)
PROT SERPL-MCNC: 7.1 G/DL — SIGNIFICANT CHANGE UP (ref 6–8)
RBC # BLD: 4.48 M/UL — LOW (ref 4.7–6.1)
RBC # FLD: 18.4 % — HIGH (ref 11.5–14.5)
SALICYLATES SERPL-MCNC: <0.3 MG/DL — LOW (ref 4–30)
SODIUM SERPL-SCNC: 141 MMOL/L — SIGNIFICANT CHANGE UP (ref 135–146)
WBC # BLD: 5.21 K/UL — SIGNIFICANT CHANGE UP (ref 4.8–10.8)
WBC # FLD AUTO: 5.21 K/UL — SIGNIFICANT CHANGE UP (ref 4.8–10.8)

## 2023-07-23 PROCEDURE — 99285 EMERGENCY DEPT VISIT HI MDM: CPT

## 2023-07-23 RX ORDER — HALOPERIDOL DECANOATE 100 MG/ML
5 INJECTION INTRAMUSCULAR ONCE
Refills: 0 | Status: COMPLETED | OUTPATIENT
Start: 2023-07-23 | End: 2023-07-23

## 2023-07-23 RX ADMIN — HALOPERIDOL DECANOATE 5 MILLIGRAM(S): 100 INJECTION INTRAMUSCULAR at 20:40

## 2023-07-23 NOTE — ED ADULT NURSE NOTE - NS ED NURSE AMBULANCES2
Detail Level: Detailed Quality 226: Preventive Care And Screening: Tobacco Use: Screening And Cessation Intervention: Patient screened for tobacco use, is a smoker AND received Cessation Counseling [facility]

## 2023-07-23 NOTE — ED ADULT NURSE NOTE - OBJECTIVE STATEMENT
Pt  with C/O depression anxiety from yesterday ,"pt report he lost his mom yesterday "pt state he was depressed at home and punches the  glass mirror at home with thought of suicide thoughts ,pt report HX- Suicide attempt in the past .pt denies  no pain no injury noted ,no N/V ,  did not took his medication  today in AM ,

## 2023-07-23 NOTE — ED PROVIDER NOTE - PROGRESS NOTE DETAILS
EP: Case endorsed to Dr. Joiner to follow-up  Labs, EKG, psych evaluation and dispo. Endorsed from Dr. Molina  Awaiting psych eval  pt w/ schizophrenia, SI  At 2040, pt baging head into wall saying hearing voices, then throwing things. Pt written for haldol 5 mg IM for calming. Allergy noted in chart (SE, not allergy according to symptoms experienced) telepsych consulted, added to list. Labs reviewed. pt remains on 1:1 for suicidal ideation telepsych consulted, added to list. Labs reviewed. pt remains on 1:1 for suicidal ideation  Patient medically cleared awaiting psych reassessment in AM Endorsed to Dr. Strnog  Awaiting telepsych reassessment Tae - pt awaiting covid test for admission to [facility]ruz - covid neg. pt signed out to Dr Wadsworth pending transport [facility] Patient evaluated for suicidal ideations, evaluated by psychiatry who recommended admission to inpatient unit.  Labs reviewed and COVID-negative.  Legals filled out earlier by attending and patient was transferred to [facility] for continued management and care.

## 2023-07-23 NOTE — ED PROVIDER NOTE - CLINICAL SUMMARY MEDICAL DECISION MAKING FREE TEXT BOX
Endorsed from Dr. Molina  51 yo man w/ schizophrenia w/ SI. no clear plan  In ED, became agitated and hit head into wall (no LOC, no signs of inj, no AC/antiplt agents, neuro intact after) and threw ice water at 1:1  Given haldol 5 mg IM  Medically stable for psych assessment  + hearing voices

## 2023-07-23 NOTE — ED PROVIDER NOTE - ATTENDING CONTRIBUTION TO CARE
52-year-old male PMH as noted in the chart presenting from [address] for suicidal ideations.  Patient states that his mother passed away yesterday, he feels like he wants to hurt himself by cutting his wrists.  States that he called suicide hotline and he was advised to come to the emergency room.  Denies any physical complaints, no drug/medication/alcohol ingestion.  Admits to hearing voices that are telling him "we will like you". Well-appearing middle-age male resting on a stretcher in no acute distress, PERRLA, P conjunctiva, MMM, speaking full sentences, lungs clear to auscultation, RRR, well-perfused extremities, abdomen soft/NT/ND, no midline spine or CVA TTP, patient is awake and alert, normal mood and affect, cooperative.  Plan: Labs, EKG, psychiatry evaluation, dispo accordingly.  Patient is amenable to plan.  One-to-one observation was initiated.

## 2023-07-23 NOTE — ED PROVIDER NOTE - PHYSICAL EXAMINATION
CONSTITUTIONAL: awake, sitting in stretcher in no acute distress  SKIN: Warm, dry  HEAD: Normocephalic; atraumatic  EYES: No conjunctival injection. EOMI. PERRL  ENT: No nasal discharge; oropharynx nonerythematous; airway clear  NECK: Supple; non tender, no lymphadenopathy  CARD:  Regular rate and rhythm; S1, S2 normal; no murmurs, gallops, or rubs  RESP: CTAB; No wheezes, crackles, rales or rhonchi  ABD: Soft, nontender, nondistended, nonrigid, no guarding or rebound tenderness  EXT: Normal ROM,  no edema, good radial pulse  NEURO: A&O x3, speaking in full clear sentences, no facial asymmetry, moving all extremities. Psychiatric baseline unknown.

## 2023-07-23 NOTE — ED PROVIDER NOTE - OBJECTIVE STATEMENT
Pt is a 52-year-old male from [facility] with medical history of schizoaffective disorder, bipolar, hypertension, hyperlipidemia, diabetes, and hypothyroidism who presents to ER for suicidal ideation. Pt states his mother  yesterday, and has had thoughts of hurting himself and others today. He does not have a stated plan. He states he punched a glass wall  in frustration. Denies any active medical problems. 1:1 ordered for suicidal ideation. Writer attempted to reach emergency contact, Sarah Almaguer for further info without success.

## 2023-07-24 LAB
GLUCOSE BLDC GLUCOMTR-MCNC: 137 MG/DL — HIGH (ref 70–99)
GLUCOSE BLDC GLUCOMTR-MCNC: 90 MG/DL — SIGNIFICANT CHANGE UP (ref 70–99)
SARS-COV-2 RNA SPEC QL NAA+PROBE: SIGNIFICANT CHANGE UP

## 2023-07-24 PROCEDURE — 80061 LIPID PANEL: CPT

## 2023-07-24 PROCEDURE — 83036 HEMOGLOBIN GLYCOSYLATED A1C: CPT

## 2023-07-24 PROCEDURE — 87635 SARS-COV-2 COVID-19 AMP PRB: CPT

## 2023-07-24 PROCEDURE — 84443 ASSAY THYROID STIM HORMONE: CPT

## 2023-07-24 PROCEDURE — 82962 GLUCOSE BLOOD TEST: CPT

## 2023-07-24 RX ORDER — METFORMIN HYDROCHLORIDE 850 MG/1
2000 TABLET ORAL
Refills: 0 | Status: DISCONTINUED | OUTPATIENT
Start: 2023-07-24 | End: 2023-07-24

## 2023-07-24 RX ORDER — LEVOTHYROXINE SODIUM 125 MCG
25 TABLET ORAL DAILY
Refills: 0 | Status: DISCONTINUED | OUTPATIENT
Start: 2023-07-24 | End: 2023-07-28

## 2023-07-24 RX ORDER — CLOZAPINE 150 MG/1
100 TABLET, ORALLY DISINTEGRATING ORAL ONCE
Refills: 0 | Status: COMPLETED | OUTPATIENT
Start: 2023-07-24 | End: 2023-07-24

## 2023-07-24 RX ORDER — ATORVASTATIN CALCIUM 80 MG/1
40 TABLET, FILM COATED ORAL AT BEDTIME
Refills: 0 | Status: DISCONTINUED | OUTPATIENT
Start: 2023-07-24 | End: 2023-07-28

## 2023-07-24 RX ORDER — CLOZAPINE 150 MG/1
100 TABLET, ORALLY DISINTEGRATING ORAL DAILY
Refills: 0 | Status: DISCONTINUED | OUTPATIENT
Start: 2023-07-24 | End: 2023-07-24

## 2023-07-24 RX ORDER — CLOZAPINE 150 MG/1
400 TABLET, ORALLY DISINTEGRATING ORAL AT BEDTIME
Refills: 0 | Status: DISCONTINUED | OUTPATIENT
Start: 2023-07-24 | End: 2023-07-28

## 2023-07-24 RX ORDER — MIDAZOLAM HYDROCHLORIDE 1 MG/ML
2 INJECTION, SOLUTION INTRAMUSCULAR; INTRAVENOUS ONCE
Refills: 0 | Status: DISCONTINUED | OUTPATIENT
Start: 2023-07-24 | End: 2023-07-24

## 2023-07-24 RX ORDER — HALOPERIDOL DECANOATE 100 MG/ML
5 INJECTION INTRAMUSCULAR ONCE
Refills: 0 | Status: COMPLETED | OUTPATIENT
Start: 2023-07-24 | End: 2023-07-24

## 2023-07-24 RX ORDER — DIVALPROEX SODIUM 500 MG/1
500 TABLET, DELAYED RELEASE ORAL DAILY
Refills: 0 | Status: DISCONTINUED | OUTPATIENT
Start: 2023-07-24 | End: 2023-07-28

## 2023-07-24 RX ORDER — DIVALPROEX SODIUM 500 MG/1
750 TABLET, DELAYED RELEASE ORAL AT BEDTIME
Refills: 0 | Status: DISCONTINUED | OUTPATIENT
Start: 2023-07-24 | End: 2023-07-28

## 2023-07-24 RX ORDER — CLOZAPINE 150 MG/1
400 TABLET, ORALLY DISINTEGRATING ORAL AT BEDTIME
Refills: 0 | Status: DISCONTINUED | OUTPATIENT
Start: 2023-07-24 | End: 2023-07-24

## 2023-07-24 RX ORDER — METFORMIN HYDROCHLORIDE 850 MG/1
1000 TABLET ORAL
Refills: 0 | Status: DISCONTINUED | OUTPATIENT
Start: 2023-07-24 | End: 2023-07-28

## 2023-07-24 RX ADMIN — MIDAZOLAM HYDROCHLORIDE 2 MILLIGRAM(S): 1 INJECTION, SOLUTION INTRAMUSCULAR; INTRAVENOUS at 01:11

## 2023-07-24 RX ADMIN — Medication 5 MILLIGRAM(S): at 22:28

## 2023-07-24 RX ADMIN — Medication 2 MILLIGRAM(S): at 22:26

## 2023-07-24 RX ADMIN — ATORVASTATIN CALCIUM 40 MILLIGRAM(S): 80 TABLET, FILM COATED ORAL at 22:28

## 2023-07-24 RX ADMIN — HALOPERIDOL DECANOATE 5 MILLIGRAM(S): 100 INJECTION INTRAMUSCULAR at 01:11

## 2023-07-24 RX ADMIN — DIVALPROEX SODIUM 750 MILLIGRAM(S): 500 TABLET, DELAYED RELEASE ORAL at 22:28

## 2023-07-24 RX ADMIN — CLOZAPINE 100 MILLIGRAM(S): 150 TABLET, ORALLY DISINTEGRATING ORAL at 12:17

## 2023-07-24 RX ADMIN — CLOZAPINE 400 MILLIGRAM(S): 150 TABLET, ORALLY DISINTEGRATING ORAL at 22:29

## 2023-07-24 RX ADMIN — METFORMIN HYDROCHLORIDE 1000 MILLIGRAM(S): 850 TABLET ORAL at 22:27

## 2023-07-24 NOTE — ED BEHAVIORAL HEALTH ASSESSMENT NOTE - HPI (INCLUDE ILLNESS QUALITY, SEVERITY, DURATION, TIMING, CONTEXT, MODIFYING FACTORS, ASSOCIATED SIGNS AND SYMPTOMS)
Pt is a 53 yo M, single, non-caregiver, disabled, domiciled at [facility]([phone]), PMH significant for DM, HLD, HTN, hypothyroidism, per psyckes carries various mood and psychotic disorders(, schizoaffective disorder, MDD, Schizophrenia, Bipolar disorder), multiple substance related disorders(cannabis, other psychoactive substances) and antisocial personality disorder, per dave is a high utilizer of ED/inpatient services, has well documented h/o presenting to the ED endorsing SI for secondary gain, multiple prior psych admissions (most recently at [facility] from -), per chart has multiple suicide attempts, in outpt treatment at [facility], on clozaril 100 mg/400 mg AM/PM, Depakote ??mg AM/PM, Ativan 2 mg qHS, and Invega [id number] mg q4WKS (last dose receive ???), who self presents to the ED expressing SI/HI/cAH.    On interview, patient states he came to the ED after feeling overwhelmed his mother  yesterday and feeling sad. He states he was having SI with no plan. He states also having cAH to kill self and other. Denies SI/HI/AVH currently. Patient is not able to safety plan and walks off camera in addition and states he wants to go home prior to sitting down again. He is difficult to understand at times but reports poor sleep. He does not provide clear history but reports taking medication.    Per [facility] night team, he has a history of calling to go to the ED when not feeling well. They tried to confirm medication but not able to clearly state dosages. They are unaware of any stressor at this time.

## 2023-07-24 NOTE — ED BEHAVIORAL HEALTH PROGRESS NOTE - COLLATERAL INFORMATION (NAME, PHONE, RELATIONSHIP):
see [facility] note for further details    I spoke briefly to residential counselor Jaiden [phone] from [facility]. He reports that he was informed by his staff that the patient had requested initially to call a suicide hotline, which he did, and subsequently he did not feel better so was sent to the ED. Jaiden is unable to verify whether patient's mother actually passed away. He notes that patient has episodes like this 1-2 times per month when he has urges to self-harm when he is stressed.

## 2023-07-24 NOTE — ED BEHAVIORAL HEALTH ASSESSMENT NOTE - DETAILS
hit glass window and threw ice at RN reports CAH via phone Haldol and Thorazine and Navane causes anaphylaxis per chart review See hpi Haldol and Thorazine and Navane causes anaphylaxis per chart review but also note from ED in allergy this Haldol allergy is not true allergy? Thus very unclear and patient not able to state

## 2023-07-24 NOTE — ED BEHAVIORAL HEALTH PROGRESS NOTE - RISK ASSESSMENT
acute self-harm risk is moderate as patient is facing recent acute life stressor, currently is unable to commit to safety, appears dysphoric/anxious, has demonstrated past self-harm behavior, reports ongoing passive SI

## 2023-07-24 NOTE — ED BEHAVIORAL HEALTH NOTE - BEHAVIORAL HEALTH NOTE
========================     FOR EACH COLLATERAL     ========================     Collateral below has requested that the information provided remain confidential: Yes [  ] No [X]     Collateral below has provided information that patient is/may be unaware of: Yes [  ] No [X]     Patient gives permission to obtain collateral from _____:      ( ) Yes     (X)  No     Rationale for overriding objection               (  ) Lack of capacity. Details: ________               (X) Assessing risk of danger to self/others. Details: ________     Rationale for selecting specific collateral source               (X) Potential to impact risk of danger to self/others and no alternative equivalent. Details:             NAME:  Jaiden      NUMBER:  [phone]     RELATIONSHIP: Staff member at [employer]     RELIABILITY: High, works regularly with patient     COMMENTS: [facility] contacted pt’s residence to follow up on the nature of pt’s baseline and to verify if the pt’s mother has indeed passed away, as the pt reports.      Collateral had no knowledge of the pt’s mother passing nor her most recent status/whereabouts as collateral reports the pt has had no contact with family that the residence is aware of.      Collateral noted that the pt’s current presentation in the ED is aligned with his long-term baseline pattern of presenting approximately every other month and endorsing SI. Collateral reports that pt often bangs his head against the wall and reports CAH as he is today, and this was the same presentation for his last ED visit in May. Collateral describes pt as a frequent ED utilizer for “any little thing,” but notes that until today the pt demonstrated improvement in this pattern by not visiting an ED since May.      Collateral reports that at his baseline, pt does possess the ability to engage coping skills and will contact 911 or ask for help from staff before self-harming or becoming agitated, but this behavior is interspersed with a regular pattern of decompensations that warrant others activating EMS on his behalf, as was the case in May. Collateral notes that the pt’s response to his decompensation has improved over the last few months as evidenced by his SIB becoming more infrequent, with the last notable episode occurring around April/May when the pt cut his wrists with a fork. Collateral reports that the pt does have the freedom to go into the community himself and occasionally while he is out he will take himself to the ED.      Collateral states that the pt does best when he is kept busy and regularly seen by his providers/feels he has their support.             ========================     FOR EACH COLLATERAL     ========================     Collateral below has requested that the information provided remain confidential: Yes [  ] No [X]     Collateral below has provided information that patient is/may be unaware of: Yes [  ] No [X]     Patient gives permission to obtain collateral from _____:      ( ) Yes     (X)  No     Rationale for overriding objection               (  ) Lack of capacity. Details: ________               (X) Assessing risk of danger to self/others. Details: ________     Rationale for selecting specific collateral source               (X) Potential to impact risk of danger to self/others and no alternative equivalent. Details:          NAME:  Akosua Partida     NUMBER:  [phone]     RELATIONSHIP: Outpatient therapist     RELIABILITY: Fair, has worked with the pt for approximately 2 months     COMMENTS: [facility] spoke very briefly with collateral to verify the passing of the pt’s mother. Collateral noted that pt left her a voicemail yesterday stating that his mother had passed away and that he was having bad thoughts. Collateral was unable to follow up with the pt before his presentation to the ED to speak more about his message and collateral does not have any contact information for the pt’s mother or other family. Collateral noted that the pt has not discussed his mother with collateral previously.      Collateral reports that pt has shown some improvement over their time working together but the pt does frequent the ED and has a hx of banging his head against the wall. Collateral states that to her knowledge the pt’s last ED visit was May 23rd of this year, and the pt was ultimately discharged. Collateral reports that per her notes, the pt has a hx of 5 suicide attempts. Collateral provided the name and number of the pt’s psychiatrist if needed: Dr. Naren Pearson, [phone]. ========================     FOR EACH COLLATERAL     ========================     Collateral below has requested that the information provided remain confidential: Yes [  ] No [X]     Collateral below has provided information that patient is/may be unaware of: Yes [  ] No [X]     Patient gives permission to obtain collateral from _____:      ( ) Yes     (X)  No     Rationale for overriding objection               (  ) Lack of capacity. Details: ________               (X) Assessing risk of danger to self/others. Details: ________     Rationale for selecting specific collateral source               (X) Potential to impact risk of danger to self/others and no alternative equivalent. Details:             NAME:  Jaiden      NUMBER:  [phone]     RELATIONSHIP: Staff member at [employer] residence     RELIABILITY: High, works regularly with patient     COMMENTS: [facility] contacted pt’s residence to follow up on the nature of pt’s baseline and to confirm details of the pt's mother passing away.      Collateral had no knowledge of the pt’s mother passing nor her most recent status/whereabouts as collateral reports the pt has had no contact with family that the residence is aware of.      Collateral noted that the pt’s current presentation in the ED is aligned with his long-term baseline pattern of presenting approximately every other month and endorsing SI. Collateral reports that pt often bangs his head against the wall and reports CAH as he is today, and this was the same presentation for his last ED visit in May. Collateral describes pt as a frequent ED utilizer but notes that until today the pt demonstrated improvement in this pattern by not visiting an ED since May.      Collateral reports that at his baseline, pt does possess the ability to engage coping skills and will contact 911 or ask for help from staff before self-harming or becoming agitated, but this behavior is interspersed with a regular pattern of decompensations that warrant others activating EMS on his behalf, as was the case in May. Collateral notes that the pt’s response to his decompensation has improved over the last few months as evidenced by his SIB becoming more infrequent, with the last notable episode occurring around April/May when the pt cut his wrists with a fork. Collateral reports that the pt does have the freedom to go into the community himself and occasionally while he is out he will take himself to the ED.      Collateral states that the pt does best when he is able to stay busy, and when he is regularly seen by his providers and has easy access to them.            ========================     FOR EACH COLLATERAL     ========================     Collateral below has requested that the information provided remain confidential: Yes [  ] No [X]     Collateral below has provided information that patient is/may be unaware of: Yes [  ] No [X]     Patient gives permission to obtain collateral from _____:      ( ) Yes     (X)  No     Rationale for overriding objection               (  ) Lack of capacity. Details: ________               (X) Assessing risk of danger to self/others. Details: ________     Rationale for selecting specific collateral source               (X) Potential to impact risk of danger to self/others and no alternative equivalent. Details:          NAME:  Akosua Partida     NUMBER:  [phone]     RELATIONSHIP: Outpatient therapist     RELIABILITY: Fair, has worked with the pt for approximately 2 months     COMMENTS: [facility] spoke very briefly with collateral regarding the passing of the pt’s mother. Collateral noted that pt left her a voicemail yesterday stating that his mother had passed away and that he was having bad thoughts. Collateral was unable to follow up with the pt before his presentation to the ED to speak more about his message and collateral does not have any contact information for the pt’s mother or other family. Collateral noted that the pt has not discussed his mother with collateral previously.      Collateral reports that pt has shown some improvement over their time working together but the pt does frequent the ED and has a hx of banging his head against the wall. Collateral states that to her knowledge the pt’s last ED visit was May 23rd of this year, and the pt was ultimately discharged. Collateral reports that per her notes, the pt has a hx of 5 suicide attempts. Collateral provided the name and number of the pt’s psychiatrist if needed: Dr. Naren Pearson, [phone].

## 2023-07-24 NOTE — ED BEHAVIORAL HEALTH PROGRESS NOTE - CASE SUMMARY/FORMULATION (CLEARLY DOCUMENT RATIONALE FOR DISPOSITION CHANGE)
This is a 53 yo male living at [facility] ([phone]) with PMH DM, HLD, HTN, hypothyroidism, and psychiatric h/o schizoaffective disorder, past substance use disorders, frequent ED visits and hospitalizations (most recently d/c from [facility] 5/23/23), past suicide attempts vs NSSIB by cutting, banging head, on Clozaril and Invega Sustenna, who was BIB EMS a/b residence after patient endorsed SI.    While chart history shows that the patient in the past may have had secondary gain, poor frustration tolerance with his living situation, or a pattern of utilizing the ED and hospitalizations as emotional coping mechanisms, there is reason to believe that his presentation today is slightly more acute and warranting further stabilization. The patient reports his mother passed away several days ago. He proceeded to call his therapist and left them a voicemail stating this. The patient yesterday prior to presentation, according to his residence, called the suicide hotline but then did not feel better and therefore was brought to the ED. In the ED he continues to be fixated on his mother's death, saying he wonder why "they took her and not me," continues to express mostly passive SI, and overall appears dysphoric and stressed. While his hospitalization this time may not change his future overall pattern of coping in light of triggers like this, he is someone who is very concrete, with longstanding history of psychosis and a tendency to be impulsive, which increase his risk of self-harm. Since he has demonstrated history of self-harm, continues to express affective disturbance, and is confronting an acute stressor, he would benefit from voluntary psychiatric hospitalization for safety.   This is a 51 yo male living at [facility] ([phone]) with PMH DM, HLD, HTN, hypothyroidism, and psychiatric h/o schizoaffective disorder, past substance use disorders, frequent ED visits and hospitalizations (most recently d/c from [facility] 5/23/23), past suicide attempts vs NSSIB by cutting, banging head, on Clozaril and Invega Sustenna, who was BIB EMS a/b residence after patient endorsed SI.    While chart history shows that the patient in the past may have had secondary gain, poor frustration tolerance with his living situation, or a pattern of utilizing the ED and hospitalizations as emotional coping mechanisms, there is reason to believe that his presentation today is slightly more acute and warranting further stabilization. The patient reports his mother passed away several days ago. He proceeded to call his therapist and left them a voicemail stating this. The patient yesterday prior to presentation, according to his residence, called the suicide hotline but then did not feel better and therefore was brought to the ED. In the ED he continues to be fixated on his mother's death, saying he wonder why "they took her and not me," continues to express mostly passive SI, and overall appears dysphoric and stressed. While his hospitalization this time may not change his future overall pattern of coping in light of triggers like this, he is someone who is very concrete, with longstanding history of psychosis and a tendency to be impulsive, which increase his risk of self-harm. Since he has demonstrated history of self-harm, continues to express affective disturbance, and is confronting an acute stressor, he would benefit from voluntary psychiatric hospitalization for safety.    Plan:  - plan to admit 9.13  - continues Clozaril 100mg qam (give dose now), and Clozaril 400mg qhs  - Invega Sustenna 156mg last given 6/29/23, next dose due 7/27/23  - continue Depakote 500mg @1pm, 750mg qhs  - continue Ativan 2mg qhs  - other meds: atorvastatin 40mg qhs, bisacodyl 5 qhs, Colace, Synthroid 25mcg, metformin 2000mg BID

## 2023-07-24 NOTE — ED BEHAVIORAL HEALTH ASSESSMENT NOTE - NSSUICPROTFACT_PSY_ALL_CORE
Identifies reasons for living/Supportive social network of family or friends/Fear of death or the actual act of killing self/Positive therapeutic relationships/Mor[employer] beliefs

## 2023-07-24 NOTE — ED BEHAVIORAL HEALTH NOTE - BEHAVIORAL HEALTH NOTE
==================  PRE-HOSPITAL COURSE  ===================  SOURCE:  MD Villarreal and secondhand ED documentation  DETAILS: BIB EMS from [facility] for SI  =========  ED COURSE  =========  SOURCE:  MD and secondhand ED documentation.  ARRIVAL:  Per chart and MD, patient arrived via EMS. Per RN, patient was calm upon arrival, and cooperative with triage process.  BELONGINGS:  Per chart, patient arrived with belongings. All belongings were provided to hospital security, and patient currently in a gown with a 1:1 staff member.  BEHAVIOR: MD described patient to be initially calm and cooperative but then became agitated, endorsing CAH, asked RNs if they also hear voices then threw ice water at RNs, unable to be verbally de-escalated requiring medication presenting with disorganized thought process, AAOx3, presenting with agitated mood and flat affect, remains in poor behavioral and impulse control, is currently not violent/aggressive. MD stated that the patient is endorsing CAH. MD stated that there are old lateral scars on wrist from NSSIB. MD stated that the patient appears to be slightly disheveled, maintains fair hygiene.  TREATMENT:  Per chart and MD, patient received PRN medications: IM Haldol 5mg.   VISITORS:  Per MD, no visitors at bedside.          COVID Exposure Screen- collateral (i.e. third-party, chart review, belongings, etc; include EMS and ED staff)   ---------------------------------------------------  1. Has the patient had a COVID-19 test in the last 90 days? Unknown.   2. Has the patient tested positive for COVID-19 antibodies? Unknown.   3. Has the patient received 2 doses of the COVID-19 vaccine? Unknown  4. In the past 10 days, has the patient been around anyone with a positive COVID-19 test?* Unknown.   5. Has the patient been out of [place] within the past 10 days? Unknown

## 2023-07-24 NOTE — ED BEHAVIORAL HEALTH PROGRESS NOTE - NSBHMSETHTCONTENT_PSY_A_CORE
Returning patients call.  Informed him xray results showed no pneumonia.  States that he is feeling so much better now.  Also mentioned that [facility].  Patient notified that I did not receive request, so patient will [facility] today or tomorrow and have them fax form to our office.  
Preoccupations/Suicidality

## 2023-07-24 NOTE — ED BEHAVIORAL HEALTH PROGRESS NOTE - DETAILS:
On interview patient continues to appear flat, minimally talkative, answering in short sentences. He has poor articulation so it is difficult to understand him completely. He continues to say that he is in a "bad mood" and does not feel safe leaving the hospital. He wonders why his mother  instead of him and still is very fixated on her. He continues to expressive passive SI. Patient is unable to have discussion about coping mechanisms, future goals, etc.

## 2023-07-24 NOTE — ED BEHAVIORAL HEALTH ASSESSMENT NOTE - SUMMARY
Pt is a 51 yo M, single, non-caregiver, disabled, domiciled at [facility]([phone]), PMH significant for DM, HLD, HTN, hypothyroidism, per psyckes carries various mood and psychotic disorders(, schizoaffective disorder, MDD, Schizophrenia, Bipolar disorder), multiple substance related disorders(cannabis, other psychoactive substances) and antisocial personality disorder, per jarodrebekah is a high utilizer of ED/inpatient services, has well documented h/o presenting to the ED endorsing SI for secondary gain, multiple prior psych admissions (most recently at [facility] from 4/25-5/923), per chart has multiple suicide attempts, in outpt treatment at [facility], on clozaril 100 mg/400 mg AM/PM, Depakote ??mg AM/PM, Ativan 2 mg qHS, and Invega [id number] mg q4WKS (last dose receive ???), who self presents to the ED expressing SI/HI/cAH.    Patient presenting with SI/HI/cAH after reported death of his mother yesterday. Collateral was not helpful in discussion of where patient is or not at his baseline. Patient is not cooperative on exam. At this time will hold for collateral from outpatient MH team    Plan:   -Hold for MH collateral   -Continue current medication:  clozaril 100 mg/400 mg AM/PM, Depakote ??mg AM/PM, Ativan 2 mg qHS, and Invega [id number] mg q4WKS  - Agitation Medication: Ativan 2mg/Benadryl 50mg q6hr prn agitation

## 2023-07-25 DIAGNOSIS — F20.9 SCHIZOPHRENIA, UNSPECIFIED: ICD-10-CM

## 2023-07-25 PROCEDURE — 99233 SBSQ HOSP IP/OBS HIGH 50: CPT

## 2023-07-25 RX ORDER — HYDROXYZINE HCL 10 MG
50 TABLET ORAL EVERY 6 HOURS
Refills: 0 | Status: DISCONTINUED | OUTPATIENT
Start: 2023-07-25 | End: 2023-07-28

## 2023-07-25 RX ORDER — DIPHENHYDRAMINE HCL 50 MG
50 CAPSULE ORAL EVERY 6 HOURS
Refills: 0 | Status: DISCONTINUED | OUTPATIENT
Start: 2023-07-25 | End: 2023-07-28

## 2023-07-25 RX ORDER — BENZTROPINE MESYLATE 1 MG
1 TABLET ORAL
Refills: 0 | Status: DISCONTINUED | OUTPATIENT
Start: 2023-07-25 | End: 2023-07-28

## 2023-07-25 RX ORDER — HALOPERIDOL DECANOATE 100 MG/ML
5 INJECTION INTRAMUSCULAR EVERY 6 HOURS
Refills: 0 | Status: DISCONTINUED | OUTPATIENT
Start: 2023-07-25 | End: 2023-07-28

## 2023-07-25 RX ADMIN — DIVALPROEX SODIUM 750 MILLIGRAM(S): 500 TABLET, DELAYED RELEASE ORAL at 20:44

## 2023-07-25 RX ADMIN — ATORVASTATIN CALCIUM 40 MILLIGRAM(S): 80 TABLET, FILM COATED ORAL at 20:45

## 2023-07-25 RX ADMIN — Medication 1 MILLIGRAM(S): at 16:49

## 2023-07-25 RX ADMIN — Medication 2 MILLIGRAM(S): at 20:46

## 2023-07-25 RX ADMIN — Medication 25 MICROGRAM(S): at 06:36

## 2023-07-25 RX ADMIN — DIVALPROEX SODIUM 500 MILLIGRAM(S): 500 TABLET, DELAYED RELEASE ORAL at 12:38

## 2023-07-25 RX ADMIN — CLOZAPINE 400 MILLIGRAM(S): 150 TABLET, ORALLY DISINTEGRATING ORAL at 20:45

## 2023-07-25 RX ADMIN — METFORMIN HYDROCHLORIDE 1000 MILLIGRAM(S): 850 TABLET ORAL at 20:44

## 2023-07-25 RX ADMIN — Medication 5 MILLIGRAM(S): at 20:45

## 2023-07-25 RX ADMIN — Medication 50 MILLIGRAM(S): at 16:49

## 2023-07-25 RX ADMIN — METFORMIN HYDROCHLORIDE 1000 MILLIGRAM(S): 850 TABLET ORAL at 08:46

## 2023-07-25 NOTE — UM REPORT PROGRESS NOTE - NSUMRMPROVIDER_GEN_A_CORE FT
Juma Levi ( 1970)  [facility]  ID# [id number]  [phone]    - Spoke with Mere VERDIN Pending auth# [id number]. Faxed clinicals to [phone]. CM will reach out.     Juma Levi ( 1970)  [facility]  ID# [id number]  [phone]    - Spoke with Mere Martinezing auth# [id number]. Faxed clinicals to [phone]. CM will reach out.   spoke with Michell  x [id number] approved LCD and review due  w Michell      Juma Levi ( 1970)  [facility]  ID# [id number]  [phone]    - Spoke with Mere Martinezing auth# [id number]. Faxed clinicals to [phone]. CM will reach out.   spoke with Michell  x [phone] approved LCD and review due  w Michell     - Left DC clinicals on Michell's  ( x [phone]) and also faxed DC summary to [phone].

## 2023-07-25 NOTE — BH INPATIENT PSYCHIATRY ASSESSMENT NOTE - RISK ASSESSMENT
Warning signs: suicidal ideations, anxiety, irritability, social isolation  Risk factors: age, gender, presenting symptoms, past suicide attempts  Protective factors: residential stability, treatment compliance  Access to lethal means: denies  Level of risk: low

## 2023-07-25 NOTE — BH INPATIENT PSYCHIATRY ASSESSMENT NOTE - CURRENT MEDICATION
MEDICATIONS  (STANDING):  atorvastatin 40 milliGRAM(s) Oral at bedtime  bisacodyl 5 milliGRAM(s) Oral at bedtime  cloZAPine 400 milliGRAM(s) Oral at bedtime  divalproex  milliGRAM(s) Oral daily  divalproex  milliGRAM(s) Oral at bedtime  levothyroxine 25 MICROGram(s) Oral daily  LORazepam     Tablet 2 milliGRAM(s) Oral at bedtime  metFORMIN 1000 milliGRAM(s) Oral two times a day    MEDICATIONS  (PRN):   MEDICATIONS  (STANDING):  atorvastatin 40 milliGRAM(s) Oral at bedtime  benztropine 1 milliGRAM(s) Oral two times a day  bisacodyl 5 milliGRAM(s) Oral at bedtime  cloZAPine 400 milliGRAM(s) Oral at bedtime  divalproex  milliGRAM(s) Oral daily  divalproex  milliGRAM(s) Oral at bedtime  levothyroxine 25 MICROGram(s) Oral daily  LORazepam     Tablet 2 milliGRAM(s) Oral at bedtime  metFORMIN 1000 milliGRAM(s) Oral two times a day    MEDICATIONS  (PRN):  diphenhydrAMINE 50 milliGRAM(s) Oral every 6 hours PRN Agitation  haloperidol     Tablet 5 milliGRAM(s) Oral every 6 hours PRN Agitation  hydrOXYzine hydrochloride 50 milliGRAM(s) Oral every 6 hours PRN Anxiety, insomnia  LORazepam     Tablet 2 milliGRAM(s) Oral every 6 hours PRN Agitation

## 2023-07-25 NOTE — BH PATIENT PROFILE - HOME MEDICATIONS
Macrobid 100 mg oral capsule , 1 cap(s) orally 2 times a day  levothyroxine 25 mcg (0.025 mg) oral tablet , 1 tab(s) orally once a day  MiraLax oral powder for reconstitution , 17 gram(s) orally once a day as needed for  constipation  bisacodyl 5 mg oral delayed release tablet , 1 tab(s) orally once a day (at bedtime) MDD: 5mg  metoprolol succinate 25 mg oral tablet, extended release , 1 tab(s) orally once a day MDD: 25mg  cloZAPine 200 mg oral tablet , 2 tab(s) orally once a day (at bedtime) MDD: 500mg  cloZAPine 100 mg oral tablet , 1 tab(s) orally once a day MDD: 500mg  atorvastatin 40 mg oral tablet , 1 tab(s) orally once a day (at bedtime) MDD: 40mg  metFORMIN 1000 mg oral tablet , 1 tab(s) orally 2 times a day MDD: 2000mg  divalproex sodium 500 mg oral delayed release tablet , 1 tab(s) orally once a day MDD: 1250mg  divalproex sodium 250 mg oral delayed release tablet , 3 tab(s) orally once a day (at bedtime) MDD: 1250mg  LORazepam 2 mg oral tablet , 1 tab(s) orally once a day (at bedtime) MDD: 2mg

## 2023-07-25 NOTE — BH INPATIENT PSYCHIATRY ASSESSMENT NOTE - NSBHCHARTREVIEWLAB_PSY_A_CORE FT
Complete Blood Count + Automated Diff (07.23.23 @ 19:58)   WBC Count: 5.21 K/uL  RBC Count: 4.48 M/uL  Hemoglobin: 11.9 g/dL  Hematocrit: 37.8 %  Mean Cell Volume: 84.4 fL  Mean Cell Hemoglobin: 26.6 pg  Mean Cell Hemoglobin Conc: 31.5 g/dL  Red Cell Distrib Width: 18.4 %  Platelet Count - Automated: 239 K/uL  MPV: 9.9 fL  Auto Neutrophil #: 2.30 K/uL  Auto Lymphocyte #: 2.21 K/uL  Auto Monocyte #: 0.56 K/uL  Auto Eosinophil #: 0.11 K/uL  Auto Basophil #: 0.02 K/uL  Auto Neutrophil %: 44.2: Differential percentages must be correlated with absolute numbers for   clinical significance. %  Auto Lymphocyte %: 42.4 %  Auto Monocyte %: 10.7 %  Auto Eosinophil %: 2.1 %  Auto Basophil %: 0.4 %  Auto Immature Granulocyte %: 0.2: (Includes meta, myelo and promyelocytes). Mild elevations in immature   granulocytes may be seen with many inflammatory processes and pregnancy;   clinical correlation suggested. %  Nucleated RBC: 0 /100 WBCsmprehensive Metabolic Panel (07.23.23 @ 19:58)   Sodium: 141 mmol/L  Potassium: 4.7 mmol/L  Chloride: 104 mmol/L  Carbon Dioxide: 25 mmol/L  Anion Gap: 12 mmol/L  Blood Urea Nitrogen: 7 mg/dL  Creatinine: 0.8 mg/dL  Glucose: 166 mg/dL  Calcium: 9.6 mg/dL  Protein Total: 7.1 g/dL  Albumin: 4.2 g/dL  Bilirubin Total: <0.2 mg/dL  Alkaline Phosphatase: 76 U/L  Aspartate Aminotransferase (AST/SGOT): 11 U/L  Alanine Aminotransferase (ALT/SGPT): 5 U/L  eGFR: 107: The estimated glomerular filtration rate (eGFR) is calculated using the   2021 CKD-EPI creatinine equation, which does not have a coefficient for   race and is validated in individuals 18 years of age and older (N Engl J   Med 2021; 385:4205-6168). Creatinine-based eGFR may be inaccurate in   various situations including but not limited to extremes of muscle mass,   altered dietary protein intake, or medications that affect renal tubular   creatinine secretion. mL/min/1.66s3Guawetmcjc Level, Serum (07.23.23 @ 19:58)   Salicylate Level, Serum: <0.3 mg/dLAcetaminophen Level, Serum: <5.0 ug/mL (07.23.23 @ 19:58) Alcohol, Blood: <10 mg/dL (07.23.23 @ 19:58)

## 2023-07-25 NOTE — BH INPATIENT PSYCHIATRY ASSESSMENT NOTE - NSBHASSESSSUMMFT_PSY_ALL_CORE
Pt is a 53 yo M, single, non-caregiver, disabled, domiciled at [facility] (#[phone]), PMH significant for DM, HLD, HTN, hypothyroidism, per psyckes carries various mood and psychotic disorders (schizoaffective disorder, MDD, Schizophrenia, Bipolar disorder), multiple substance related disorders (cannabis, other psychoactive substances) and antisocial personality disorder, per dave is a high utilizer of ED/inpatient services, has well documented h/o presenting to the ED endorsing SI for secondary gain, multiple prior psych admissions (most recently at [facility] from 4/25-5/923), per chart has multiple suicide attempts, in outpatient treatment at [facility], on clozaril 100 mg/400 mg AM/PM, Depakote 500/750mg AM/PM, Ativan 2 mg qHS, and Invega [id number] mg q4WKS (last dose 6/29/23, next dose scheduled for 7/27/2023).    While chart history shows that the patient in the past may have had secondary gain, poor frustration tolerance with his living situation, or a pattern of utilizing the ED and hospitalizations as emotional coping mechanisms, there is reason to believe that his presentation is slightly more acute and warranting further stabilization. The patient reports an acute stressor and continues to express mostly passive SI, and overall appears dysphoric and stressed. While his hospitalization this time may not change his future overall pattern of coping in light of triggers like this, he is someone who is very concrete, with longstanding history of psychosis and a tendency to be impulsive, which increase his risk of self-harm. Since he has demonstrated history of self-harm, continues to express affective disturbance, and is confronting an acute stressor, he would benefit from continued voluntary psychiatric hospitalization for safety.      Plan:  - call Dr. Naren Pearson, [phone]  - continues Clozaril 100mg qam and Clozaril 400mg qhs  - Invega Sustenna 156mg last given 6/29/23, next dose due 7/27/23  - continue Depakote 500mg @1pm, 750mg qhs  - continue Ativan 2mg qhs  - Start cogentin 1mg BID  - Start Atarax 50mg q6h as need for anxiety or agitation  - other meds: atorvastatin 40mg qhs, bisacodyl 5 qhs, Colace, Synthroid 25mcg, metformin 2000mg BIDwho self presents to the ED expressing SI/HI/cAH.

## 2023-07-25 NOTE — BH PATIENT PROFILE - NSFALLSECTIONLABEL_GEN_A_CORE
Protopic Counseling: Patient may experience a mild burning sensation during topical application. Protopic is not approved in children less than 2 years of age. There have been case reports of hematologic and skin malignancies in patients using topical calcineurin inhibitors although causality is questionable. .

## 2023-07-25 NOTE — BH PATIENT PROFILE - NSNUTRITIONASSESS_GEN_ALL_CORE
Diagnosis of diabetes/Teeth, gums or other dental issues/Wants to speak to a dietitian about his/her therapeutic diet

## 2023-07-25 NOTE — CONSULT NOTE ADULT - SUBJECTIVE AND OBJECTIVE BOX
SUBJECTIVE:    Patient is a 52y old Male who presents with a chief complaint of   Currently admitted to medicine with the primary diagnosis of Suicidal ideation       Today is hospital day 1d. This morning he is resting comfortably in bed and reports no new issues or overnight events.     ROS:   CONSTITUTIONAL: No weakness, fevers or chills   EYES/ENT: No visual changes; No vertigo or throat pain   NECK: No pain or stiffness   RESPIRATORY: No cough, wheezing, hemoptysis; No shortness of breath   CARDIOVASCULAR: No chest pain or palpitations   GASTROINTESTINAL: No abdominal or epigastric pain. No nausea, vomiting, or hematemesis; No diarrhea or constipation. No melena or hematochezia.  GENITOURINARY: No dysuria, frequency or hematuria  NEUROLOGICAL: No numbness or weakness  SKIN: No itching, rashes      PAST MEDICAL & SURGICAL HISTORY  DM (diabetes mellitus)    HTN (hypertension)    Schizophrenia    Substance use disorder    No significant past surgical history      SOCIAL HISTORY:    ALLERGIES:  penicillin (Unknown)  Navane (Unknown)  Thorazine (Unknown)  fish (Unknown)  pork (Unknown)    MEDICATIONS:  STANDING MEDICATIONS  atorvastatin 40 milliGRAM(s) Oral at bedtime  bisacodyl 5 milliGRAM(s) Oral at bedtime  cloZAPine 400 milliGRAM(s) Oral at bedtime  divalproex  milliGRAM(s) Oral daily  divalproex  milliGRAM(s) Oral at bedtime  levothyroxine 25 MICROGram(s) Oral daily  LORazepam     Tablet 2 milliGRAM(s) Oral at bedtime  metFORMIN 1000 milliGRAM(s) Oral two times a day    PRN MEDICATIONS    VITALS:   T(F): 97.5  HR: 93  BP: 86/49  RR: 18  SpO2: 97%    LABS:  Negative for smoking/alcohol/drug use.                         11.9   5.21  )-----------( 239      ( 23 Jul 2023 19:58 )             37.8     07-23    141  |  104  |  7<L>  ----------------------------<  166<H>  4.7   |  25  |  0.8    Ca    9.6      23 Jul 2023 19:58    TPro  7.1  /  Alb  4.2  /  TBili  <0.2  /  DBili  x   /  AST  11  /  ALT  5   /  AlkPhos  76  07-23      Urinalysis Basic - ( 23 Jul 2023 19:58 )    Color: x / Appearance: x / SG: x / pH: x  Gluc: 166 mg/dL / Ketone: x  / Bili: x / Urobili: x   Blood: x / Protein: x / Nitrite: x   Leuk Esterase: x / RBC: x / WBC x   Sq Epi: x / Non Sq Epi: x / Bacteria: x    RADIOLOGY:    PHYSICAL EXAM:  GEN: No acute distress  HEENT: normocephalic, atraumatic, aniceteric  LUNGS: bl breath sounds   HEART: S1/S2 present. RRR, no murmurs  ABD: Soft, non-tender, non-distended. Bowel sounds present  EXT: warm   NEURO: non focal   PSYCH: denies SI on my exam       ASSESSMENT AND PLAN:    52-year-old male from [facility] with medical history of schizoaffective disorder, bipolar, hypertension, hyperlipidemia, diabetes, and hypothyroidism who presents to ER for suicidal ideation.    # SI   # H/O Schizoaffective d/o  # H/O Bipolar d/o  - care per primary psych team     # H/O HTN, controlled   - hold home bp meds given controlled/ soft blood pressure  - encourage po intake, if blood pressure continue to be soft - recommend midodrine 5 mg tid     # H/O HLD, cw statin, check lipid profile    # H/O DM, cw metformin, carb diet     thank you for the courtesy of this consult, recall prn  SUBJECTIVE:    Patient is a 52y old Male who presents with a chief complaint of   Currently admitted to medicine with the primary diagnosis of Suicidal ideation       Today is hospital day 1d. This morning he is resting comfortably in bed and reports no new issues or overnight events.     ROS:   CONSTITUTIONAL: No weakness, fevers or chills   EYES/ENT: No visual changes; No vertigo or throat pain   NECK: No pain or stiffness   RESPIRATORY: No cough, wheezing, hemoptysis; No shortness of breath   CARDIOVASCULAR: No chest pain or palpitations   GASTROINTESTINAL: No abdominal or epigastric pain. No nausea, vomiting, or hematemesis; No diarrhea or constipation. No melena or hematochezia.  GENITOURINARY: No dysuria, frequency or hematuria  NEUROLOGICAL: No numbness or weakness  SKIN: No itching, rashes      PAST MEDICAL & SURGICAL HISTORY  DM (diabetes mellitus)    HTN (hypertension)    Schizophrenia    Substance use disorder    No significant past surgical history      SOCIAL HISTORY:    ALLERGIES:  penicillin (Unknown)  Navane (Unknown)  Thorazine (Unknown)  fish (Unknown)  pork (Unknown)    MEDICATIONS:  STANDING MEDICATIONS  atorvastatin 40 milliGRAM(s) Oral at bedtime  bisacodyl 5 milliGRAM(s) Oral at bedtime  cloZAPine 400 milliGRAM(s) Oral at bedtime  divalproex  milliGRAM(s) Oral daily  divalproex  milliGRAM(s) Oral at bedtime  levothyroxine 25 MICROGram(s) Oral daily  LORazepam     Tablet 2 milliGRAM(s) Oral at bedtime  metFORMIN 1000 milliGRAM(s) Oral two times a day    PRN MEDICATIONS    VITALS:   T(F): 97.5  HR: 93  BP: 86/49  RR: 18  SpO2: 97%    LABS:  Negative for smoking/alcohol/drug use.                         11.9   5.21  )-----------( 239      ( 23 Jul 2023 19:58 )             37.8     07-23    141  |  104  |  7<L>  ----------------------------<  166<H>  4.7   |  25  |  0.8    Ca    9.6      23 Jul 2023 19:58    TPro  7.1  /  Alb  4.2  /  TBili  <0.2  /  DBili  x   /  AST  11  /  ALT  5   /  AlkPhos  76  07-23      Urinalysis Basic - ( 23 Jul 2023 19:58 )    Color: x / Appearance: x / SG: x / pH: x  Gluc: 166 mg/dL / Ketone: x  / Bili: x / Urobili: x   Blood: x / Protein: x / Nitrite: x   Leuk Esterase: x / RBC: x / WBC x   Sq Epi: x / Non Sq Epi: x / Bacteria: x    RADIOLOGY:    PHYSICAL EXAM:  GEN: No acute distress  HEENT: normocephalic, atraumatic, aniceteric  LUNGS: bl breath sounds   HEART: S1/S2 present. RRR, no murmurs  ABD: Soft, non-tender, non-distended. Bowel sounds present  EXT: warm   NEURO: non focal   PSYCH: denies SI on my exam       ASSESSMENT AND PLAN:    52-year-old male from [facility] with medical history of schizoaffective disorder, bipolar, hypertension, hyperlipidemia, diabetes, and hypothyroidism who presents to ER for suicidal ideation.    # SI   # H/O Schizoaffective d/o  # H/O Bipolar d/o  - care per primary psych team     # H/O HTN, controlled   - hold home bp meds given controlled/ soft blood pressure  - encourage po intake, if blood pressure continue to be soft - recommend midodrine 5 mg tid     # H/O HLD, cw statin, check lipid profile    # H/O DM, cw metformin, carb diet , check Hba1C    thank you for the courtesy of this consult, recall prn

## 2023-07-25 NOTE — BH INPATIENT PSYCHIATRY ASSESSMENT NOTE - DESCRIPTION
See hpi Mr. Levi is a 53 yo M, single, non-caregiver, disabled, domiciled at [facility], completed 9th grade.

## 2023-07-25 NOTE — BH INPATIENT PSYCHIATRY ASSESSMENT NOTE - NSBHCHARTREVIEWINVESTIGATE_PSY_A_CORE FT
< from: 12 Lead ECG (07.23.23 @ 18:53) >      Ventricular Rate 104 BPM    Atrial Rate 105 BPM    P-R Interval 244 ms    QRS Duration 104 ms    Q-T Interval 350 ms    QTC Calculation(Bazett) 460 ms    R Axis 11 degrees    T Axis -14 degrees    Diagnosis Line Sinus tachycardia  Moderate voltage criteria for LVH, may be normal variant  Inferior infarct , age undetermined  Abnormal ECG    Confirmed by Cheyenne Tyler MD ([id number]) on 7/24/2023 7:36:29 AM    < end of copied text >

## 2023-07-25 NOTE — BH SAFETY PLAN - THE ONE THING THAT IS MOST IMPORTANT TO ME AND WORTH LIVING FOR IS:
The one thing that is most important to me and worth living for would be respect, caring and curiosity.

## 2023-07-25 NOTE — BH INPATIENT PSYCHIATRY ASSESSMENT NOTE - OTHER PAST PSYCHIATRIC HISTORY (INCLUDE DETAILS REGARDING ONSET, COURSE OF ILLNESS, INPATIENT/OUTPATIENT TREATMENT)
See hpi Per nehal carries various mood and psychotic disorders (schizoaffective disorder, MDD, Schizophrenia, Bipolar disorder), multiple substance related disorders(cannabis, other psychoactive substances) and antisocial personality disorder, per dave is a high utilizer of ED/inpatient services, has well documented h/o presenting to the ED endorsing SI for secondary gain, multiple prior psych admissions (most recently at [facility] from 4/25-5/923), per chart has multiple suicide attempts, in outpt treatment at [facility]    Current psychotropic medications  - Clozaril 100mg qam, and Clozaril 400mg qhs  - Invega Sustenna 156mg last given 6/29/23, next dose due 7/27/23  - Depakote 500mg @1pm, 750mg qhs  - Ativan 2mg qhs    Outpatient psychiatrist: Dr. Naren Pearson, [phone]

## 2023-07-25 NOTE — BH INPATIENT PSYCHIATRY ASSESSMENT NOTE - NSBHCHARTREVIEWVS_PSY_A_CORE FT
Vital Signs Last 24 Hrs  T(C): 36.4 (07-25-23 @ 07:56), Max: 36.8 (07-24-23 @ 11:23)  T(F): 97.5 (07-25-23 @ 07:56), Max: 98.3 (07-24-23 @ 11:23)  HR: 93 (07-25-23 @ 07:56) (93 - 110)  BP: 86/49 (07-25-23 @ 07:56) (86/49 - 141/96)  BP(mean): --  RR: 18 (07-25-23 @ 07:56) (16 - 20)  SpO2: 97% (07-24-23 @ 22:30) (97% - 97%)     Vital Signs Last 24 Hrs  T(C): 36.4 (07-25-23 @ 07:56), Max: 36.7 (07-24-23 @ 16:49)  T(F): 97.5 (07-25-23 @ 07:56), Max: 98.1 (07-24-23 @ 22:30)  HR: 93 (07-25-23 @ 07:56) (93 - 103)  BP: 86/49 (07-25-23 @ 07:56) (86/49 - 141/96)  BP(mean): --  RR: 18 (07-25-23 @ 07:56) (16 - 20)  SpO2: 97% (07-24-23 @ 22:30) (97% - 97%)     Vital Signs Last 24 Hrs  T(C): 35.8 (07-25-23 @ 15:29), Max: 36.7 (07-24-23 @ 22:30)  T(F): 96.4 (07-25-23 @ 15:29), Max: 98.1 (07-24-23 @ 22:30)  HR: 109 (07-25-23 @ 15:29) (93 - 109)  BP: 124/69 (07-25-23 @ 15:29) (86/49 - 141/96)  BP(mean): --  RR: 16 (07-25-23 @ 15:29) (16 - 18)  SpO2: 97% (07-24-23 @ 22:30) (97% - 97%)

## 2023-07-25 NOTE — BH INPATIENT PSYCHIATRY ASSESSMENT NOTE - NSBHMETABOLIC_PSY_ALL_CORE_FT
BMI: BMI (kg/m2): 29 (07-25-23 @ 01:40)  HbA1c: A1C with Estimated Average Glucose Result: 6.5 % (03-19-23 @ 07:55)    Glucose: POCT Blood Glucose.: 137 mg/dL (07-24-23 @ 18:46)    BP: 86/49 (07-25-23 @ 07:56) (86/49 - 160/80)  Lipid Panel: Date/Time: 03-19-23 @ 07:55  Cholesterol, Serum: 161  Direct LDL: --  HDL Cholesterol, Serum: 27  Total Cholesterol/HDL Ration Measurement: --  Triglycerides, Serum: 116   BMI: BMI (kg/m2): 29 (07-25-23 @ 01:40)  HbA1c: A1C with Estimated Average Glucose Result: 6.5 % (03-19-23 @ 07:55)    Glucose: POCT Blood Glucose.: 137 mg/dL (07-24-23 @ 18:46)    BP: 124/69 (07-25-23 @ 15:29) (86/49 - 160/80)  Lipid Panel: Date/Time: 03-19-23 @ 07:55  Cholesterol, Serum: 161  Direct LDL: --  HDL Cholesterol, Serum: 27  Total Cholesterol/HDL Ration Measurement: --  Triglycerides, Serum: 116

## 2023-07-25 NOTE — BH INPATIENT PSYCHIATRY ASSESSMENT NOTE - NSBHMSESPABN_PSY_A_CORE
CV Surgery: ECMO Daily Management    Date: 10/16/2021    Reason for Visit: Management of temporary circulatory support    ECMO information: VV ECMO 9/25/21    ECMO indication: Acute respiratory collapse     Cannulation: 31 Fr Protek-Duo R IJ     ECMO Results   Internal Changes:               ECMO Pump Type Centrifugal Pump              ECMO Sweep Flow 6 L/min             ECMO Sweep Flow FiO2 1             ECMO Pump Flow 3.7 L/min             ECMO Pump Flow - RPM @ 4,300 RPMs             ECMO Preoxygenator Pressure 278 mm Hg     No line chatter.    Anticoagulation: No current anticoagulation.  INR 1.3  PTT 33    Oxygen: High flow nasal cannula 40 L/min FiO2 (%):  [60 %-65 %] 60 %    Current Status: Patient is stable with adequate flows on VV ECMO.    Current Infusions:  Esmolol 25 mcg/kg/min      Vitals, labs, and imaging documented over the past 24 hours have been independently reviewed.    Assessment    Patient is a 36 year old male with COVID-19 PNA and acute respiratory failure requiring VV ECMO.    Plan    - Encourage pulmonary toilet/ incentive spirometry.   - Fibrinogen 102. Nasal bleed. Transfuse fibryga.  - Continue ecmo support.  - PT.  - Pulmonary toilet/ incentive spirometry.      Charting performed by yunior Morton for Dr. Frederick.    All medical record entries made by the yunior were at my direction. I have reviewed the chart and agree that the record accurately reflects my personal performance of the history, physical exam, hospital course, and assessment and plan.   Soft volume/Decreased productivity/Impaired articulation

## 2023-07-25 NOTE — BH INPATIENT PSYCHIATRY ASSESSMENT NOTE - HPI (INCLUDE ILLNESS QUALITY, SEVERITY, DURATION, TIMING, CONTEXT, MODIFYING FACTORS, ASSOCIATED SIGNS AND SYMPTOMS)
Pt is a 53 yo M, single, non-caregiver, disabled, domiciled at [facility] (#[phone]), PMH significant for DM, HLD, HTN, hypothyroidism, per psyckes carries various mood and psychotic disorders (schizoaffective disorder, MDD, Schizophrenia, Bipolar disorder), multiple substance related disorders (cannabis, other psychoactive substances) and antisocial personality disorder, per dave is a high utilizer of ED/inpatient services, has well documented h/o presenting to the ED endorsing SI for secondary gain, multiple prior psych admissions (most recently at [facility] from 4/25-5/923), per chart has multiple suicide attempts, in outpatient treatment at [facility], on clozaril 100 mg/400 mg AM/PM, Depakote 500/750mg AM/PM, Ativan 2 mg qHS, and Invega [id number] mg q4WKS (last dose 6/29/23, next dose scheduled for 7/27/2023), who self presents to the ED expressing SI/HI/cAH.    Upon approach, patient is wrapped in blanket sitting in the TV room; he return to room for evaluation. He explains that his mtoher passed a few days ago and he wanted to go to the [facility] to kill himself. He states when he has thoughts of suicide that he usually he listens to music, writes poetry and songs; howver, this did not distract him. He says he "lives to live."   Pt is a 51 yo M, single, non-caregiver, disabled, domiciled at [facility] [phone]), PMH significant for DM, HLD, HTN, hypothyroidism, per psyckes carries various mood and psychotic disorders (schizoaffective disorder, MDD, Schizophrenia, Bipolar disorder), multiple substance related disorders (cannabis, other psychoactive substances) and antisocial personality disorder, per dave is a high utilizer of ED/inpatient services, has well documented h/o presenting to the ED endorsing SI for secondary gain, multiple prior psych admissions (most recently at [facility] from 4/25-5/923), per chart has multiple suicide attempts, in outpatient treatment at [facility], on clozaril 100 mg/400 mg AM/PM, Depakote 500/750mg AM/PM, Ativan 2 mg qHS, and Invega [id number] mg q4WKS (last dose 6/29/23, next dose scheduled for 7/27/2023), who self presents to the ED expressing SI/HI/cAH.    Upon approach, patient is wrapped in blanket sitting in the TV room; he return to his room for evaluation. He explains that his mother passed a few days ago and he wanted to go to the [facility] to kill himself. He states when he has thoughts of suicide that he usually he listens to music, writes poetry and songs; however, this did not distract him this time. He says he "lives to live." Last suicidal ideation was before his presentation to the ED. He states that he is voluntary and when could he go home, he then offers to leave Thursday or Friday. He complains of jaw spasms, says atarax he received helped. Endorses auditory hallucinations of people telling him he's "no good," multiple voices, yelling, external; states he last heard these voices before he presented to the ED. Denies paranoid, homicidal ideations, and visual hallucinations.     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      Collateral had no knowledge of the pt’s mother passing nor her most recent status/whereabouts as collateral reports the pt has had no contact with family that the residence is aware of.      Collateral noted that the pt’s current presentation in the ED is aligned with his long-term baseline pattern of presenting approximately every other month and endorsing SI. Collateral reports that pt often bangs his head against the wall and reports CAH as he is today, and this was the same presentation for his last ED visit in May. Collateral describes pt as a frequent ED utilizer but notes that until today the pt demonstrated improvement in this pattern by not visiting an ED since May.      Collateral reports that at his baseline, pt does possess the ability to engage coping skills and will contact 911 or ask for help from staff before self-harming or becoming agitated, but this behavior is interspersed with a regular pattern of decompensations that warrant others activating EMS on his behalf, as was the case in May. Collateral notes that the pt’s response to his decompensation has improved over the last few months as evidenced by his SIB becoming more infrequent, with the last notable episode occurring around April/May when the pt cut his wrists with a fork. Collateral reports that the pt does have the freedom to go into the community himself and occasionally while he is out he will take himself to the ED.      Collateral states that the pt does best when he is able to stay busy, and when he is regularly seen by his providers and has easy access to them.     ========================   NAME:  Akosua Partida   NUMBER:  [phone]   RELATIONSHIP: Outpatient therapist   RELIABILITY: Fair, has worked with the pt for approximately 2 months     COMMENTS: [facility] spoke very briefly with collateral regarding the passing of the pt’s mother. Collateral noted that pt left her a voicemail yesterday stating that his mother had passed away and that he was having bad thoughts. Collateral was unable to follow up with the pt before his presentation to the ED to speak more about his message and collateral does not have any contact information for the pt’s mother or other family. Collateral noted that the pt has not discussed his mother with collateral previously.      Collateral reports that pt has shown some improvement over their time working together but the pt does frequent the ED and has a hx of banging his head against the wall. Collateral states that to her knowledge the pt’s last ED visit was May 23rd of this year, and the pt was ultimately discharged. Collateral reports that per her notes, the pt has a hx of 5 suicide attempts. Collateral provided the name and number of the pt’s psychiatrist if needed: Dr. Naren Pearson, [phone].

## 2023-07-25 NOTE — BH PATIENT PROFILE - FUNCTIONAL ASSESSMENT - BASIC MOBILITY 6.
4-calculated by average/Not able to assess (calculate score using [facility] averaging method)

## 2023-07-26 LAB
A1C WITH ESTIMATED AVERAGE GLUCOSE RESULT: 5.8 % — HIGH (ref 4–5.6)
CHOLEST SERPL-MCNC: 226 MG/DL — HIGH
ESTIMATED AVERAGE GLUCOSE: 120 MG/DL — HIGH (ref 68–114)
HDLC SERPL-MCNC: 26 MG/DL — LOW
LIPID PNL WITH DIRECT LDL SERPL: 173 MG/DL — HIGH
NON HDL CHOLESTEROL: 200 MG/DL — HIGH
TRIGL SERPL-MCNC: 137 MG/DL — SIGNIFICANT CHANGE UP
TSH SERPL-MCNC: 1.75 UIU/ML — SIGNIFICANT CHANGE UP (ref 0.27–4.2)

## 2023-07-26 RX ORDER — PALIPERIDONE 1.5 MG/1
234 TABLET, EXTENDED RELEASE ORAL ONCE
Refills: 0 | Status: COMPLETED | OUTPATIENT
Start: 2023-07-27 | End: 2023-07-27

## 2023-07-26 RX ORDER — ESCITALOPRAM OXALATE 10 MG/1
10 TABLET, FILM COATED ORAL DAILY
Refills: 0 | Status: DISCONTINUED | OUTPATIENT
Start: 2023-07-26 | End: 2023-07-28

## 2023-07-26 RX ADMIN — METFORMIN HYDROCHLORIDE 1000 MILLIGRAM(S): 850 TABLET ORAL at 06:26

## 2023-07-26 RX ADMIN — Medication 5 MILLIGRAM(S): at 20:54

## 2023-07-26 RX ADMIN — Medication 25 MICROGRAM(S): at 06:26

## 2023-07-26 RX ADMIN — ATORVASTATIN CALCIUM 40 MILLIGRAM(S): 80 TABLET, FILM COATED ORAL at 20:53

## 2023-07-26 RX ADMIN — METFORMIN HYDROCHLORIDE 1000 MILLIGRAM(S): 850 TABLET ORAL at 18:48

## 2023-07-26 RX ADMIN — Medication 1 MILLIGRAM(S): at 08:48

## 2023-07-26 RX ADMIN — DIVALPROEX SODIUM 750 MILLIGRAM(S): 500 TABLET, DELAYED RELEASE ORAL at 20:53

## 2023-07-26 RX ADMIN — DIVALPROEX SODIUM 500 MILLIGRAM(S): 500 TABLET, DELAYED RELEASE ORAL at 13:12

## 2023-07-26 RX ADMIN — Medication 2 MILLIGRAM(S): at 20:54

## 2023-07-26 RX ADMIN — CLOZAPINE 400 MILLIGRAM(S): 150 TABLET, ORALLY DISINTEGRATING ORAL at 20:53

## 2023-07-26 RX ADMIN — Medication 1 MILLIGRAM(S): at 20:53

## 2023-07-26 NOTE — BH INPATIENT PSYCHIATRY PROGRESS NOTE - NSBHFUPINTERVALHXFT_PSY_A_CORE
Nursing reports no acute events overnight. Per chart review the patient has not required any behavioral PRNS since the last assessment.    Chart reviewed, patient seen and evaluated in AM. On approach, .... Patient reports ________________.   Patient endorsed OK sleep and appetite.     No SI/HI/AVH elicited.   Nursing reports no acute events overnight. Per chart review the patient has not required any behavioral PRNS since the last assessment.    Chart reviewed, patient seen and evaluated in AM. On approach, .... Patient reports ________________.   Patient endorsed OK sleep and appetite.     No SI/HI/AVH elicited.    Spoke to outpatient psychiatrist, Dr. Naren Pearson, [phone], on 7/26/23 at 09:30. She recommends starting patient on Lexapro for depressive symptoms. Agrees with giving Invega 234mg injection tomorrow to stay on schedule. She gives more information about patient's family: abandoned at the age of 6, both parents alcoholics, brother is in [employer]. Reports that patient said his mother committed suicide, but unable to verify the validity of this. Overall, explains that the patient has been stable since the induction of Invega, able to complete ADLs more easily, decreased auditory hallucinations; also states he is attention-seeking. She states that the patient only has 2 episodes of violence: punching a wall and cutting his wrists with a soda can. She explains that his presentation can also be due to his poor living conditions, where other residents smoke cannabis on the floor. She says that he is currently in therapy with Akosua, which he thoroughly enjoys. Denies patient's reporting any symptoms of EPS.    Nursing reports no acute events overnight. Per chart review the patient has not required any behavioral PRNS since the last assessment. Atarax given for anxiety. Cogentin added for possible EPS.    Chart reviewed, patient seen and evaluated in AM. On approach, patient is asleep in bed, with covers pulled up to his chin. Patient reports that he feels good, denies any EPS. Patient endorsed OK sleep and appetite. No SI/HI/AVH elicited.    Spoke to outpatient psychiatrist, Dr. Naren Pearson, [phone], on 7/26/23 at 09:30. She recommends starting patient on Lexapro for depressive symptoms. Agrees with giving Invega 234mg injection tomorrow to stay on schedule. She gives more information about patient's family: abandoned at the age of 6, both parents alcoholics, brother is in penitentiary. Reports that patient said his mother committed suicide, but unable to verify the validity of this. Overall, explains that the patient has been stable since the induction of Invega, able to complete ADLs more easily, decreased auditory hallucinations; also states he is attention-seeking. She states that the patient only has 2 episodes of violence: punching a wall and cutting his wrists with a soda can. She explains that his presentation can also be due to his poor living conditions, where other residents smoke cannabis on the floor. She says that he is currently in therapy with Akosua, which he thoroughly enjoys. Denies patient's reporting any symptoms of EPS.

## 2023-07-26 NOTE — BH INPATIENT PSYCHIATRY PROGRESS NOTE - NSBHASSESSSUMMFT_PSY_ALL_CORE
Pt is a 53 yo M, single, non-caregiver, disabled, domiciled at [facility] [phone]), PMH significant for DM, HLD, HTN, hypothyroidism, per psyckes carries various mood and psychotic disorders (schizoaffective disorder, MDD, Schizophrenia, Bipolar disorder), multiple substance related disorders (cannabis, other psychoactive substances) and antisocial personality disorder, per dave is a high utilizer of ED/inpatient services, has well documented h/o presenting to the ED endorsing SI for secondary gain, multiple prior psych admissions (most recently at [facility] from 4/25-5/923), per chart has multiple suicide attempts, in outpatient treatment at [facility], on clozaril 100 mg/400 mg AM/PM, Depakote 500/750mg AM/PM, Ativan 2 mg qHS, and Invega [id number] mg q4WKS (last dose 6/29/23, next dose scheduled for 7/27/2023).    While chart history shows that the patient in the past may have had secondary gain, poor frustration tolerance with his living situation, or a pattern of utilizing the ED and hospitalizations as emotional coping mechanisms, there is reason to believe that his presentation is slightly more acute and warranting further stabilization. The patient reports an acute stressor and continues to express mostly passive SI, and overall appears dysphoric and stressed. While his hospitalization this time may not change his future overall pattern of coping in light of triggers like this, he is someone who is very concrete, with longstanding history of psychosis and a tendency to be impulsive, which increase his risk of self-harm. Since he has demonstrated history of self-harm, continues to express affective disturbance, and is confronting an acute stressor, he would benefit from continued voluntary psychiatric hospitalization for safety.      Plan:  - Call Dr. Naren Pearson, [phone]  - Continue Clozaril 100mg qam and Clozaril 400mg qhs  - Invega Sustenna 156mg last given 6/29/23, next dose due 7/27/23  - Continue Depakote 500mg @1pm, 750mg qhs  - Continue Ativan 2mg qhs  - Continue cogentin 1mg BID  - Continue Atarax 50mg q6h as need for anxiety or agitation  - other meds: atorvastatin 40mg qhs, bisacodyl 5 qhs, Colace, Synthroid 25mcg, metformin 2000mg BIDwho self presents to the ED expressing SI/HI/cAH. Pt is a 51 yo M, single, non-caregiver, disabled, domiciled at [facility] [phone]), PMH significant for DM, HLD, HTN, hypothyroidism, per psyckes carries various mood and psychotic disorders (schizoaffective disorder, MDD, Schizophrenia, Bipolar disorder), multiple substance related disorders (cannabis, other psychoactive substances) and antisocial personality disorder, per dave is a high utilizer of ED/inpatient services, has well documented h/o presenting to the ED endorsing SI for secondary gain, multiple prior psych admissions (most recently at [facility] from 4/25-5/923), per chart has multiple suicide attempts, in outpatient treatment at [facility], on clozaril 100 mg/400 mg AM/PM, Depakote 500/750mg AM/PM, Ativan 2 mg qHS, and Invega [id number] mg q4WKS (last dose 6/29/23, next dose scheduled for 7/27/2023).    While chart history shows that the patient in the past may have had secondary gain, poor frustration tolerance with his living situation, or a pattern of utilizing the ED and hospitalizations as emotional coping mechanisms, there is reason to believe that his presentation is slightly more acute and warranting further stabilization. The patient reports an acute stressor and continues to express mostly passive SI, and overall appears dysphoric and stressed. While his hospitalization this time may not change his future overall pattern of coping in light of triggers like this, he is someone who is very concrete, with longstanding history of psychosis and a tendency to be impulsive, which increase his risk of self-harm. Since he has demonstrated history of self-harm, continues to express affective disturbance, and is confronting an acute stressor, he would benefit from continued voluntary psychiatric hospitalization for safety.      Plan:  - Dr. Naren Pearson, [phone], recommended initiation of Lexapro  - Continue Clozaril 100mg qam and Clozaril 400mg qhs  - Invega Sustenna 156mg last given 6/29/23  - Invega Sustenna 234mg ordered for tomorrow  - Continue Depakote 500mg @1pm, 750mg qhs  - Continue Ativan 2mg qhs  - Continue cogentin 1mg BID  - Continue Atarax 50mg q6h as need for anxiety or agitation  - other meds: atorvastatin 40mg qhs, bisacodyl 5 qhs, Colace, Synthroid 25mcg, metformin 2000mg BIDwho self presents to the ED expressing SI/HI/cAH. Pt is a 53 yo M, single, non-caregiver, disabled, domiciled at [facility] (#[phone]), PMH significant for DM, HLD, HTN, hypothyroidism, per psyckes carries various mood and psychotic disorders (schizoaffective disorder, MDD, Schizophrenia, Bipolar disorder), multiple substance related disorders (cannabis, other psychoactive substances) and antisocial personality disorder, per dave is a high utilizer of ED/inpatient services, has well documented h/o presenting to the ED endorsing SI for secondary gain, multiple prior psych admissions (most recently at [facility] from 4/25-5/923), per chart has multiple suicide attempts, in outpatient treatment at [facility], on clozaril 100 mg/400 mg AM/PM, Depakote 500/750mg AM/PM, Ativan 2 mg qHS, and Invega [id number] mg q4WKS (last dose 6/29/23, next dose scheduled for 7/27/2023).    While chart history shows that the patient in the past may have had secondary gain, poor frustration tolerance with his living situation, or a pattern of utilizing the ED and hospitalizations as emotional coping mechanisms, there is reason to believe that his presentation is slightly more acute and warranting further stabilization. The patient reports an acute stressor and continues to express mostly passive SI, and overall appears dysphoric and stressed. While his hospitalization this time may not change his future overall pattern of coping in light of triggers like this, he is someone who is very concrete, with longstanding history of psychosis and a tendency to be impulsive, which increase his risk of self-harm. Since he has demonstrated history of self-harm, continues to express affective disturbance, and is confronting an acute stressor, he would benefit from continued voluntary psychiatric hospitalization for safety.      Plan:  - Start Lexapro 10mg PO QD  - Continue Clozaril 100mg qam and Clozaril 400mg qhs  - Invega Sustenna 156mg last given 6/29/23  - Invega Sustenna 234mg IM ordered for tomorrow  - Continue Depakote 500mg @1pm, 750mg qhs  - Continue Ativan 2mg qhs  - Continue cogentin 1mg BID  - Continue Atarax 50mg q6h as need for anxiety or agitation  - Pending discharge Friday  - other meds: atorvastatin 40mg qhs, bisacodyl 5 qhs, Colace, Synthroid 25mcg, metformin 2000mg BIDwho self presents to the ED expressing SI/HI/cAH.

## 2023-07-26 NOTE — BH INPATIENT PSYCHIATRY PROGRESS NOTE - NSBHMETABOLIC_PSY_ALL_CORE_FT
BMI: BMI (kg/m2): 29 (07-25-23 @ 01:40)  HbA1c: A1C with Estimated Average Glucose Result: 6.5 % (03-19-23 @ 07:55)    Glucose: POCT Blood Glucose.: 137 mg/dL (07-24-23 @ 18:46)    BP: 94/70 (07-26-23 @ 07:46) (86/49 - 160/80)  Lipid Panel: Date/Time: 03-19-23 @ 07:55  Cholesterol, Serum: 161  Direct LDL: --  HDL Cholesterol, Serum: 27  Total Cholesterol/HDL Ration Measurement: --  Triglycerides, Serum: 116   BMI: BMI (kg/m2): 29 (07-25-23 @ 01:40)  HbA1c: A1C with Estimated Average Glucose Result: 5.8 % (07-26-23 @ 08:30)    Glucose: POCT Blood Glucose.: 137 mg/dL (07-24-23 @ 18:46)    BP: 93/67 (07-26-23 @ 16:43) (86/49 - 141/96)  Lipid Panel: Date/Time: 07-26-23 @ 08:30  Cholesterol, Serum: 226  Direct LDL: --  HDL Cholesterol, Serum: 26  Total Cholesterol/HDL Ration Measurement: --  Triglycerides, Serum: 137

## 2023-07-26 NOTE — BH INPATIENT PSYCHIATRY PROGRESS NOTE - NSBHCONSBHPROVDETAILS_PSY_A_CORE  FT
Called Dr. Naren Pearson, [phone], at 7/26/23 at 09:05 Called Dr. Naren Pearson, [phone], at 7/26/23 at 09:30

## 2023-07-26 NOTE — BH INPATIENT PSYCHIATRY PROGRESS NOTE - NSBHCHARTREVIEWVS_PSY_A_CORE FT
Vital Signs Last 24 Hrs  T(C): 35.5 (07-26-23 @ 07:46), Max: 35.8 (07-25-23 @ 15:29)  T(F): 95.9 (07-26-23 @ 07:46), Max: 96.4 (07-25-23 @ 15:29)  HR: 109 (07-26-23 @ 07:46) (109 - 109)  BP: 94/70 (07-26-23 @ 07:46) (94/70 - 124/69)  BP(mean): --  RR: 16 (07-26-23 @ 07:46) (16 - 16)  SpO2: --     Vital Signs Last 24 Hrs  T(C): 35.7 (07-26-23 @ 16:43), Max: 35.7 (07-26-23 @ 16:43)  T(F): 96.3 (07-26-23 @ 16:43), Max: 96.3 (07-26-23 @ 16:43)  HR: 119 (07-26-23 @ 16:43) (109 - 119)  BP: 93/67 (07-26-23 @ 16:43) (93/67 - 94/70)  BP(mean): --  RR: 20 (07-26-23 @ 16:43) (16 - 20)  SpO2: --

## 2023-07-26 NOTE — BH INPATIENT PSYCHIATRY PROGRESS NOTE - CURRENT MEDICATION
MEDICATIONS  (STANDING):  atorvastatin 40 milliGRAM(s) Oral at bedtime  benztropine 1 milliGRAM(s) Oral two times a day  bisacodyl 5 milliGRAM(s) Oral at bedtime  cloZAPine 400 milliGRAM(s) Oral at bedtime  divalproex  milliGRAM(s) Oral at bedtime  divalproex  milliGRAM(s) Oral daily  levothyroxine 25 MICROGram(s) Oral daily  LORazepam     Tablet 2 milliGRAM(s) Oral at bedtime  metFORMIN 1000 milliGRAM(s) Oral two times a day    MEDICATIONS  (PRN):  diphenhydrAMINE 50 milliGRAM(s) Oral every 6 hours PRN Agitation  haloperidol     Tablet 5 milliGRAM(s) Oral every 6 hours PRN Agitation  hydrOXYzine hydrochloride 50 milliGRAM(s) Oral every 6 hours PRN Anxiety, insomnia  LORazepam     Tablet 2 milliGRAM(s) Oral every 6 hours PRN Agitation   MEDICATIONS  (STANDING):  atorvastatin 40 milliGRAM(s) Oral at bedtime  benztropine 1 milliGRAM(s) Oral two times a day  bisacodyl 5 milliGRAM(s) Oral at bedtime  cloZAPine 400 milliGRAM(s) Oral at bedtime  divalproex  milliGRAM(s) Oral daily  divalproex  milliGRAM(s) Oral at bedtime  escitalopram 10 milliGRAM(s) Oral daily  levothyroxine 25 MICROGram(s) Oral daily  LORazepam     Tablet 2 milliGRAM(s) Oral at bedtime  metFORMIN 1000 milliGRAM(s) Oral two times a day    MEDICATIONS  (PRN):  diphenhydrAMINE 50 milliGRAM(s) Oral every 6 hours PRN Agitation  haloperidol     Tablet 5 milliGRAM(s) Oral every 6 hours PRN Agitation  hydrOXYzine hydrochloride 50 milliGRAM(s) Oral every 6 hours PRN Anxiety, insomnia  LORazepam     Tablet 2 milliGRAM(s) Oral every 6 hours PRN Agitation

## 2023-07-27 RX ORDER — CLOZAPINE 150 MG/1
100 TABLET, ORALLY DISINTEGRATING ORAL DAILY
Refills: 0 | Status: DISCONTINUED | OUTPATIENT
Start: 2023-07-27 | End: 2023-07-28

## 2023-07-27 RX ORDER — CLOZAPINE 150 MG/1
2 TABLET, ORALLY DISINTEGRATING ORAL
Qty: 0 | Refills: 0 | DISCHARGE
Start: 2023-07-27

## 2023-07-27 RX ORDER — CLOZAPINE 150 MG/1
1 TABLET, ORALLY DISINTEGRATING ORAL
Qty: 0 | Refills: 0 | DISCHARGE
Start: 2023-07-27

## 2023-07-27 RX ADMIN — Medication 1 MILLIGRAM(S): at 20:38

## 2023-07-27 RX ADMIN — CLOZAPINE 400 MILLIGRAM(S): 150 TABLET, ORALLY DISINTEGRATING ORAL at 20:37

## 2023-07-27 RX ADMIN — METFORMIN HYDROCHLORIDE 1000 MILLIGRAM(S): 850 TABLET ORAL at 17:54

## 2023-07-27 RX ADMIN — Medication 1 MILLIGRAM(S): at 09:01

## 2023-07-27 RX ADMIN — ESCITALOPRAM OXALATE 10 MILLIGRAM(S): 10 TABLET, FILM COATED ORAL at 09:01

## 2023-07-27 RX ADMIN — DIVALPROEX SODIUM 500 MILLIGRAM(S): 500 TABLET, DELAYED RELEASE ORAL at 11:23

## 2023-07-27 RX ADMIN — ATORVASTATIN CALCIUM 40 MILLIGRAM(S): 80 TABLET, FILM COATED ORAL at 21:14

## 2023-07-27 RX ADMIN — Medication 25 MICROGRAM(S): at 06:12

## 2023-07-27 RX ADMIN — Medication 5 MILLIGRAM(S): at 21:14

## 2023-07-27 RX ADMIN — PALIPERIDONE 234 MILLIGRAM(S): 1.5 TABLET, EXTENDED RELEASE ORAL at 12:04

## 2023-07-27 RX ADMIN — DIVALPROEX SODIUM 750 MILLIGRAM(S): 500 TABLET, DELAYED RELEASE ORAL at 20:38

## 2023-07-27 RX ADMIN — METFORMIN HYDROCHLORIDE 1000 MILLIGRAM(S): 850 TABLET ORAL at 09:01

## 2023-07-27 RX ADMIN — Medication 2 MILLIGRAM(S): at 20:38

## 2023-07-27 RX ADMIN — CLOZAPINE 100 MILLIGRAM(S): 150 TABLET, ORALLY DISINTEGRATING ORAL at 09:05

## 2023-07-27 NOTE — BH INPATIENT PSYCHIATRY PROGRESS NOTE - NSBHCHARTREVIEWINVESTIGATE_PSY_A_CORE FT
< from: 12 Lead ECG (07.23.23 @ 18:53) >      Ventricular Rate 104 BPM    Atrial Rate 105 BPM    P-R Interval 244 ms    QRS Duration 104 ms    Q-T Interval 350 ms    QTC Calculation(Bazett) 460 ms    R Axis 11 degrees    T Axis -14 degrees    Diagnosis Line Sinus tachycardia  Moderate voltage criteria for LVH, may be normal variant  Inferior infarct , age undetermined  Abnormal ECG    Confirmed by Cheyenne Tyler MD ([id number]) on 7/24/2023 7:36:29 AM    < end of copied text >    
< from: 12 Lead ECG (07.23.23 @ 18:53) >      Ventricular Rate 104 BPM    Atrial Rate 105 BPM    P-R Interval 244 ms    QRS Duration 104 ms    Q-T Interval 350 ms    QTC Calculation(Bazett) 460 ms    R Axis 11 degrees    T Axis -14 degrees    Diagnosis Line Sinus tachycardia  Moderate voltage criteria for LVH, may be normal variant  Inferior infarct , age undetermined  Abnormal ECG    Confirmed by Cheyenne Tyler MD [id number]) on 7/24/2023 7:36:29 AM    < end of copied text >

## 2023-07-27 NOTE — BH INPATIENT PSYCHIATRY PROGRESS NOTE - NSBHMSESPABN_PSY_A_CORE
Soft volume/Decreased productivity/Impaired articulation
Soft volume/Decreased productivity/Impaired articulation

## 2023-07-27 NOTE — BH INPATIENT PSYCHIATRY PROGRESS NOTE - NSBHCHARTREVIEWLAB_PSY_A_CORE FT
Complete Blood Count + Automated Diff (07.23.23 @ 19:58)   WBC Count: 5.21 K/uL  RBC Count: 4.48 M/uL  Hemoglobin: 11.9 g/dL  Hematocrit: 37.8 %  Mean Cell Volume: 84.4 fL  Mean Cell Hemoglobin: 26.6 pg  Mean Cell Hemoglobin Conc: 31.5 g/dL  Red Cell Distrib Width: 18.4 %  Platelet Count - Automated: 239 K/uL  MPV: 9.9 fL  Auto Neutrophil #: 2.30 K/uL  Auto Lymphocyte #: 2.21 K/uL  Auto Monocyte #: 0.56 K/uL  Auto Eosinophil #: 0.11 K/uL  Auto Basophil #: 0.02 K/uL  Auto Neutrophil %: 44.2: Differential percentages must be correlated with absolute numbers for   clinical significance. %  Auto Lymphocyte %: 42.4 %  Auto Monocyte %: 10.7 %  Auto Eosinophil %: 2.1 %  Auto Basophil %: 0.4 %  Auto Immature Granulocyte %: 0.2: (Includes meta, myelo and promyelocytes). Mild elevations in immature   granulocytes may be seen with many inflammatory processes and pregnancy;   clinical correlation suggested. %  Nucleated RBC: 0 /100 WBCsmprehensive Metabolic Panel (07.23.23 @ 19:58)   Sodium: 141 mmol/L  Potassium: 4.7 mmol/L  Chloride: 104 mmol/L  Carbon Dioxide: 25 mmol/L  Anion Gap: 12 mmol/L  Blood Urea Nitrogen: 7 mg/dL  Creatinine: 0.8 mg/dL  Glucose: 166 mg/dL  Calcium: 9.6 mg/dL  Protein Total: 7.1 g/dL  Albumin: 4.2 g/dL  Bilirubin Total: <0.2 mg/dL  Alkaline Phosphatase: 76 U/L  Aspartate Aminotransferase (AST/SGOT): 11 U/L  Alanine Aminotransferase (ALT/SGPT): 5 U/L  eGFR: 107: The estimated glomerular filtration rate (eGFR) is calculated using the   2021 CKD-EPI creatinine equation, which does not have a coefficient for   race and is validated in individuals 18 years of age and older (N Engl J   Med 2021; 385:3692-9872). Creatinine-based eGFR may be inaccurate in   various situations including but not limited to extremes of muscle mass,   altered dietary protein intake, or medications that affect renal tubular   creatinine secretion. mL/min/1.86k2Axgvlufphw Level, Serum (07.23.23 @ 19:58)   Salicylate Level, Serum: <0.3 mg/dLAcetaminophen Level, Serum: <5.0 ug/mL (07.23.23 @ 19:58) Alcohol, Blood: <10 mg/dL (07.23.23 @ 19:58) 
Complete Blood Count + Automated Diff (07.23.23 @ 19:58)   WBC Count: 5.21 K/uL  RBC Count: 4.48 M/uL  Hemoglobin: 11.9 g/dL  Hematocrit: 37.8 %  Mean Cell Volume: 84.4 fL  Mean Cell Hemoglobin: 26.6 pg  Mean Cell Hemoglobin Conc: 31.5 g/dL  Red Cell Distrib Width: 18.4 %  Platelet Count - Automated: 239 K/uL  MPV: 9.9 fL  Auto Neutrophil #: 2.30 K/uL  Auto Lymphocyte #: 2.21 K/uL  Auto Monocyte #: 0.56 K/uL  Auto Eosinophil #: 0.11 K/uL  Auto Basophil #: 0.02 K/uL  Auto Neutrophil %: 44.2: Differential percentages must be correlated with absolute numbers for   clinical significance. %  Auto Lymphocyte %: 42.4 %  Auto Monocyte %: 10.7 %  Auto Eosinophil %: 2.1 %  Auto Basophil %: 0.4 %  Auto Immature Granulocyte %: 0.2: (Includes meta, myelo and promyelocytes). Mild elevations in immature   granulocytes may be seen with many inflammatory processes and pregnancy;   clinical correlation suggested. %  Nucleated RBC: 0 /100 WBCsmprehensive Metabolic Panel (07.23.23 @ 19:58)   Sodium: 141 mmol/L  Potassium: 4.7 mmol/L  Chloride: 104 mmol/L  Carbon Dioxide: 25 mmol/L  Anion Gap: 12 mmol/L  Blood Urea Nitrogen: 7 mg/dL  Creatinine: 0.8 mg/dL  Glucose: 166 mg/dL  Calcium: 9.6 mg/dL  Protein Total: 7.1 g/dL  Albumin: 4.2 g/dL  Bilirubin Total: <0.2 mg/dL  Alkaline Phosphatase: 76 U/L  Aspartate Aminotransferase (AST/SGOT): 11 U/L  Alanine Aminotransferase (ALT/SGPT): 5 U/L  eGFR: 107: The estimated glomerular filtration rate (eGFR) is calculated using the   2021 CKD-EPI creatinine equation, which does not have a coefficient for   race and is validated in individuals 18 years of age and older (N Engl J   Med 2021; 385:4388-6985). Creatinine-based eGFR may be inaccurate in   various situations including but not limited to extremes of muscle mass,   altered dietary protein intake, or medications that affect renal tubular   creatinine secretion. mL/min/1.39u7Xdroyxsxsb Level, Serum (07.23.23 @ 19:58)   Salicylate Level, Serum: <0.3 mg/dLAcetaminophen Level, Serum: <5.0 ug/mL (07.23.23 @ 19:58) Alcohol, Blood: <10 mg/dL (07.23.23 @ 19:58)

## 2023-07-27 NOTE — BH TREATMENT PLAN - NSTXSUICIDINTERRN_PSY_ALL_CORE
Assess and monitor potential for self-harm including ideation, intention, plan and lethality.   Initiate suicide-risk precautions  Provide a safe environment  Remove all potentially dangerous items  Monitor all activities carefully  Provide additional safety measures based on level of risk   Anticipate initiation of pharmacologic therapy  Determine a mutual risk management and patient safety plan  Set clear and specific parameters

## 2023-07-27 NOTE — BH TREATMENT PLAN - NSTXDIABINTERRN_PSY_ALL_CORE
Establish target blood glucose levels based on patient-specific factors such as age, developmental stage and illness severity   Document blood glucose levels and monitor trend  Advocate for treatment if not within desired range   Provide pharmacologic therapy to maintain glycemic control  Advocate for correctional dose if blood glucose level is above target blood glucose level  Establish and follow a plan to identify and treat hypoglycemia  Avoid hypoglycemic episodes by adjusting insulin dose to change in condition   Identify potential cause in event of decreased blood glucose level.

## 2023-07-27 NOTE — BH CHART NOTE - RISK ASSESSMENT
Warning signs: suicidal ideations, anxiety, irritability, social isolation  Risk factors: age, gender, presenting symptoms, past suicide attempts  Protective factors: residential stability, treatment compliance  Access to lethal means: denies  Level of risk: low    
Warning signs: suicidal ideations, anxiety, irritability, social isolation  Risk factors: age, gender, presenting symptoms, past suicide attempts  Protective factors: residential stability, treatment compliance  Access to lethal means: denies  Level of risk: low

## 2023-07-27 NOTE — BH TREATMENT PLAN - NSTXDEPRESINTERRN_PSY_ALL_CORE
Assess and monitor risk for suicide, including but not limited to thoughts and ideation as patient may not disclose voluntarily  Provide opportunity for patient and family/caregiver to express feelings, stressors and self-perception   Convey caring approach, empathy and potential for change  Hope is hitchcock for recovery.   Utilize existing coping strategies and assist in developing new strategies, such as relaxation, gratitude, music and problem-solving  Assess for ineffective strategies   Recognize past and present achievements, successes and personal strengths.   Empower participation in planning care and activities, as well as development of attainable goals, to increase self-esteem and feelings of control.   Promote self-care; support balanced sleep, physical activity and nutrition.   Consider referral for a comprehensive assessment if there are concerns about the number, severity and duration of symptoms

## 2023-07-27 NOTE — BH INPATIENT PSYCHIATRY PROGRESS NOTE - NSBHCHARTREVIEWVS_PSY_A_CORE FT
Vital Signs Last 24 Hrs  T(C): 36.1 (07-27-23 @ 08:08), Max: 36.1 (07-27-23 @ 08:08)  T(F): 96.9 (07-27-23 @ 08:08), Max: 96.9 (07-27-23 @ 08:08)  HR: 94 (07-27-23 @ 08:08) (94 - 119)  BP: 116/71 (07-27-23 @ 08:08) (93/67 - 116/71)  BP(mean): --  RR: 18 (07-27-23 @ 08:08) (18 - 20)  SpO2: --

## 2023-07-27 NOTE — BH DISCHARGE NOTE NURSING/SOCIAL WORK/PSYCH REHAB - NSDCNEXTLEVELWHO_PSY_ALL_CORE_FT
[email or web address] Sarah Bell [email or web address] to [email or web address]

## 2023-07-27 NOTE — BH DISCHARGE NOTE NURSING/SOCIAL WORK/PSYCH REHAB - NSDCVIVACCINE_GEN_ALL_CORE_FT
Tdap; 21-Aug-2022 18:52; Kathrin Lebron (RN); [employer]; [id number] (Exp. Date: 22-Sep-2024); IntraMuscular; Deltoid Right.; 0.5 milliLiter(s); VIS (VIS Published: 09-May-2013, VIS Presented: 21-Aug-2022);

## 2023-07-27 NOTE — BH INPATIENT PSYCHIATRY PROGRESS NOTE - NSTXDIABGOAL_PSY_ALL_CORE
no rashes , no jaundice present
Will verbalize 2 signs and symptoms of hypo and hyperglycemia
Will verbalize 2 signs and symptoms of hypo and hyperglycemia

## 2023-07-27 NOTE — BH INPATIENT PSYCHIATRY DISCHARGE NOTE - NSDCMRMEDTOKEN_GEN_ALL_CORE_FT
atorvastatin 40 mg oral tablet: 1 tab(s) orally once a day (at bedtime) MDD: 40mg  bisacodyl 5 mg oral delayed release tablet: 1 tab(s) orally once a day (at bedtime) MDD: 5mg  cloZAPine 100 mg oral tablet: 1 tab(s) orally once a day MDD: 500mg  cloZAPine 200 mg oral tablet: 2 tab(s) orally once a day (at bedtime) MDD: 500mg  divalproex sodium 250 mg oral delayed release tablet: 3 tab(s) orally once a day (at bedtime) MDD: 1250mg  divalproex sodium 500 mg oral delayed release tablet: 1 tab(s) orally once a day MDD: 1250mg  levothyroxine 25 mcg (0.025 mg) oral tablet: 1 tab(s) orally once a day  LORazepam 2 mg oral tablet: 1 tab(s) orally once a day (at bedtime) MDD: 2mg  Macrobid 100 mg oral capsule: 1 cap(s) orally 2 times a day  metFORMIN 1000 mg oral tablet: 1 tab(s) orally 2 times a day MDD: 2000mg  metoprolol succinate 25 mg oral tablet, extended release: 1 tab(s) orally once a day MDD: 25mg  MiraLax oral powder for reconstitution: 17 gram(s) orally once a day as needed for  constipation   atorvastatin 40 mg oral tablet: 1 tab(s) orally once a day (at bedtime) x 14 days MDD: 40mg  benztropine 1 mg oral tablet: 1 tab(s) orally 2 times a day x 14 days  bisacodyl 5 mg oral delayed release tablet: 1 tab(s) orally once a day (at bedtime) MDD: 5mg  cloZAPine 100 mg oral tablet: 1 tab(s) orally once a day  cloZAPine 200 mg oral tablet: 2 tab(s) orally once a day (at bedtime)  divalproex sodium 250 mg oral delayed release tablet: 3 tab(s) orally once a day (at bedtime)  divalproex sodium 500 mg oral delayed release tablet: 1 tab(s) orally once a day x 14 days  escitalopram 10 mg oral tablet: 1 tab(s) orally once a day x 14 days  levothyroxine 25 mcg (0.025 mg) oral tablet: 1 tab(s) orally once a day x 14 days  LORazepam 2 mg oral tablet: 1 tab(s) orally once a day (at bedtime) MDD: 2mg  Macrobid 100 mg oral capsule: 1 cap(s) orally 2 times a day  metFORMIN 1000 mg oral tablet: 1 tab(s) orally 2 times a day x 14 days  metoprolol succinate 25 mg oral tablet, extended release: 1 tab(s) orally once a day x 14 days MDD: 25mg  MiraLax oral powder for reconstitution: 17 gram(s) orally once a day as needed for  constipation   atorvastatin 40 mg oral tablet: 1 tab(s) orally once a day (at bedtime) x 14 days MDD: 40mg  benztropine 1 mg oral tablet: 1 tab(s) orally 2 times a day x 14 days  bisacodyl 5 mg oral delayed release tablet: 1 tab(s) orally once a day (at bedtime) MDD: 5mg  cloZAPine 100 mg oral tablet: 1 tab(s) orally once a day  cloZAPine 200 mg oral tablet: 2 tab(s) orally once a day (at bedtime)  divalproex sodium 250 mg oral delayed release tablet: 3 tab(s) orally once a day (at bedtime)  divalproex sodium 500 mg oral delayed release tablet: 1 tab(s) orally once a day x 14 days  escitalopram 10 mg oral tablet: 1 tab(s) orally once a day x 14 days  levothyroxine 25 mcg (0.025 mg) oral tablet: 1 tab(s) orally once a day x 14 days  LORazepam 2 mg oral tablet: 1 tab(s) orally once a day (at bedtime) MDD: 2mg  metFORMIN 1000 mg oral tablet: 1 tab(s) orally 2 times a day x 14 days  metoprolol succinate 25 mg oral tablet, extended release: 1 tab(s) orally once a day x 14 days MDD: 25mg  MiraLax oral powder for reconstitution: 17 gram(s) orally once a day as needed for  constipation

## 2023-07-27 NOTE — BH INPATIENT PSYCHIATRY DISCHARGE NOTE - DESCRIPTION
Mr. Levi is a 53 yo M, single, non-caregiver, disabled, domiciled at [facility], completed 9th grade.

## 2023-07-27 NOTE — BH INPATIENT PSYCHIATRY PROGRESS NOTE - NSBHATTESTCOMMENTATTENDFT_PSY_A_CORE
Case discussed with resident.  I concur with findings and plan. 
Case discussed with resident.  I concur with findings and plan.

## 2023-07-27 NOTE — BH INPATIENT PSYCHIATRY PROGRESS NOTE - NSBHMETABOLIC_PSY_ALL_CORE_FT
BMI: BMI (kg/m2): 29 (07-25-23 @ 01:40)  HbA1c: A1C with Estimated Average Glucose Result: 5.8 % (07-26-23 @ 08:30)    Glucose: POCT Blood Glucose.: 137 mg/dL (07-24-23 @ 18:46)    BP: 116/71 (07-27-23 @ 08:08) (86/49 - 141/96)  Lipid Panel: Date/Time: 07-26-23 @ 08:30  Cholesterol, Serum: 226  Direct LDL: --  HDL Cholesterol, Serum: 26  Total Cholesterol/HDL Ration Measurement: --  Triglycerides, Serum: 137

## 2023-07-27 NOTE — BH DISCHARGE NOTE NURSING/SOCIAL WORK/PSYCH REHAB - NSCDUDCCRISIS_PSY_A_CORE
[facility]  1 [phone] [facility] ([phone])  Text "WELL" to [phone]  Website: www.[employer] for [employer]/.National Suicide Prevention Lifeline 8 [phone] [phone] [phone]/988 Suicide and Crisis Lifeline

## 2023-07-27 NOTE — BH INPATIENT PSYCHIATRY PROGRESS NOTE - CURRENT MEDICATION
MEDICATIONS  (STANDING):  atorvastatin 40 milliGRAM(s) Oral at bedtime  benztropine 1 milliGRAM(s) Oral two times a day  bisacodyl 5 milliGRAM(s) Oral at bedtime  cloZAPine 400 milliGRAM(s) Oral at bedtime  cloZAPine 100 milliGRAM(s) Oral daily  divalproex  milliGRAM(s) Oral daily  divalproex  milliGRAM(s) Oral at bedtime  escitalopram 10 milliGRAM(s) Oral daily  levothyroxine 25 MICROGram(s) Oral daily  LORazepam     Tablet 2 milliGRAM(s) Oral at bedtime  metFORMIN 1000 milliGRAM(s) Oral two times a day    MEDICATIONS  (PRN):  diphenhydrAMINE 50 milliGRAM(s) Oral every 6 hours PRN Agitation  haloperidol     Tablet 5 milliGRAM(s) Oral every 6 hours PRN Agitation  hydrOXYzine hydrochloride 50 milliGRAM(s) Oral every 6 hours PRN Anxiety, insomnia  LORazepam     Tablet 2 milliGRAM(s) Oral every 6 hours PRN Agitation   MEDICATIONS  (STANDING):  atorvastatin 40 milliGRAM(s) Oral at bedtime  benztropine 1 milliGRAM(s) Oral two times a day  bisacodyl 5 milliGRAM(s) Oral at bedtime  cloZAPine 100 milliGRAM(s) Oral daily  cloZAPine 400 milliGRAM(s) Oral at bedtime  divalproex  milliGRAM(s) Oral daily  divalproex  milliGRAM(s) Oral at bedtime  escitalopram 10 milliGRAM(s) Oral daily  levothyroxine 25 MICROGram(s) Oral daily  LORazepam     Tablet 2 milliGRAM(s) Oral at bedtime  metFORMIN 1000 milliGRAM(s) Oral two times a day    MEDICATIONS  (PRN):  diphenhydrAMINE 50 milliGRAM(s) Oral every 6 hours PRN Agitation  haloperidol     Tablet 5 milliGRAM(s) Oral every 6 hours PRN Agitation  hydrOXYzine hydrochloride 50 milliGRAM(s) Oral every 6 hours PRN Anxiety, insomnia  LORazepam     Tablet 2 milliGRAM(s) Oral every 6 hours PRN Agitation

## 2023-07-27 NOTE — BH INPATIENT PSYCHIATRY PROGRESS NOTE - NSBHFUPINTERVALHXFT_PSY_A_CORE
Nursing reports no acute events overnight. Per chart review the patient has not required any behavioral PRNS since the last assessment.     Chart reviewed, patient seen and evaluated in AM. On approach, patient is asleep in bed, with covers pulled up to his chin. Patient reports that he feels good, denies any EPS. Endorses wanting to go home tomorrow. Is anticipating getting Invega IM today. Patient endorsed OK sleep and appetite. No SI/HI/AVH elicited.    Spoke to outpatient psychiatrist, Dr. Naren Pearson, [phone], on 7/26/23 at 09:30. She recommends starting patient on Lexapro for depressive symptoms. Agrees with giving Invega 234mg injection tomorrow to stay on schedule. She gives more information about patient's family: abandoned at the age of 6, both parents alcoholics, brother is in detention. Reports that patient said his mother committed suicide, but unable to verify the validity of this. Overall, explains that the patient has been stable since the induction of Invega, able to complete ADLs more easily, decreased auditory hallucinations; also states he is attention-seeking. She states that the patient only has 2 episodes of violence: punching a wall and cutting his wrists with a soda can. She explains that his presentation can also be due to his poor living conditions, where other residents smoke cannabis on the floor. She says that he is currently in therapy with [facility], which he thoroughly enjoys. Denies patient's reporting any symptoms of EPS.     Spoke to outpatient psychiatrist, Dr. Naren Pearson, [phone], on 7/27/23 at 11:20; updated her on patient's condition (adding Lexapro 10mg and cogentin 1mg BID, and patient is receiving Invega 234mg IM today.) She states that she has no further safety concerns.

## 2023-07-27 NOTE — BH INPATIENT PSYCHIATRY PROGRESS NOTE - NSBHASSESSSUMMFT_PSY_ALL_CORE
Pt is a 53 yo M, single, non-caregiver, disabled, domiciled at [facility] (#[phone]), PMH significant for DM, HLD, HTN, hypothyroidism, per psyckes carries various mood and psychotic disorders (schizoaffective disorder, MDD, Schizophrenia, Bipolar disorder), multiple substance related disorders (cannabis, other psychoactive substances) and antisocial personality disorder, per dave is a high utilizer of ED/inpatient services, has well documented h/o presenting to the ED endorsing SI for secondary gain, multiple prior psych admissions (most recently at [facility] from 4/25-5/923), per chart has multiple suicide attempts, in outpatient treatment at [facility], on clozaril 100 mg/400 mg AM/PM, Depakote 500/750mg AM/PM, Ativan 2 mg qHS, and Invega [id number] mg q4WKS (last dose 6/29/23, next dose scheduled for 7/27/2023).    While chart history shows that the patient in the past may have had secondary gain, poor frustration tolerance with his living situation, or a pattern of utilizing the ED and hospitalizations as emotional coping mechanisms, there is reason to believe that his presentation is slightly more acute and warranting further stabilization. The patient reports an acute stressor and continues to express mostly passive SI, and overall appears dysphoric and stressed. While his hospitalization this time may not change his future overall pattern of coping in light of triggers like this, he is someone who is very concrete, with longstanding history of psychosis and a tendency to be impulsive, which increase his risk of self-harm. Patient has remained calm and cooperative on unit, adamantly denying current suicidal ideations, plan or attempt. *****        Plan:  - Continue Lexapro 10mg PO QD  - Continue Clozaril 100mg qam and Clozaril 400mg qhs  - Invega Sustenna 156mg last given 6/29/23  - Invega Sustenna 234mg IM ordered for today, 7/27/23 - next dose is 08/24/23  - Continue Depakote 500mg @1pm, 750mg qhs  - Continue Ativan 2mg qhs  - Continue cogentin 1mg BID  - Continue Atarax 50mg q6h as need for anxiety or agitation  - Pending discharge tomorrow, with cab/ambulance to [facility], follow-up with Dr. Pearson  - other meds: atorvastatin 40mg qhs, bisacodyl 5 qhs, Colace, Synthroid 25mcg, metformin 2000mg BIDwho self presents to the ED expressing SI/HI/cAH. Pt is a 51 yo M, single, non-caregiver, disabled, domiciled at [facility] (#[phone]), PMH significant for DM, HLD, HTN, hypothyroidism, per psyckes carries various mood and psychotic disorders (schizoaffective disorder, MDD, Schizophrenia, Bipolar disorder), multiple substance related disorders (cannabis, other psychoactive substances) and antisocial personality disorder, per dave is a high utilizer of ED/inpatient services, has well documented h/o presenting to the ED endorsing SI for secondary gain, multiple prior psych admissions (most recently at [facility] from 4/25-5/923), per chart has multiple suicide attempts, in outpatient treatment at [facility], on clozaril 100 mg/400 mg AM/PM, Depakote 500/750mg AM/PM, Ativan 2 mg qHS, and Invega [id number] mg q4WKS (last dose 6/29/23, next dose scheduled for 7/27/2023).    Patient has a history of secondary gain, poor frustration tolerance with his living situation, or a pattern of utilizing the ED and hospitalizations as emotional coping mechanisms. Patient was admitted to the inpatient unit because there was reason to believe that his presentation is slightly more acute and warranting further stabilization. Throughout his admission, patient has remained calm and cooperative on unit, adamantly denying current suicidal ideations, plan or attempt. He appears discharge oriented  and treatment complaint. Patient appears stable for discharge tomorrow, with outpatient follow with Dr. Pearson.    Plan:  - Continue Lexapro 10mg PO QD  - Continue Clozaril 100mg qam and Clozaril 400mg qhs  - Invega Sustenna 156mg last given 6/29/23  - Invega Sustenna 234mg IM ordered for today, 7/27/23 - next dose is 08/24/23  - Continue Depakote 500mg @1pm, 750mg qhs  - Continue Ativan 2mg qhs  - Continue cogentin 1mg BID  - Continue Atarax 50mg q6h as need for anxiety or agitation  - Pending discharge tomorrow, with cab/ambulance to [facility], follow-up with Dr. Pearson  - other meds: atorvastatin 40mg qhs, bisacodyl 5 qhs, Colace, Synthroid 25mcg, metformin 2000mg BIDwho self presents to the ED expressing SI/HI/cAH.

## 2023-07-27 NOTE — BH DISCHARGE NOTE NURSING/SOCIAL WORK/PSYCH REHAB - PATIENT PORTAL LINK FT
You can access the FollowMyHealth Patient Portal offered by [facility] by registering at the following website: [email or web address]. By joining [facility]’s FollowMyHealth portal, you will also be able to view your health information using other applications (apps) compatible with our system.

## 2023-07-27 NOTE — BH TREATMENT PLAN - NSTXHYPERINTERRN_PSY_ALL_CORE
Evaluate adherence to home antihypertensive regimen   Provide scheduled antihypertensive medication  Consider administration time and effects   Monitor response to antihypertensive medication therapy   Minimize risk of orthostatic hypotension  Encourage caution with position changes, particularly if elderly   Encouraged to be compliant with medication

## 2023-07-27 NOTE — BH CHART NOTE - NSSUICRSKFACTOR_PSY_ALL_CORE
Current and Past Psychiatric Diagnoses/Activating Events/Stressors
Current and Past Psychiatric Diagnoses/Activating Events/Stressors

## 2023-07-27 NOTE — BH INPATIENT PSYCHIATRY PROGRESS NOTE - PRN MEDS
MEDICATIONS  (PRN):  diphenhydrAMINE 50 milliGRAM(s) Oral every 6 hours PRN Agitation  haloperidol     Tablet 5 milliGRAM(s) Oral every 6 hours PRN Agitation  hydrOXYzine hydrochloride 50 milliGRAM(s) Oral every 6 hours PRN Anxiety, insomnia  LORazepam     Tablet 2 milliGRAM(s) Oral every 6 hours PRN Agitation  
MEDICATIONS  (PRN):  diphenhydrAMINE 50 milliGRAM(s) Oral every 6 hours PRN Agitation  haloperidol     Tablet 5 milliGRAM(s) Oral every 6 hours PRN Agitation  hydrOXYzine hydrochloride 50 milliGRAM(s) Oral every 6 hours PRN Anxiety, insomnia  LORazepam     Tablet 2 milliGRAM(s) Oral every 6 hours PRN Agitation

## 2023-07-27 NOTE — BH INPATIENT PSYCHIATRY DISCHARGE NOTE - OTHER PAST PSYCHIATRIC HISTORY (INCLUDE DETAILS REGARDING ONSET, COURSE OF ILLNESS, INPATIENT/OUTPATIENT TREATMENT)
pt has a history of mental illness with multiple prior IPP admissions  Per nehal carries various mood and psychotic disorders (schizoaffective disorder, MDD, Schizophrenia, Bipolar disorder), multiple substance related disorders(cannabis, other psychoactive substances) and antisocial personality disorder, per dave is a high utilizer of ED/inpatient services, has well documented h/o presenting to the ED endorsing SI for secondary gain, multiple prior psych admissions (most recently at [facility] from 4/25-5/923), per chart has multiple suicide attempts, in outpt treatment at [facility]    Current psychotropic medications  - Clozaril 100mg qam, and Clozaril 400mg qhs  - Invega Sustenna 156mg last given 6/29/23, next dose due 7/27/23  - Depakote 500mg @1pm, 750mg qhs  - Ativan 2mg qhs    Outpatient psychiatrist: Dr. Naren Pearson, [phone]

## 2023-07-27 NOTE — BH INPATIENT PSYCHIATRY PROGRESS NOTE - NSDCCRITERIA_PSY_ALL_CORE
Ability to safety plan, no longer actively suicidal 
Ability to safety plan, no longer actively suicidal
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
## 2023-07-27 NOTE — BH TREATMENT PLAN - NSTXPLANTHERAPYSESSIONSFT_PSY_ALL_CORE
07-25-23  Type of therapy: Other, Monitoring of Moods and Triggers  Type of session: Group  Level of patient participation: Not engaged  Duration of participation: Less than 15 minutes  Therapy conducted by: Social work  Therapy Summary: Patient was an observer during group session.  He did not participate or contrinbute to group discussion.    07-26-23  Type of therapy: Dialectical behavior therapy, Coping skills  Type of session: Group  Level of patient participation: Participates  Duration of participation: 45 minutes  Therapy conducted by: Social work  Therapy Summary: Patient attended the Crisis Skills Group with  and peers. The DBT Wise Mind ACCEPTS skill was taught (a Distress Tolerance skill that uses distraction to temporarily distance yourself from a distressing situation or emotion). The following skills were reviewed: “activities, contributing, comparisons, emotions, pushing away, thoughts, sensations”. Patients offered support and feedback while  facilitated the group.    Juma participated in the dialogue. He appeared open to the therapeutic intervention. He feels he can engage in the following activities to help temporarily distract from stressors: playing video games, listening to music. He provided peer empathy and support when appropriate.    07-26-23  Type of therapy: Dialectical behavior therapy, Social skills training  Type of session: Group  Level of patient participation: Participates  Duration of participation: 45 minutes  Therapy conducted by: Social work  Therapy Summary: Patient attended the Patient Education Group with  and peers. The Passive, Aggressive, and Assertive Communication worksheet was reviewed, which gives an overview of each communication style, along with tips to help patients with recognizing each one. Patients were asked to consider how one can replace aggression and passivity with assertiveness. Patients offered support and feedback while  facilitated the discussion.      Juma participated and appeared open to reviewing the materials presented. He was able to identify benefits of assertive communication. He engaged well with peers.    07-26-23  Type of therapy: Inspiration and motiviation, Self esteem  Type of session: Group  Level of patient participation: Participates  Duration of participation: 60 minutes  Therapy conducted by: Social work  Therapy Summary: Patient attended the Support Group with  and peers. Patients were encouraged to complete a “Life Story” exercise (inspired by Positive Psychology and Narrative Therapy) and consider his/ her ideal future. Patients were asked to consider how life will be different in this ideal future and any plans they have to achieve this goal. Patients provided support and feedback while  facilitated the discussion.    Juma was an active participant. He discussed his long term goals with his peers. He discussed his interest in securing strong personal relationships and exploring vocational opportunities, such as maintenance.    07-27-23  Type of therapy: Coping skills, Creative arts therapy, Inspiration and motiviation, Leisure development, Stress management  Type of session: Individual  Level of patient participation: Participated with encouragement  Duration of participation: 15 minutes  Therapy conducted by: Psych rehab  Therapy Summary: Pt is familiar with unit , pt usally enjoys music , simple creative arts peer socialization .

## 2023-07-27 NOTE — BH INPATIENT PSYCHIATRY DISCHARGE NOTE - NSBHASSESSSUMMFT_PSY_ALL_CORE
Pt is a 53 yo M, single, non-caregiver, disabled, domiciled at [facility] (#[phone]), PMH significant for DM, HLD, HTN, hypothyroidism, per psyckes carries various mood and psychotic disorders (schizoaffective disorder, MDD, Schizophrenia, Bipolar disorder), multiple substance related disorders (cannabis, other psychoactive substances) and antisocial personality disorder, per dave is a high utilizer of ED/inpatient services, has well documented h/o presenting to the ED endorsing SI for secondary gain, multiple prior psych admissions (most recently at [facility] from 4/25-5/923), per chart has multiple suicide attempts, in outpatient treatment at [facility], on clozaril 100 mg/400 mg AM/PM, Depakote 500/750mg AM/PM, Ativan 2 mg qHS, and Invega Siwyrpkj445 mg q4WKS (last dose 6/29/23, next dose scheduled for 7/27/2023).    Patient has a history of secondary gain, poor frustration tolerance with his living situation, or a pattern of utilizing the ED and hospitalizations as emotional coping mechanisms. Patient was admitted to the inpatient unit because there was reason to believe that his presentation is slightly more acute and warranting further stabilization. Throughout his admission, patient has remained calm and cooperative on unit, adamantly denying current suicidal ideations, plan or attempt. He appears discharge oriented and treatment complaint. Patient appears stable for discharge tomorrow, with outpatient follow with Dr. Pearson.    Plan:  - Continue Lexapro 10mg PO QD  - Continue Clozaril 100mg qam and Clozaril 400mg qhs  - Invega Sustenna 156mg last given 6/29/23  - Invega Sustenna 234mg IM ordered for today, 7/27/23 - next dose is 08/24/23  - Continue Depakote 500mg @1pm, 750mg qhs  - Continue Ativan 2mg qhs  - Continue Cogentin 1mg BID  - Continue Atarax 50mg q6h as need for anxiety or agitation  - Pending discharge tomorrow, with cab/ambulance to [facility], follow-up with Dr. Pearson  - other meds: atorvastatin 40mg qhs, bisacodyl 5 qhs, Colace, Synthroid 25mcg, metformin 2000mg BID Pt is a 51 yo M, single, non-caregiver, disabled, domiciled at [facility] (#[phone]), PMH significant for DM, HLD, HTN, hypothyroidism, per psyckes carries various mood and psychotic disorders (schizoaffective disorder, MDD, Schizophrenia, Bipolar disorder), multiple substance related disorders (cannabis, other psychoactive substances) and antisocial personality disorder, per dave is a high utilizer of ED/inpatient services, has well documented h/o presenting to the ED endorsing SI for secondary gain, multiple prior psych admissions (most recently at [facility] from 4/25-5/923), per chart has multiple suicide attempts, in outpatient treatment at [facility], on clozaril 100 mg/400 mg AM/PM, Depakote 500/750mg AM/PM, Ativan 2 mg qHS, and Invega Tlzofbff794 mg q4WKS (last dose 6/29/23, next dose scheduled for 7/27/2023).    Patient has a history of secondary gain, poor frustration tolerance with his living situation, or a pattern of utilizing the ED and hospitalizations as emotional coping mechanisms. Patient was admitted to the inpatient unit because there was reason to believe that his presentation is slightly more acute and warranting further stabilization. Throughout his admission, patient has remained calm and cooperative on unit, adamantly denying current suicidal ideations, plan or attempt. He appears discharge-oriented and treatment-compliant. Patient appears stable for discharge today, with outpatient follow up with Dr. Pearson.    Plan:  - Continue Lexapro 10mg PO QD  - Continue Clozaril 100mg qam and Clozaril 400mg qhs  - Invega Sustenna 156mg last given 6/29/23  - Invega Sustenna 234mg IM ordered for today, 7/27/23 - next dose is 08/24/23  - Continue Depakote 500mg @1pm, 750mg qhs  - Continue Ativan 2mg qhs  - Continue Cogentin 1mg BID  - Continue Atarax 50mg q6h as need for anxiety or agitation  - Pending discharge tomorrow, with cab/ambulance to [facility], follow-up with Dr. Pearson  - other meds: atorvastatin 40mg qhs, bisacodyl 5 qhs, Colace, Synthroid 25mcg, metformin 2000mg BID

## 2023-07-27 NOTE — BH INPATIENT PSYCHIATRY DISCHARGE NOTE - NSBHDCMEDICALFT_PSY_A_CORE
HTN: atorvastatin 40mg qhs  Constipation: bisacodyl 5 qhs, Colace  Hypothyroid: Synthroid 25mcg  DM II: metformin 2000mg BID

## 2023-07-27 NOTE — BH INPATIENT PSYCHIATRY PROGRESS NOTE - NSBHATTESTBILLING_PSY_A_CORE
43843-Pvdegyhyfn OBS or IP - low complexity OR 25-34 mins
12968-Ychrcrgkrw OBS or IP - low complexity OR 25-34 mins

## 2023-07-27 NOTE — BH INPATIENT PSYCHIATRY DISCHARGE NOTE - HOSPITAL COURSE
Pt is a 51 yo M, single, non-caregiver, disabled, domiciled at [facility] (#[phone]), PMH significant for DM, HLD, HTN, hypothyroidism, per psyckes carries various mood and psychotic disorders (schizoaffective disorder, MDD, Schizophrenia, Bipolar disorder), multiple substance related disorders (cannabis, other psychoactive substances) and antisocial personality disorder, per dave is a high utilizer of ED/inpatient services, has well documented h/o presenting to the ED endorsing SI for secondary gain, multiple prior psych admissions (most recently at [facility] from 4/25-5/923), per chart has multiple suicide attempts, in outpatient treatment at [facility], on clozaril 100 mg/400 mg AM/PM, Depakote 500/750mg AM/PM, Ativan 2 mg qHS, and Invega Pyfaopwh149 mg q4WKS (last dose 6/29/23, next dose scheduled for 7/27/2023).    Plan:  - Continue Lexapro 10mg PO QD  - Continue Clozaril 100mg qam and Clozaril 400mg qhs  - Invega Sustenna 156mg last given 6/29/23  - Invega Sustenna 234mg IM ordered for today, 7/27/23 - next dose is 08/24/23  - Continue Depakote 500mg @1pm, 750mg qhs  - Continue Ativan 2mg qhs  - Continue Cogentin 1mg BID  - Continue Atarax 50mg q6h as need for anxiety or agitation  - Pending discharge tomorrow, with cab/ambulance to [facility], follow-up with Dr. Pearson  - other meds: atorvastatin 40mg qhs, bisacodyl 5 qhs, Colace, Synthroid 25mcg, metformin 2000mg BID     Pt is a 51 yo M, single, non-caregiver, disabled, domiciled at [facility] (#[phone]), PMH significant for DM, HLD, HTN, hypothyroidism, per psyckes carries various mood and psychotic disorders (schizoaffective disorder, MDD, Schizophrenia, Bipolar disorder), multiple substance related disorders (cannabis, other psychoactive substances) and antisocial personality disorder, per dave is a high utilizer of ED/inpatient services, has well documented h/o presenting to the ED endorsing SI for secondary gain, multiple prior psych admissions (most recently at [facility] from 4/25-5/923), per chart has multiple suicide attempts, in outpatient treatment at [facility], on Clozaril 100 mg/400 mg AM/PM, Depakote 500/750mg AM/PM, Ativan 2 mg qHS, and Invega Apuxburn513 mg q4WKS (last dose 6/29/23, next dose scheduled for 7/27/2023).    Upon presentation to the inpatient unit, patient endorsed active suicidal ideation with a plan, in the context of an acute stressor of his mother passing away. Since then, he has adamantly denied any current suicidal ideations, plan, or intent. Throughout his stay, he remained discharge-oriented and treatment compliant. He was started on his home meds of Clozaril 100mg qam and Clozaril 400mg qhs, Invega Sustenna 234mg IM on/27/23, Depakote 500mg QD, 750mg qhs, and Ativan 2mg qhs. Per discussion with Dr. Pearson, patient was started on Lexapro 10mh PO QD for depressive symptoms. Also, started on Cogentin 1mg BID for possible perioral EPS. On this regimen, patient's negative symptoms neared baseline, no behavior concerns, no safety concerns, and he was deemed ready for discharge with plan to followup outpatient with Dr. Pearson and return to [facility].    Of note:  - Invega Sustenna 156mg given 6/29/23  - Invega Sustenna 234mg IM given /27/23, next dose is 08/24/23

## 2023-07-27 NOTE — BH TREATMENT PLAN - NSTXCOPEINTERSW_PSY_ALL_CORE
Sw will provide support contacts education and referrals for healthy coping skills.  Pt encouraged to attend at least one group per day.

## 2023-07-28 VITALS
TEMPERATURE: 96 F | DIASTOLIC BLOOD PRESSURE: 78 MMHG | HEART RATE: 121 BPM | SYSTOLIC BLOOD PRESSURE: 131 MMHG | RESPIRATION RATE: 18 BRPM

## 2023-07-28 RX ORDER — CLOZAPINE 150 MG/1
2 TABLET, ORALLY DISINTEGRATING ORAL
Qty: 28 | Refills: 0
Start: 2023-07-28 | End: 2023-08-10

## 2023-07-28 RX ORDER — DIVALPROEX SODIUM 500 MG/1
3 TABLET, DELAYED RELEASE ORAL
Qty: 42 | Refills: 0
Start: 2023-07-28 | End: 2023-08-10

## 2023-07-28 RX ORDER — POLYETHYLENE GLYCOL 3350 17 G/17G
17 POWDER, FOR SOLUTION ORAL
Qty: 1 | Refills: 0
Start: 2023-07-28 | End: 2023-08-10

## 2023-07-28 RX ORDER — ATORVASTATIN CALCIUM 80 MG/1
1 TABLET, FILM COATED ORAL
Qty: 14 | Refills: 0
Start: 2023-07-28 | End: 2023-08-10

## 2023-07-28 RX ORDER — METFORMIN HYDROCHLORIDE 850 MG/1
1 TABLET ORAL
Qty: 28 | Refills: 0
Start: 2023-07-28 | End: 2023-08-10

## 2023-07-28 RX ORDER — DIVALPROEX SODIUM 500 MG/1
1 TABLET, DELAYED RELEASE ORAL
Qty: 14 | Refills: 0
Start: 2023-07-28 | End: 2023-08-10

## 2023-07-28 RX ORDER — CLOZAPINE 150 MG/1
1 TABLET, ORALLY DISINTEGRATING ORAL
Qty: 14 | Refills: 0
Start: 2023-07-28 | End: 2023-08-10

## 2023-07-28 RX ORDER — ESCITALOPRAM OXALATE 10 MG/1
1 TABLET, FILM COATED ORAL
Qty: 14 | Refills: 0
Start: 2023-07-28 | End: 2023-08-10

## 2023-07-28 RX ORDER — METOPROLOL TARTRATE 50 MG
1 TABLET ORAL
Qty: 14 | Refills: 0
Start: 2023-07-28 | End: 2023-08-10

## 2023-07-28 RX ORDER — BENZTROPINE MESYLATE 1 MG
1 TABLET ORAL
Qty: 28 | Refills: 0
Start: 2023-07-28 | End: 2023-08-10

## 2023-07-28 RX ORDER — LEVOTHYROXINE SODIUM 125 MCG
1 TABLET ORAL
Qty: 14 | Refills: 0
Start: 2023-07-28 | End: 2023-08-10

## 2023-07-28 RX ADMIN — CLOZAPINE 100 MILLIGRAM(S): 150 TABLET, ORALLY DISINTEGRATING ORAL at 08:17

## 2023-07-28 RX ADMIN — Medication 25 MICROGRAM(S): at 06:24

## 2023-07-28 RX ADMIN — Medication 1 MILLIGRAM(S): at 08:18

## 2023-07-28 RX ADMIN — ESCITALOPRAM OXALATE 10 MILLIGRAM(S): 10 TABLET, FILM COATED ORAL at 08:14

## 2023-07-28 RX ADMIN — METFORMIN HYDROCHLORIDE 1000 MILLIGRAM(S): 850 TABLET ORAL at 07:13

## 2023-07-28 RX ADMIN — DIVALPROEX SODIUM 500 MILLIGRAM(S): 500 TABLET, DELAYED RELEASE ORAL at 11:27

## 2023-07-31 NOTE — BH SOCIAL WORK CONFIRMATION FOLLOW UP NOTE - NSLINKEDTOLOC_PSY_ALL_CORE
Juma attended his outpatient mental health appointment today with Dr. Pearson at [facility] as scheduled, [phone].

## 2023-08-02 DIAGNOSIS — F32.9 MAJOR DEPRESSIVE DISORDER, SINGLE EPISODE, UNSPECIFIED: ICD-10-CM

## 2023-08-02 DIAGNOSIS — R45.851 SUICIDAL IDEATIONS: ICD-10-CM

## 2023-08-02 DIAGNOSIS — F10.10 ALCOHOL ABUSE, UNCOMPLICATED: ICD-10-CM

## 2023-08-02 DIAGNOSIS — F25.0 SCHIZOAFFECTIVE DISORDER, BIPOLAR TYPE: ICD-10-CM

## 2023-08-02 DIAGNOSIS — Z79.84 LONG TERM (CURRENT) USE OF ORAL HYPOGLYCEMIC DRUGS: ICD-10-CM

## 2023-08-02 DIAGNOSIS — Z91.018 ALLERGY TO OTHER FOODS: ICD-10-CM

## 2023-08-02 DIAGNOSIS — K59.00 CONSTIPATION, UNSPECIFIED: ICD-10-CM

## 2023-08-02 DIAGNOSIS — Z88.0 ALLERGY STATUS TO PENICILLIN: ICD-10-CM

## 2023-08-02 DIAGNOSIS — Z79.624 LONG TERM (CURRENT) USE OF INHIBITORS OF NUCLEOTIDE SYNTHESIS: ICD-10-CM

## 2023-08-02 DIAGNOSIS — E78.5 HYPERLIPIDEMIA, UNSPECIFIED: ICD-10-CM

## 2023-08-02 DIAGNOSIS — I10 ESSENTIAL (PRIMARY) HYPERTENSION: ICD-10-CM

## 2023-08-02 DIAGNOSIS — Z91.013 ALLERGY TO SEAFOOD: ICD-10-CM

## 2023-08-02 DIAGNOSIS — E03.9 HYPOTHYROIDISM, UNSPECIFIED: ICD-10-CM

## 2023-08-05 NOTE — ED ADULT TRIAGE NOTE - BP NONINVASIVE SYSTOLIC (MM HG)
[address] [place] [address]  Phone: [phone]  Fax: [phone]  Follow Up Time: 4-6 Days

## 2023-08-16 NOTE — BH INPATIENT PSYCHIATRY PROGRESS NOTE - NSTXSUICIDGOAL_PSY_ALL_CORE
Peripheral Block    Patient location during procedure: pre-op  Reason for block: post-op pain management and at surgeon's request  Start time: 8/16/2023 12:45 PM  End time: 8/16/2023 12:49 PM  Staffing  Performed: anesthesiologist   Anesthesiologist: Duc Martines MD  Preanesthetic Checklist  Completed: patient identified, IV checked, site marked, risks and benefits discussed, surgical/procedural consents, equipment checked, pre-op evaluation, timeout performed, anesthesia consent given, oxygen available, monitors applied/VS acknowledged, fire risk safety assessment completed and verbalized and blood product R/B/A discussed and consented  Peripheral Block   Patient position: supine  Prep: ChloraPrep  Provider prep: mask and sterile gloves  Patient monitoring: cardiac monitor, continuous pulse ox, frequent blood pressure checks, IV access, oxygen and responsive to questions  Block type: Femoral  Adductor canal  Laterality: right  Injection technique: single-shot  Guidance: ultrasound guided    Needle   Needle type: insulated echogenic nerve stimulator needle   Needle localization: ultrasound guidance and anatomical landmarks  Test dose: negative  Assessment   Injection assessment: no intravascular symptoms, local visualized surrounding nerve on ultrasound, no paresthesia on injection and negative aspiration for heme  Paresthesia pain: none  Slow fractionated injection: yes  Hemodynamics: stable  Real-time US image taken/store: yes  Outcomes: uncomplicated    Medications Administered  dexmedetomidine (PRECEDEX) injection 200 mcg/2 mL - Perineural   0.2 mL - 8/16/2023 12:45:00 PM  ropivacaine (NAROPIN) injection 0.5% - Perineural   25 mL - 8/16/2023 12:45:00 PM
Be able to state 3 reasons for living

## 2023-08-18 ENCOUNTER — EMERGENCY (EMERGENCY)
Facility: HOSPITAL | Age: 53
LOS: 0 days | Discharge: LEFT BEFORE DISCHARGE | End: 2023-08-18
Attending: STUDENT IN AN ORGANIZED HEALTH CARE EDUCATION/TRAINING PROGRAM
Payer: MEDICAID

## 2023-08-18 VITALS
DIASTOLIC BLOOD PRESSURE: 62 MMHG | TEMPERATURE: 98 F | HEART RATE: 127 BPM | HEIGHT: 66 IN | RESPIRATION RATE: 18 BRPM | SYSTOLIC BLOOD PRESSURE: 101 MMHG | OXYGEN SATURATION: 99 %

## 2023-08-18 DIAGNOSIS — R06.02 SHORTNESS OF BREATH: ICD-10-CM

## 2023-08-18 DIAGNOSIS — I10 ESSENTIAL (PRIMARY) HYPERTENSION: ICD-10-CM

## 2023-08-18 DIAGNOSIS — R07.89 OTHER CHEST PAIN: ICD-10-CM

## 2023-08-18 DIAGNOSIS — F25.9 SCHIZOAFFECTIVE DISORDER, UNSPECIFIED: ICD-10-CM

## 2023-08-18 DIAGNOSIS — F31.9 BIPOLAR DISORDER, UNSPECIFIED: ICD-10-CM

## 2023-08-18 DIAGNOSIS — F17.200 NICOTINE DEPENDENCE, UNSPECIFIED, UNCOMPLICATED: ICD-10-CM

## 2023-08-18 DIAGNOSIS — Z53.29 PROCEDURE AND TREATMENT NOT CARRIED OUT BECAUSE OF PATIENT'S DECISION FOR OTHER REASONS: ICD-10-CM

## 2023-08-18 DIAGNOSIS — E78.5 HYPERLIPIDEMIA, UNSPECIFIED: ICD-10-CM

## 2023-08-18 DIAGNOSIS — E11.9 TYPE 2 DIABETES MELLITUS WITHOUT COMPLICATIONS: ICD-10-CM

## 2023-08-18 DIAGNOSIS — Z91.018 ALLERGY TO OTHER FOODS: ICD-10-CM

## 2023-08-18 DIAGNOSIS — Z88.8 ALLERGY STATUS TO OTHER DRUGS, MEDICAMENTS AND BIOLOGICAL SUBSTANCES: ICD-10-CM

## 2023-08-18 DIAGNOSIS — Z91.013 ALLERGY TO SEAFOOD: ICD-10-CM

## 2023-08-18 DIAGNOSIS — Z79.84 LONG TERM (CURRENT) USE OF ORAL HYPOGLYCEMIC DRUGS: ICD-10-CM

## 2023-08-18 DIAGNOSIS — Z88.0 ALLERGY STATUS TO PENICILLIN: ICD-10-CM

## 2023-08-18 DIAGNOSIS — E03.9 HYPOTHYROIDISM, UNSPECIFIED: ICD-10-CM

## 2023-08-18 LAB
ALBUMIN SERPL ELPH-MCNC: 3.8 G/DL — SIGNIFICANT CHANGE UP (ref 3.5–5.2)
ALP SERPL-CCNC: 79 U/L — SIGNIFICANT CHANGE UP (ref 30–115)
ALT FLD-CCNC: 7 U/L — SIGNIFICANT CHANGE UP (ref 0–41)
ANION GAP SERPL CALC-SCNC: 13 MMOL/L — SIGNIFICANT CHANGE UP (ref 7–14)
AST SERPL-CCNC: 17 U/L — SIGNIFICANT CHANGE UP (ref 0–41)
BASOPHILS # BLD AUTO: 0.02 K/UL — SIGNIFICANT CHANGE UP (ref 0–0.2)
BASOPHILS NFR BLD AUTO: 0.3 % — SIGNIFICANT CHANGE UP (ref 0–1)
BILIRUB SERPL-MCNC: <0.2 MG/DL — SIGNIFICANT CHANGE UP (ref 0.2–1.2)
BUN SERPL-MCNC: 11 MG/DL — SIGNIFICANT CHANGE UP (ref 10–20)
CALCIUM SERPL-MCNC: 9.5 MG/DL — SIGNIFICANT CHANGE UP (ref 8.4–10.4)
CHLORIDE SERPL-SCNC: 103 MMOL/L — SIGNIFICANT CHANGE UP (ref 98–110)
CO2 SERPL-SCNC: 22 MMOL/L — SIGNIFICANT CHANGE UP (ref 17–32)
CREAT SERPL-MCNC: 0.8 MG/DL — SIGNIFICANT CHANGE UP (ref 0.7–1.5)
EGFR: 107 ML/MIN/1.73M2 — SIGNIFICANT CHANGE UP
EOSINOPHIL # BLD AUTO: 0.09 K/UL — SIGNIFICANT CHANGE UP (ref 0–0.7)
EOSINOPHIL NFR BLD AUTO: 1.3 % — SIGNIFICANT CHANGE UP (ref 0–8)
GLUCOSE SERPL-MCNC: 102 MG/DL — HIGH (ref 70–99)
HCT VFR BLD CALC: 38.6 % — LOW (ref 42–52)
HGB BLD-MCNC: 12 G/DL — LOW (ref 14–18)
IMM GRANULOCYTES NFR BLD AUTO: 0.3 % — SIGNIFICANT CHANGE UP (ref 0.1–0.3)
LIDOCAIN IGE QN: 39 U/L — SIGNIFICANT CHANGE UP (ref 7–60)
LYMPHOCYTES # BLD AUTO: 1.8 K/UL — SIGNIFICANT CHANGE UP (ref 1.2–3.4)
LYMPHOCYTES # BLD AUTO: 26.8 % — SIGNIFICANT CHANGE UP (ref 20.5–51.1)
MCHC RBC-ENTMCNC: 25.9 PG — LOW (ref 27–31)
MCHC RBC-ENTMCNC: 31.1 G/DL — LOW (ref 32–37)
MCV RBC AUTO: 83.2 FL — SIGNIFICANT CHANGE UP (ref 80–94)
MONOCYTES # BLD AUTO: 0.67 K/UL — HIGH (ref 0.1–0.6)
MONOCYTES NFR BLD AUTO: 10 % — HIGH (ref 1.7–9.3)
NEUTROPHILS # BLD AUTO: 4.12 K/UL — SIGNIFICANT CHANGE UP (ref 1.4–6.5)
NEUTROPHILS NFR BLD AUTO: 61.3 % — SIGNIFICANT CHANGE UP (ref 42.2–75.2)
NRBC # BLD: 0 /100 WBCS — SIGNIFICANT CHANGE UP (ref 0–0)
PLATELET # BLD AUTO: 244 K/UL — SIGNIFICANT CHANGE UP (ref 130–400)
PMV BLD: 11.5 FL — HIGH (ref 7.4–10.4)
POTASSIUM SERPL-MCNC: 4.8 MMOL/L — SIGNIFICANT CHANGE UP (ref 3.5–5)
POTASSIUM SERPL-SCNC: 4.8 MMOL/L — SIGNIFICANT CHANGE UP (ref 3.5–5)
PROT SERPL-MCNC: 6.9 G/DL — SIGNIFICANT CHANGE UP (ref 6–8)
RBC # BLD: 4.64 M/UL — LOW (ref 4.7–6.1)
RBC # FLD: 18.6 % — HIGH (ref 11.5–14.5)
SODIUM SERPL-SCNC: 138 MMOL/L — SIGNIFICANT CHANGE UP (ref 135–146)
TROPONIN T SERPL-MCNC: 0.01 NG/ML — SIGNIFICANT CHANGE UP
WBC # BLD: 6.72 K/UL — SIGNIFICANT CHANGE UP (ref 4.8–10.8)
WBC # FLD AUTO: 6.72 K/UL — SIGNIFICANT CHANGE UP (ref 4.8–10.8)

## 2023-08-18 PROCEDURE — 83690 ASSAY OF LIPASE: CPT

## 2023-08-18 PROCEDURE — 93010 ELECTROCARDIOGRAM REPORT: CPT

## 2023-08-18 PROCEDURE — 85025 COMPLETE CBC W/AUTO DIFF WBC: CPT

## 2023-08-18 PROCEDURE — 99285 EMERGENCY DEPT VISIT HI MDM: CPT

## 2023-08-18 PROCEDURE — 71045 X-RAY EXAM CHEST 1 VIEW: CPT | Mod: 26

## 2023-08-18 PROCEDURE — 99285 EMERGENCY DEPT VISIT HI MDM: CPT | Mod: 25

## 2023-08-18 PROCEDURE — 96374 THER/PROPH/DIAG INJ IV PUSH: CPT

## 2023-08-18 PROCEDURE — 71045 X-RAY EXAM CHEST 1 VIEW: CPT

## 2023-08-18 PROCEDURE — 36415 COLL VENOUS BLD VENIPUNCTURE: CPT

## 2023-08-18 PROCEDURE — 84484 ASSAY OF TROPONIN QUANT: CPT

## 2023-08-18 PROCEDURE — 80053 COMPREHEN METABOLIC PANEL: CPT

## 2023-08-18 PROCEDURE — 93005 ELECTROCARDIOGRAM TRACING: CPT

## 2023-08-18 RX ORDER — KETOROLAC TROMETHAMINE 30 MG/ML
15 SYRINGE (ML) INJECTION ONCE
Refills: 0 | Status: DISCONTINUED | OUTPATIENT
Start: 2023-08-18 | End: 2023-08-18

## 2023-08-18 RX ORDER — SODIUM CHLORIDE 9 MG/ML
1000 INJECTION, SOLUTION INTRAVENOUS ONCE
Refills: 0 | Status: COMPLETED | OUTPATIENT
Start: 2023-08-18 | End: 2023-08-18

## 2023-08-18 RX ADMIN — SODIUM CHLORIDE 1000 MILLILITER(S): 9 INJECTION, SOLUTION INTRAVENOUS at 15:43

## 2023-08-18 RX ADMIN — Medication 15 MILLIGRAM(S): at 16:22

## 2023-08-18 RX ADMIN — Medication 15 MILLIGRAM(S): at 15:43

## 2023-08-18 NOTE — ED PROVIDER NOTE - CLINICAL SUMMARY MEDICAL DECISION MAKING FREE TEXT BOX
Patient will schizophrenic bipolar presents with chest pain shortness of breath.  Well appearing, NAD, non toxic. NCAT PERRLA EOMI neck supple non tender normal wob cta bl rrr abdomen s nt nd no rebound no guarding WWPx4 neuro non focal.  Patient pulled out his IV and eloped from the ED however patient feeling better

## 2023-08-18 NOTE — ED PROVIDER NOTE - OBJECTIVE STATEMENT
Pt is a 52-year-old male from [facility] with medical history of schizoaffective disorder, bipolar, hypertension, hyperlipidemia, diabetes, and hypothyroidism who presents to ER for cp and sob since earlier today. describes the pain as sharp. Otherwise denies any fever, chills, headache, changes in vision, cough, congestion, palpitations, n/v/d, abd pain, constipation, urinary complaints, lower extremity pain/swelling. no si hi avh.

## 2023-08-18 NOTE — ED ADULT NURSE REASSESSMENT NOTE - NS ED NURSE REASSESS COMMENT FT1
Patient AOx4, ambulates with steady gait, js Si/HI  patient verbalized leaving, he removed his IV and stated " You are  overdosing me on water". MD made aware.

## 2023-10-03 ENCOUNTER — EMERGENCY (EMERGENCY)
Facility: HOSPITAL | Age: 53
LOS: 0 days | Discharge: HOME | End: 2023-10-04
Attending: EMERGENCY MEDICINE
Payer: MEDICAID

## 2023-10-03 VITALS
RESPIRATION RATE: 12 BRPM | SYSTOLIC BLOOD PRESSURE: 138 MMHG | HEART RATE: 74 BPM | WEIGHT: 199.96 LBS | OXYGEN SATURATION: 98 % | DIASTOLIC BLOOD PRESSURE: 88 MMHG | HEIGHT: 66 IN | TEMPERATURE: 98 F

## 2023-10-03 DIAGNOSIS — Z20.822 CONTACT WITH AND (SUSPECTED) EXPOSURE TO COVID-19: ICD-10-CM

## 2023-10-03 DIAGNOSIS — I10 ESSENTIAL (PRIMARY) HYPERTENSION: ICD-10-CM

## 2023-10-03 DIAGNOSIS — F20.9 SCHIZOPHRENIA, UNSPECIFIED: ICD-10-CM

## 2023-10-03 DIAGNOSIS — E78.5 HYPERLIPIDEMIA, UNSPECIFIED: ICD-10-CM

## 2023-10-03 DIAGNOSIS — E11.9 TYPE 2 DIABETES MELLITUS WITHOUT COMPLICATIONS: ICD-10-CM

## 2023-10-03 DIAGNOSIS — Z79.84 LONG TERM (CURRENT) USE OF ORAL HYPOGLYCEMIC DRUGS: ICD-10-CM

## 2023-10-03 DIAGNOSIS — R44.0 AUDITORY HALLUCINATIONS: ICD-10-CM

## 2023-10-03 DIAGNOSIS — F31.9 BIPOLAR DISORDER, UNSPECIFIED: ICD-10-CM

## 2023-10-03 DIAGNOSIS — E03.9 HYPOTHYROIDISM, UNSPECIFIED: ICD-10-CM

## 2023-10-03 PROCEDURE — 87635 SARS-COV-2 COVID-19 AMP PRB: CPT

## 2023-10-03 PROCEDURE — 80048 BASIC METABOLIC PNL TOTAL CA: CPT

## 2023-10-03 PROCEDURE — 93005 ELECTROCARDIOGRAM TRACING: CPT

## 2023-10-03 PROCEDURE — 93010 ELECTROCARDIOGRAM REPORT: CPT

## 2023-10-03 PROCEDURE — 99285 EMERGENCY DEPT VISIT HI MDM: CPT

## 2023-10-03 PROCEDURE — 99285 EMERGENCY DEPT VISIT HI MDM: CPT | Mod: 25

## 2023-10-03 PROCEDURE — 36415 COLL VENOUS BLD VENIPUNCTURE: CPT

## 2023-10-03 PROCEDURE — 80307 DRUG TEST PRSMV CHEM ANLYZR: CPT

## 2023-10-03 PROCEDURE — 85025 COMPLETE CBC W/AUTO DIFF WBC: CPT

## 2023-10-03 NOTE — ED ADULT TRIAGE NOTE - CHIEF COMPLAINT QUOTE
pt bibems from [facility] for medication refill. denies SI/HI, and visual/auditory hallucinations

## 2023-10-03 NOTE — ED ADULT NURSE NOTE - CHIEF COMPLAINT QUOTE
Never smoker
pt bibems from [facility] for medication refill. denies SI/HI, and visual/auditory hallucinations

## 2023-10-03 NOTE — ED PROVIDER NOTE - PHYSICAL EXAMINATION
CONSTITUTIONAL: non-toxic appearing male, NAD   SKIN: skin exam is warm and dry  HEAD: Normocephalic; atraumatic  EYES: PERRL, EOM intact; conjunctiva and sclera clear.  ENT: MMM  CARD: S1, S2 normal, no murmur  RESP: Good air movement bilaterally  ABD: soft; non-distended; non-tender  EXT: Normal ROM   NEURO: awake, alert, following commands, oriented, grossly unremarkable. No Focal deficits. GCS 15.   PSYCH: Cooperative, appropriate

## 2023-10-03 NOTE — ED PROVIDER NOTE - PROGRESS NOTE DETAILS
Received sign out from Dr. Smith pending psych evaluation. patient comfortable at this time. Remains of 1:1. Psych assessment pending. psych consulted AY: Spoke with telepsych - stated they have still yet to evaluate the patient but that he is on their list to be seen. Patient signed out to Dr. Molina pending psych evaluation and reassessment. Pt seen by tele-psych and is cleared for discharge, pt has outpatient appointment this Friday with psychiatry.

## 2023-10-03 NOTE — ED ADULT NURSE NOTE - NSFALLUNIVINTERV_ED_ALL_ED
Bed/Stretcher in lowest position, wheels locked, appropriate side rails in place/Call bell, personal items and telephone in reach/Instruct patient to call for assistance before getting out of bed/chair/stretcher/Non-slip footwear applied when patient is off stretcher/[place] to call system/Physically safe environment - no spills, clutter or unnecessary equipment/Purposeful proactive rounding/Room/bathroom lighting operational, light cord in reach

## 2023-10-03 NOTE — ED PROVIDER NOTE - ATTENDING APP SHARED VISIT CONTRIBUTION OF CARE
Patient presented today in need of medication adjustment.  Patient also endorsed having suicidal ideations.  Patient's facility was contacted however unable to obtain appropriate collateral information.  Patient is pending psychiatric evaluation and disposition.

## 2023-10-03 NOTE — ED PROVIDER NOTE - NSFOLLOWUPINSTRUCTIONS_ED_ALL_ED_FT
Keep your appointment with your psychiatrist this Friday October 6th as scheduled.  Continue to take all of your medication as prescribed.

## 2023-10-03 NOTE — ED PROVIDER NOTE - PATIENT PORTAL LINK FT
You can access the FollowMyHealth Patient Portal offered by [facility] by registering at the following website: [email or web address]. By joining [facility]’s FollowMyHealth portal, you will also be able to view your health information using other applications (apps) compatible with our system.

## 2023-10-03 NOTE — ED PROVIDER NOTE - CLINICAL SUMMARY MEDICAL DECISION MAKING FREE TEXT BOX
53-year-old male with history of psychiatry presenting for evaluation from his outpatient psych facility.  Upon my evaluation patient denies any suicidal homicidal ideations, does not have any physical complaints.  Telepsychiatry was consulted, and cleared the patient for discharge to his facility.  He has an appointment with psychiatrist in 3 days.  Labs and vital signs reviewed.  Patient is pleasant and cooperative, agrees with discharge back to his facility.

## 2023-10-03 NOTE — ED ADULT NURSE NOTE - OBJECTIVE STATEMENT
Pt. complains of "racing ideas" requesting psych medication. Pt. reports of thought of hurting self. denies any thoughts of hurting others Spouse

## 2023-10-03 NOTE — ED PROVIDER NOTE - OBJECTIVE STATEMENT
53 year old male, past medical history htn, hdl, dm, schizoaffective disorder, bipolar disorder, who presents for eval. 53 year old male, past medical history htn, hdl, dm, schizoaffective disorder, bipolar disorder, who presents from [facility] for eval. patient admits to non-compliance with clozapine x1 month, reports increasingly more prevalent auditory hallucinations. patient also reports thoughts of hurting himself, no plan. denies hi, visual hallucinations. denies drug use or alcohol use.

## 2023-10-04 VITALS
TEMPERATURE: 98 F | SYSTOLIC BLOOD PRESSURE: 142 MMHG | HEART RATE: 104 BPM | DIASTOLIC BLOOD PRESSURE: 81 MMHG | RESPIRATION RATE: 18 BRPM

## 2023-10-04 DIAGNOSIS — F20.9 SCHIZOPHRENIA, UNSPECIFIED: ICD-10-CM

## 2023-10-04 LAB
ANION GAP SERPL CALC-SCNC: 13 MMOL/L — SIGNIFICANT CHANGE UP (ref 7–14)
APAP SERPL-MCNC: <5 UG/ML — LOW (ref 10–30)
BASOPHILS # BLD AUTO: 0.03 K/UL — SIGNIFICANT CHANGE UP (ref 0–0.2)
BASOPHILS NFR BLD AUTO: 0.4 % — SIGNIFICANT CHANGE UP (ref 0–1)
BUN SERPL-MCNC: 10 MG/DL — SIGNIFICANT CHANGE UP (ref 10–20)
CALCIUM SERPL-MCNC: 9.5 MG/DL — SIGNIFICANT CHANGE UP (ref 8.4–10.5)
CHLORIDE SERPL-SCNC: 97 MMOL/L — LOW (ref 98–110)
CO2 SERPL-SCNC: 23 MMOL/L — SIGNIFICANT CHANGE UP (ref 17–32)
CREAT SERPL-MCNC: 0.8 MG/DL — SIGNIFICANT CHANGE UP (ref 0.7–1.5)
EGFR: 106 ML/MIN/1.73M2 — SIGNIFICANT CHANGE UP
EOSINOPHIL # BLD AUTO: 0.18 K/UL — SIGNIFICANT CHANGE UP (ref 0–0.7)
EOSINOPHIL NFR BLD AUTO: 2.4 % — SIGNIFICANT CHANGE UP (ref 0–8)
ETHANOL SERPL-MCNC: <10 MG/DL — SIGNIFICANT CHANGE UP
GLUCOSE SERPL-MCNC: 87 MG/DL — SIGNIFICANT CHANGE UP (ref 70–99)
HCT VFR BLD CALC: 34.2 % — LOW (ref 42–52)
HGB BLD-MCNC: 10.9 G/DL — LOW (ref 14–18)
IMM GRANULOCYTES NFR BLD AUTO: 0.3 % — SIGNIFICANT CHANGE UP (ref 0.1–0.3)
LYMPHOCYTES # BLD AUTO: 3.23 K/UL — SIGNIFICANT CHANGE UP (ref 1.2–3.4)
LYMPHOCYTES # BLD AUTO: 43.9 % — SIGNIFICANT CHANGE UP (ref 20.5–51.1)
MCHC RBC-ENTMCNC: 25.8 PG — LOW (ref 27–31)
MCHC RBC-ENTMCNC: 31.9 G/DL — LOW (ref 32–37)
MCV RBC AUTO: 81 FL — SIGNIFICANT CHANGE UP (ref 80–94)
MONOCYTES # BLD AUTO: 0.82 K/UL — HIGH (ref 0.1–0.6)
MONOCYTES NFR BLD AUTO: 11.2 % — HIGH (ref 1.7–9.3)
NEUTROPHILS # BLD AUTO: 3.07 K/UL — SIGNIFICANT CHANGE UP (ref 1.4–6.5)
NEUTROPHILS NFR BLD AUTO: 41.8 % — LOW (ref 42.2–75.2)
NRBC # BLD: 0 /100 WBCS — SIGNIFICANT CHANGE UP (ref 0–0)
PLATELET # BLD AUTO: 276 K/UL — SIGNIFICANT CHANGE UP (ref 130–400)
PMV BLD: 10 FL — SIGNIFICANT CHANGE UP (ref 7.4–10.4)
POTASSIUM SERPL-MCNC: 3.8 MMOL/L — SIGNIFICANT CHANGE UP (ref 3.5–5)
POTASSIUM SERPL-SCNC: 3.8 MMOL/L — SIGNIFICANT CHANGE UP (ref 3.5–5)
RBC # BLD: 4.22 M/UL — LOW (ref 4.7–6.1)
RBC # FLD: 17.2 % — HIGH (ref 11.5–14.5)
SALICYLATES SERPL-MCNC: 1 MG/DL — LOW (ref 4–30)
SARS-COV-2 RNA SPEC QL NAA+PROBE: SIGNIFICANT CHANGE UP
SODIUM SERPL-SCNC: 133 MMOL/L — LOW (ref 135–146)
WBC # BLD: 7.35 K/UL — SIGNIFICANT CHANGE UP (ref 4.8–10.8)
WBC # FLD AUTO: 7.35 K/UL — SIGNIFICANT CHANGE UP (ref 4.8–10.8)

## 2023-10-04 PROCEDURE — 90792 PSYCH DIAG EVAL W/MED SRVCS: CPT | Mod: 95

## 2023-10-04 NOTE — ED ADULT NURSE REASSESSMENT NOTE - NS ED NURSE REASSESS COMMENT FT1
Pt on 1:1 observation, waiting for psych eval. 1:1 sit at bedside. Safety maintained. Belongings given to security. NAD noted.

## 2023-10-04 NOTE — ED ADULT NURSE REASSESSMENT NOTE - NS ED NURSE REASSESS COMMENT FT1
Psych evaluated pt via telepsych. Pt ok for d/c back to facility. transport placed for pt to return home. Pt alert and agreeable with plan of care. 1:1 maintained until pt transport arrives.

## 2023-10-04 NOTE — ED BEHAVIORAL HEALTH ASSESSMENT NOTE - SUMMARY
54 yo male with schizoaffective disorder, domiciled in a group home, on Clozaril and Invega Sustenna (last received about a week ago), multiple prior hospitalizations, many ED visits, last discharged from [facility] end of July, who was BIB EMS requested by self to staff due to feeling momentarily upset and "nervous." Per ED sign out, also made a suicidal threat.    Patient with no acute psychiatric signs/symptoms or distress, now expressing desire for discharge. He clearly admits to making a vague comment about wanting to kill himself but says clearly he did not mean it at all, and attributes this to momentarily feeling upset. Patient himself requested for EMS to be called. He has a very recurrent pattern of being help seeking and presenting to the ER if in distress. On exam patient presents very bright, smiling, animated, which is incongruent with being suicidal or in any significant emotional distress. He remains compliant w/ his outpatient treatment, likes his current provider, and is able to say he received his Invega Sustenna injection 1 wk ago and that he is meeting with both his psychiatrist and therapist in two days. These are all further reassuring factors. Patient is not presenting with any danger or evidence of psychiatric decompensation. Appropriate for discharge.

## 2023-10-04 NOTE — ED BEHAVIORAL HEALTH NOTE - BEHAVIORAL HEALTH NOTE
==================             PRE-HOSPITAL COURSE             ===================            SOURCE:  Secondhand EMR documentation.             DETAILS:  Patient BIBself to ED: chief complaint of AH.           ===========      ED COURSE:            ===========             SOURCE:  PA and secondhand EMR documentation.              ARRIVAL:  Patient noted to be cooperative with ED protocol. Patient gowned, wanded, and placed in private room on 1:1 for consult. Patient presents with fair hygiene/grooming.              BELONGINGS:  None notable.             BEHAVIOR: Blood provided for routine labs without noted incident. No SI/HI elicited per RN; patient endorsing CAH to harm himself, also endorses he has been noncompliant with his clozapine and spitting it out in facility. Patient is alert, orientedx4, and makes eye contact; speech of normal volume and rate accompanied by logical thought process. Patient has been in hospital bed while in ED; presently observed to be awake and calm.       TREATMENT: Patient has not required medication intervention while in ED; remains in behavioral control for duration of ED stay.      Visitors: Patient presently unaccompanied by social supports while in ED.

## 2023-10-04 NOTE — ED BEHAVIORAL HEALTH ASSESSMENT NOTE - NSSUICPROTFACT_PSY_ALL_CORE
Identifies reasons for living/Supportive social network of family or friends/Fear of death or the actual act of killing self/Cultural, spiritual and/or moral attitudes against suicide/Positive therapeutic relationships/Mormon beliefs

## 2023-10-04 NOTE — ED BEHAVIORAL HEALTH ASSESSMENT NOTE - HPI (INCLUDE ILLNESS QUALITY, SEVERITY, DURATION, TIMING, CONTEXT, MODIFYING FACTORS, ASSOCIATED SIGNS AND SYMPTOMS)
54 yo male with schizoaffective disorder, domiciled in a group home, on Clozaril and Invega Sustenna (last received about a week ago), multiple prior hospitalizations, many ED visits, last discharged from [facility] end of July, who was BIB EMS requested by self to staff due to feeling momentarily upset and "nervous." Per ED sign out, also made a suicidal threat.    On interview today the patient is smiling, in bright spirits, with childlike relatedness, very calm and cooperative. He says immediately that he is feeling better and wants to return home. He is a little tangential in describing what triggered him last night, mentioning another resident who was saying she was "diabetic" and that somehow upsetting him, and also mentioning going to the store for an errand and feeling upset at someone while there. Denies any physical aggression, very firmly denies any violent ideation ("I've never hurt anyone!"). He says also that he felt his lips trembling so he got nervous, thinks it happens when he doesn't get his meds soon enough, so he asked staff to call the ambulance for him. He admits to making a statement about wanting to kill himself but says very clearly he did not mean it at all, he was upset and "I wasn't thinking right in the moment." He adamantly denies any SI. Says he has been doing overall well in terms of his mental health. Says one time he spit out his clozapine and he's not sure why but thinks he got upset, but otherwise he has been compliant with his medications. Reports last receiving his Invega Sustenna injection one week ago. Reports he is seeing both his therapist and psychiatrist Dr. Soriano this coming Friday. Patient asks about wanting to increase his clozapine because he thinks it helps w/ sleep. He was agreeable to discussing this w/ his outpatient psychiatrist. Patient says if he were to feel he's in a crisis or has genuine SI, he would tell staff and come to the hospital ("I always come to the hospital!").

## 2023-10-04 NOTE — ED BEHAVIORAL HEALTH ASSESSMENT NOTE - NS ED BHA HOMICIDALITY PRESENT CURRENT IDEATION
ENT Focus Note:    Notified by RN that patient refusing NGT. Returned to bedside to discuss with patient again. His primary concern is that pain is still going to be uncontrolled. We discussed that medications for pain management are limited without ability to take meds PO. Reviewed that pain control is a major contributor to swallowing difficulty. Shared our concern that he is unable to take PO for nutrition or take oral meds (ie warfarin). Patient called his ex-wife Natalia who is a nurse and we discussed the above again. He is agreeable to NGT and requests that this be placed when Natalia is here (plans to arrive about 6 tonight). Patient requested additional dose of steroid as this helped him yesterday - ordered. Orders placed for lidocaine-afrin spray to bilateral nares prior to placement, lidocaine jelly to left nasal cavity prior to placement. Reviewed concerns re: ativan. Natalia reports with routine ativan patient had worsening restless leg syndrome but he should be OK for a few doses. OK for ativan prior to NGT placement. Once PO option available, he tolerated xanax better. Updated hospitalist team.     Reena Jordan PA-C     Yes

## 2023-10-04 NOTE — ED BEHAVIORAL HEALTH ASSESSMENT NOTE - CURRENT MEDICATION
Last discharge meds:  *pt confirms compliance w/ clozapine, and also Efrain Hurd last received about 1wk ago    atorvastatin 40 mg oral tablet: 1 tab(s) orally once a day (at bedtime) x 14 days MDD: 40mg  benztropine 1 mg oral tablet: 1 tab(s) orally 2 times a day x 14 days  bisacodyl 5 mg oral delayed release tablet: 1 tab(s) orally once a day (at bedtime) MDD: 5mg  cloZAPine 100 mg oral tablet: 1 tab(s) orally once a day  cloZAPine 200 mg oral tablet: 2 tab(s) orally once a day (at bedtime)  divalproex sodium 250 mg oral delayed release tablet: 3 tab(s) orally once a day (at bedtime)  divalproex sodium 500 mg oral delayed release tablet: 1 tab(s) orally once a day x 14 days  escitalopram 10 mg oral tablet: 1 tab(s) orally once a day x 14 days  levothyroxine 25 mcg (0.025 mg) oral tablet: 1 tab(s) orally once a day x 14 days  LORazepam 2 mg oral tablet: 1 tab(s) orally once a day (at bedtime) MDD: 2mg  metFORMIN 1000 mg oral tablet: 1 tab(s) orally 2 times a day x 14 days  metoprolol succinate 25 mg oral tablet, extended release: 1 tab(s) orally once a day x 14 days MDD: 25mg  MiraLax oral powder for reconstitution: 17 gram(s) orally once a day as needed for  constipation

## 2023-10-15 ENCOUNTER — EMERGENCY (EMERGENCY)
Facility: HOSPITAL | Age: 53
LOS: 0 days | Discharge: HOME | End: 2023-10-16
Attending: EMERGENCY MEDICINE
Payer: MEDICAID

## 2023-10-15 PROCEDURE — 84295 ASSAY OF SERUM SODIUM: CPT

## 2023-10-15 PROCEDURE — 71045 X-RAY EXAM CHEST 1 VIEW: CPT

## 2023-10-15 PROCEDURE — 99285 EMERGENCY DEPT VISIT HI MDM: CPT

## 2023-10-15 PROCEDURE — 80048 BASIC METABOLIC PNL TOTAL CA: CPT

## 2023-10-15 PROCEDURE — 82330 ASSAY OF CALCIUM: CPT

## 2023-10-15 PROCEDURE — 93005 ELECTROCARDIOGRAM TRACING: CPT

## 2023-10-15 PROCEDURE — 85018 HEMOGLOBIN: CPT

## 2023-10-15 PROCEDURE — 99285 EMERGENCY DEPT VISIT HI MDM: CPT | Mod: 25

## 2023-10-15 PROCEDURE — 83690 ASSAY OF LIPASE: CPT

## 2023-10-15 PROCEDURE — 85025 COMPLETE CBC W/AUTO DIFF WBC: CPT

## 2023-10-15 PROCEDURE — 80076 HEPATIC FUNCTION PANEL: CPT

## 2023-10-15 PROCEDURE — 96374 THER/PROPH/DIAG INJ IV PUSH: CPT

## 2023-10-15 PROCEDURE — 82803 BLOOD GASES ANY COMBINATION: CPT

## 2023-10-15 PROCEDURE — 83605 ASSAY OF LACTIC ACID: CPT

## 2023-10-15 PROCEDURE — 36415 COLL VENOUS BLD VENIPUNCTURE: CPT

## 2023-10-15 PROCEDURE — 85014 HEMATOCRIT: CPT

## 2023-10-15 PROCEDURE — 84132 ASSAY OF SERUM POTASSIUM: CPT

## 2023-10-15 NOTE — BH INPATIENT PSYCHIATRY PROGRESS NOTE - NSBHMSESPEECH_PSY_A_CORE
Abnormal as indicated, otherwise normal...
Walk in
Abnormal as indicated, otherwise normal...

## 2023-10-16 VITALS
RESPIRATION RATE: 18 BRPM | DIASTOLIC BLOOD PRESSURE: 73 MMHG | HEART RATE: 116 BPM | TEMPERATURE: 98 F | SYSTOLIC BLOOD PRESSURE: 131 MMHG | OXYGEN SATURATION: 100 %

## 2023-10-16 VITALS
HEIGHT: 66 IN | TEMPERATURE: 99 F | WEIGHT: 175.05 LBS | RESPIRATION RATE: 18 BRPM | HEART RATE: 120 BPM | DIASTOLIC BLOOD PRESSURE: 91 MMHG | SYSTOLIC BLOOD PRESSURE: 155 MMHG | OXYGEN SATURATION: 99 %

## 2023-10-16 LAB
ALBUMIN SERPL ELPH-MCNC: 4.1 G/DL — SIGNIFICANT CHANGE UP (ref 3.5–5.2)
ALP SERPL-CCNC: 81 U/L — SIGNIFICANT CHANGE UP (ref 30–115)
ALT FLD-CCNC: 11 U/L — SIGNIFICANT CHANGE UP (ref 0–41)
ANION GAP SERPL CALC-SCNC: 11 MMOL/L — SIGNIFICANT CHANGE UP (ref 7–14)
AST SERPL-CCNC: 21 U/L — SIGNIFICANT CHANGE UP (ref 0–41)
BASE EXCESS BLDV CALC-SCNC: 4 MMOL/L — HIGH (ref -2–3)
BASOPHILS # BLD AUTO: 0.01 K/UL — SIGNIFICANT CHANGE UP (ref 0–0.2)
BASOPHILS NFR BLD AUTO: 0.2 % — SIGNIFICANT CHANGE UP (ref 0–1)
BILIRUB DIRECT SERPL-MCNC: <0.2 MG/DL — SIGNIFICANT CHANGE UP (ref 0–0.3)
BILIRUB INDIRECT FLD-MCNC: <0.2 MG/DL — SIGNIFICANT CHANGE UP (ref 0.2–1.2)
BILIRUB SERPL-MCNC: <0.2 MG/DL — SIGNIFICANT CHANGE UP (ref 0.2–1.2)
BUN SERPL-MCNC: 7 MG/DL — LOW (ref 10–20)
CA-I SERPL-SCNC: 1.26 MMOL/L — SIGNIFICANT CHANGE UP (ref 1.15–1.33)
CALCIUM SERPL-MCNC: 9.6 MG/DL — SIGNIFICANT CHANGE UP (ref 8.4–10.5)
CHLORIDE SERPL-SCNC: 104 MMOL/L — SIGNIFICANT CHANGE UP (ref 98–110)
CO2 SERPL-SCNC: 25 MMOL/L — SIGNIFICANT CHANGE UP (ref 17–32)
CREAT SERPL-MCNC: 0.8 MG/DL — SIGNIFICANT CHANGE UP (ref 0.7–1.5)
EGFR: 106 ML/MIN/1.73M2 — SIGNIFICANT CHANGE UP
EOSINOPHIL # BLD AUTO: 0.11 K/UL — SIGNIFICANT CHANGE UP (ref 0–0.7)
EOSINOPHIL NFR BLD AUTO: 1.9 % — SIGNIFICANT CHANGE UP (ref 0–8)
GAS PNL BLDV: 138 MMOL/L — SIGNIFICANT CHANGE UP (ref 136–145)
GAS PNL BLDV: SIGNIFICANT CHANGE UP
GLUCOSE SERPL-MCNC: 108 MG/DL — HIGH (ref 70–99)
HCO3 BLDV-SCNC: 30 MMOL/L — HIGH (ref 22–29)
HCT VFR BLD CALC: 38.5 % — LOW (ref 42–52)
HCT VFR BLDA CALC: 37 % — LOW (ref 39–51)
HGB BLD CALC-MCNC: 12.4 G/DL — LOW (ref 12.6–17.4)
HGB BLD-MCNC: 11.8 G/DL — LOW (ref 14–18)
IMM GRANULOCYTES NFR BLD AUTO: 0.2 % — SIGNIFICANT CHANGE UP (ref 0.1–0.3)
LACTATE BLDV-MCNC: 1.2 MMOL/L — SIGNIFICANT CHANGE UP (ref 0.5–2)
LACTATE SERPL-SCNC: 1.3 MMOL/L — SIGNIFICANT CHANGE UP (ref 0.7–2)
LIDOCAIN IGE QN: 53 U/L — SIGNIFICANT CHANGE UP (ref 7–60)
LYMPHOCYTES # BLD AUTO: 2.3 K/UL — SIGNIFICANT CHANGE UP (ref 1.2–3.4)
LYMPHOCYTES # BLD AUTO: 40.2 % — SIGNIFICANT CHANGE UP (ref 20.5–51.1)
MCHC RBC-ENTMCNC: 26.1 PG — LOW (ref 27–31)
MCHC RBC-ENTMCNC: 30.6 G/DL — LOW (ref 32–37)
MCV RBC AUTO: 85.2 FL — SIGNIFICANT CHANGE UP (ref 80–94)
MONOCYTES # BLD AUTO: 0.68 K/UL — HIGH (ref 0.1–0.6)
MONOCYTES NFR BLD AUTO: 11.9 % — HIGH (ref 1.7–9.3)
NEUTROPHILS # BLD AUTO: 2.61 K/UL — SIGNIFICANT CHANGE UP (ref 1.4–6.5)
NEUTROPHILS NFR BLD AUTO: 45.6 % — SIGNIFICANT CHANGE UP (ref 42.2–75.2)
NRBC # BLD: 0 /100 WBCS — SIGNIFICANT CHANGE UP (ref 0–0)
PCO2 BLDV: 48 MMHG — SIGNIFICANT CHANGE UP (ref 42–55)
PH BLDV: 7.4 — SIGNIFICANT CHANGE UP (ref 7.32–7.43)
PLATELET # BLD AUTO: 268 K/UL — SIGNIFICANT CHANGE UP (ref 130–400)
PMV BLD: 9.4 FL — SIGNIFICANT CHANGE UP (ref 7.4–10.4)
PO2 BLDV: 56 MMHG — SIGNIFICANT CHANGE UP
POTASSIUM BLDV-SCNC: 4.3 MMOL/L — SIGNIFICANT CHANGE UP (ref 3.5–5.1)
POTASSIUM SERPL-MCNC: 4.8 MMOL/L — SIGNIFICANT CHANGE UP (ref 3.5–5)
POTASSIUM SERPL-SCNC: 4.8 MMOL/L — SIGNIFICANT CHANGE UP (ref 3.5–5)
PROT SERPL-MCNC: 7.4 G/DL — SIGNIFICANT CHANGE UP (ref 6–8)
RBC # BLD: 4.52 M/UL — LOW (ref 4.7–6.1)
RBC # FLD: 17.6 % — HIGH (ref 11.5–14.5)
SAO2 % BLDV: 87.3 % — SIGNIFICANT CHANGE UP
SODIUM SERPL-SCNC: 140 MMOL/L — SIGNIFICANT CHANGE UP (ref 135–146)
WBC # BLD: 5.72 K/UL — SIGNIFICANT CHANGE UP (ref 4.8–10.8)
WBC # FLD AUTO: 5.72 K/UL — SIGNIFICANT CHANGE UP (ref 4.8–10.8)

## 2023-10-16 PROCEDURE — 93010 ELECTROCARDIOGRAM REPORT: CPT

## 2023-10-16 PROCEDURE — 71045 X-RAY EXAM CHEST 1 VIEW: CPT | Mod: 26

## 2023-10-16 RX ORDER — FAMOTIDINE 10 MG/ML
20 INJECTION INTRAVENOUS ONCE
Refills: 0 | Status: DISCONTINUED | OUTPATIENT
Start: 2023-10-16 | End: 2023-10-16

## 2023-10-16 RX ORDER — SODIUM CHLORIDE 9 MG/ML
1000 INJECTION, SOLUTION INTRAVENOUS ONCE
Refills: 0 | Status: COMPLETED | OUTPATIENT
Start: 2023-10-16 | End: 2023-10-16

## 2023-10-16 RX ORDER — FAMOTIDINE 10 MG/ML
20 INJECTION INTRAVENOUS ONCE
Refills: 0 | Status: COMPLETED | OUTPATIENT
Start: 2023-10-16 | End: 2023-10-16

## 2023-10-16 RX ADMIN — SODIUM CHLORIDE 1000 MILLILITER(S): 9 INJECTION, SOLUTION INTRAVENOUS at 02:22

## 2023-10-16 RX ADMIN — Medication 60 MILLILITER(S): at 02:16

## 2023-10-16 RX ADMIN — FAMOTIDINE 20 MILLIGRAM(S): 10 INJECTION INTRAVENOUS at 02:16

## 2023-10-16 NOTE — ED PROVIDER NOTE - CARE PROVIDER_API CALL
Joseph Pascal  Internal Medicine  [address] [place] [address]  Phone: [phone]  Fax: [phone]  Follow Up Time: 1-3 Days    Joseph Tsai  Gastroenterology  [address] [place] [address]  Phone: [phone]  Fax: [phone]  Follow Up Time: 4-6 Days    Yenifer Ureña  Gastroenterology  [address] [place] [address]  Phone: [phone]  Fax: [phone]  Follow Up Time: 4-6 Days    Zuri Montoya  Gastroenterology  [address] [place] [address]  Phone: [phone]  Fax: [phone]  Follow Up Time: 4-6 Days    Penny Matute  Gastroenterology  [address] [address]  Phone: [phone]  Fax: [phone]  Follow Up Time: 4-6 Days

## 2023-10-16 NOTE — ED PROVIDER NOTE - PHYSICAL EXAMINATION
VITAL SIGNS: I have reviewed nursing notes and confirm.  CONSTITUTIONAL: 54 yo M laying on stretcher; in no acute distress.  SKIN: Skin exam is warm and dry, no acute rash.  HEAD: Normocephalic; atraumatic.  EYES: Conjunctiva and sclera clear.  ENT: No nasal discharge; airway clear.   CARD: S1, S2 normal; no murmurs, gallops, or rubs. Regular rate and rhythm.  RESP: No wheezes, rales or rhonchi. Speaking in full sentences.   ABD: Normal bowel sounds; soft; non-distended; non-tender; No rebound or guarding. No CVA tenderness.  EXT: Normal ROM. No clubbing, cyanosis or edema.  NEURO: Alert, oriented. Grossly unremarkable. No focal deficits.   PSYCH: Cooperative, appropriate. Denies SI/HI.

## 2023-10-16 NOTE — ED PROVIDER NOTE - PATIENT PORTAL LINK FT
You can access the FollowMyHealth Patient Portal offered by [facility] by registering at the following website: [email or web address]. By joining [employer]’s FollowMyHealth portal, you will also be able to view your health information using other applications (apps) compatible with our system.

## 2023-10-16 NOTE — ED PROVIDER NOTE - PROVIDER TOKENS
PROVIDER:[TOKEN:[[id number]],FOLLOWUP:[1-3 Days]],PROVIDER:[TOKEN:[[id number]],FOLLOWUP:[4-6 Days]],PROVIDER:[TOKEN:[[id number]],FOLLOWUP:[4-6 Days]],PROVIDER:[TOKEN:[[id number]],FOLLOWUP:[4-6 Days]],PROVIDER:[TOKEN:[[id number]],FOLLOWUP:[4-6 Days]]

## 2023-10-16 NOTE — ED ADULT TRIAGE NOTE - CHIEF COMPLAINT QUOTE
Pt BIBA from [address] [place] [address], c/o abdominal pain, states he also had not been compliant with metformin prescription

## 2023-10-16 NOTE — ED PROVIDER NOTE - CARE PROVIDERS DIRECT ADDRESSES
,DirectAddress_Unknown,[email or web address],DirectAddress_Unknown,DirectAddress_Unknown

## 2023-10-16 NOTE — ED PROVIDER NOTE - CLINICAL SUMMARY MEDICAL DECISION MAKING FREE TEXT BOX
52 yo man w/ HTN, DM, schizoaffective d/o here w/ abdominal pain. denies vomiting, nausea, fever, urinary symptoms. Unable to clearly describe pain    wd/wn  nad  rrr s1s2 wnl  cta b/l  nt/nd + BS  aao X 3    indicates epigastric area as painful region  denies cp, sob, gandhi, palpitations    52 yo man w/ abdominal pain w/ reassuring examination. Will obtain labs, GI meds and reassess 54 yo man w/ HTN, DM, schizoaffective d/o here w/ abdominal pain. denies vomiting, nausea, fever, urinary symptoms. Unable to clearly describe pain    wd/wn  nad  rrr s1s2 wnl  cta b/l  nt/nd + BS  aao X 3    indicates epigastric area as painful region  denies cp, sob, gandhi, palpitations    54 yo man w/ abdominal pain w/ reassuring examination. Will obtain labs, GI meds and reassess    0300: labs reviewed. pt symptoms improved. HR = 82 on my assessment. NT/ND + BS and arden PO w/o diff

## 2023-10-16 NOTE — ED PROVIDER NOTE - OBJECTIVE STATEMENT
53-year-old male with past medical history of hypertension, HLD, DM, hypothyroidism, and schizoaffective disorder presents to the ED complaining of mild upper abdominal pain that started earlier today and persisting.  Patient has not take any medications to improve her symptoms.  Symptoms are unrelated to eating.  He denies other complaints. Pt denies fever, chills, nausea, vomiting, diarrhea, headache, dizziness, weakness, chest pain, SOB, back pain, LOC, trauma, urinary symptoms, cough, calf pain/swelling, recent travel, recent surgery, SI/HI.

## 2023-10-16 NOTE — ED PROVIDER NOTE - PROGRESS NOTE DETAILS
Pt reports improvement in symptoms, rpt abd exam soft, NTND, pt ate/drank in ED. Discussed results and provided copy to pt. Pt understands to follow-up with PMD/GI. Pt understands to return to ED if symptoms return/worsen. Agreeable to discharge.

## 2023-10-19 DIAGNOSIS — F25.9 SCHIZOAFFECTIVE DISORDER, UNSPECIFIED: ICD-10-CM

## 2023-10-19 DIAGNOSIS — Z88.0 ALLERGY STATUS TO PENICILLIN: ICD-10-CM

## 2023-10-19 DIAGNOSIS — Z91.013 ALLERGY TO SEAFOOD: ICD-10-CM

## 2023-10-19 DIAGNOSIS — Z88.8 ALLERGY STATUS TO OTHER DRUGS, MEDICAMENTS AND BIOLOGICAL SUBSTANCES: ICD-10-CM

## 2023-10-19 DIAGNOSIS — R10.10 UPPER ABDOMINAL PAIN, UNSPECIFIED: ICD-10-CM

## 2023-10-19 DIAGNOSIS — R00.0 TACHYCARDIA, UNSPECIFIED: ICD-10-CM

## 2023-10-19 DIAGNOSIS — F17.200 NICOTINE DEPENDENCE, UNSPECIFIED, UNCOMPLICATED: ICD-10-CM

## 2023-10-19 DIAGNOSIS — R94.31 ABNORMAL ELECTROCARDIOGRAM [ECG] [EKG]: ICD-10-CM

## 2023-10-19 DIAGNOSIS — E11.9 TYPE 2 DIABETES MELLITUS WITHOUT COMPLICATIONS: ICD-10-CM

## 2023-10-19 DIAGNOSIS — Z79.84 LONG TERM (CURRENT) USE OF ORAL HYPOGLYCEMIC DRUGS: ICD-10-CM

## 2023-10-19 DIAGNOSIS — Z91.014 ALLERGY TO MAMMALIAN MEATS: ICD-10-CM

## 2023-10-19 DIAGNOSIS — E78.5 HYPERLIPIDEMIA, UNSPECIFIED: ICD-10-CM

## 2023-10-19 DIAGNOSIS — R10.13 EPIGASTRIC PAIN: ICD-10-CM

## 2023-10-19 DIAGNOSIS — E03.9 HYPOTHYROIDISM, UNSPECIFIED: ICD-10-CM

## 2023-10-19 DIAGNOSIS — I10 ESSENTIAL (PRIMARY) HYPERTENSION: ICD-10-CM

## 2023-10-31 NOTE — ED BEHAVIORAL HEALTH ASSESSMENT NOTE - CURRENT ACTIVE IDEATION:
Discharge Summary  Hospitalist Service      Mar Zeng MRN# [id number]   YOB: 1953 Age: 70 year old     Date of Admission:  10/25/2023  Date of Discharge:  10/31/2023  Admitting Physician:  Ayanna Omer DO  Discharge Physician: Naye Ji MD   Discharging Service: Hospitalist Service     Primary Provider: Gail Crespo Ra  Primary Care Physician Phone Number: [phone]         Discharge Diagnoses/Problem Oriented Hospital Course (Providers):      Discharge Diagnoses   Chest pain (possible ongoing angina)/acute systolic CHF like due to mitral valve disease/history of rheumatic fever as a child/ 3+ mitral regurgitation  Type 2 MI due to demand ischemia, secondary to Acute HFrEF   Acute renal failure (cardiorenal but at risk for dye nephropathy)  Asthma    Underweight   Hospital Course   Mar Zeng is a 70 year old female with PMHx of recent diagnosis of asthma, who was admitted on 10/25/2023 with chest pain, OLSON, and orthopnea , CT with effusions and infiltrate likely fluid, and BNP of 5,100 consistent with congstive heart failure.      Chest pain (possible ongoing angina)/acute systolic CHF like due to mitral valve disease/history of rheumatic fever as a child/ 3+ mitral regurgitation  Presenting with 5 days of dyspnea on exertion, orthopnea, left-sided substernal chest pain relieved by rest.  Her symptoms are very consistent with CHF and this goes along with her work-up with a CT scan and BNP as well.   Work-up notable for BNP of 5000, CT PE with bilateral pleural effusions but no PE, and troponins of 19 and 17 with new LBBB on EKG.  Received DuoNeb in the ER with minimal improvement in symptoms.  Given current symptoms, elevated BNP, pleural effusions, suspect heart failure.  Patient most certainly could have underlying ischemic disease.  Was recently traveling to [place] and was having symptoms but did not want to go to the hospital down there.  We will be getting  an echocardiogram.    -Was placed on Lasix and her symptoms are already improved.  She has a history of a murmur as a child following a history of rheumatic fever as a child.  No other personal or family history of cardiac disease.  Follow on telemetry.,  Follow ins and outs, daily weight.    -Echo done showing an EF of only 30-35% and 3+ MR.     -Cardiology saw the patient and will be getting a cardiac angiogram 10/27, angiogram with nonobstructive coronary artery disease.  Does not explain her EF. . - She was started on a betablocker, ace inhibitor, apirin, and statin.     -Cardiology to arrange outpatient cardiac MRI, KADEEM        Acute renal failure (cardiorenal but at risk for dye nephropathy)  Patient presented with a creatinine of 1.33 on arrival, Last known 0.8 in 2022. Repeat creatinine down to 1.04 with lasix for congstive heart failure.  Patient also had a CT angiogram of the chest and cardiac angiogram so need to be aware for dye nephropathy.  Repeat bmp daily.  Fluctuating currently at 1.12  - monitor labs outpatient     Asthma    Follow up outpatient with pcp and PFT, nebs ordered         Code Status:      Full Code         Important Results:                  Pending Results:        Unresulted Labs Ordered in the Past 30 Days of this Admission       No orders found from 9/25/2023 to 10/26/2023.                 Discharge Instructions and Follow-Up:      Follow-up Appointments     Follow-up and recommended labs and tests       Follow up with primary care provider, Gail Crespo, within 7 days   for hospital follow- up.  The following labs/tests are recommended: BMP,   cardiac MRI, and KADEEM.                 Discharge Disposition:        Discharged to home          Discharge Medications:        Discharge Medication List as of 10/29/2023 11:10 AM        START taking these medications    Details   albuterol (ACCUNEB) 0.63 MG/3ML neb solution Take 3 mLs (0.63 mg) by nebulization every 6 hours as needed for  shortness of breath, wheezing or cough, Disp-90 mL, R-0, E-Prescribe      aspirin 81 MG EC tablet Take 1 tablet (81 mg) by mouth daily for 30 days, Disp-30 tablet, R-0, E-Prescribe      atorvastatin (LIPITOR) 40 MG tablet Take 1 tablet (40 mg) by mouth every evening for 30 days, Disp-30 tablet, R-0, E-Prescribe      furosemide (LASIX) 20 MG tablet Take 0.5 tablets (10 mg) by mouth daily for 20 days, Disp-10 tablet, R-0, E-Prescribe      lisinopril (ZESTRIL) 2.5 MG tablet Take 1 tablet (2.5 mg) by mouth 2 times daily for 30 days, Disp-60 tablet, R-0, E-Prescribe      metoprolol succinate ER (TOPROL XL) 25 MG 24 hr tablet Take 0.5 tablets (12.5 mg) by mouth 2 times daily for 30 days, Disp-30 tablet, R-0, E-Prescribe           CONTINUE these medications which have NOT CHANGED    Details   albuterol (PROAIR HFA/PROVENTIL HFA/VENTOLIN HFA) 108 (90 Base) MCG/ACT inhaler Inhale 2 puffs into the lungs every 4 hours as needed for shortness of breath, wheezing or cough, Disp-18 g, R-3, E-PrescribePharmacy may dispense brand covered by insurance (Proair, or proventil or ventolin or generic albuterol inhaler)      aluminum chloride (DRYSOL) 20 % external solution Apply topically At BedtimeDisp-60 mL, [id number]-Lmsposafm      amphetamine-dextroamphetamine (ADDERALL XR) 25 MG 24 hr capsule Take 1 capsule (25 mg) by mouth every morning, Disp-90 capsule, R-0, E-Prescribe      azelastine-fluticasone (DYMISTA) 137-50 MCG/ACT nasal spray Spray 1 spray into both nostrils 2 times daily as needed, Historical      budesonide (PULMICORT) 0.5 MG/2ML neb solution Spray 0.5 mg in nostril 2 times daily Uses as nasal rinse twice daily., Historical      budesonide-formoterol (SYMBICORT) 160-4.5 MCG/ACT Inhaler Inhale 2 puffs into the lungs 2 times daily, Disp-10.2 g, R-2, E-Prescribe      buPROPion (WELLBUTRIN XL) 150 MG 24 hr tablet TAKE 1 TABLET(150 MG) BY MOUTH EVERY MORNING, Disp-90 tablet, R-2, E-Prescribe      fexofenadine (ALLEGRA) 180 MG  "tablet Take 180 mg by mouth every other day, Historical      fluorouracil (EFUDEX) 5 % external cream APPLY SPARINGLY EXTERNALLY TO FACE EVERY SUNDAY AT NIGHT. AVOID EYES AND LIPSHistorical      loratadine (CLARITIN) 10 MG tablet Take 10 mg by mouth every other day, Historical      timolol maleate (TIMOPTIC) 0.5 % ophthalmic solution Apply 1 drop topically nightly as needed (dry skin cracks) Apply 1 drop to lesion every night at bedtime. Do Not use on normal skin., Historical      LORazepam (ATIVAN) 0.5 MG tablet Take 1 tablet (0.5 mg) by mouth nightly as needed for sleep, Disp-15 tablet, R-0, E-Prescribe                  Allergies:         Allergies   Allergen Reactions    Codeine Itching    Concerta [Methylphenidate] Itching    Hay Fever & [A.R.M.]             Consultations This Hospital Stay:        Consultation during this admission received from cardiology          Condition and Physical Exam on Discharge:        Discharge condition: Stable   Discharge vitals: Blood pressure 120/82, pulse 78, temperature 99.4  F (37.4  C), temperature source Oral, resp. rate 16, height 1.676 m (5' 6\"), weight 51.3 kg (113 lb 3.2 oz), SpO2 100%, not currently breastfeeding.   General: Pt in NAD, normal appearance  HEENT: OP clear MMM, no JVD  Lungs: Clear to Auscultation Bilateral, normal breathing  without accessory muscle usage, no wheezing, rhonchi or crackles  Cardiac: +S1, S2, RRR, no MRG, no edema  Abdominal: normal bowel sounds, NT/ND, no hepatosplenomegaly  Skin: warm, dry, normal turgor, no rash  Psyche: A& O x3, appropriate affect            Discharge Orders for Skilled Facility (from Discharge Orders):        After Care Instructions       Activity      Your activity upon discharge: activity as tolerated        Activity - Cardiac Rehab      You are encouraged to enroll in an Outpatient Cardiac Rehab program after discharge from the hospital.  Our Cardiac Rehab staff may visit briefly with you while you're in the " hospital.  If they miss you, someone will contact you after you are home.        Comfort and Pain Management - Bruising after Surgery      Expect mild tingling of hand and tenderness at the wrist puncture site for up to 3 days. You may take Tylenol or a pain medicine recommended by your doctor.        Diet      Follow this diet upon discharge: Orders Placed This Encounter      Low Saturated Fat Na <2400 mg        Dressing Removal      Remove the band-aid on the puncture site after 24 hours and leave open to air. If minor oozing, you may apply a band-aid and remove after 12 hours        Medication Instructions - Anticoagulants      Do NOT stop your aspirin or platelet inhibitor unless directed by your Cardiologist.  These medications help to prevent platelets in your blood from sticking together and forming a clot.  Examples of these medications are:  Ticagrelor (Brilinta), Clopidigrel (Plavix), Prasugrel (Effient)        Precautions - Active Sports Activities      DO NOT engage in vigorous exercise using your affected arm for 3 days after discharge.  This includes golf, tennis or swimming.        Precautions - Elective Dental Work      NO elective dental work for 6 weeks after receiving a stent.        Precautions - Household Activities      Avoid excessive bending or movement of your wrist for 72 hours.  Do not subject hand/arm to any forceful movements for 24 hours, such as supporting weight when rising from a chair or bed.     Remove the band-aid on the puncture site after 24 hours and leave open to air. If minor oozing, you may apply a band-aid and remove after 12 hours.        Precautions - Lifting      DO NOT lift more than 5 pounds with affected arm for 48 hours        Precautions - Operating yard equipment or vehicles      Do not operate a chainsaw, lawnmower, motorcycle, or all-terrain vehicle for 48 hours after the procedure.        Return to Driving      Driving is NOT permitted for 24 hours after surgery         Return to work      You may return to work after 72 hours if you are feeling well and your job does not involve heavy lifting.        Shower / Bathing      You may shower on the day after your procedure.  DO NOT soak of wrist with the puncture site in water for 3 days to prevent infection. DO NOT take a tub bath or wash dishes for 3 days after the procedure        When to call - Contact the Heart Clinic      You may experience symptoms that require follow-up before your scheduled appointment. Contact the Heart Clinic if you develop: Fever over 100.4o Fahrenheit, that lasts more than one day; Redness, heat, or pus at the puncture site; Change in color or temperature in your hand or arm.        When to call - Reasons to Call 911      If your wrist puncture site starts bleeding after discharge, sit down and apply firm pressure with your thumb against the puncture site and fingers against the back of the wrist for 10 minutes. If the bleeding stops, continue to rest, keeping your wrist still for 2 hours. Notify your doctor as soon as possible.  IF BLEEDING DOES NOT STOP OR THERE IS A LARGER AMOUNT OF BLEEDING OR SPURTING CALL 9-1-1 immediately.DO NOT drive yourself to the hospital.              Future tests that were ordered for you       NEBULIZER      Transesophageal Echocardiogram      Assess MV pathology      Administration of IV contrast will be tailored to this examination per the appropriate written protocol listed in the Echocardiography department Protocol Book, or by the supervising Cardiologist. This may result in an order change.    Use of contrast is at the discretion of the supervising Cardiologist.                   Rehab orders for Skilled Facility (from Discharge Orders):               Discharge Time:      Greater than 30 minutes.        Image Results From This Hospital Stay (For Non-EPIC Providers):        Results for orders placed or performed during the hospital encounter of 10/25/23   CT Chest  Pulmonary Embolism w Contrast    Narrative    EXAM: CT CHEST PULMONARY EMBOLISM W CONTRAST  LOCATION: [facility]  DATE: 10/25/2023    INDICATION: SOB chest pain.  COMPARISON: None.  TECHNIQUE: CT chest pulmonary angiogram during arterial phase injection of IV contrast. Multiplanar reformats and MIP reconstructions were performed. Dose reduction techniques were used.   CONTRAST: 52mL Isovue 370    FINDINGS:  ANGIOGRAM CHEST: Pulmonary arteries are normal caliber and negative for pulmonary emboli. Thoracic aorta is not well opacified and is  indeterminate for dissection. No CT evidence of right heart strain.    LUNGS AND PLEURA: Moderate bilateral pleural effusions with associated compressive atelectasis in the posterior lung bases. Patchy bilateral anterior mid lung infiltrates or atelectasis.    MEDIASTINUM/AXILLAE: No lymphadenopathy. Normal esophagus. No significant pericardial effusion. No evidence for thoracic aortic aneurysm.    CORONARY ARTERY CALCIFICATION: Mild.    UPPER ABDOMEN: Unremarkable    MUSCULOSKELETAL: Mild to moderate thoracic spondylosis. No concerning osseous abnormalities      Impression    IMPRESSION:  1.  No CT evidence for pulmonary embolism.    2.  Moderate bilateral pleural effusions with associated compressive atelectasis in the posterior lung bases. Patchy bilateral anterior mid lung infiltrates or atelectasis. Clinical correlation for pneumonia.     XR Chest Port 1 View    Narrative    CHEST ONE VIEW PORTABLE   10/27/2023 3:22 PM     HISTORY:  Shortness of breath. Orthopnea.    COMPARISON: 9/8/2023.      Impression    IMPRESSION: Small bilateral pleural effusions, larger on the left.  There is mild associated infiltrate or atelectasis at the left lung  base. The lungs are otherwise clear. No pneumothorax. Pulmonary  vascularity is within normal limits. Aortic calcification.    NY PARIS MD         SYSTEM ID:  [id number]   CT Chest w/o Contrast    Narrative     EXAM: CT CHEST W/O CONTRAST  LOCATION: [facility]  DATE: 10/28/2023    INDICATION: Follow-up pleural effusions  COMPARISON: None.  TECHNIQUE: CT chest without IV contrast. Multiplanar reformats were obtained. Dose reduction techniques were used.  CONTRAST: None.    FINDINGS:   LUNGS AND PLEURA: Persistent but decreased size of the now small bilateral effusions and associated atelectasis. The pulmonary edema and patchy pulmonary opacities have nearly resolved. No pneumothorax. Minimal bronchiectasis.    MEDIASTINUM/AXILLAE: Heart size is normal. No lymphadenopathy. No thoracic aortic aneurysm.    CORONARY ARTERY CALCIFICATION: Mild.    UPPER ABDOMEN: No significant finding.    MUSCULOSKELETAL: Degenerative changes of the spine.      Impression    IMPRESSION:   1.  Persistent but decreased size of the now small bilateral effusions and associated atelectasis.  2.  Near complete resolution of the pulmonary edema/patchy pulmonary opacities.   Echocardiogram Complete     Value    LVEF  30-35%    Narrative    [id number]  [id number]  [id number]  [id number]^[email or web address]     [facility]  Echocardiography Laboratory  [address] [place] [address]     Name: MARCO SHIN  MRN: [id number]  : 1953  Study Date: 10/26/2023 09:21 AM  Age: 70 yrs  Gender: Female  Patient Location: [facility] [[email or web address]]  Reason For Study: Syncope  Ordering Physician: SHUKRI ROSA  Performed By: Ellen Latham     BSA: 1.6 m2  Height: 66 in  Weight: 119 lb  BP: 144/88 mmHg  ______________________________________________________________________________  Procedure  Complete Portable Echo Adult.  ______________________________________________________________________________  Interpretation Summary     Left ventricular systolic function is moderate to severely reduced.  The visual ejection fraction is 30-35%.  There is moderately severe (3+) mitral regurgitation.  There is no comparison study  available.  ______________________________________________________________________________  Left Ventricle  The left ventricle is normal in size. Diastolic Doppler findings (E/E' ratio  and/or other parameters) suggest left ventricular filling pressures are  increased. The visual ejection fraction is 30-35%. Left ventricular systolic  function is moderate to severely reduced. There is severe inferior wall  hypokinesis. There is mod-severe global hypokinesia of the left ventricle.  Septal motion is consistent with conduction abnormality.     Right Ventricle  The right ventricle is normal in structure, function and size.     Atria  The left atrium is moderately dilated. Right atrial size is normal.     Mitral Valve  The mitral valve is normal in structure and function. There is moderately  severe (3+) mitral regurgitation. There is no mitral valve stenosis.     Tricuspid Valve  The tricuspid valve is not well visualized, but is grossly normal. Right  ventricular systolic pressure could not be approximated due to inadequate  tricuspid regurgitation.     Aortic Valve  The aortic valve is trileaflet. There is trace aortic regurgitation. No aortic  stenosis is present.     Pulmonic Valve  The pulmonic valve is not well visualized. Normal pulmonic valve velocity.     Vessels  The aortic root is normal size. The inferior vena cava is normal.     Pericardium  Small pericardial effusion. Small left pleural effusion.     ______________________________________________________________________________  MMode/2D Measurements & Calculations  IVSd: 0.84 cm  LVIDd: 5.1 cm  LVIDs: 4.3 cm  LVPWd: 1.1 cm     FS: 14.1 %  LV mass(C)d: 178.1 grams  LV mass(C)dI: 111.0 grams/m2  Ao root diam: 3.2 cm  asc Aorta Diam: 3.2 cm  LVOT diam: 2.2 cm  LVOT area: 3.8 cm2  Ao root diam index Ht(cm/m): 1.9  Ao root diam index BSA (cm/m2): 2.0  Asc Ao diam index BSA (cm/m2): 2.0  Asc Ao diam index Ht(cm/m): 1.9  LA Volume (BP): 62.8 ml     LA Volume  "Index (BP): 39.2 ml/m2  RV Base: 2.9 cm  RWT: 0.44  TAPSE: 2.5 cm     Doppler Measurements & Calculations  MV E max candice: 92.8 cm/sec  MV A max candice: 81.2 cm/sec  MV E/A: 1.1  MV dec slope: 642.5 cm/sec2  MV dec time: 0.14 sec  LV V1 max P.4 mmHg  LV V1 max: 77.5 cm/sec  LV V1 VTI: 14.8 cm  MR PISA: 3.6 cm2  MR ERO: 0.28 cm2  MR volume: 44.9 ml  SV(LVOT): 55.8 ml  SI(LVOT): 34.8 ml/m2  PA acc time: 0.09 sec  E/E' avg: 15.7  Lateral E/e': 15.9  Medial E/e': 15.5     ______________________________________________________________________________  Report approved by: Samantha Waters 10/26/2023 10:30 AM         Cardiac Catheterization    Narrative    1) Acute systolic heart failure  LVEF 30 to 35%  2) mitral regurgitation  3) LBBB  4) \" nonobstructive CAD\"    Findings  LMCA normal  LAD 20% proximal  CX 30 to 40% distal  RCA dominant normal             Most Recent Lab Results In EPIC (For Non-EPIC Providers):    Most Recent 3 CBC's:  Recent Labs   Lab Test 10/29/23  0640 10/26/23  0806 10/25/23  2033 09/26/23  1146   WBC  --  6.4 8.2 6.9   HGB  --  11.7 11.6* 11.9   MCV  --  93 92 93    352 383 263      Most Recent 3 BMP's:  Recent Labs   Lab Test 10/30/23  0913 10/29/23  0640 10/28/23  0723 10/27/23  06     --  133* 136   POTASSIUM 4.9 4.6 4.3 4.5   CHLORIDE 100  --  96* 100   CO2 27  --  29 27   BUN 22.5  --  25.8* 24.4*   CR 1.10* 0.96* 1.12* 1.04*   ANIONGAP 8  --  8 9   MERRILL 9.6  --  9.2 9.5   GLC 99  --  93 106*     Most Recent 3 Troponin's:No lab results found.  Most Recent 3 INR's:No lab results found.  Most Recent 2 LFT's:  Recent Labs   Lab Test 22  1145 20  0725   AST 20 23   ALT 20 19   ALKPHOS 94 99   BILITOTAL 0.4 0.3     Most Recent Cholesterol Panel:  Recent Labs   Lab Test 10/26/23  0806   CHOL 201*   *   HDL 76   TRIG 70     Most Recent 6 Bacteria Isolates From Any Culture (See EPIC Reports for Culture Details):  Recent Labs   Lab Test 19  1021 " 03/05/19  1108 08/14/17  1300 07/18/16  0857 06/25/16  1333   CULT <10,000 colonies/mL  mixed urogenital sam   >100,000 colonies/mL  Escherichia coli  * <10,000 colonies/mL  urogenital sam  Susceptibility testing not routinely done   <10,000 colonies/mL mixed urogenital sam Susceptibility testing not routinely   done   50,000 to 100,000 colonies/mL Escherichia coli*     Most Recent TSH, T4 and HgbA1c:  Recent Labs   Lab Test 09/26/23  1146 12/06/17  1045 12/04/17  0000   TSH 0.74   < > 0.569   A1C  --   --  5.6    < > = values in this interval not displayed.        Unable to assess None known

## 2023-12-01 ENCOUNTER — OUTPATIENT (OUTPATIENT)
Dept: OUTPATIENT SERVICES | Facility: HOSPITAL | Age: 53
LOS: 1 days | End: 2023-12-01
Payer: MEDICAID

## 2023-12-01 ENCOUNTER — APPOINTMENT (OUTPATIENT)
Dept: OPHTHALMOLOGY | Facility: CLINIC | Age: 53
End: 2023-12-01
Payer: MEDICAID

## 2023-12-01 DIAGNOSIS — H53.8 OTHER VISUAL DISTURBANCES: ICD-10-CM

## 2023-12-01 DIAGNOSIS — H16.142 PUNCTATE KERATITIS, LEFT EYE: ICD-10-CM

## 2023-12-01 PROCEDURE — 92012 INTRM OPH EXAM EST PATIENT: CPT

## 2023-12-01 PROCEDURE — 92134 CPTRZ OPH DX IMG PST SGM RTA: CPT | Mod: 26

## 2023-12-01 PROCEDURE — 92134 CPTRZ OPH DX IMG PST SGM RTA: CPT

## 2023-12-08 ENCOUNTER — INPATIENT (INPATIENT)
Facility: HOSPITAL | Age: 53
LOS: 2 days | Discharge: HOME | DRG: 720 | End: 2023-12-11
Attending: INTERNAL MEDICINE | Admitting: HOSPITALIST
Payer: MEDICAID

## 2023-12-08 VITALS
SYSTOLIC BLOOD PRESSURE: 116 MMHG | OXYGEN SATURATION: 97 % | TEMPERATURE: 100 F | HEIGHT: 66 IN | DIASTOLIC BLOOD PRESSURE: 57 MMHG | HEART RATE: 131 BPM | RESPIRATION RATE: 18 BRPM | WEIGHT: 210.1 LBS

## 2023-12-08 DIAGNOSIS — R50.9 FEVER, UNSPECIFIED: ICD-10-CM

## 2023-12-08 LAB
ALBUMIN SERPL ELPH-MCNC: 4 G/DL — SIGNIFICANT CHANGE UP (ref 3.5–5.2)
ALBUMIN SERPL ELPH-MCNC: 4 G/DL — SIGNIFICANT CHANGE UP (ref 3.5–5.2)
ALP SERPL-CCNC: 87 U/L — SIGNIFICANT CHANGE UP (ref 30–115)
ALP SERPL-CCNC: 87 U/L — SIGNIFICANT CHANGE UP (ref 30–115)
ALT FLD-CCNC: <5 U/L — SIGNIFICANT CHANGE UP (ref 0–41)
ALT FLD-CCNC: <5 U/L — SIGNIFICANT CHANGE UP (ref 0–41)
ANION GAP SERPL CALC-SCNC: 12 MMOL/L — SIGNIFICANT CHANGE UP (ref 7–14)
ANION GAP SERPL CALC-SCNC: 12 MMOL/L — SIGNIFICANT CHANGE UP (ref 7–14)
AST SERPL-CCNC: 10 U/L — SIGNIFICANT CHANGE UP (ref 0–41)
AST SERPL-CCNC: 10 U/L — SIGNIFICANT CHANGE UP (ref 0–41)
BASOPHILS # BLD AUTO: 0.02 K/UL — SIGNIFICANT CHANGE UP (ref 0–0.2)
BASOPHILS # BLD AUTO: 0.02 K/UL — SIGNIFICANT CHANGE UP (ref 0–0.2)
BASOPHILS NFR BLD AUTO: 0.3 % — SIGNIFICANT CHANGE UP (ref 0–1)
BASOPHILS NFR BLD AUTO: 0.3 % — SIGNIFICANT CHANGE UP (ref 0–1)
BILIRUB SERPL-MCNC: 0.2 MG/DL — SIGNIFICANT CHANGE UP (ref 0.2–1.2)
BILIRUB SERPL-MCNC: 0.2 MG/DL — SIGNIFICANT CHANGE UP (ref 0.2–1.2)
BUN SERPL-MCNC: 13 MG/DL — SIGNIFICANT CHANGE UP (ref 10–20)
BUN SERPL-MCNC: 13 MG/DL — SIGNIFICANT CHANGE UP (ref 10–20)
CALCIUM SERPL-MCNC: 9.5 MG/DL — SIGNIFICANT CHANGE UP (ref 8.4–10.5)
CALCIUM SERPL-MCNC: 9.5 MG/DL — SIGNIFICANT CHANGE UP (ref 8.4–10.5)
CHLORIDE SERPL-SCNC: 100 MMOL/L — SIGNIFICANT CHANGE UP (ref 98–110)
CHLORIDE SERPL-SCNC: 100 MMOL/L — SIGNIFICANT CHANGE UP (ref 98–110)
CO2 SERPL-SCNC: 25 MMOL/L — SIGNIFICANT CHANGE UP (ref 17–32)
CO2 SERPL-SCNC: 25 MMOL/L — SIGNIFICANT CHANGE UP (ref 17–32)
CREAT SERPL-MCNC: 0.9 MG/DL — SIGNIFICANT CHANGE UP (ref 0.7–1.5)
CREAT SERPL-MCNC: 0.9 MG/DL — SIGNIFICANT CHANGE UP (ref 0.7–1.5)
EGFR: 103 ML/MIN/1.73M2 — SIGNIFICANT CHANGE UP
EGFR: 103 ML/MIN/1.73M2 — SIGNIFICANT CHANGE UP
EOSINOPHIL # BLD AUTO: 0.08 K/UL — SIGNIFICANT CHANGE UP (ref 0–0.7)
EOSINOPHIL # BLD AUTO: 0.08 K/UL — SIGNIFICANT CHANGE UP (ref 0–0.7)
EOSINOPHIL NFR BLD AUTO: 1.2 % — SIGNIFICANT CHANGE UP (ref 0–8)
EOSINOPHIL NFR BLD AUTO: 1.2 % — SIGNIFICANT CHANGE UP (ref 0–8)
FLUAV AG NPH QL: SIGNIFICANT CHANGE UP
FLUAV AG NPH QL: SIGNIFICANT CHANGE UP
FLUBV AG NPH QL: SIGNIFICANT CHANGE UP
FLUBV AG NPH QL: SIGNIFICANT CHANGE UP
GAS PNL BLDV: SIGNIFICANT CHANGE UP
GAS PNL BLDV: SIGNIFICANT CHANGE UP
GLUCOSE BLDC GLUCOMTR-MCNC: 113 MG/DL — HIGH (ref 70–99)
GLUCOSE BLDC GLUCOMTR-MCNC: 113 MG/DL — HIGH (ref 70–99)
GLUCOSE SERPL-MCNC: 90 MG/DL — SIGNIFICANT CHANGE UP (ref 70–99)
GLUCOSE SERPL-MCNC: 90 MG/DL — SIGNIFICANT CHANGE UP (ref 70–99)
HCT VFR BLD CALC: 35.1 % — LOW (ref 42–52)
HCT VFR BLD CALC: 35.1 % — LOW (ref 42–52)
HGB BLD-MCNC: 11.2 G/DL — LOW (ref 14–18)
HGB BLD-MCNC: 11.2 G/DL — LOW (ref 14–18)
IMM GRANULOCYTES NFR BLD AUTO: 0.8 % — HIGH (ref 0.1–0.3)
IMM GRANULOCYTES NFR BLD AUTO: 0.8 % — HIGH (ref 0.1–0.3)
LYMPHOCYTES # BLD AUTO: 0.99 K/UL — LOW (ref 1.2–3.4)
LYMPHOCYTES # BLD AUTO: 0.99 K/UL — LOW (ref 1.2–3.4)
LYMPHOCYTES # BLD AUTO: 15.3 % — LOW (ref 20.5–51.1)
LYMPHOCYTES # BLD AUTO: 15.3 % — LOW (ref 20.5–51.1)
MAGNESIUM SERPL-MCNC: 1.8 MG/DL — SIGNIFICANT CHANGE UP (ref 1.8–2.4)
MAGNESIUM SERPL-MCNC: 1.8 MG/DL — SIGNIFICANT CHANGE UP (ref 1.8–2.4)
MCHC RBC-ENTMCNC: 26 PG — LOW (ref 27–31)
MCHC RBC-ENTMCNC: 26 PG — LOW (ref 27–31)
MCHC RBC-ENTMCNC: 31.9 G/DL — LOW (ref 32–37)
MCHC RBC-ENTMCNC: 31.9 G/DL — LOW (ref 32–37)
MCV RBC AUTO: 81.4 FL — SIGNIFICANT CHANGE UP (ref 80–94)
MCV RBC AUTO: 81.4 FL — SIGNIFICANT CHANGE UP (ref 80–94)
MONOCYTES # BLD AUTO: 1.05 K/UL — HIGH (ref 0.1–0.6)
MONOCYTES # BLD AUTO: 1.05 K/UL — HIGH (ref 0.1–0.6)
MONOCYTES NFR BLD AUTO: 16.3 % — HIGH (ref 1.7–9.3)
MONOCYTES NFR BLD AUTO: 16.3 % — HIGH (ref 1.7–9.3)
NEUTROPHILS # BLD AUTO: 4.26 K/UL — SIGNIFICANT CHANGE UP (ref 1.4–6.5)
NEUTROPHILS # BLD AUTO: 4.26 K/UL — SIGNIFICANT CHANGE UP (ref 1.4–6.5)
NEUTROPHILS NFR BLD AUTO: 66.1 % — SIGNIFICANT CHANGE UP (ref 42.2–75.2)
NEUTROPHILS NFR BLD AUTO: 66.1 % — SIGNIFICANT CHANGE UP (ref 42.2–75.2)
NRBC # BLD: 0 /100 WBCS — SIGNIFICANT CHANGE UP (ref 0–0)
NRBC # BLD: 0 /100 WBCS — SIGNIFICANT CHANGE UP (ref 0–0)
NT-PROBNP SERPL-SCNC: 329 PG/ML — HIGH (ref 0–300)
NT-PROBNP SERPL-SCNC: 329 PG/ML — HIGH (ref 0–300)
PLATELET # BLD AUTO: 320 K/UL — SIGNIFICANT CHANGE UP (ref 130–400)
PLATELET # BLD AUTO: 320 K/UL — SIGNIFICANT CHANGE UP (ref 130–400)
PMV BLD: 9.9 FL — SIGNIFICANT CHANGE UP (ref 7.4–10.4)
PMV BLD: 9.9 FL — SIGNIFICANT CHANGE UP (ref 7.4–10.4)
POTASSIUM SERPL-MCNC: 4.4 MMOL/L — SIGNIFICANT CHANGE UP (ref 3.5–5)
POTASSIUM SERPL-MCNC: 4.4 MMOL/L — SIGNIFICANT CHANGE UP (ref 3.5–5)
POTASSIUM SERPL-SCNC: 4.4 MMOL/L — SIGNIFICANT CHANGE UP (ref 3.5–5)
POTASSIUM SERPL-SCNC: 4.4 MMOL/L — SIGNIFICANT CHANGE UP (ref 3.5–5)
PROT SERPL-MCNC: 7.5 G/DL — SIGNIFICANT CHANGE UP (ref 6–8)
PROT SERPL-MCNC: 7.5 G/DL — SIGNIFICANT CHANGE UP (ref 6–8)
RBC # BLD: 4.31 M/UL — LOW (ref 4.7–6.1)
RBC # BLD: 4.31 M/UL — LOW (ref 4.7–6.1)
RBC # FLD: 16.8 % — HIGH (ref 11.5–14.5)
RBC # FLD: 16.8 % — HIGH (ref 11.5–14.5)
RSV RNA NPH QL NAA+NON-PROBE: SIGNIFICANT CHANGE UP
RSV RNA NPH QL NAA+NON-PROBE: SIGNIFICANT CHANGE UP
SARS-COV-2 RNA SPEC QL NAA+PROBE: SIGNIFICANT CHANGE UP
SARS-COV-2 RNA SPEC QL NAA+PROBE: SIGNIFICANT CHANGE UP
SODIUM SERPL-SCNC: 137 MMOL/L — SIGNIFICANT CHANGE UP (ref 135–146)
SODIUM SERPL-SCNC: 137 MMOL/L — SIGNIFICANT CHANGE UP (ref 135–146)
TROPONIN T SERPL-MCNC: <0.01 NG/ML — SIGNIFICANT CHANGE UP
TROPONIN T SERPL-MCNC: <0.01 NG/ML — SIGNIFICANT CHANGE UP
WBC # BLD: 6.45 K/UL — SIGNIFICANT CHANGE UP (ref 4.8–10.8)
WBC # BLD: 6.45 K/UL — SIGNIFICANT CHANGE UP (ref 4.8–10.8)
WBC # FLD AUTO: 6.45 K/UL — SIGNIFICANT CHANGE UP (ref 4.8–10.8)
WBC # FLD AUTO: 6.45 K/UL — SIGNIFICANT CHANGE UP (ref 4.8–10.8)

## 2023-12-08 PROCEDURE — 36415 COLL VENOUS BLD VENIPUNCTURE: CPT

## 2023-12-08 PROCEDURE — 82962 GLUCOSE BLOOD TEST: CPT

## 2023-12-08 PROCEDURE — 93010 ELECTROCARDIOGRAM REPORT: CPT

## 2023-12-08 PROCEDURE — 85027 COMPLETE CBC AUTOMATED: CPT

## 2023-12-08 PROCEDURE — 84443 ASSAY THYROID STIM HORMONE: CPT

## 2023-12-08 PROCEDURE — 71045 X-RAY EXAM CHEST 1 VIEW: CPT | Mod: 26

## 2023-12-08 PROCEDURE — 83735 ASSAY OF MAGNESIUM: CPT

## 2023-12-08 PROCEDURE — 71045 X-RAY EXAM CHEST 1 VIEW: CPT

## 2023-12-08 PROCEDURE — 83036 HEMOGLOBIN GLYCOSYLATED A1C: CPT

## 2023-12-08 PROCEDURE — 99285 EMERGENCY DEPT VISIT HI MDM: CPT

## 2023-12-08 PROCEDURE — 99223 1ST HOSP IP/OBS HIGH 75: CPT

## 2023-12-08 PROCEDURE — 84145 PROCALCITONIN (PCT): CPT

## 2023-12-08 PROCEDURE — 81003 URINALYSIS AUTO W/O SCOPE: CPT

## 2023-12-08 PROCEDURE — 85025 COMPLETE CBC W/AUTO DIFF WBC: CPT

## 2023-12-08 PROCEDURE — 80048 BASIC METABOLIC PNL TOTAL CA: CPT

## 2023-12-08 PROCEDURE — 94640 AIRWAY INHALATION TREATMENT: CPT

## 2023-12-08 RX ORDER — PALIPERIDONE 1.5 MG/1
234 TABLET, EXTENDED RELEASE ORAL
Refills: 0 | DISCHARGE

## 2023-12-08 RX ORDER — ACETAMINOPHEN 500 MG
975 TABLET ORAL ONCE
Refills: 0 | Status: COMPLETED | OUTPATIENT
Start: 2023-12-08 | End: 2023-12-08

## 2023-12-08 RX ORDER — SODIUM CHLORIDE 9 MG/ML
1000 INJECTION INTRAMUSCULAR; INTRAVENOUS; SUBCUTANEOUS ONCE
Refills: 0 | Status: DISCONTINUED | OUTPATIENT
Start: 2023-12-08 | End: 2023-12-08

## 2023-12-08 RX ORDER — ENOXAPARIN SODIUM 100 MG/ML
40 INJECTION SUBCUTANEOUS EVERY 24 HOURS
Refills: 0 | Status: DISCONTINUED | OUTPATIENT
Start: 2023-12-08 | End: 2023-12-11

## 2023-12-08 RX ORDER — METOPROLOL TARTRATE 50 MG
25 TABLET ORAL DAILY
Refills: 0 | Status: DISCONTINUED | OUTPATIENT
Start: 2023-12-08 | End: 2023-12-11

## 2023-12-08 RX ORDER — INSULIN LISPRO 100/ML
VIAL (ML) SUBCUTANEOUS
Refills: 0 | Status: DISCONTINUED | OUTPATIENT
Start: 2023-12-08 | End: 2023-12-11

## 2023-12-08 RX ORDER — ACETAMINOPHEN 500 MG
650 TABLET ORAL EVERY 6 HOURS
Refills: 0 | Status: DISCONTINUED | OUTPATIENT
Start: 2023-12-08 | End: 2023-12-11

## 2023-12-08 RX ORDER — LEVOTHYROXINE SODIUM 125 MCG
25 TABLET ORAL DAILY
Refills: 0 | Status: DISCONTINUED | OUTPATIENT
Start: 2023-12-08 | End: 2023-12-11

## 2023-12-08 RX ORDER — BENZTROPINE MESYLATE 1 MG
1 TABLET ORAL
Refills: 0 | Status: DISCONTINUED | OUTPATIENT
Start: 2023-12-08 | End: 2023-12-11

## 2023-12-08 RX ORDER — SODIUM CHLORIDE 9 MG/ML
2000 INJECTION INTRAMUSCULAR; INTRAVENOUS; SUBCUTANEOUS ONCE
Refills: 0 | Status: COMPLETED | OUTPATIENT
Start: 2023-12-08 | End: 2023-12-08

## 2023-12-08 RX ORDER — ATORVASTATIN CALCIUM 80 MG/1
40 TABLET, FILM COATED ORAL AT BEDTIME
Refills: 0 | Status: DISCONTINUED | OUTPATIENT
Start: 2023-12-08 | End: 2023-12-11

## 2023-12-08 RX ORDER — DIVALPROEX SODIUM 500 MG/1
500 TABLET, DELAYED RELEASE ORAL
Refills: 0 | Status: DISCONTINUED | OUTPATIENT
Start: 2023-12-08 | End: 2023-12-11

## 2023-12-08 RX ORDER — CLOZAPINE 150 MG/1
100 TABLET, ORALLY DISINTEGRATING ORAL
Refills: 0 | Status: DISCONTINUED | OUTPATIENT
Start: 2023-12-08 | End: 2023-12-11

## 2023-12-08 RX ORDER — DIVALPROEX SODIUM 500 MG/1
250 TABLET, DELAYED RELEASE ORAL AT BEDTIME
Refills: 0 | Status: DISCONTINUED | OUTPATIENT
Start: 2023-12-08 | End: 2023-12-11

## 2023-12-08 RX ORDER — KETOROLAC TROMETHAMINE 30 MG/ML
30 SYRINGE (ML) INJECTION ONCE
Refills: 0 | Status: DISCONTINUED | OUTPATIENT
Start: 2023-12-08 | End: 2023-12-08

## 2023-12-08 RX ADMIN — CLOZAPINE 100 MILLIGRAM(S): 150 TABLET, ORALLY DISINTEGRATING ORAL at 18:27

## 2023-12-08 RX ADMIN — DIVALPROEX SODIUM 500 MILLIGRAM(S): 500 TABLET, DELAYED RELEASE ORAL at 18:27

## 2023-12-08 RX ADMIN — Medication 975 MILLIGRAM(S): at 11:41

## 2023-12-08 RX ADMIN — Medication 1 MILLIGRAM(S): at 18:28

## 2023-12-08 RX ADMIN — DIVALPROEX SODIUM 250 MILLIGRAM(S): 500 TABLET, DELAYED RELEASE ORAL at 21:20

## 2023-12-08 RX ADMIN — Medication 5 MILLIGRAM(S): at 21:20

## 2023-12-08 RX ADMIN — Medication 2 MILLIGRAM(S): at 21:19

## 2023-12-08 RX ADMIN — Medication 975 MILLIGRAM(S): at 10:41

## 2023-12-08 RX ADMIN — ATORVASTATIN CALCIUM 40 MILLIGRAM(S): 80 TABLET, FILM COATED ORAL at 21:20

## 2023-12-08 RX ADMIN — SODIUM CHLORIDE 2000 MILLILITER(S): 9 INJECTION INTRAMUSCULAR; INTRAVENOUS; SUBCUTANEOUS at 10:41

## 2023-12-08 NOTE — H&P ADULT - NSHPLABSRESULTS_GEN_ALL_CORE
LABS:                          11.2   6.45  )-----------( 320      ( 08 Dec 2023 11:05 )             35.1     12-08    137  |  100  |  13  ----------------------------<  90  4.4   |  25  |  0.9    Ca    9.5      08 Dec 2023 11:05  Mg     1.8     12-08    TPro  7.5  /  Alb  4.0  /  TBili  0.2  /  DBili  x   /  AST  10  /  ALT  <5  /  AlkPhos  87  12-08

## 2023-12-08 NOTE — H&P ADULT - ATTENDING COMMENTS
52 yo man non-smoke who lives in a group homer and known to have schizophrenia, HTN, DL, and DM  presenting for fever with productive cough associated with dyspnea and chest pain since 3 days.    Agree  with assessment  except for changes below.   Vital Signs (24 Hrs):  T(C): 37.1 (12-08-23 @ 17:21), Max: 38 (12-08-23 @ 09:29)  HR: 97 (12-08-23 @ 17:21) (97 - 131)  BP: 121/70 (12-08-23 @ 17:21) (116/57 - 121/70)  RR: 18 (12-08-23 @ 17:21) (18 - 18)  SpO2: 99% (12-08-23 @ 17:21) (97% - 99%)  on NC   Daily Height in cm: 167.64 (08 Dec 2023 09:29)      ECHO 5/2023  Summary:   1. Normal global left ventricular systolic function.   2. LV Ejection Fraction by Blackwood's Method with a biplane EF of 52 %.   3. Basal and mid anterior septum and basal and mid inferior septum are   abnormal as described above.   4. Normal right ventricular size and function.   5. Normal left atrial size.   6. Normal right atrial size.   7. No evidence of mitral valve regurgitation.   8. Adequate TR velocity was not obtained to accurately assess RVSP.   9. There is no evidence of pericardial effusion.      IMPRESSION  Sepsis  Secondary  to Unspecified  Pneumonia  Likely Viral  in Nature   Possible Bronchilotis     s/p levofloxacin and fluid bolus in ED  No O2 requirements  CXR unremarkable  Lactate -ve  - c/w levofloxacin  - follow up on cultures  Send Comprehensive  RVP     Hx HF  - No prior history of pulmonary edema or heart failure  - TTE  (5/8/2023): EF 52%, wall motion abnormality present, no valve disease  - patient denies prior cardiac work up and no cardiac work up in chart  - ECG: sinus rhtyhm  - Pro- (mild elevation)  -CXR unremarkable on prelim  read   - Consider cardiology referral as outpatient for workup  - add aspirin 81 mg qd  - c/w Atorvastatin 40mg  - c/w metoprolol  - consider adding Farxiga or Jardiance prior to discharge    Hx hypothyroid  - c/w levothyroxine    Hx Schizophrenia  - controlled symptoms  - c/w psych meds    Hx DLD  c/w atorvastatin    Hx  DM - Hold oral meds;  check fs;  Start insulin  regimend if  serum Glucose >180,  Hold for Hypoglycemia Goal  Gaol 140-180, f/u  A1c 54 yo man non-smoke who lives in a group homer and known to have schizophrenia, HTN, DL, and DM  presenting for fever with productive cough associated with dyspnea and chest pain since 3 days.    Agree  with assessment  except for changes below.   Vital Signs (24 Hrs):  T(C): 37.1 (12-08-23 @ 17:21), Max: 38 (12-08-23 @ 09:29)  HR: 97 (12-08-23 @ 17:21) (97 - 131)  BP: 121/70 (12-08-23 @ 17:21) (116/57 - 121/70)  RR: 18 (12-08-23 @ 17:21) (18 - 18)  SpO2: 99% (12-08-23 @ 17:21) (97% - 99%)  on NC   Daily Height in cm: 167.64 (08 Dec 2023 09:29)      ECHO 5/2023  Summary:   1. Normal global left ventricular systolic function.   2. LV Ejection Fraction by Blackwood's Method with a biplane EF of 52 %.   3. Basal and mid anterior septum and basal and mid inferior septum are   abnormal as described above.   4. Normal right ventricular size and function.   5. Normal left atrial size.   6. Normal right atrial size.   7. No evidence of mitral valve regurgitation.   8. Adequate TR velocity was not obtained to accurately assess RVSP.   9. There is no evidence of pericardial effusion.      IMPRESSION  Sepsis  Secondary  to Suspected  Unspecified  Pneumonia  Likely Viral  in Nature   Possible Bronchilotis     s/p levofloxacin and 3L IV fluid bolus in ED  No O2 requirements  CXR unremarkable  Lactate -ve  Can continue  Abx  for now   - follow up on cultures  Send Comprehensive  RVP     Hx HF  - No prior history of pulmonary edema or heart failure  - TTE  (5/8/2023): EF 52%, wall motion abnormality present, no valve disease  - patient denies prior cardiac work up and no cardiac work up in chart  - ECG: sinus rhtyhm  - Pro- (mild elevation)  -CXR unremarkable on prelim  read   - Consider cardiology referral as outpatient for workup  - add aspirin 81 mg qd  - c/w Atorvastatin 40mg  - c/w metoprolol  - consider adding Farxiga or Jardiance prior to discharge    Hx hypothyroid  - c/w levothyroxine    Hx Schizophrenia  - controlled symptoms  - c/w psych Medications     Hx DLD  c/w atorvastatin    Hx  DM - Hold oral meds;  check fs;  Start insulin  regimend if  serum Glucose >180,  Hold for Hypoglycemia Goal  Gaol 140-180, f/u  A1c 54 yo man non-smoke who lives in a group homer and known to have schizophrenia, HTN, DL, and DM  presenting for fever with productive cough associated with dyspnea and chest pain since 3 days.    Agree  with assessment  except for changes below.   Vital Signs (24 Hrs):  T(C): 37.1 (12-08-23 @ 17:21), Max: 38 (12-08-23 @ 09:29)  HR: 97 (12-08-23 @ 17:21) (97 - 131)  BP: 121/70 (12-08-23 @ 17:21) (116/57 - 121/70)  RR: 18 (12-08-23 @ 17:21) (18 - 18)  SpO2: 99% (12-08-23 @ 17:21) (97% - 99%)  on NC   Daily Height in cm: 167.64 (08 Dec 2023 09:29)      ECHO 5/2023  Summary:   1. Normal global left ventricular systolic function.   2. LV Ejection Fraction by Blackwood's Method with a biplane EF of 52 %.   3. Basal and mid anterior septum and basal and mid inferior septum are   abnormal as described above.   4. Normal right ventricular size and function.   5. Normal left atrial size.   6. Normal right atrial size.   7. No evidence of mitral valve regurgitation.   8. Adequate TR velocity was not obtained to accurately assess RVSP.   9. There is no evidence of pericardial effusion.    PHYSICAL EXAM  GENERAL: NAD,  HEAD:  NCAT, EOMI, MM  NECK: Supple, Nontender  NERVOUS SYSTEM:  AAOx3, NFD  CHEST/LUNG: +bs b/l, + wheezing   HEART: +s1s2 RRR  ABDOMEN: soft, NT/ND  EXTREMITIES:  pp, no edema  SKIN: age related skin changes     IMPRESSION  Sepsis  Secondary  to Suspected  Unspecified  Pneumonia  Likely Viral  in Nature   Mild  asthma  Exacerbation   Hx Mild  Asthma   Possible Bronchilotis     s/p levofloxacin and 3L IV fluid bolus in ED  No O2 requirements  CXR unremarkable  Lactate -ve  Can continue  Abx  for now   follow up on cultures  Send Comprehensive  RVP   Titrate supplemental O2 to maintain SpO2 92-96%;   Avoid hyperoxia and wean off O2 gradually  Symbicort 160-4.5mcg 2 puffs BID  Nebulizer treatment Q6H PRN.  Continue methylprednisolone 40mg IV Q12H.       Hx HF  - No prior history of pulmonary edema or heart failure  - TTE  (5/8/2023): EF 52%, wall motion abnormality present, no valve disease  - patient denies prior cardiac work up and no cardiac work up in chart  - ECG: sinus rhtyhm  - Pro- (mild elevation)  -CXR unremarkable on prelim  read   - Consider cardiology referral as outpatient for workup  - add aspirin 81 mg qd  - c/w Atorvastatin 40mg  - c/w metoprolol  - consider adding Farxiga or Jardiance prior to discharge    Hx hypothyroid  - c/w levothyroxine    Hx Schizophrenia  - controlled symptoms  - c/w psych Medications     Hx DLD  c/w atorvastatin    Hx  DM - Hold oral meds;  check fs;  Start insulin  regimend if  serum Glucose >180,  Hold for Hypoglycemia Goal  Gaol 140-180, f/u  A1c    Seen on 12/08/23

## 2023-12-08 NOTE — ED PROVIDER NOTE - CLINICAL SUMMARY MEDICAL DECISION MAKING FREE TEXT BOX
pt with cough, sob and weakness, febrile in ED, r/o pna/sepsis, labs, ivf, and cxr.  no hypoxia.  abx started and admitted to med.  Any ordered labs and EKG were reviewed.  Any imaging was ordered and reviewed by me.  Appropriate medications for patient's presenting complaints were ordered and effects were reassessed.  Patient's records (prior hospital, ED visit, and/or nursing home notes if available) were reviewed.  Additional history was obtained from EMS, family, and/or PCP (where available).  Escalation to admission/observation was considered.  Patient requires inpatient hospitalization - monitored setting.

## 2023-12-08 NOTE — ED PROVIDER NOTE - OBJECTIVE STATEMENT
33-year-old male with a past medical history of hypertension, hyperlipidemia, diabetes, hypothyroidism, and schizoaffective disorder who presents to the ED with shortness of breath, and a cough. He reports that symptoms started 3 days ago. Also endorses chest pain that only occurs with coughing. Reports that he has been bringing up yellow-colored phlegm. Also endorses general weakness. Denies fever, nausea, vomiting, abdominal pain, urinary symptoms, and change with bowel movement. 53-year-old male with a past medical history of hypertension, hyperlipidemia, diabetes, hypothyroidism, and schizoaffective disorder who presents to the ED with shortness of breath, and a cough. He reports that symptoms started 3 days ago. Also endorses chest pain that only occurs with coughing. Reports that he has been bringing up yellow-colored phlegm. Also endorses general weakness. Denies fever, nausea, vomiting, abdominal pain, urinary symptoms, and change with bowel movement.

## 2023-12-08 NOTE — ED PROVIDER NOTE - PROGRESS NOTE DETAILS
Patient is admitted for fever and weakness. Pt is aware of the plan and agrees. Pt has been endorsed to MAR.

## 2023-12-08 NOTE — ED PROVIDER NOTE - PHYSICAL EXAMINATION
CONSTITUTIONAL: Weak-appearing; in no apparent distress.   ENT: Hearing is intact with good acuity to spoken voice.  Patient is speaking clearly, not muffled and airway is intact.   RESPIRATORY: No signs of respiratory distress. Lung sounds are clear in all lobes bilaterally without rales, rhonchi, or wheezes.  CARDIOVASCULAR: tachycardic  GI: Abdomen is soft, non-tender, and without distention. Bowel sounds are present and normoactive in all four quadrants. No masses are noted.   BACK: No evidence of trauma or deformity. No CVA tenderness bilaterally. Normal ROM.   NEURO: A & O x 3. Normal speech. No focal deficit.  PSYCHOLOGICAL: Appropriate mood and affect. Good judgement and insight.

## 2023-12-08 NOTE — H&P ADULT - ASSESSMENT
52 yo man non-smoke who lives in a group homer and known to have schizophrenia, HTN, DL, and DM  presenting for fever with productive cough associated with dyspnea and chest pain since 3 days.    #Sepsis 2/2 Respiratory infection  #Bronchitis vs. pneumonia  - s/p levoflocacin and fluid bolus in ED  - No O2 requirements  - CXR unremarkable  - Lactate -ve  - c/w levofloxacin  - follow up on cultures  - check RVP for COVID and influenza      #Mid range EF  - No prior history of pulmonary edema or heart failure  - TTE  (5/8/2023): EF 52%, wall motion abnormality present, no valve disease  - patient denies prior cardiac work up and no cardiac work up in chart  - ECG: sinus rhtyhm  - Pro- (mild elevation)  - Consider cardiology referral as outpatient for workup  - add aspirin 81 mg qd  - c/w Atorvastatin 40mg  - c/w metoprolol  - consider adding Farxiga or Jardiance prior to discharge    #hypothyroid  - c/w levothyroxine    #Schizophrenia  - controlled symptoms  - c/w psych meds    #DL  c/w atorvastatin    #DM   - ISS  - check a1c  - may benefit from addition of  Jardiance or Farxiga prior to d/c     #Diet: diabetic diet  #DVT ppx lovenox 40mg subq qd

## 2023-12-08 NOTE — H&P ADULT - HISTORY OF PRESENT ILLNESS
52 yo man non-smoker known to have schizophrenia, HTN, DL, and lives in a group home presenting for fever and productive cough associated with dyspnea since 3 days.  he also reports lower chest pain when coughing.   Denies ill contacts. reports that he took his flu shot this year.  No GI complaints except for decreased appetite.    In ED, patient was found febrile, tachycardic and spO2 97% on NC 2L but blood pressure was normal.  At time of evaluation patient was on room air.  He treated for sepsis 2/2 respiratory infection bronchitis vs atypical pneumonia with IV fluids and Levofloxacin.

## 2023-12-08 NOTE — ED ADULT NURSE NOTE - NSFALLUNIVINTERV_ED_ALL_ED
Bed/Stretcher in lowest position, wheels locked, appropriate side rails in place/Call bell, personal items and telephone in reach/Instruct patient to call for assistance before getting out of bed/chair/stretcher/Non-slip footwear applied when patient is off stretcher/[place] to call system/Physically safe environment - no spills, clutter or unnecessary equipment/Purposeful proactive rounding/Room/bathroom lighting operational, light cord in reach Bed/Stretcher in lowest position, wheels locked, appropriate side rails in place/Call bell, personal items and telephone in reach/Instruct patient to call for assistance before getting out of bed/chair/stretcher/Non-slip footwear applied when patient is off stretcher/[place] to call system/Physically safe environment - no spills, clutter or unnecessary equipment/Purposeful proactive rounding/Room/bathroom lighting operational, light cord in reach

## 2023-12-08 NOTE — H&P ADULT - NSHPPHYSICALEXAM_GEN_ALL_CORE
T(C): 36.8 (12-08-23 @ 11:30), Max: 38 (12-08-23 @ 09:29)  HR: 100 (12-08-23 @ 11:30) (100 - 131)  BP: 121/64 (12-08-23 @ 11:30) (116/57 - 121/64)  RR: 18 (12-08-23 @ 11:30) (18 - 18)  SpO2: 97% (12-08-23 @ 11:30) (97% - 97%)    CONSTITUTIONAL: No apparent distress  EYES: PERRLA and symmetric, EOMI, No conjunctival or scleral injection, non-icteric  NECK: Supple, symmetric and without tracheal deviation   RESP: No respiratory distress, no use of accessory muscles; Coarse breath sounds but no weezing or crackles  CV: RRR, +S1S2, no murmur. no JVD; no peripheral edema  GI: Soft, NT, ND, no rebound, no guarding;  NEURO: AOx3, no focal deficits

## 2023-12-08 NOTE — H&P ADULT - NSICDXPASTMEDICALHX_GEN_ALL_CORE_FT
PAST MEDICAL HISTORY:  DM (diabetes mellitus)     Echocardiogram abnormal     HTN (hypertension)     Schizophrenia     Substance use disorder

## 2023-12-08 NOTE — ED ADULT NURSE NOTE - NSHOSCREENINGQ1_ED_ALL_ED
Pediatric Outpatient Speech Evaluation   06/17/19 1300   Visit Type   Visit Type Initial       Present No   Comments Luciana and her family speak English.   General Patient Information   Type of Evaluation  Speech and Language   Start of Care Date 06/17/19   Referring Physician Andra Parker Cindy reports she usually understands Luciana Luciana Dobbs was administered the Patel-Amanda, PhD., Kim, Phd, [facility]. 2000. Amanda Tabor 2 Test of Articulation. [place], [place]. [facility]    Debby Toribio MA, [facility]-SLP  [facility]  [phone] (Phone)  [phone] (Fax)   No

## 2023-12-09 LAB
A1C WITH ESTIMATED AVERAGE GLUCOSE RESULT: 6.2 % — HIGH (ref 4–5.6)
A1C WITH ESTIMATED AVERAGE GLUCOSE RESULT: 6.2 % — HIGH (ref 4–5.6)
ANION GAP SERPL CALC-SCNC: 11 MMOL/L — SIGNIFICANT CHANGE UP (ref 7–14)
ANION GAP SERPL CALC-SCNC: 11 MMOL/L — SIGNIFICANT CHANGE UP (ref 7–14)
APPEARANCE UR: CLEAR — SIGNIFICANT CHANGE UP
APPEARANCE UR: CLEAR — SIGNIFICANT CHANGE UP
BASOPHILS # BLD AUTO: 0.01 K/UL — SIGNIFICANT CHANGE UP (ref 0–0.2)
BASOPHILS # BLD AUTO: 0.01 K/UL — SIGNIFICANT CHANGE UP (ref 0–0.2)
BASOPHILS NFR BLD AUTO: 0.2 % — SIGNIFICANT CHANGE UP (ref 0–1)
BASOPHILS NFR BLD AUTO: 0.2 % — SIGNIFICANT CHANGE UP (ref 0–1)
BILIRUB UR-MCNC: NEGATIVE — SIGNIFICANT CHANGE UP
BILIRUB UR-MCNC: NEGATIVE — SIGNIFICANT CHANGE UP
BUN SERPL-MCNC: 7 MG/DL — LOW (ref 10–20)
BUN SERPL-MCNC: 7 MG/DL — LOW (ref 10–20)
CALCIUM SERPL-MCNC: 9.9 MG/DL — SIGNIFICANT CHANGE UP (ref 8.4–10.4)
CALCIUM SERPL-MCNC: 9.9 MG/DL — SIGNIFICANT CHANGE UP (ref 8.4–10.4)
CHLORIDE SERPL-SCNC: 103 MMOL/L — SIGNIFICANT CHANGE UP (ref 98–110)
CHLORIDE SERPL-SCNC: 103 MMOL/L — SIGNIFICANT CHANGE UP (ref 98–110)
CO2 SERPL-SCNC: 21 MMOL/L — SIGNIFICANT CHANGE UP (ref 17–32)
CO2 SERPL-SCNC: 21 MMOL/L — SIGNIFICANT CHANGE UP (ref 17–32)
COLOR SPEC: YELLOW — SIGNIFICANT CHANGE UP
COLOR SPEC: YELLOW — SIGNIFICANT CHANGE UP
CREAT SERPL-MCNC: 0.7 MG/DL — SIGNIFICANT CHANGE UP (ref 0.7–1.5)
CREAT SERPL-MCNC: 0.7 MG/DL — SIGNIFICANT CHANGE UP (ref 0.7–1.5)
DIFF PNL FLD: NEGATIVE — SIGNIFICANT CHANGE UP
DIFF PNL FLD: NEGATIVE — SIGNIFICANT CHANGE UP
EGFR: 111 ML/MIN/1.73M2 — SIGNIFICANT CHANGE UP
EGFR: 111 ML/MIN/1.73M2 — SIGNIFICANT CHANGE UP
EOSINOPHIL # BLD AUTO: 0 K/UL — SIGNIFICANT CHANGE UP (ref 0–0.7)
EOSINOPHIL # BLD AUTO: 0 K/UL — SIGNIFICANT CHANGE UP (ref 0–0.7)
EOSINOPHIL NFR BLD AUTO: 0 % — SIGNIFICANT CHANGE UP (ref 0–8)
EOSINOPHIL NFR BLD AUTO: 0 % — SIGNIFICANT CHANGE UP (ref 0–8)
ESTIMATED AVERAGE GLUCOSE: 131 MG/DL — HIGH (ref 68–114)
ESTIMATED AVERAGE GLUCOSE: 131 MG/DL — HIGH (ref 68–114)
GLUCOSE BLDC GLUCOMTR-MCNC: 162 MG/DL — HIGH (ref 70–99)
GLUCOSE BLDC GLUCOMTR-MCNC: 162 MG/DL — HIGH (ref 70–99)
GLUCOSE BLDC GLUCOMTR-MCNC: 168 MG/DL — HIGH (ref 70–99)
GLUCOSE BLDC GLUCOMTR-MCNC: 168 MG/DL — HIGH (ref 70–99)
GLUCOSE BLDC GLUCOMTR-MCNC: 181 MG/DL — HIGH (ref 70–99)
GLUCOSE BLDC GLUCOMTR-MCNC: 181 MG/DL — HIGH (ref 70–99)
GLUCOSE SERPL-MCNC: 178 MG/DL — HIGH (ref 70–99)
GLUCOSE SERPL-MCNC: 178 MG/DL — HIGH (ref 70–99)
GLUCOSE UR QL: NEGATIVE MG/DL — SIGNIFICANT CHANGE UP
GLUCOSE UR QL: NEGATIVE MG/DL — SIGNIFICANT CHANGE UP
HCT VFR BLD CALC: 34.2 % — LOW (ref 42–52)
HCT VFR BLD CALC: 34.2 % — LOW (ref 42–52)
HGB BLD-MCNC: 10.7 G/DL — LOW (ref 14–18)
HGB BLD-MCNC: 10.7 G/DL — LOW (ref 14–18)
IMM GRANULOCYTES NFR BLD AUTO: 0.5 % — HIGH (ref 0.1–0.3)
IMM GRANULOCYTES NFR BLD AUTO: 0.5 % — HIGH (ref 0.1–0.3)
KETONES UR-MCNC: NEGATIVE MG/DL — SIGNIFICANT CHANGE UP
KETONES UR-MCNC: NEGATIVE MG/DL — SIGNIFICANT CHANGE UP
LEUKOCYTE ESTERASE UR-ACNC: NEGATIVE — SIGNIFICANT CHANGE UP
LEUKOCYTE ESTERASE UR-ACNC: NEGATIVE — SIGNIFICANT CHANGE UP
LYMPHOCYTES # BLD AUTO: 0.83 K/UL — LOW (ref 1.2–3.4)
LYMPHOCYTES # BLD AUTO: 0.83 K/UL — LOW (ref 1.2–3.4)
LYMPHOCYTES # BLD AUTO: 14.5 % — LOW (ref 20.5–51.1)
LYMPHOCYTES # BLD AUTO: 14.5 % — LOW (ref 20.5–51.1)
MAGNESIUM SERPL-MCNC: 1.8 MG/DL — SIGNIFICANT CHANGE UP (ref 1.8–2.4)
MAGNESIUM SERPL-MCNC: 1.8 MG/DL — SIGNIFICANT CHANGE UP (ref 1.8–2.4)
MCHC RBC-ENTMCNC: 26.1 PG — LOW (ref 27–31)
MCHC RBC-ENTMCNC: 26.1 PG — LOW (ref 27–31)
MCHC RBC-ENTMCNC: 31.3 G/DL — LOW (ref 32–37)
MCHC RBC-ENTMCNC: 31.3 G/DL — LOW (ref 32–37)
MCV RBC AUTO: 83.4 FL — SIGNIFICANT CHANGE UP (ref 80–94)
MCV RBC AUTO: 83.4 FL — SIGNIFICANT CHANGE UP (ref 80–94)
MONOCYTES # BLD AUTO: 0.08 K/UL — LOW (ref 0.1–0.6)
MONOCYTES # BLD AUTO: 0.08 K/UL — LOW (ref 0.1–0.6)
MONOCYTES NFR BLD AUTO: 1.4 % — LOW (ref 1.7–9.3)
MONOCYTES NFR BLD AUTO: 1.4 % — LOW (ref 1.7–9.3)
NEUTROPHILS # BLD AUTO: 4.76 K/UL — SIGNIFICANT CHANGE UP (ref 1.4–6.5)
NEUTROPHILS # BLD AUTO: 4.76 K/UL — SIGNIFICANT CHANGE UP (ref 1.4–6.5)
NEUTROPHILS NFR BLD AUTO: 83.4 % — HIGH (ref 42.2–75.2)
NEUTROPHILS NFR BLD AUTO: 83.4 % — HIGH (ref 42.2–75.2)
NITRITE UR-MCNC: NEGATIVE — SIGNIFICANT CHANGE UP
NITRITE UR-MCNC: NEGATIVE — SIGNIFICANT CHANGE UP
NRBC # BLD: 0 /100 WBCS — SIGNIFICANT CHANGE UP (ref 0–0)
NRBC # BLD: 0 /100 WBCS — SIGNIFICANT CHANGE UP (ref 0–0)
PH UR: 6.5 — SIGNIFICANT CHANGE UP (ref 5–8)
PH UR: 6.5 — SIGNIFICANT CHANGE UP (ref 5–8)
PLATELET # BLD AUTO: 302 K/UL — SIGNIFICANT CHANGE UP (ref 130–400)
PLATELET # BLD AUTO: 302 K/UL — SIGNIFICANT CHANGE UP (ref 130–400)
PMV BLD: 9.4 FL — SIGNIFICANT CHANGE UP (ref 7.4–10.4)
PMV BLD: 9.4 FL — SIGNIFICANT CHANGE UP (ref 7.4–10.4)
POTASSIUM SERPL-MCNC: 5.3 MMOL/L — HIGH (ref 3.5–5)
POTASSIUM SERPL-MCNC: 5.3 MMOL/L — HIGH (ref 3.5–5)
POTASSIUM SERPL-SCNC: 5.3 MMOL/L — HIGH (ref 3.5–5)
POTASSIUM SERPL-SCNC: 5.3 MMOL/L — HIGH (ref 3.5–5)
PROT UR-MCNC: NEGATIVE MG/DL — SIGNIFICANT CHANGE UP
PROT UR-MCNC: NEGATIVE MG/DL — SIGNIFICANT CHANGE UP
RBC # BLD: 4.1 M/UL — LOW (ref 4.7–6.1)
RBC # BLD: 4.1 M/UL — LOW (ref 4.7–6.1)
RBC # FLD: 17.2 % — HIGH (ref 11.5–14.5)
RBC # FLD: 17.2 % — HIGH (ref 11.5–14.5)
SODIUM SERPL-SCNC: 135 MMOL/L — SIGNIFICANT CHANGE UP (ref 135–146)
SODIUM SERPL-SCNC: 135 MMOL/L — SIGNIFICANT CHANGE UP (ref 135–146)
SP GR SPEC: 1.01 — SIGNIFICANT CHANGE UP (ref 1–1.03)
SP GR SPEC: 1.01 — SIGNIFICANT CHANGE UP (ref 1–1.03)
TSH SERPL-MCNC: 0.82 UIU/ML — SIGNIFICANT CHANGE UP (ref 0.27–4.2)
TSH SERPL-MCNC: 0.82 UIU/ML — SIGNIFICANT CHANGE UP (ref 0.27–4.2)
UROBILINOGEN FLD QL: 1 MG/DL — SIGNIFICANT CHANGE UP (ref 0.2–1)
UROBILINOGEN FLD QL: 1 MG/DL — SIGNIFICANT CHANGE UP (ref 0.2–1)
WBC # BLD: 5.71 K/UL — SIGNIFICANT CHANGE UP (ref 4.8–10.8)
WBC # BLD: 5.71 K/UL — SIGNIFICANT CHANGE UP (ref 4.8–10.8)
WBC # FLD AUTO: 5.71 K/UL — SIGNIFICANT CHANGE UP (ref 4.8–10.8)
WBC # FLD AUTO: 5.71 K/UL — SIGNIFICANT CHANGE UP (ref 4.8–10.8)

## 2023-12-09 PROCEDURE — 99232 SBSQ HOSP IP/OBS MODERATE 35: CPT

## 2023-12-09 RX ORDER — IPRATROPIUM/ALBUTEROL SULFATE 18-103MCG
3 AEROSOL WITH ADAPTER (GRAM) INHALATION EVERY 6 HOURS
Refills: 0 | Status: DISCONTINUED | OUTPATIENT
Start: 2023-12-09 | End: 2023-12-11

## 2023-12-09 RX ORDER — SODIUM ZIRCONIUM CYCLOSILICATE 10 G/10G
5 POWDER, FOR SUSPENSION ORAL ONCE
Refills: 0 | Status: COMPLETED | OUTPATIENT
Start: 2023-12-09 | End: 2023-12-09

## 2023-12-09 RX ORDER — BUDESONIDE AND FORMOTEROL FUMARATE DIHYDRATE 160; 4.5 UG/1; UG/1
2 AEROSOL RESPIRATORY (INHALATION)
Refills: 0 | Status: DISCONTINUED | OUTPATIENT
Start: 2023-12-09 | End: 2023-12-11

## 2023-12-09 RX ADMIN — Medication 3 MILLILITER(S): at 01:37

## 2023-12-09 RX ADMIN — Medication 1: at 17:02

## 2023-12-09 RX ADMIN — DIVALPROEX SODIUM 500 MILLIGRAM(S): 500 TABLET, DELAYED RELEASE ORAL at 18:00

## 2023-12-09 RX ADMIN — CLOZAPINE 100 MILLIGRAM(S): 150 TABLET, ORALLY DISINTEGRATING ORAL at 06:11

## 2023-12-09 RX ADMIN — Medication 40 MILLIGRAM(S): at 01:37

## 2023-12-09 RX ADMIN — DIVALPROEX SODIUM 250 MILLIGRAM(S): 500 TABLET, DELAYED RELEASE ORAL at 22:03

## 2023-12-09 RX ADMIN — Medication 1 MILLIGRAM(S): at 18:00

## 2023-12-09 RX ADMIN — DIVALPROEX SODIUM 500 MILLIGRAM(S): 500 TABLET, DELAYED RELEASE ORAL at 06:11

## 2023-12-09 RX ADMIN — ENOXAPARIN SODIUM 40 MILLIGRAM(S): 100 INJECTION SUBCUTANEOUS at 13:23

## 2023-12-09 RX ADMIN — Medication 1 MILLIGRAM(S): at 06:11

## 2023-12-09 RX ADMIN — Medication 40 MILLIGRAM(S): at 06:12

## 2023-12-09 RX ADMIN — ATORVASTATIN CALCIUM 40 MILLIGRAM(S): 80 TABLET, FILM COATED ORAL at 22:01

## 2023-12-09 RX ADMIN — Medication 1: at 13:05

## 2023-12-09 RX ADMIN — Medication 25 MILLIGRAM(S): at 06:11

## 2023-12-09 RX ADMIN — Medication 3 MILLILITER(S): at 13:36

## 2023-12-09 RX ADMIN — Medication 3 MILLILITER(S): at 20:13

## 2023-12-09 RX ADMIN — BUDESONIDE AND FORMOTEROL FUMARATE DIHYDRATE 2 PUFF(S): 160; 4.5 AEROSOL RESPIRATORY (INHALATION) at 21:58

## 2023-12-09 RX ADMIN — Medication 40 MILLIGRAM(S): at 18:00

## 2023-12-09 RX ADMIN — Medication 25 MICROGRAM(S): at 06:11

## 2023-12-09 RX ADMIN — Medication 2 MILLIGRAM(S): at 22:00

## 2023-12-09 RX ADMIN — CLOZAPINE 100 MILLIGRAM(S): 150 TABLET, ORALLY DISINTEGRATING ORAL at 18:00

## 2023-12-09 RX ADMIN — Medication 3 MILLILITER(S): at 11:24

## 2023-12-09 RX ADMIN — Medication 5 MILLIGRAM(S): at 22:01

## 2023-12-09 RX ADMIN — SODIUM ZIRCONIUM CYCLOSILICATE 5 GRAM(S): 10 POWDER, FOR SUSPENSION ORAL at 13:24

## 2023-12-09 RX ADMIN — BUDESONIDE AND FORMOTEROL FUMARATE DIHYDRATE 2 PUFF(S): 160; 4.5 AEROSOL RESPIRATORY (INHALATION) at 13:23

## 2023-12-09 NOTE — PATIENT PROFILE ADULT - FALL HARM RISK - HARM RISK INTERVENTIONS
Assistance with ambulation/Assistance OOB with selected safe patient handling equipment/Communicate Risk of Fall with Harm to all staff/Discuss with provider need for PT consult/Monitor gait and stability/Reinforce activity limits and safety measures with patient and family/Tailored Fall Risk Interventions/Visual Cue: Yellow wristband and red socks/Bed in lowest position, wheels locked, appropriate side rails in place/Call bell, personal items and telephone in reach/Instruct patient to call for assistance before getting out of bed or chair/Non-slip footwear when patient is out of bed/[place] to call system/Physically safe environment - no spills, clutter or unnecessary equipment/Purposeful Proactive Rounding/Room/bathroom lighting operational, light cord in reach Assistance with ambulation/Assistance OOB with selected safe patient handling equipment/Communicate Risk of Fall with Harm to all staff/Discuss with provider need for PT consult/Monitor gait and stability/Reinforce activity limits and safety measures with patient and family/Tailored Fall Risk Interventions/Visual Cue: Yellow wristband and red socks/Bed in lowest position, wheels locked, appropriate side rails in place/Call bell, personal items and telephone in reach/Instruct patient to call for assistance before getting out of bed or chair/Non-slip footwear when patient is out of bed/[place] to call system/Physically safe environment - no spills, clutter or unnecessary equipment/Purposeful Proactive Rounding/Room/bathroom lighting operational, light cord in reach

## 2023-12-09 NOTE — PROGRESS NOTE ADULT - ASSESSMENT
52 yo man non-smoke who lives in a group homer and known to have schizophrenia, HTN, DL, and DM  presenting for fever with productive cough associated with dyspnea and chest pain since 3 days.    #Sepsis   #Bronchitis vs. pneumonia  - on admission: febrile 100.7, tachycardic 117, normotensive. originally sating well on RA but now requiring supplemental 02  - no leukocytosis, no LA   - UA unremarkable  - VBG unremarkable  - RVP negative  - CXR with suble bibasilar opacities  - s/p levoflocacin and fluid bolus in ED  - c/w levofloxacin, currently day #3  - follow up on cultures  - albuterol/ipratropium for Nebulization 3 milliLiter(s) Nebulizer every 6 hours  - budesonide 160 MICROgram(s)/formoterol 4.5 MICROgram(s) Inhaler 2 Puff(s) Inhalation two times a day  - will continue steroids for now; methylPREDNISolone sodium succinate Injectable 40 milliGRAM(s) IV Push two times a day    #HLD  atorvastatin 40 milliGRAM(s) Oral at bedtime    # Schizophrenia  # Extrapyramidal s/e  - benztropine 1 milliGRAM(s) Oral two times a day  - cloZAPine 100 milliGRAM(s) Oral two times a day  - divalproex  milliGRAM(s) Oral at bedtime  - divalproex  milliGRAM(s) Oral two times a day  - LORazepam     Tablet 2 milliGRAM(s) Oral at bedtime    # DM  insulin lispro (ADMELOG) corrective regimen sliding scale   SubCutaneous three times a day before meals    # Hypothyroidism  levothyroxine 25 MICROGram(s) Oral daily    #Mid range EF  - No prior history of pulmonary edema or heart failure  - TTE  (5/8/2023): EF 52%, wall motion abnormality present, no valve disease  - patient denies prior cardiac work up and no cardiac work up in chart  - ECG: sinus rhtyhm  - Pro- (mild elevation)  - Consider cardiology referral as outpatient for workup  - add aspirin 81 mg qd  - c/w Atorvastatin 40mg  - c/w metoprolol succinate ER 25 milliGRAM(s) Oral daily  - no diuretics per med rec  - consider adding Farxiga or Jardiance prior to discharge    #hypothyroid  - c/w levothyroxine    #Schizophrenia  - controlled symptoms  - c/w psych meds    #DL  c/w atorvastatin    #Diet: diabetic diet  #DVT ppx enoxaparin Injectable 40 milliGRAM(s) SubCutaneous every 24 hours      #Progress Note Handoff  Pending (specify):  clincial improvement, wean off 02  Disposition: Unknown at this time________likely return to group home

## 2023-12-09 NOTE — PROGRESS NOTE ADULT - SUBJECTIVE AND OBJECTIVE BOX
SUDHA PARKER  53y  Male      Patient is a 53y old  Male who presents with a chief complaint of Dyspnea, cough and fever (08 Dec 2023 15:45)      INTERVAL HPI/OVERNIGHT EVENTS: no acute events overnight.       REVIEW OF SYSTEMS:  CONSTITUTIONAL: No fever, weight loss, or fatigue  RESPIRATORY: No cough, wheezing, chills or hemoptysis; No shortness of breath  CARDIOVASCULAR: No chest pain, palpitations, dizziness, or leg swelling  GASTROINTESTINAL: No abdominal or epigastric pain. No nausea, vomiting, or hematemesis; No diarrhea or constipation. No melena or hematochezia.  GENITOURINARY: No dysuria, frequency, hematuria, or incontinence  NEUROLOGICAL: No headaches, memory loss, loss of strength, numbness, or tremors  SKIN: No itching, burning, rashes, or lesions   MUSCULOSKELETAL: No joint pain or swelling; No muscle, back, or extremity pain  PSYCHIATRIC: No depression, anxiety, mood swings, or difficulty sleeping  All other review of systems negative    T(C): 38.2 (12-08-23 @ 23:50), Max: 38.2 (12-08-23 @ 23:50)  HR: 106 (12-09-23 @ 07:29) (97 - 117)  BP: 110/61 (12-09-23 @ 07:29) (110/61 - 138/91)  RR: 18 (12-09-23 @ 07:29) (18 - 18)  SpO2: 93% (12-09-23 @ 07:29) (93% - 99%)  Wt(kg): --Vital Signs Last 24 Hrs  T(C): 38.2 (08 Dec 2023 23:50), Max: 38.2 (08 Dec 2023 23:50)  T(F): 100.7 (08 Dec 2023 23:50), Max: 100.7 (08 Dec 2023 23:50)  HR: 106 (09 Dec 2023 07:29) (97 - 117)  BP: 110/61 (09 Dec 2023 07:29) (110/61 - 138/91)  BP(mean): 94 (09 Dec 2023 06:02) (94 - 94)  RR: 18 (09 Dec 2023 07:29) (18 - 18)  SpO2: 93% (09 Dec 2023 07:29) (93% - 99%)    Parameters below as of 09 Dec 2023 07:29  Patient On (Oxygen Delivery Method): room air    PHYSICAL EXAM:  GENERAL: NAD, well-groomed, well-developed  PSYCH: no agitation, baseline mentation  NERVOUS SYSTEM:  Alert & Oriented X3, Motor Strength 5/5 B/L upper and lower extremities; Sensory intact; FTN WNL  PULMONARY: Clear to percussion bilaterally; No rales, rhonchi, wheezing, or rubs  CARDIOVASCULAR: Regular rate and rhythm; No murmurs, rubs, or gallops  GI: Soft, Nontender, Nondistended; Bowel sounds present  EXTREMITIES:  2+ Peripheral Pulses, No clubbing, cyanosis, or edema  LYMPH: No lymphadenopathy noted  SKIN: No rashes or lesions    Consultant(s) Notes Reviewed:  [x ] YES  [ ] NO    Discussed with Consultants/Other Providers [ x] YES     LABS                          10.7   5.71  )-----------( 302      ( 09 Dec 2023 11:31 )             34.2     12-09    135  |  103  |  7<L>  ----------------------------<  178<H>  5.3<H>   |  21  |  0.7    Ca    9.9      09 Dec 2023 11:31  Mg     1.8     12-09    TPro  7.5  /  Alb  4.0  /  TBili  0.2  /  DBili  x   /  AST  10  /  ALT  <5  /  AlkPhos  87  12-08      Urinalysis Basic - ( 09 Dec 2023 11:31 )    Color: x / Appearance: x / SG: x / pH: x  Gluc: 178 mg/dL / Ketone: x  / Bili: x / Urobili: x   Blood: x / Protein: x / Nitrite: x   Leuk Esterase: x / RBC: x / WBC x   Sq Epi: x / Non Sq Epi: x / Bacteria: x    CARDIAC MARKERS ( 08 Dec 2023 11:05 )  x     / <0.01 ng/mL / x     / x     / x          POCT Blood Glucose.: 162 mg/dL (09 Dec 2023 11:30)    RADIOLOGY & ADDITIONAL TESTS:   Xray Chest 1 View- PORTABLE-Urgent (12.08.23 @ 11:19) >  Support devices: None  Cardiac/ Mediastinum: unremarkable  Lungs/ Pleura: Subtle bibasilar peribronchial opacities. No lobar   consolidation or pneumothorax  Skeletal/ soft tissues: Stable      Imaging Personally Reviewed:  [ ] YES  [x ] NO    MEDICATIONS  (STANDING):  albuterol/ipratropium for Nebulization 3 milliLiter(s) Nebulizer every 6 hours  atorvastatin 40 milliGRAM(s) Oral at bedtime  benztropine 1 milliGRAM(s) Oral two times a day  bisacodyl 5 milliGRAM(s) Oral at bedtime  budesonide 160 MICROgram(s)/formoterol 4.5 MICROgram(s) Inhaler 2 Puff(s) Inhalation two times a day  cloZAPine 100 milliGRAM(s) Oral two times a day  divalproex  milliGRAM(s) Oral at bedtime  divalproex  milliGRAM(s) Oral two times a day  enoxaparin Injectable 40 milliGRAM(s) SubCutaneous every 24 hours  insulin lispro (ADMELOG) corrective regimen sliding scale   SubCutaneous three times a day before meals  levoFLOXacin IVPB 750 milliGRAM(s) IV Intermittent every 24 hours  levothyroxine 25 MICROGram(s) Oral daily  LORazepam     Tablet 2 milliGRAM(s) Oral at bedtime  methylPREDNISolone sodium succinate Injectable 40 milliGRAM(s) IV Push two times a day  metoprolol succinate ER 25 milliGRAM(s) Oral daily  sodium zirconium cyclosilicate 5 Gram(s) Oral once    MEDICATIONS  (PRN):  acetaminophen     Tablet .. 650 milliGRAM(s) Oral every 6 hours PRN Temp greater or equal to 38C (100.4F), Mild Pain (1 - 3)

## 2023-12-09 NOTE — PATIENT PROFILE ADULT - HAVE YOU RECENTLY LOST WEIGHT WITHOUT TRYING?
58 yo F with a PMH of NIDDM (on Metformin 500mg qd) who presents to ED with SOB and chest pain x 3 days. Patient reports that she has been experiencing shortness of breath, pleuritic chest pain and progressively worsening with exertion, associated with R upper sternal chest pain, non-radiating, worse with inspiration. She denies any known sick contacts or recent travels. No (0)

## 2023-12-10 LAB
ANION GAP SERPL CALC-SCNC: 11 MMOL/L — SIGNIFICANT CHANGE UP (ref 7–14)
ANION GAP SERPL CALC-SCNC: 11 MMOL/L — SIGNIFICANT CHANGE UP (ref 7–14)
BUN SERPL-MCNC: 8 MG/DL — LOW (ref 10–20)
BUN SERPL-MCNC: 8 MG/DL — LOW (ref 10–20)
CALCIUM SERPL-MCNC: 9.8 MG/DL — SIGNIFICANT CHANGE UP (ref 8.4–10.5)
CALCIUM SERPL-MCNC: 9.8 MG/DL — SIGNIFICANT CHANGE UP (ref 8.4–10.5)
CHLORIDE SERPL-SCNC: 104 MMOL/L — SIGNIFICANT CHANGE UP (ref 98–110)
CHLORIDE SERPL-SCNC: 104 MMOL/L — SIGNIFICANT CHANGE UP (ref 98–110)
CO2 SERPL-SCNC: 25 MMOL/L — SIGNIFICANT CHANGE UP (ref 17–32)
CO2 SERPL-SCNC: 25 MMOL/L — SIGNIFICANT CHANGE UP (ref 17–32)
CREAT SERPL-MCNC: 0.7 MG/DL — SIGNIFICANT CHANGE UP (ref 0.7–1.5)
CREAT SERPL-MCNC: 0.7 MG/DL — SIGNIFICANT CHANGE UP (ref 0.7–1.5)
EGFR: 111 ML/MIN/1.73M2 — SIGNIFICANT CHANGE UP
EGFR: 111 ML/MIN/1.73M2 — SIGNIFICANT CHANGE UP
GLUCOSE BLDC GLUCOMTR-MCNC: 106 MG/DL — HIGH (ref 70–99)
GLUCOSE BLDC GLUCOMTR-MCNC: 106 MG/DL — HIGH (ref 70–99)
GLUCOSE BLDC GLUCOMTR-MCNC: 133 MG/DL — HIGH (ref 70–99)
GLUCOSE BLDC GLUCOMTR-MCNC: 133 MG/DL — HIGH (ref 70–99)
GLUCOSE BLDC GLUCOMTR-MCNC: 161 MG/DL — HIGH (ref 70–99)
GLUCOSE BLDC GLUCOMTR-MCNC: 161 MG/DL — HIGH (ref 70–99)
GLUCOSE BLDC GLUCOMTR-MCNC: 93 MG/DL — SIGNIFICANT CHANGE UP (ref 70–99)
GLUCOSE BLDC GLUCOMTR-MCNC: 93 MG/DL — SIGNIFICANT CHANGE UP (ref 70–99)
GLUCOSE SERPL-MCNC: 109 MG/DL — HIGH (ref 70–99)
GLUCOSE SERPL-MCNC: 109 MG/DL — HIGH (ref 70–99)
HCT VFR BLD CALC: 34.5 % — LOW (ref 42–52)
HCT VFR BLD CALC: 34.5 % — LOW (ref 42–52)
HGB BLD-MCNC: 10.8 G/DL — LOW (ref 14–18)
HGB BLD-MCNC: 10.8 G/DL — LOW (ref 14–18)
MAGNESIUM SERPL-MCNC: 2 MG/DL — SIGNIFICANT CHANGE UP (ref 1.8–2.4)
MAGNESIUM SERPL-MCNC: 2 MG/DL — SIGNIFICANT CHANGE UP (ref 1.8–2.4)
MCHC RBC-ENTMCNC: 25.7 PG — LOW (ref 27–31)
MCHC RBC-ENTMCNC: 25.7 PG — LOW (ref 27–31)
MCHC RBC-ENTMCNC: 31.3 G/DL — LOW (ref 32–37)
MCHC RBC-ENTMCNC: 31.3 G/DL — LOW (ref 32–37)
MCV RBC AUTO: 81.9 FL — SIGNIFICANT CHANGE UP (ref 80–94)
MCV RBC AUTO: 81.9 FL — SIGNIFICANT CHANGE UP (ref 80–94)
NRBC # BLD: 0 /100 WBCS — SIGNIFICANT CHANGE UP (ref 0–0)
NRBC # BLD: 0 /100 WBCS — SIGNIFICANT CHANGE UP (ref 0–0)
PLATELET # BLD AUTO: 330 K/UL — SIGNIFICANT CHANGE UP (ref 130–400)
PLATELET # BLD AUTO: 330 K/UL — SIGNIFICANT CHANGE UP (ref 130–400)
PMV BLD: 9.4 FL — SIGNIFICANT CHANGE UP (ref 7.4–10.4)
PMV BLD: 9.4 FL — SIGNIFICANT CHANGE UP (ref 7.4–10.4)
POTASSIUM SERPL-MCNC: 4.5 MMOL/L — SIGNIFICANT CHANGE UP (ref 3.5–5)
POTASSIUM SERPL-MCNC: 4.5 MMOL/L — SIGNIFICANT CHANGE UP (ref 3.5–5)
POTASSIUM SERPL-SCNC: 4.5 MMOL/L — SIGNIFICANT CHANGE UP (ref 3.5–5)
POTASSIUM SERPL-SCNC: 4.5 MMOL/L — SIGNIFICANT CHANGE UP (ref 3.5–5)
PROCALCITONIN SERPL-MCNC: 0.08 NG/ML — SIGNIFICANT CHANGE UP (ref 0.02–0.1)
PROCALCITONIN SERPL-MCNC: 0.08 NG/ML — SIGNIFICANT CHANGE UP (ref 0.02–0.1)
RBC # BLD: 4.21 M/UL — LOW (ref 4.7–6.1)
RBC # BLD: 4.21 M/UL — LOW (ref 4.7–6.1)
RBC # FLD: 17.2 % — HIGH (ref 11.5–14.5)
RBC # FLD: 17.2 % — HIGH (ref 11.5–14.5)
SODIUM SERPL-SCNC: 140 MMOL/L — SIGNIFICANT CHANGE UP (ref 135–146)
SODIUM SERPL-SCNC: 140 MMOL/L — SIGNIFICANT CHANGE UP (ref 135–146)
WBC # BLD: 9.83 K/UL — SIGNIFICANT CHANGE UP (ref 4.8–10.8)
WBC # BLD: 9.83 K/UL — SIGNIFICANT CHANGE UP (ref 4.8–10.8)
WBC # FLD AUTO: 9.83 K/UL — SIGNIFICANT CHANGE UP (ref 4.8–10.8)
WBC # FLD AUTO: 9.83 K/UL — SIGNIFICANT CHANGE UP (ref 4.8–10.8)

## 2023-12-10 PROCEDURE — 71045 X-RAY EXAM CHEST 1 VIEW: CPT | Mod: 26

## 2023-12-10 PROCEDURE — 99232 SBSQ HOSP IP/OBS MODERATE 35: CPT

## 2023-12-10 RX ORDER — CHLORHEXIDINE GLUCONATE 213 G/1000ML
1 SOLUTION TOPICAL
Refills: 0 | Status: DISCONTINUED | OUTPATIENT
Start: 2023-12-10 | End: 2023-12-11

## 2023-12-10 RX ADMIN — Medication 25 MILLIGRAM(S): at 06:00

## 2023-12-10 RX ADMIN — BUDESONIDE AND FORMOTEROL FUMARATE DIHYDRATE 2 PUFF(S): 160; 4.5 AEROSOL RESPIRATORY (INHALATION) at 21:22

## 2023-12-10 RX ADMIN — CLOZAPINE 100 MILLIGRAM(S): 150 TABLET, ORALLY DISINTEGRATING ORAL at 17:19

## 2023-12-10 RX ADMIN — ENOXAPARIN SODIUM 40 MILLIGRAM(S): 100 INJECTION SUBCUTANEOUS at 14:35

## 2023-12-10 RX ADMIN — CLOZAPINE 100 MILLIGRAM(S): 150 TABLET, ORALLY DISINTEGRATING ORAL at 06:01

## 2023-12-10 RX ADMIN — DIVALPROEX SODIUM 500 MILLIGRAM(S): 500 TABLET, DELAYED RELEASE ORAL at 06:00

## 2023-12-10 RX ADMIN — Medication 40 MILLIGRAM(S): at 06:01

## 2023-12-10 RX ADMIN — DIVALPROEX SODIUM 500 MILLIGRAM(S): 500 TABLET, DELAYED RELEASE ORAL at 17:19

## 2023-12-10 RX ADMIN — Medication 25 MICROGRAM(S): at 06:01

## 2023-12-10 RX ADMIN — Medication 2 MILLIGRAM(S): at 21:23

## 2023-12-10 RX ADMIN — DIVALPROEX SODIUM 250 MILLIGRAM(S): 500 TABLET, DELAYED RELEASE ORAL at 21:23

## 2023-12-10 RX ADMIN — Medication 1 MILLIGRAM(S): at 06:01

## 2023-12-10 RX ADMIN — Medication 40 MILLIGRAM(S): at 17:19

## 2023-12-10 RX ADMIN — ATORVASTATIN CALCIUM 40 MILLIGRAM(S): 80 TABLET, FILM COATED ORAL at 21:22

## 2023-12-10 RX ADMIN — Medication 5 MILLIGRAM(S): at 21:23

## 2023-12-10 RX ADMIN — Medication 1 MILLIGRAM(S): at 17:19

## 2023-12-10 NOTE — PROGRESS NOTE ADULT - ASSESSMENT
Hx HF  - No prior history of pulmonary edema or heart failure  - TTE  (5/8/2023): EF 52%, wall motion abnormality present, no valve disease  - patient denies prior cardiac work up and no cardiac work up in chart  - ECG: sinus rhtyhm  - Pro- (mild elevation)  -CXR unremarkable on prelim  read   - Consider cardiology referral as outpatient for workup  - add aspirin 81 mg qd  - c/w Atorvastatin 40mg  - c/w metoprolol  - consider adding Farxiga or Jardiance prior to discharge    Hx hypothyroid  - c/w levothyroxine    Hx Schizophrenia  - controlled symptoms  - c/w psych Medications     Hx DLD  c/w atorvastatin    Hx  DM - Hold oral meds;  check fs;  Start insulin  regimend if  serum Glucose >180,  Hold for Hypoglycemia Goal  Gaol 140-180, f/u  A1c   Patient is a 54 yo M nonsmoker living in a group home w/PMH of schizophrenia, HTN, and DL, who presented due to 3 days of fever and productive cough associated with dyspnea and lower chest pain w/ED vitals notable for T 100.4, tachycardia to 131, and SPO2 97% on NC 2L, labs notable for , and CXR notable for subtle bibasilar peribronchial opacities now s/p levaquin x 1 and 2L NS in ED, admitted for work up and management of fever, cough w/CP, and dyspnea.    #Sepsis   #Bronchitis vs. pneumonia  *CXR (12/8): Subtle bibasilar peribronchial opacities  - on admission: febrile 100.7, tachycardic 117, normotensive. originally sating well on RA but now requiring supplemental 02  - no leukocytosis, no LA   - UA unremarkable  - VBG unremarkable  - RVP negative  - BCx (12/8): NGTD  - Procal (12/9): 0.08  - s/p levoflocacin and fluid bolus in ED  - c/w levofloxacin, currently day #3  - albuterol/ipratropium for Nebulization 3 milliLiter(s) Nebulizer every 6 hours  - budesonide 160 MICROgram(s)/formoterol 4.5 MICROgram(s) Inhaler 2 Puff(s) Inhalation two times a day  - will continue steroids for now; methylPREDNISolone sodium succinate Injectable 40 milliGRAM(s) IV Push two times a day    #Hx HFpEF  *CXR (12/8): Subtle bibasilar peribronchial opacities  - No prior history of pulmonary edema or heart failure  - TTE  (5/8/2023): EF 52%, wall motion abnormality present, no valve disease  - patient denies prior cardiac work up and no cardiac work up in chart  - ECG: sinus rhtyhm  - Pro- (mild elevation)  - Consider cardiology referral as outpatient for workup  - add aspirin 81 mg qd  - c/w Atorvastatin 40mg  - c/w metoprolol  - consider adding Farxiga or Jardiance prior to discharge    Hx hypothyroid  - c/w levothyroxine    #Hx Schizophrenia  #Extrapyramidal symptoms  - benztropine 1 mg bid  - cloZAPine 100 mg bid  - divalproex 500 mg tid, 250 mg qHS  - Ativan PO 2 mg qHS     #Hx DLD  c/w atorvastatin    #Hx DM   - A1c 6.2 (12/9)  - Hold oral meds  - FS and ISS   - Start insulin regimen if serum Glucose >180, Hold for Hypoglycemia Goal 140-180    #MISC  - DVT PPX: Lovenox  - GI PPx: None  - Diet: DASH/CC  - Activity: AAT Patient is a 52 yo M nonsmoker living in a group home w/PMH of schizophrenia, HTN, and DL, who presented due to 3 days of fever and productive cough associated with dyspnea and lower chest pain w/ED vitals notable for T 100.4, tachycardia to 131, and SPO2 97% on NC 2L, labs notable for , and CXR notable for subtle bibasilar peribronchial opacities now s/p levaquin x 1 and 2L NS in ED, admitted for work up and management of fever, cough w/CP, and dyspnea.    #Sepsis   #Bronchitis vs. pneumonia  *CXR (12/8): Subtle bibasilar peribronchial opacities  - on admission: febrile 100.7, tachycardic 117, normotensive. originally sating well on RA but now requiring supplemental 02  - no leukocytosis, no LA   - UA unremarkable  - VBG unremarkable  - RVP negative  - BCx (12/8): NGTD  - Procal (12/9): 0.08  - s/p levoflocacin and fluid bolus in ED  - c/w levofloxacin, currently day #3  - albuterol/ipratropium for Nebulization 3 milliLiter(s) Nebulizer every 6 hours  - budesonide 160 MICROgram(s)/formoterol 4.5 MICROgram(s) Inhaler 2 Puff(s) Inhalation two times a day  - will continue steroids for now; methylPREDNISolone sodium succinate Injectable 40 milliGRAM(s) IV Push two times a day    #Hx HFpEF  *CXR (12/8): Subtle bibasilar peribronchial opacities  - No prior history of pulmonary edema or heart failure  - TTE  (5/8/2023): EF 52%, wall motion abnormality present, no valve disease  - patient denies prior cardiac work up and no cardiac work up in chart  - ECG: sinus rhtyhm  - Pro- (mild elevation)  - Consider cardiology referral as outpatient for workup  - add aspirin 81 mg qd  - c/w Atorvastatin 40mg  - c/w metoprolol  - consider adding Farxiga or Jardiance prior to discharge    Hx hypothyroid  - c/w levothyroxine    #Hx Schizophrenia  #Extrapyramidal symptoms  - benztropine 1 mg bid  - cloZAPine 100 mg bid  - divalproex 500 mg tid, 250 mg qHS  - Ativan PO 2 mg qHS     #Hx DLD  c/w atorvastatin    #Hx DM   - A1c 6.2 (12/9)  - Hold oral meds  - FS and ISS   - Start insulin regimen if serum Glucose >180, Hold for Hypoglycemia Goal 140-180    #MISC  - DVT PPX: Lovenox  - GI PPx: None  - Diet: DASH/CC  - Activity: AAT Patient is a 52 yo M nonsmoker living in a group home w/PMH of schizophrenia, HTN, and DL, who presented due to 3 days of fever and productive cough associated with dyspnea and lower chest pain w/ED vitals notable for T 100.4, tachycardia to 131, and SPO2 97% on NC 2L, labs notable for , and CXR notable for subtle bibasilar peribronchial opacities now s/p levaquin x 1 and 2L NS in ED, admitted for work up and management of fever, cough w/CP, and dyspnea.    #Sepsis   #Bronchitis vs. pneumonia  *CXR (12/10): No acute cardiopulmonary disease  *CXR (12/8): Subtle bibasilar peribronchial opacities  - on admission: febrile 100.7, tachycardic 117, normotensive. originally sating well on RA but now requiring supplemental 02  - no leukocytosis, no LA   - UA unremarkable  - VBG unremarkable  - RVP negative  - BCx (12/8): NGTD  - Procal (12/9): 0.08  - s/p levoflocacin and fluid bolus in ED  - c/w levofloxacin, currently day #3 of 3 days  - albuterol/ipratropium for Nebulization 3 milliLiter(s) Nebulizer every 6 hours  - budesonide 160 MICROgram(s)/formoterol 4.5 MICROgram(s) Inhaler 2 Puff(s) Inhalation two times a day  - D/c methylprednisolone, start prednisone 40 mg bid, taper steroids  - Repeat CXR showing no more bibasilar opacities    #?CHF  *CXR (12/8): Subtle bibasilar peribronchial opacities  - No prior history of pulmonary edema or heart failure  - TTE  (5/8/2023): EF 52%, wall motion abnormality present, no valve disease  - patient denies prior cardiac work up and no cardiac work up in chart  - ECG: sinus rhtyhm  - Pro- (mild elevation), baseline 226  - c/waspirin 81 mg qd  - c/w Atorvastatin 40mg  - c/w metoprolol  - consider adding Farxiga or Jardiance prior to discharge  - Consider cardiology referral as outpatient for workup    #Hx hypothyroid  - c/w levothyroxine    #Hx Schizophrenia  #Extrapyramidal symptoms  - benztropine 1 mg bid  - cloZAPine 100 mg bid  - divalproex 500 mg tid, 250 mg qHS  - Ativan PO 2 mg qHS     #Hx DLD  - c/w atorvastatin    #Hx DM   - A1c 6.2 (12/9)  - Hold oral meds  - FS and ISS   - Start insulin regimen if serum Glucose >180, Hold for Hypoglycemia Goal 140-180    #MISC  - DVT PPX: Lovenox  - GI PPx: None  - Diet: DASH/CC  - Activity: AAT  - Dispo: To group home Patient is a 54 yo M nonsmoker living in a group home w/PMH of schizophrenia, HTN, and DL, who presented due to 3 days of fever and productive cough associated with dyspnea and lower chest pain w/ED vitals notable for T 100.4, tachycardia to 131, and SPO2 97% on NC 2L, labs notable for , and CXR notable for subtle bibasilar peribronchial opacities now s/p levaquin x 1 and 2L NS in ED, admitted for work up and management of fever, cough w/CP, and dyspnea.    #Sepsis   #Bronchitis vs. pneumonia  *CXR (12/10): No acute cardiopulmonary disease  *CXR (12/8): Subtle bibasilar peribronchial opacities  - on admission: febrile 100.7, tachycardic 117, normotensive. originally sating well on RA but now requiring supplemental 02  - no leukocytosis, no LA   - UA unremarkable  - VBG unremarkable  - RVP negative  - BCx (12/8): NGTD  - Procal (12/9): 0.08  - s/p levoflocacin and fluid bolus in ED  - c/w levofloxacin, currently day #3 of 3 days  - albuterol/ipratropium for Nebulization 3 milliLiter(s) Nebulizer every 6 hours  - budesonide 160 MICROgram(s)/formoterol 4.5 MICROgram(s) Inhaler 2 Puff(s) Inhalation two times a day  - D/c methylprednisolone, start prednisone 40 mg bid, taper steroids  - Repeat CXR showing no more bibasilar opacities    #?CHF  *CXR (12/8): Subtle bibasilar peribronchial opacities  - No prior history of pulmonary edema or heart failure  - TTE  (5/8/2023): EF 52%, wall motion abnormality present, no valve disease  - patient denies prior cardiac work up and no cardiac work up in chart  - ECG: sinus rhtyhm  - Pro- (mild elevation), baseline 226  - c/waspirin 81 mg qd  - c/w Atorvastatin 40mg  - c/w metoprolol  - consider adding Farxiga or Jardiance prior to discharge  - Consider cardiology referral as outpatient for workup    #Hx hypothyroid  - c/w levothyroxine    #Hx Schizophrenia  #Extrapyramidal symptoms  - benztropine 1 mg bid  - cloZAPine 100 mg bid  - divalproex 500 mg tid, 250 mg qHS  - Ativan PO 2 mg qHS     #Hx DLD  - c/w atorvastatin    #Hx DM   - A1c 6.2 (12/9)  - Hold oral meds  - FS and ISS   - Start insulin regimen if serum Glucose >180, Hold for Hypoglycemia Goal 140-180    #MISC  - DVT PPX: Lovenox  - GI PPx: None  - Diet: DASH/CC  - Activity: AAT  - Dispo: To group home Patient is a 54 yo M nonsmoker living in a group home w/PMH of schizophrenia, HTN, and DL, who presented due to 3 days of fever and productive cough associated with dyspnea and lower chest pain w/ED vitals notable for T 100.4, tachycardia to 131, and SPO2 97% on NC 2L, labs notable for , and CXR notable for subtle bibasilar peribronchial opacities now s/p levaquin x 1 and 2L NS in ED, admitted for work up and management of fever, cough w/CP, and dyspnea.    #Sepsis   #Bronchitis vs. pneumonia  *CXR (12/10): No acute cardiopulmonary disease  *CXR (12/8): Subtle bibasilar peribronchial opacities  - on admission: febrile 100.7, tachycardic 117, normotensive. originally sating well on RA but now requiring supplemental 02  - no leukocytosis, no LA   - UA unremarkable  - VBG unremarkable  - RVP negative  - BCx (12/8): NGTD  - Procal (12/9): 0.08  - s/p levoflocacin and fluid bolus in ED  - c/w levofloxacin, currently day #3 of 3 days  - albuterol/ipratropium for Nebulization 3 milliLiter(s) Nebulizer every 6 hours  - budesonide 160 MICROgram(s)/formoterol 4.5 MICROgram(s) Inhaler 2 Puff(s) Inhalation two times a day  - D/c methylprednisolone, start prednisone 40 mg bid, taper steroids  - Repeat CXR showing no more bibasilar opacities    #?CHF  *CXR (12/8): Subtle bibasilar peribronchial opacities  - No prior history of pulmonary edema or heart failure  - TTE  (5/8/2023): EF 52%, wall motion abnormality present, no valve disease  - patient denies prior cardiac work up and no cardiac work up in chart  - ECG: sinus rhtyhm  - Pro- (mild elevation), baseline 226  - No longer taking aspirin  - c/w Atorvastatin 40mg  - c/w metoprolol  - consider adding Farxiga or Jardiance prior to discharge  - Consider cardiology referral as outpatient for workup    #Hx hypothyroid  - c/w levothyroxine    #Hx Schizophrenia  #Extrapyramidal symptoms  - benztropine 1 mg bid  - cloZAPine 100 mg bid  - divalproex 500 mg tid, 250 mg qHS  - Ativan PO 2 mg qHS     #Hx DLD  - c/w atorvastatin    #Hx DM   - A1c 6.2 (12/9)  - Hold oral meds  - FS and ISS   - Start insulin regimen if serum Glucose >180, Hold for Hypoglycemia Goal 140-180    #MISC  - DVT PPX: Lovenox  - GI PPx: None  - Diet: DASH/CC  - Activity: AAT  - Dispo: To group home Patient is a 52 yo M nonsmoker living in a group home w/PMH of schizophrenia, HTN, and DL, who presented due to 3 days of fever and productive cough associated with dyspnea and lower chest pain w/ED vitals notable for T 100.4, tachycardia to 131, and SPO2 97% on NC 2L, labs notable for , and CXR notable for subtle bibasilar peribronchial opacities now s/p levaquin x 1 and 2L NS in ED, admitted for work up and management of fever, cough w/CP, and dyspnea.    #Sepsis   #Bronchitis vs. pneumonia  *CXR (12/10): No acute cardiopulmonary disease  *CXR (12/8): Subtle bibasilar peribronchial opacities  - on admission: febrile 100.7, tachycardic 117, normotensive. originally sating well on RA but now requiring supplemental 02  - no leukocytosis, no LA   - UA unremarkable  - VBG unremarkable  - RVP negative  - BCx (12/8): NGTD  - Procal (12/9): 0.08  - s/p levoflocacin and fluid bolus in ED  - c/w levofloxacin, currently day #3 of 3 days  - albuterol/ipratropium for Nebulization 3 milliLiter(s) Nebulizer every 6 hours  - budesonide 160 MICROgram(s)/formoterol 4.5 MICROgram(s) Inhaler 2 Puff(s) Inhalation two times a day  - D/c methylprednisolone, start prednisone 40 mg bid, taper steroids  - Repeat CXR showing no more bibasilar opacities    #?CHF  *CXR (12/8): Subtle bibasilar peribronchial opacities  - No prior history of pulmonary edema or heart failure  - TTE  (5/8/2023): EF 52%, wall motion abnormality present, no valve disease  - patient denies prior cardiac work up and no cardiac work up in chart  - ECG: sinus rhtyhm  - Pro- (mild elevation), baseline 226  - No longer taking aspirin  - c/w Atorvastatin 40mg  - c/w metoprolol  - consider adding Farxiga or Jardiance prior to discharge  - Consider cardiology referral as outpatient for workup    #Hx hypothyroid  - c/w levothyroxine    #Hx Schizophrenia  #Extrapyramidal symptoms  - benztropine 1 mg bid  - cloZAPine 100 mg bid  - divalproex 500 mg tid, 250 mg qHS  - Ativan PO 2 mg qHS     #Hx DLD  - c/w atorvastatin    #Hx DM   - A1c 6.2 (12/9)  - Hold oral meds  - FS and ISS   - Start insulin regimen if serum Glucose >180, Hold for Hypoglycemia Goal 140-180    #MISC  - DVT PPX: Lovenox  - GI PPx: None  - Diet: DASH/CC  - Activity: AAT  - Dispo: To group home

## 2023-12-10 NOTE — PROGRESS NOTE ADULT - SUBJECTIVE AND OBJECTIVE BOX
----------Daily Progress Note----------    HISTORY OF PRESENT ILLNESS:  Patient is a 52 yo M nonsmoker living in a group home w/PMH of schizophrenia, HTN, and DL, who presented due to 3 days of fever and productive cough associated with dyspnea and lower chest pain w/ED vitals notable for T 100.4, tachycardia to 131, and SPO2 97% on NC 2L, labs notable for , and CXR notable for subtle bibasilar peribronchial opacities now s/p levaquin x 1 and 2L NS in ED, admitted for work up and management of fever, cough w/CP, and dyspnea.     Today is hospital day 2d.     INTERVAL HOSPITAL COURSE / OVERNIGHT EVENTS:    Patient was examined and seen at bedside. This morning he is resting comfortably in bed and reports no new issues or overnight events.     Review of Systems: Otherwise unremarkable     <<<<<PAST MEDICAL & SURGICAL HISTORY>>>>>  DM (diabetes mellitus)    HTN (hypertension)    Schizophrenia    Substance use disorder    Echocardiogram abnormal      ALLERGIES  Navane (Unknown)  Thorazine (Unknown)  fish (Unknown)  pork (Unknown)  penicillin (Unknown)      Home Medications:  atorvastatin 40 mg oral tablet: 1 tab(s) orally once a day (at bedtime) (08 Dec 2023 16:01)  benztropine 1 mg oral tablet: 1 tab(s) orally 2 times a day (08 Dec 2023 16:01)  cloZAPine 100 mg oral tablet: 1 tab(s) orally 2 times a day (08 Dec 2023 16:01)  Colace 100 mg oral capsule: 3 cap(s) orally once a day (08 Dec 2023 16:01)  divalproex sodium 250 mg oral delayed release tablet: 1 tab(s) orally once a day (at bedtime) (08 Dec 2023 16:01)  divalproex sodium 500 mg oral delayed release tablet: 1 tab(s) orally 2 times a day (08 Dec 2023 16:01)  Dulcolax Laxative 5 mg oral delayed release tablet: 1 tab(s) orally once a day (at bedtime) (08 Dec 2023 16:01)  Invega Sustenna 234 mg/1.5 mL intramuscular suspension, extended release: 234 milligram(s) intramuscularly once a month (08 Dec 2023 16:01)  levothyroxine 25 mcg (0.025 mg) oral tablet: 1 tab(s) orally once a day (08 Dec 2023 16:01)  LORazepam 2 mg oral tablet: 1 tab(s) orally once a day (at bedtime) (08 Dec 2023 16:02)  metFORMIN 1000 mg oral tablet: 1 tab(s) orally 2 times a day (08 Dec 2023 16:01)  metoprolol succinate 25 mg oral tablet, extended release: 1 tab(s) orally once a day (08 Dec 2023 16:01)  Tylenol 325 mg oral tablet: 2 tab(s) orally every 6 hours as needed for  pain or fever (08 Dec 2023 15:54)        MEDICATIONS  STANDING MEDICATIONS  albuterol/ipratropium for Nebulization 3 milliLiter(s) Nebulizer every 6 hours  atorvastatin 40 milliGRAM(s) Oral at bedtime  benztropine 1 milliGRAM(s) Oral two times a day  bisacodyl 5 milliGRAM(s) Oral at bedtime  budesonide 160 MICROgram(s)/formoterol 4.5 MICROgram(s) Inhaler 2 Puff(s) Inhalation two times a day  cloZAPine 100 milliGRAM(s) Oral two times a day  divalproex  milliGRAM(s) Oral at bedtime  divalproex  milliGRAM(s) Oral two times a day  enoxaparin Injectable 40 milliGRAM(s) SubCutaneous every 24 hours  insulin lispro (ADMELOG) corrective regimen sliding scale   SubCutaneous three times a day before meals  levoFLOXacin IVPB 750 milliGRAM(s) IV Intermittent every 24 hours  levothyroxine 25 MICROGram(s) Oral daily  LORazepam     Tablet 2 milliGRAM(s) Oral at bedtime  methylPREDNISolone sodium succinate Injectable 40 milliGRAM(s) IV Push two times a day  metoprolol succinate ER 25 milliGRAM(s) Oral daily    PRN MEDICATIONS  acetaminophen     Tablet .. 650 milliGRAM(s) Oral every 6 hours PRN    VITALS:  T(F): 97.6  HR: 95  BP: 127/70  RR: 18  SpO2: 97%    <<<<<PHYSICAL EXAM>>>>>  GENERAL: Well developed, well nourished and in no acute distress. Resting comfortably in bed.  HEENT: Normocephalic, atraumatic, mucous membranes moist, EOMI, PERRLA, bilateral sclera anicteric, no conjunctival injection  Neck: Supple, non-tender, no lymphadenopathy.  PULMONARY: Clear to auscultation bilaterally. No rales, rhonchi, or wheezing.  CARDIOVASCULAR: Regular rate and rhythm, S1-S2, no murmurs  GASTROINTESTINAL: Soft, non-tender, non-distended, no guarding.  RENAL: No CVA tenderness.  SKIN/EXTREMITIES: No clubbing or edema  NEUROLOGIC/MUSCULOSKELETAL: AOx4, grossly moving all extremities, no focal deficits.    <<<<<LABS>>>>>                        10.7   5.71  )-----------( 302      ( 09 Dec 2023 11:31 )             34.2     12-09    135  |  103  |  7<L>  ----------------------------<  178<H>  5.3<H>   |  21  |  0.7    Ca    9.9      09 Dec 2023 11:31  Mg     1.8     12-09    TPro  7.5  /  Alb  4.0  /  TBili  0.2  /  DBili  x   /  AST  10  /  ALT  <5  /  AlkPhos  87  12-08      Urinalysis Basic - ( 09 Dec 2023 11:31 )    Color: x / Appearance: x / SG: x / pH: x  Gluc: 178 mg/dL / Ketone: x  / Bili: x / Urobili: x   Blood: x / Protein: x / Nitrite: x   Leuk Esterase: x / RBC: x / WBC x   Sq Epi: x / Non Sq Epi: x / Bacteria: x            Culture - Blood (collected 08 Dec 2023 11:05)  Source: .Blood Blood  Preliminary Report (09 Dec 2023 23:02):    No growth at 24 hours    Culture - Blood (collected 08 Dec 2023 11:05)  Source: .Blood Blood  Preliminary Report (09 Dec 2023 23:02):    No growth at 24 hours    [id number]  CARDIAC MARKERS ( 08 Dec 2023 11:05 )  x     / <0.01 ng/mL / x     / x     / x            <<<<<RADIOLOGY>>>>>  CXR (12/8): Subtle bibasilar peribronchial opacities    ----------------------------------------------------------------------------------------------------------------------------------------------------------------------------------------------- ----------Daily Progress Note----------    HISTORY OF PRESENT ILLNESS:  Patient is a 52 yo M nonsmoker living in a group home w/PMH of schizophrenia, HTN, and DL, who presented due to 3 days of fever and productive cough associated with dyspnea and lower chest pain w/ED vitals notable for T 100.4, tachycardia to 131, and SPO2 97% on NC 2L, labs notable for , and CXR notable for subtle bibasilar peribronchial opacities now s/p levaquin x 1 and 2L NS in ED, admitted for work up and management of fever, cough w/CP, and dyspnea.     Today is hospital day 2d.     INTERVAL HOSPITAL COURSE / OVERNIGHT EVENTS:    Patient was examined and seen at bedside. This morning he is resting comfortably in bed and reports no new issues or overnight events.     Review of Systems: Otherwise unremarkable     <<<<<PAST MEDICAL & SURGICAL HISTORY>>>>>  DM (diabetes mellitus)    HTN (hypertension)    Schizophrenia    Substance use disorder    Echocardiogram abnormal      ALLERGIES  Navane (Unknown)  Thorazine (Unknown)  fish (Unknown)  pork (Unknown)  penicillin (Unknown)      Home Medications:  atorvastatin 40 mg oral tablet: 1 tab(s) orally once a day (at bedtime) (08 Dec 2023 16:01)  benztropine 1 mg oral tablet: 1 tab(s) orally 2 times a day (08 Dec 2023 16:01)  cloZAPine 100 mg oral tablet: 1 tab(s) orally 2 times a day (08 Dec 2023 16:01)  Colace 100 mg oral capsule: 3 cap(s) orally once a day (08 Dec 2023 16:01)  divalproex sodium 250 mg oral delayed release tablet: 1 tab(s) orally once a day (at bedtime) (08 Dec 2023 16:01)  divalproex sodium 500 mg oral delayed release tablet: 1 tab(s) orally 2 times a day (08 Dec 2023 16:01)  Dulcolax Laxative 5 mg oral delayed release tablet: 1 tab(s) orally once a day (at bedtime) (08 Dec 2023 16:01)  Invega Sustenna 234 mg/1.5 mL intramuscular suspension, extended release: 234 milligram(s) intramuscularly once a month (08 Dec 2023 16:01)  levothyroxine 25 mcg (0.025 mg) oral tablet: 1 tab(s) orally once a day (08 Dec 2023 16:01)  LORazepam 2 mg oral tablet: 1 tab(s) orally once a day (at bedtime) (08 Dec 2023 16:02)  metFORMIN 1000 mg oral tablet: 1 tab(s) orally 2 times a day (08 Dec 2023 16:01)  metoprolol succinate 25 mg oral tablet, extended release: 1 tab(s) orally once a day (08 Dec 2023 16:01)  Tylenol 325 mg oral tablet: 2 tab(s) orally every 6 hours as needed for  pain or fever (08 Dec 2023 15:54)        MEDICATIONS  STANDING MEDICATIONS  albuterol/ipratropium for Nebulization 3 milliLiter(s) Nebulizer every 6 hours  atorvastatin 40 milliGRAM(s) Oral at bedtime  benztropine 1 milliGRAM(s) Oral two times a day  bisacodyl 5 milliGRAM(s) Oral at bedtime  budesonide 160 MICROgram(s)/formoterol 4.5 MICROgram(s) Inhaler 2 Puff(s) Inhalation two times a day  cloZAPine 100 milliGRAM(s) Oral two times a day  divalproex  milliGRAM(s) Oral at bedtime  divalproex  milliGRAM(s) Oral two times a day  enoxaparin Injectable 40 milliGRAM(s) SubCutaneous every 24 hours  insulin lispro (ADMELOG) corrective regimen sliding scale   SubCutaneous three times a day before meals  levoFLOXacin IVPB 750 milliGRAM(s) IV Intermittent every 24 hours  levothyroxine 25 MICROGram(s) Oral daily  LORazepam     Tablet 2 milliGRAM(s) Oral at bedtime  methylPREDNISolone sodium succinate Injectable 40 milliGRAM(s) IV Push two times a day  metoprolol succinate ER 25 milliGRAM(s) Oral daily    PRN MEDICATIONS  acetaminophen     Tablet .. 650 milliGRAM(s) Oral every 6 hours PRN    VITALS:  T(F): 97.6  HR: 95  BP: 127/70  RR: 18  SpO2: 97%    <<<<<PHYSICAL EXAM>>>>>  GENERAL: Well developed, well nourished and in no acute distress. Resting comfortably in bed.  HEENT: Normocephalic, atraumatic, mucous membranes moist, EOMI, PERRLA, bilateral sclera anicteric, no conjunctival injection  Neck: Supple, non-tender, no lymphadenopathy.  PULMONARY: Clear to auscultation bilaterally. No rales, rhonchi, or wheezing.  CARDIOVASCULAR: Regular rate and rhythm, S1-S2, no murmurs  GASTROINTESTINAL: Soft, non-tender, non-distended, no guarding.  RENAL: No CVA tenderness.  SKIN/EXTREMITIES: No clubbing or edema  NEUROLOGIC/MUSCULOSKELETAL: AOx4, grossly moving all extremities, no focal deficits.    <<<<<LABS>>>>>                        10.7   5.71  )-----------( 302      ( 09 Dec 2023 11:31 )             34.2     12-09    135  |  103  |  7<L>  ----------------------------<  178<H>  5.3<H>   |  21  |  0.7    Ca    9.9      09 Dec 2023 11:31  Mg     1.8     12-09    TPro  7.5  /  Alb  4.0  /  TBili  0.2  /  DBili  x   /  AST  10  /  ALT  <5  /  AlkPhos  87  12-08      Urinalysis Basic - ( 09 Dec 2023 11:31 )    Color: x / Appearance: x / SG: x / pH: x  Gluc: 178 mg/dL / Ketone: x  / Bili: x / Urobili: x   Blood: x / Protein: x / Nitrite: x   Leuk Esterase: x / RBC: x / WBC x   Sq Epi: x / Non Sq Epi: x / Bacteria: x            Culture - Blood (collected 08 Dec 2023 11:05)  Source: .Blood Blood  Preliminary Report (09 Dec 2023 23:02):    No growth at 24 hours    Culture - Blood (collected 08 Dec 2023 11:05)  Source: .Blood Blood  Preliminary Report (09 Dec 2023 23:02):    No growth at 24 hours    [id number]  CARDIAC MARKERS ( 08 Dec 2023 11:05 )  x     / <0.01 ng/mL / x     / x     / x            <<<<<RADIOLOGY>>>>>  CXR (12/8): Subtle bibasilar peribronchial opacities    ----------------------------------------------------------------------------------------------------------------------------------------------------------------------------------------------- ----------Daily Progress Note----------    HISTORY OF PRESENT ILLNESS:  Patient is a 52 yo M nonsmoker living in a group home w/PMH of schizophrenia, HTN, and DL, who presented due to 3 days of fever and productive cough associated with dyspnea and lower chest pain w/ED vitals notable for T 100.4, tachycardia to 131, and SPO2 97% on NC 2L, labs notable for , and CXR notable for subtle bibasilar peribronchial opacities now s/p levaquin x 1 and 2L NS in ED, admitted for work up and management of fever, cough w/CP, and dyspnea.     Today is hospital day 2d.     INTERVAL HOSPITAL COURSE / OVERNIGHT EVENTS:  24 hr event:      O/N event:      Patient was examined and seen at bedside. This morning he is resting comfortably in bed and reports no new issues or overnight events.     Review of Systems: Otherwise unremarkable     <<<<<PAST MEDICAL & SURGICAL HISTORY>>>>>  DM (diabetes mellitus)    HTN (hypertension)    Schizophrenia    Substance use disorder    Echocardiogram abnormal      ALLERGIES  Navane (Unknown)  Thorazine (Unknown)  fish (Unknown)  pork (Unknown)  penicillin (Unknown)      Home Medications:  atorvastatin 40 mg oral tablet: 1 tab(s) orally once a day (at bedtime) (08 Dec 2023 16:01)  benztropine 1 mg oral tablet: 1 tab(s) orally 2 times a day (08 Dec 2023 16:01)  cloZAPine 100 mg oral tablet: 1 tab(s) orally 2 times a day (08 Dec 2023 16:01)  Colace 100 mg oral capsule: 3 cap(s) orally once a day (08 Dec 2023 16:01)  divalproex sodium 250 mg oral delayed release tablet: 1 tab(s) orally once a day (at bedtime) (08 Dec 2023 16:01)  divalproex sodium 500 mg oral delayed release tablet: 1 tab(s) orally 2 times a day (08 Dec 2023 16:01)  Dulcolax Laxative 5 mg oral delayed release tablet: 1 tab(s) orally once a day (at bedtime) (08 Dec 2023 16:01)  Invega Sustenna 234 mg/1.5 mL intramuscular suspension, extended release: 234 milligram(s) intramuscularly once a month (08 Dec 2023 16:01)  levothyroxine 25 mcg (0.025 mg) oral tablet: 1 tab(s) orally once a day (08 Dec 2023 16:01)  LORazepam 2 mg oral tablet: 1 tab(s) orally once a day (at bedtime) (08 Dec 2023 16:02)  metFORMIN 1000 mg oral tablet: 1 tab(s) orally 2 times a day (08 Dec 2023 16:01)  metoprolol succinate 25 mg oral tablet, extended release: 1 tab(s) orally once a day (08 Dec 2023 16:01)  Tylenol 325 mg oral tablet: 2 tab(s) orally every 6 hours as needed for  pain or fever (08 Dec 2023 15:54)        MEDICATIONS  STANDING MEDICATIONS  albuterol/ipratropium for Nebulization 3 milliLiter(s) Nebulizer every 6 hours  atorvastatin 40 milliGRAM(s) Oral at bedtime  benztropine 1 milliGRAM(s) Oral two times a day  bisacodyl 5 milliGRAM(s) Oral at bedtime  budesonide 160 MICROgram(s)/formoterol 4.5 MICROgram(s) Inhaler 2 Puff(s) Inhalation two times a day  cloZAPine 100 milliGRAM(s) Oral two times a day  divalproex  milliGRAM(s) Oral at bedtime  divalproex  milliGRAM(s) Oral two times a day  enoxaparin Injectable 40 milliGRAM(s) SubCutaneous every 24 hours  insulin lispro (ADMELOG) corrective regimen sliding scale   SubCutaneous three times a day before meals  levoFLOXacin IVPB 750 milliGRAM(s) IV Intermittent every 24 hours  levothyroxine 25 MICROGram(s) Oral daily  LORazepam     Tablet 2 milliGRAM(s) Oral at bedtime  methylPREDNISolone sodium succinate Injectable 40 milliGRAM(s) IV Push two times a day  metoprolol succinate ER 25 milliGRAM(s) Oral daily    PRN MEDICATIONS  acetaminophen     Tablet .. 650 milliGRAM(s) Oral every 6 hours PRN    VITALS:  T(F): 97.6  HR: 95  BP: 127/70  RR: 18  SpO2: 97%    <<<<<PHYSICAL EXAM>>>>>  GENERAL: Well developed, well nourished and in no acute distress. Resting comfortably in bed.  HEENT: Normocephalic, atraumatic, mucous membranes moist, EOMI, PERRLA, bilateral sclera anicteric, no conjunctival injection  Neck: Supple, non-tender, no lymphadenopathy.  PULMONARY: Clear to auscultation bilaterally. No rales, rhonchi, or wheezing.  CARDIOVASCULAR: Regular rate and rhythm, S1-S2, no murmurs  GASTROINTESTINAL: Soft, non-tender, non-distended, no guarding.  RENAL: No CVA tenderness.  SKIN/EXTREMITIES: No clubbing or edema  NEUROLOGIC/MUSCULOSKELETAL: AOx4, grossly moving all extremities, no focal deficits.    <<<<<LABS>>>>>                        10.7   5.71  )-----------( 302      ( 09 Dec 2023 11:31 )             34.2     12-09    135  |  103  |  7<L>  ----------------------------<  178<H>  5.3<H>   |  21  |  0.7    Ca    9.9      09 Dec 2023 11:31  Mg     1.8     12-09    TPro  7.5  /  Alb  4.0  /  TBili  0.2  /  DBili  x   /  AST  10  /  ALT  <5  /  AlkPhos  87  12-08      Urinalysis Basic - ( 09 Dec 2023 11:31 )    Color: x / Appearance: x / SG: x / pH: x  Gluc: 178 mg/dL / Ketone: x  / Bili: x / Urobili: x   Blood: x / Protein: x / Nitrite: x   Leuk Esterase: x / RBC: x / WBC x   Sq Epi: x / Non Sq Epi: x / Bacteria: x            Culture - Blood (collected 08 Dec 2023 11:05)  Source: .Blood Blood  Preliminary Report (09 Dec 2023 23:02):    No growth at 24 hours    Culture - Blood (collected 08 Dec 2023 11:05)  Source: .Blood Blood  Preliminary Report (09 Dec 2023 23:02):    No growth at 24 hours    [id number]  CARDIAC MARKERS ( 08 Dec 2023 11:05 )  x     / <0.01 ng/mL / x     / x     / x            <<<<<RADIOLOGY>>>>>  CXR (12/8): Subtle bibasilar peribronchial opacities    ----------------------------------------------------------------------------------------------------------------------------------------------------------------------------------------------- ----------Daily Progress Note----------    HISTORY OF PRESENT ILLNESS:  Patient is a 54 yo M nonsmoker living in a group home w/PMH of schizophrenia, HTN, and DL, who presented due to 3 days of fever and productive cough associated with dyspnea and lower chest pain w/ED vitals notable for T 100.4, tachycardia to 131, and SPO2 97% on NC 2L, labs notable for , and CXR notable for subtle bibasilar peribronchial opacities now s/p levaquin x 1 and 2L NS in ED, admitted for work up and management of fever, cough w/CP, and dyspnea.     Today is hospital day 2d.     INTERVAL HOSPITAL COURSE / OVERNIGHT EVENTS:  24 hr event:      O/N event:      Patient was examined and seen at bedside. This morning he is resting comfortably in bed and reports no new issues or overnight events.     Review of Systems: Otherwise unremarkable     <<<<<PAST MEDICAL & SURGICAL HISTORY>>>>>  DM (diabetes mellitus)    HTN (hypertension)    Schizophrenia    Substance use disorder    Echocardiogram abnormal      ALLERGIES  Navane (Unknown)  Thorazine (Unknown)  fish (Unknown)  pork (Unknown)  penicillin (Unknown)      Home Medications:  atorvastatin 40 mg oral tablet: 1 tab(s) orally once a day (at bedtime) (08 Dec 2023 16:01)  benztropine 1 mg oral tablet: 1 tab(s) orally 2 times a day (08 Dec 2023 16:01)  cloZAPine 100 mg oral tablet: 1 tab(s) orally 2 times a day (08 Dec 2023 16:01)  Colace 100 mg oral capsule: 3 cap(s) orally once a day (08 Dec 2023 16:01)  divalproex sodium 250 mg oral delayed release tablet: 1 tab(s) orally once a day (at bedtime) (08 Dec 2023 16:01)  divalproex sodium 500 mg oral delayed release tablet: 1 tab(s) orally 2 times a day (08 Dec 2023 16:01)  Dulcolax Laxative 5 mg oral delayed release tablet: 1 tab(s) orally once a day (at bedtime) (08 Dec 2023 16:01)  Invega Sustenna 234 mg/1.5 mL intramuscular suspension, extended release: 234 milligram(s) intramuscularly once a month (08 Dec 2023 16:01)  levothyroxine 25 mcg (0.025 mg) oral tablet: 1 tab(s) orally once a day (08 Dec 2023 16:01)  LORazepam 2 mg oral tablet: 1 tab(s) orally once a day (at bedtime) (08 Dec 2023 16:02)  metFORMIN 1000 mg oral tablet: 1 tab(s) orally 2 times a day (08 Dec 2023 16:01)  metoprolol succinate 25 mg oral tablet, extended release: 1 tab(s) orally once a day (08 Dec 2023 16:01)  Tylenol 325 mg oral tablet: 2 tab(s) orally every 6 hours as needed for  pain or fever (08 Dec 2023 15:54)        MEDICATIONS  STANDING MEDICATIONS  albuterol/ipratropium for Nebulization 3 milliLiter(s) Nebulizer every 6 hours  atorvastatin 40 milliGRAM(s) Oral at bedtime  benztropine 1 milliGRAM(s) Oral two times a day  bisacodyl 5 milliGRAM(s) Oral at bedtime  budesonide 160 MICROgram(s)/formoterol 4.5 MICROgram(s) Inhaler 2 Puff(s) Inhalation two times a day  cloZAPine 100 milliGRAM(s) Oral two times a day  divalproex  milliGRAM(s) Oral at bedtime  divalproex  milliGRAM(s) Oral two times a day  enoxaparin Injectable 40 milliGRAM(s) SubCutaneous every 24 hours  insulin lispro (ADMELOG) corrective regimen sliding scale   SubCutaneous three times a day before meals  levoFLOXacin IVPB 750 milliGRAM(s) IV Intermittent every 24 hours  levothyroxine 25 MICROGram(s) Oral daily  LORazepam     Tablet 2 milliGRAM(s) Oral at bedtime  methylPREDNISolone sodium succinate Injectable 40 milliGRAM(s) IV Push two times a day  metoprolol succinate ER 25 milliGRAM(s) Oral daily    PRN MEDICATIONS  acetaminophen     Tablet .. 650 milliGRAM(s) Oral every 6 hours PRN    VITALS:  T(F): 97.6  HR: 95  BP: 127/70  RR: 18  SpO2: 97%    <<<<<PHYSICAL EXAM>>>>>  GENERAL: Well developed, well nourished and in no acute distress. Resting comfortably in bed.  HEENT: Normocephalic, atraumatic, mucous membranes moist, EOMI, PERRLA, bilateral sclera anicteric, no conjunctival injection  Neck: Supple, non-tender, no lymphadenopathy.  PULMONARY: Clear to auscultation bilaterally. No rales, rhonchi, or wheezing.  CARDIOVASCULAR: Regular rate and rhythm, S1-S2, no murmurs  GASTROINTESTINAL: Soft, non-tender, non-distended, no guarding.  RENAL: No CVA tenderness.  SKIN/EXTREMITIES: No clubbing or edema  NEUROLOGIC/MUSCULOSKELETAL: AOx4, grossly moving all extremities, no focal deficits.    <<<<<LABS>>>>>                        10.7   5.71  )-----------( 302      ( 09 Dec 2023 11:31 )             34.2     12-09    135  |  103  |  7<L>  ----------------------------<  178<H>  5.3<H>   |  21  |  0.7    Ca    9.9      09 Dec 2023 11:31  Mg     1.8     12-09    TPro  7.5  /  Alb  4.0  /  TBili  0.2  /  DBili  x   /  AST  10  /  ALT  <5  /  AlkPhos  87  12-08      Urinalysis Basic - ( 09 Dec 2023 11:31 )    Color: x / Appearance: x / SG: x / pH: x  Gluc: 178 mg/dL / Ketone: x  / Bili: x / Urobili: x   Blood: x / Protein: x / Nitrite: x   Leuk Esterase: x / RBC: x / WBC x   Sq Epi: x / Non Sq Epi: x / Bacteria: x            Culture - Blood (collected 08 Dec 2023 11:05)  Source: .Blood Blood  Preliminary Report (09 Dec 2023 23:02):    No growth at 24 hours    Culture - Blood (collected 08 Dec 2023 11:05)  Source: .Blood Blood  Preliminary Report (09 Dec 2023 23:02):    No growth at 24 hours    [id number]  CARDIAC MARKERS ( 08 Dec 2023 11:05 )  x     / <0.01 ng/mL / x     / x     / x            <<<<<RADIOLOGY>>>>>  CXR (12/8): Subtle bibasilar peribronchial opacities    ----------------------------------------------------------------------------------------------------------------------------------------------------------------------------------------------- ----------Daily Progress Note----------    HISTORY OF PRESENT ILLNESS:  Patient is a 52 yo M nonsmoker living in a group home w/PMH of schizophrenia, HTN, and DL, who presented due to 3 days of fever and productive cough associated with dyspnea and lower chest pain w/ED vitals notable for T 100.4, tachycardia to 131, and SPO2 97% on NC 2L, labs notable for , and CXR notable for subtle bibasilar peribronchial opacities now s/p levaquin x 1 and 2L NS in ED, admitted for work up and management of fever, cough w/CP, and dyspnea.     Today is hospital day 2d.     INTERVAL HOSPITAL COURSE / OVERNIGHT EVENTS:  24 hr event:  None    O/N event:  None    Patient was examined and seen at bedside. Reports significant improvement in ability to breath, endorses wish to be discharged, good appetite, 1 BM today    Review of Systems: Otherwise unremarkable     <<<<<PAST MEDICAL & SURGICAL HISTORY>>>>>  DM (diabetes mellitus)    HTN (hypertension)    Schizophrenia    Substance use disorder    Echocardiogram abnormal      ALLERGIES  Navane (Unknown)  Thorazine (Unknown)  fish (Unknown)  pork (Unknown)  penicillin (Unknown)      Home Medications:  atorvastatin 40 mg oral tablet: 1 tab(s) orally once a day (at bedtime) (08 Dec 2023 16:01)  benztropine 1 mg oral tablet: 1 tab(s) orally 2 times a day (08 Dec 2023 16:01)  cloZAPine 100 mg oral tablet: 1 tab(s) orally 2 times a day (08 Dec 2023 16:01)  Colace 100 mg oral capsule: 3 cap(s) orally once a day (08 Dec 2023 16:01)  divalproex sodium 250 mg oral delayed release tablet: 1 tab(s) orally once a day (at bedtime) (08 Dec 2023 16:01)  divalproex sodium 500 mg oral delayed release tablet: 1 tab(s) orally 2 times a day (08 Dec 2023 16:01)  Dulcolax Laxative 5 mg oral delayed release tablet: 1 tab(s) orally once a day (at bedtime) (08 Dec 2023 16:01)  Invega Sustenna 234 mg/1.5 mL intramuscular suspension, extended release: 234 milligram(s) intramuscularly once a month (08 Dec 2023 16:01)  levothyroxine 25 mcg (0.025 mg) oral tablet: 1 tab(s) orally once a day (08 Dec 2023 16:01)  LORazepam 2 mg oral tablet: 1 tab(s) orally once a day (at bedtime) (08 Dec 2023 16:02)  metFORMIN 1000 mg oral tablet: 1 tab(s) orally 2 times a day (08 Dec 2023 16:01)  metoprolol succinate 25 mg oral tablet, extended release: 1 tab(s) orally once a day (08 Dec 2023 16:01)  Tylenol 325 mg oral tablet: 2 tab(s) orally every 6 hours as needed for  pain or fever (08 Dec 2023 15:54)        MEDICATIONS  STANDING MEDICATIONS  albuterol/ipratropium for Nebulization 3 milliLiter(s) Nebulizer every 6 hours  atorvastatin 40 milliGRAM(s) Oral at bedtime  benztropine 1 milliGRAM(s) Oral two times a day  bisacodyl 5 milliGRAM(s) Oral at bedtime  budesonide 160 MICROgram(s)/formoterol 4.5 MICROgram(s) Inhaler 2 Puff(s) Inhalation two times a day  cloZAPine 100 milliGRAM(s) Oral two times a day  divalproex  milliGRAM(s) Oral at bedtime  divalproex  milliGRAM(s) Oral two times a day  enoxaparin Injectable 40 milliGRAM(s) SubCutaneous every 24 hours  insulin lispro (ADMELOG) corrective regimen sliding scale   SubCutaneous three times a day before meals  levoFLOXacin IVPB 750 milliGRAM(s) IV Intermittent every 24 hours  levothyroxine 25 MICROGram(s) Oral daily  LORazepam     Tablet 2 milliGRAM(s) Oral at bedtime  methylPREDNISolone sodium succinate Injectable 40 milliGRAM(s) IV Push two times a day  metoprolol succinate ER 25 milliGRAM(s) Oral daily    PRN MEDICATIONS  acetaminophen     Tablet .. 650 milliGRAM(s) Oral every 6 hours PRN    VITALS:  T(F): 97.6  HR: 95  BP: 127/70  RR: 18  SpO2: 97%    <<<<<PHYSICAL EXAM>>>>>  GENERAL: NAD  PULMONARY: Clear to auscultation bilaterally. No rales, rhonchi, or wheezing.  CARDIOVASCULAR: Regular rate and rhythm, S1-S2, no murmurs, rubs, or gallops  GASTROINTESTINAL: Soft, non-tender, non-distended, no guarding.  SKIN/EXTREMITIES: Warm, 2+ dorsalis pedis pulses, no UE or LE edema  NEUROLOGIC/MUSCULOSKELETAL: AOx4, grossly moving all extremities, no focal deficits.    <<<<<LABS>>>>>                        10.7   5.71  )-----------( 302      ( 09 Dec 2023 11:31 )             34.2     12-09    135  |  103  |  7<L>  ----------------------------<  178<H>  5.3<H>   |  21  |  0.7    Ca    9.9      09 Dec 2023 11:31  Mg     1.8     12-09    TPro  7.5  /  Alb  4.0  /  TBili  0.2  /  DBili  x   /  AST  10  /  ALT  <5  /  AlkPhos  87  12-08      Urinalysis Basic - ( 09 Dec 2023 11:31 )    Color: x / Appearance: x / SG: x / pH: x  Gluc: 178 mg/dL / Ketone: x  / Bili: x / Urobili: x   Blood: x / Protein: x / Nitrite: x   Leuk Esterase: x / RBC: x / WBC x   Sq Epi: x / Non Sq Epi: x / Bacteria: x            Culture - Blood (collected 08 Dec 2023 11:05)  Source: .Blood Blood  Preliminary Report (09 Dec 2023 23:02):    No growth at 24 hours    Culture - Blood (collected 08 Dec 2023 11:05)  Source: .Blood Blood  Preliminary Report (09 Dec 2023 23:02):    No growth at 24 hours    [id number]  CARDIAC MARKERS ( 08 Dec 2023 11:05 )  x     / <0.01 ng/mL / x     / x     / x            <<<<<RADIOLOGY>>>>>  CXR (12/10): No acute cardiopulmonary disease    CXR (12/8): Subtle bibasilar peribronchial opacities    ----------------------------------------------------------------------------------------------------------------------------------------------------------------------------------------------- ----------Daily Progress Note----------    HISTORY OF PRESENT ILLNESS:  Patient is a 52 yo M nonsmoker living in a group home w/PMH of schizophrenia, HTN, and DL, who presented due to 3 days of fever and productive cough associated with dyspnea and lower chest pain w/ED vitals notable for T 100.4, tachycardia to 131, and SPO2 97% on NC 2L, labs notable for , and CXR notable for subtle bibasilar peribronchial opacities now s/p levaquin x 1 and 2L NS in ED, admitted for work up and management of fever, cough w/CP, and dyspnea.     Today is hospital day 2d.     INTERVAL HOSPITAL COURSE / OVERNIGHT EVENTS:  24 hr event:  None    O/N event:  None    Patient was examined and seen at bedside. Reports significant improvement in ability to breath, endorses wish to be discharged, good appetite, 1 BM today    Review of Systems: Otherwise unremarkable     <<<<<PAST MEDICAL & SURGICAL HISTORY>>>>>  DM (diabetes mellitus)    HTN (hypertension)    Schizophrenia    Substance use disorder    Echocardiogram abnormal      ALLERGIES  Navane (Unknown)  Thorazine (Unknown)  fish (Unknown)  pork (Unknown)  penicillin (Unknown)      Home Medications:  atorvastatin 40 mg oral tablet: 1 tab(s) orally once a day (at bedtime) (08 Dec 2023 16:01)  benztropine 1 mg oral tablet: 1 tab(s) orally 2 times a day (08 Dec 2023 16:01)  cloZAPine 100 mg oral tablet: 1 tab(s) orally 2 times a day (08 Dec 2023 16:01)  Colace 100 mg oral capsule: 3 cap(s) orally once a day (08 Dec 2023 16:01)  divalproex sodium 250 mg oral delayed release tablet: 1 tab(s) orally once a day (at bedtime) (08 Dec 2023 16:01)  divalproex sodium 500 mg oral delayed release tablet: 1 tab(s) orally 2 times a day (08 Dec 2023 16:01)  Dulcolax Laxative 5 mg oral delayed release tablet: 1 tab(s) orally once a day (at bedtime) (08 Dec 2023 16:01)  Invega Sustenna 234 mg/1.5 mL intramuscular suspension, extended release: 234 milligram(s) intramuscularly once a month (08 Dec 2023 16:01)  levothyroxine 25 mcg (0.025 mg) oral tablet: 1 tab(s) orally once a day (08 Dec 2023 16:01)  LORazepam 2 mg oral tablet: 1 tab(s) orally once a day (at bedtime) (08 Dec 2023 16:02)  metFORMIN 1000 mg oral tablet: 1 tab(s) orally 2 times a day (08 Dec 2023 16:01)  metoprolol succinate 25 mg oral tablet, extended release: 1 tab(s) orally once a day (08 Dec 2023 16:01)  Tylenol 325 mg oral tablet: 2 tab(s) orally every 6 hours as needed for  pain or fever (08 Dec 2023 15:54)        MEDICATIONS  STANDING MEDICATIONS  albuterol/ipratropium for Nebulization 3 milliLiter(s) Nebulizer every 6 hours  atorvastatin 40 milliGRAM(s) Oral at bedtime  benztropine 1 milliGRAM(s) Oral two times a day  bisacodyl 5 milliGRAM(s) Oral at bedtime  budesonide 160 MICROgram(s)/formoterol 4.5 MICROgram(s) Inhaler 2 Puff(s) Inhalation two times a day  cloZAPine 100 milliGRAM(s) Oral two times a day  divalproex  milliGRAM(s) Oral at bedtime  divalproex  milliGRAM(s) Oral two times a day  enoxaparin Injectable 40 milliGRAM(s) SubCutaneous every 24 hours  insulin lispro (ADMELOG) corrective regimen sliding scale   SubCutaneous three times a day before meals  levoFLOXacin IVPB 750 milliGRAM(s) IV Intermittent every 24 hours  levothyroxine 25 MICROGram(s) Oral daily  LORazepam     Tablet 2 milliGRAM(s) Oral at bedtime  methylPREDNISolone sodium succinate Injectable 40 milliGRAM(s) IV Push two times a day  metoprolol succinate ER 25 milliGRAM(s) Oral daily    PRN MEDICATIONS  acetaminophen     Tablet .. 650 milliGRAM(s) Oral every 6 hours PRN    VITALS:  T(F): 97.6  HR: 95  BP: 127/70  RR: 18  SpO2: 97%    <<<<<PHYSICAL EXAM>>>>>  GENERAL: NAD  PULMONARY: Clear to auscultation bilaterally. No rales, rhonchi, or wheezing.  CARDIOVASCULAR: Regular rate and rhythm, S1-S2, no murmurs, rubs, or gallops  GASTROINTESTINAL: Soft, non-tender, non-distended, no guarding.  SKIN/EXTREMITIES: Warm, 2+ dorsalis pedis pulses, no UE or LE edema  NEUROLOGIC/MUSCULOSKELETAL: AOx4, grossly moving all extremities, no focal deficits.    <<<<<LABS>>>>>                        10.7   5.71  )-----------( 302      ( 09 Dec 2023 11:31 )             34.2     12-09    135  |  103  |  7<L>  ----------------------------<  178<H>  5.3<H>   |  21  |  0.7    Ca    9.9      09 Dec 2023 11:31  Mg     1.8     12-09    TPro  7.5  /  Alb  4.0  /  TBili  0.2  /  DBili  x   /  AST  10  /  ALT  <5  /  AlkPhos  87  12-08      Urinalysis Basic - ( 09 Dec 2023 11:31 )    Color: x / Appearance: x / SG: x / pH: x  Gluc: 178 mg/dL / Ketone: x  / Bili: x / Urobili: x   Blood: x / Protein: x / Nitrite: x   Leuk Esterase: x / RBC: x / WBC x   Sq Epi: x / Non Sq Epi: x / Bacteria: x            Culture - Blood (collected 08 Dec 2023 11:05)  Source: .Blood Blood  Preliminary Report (09 Dec 2023 23:02):    No growth at 24 hours    Culture - Blood (collected 08 Dec 2023 11:05)  Source: .Blood Blood  Preliminary Report (09 Dec 2023 23:02):    No growth at 24 hours    [id number]  CARDIAC MARKERS ( 08 Dec 2023 11:05 )  x     / <0.01 ng/mL / x     / x     / x            <<<<<RADIOLOGY>>>>>  CXR (12/10): No acute cardiopulmonary disease    CXR (12/8): Subtle bibasilar peribronchial opacities    -----------------------------------------------------------------------------------------------------------------------------------------------------------------------------------------------

## 2023-12-10 NOTE — PROGRESS NOTE ADULT - SUBJECTIVE AND OBJECTIVE BOX
MEDICINE ATTENDING PROGRESS NOTE  52 yo man non-smoke who lives in a group homer and known to have schizophrenia, HTN, DL, and DM  presenting for fever with productive cough associated with dyspnea and chest pain since 3 days. Admitted for further management.    Interval/Overnight Events  - No acute complaints  - Need 3rd and final dose of Levaquin on 12/11/2023 (QTc 399)  - begin tapering steroid  - dispo planning to group home    ROS  General: Denies fevers, chills, nightsweats, weight loss  HEENT: Denies headache, rhinorrhea, sore throat, eye pain  CV: Denies CP, palpitations  PULM: Denies SOB, cough  GI: Denies abdominal pain, diarrhea  : Denies dysuria, hematuria  MSK: Denies arthralgias  SKIN: Denies rash   NEURO: Denies paresthesias, weakness  PSYCH: Denies depression    MEDICATIONS  (STANDING):  albuterol/ipratropium for Nebulization 3 milliLiter(s) Nebulizer every 6 hours  atorvastatin 40 milliGRAM(s) Oral at bedtime  benztropine 1 milliGRAM(s) Oral two times a day  bisacodyl 5 milliGRAM(s) Oral at bedtime  budesonide 160 MICROgram(s)/formoterol 4.5 MICROgram(s) Inhaler 2 Puff(s) Inhalation two times a day  chlorhexidine 2% Cloths 1 Application(s) Topical <User Schedule>  cloZAPine 100 milliGRAM(s) Oral two times a day  divalproex  milliGRAM(s) Oral at bedtime  divalproex  milliGRAM(s) Oral two times a day  enoxaparin Injectable 40 milliGRAM(s) SubCutaneous every 24 hours  insulin lispro (ADMELOG) corrective regimen sliding scale   SubCutaneous three times a day before meals  levoFLOXacin IVPB 750 milliGRAM(s) IV Intermittent every 24 hours  levothyroxine 25 MICROGram(s) Oral daily  LORazepam     Tablet 2 milliGRAM(s) Oral at bedtime  methylPREDNISolone sodium succinate Injectable 40 milliGRAM(s) IV Push two times a day  metoprolol succinate ER 25 milliGRAM(s) Oral daily    MEDICATIONS  (PRN):  acetaminophen     Tablet .. 650 milliGRAM(s) Oral every 6 hours PRN Temp greater or equal to 38C (100.4F), Mild Pain (1 - 3)    ANTIBIOTICS:  levoFLOXacin IVPB 750 milliGRAM(s) IV Intermittent every 24 hours    VITALS:  T(F): 97.6, Max: 98.6 (12-09-23 @ 14:39)  HR: 95  BP: 127/70  RR: 18Vital Signs Last 24 Hrs  T(C): 36.4 (10 Dec 2023 05:00), Max: 37 (09 Dec 2023 14:39)  T(F): 97.6 (10 Dec 2023 05:00), Max: 98.6 (09 Dec 2023 14:39)  HR: 95 (10 Dec 2023 05:00) (95 - 110)  BP: 127/70 (10 Dec 2023 05:00) (111/54 - 128/59)  BP(mean): 78 (09 Dec 2023 21:10) (78 - 78)  RR: 18 (10 Dec 2023 05:00) (18 - 18)  SpO2: 97% (09 Dec 2023 15:30) (97% - 97%)    Parameters below as of 09 Dec 2023 15:30  Patient On (Oxygen Delivery Method): room air     I&O's Summary    PHYSICAL EXAM:  Gen: NAD, resting in bed  HEENT: Normocephalic, atraumatic  Neck: supple, no lymphadenopathy  CV: Regular rate & regular rhythm  Lungs: CTABL no wheeze  Abdomen: Soft, NTND+ BS present  Ext: Warm, well perfused no CCE  Neuro: non focal, awake, CN II-XII intact   Skin: no rash, no erythema  Psych: no SI, HI, Hallucination     LABS:                        10.8   9.83  )-----------( 330      ( 10 Dec 2023 07:46 )             34.5     12-10    140  |  104  |  8<L>  ----------------------------<  109<H>  4.5   |  25  |  0.7    Ca    9.8      10 Dec 2023 07:46  Mg     2.0     12-10      MICROBIOLOGY:  Urinalysis Basic - ( 10 Dec 2023 07:46 )  Color: x / Appearance: x / SG: x / pH: x  Gluc: 109 mg/dL / Ketone: x  / Bili: x / Urobili: x   Blood: x / Protein: x / Nitrite: x   Leuk Esterase: x / RBC: x / WBC x   Sq Epi: x / Non Sq Epi: x / Bacteria: x    Culture - Blood (collected 12-08-23 @ 11:05)  Source: .Blood Blood  Preliminary Report (12-09-23 @ 23:02):    No growth at 24 hours    Culture - Blood (collected 12-08-23 @ 11:05)  Source: .Blood Blood  Preliminary Report (12-09-23 @ 23:02):    No growth at 24 hours    COVID-19 PCR: NotDetec (04 Oct 2023 04:14)  COVID-19 PCR: NotDetec (24 Jul 2023 15:28)    Blood Gas Venous - Lactate: 1.8 mmol/L (12-08-23 @ 11:55)    IMAGING:  Xray Chest 1 View- PORTABLE-Urgent:   Cardiac/ Mediastinum: unremarkable  Lungs/ Pleura: Subtle bibasilar peribronchial opacities. No lobar consolidation or pneumothorax  Skeletal/ soft tissues: Stable    CARDIOLOGY TESTING  TTE Echo Complete w/o Contrast w/ Doppler (05.08.23 @ 15:12)  Summary:   1. Normal global left ventricular systolic function.   2. LV Ejection Fraction by Blackwood's Method with a biplane EF of 52 %.   3. Basal and mid anterior septum and basal and mid inferior septum are abnormal as described above.   4. Normal right ventricular size and function.   5. Normal left atrial size.   6. Normal right atrial size.   7. No evidence of mitral valve regurgitation.   8. Adequate TR velocity was not obtained to accurately assess RVSP.   9. There is no evidence of pericardial effusion.    12 Lead ECG:   Ventricular Rate 100 BPM  Atrial Rate 100 BPM  P-R Interval 154 ms  QRS Duration 100 ms  Q-T Interval 310 ms  QTC Calculation(Bazett) 399 ms  P Axis 60 degrees  R Axis 11 degrees  T Axis -19 degrees    Diagnosis Line Normal sinus rhythm  Moderate voltage criteria for LVH, may be normal variant  Inferior infarct , age undetermined  Abnormal ECG    Confirmed by Behuria, Supreeti ([id number]) on 12/9/2023 3:16:34 PM (12-08-23 @ 11:48)    ASSESSMENT/PLAN:  52 yo man non-smoke who lives in a group homer and known to have schizophrenia, HTN, DL, and DM  presenting for fever with productive cough associated with dyspnea and chest pain since 3 days. Admitted for further management.    #URI  #Sepsis POA  #Bronchitis vs. pneumonia  - on admission: febrile 100.7, tachycardic 117, normotensive. originally sating well on RA but now requiring supplemental 02  - no leukocytosis, no LA   - UA unremarkable  - follow up on cultures  - VBG unremarkable  - RVP negative  - CXR with suble bibasilar opacities  - s/p levofloxacin and fluid bolus in ED  - c/w levofloxacin, currently day #2 -> need complete 3rd dose on 12/11/2021  - albuterol/ipratropium for Nebulization 3 milliLiter(s) Nebulizer every 6 hours  - budesonide 160 MICROgram(s)/formoterol 4.5 MICROgram(s) Inhaler 2 Puff(s) Inhalation two times a day  - start tapering steroid    #HLD  - atorvastatin 40 milliGRAM(s) Oral at bedtime    # Schizophrenia  # Extrapyramidal s/e  - benztropine 1 milliGRAM(s) Oral two times a day  - cloZAPine 100 milliGRAM(s) Oral two times a day  - divalproex  milliGRAM(s) Oral at bedtime  - divalproex  milliGRAM(s) Oral two times a day  - LORazepam     Tablet 2 milliGRAM(s) Oral at bedtime    # DM  - insulin lispro (ADMELOG) corrective regimen sliding scale   SubCutaneous three times a day before meals    # Hypothyroidism  - TSH 0.82  - levothyroxine 25 MICROGram(s) Oral daily    #? CHF  - patient denies prior cardiac work up and no cardiac work up in chart  - No prior history of pulmonary edema or heart failure  - Pro- (mild elevation), baseline 200's  - TTE  (5/8/2023): EF 52%, wall motion abnormality present, no valve disease  - ECG: sinus rhtyhm  - c/w aspirin 81 mg qd  - c/w Atorvastatin 40mg  - c/w metoprolol succinate ER 25 milliGRAM(s) Oral daily  - no diuretics per med rec  - consider adding Farxiga or Jardiance prior to discharge  - Consider cardiology referral as outpatient for workup    #Schizophrenia  - controlled symptoms  - c/w psych meds    #HDL  c/w atorvastatin    #Supportive Management:  Dispo: likely return to group home  DVT Ppx: enoxaparin Injectable 40 milliGRAM(s) SubCutaneous every 24 hours  GI Ppx: Diet:  Diet, DASH/TLC:   Sodium & Cholesterol Restricted  Consistent Carbohydrate No Snacks (12-08-23 @ 16:23) [Active]    Total time spent to complete patient's bedside assessment, review medical chart, discuss medical plan of care with covering medical team was more than 45 minutes with >50% of time spent face to face with patient, discussion with patient/family and/or coordination of care    Housestaff's notes reviewed. When there is confusion regarding the content or information provided in the notes of a housestaff (resident, medical student, physician assistant, or nurse practioner) and the attending physician notes, the attending physician's note takes precedence and supersedes the other notes.    Carlos Bill MD/Nathaniel  Attending Physician

## 2023-12-11 ENCOUNTER — TRANSCRIPTION ENCOUNTER (OUTPATIENT)
Age: 53
End: 2023-12-11

## 2023-12-11 VITALS
SYSTOLIC BLOOD PRESSURE: 126 MMHG | HEART RATE: 94 BPM | RESPIRATION RATE: 18 BRPM | DIASTOLIC BLOOD PRESSURE: 66 MMHG | TEMPERATURE: 97 F | OXYGEN SATURATION: 94 %

## 2023-12-11 LAB
ANION GAP SERPL CALC-SCNC: 15 MMOL/L — HIGH (ref 7–14)
ANION GAP SERPL CALC-SCNC: 15 MMOL/L — HIGH (ref 7–14)
BUN SERPL-MCNC: 12 MG/DL — SIGNIFICANT CHANGE UP (ref 10–20)
BUN SERPL-MCNC: 12 MG/DL — SIGNIFICANT CHANGE UP (ref 10–20)
CALCIUM SERPL-MCNC: 9.4 MG/DL — SIGNIFICANT CHANGE UP (ref 8.4–10.5)
CALCIUM SERPL-MCNC: 9.4 MG/DL — SIGNIFICANT CHANGE UP (ref 8.4–10.5)
CHLORIDE SERPL-SCNC: 103 MMOL/L — SIGNIFICANT CHANGE UP (ref 98–110)
CHLORIDE SERPL-SCNC: 103 MMOL/L — SIGNIFICANT CHANGE UP (ref 98–110)
CO2 SERPL-SCNC: 26 MMOL/L — SIGNIFICANT CHANGE UP (ref 17–32)
CO2 SERPL-SCNC: 26 MMOL/L — SIGNIFICANT CHANGE UP (ref 17–32)
CREAT SERPL-MCNC: 0.8 MG/DL — SIGNIFICANT CHANGE UP (ref 0.7–1.5)
CREAT SERPL-MCNC: 0.8 MG/DL — SIGNIFICANT CHANGE UP (ref 0.7–1.5)
EGFR: 106 ML/MIN/1.73M2 — SIGNIFICANT CHANGE UP
EGFR: 106 ML/MIN/1.73M2 — SIGNIFICANT CHANGE UP
GLUCOSE BLDC GLUCOMTR-MCNC: 113 MG/DL — HIGH (ref 70–99)
GLUCOSE BLDC GLUCOMTR-MCNC: 113 MG/DL — HIGH (ref 70–99)
GLUCOSE BLDC GLUCOMTR-MCNC: 129 MG/DL — HIGH (ref 70–99)
GLUCOSE BLDC GLUCOMTR-MCNC: 129 MG/DL — HIGH (ref 70–99)
GLUCOSE BLDC GLUCOMTR-MCNC: 166 MG/DL — HIGH (ref 70–99)
GLUCOSE BLDC GLUCOMTR-MCNC: 166 MG/DL — HIGH (ref 70–99)
GLUCOSE SERPL-MCNC: 125 MG/DL — HIGH (ref 70–99)
GLUCOSE SERPL-MCNC: 125 MG/DL — HIGH (ref 70–99)
HCT VFR BLD CALC: 35.5 % — LOW (ref 42–52)
HCT VFR BLD CALC: 35.5 % — LOW (ref 42–52)
HGB BLD-MCNC: 11 G/DL — LOW (ref 14–18)
HGB BLD-MCNC: 11 G/DL — LOW (ref 14–18)
MAGNESIUM SERPL-MCNC: 2 MG/DL — SIGNIFICANT CHANGE UP (ref 1.8–2.4)
MAGNESIUM SERPL-MCNC: 2 MG/DL — SIGNIFICANT CHANGE UP (ref 1.8–2.4)
MCHC RBC-ENTMCNC: 25.5 PG — LOW (ref 27–31)
MCHC RBC-ENTMCNC: 25.5 PG — LOW (ref 27–31)
MCHC RBC-ENTMCNC: 31 G/DL — LOW (ref 32–37)
MCHC RBC-ENTMCNC: 31 G/DL — LOW (ref 32–37)
MCV RBC AUTO: 82.2 FL — SIGNIFICANT CHANGE UP (ref 80–94)
MCV RBC AUTO: 82.2 FL — SIGNIFICANT CHANGE UP (ref 80–94)
NRBC # BLD: 0 /100 WBCS — SIGNIFICANT CHANGE UP (ref 0–0)
NRBC # BLD: 0 /100 WBCS — SIGNIFICANT CHANGE UP (ref 0–0)
PLATELET # BLD AUTO: 358 K/UL — SIGNIFICANT CHANGE UP (ref 130–400)
PLATELET # BLD AUTO: 358 K/UL — SIGNIFICANT CHANGE UP (ref 130–400)
PMV BLD: 9.9 FL — SIGNIFICANT CHANGE UP (ref 7.4–10.4)
PMV BLD: 9.9 FL — SIGNIFICANT CHANGE UP (ref 7.4–10.4)
POTASSIUM SERPL-MCNC: 4.5 MMOL/L — SIGNIFICANT CHANGE UP (ref 3.5–5)
POTASSIUM SERPL-MCNC: 4.5 MMOL/L — SIGNIFICANT CHANGE UP (ref 3.5–5)
POTASSIUM SERPL-SCNC: 4.5 MMOL/L — SIGNIFICANT CHANGE UP (ref 3.5–5)
POTASSIUM SERPL-SCNC: 4.5 MMOL/L — SIGNIFICANT CHANGE UP (ref 3.5–5)
RBC # BLD: 4.32 M/UL — LOW (ref 4.7–6.1)
RBC # BLD: 4.32 M/UL — LOW (ref 4.7–6.1)
RBC # FLD: 17.2 % — HIGH (ref 11.5–14.5)
RBC # FLD: 17.2 % — HIGH (ref 11.5–14.5)
SODIUM SERPL-SCNC: 144 MMOL/L — SIGNIFICANT CHANGE UP (ref 135–146)
SODIUM SERPL-SCNC: 144 MMOL/L — SIGNIFICANT CHANGE UP (ref 135–146)
WBC # BLD: 7.78 K/UL — SIGNIFICANT CHANGE UP (ref 4.8–10.8)
WBC # BLD: 7.78 K/UL — SIGNIFICANT CHANGE UP (ref 4.8–10.8)
WBC # FLD AUTO: 7.78 K/UL — SIGNIFICANT CHANGE UP (ref 4.8–10.8)
WBC # FLD AUTO: 7.78 K/UL — SIGNIFICANT CHANGE UP (ref 4.8–10.8)

## 2023-12-11 PROCEDURE — 99239 HOSP IP/OBS DSCHRG MGMT >30: CPT

## 2023-12-11 RX ADMIN — ENOXAPARIN SODIUM 40 MILLIGRAM(S): 100 INJECTION SUBCUTANEOUS at 12:27

## 2023-12-11 RX ADMIN — Medication 1 MILLIGRAM(S): at 05:28

## 2023-12-11 RX ADMIN — Medication 3 MILLILITER(S): at 15:46

## 2023-12-11 RX ADMIN — CHLORHEXIDINE GLUCONATE 1 APPLICATION(S): 213 SOLUTION TOPICAL at 05:28

## 2023-12-11 RX ADMIN — Medication 40 MILLIGRAM(S): at 05:28

## 2023-12-11 RX ADMIN — Medication 1: at 12:26

## 2023-12-11 RX ADMIN — Medication 25 MICROGRAM(S): at 05:28

## 2023-12-11 RX ADMIN — CLOZAPINE 100 MILLIGRAM(S): 150 TABLET, ORALLY DISINTEGRATING ORAL at 05:28

## 2023-12-11 RX ADMIN — Medication 3 MILLILITER(S): at 07:51

## 2023-12-11 RX ADMIN — DIVALPROEX SODIUM 500 MILLIGRAM(S): 500 TABLET, DELAYED RELEASE ORAL at 05:28

## 2023-12-11 RX ADMIN — Medication 25 MILLIGRAM(S): at 05:28

## 2023-12-11 RX ADMIN — BUDESONIDE AND FORMOTEROL FUMARATE DIHYDRATE 2 PUFF(S): 160; 4.5 AEROSOL RESPIRATORY (INHALATION) at 08:29

## 2023-12-11 NOTE — DISCHARGE NOTE NURSING/CASE MANAGEMENT/SOCIAL WORK - NSDCPEFALRISK_GEN_ALL_CORE
For information on Fall & Injury Prevention, visit: [email or web address] OR  [email or web address] OR  https://www.cdc.gov/steadi/patient.html For information on Fall & Injury Prevention, visit: [email or web address] OR  [email or web address] OR  https://www.cdc.gov/steadi/patient.html

## 2023-12-11 NOTE — DISCHARGE NOTE PROVIDER - PROVIDER TOKENS
PROVIDER:[TOKEN:[[id number]],FOLLOWUP:[2 weeks],ESTABLISHEDPATIENT:[T]] PROVIDER:[TOKEN:[[id number]],FOLLOWUP:[2 weeks],ESTABLISHEDPATIENT:[T]]

## 2023-12-11 NOTE — DISCHARGE NOTE PROVIDER - NSDCFUSCHEDAPPT_GEN_ALL_CORE_FT
Collins Diggs  [facility] PreAdmits  Scheduled Appointment: 01/19/2024    Collins [facility]gs[facility] Phys[facility] [address]  Scheduled Appointment: 01/19/2024     Collins Diggs  [facility] PreAdmits  Scheduled Appointment: 01/19/2024    [facility] [address]  Scheduled Appointment: 01/19/2024

## 2023-12-11 NOTE — DISCHARGE NOTE PROVIDER - CARE PROVIDER_API CALL
Joseph Pascal  Internal Medicine  [address] [place] [id number]  Phone: [phone]  Fax: [phone]  Established Patient  Follow Up Time: 2 weeks   Joseph Pascal  Internal Medicine  [address] [place] [id number]  Phone: [phone]  Fax: [phone]  Established Patient  Follow Up Time: 2 weeks

## 2023-12-11 NOTE — DISCHARGE NOTE PROVIDER - HOSPITAL COURSE
Patient is a 54 yo M nonsmoker living in a group home w/PMH of schizophrenia, HTN, and DL, who presented due to 3 days of fever and productive cough associated with dyspnea and lower chest pain w/ED vitals notable for T 100.4, tachycardia to 131, and SPO2 97% on NC 2L, labs notable for , and CXR notable for subtle bibasilar peribronchial opacities now s/p levaquin x 1 and 2L NS in ED, admitted for work up and management of fever, cough w/CP, and dyspnea.    #Likely acute viral infection (resolved)  *CXR (12/10): No acute cardiopulmonary disease  *CXR (12/8): Subtle bibasilar peribronchial opacities  - on admission: febrile 100.7, tachycardic 117, normotensive. originally sating well on RA but now requiring supplemental 02  - no leukocytosis, no LA   - UA unremarkable  - VBG unremarkable  - RVP negative  - BCx (12/8): NGTD  - Procal (12/9): 0.08  - s/p levoflocacin and fluid bolus in ED  - s/p levofloxacin  - s/p steroids and inhalers  - Repeat CXR showing no more bibasilar opacities    #?CHF  *CXR (12/8): Subtle bibasilar peribronchial opacities  - c/w aspirin 81 mg qd  - c/w Atorvastatin 40mg  - c/w metoprolol  - Cardiology referral as outpatient    #Hx hypothyroid  - c/w levothyroxine    #Hx Schizophrenia  #Extrapyramidal symptoms  - benztropine 1 mg bid  - cloZAPine 100 mg bid  - divalproex 500 mg tid, 250 mg qHS  - Ativan PO 2 mg qHS     #Hx DLD  - c/w atorvastatin    #Hx DM   - C/w home metformin   Patient is a 52 yo M nonsmoker living in a group home w/PMH of schizophrenia, HTN, and DL, who presented due to 3 days of fever and productive cough associated with dyspnea and lower chest pain w/ED vitals notable for T 100.4, tachycardia to 131, and SPO2 97% on NC 2L, labs notable for , and CXR notable for subtle bibasilar peribronchial opacities now s/p levaquin x 1 and 2L NS in ED, admitted for work up and management of fever, cough w/CP, and dyspnea.    #Likely acute viral infection (resolved)  *CXR (12/10): No acute cardiopulmonary disease  *CXR (12/8): Subtle bibasilar peribronchial opacities  - on admission: febrile 100.7, tachycardic 117, normotensive. originally sating well on RA but now requiring supplemental 02  - no leukocytosis, no LA   - UA unremarkable  - VBG unremarkable  - RVP negative  - BCx (12/8): NGTD  - Procal (12/9): 0.08  - s/p levoflocacin and fluid bolus in ED  - s/p levofloxacin  - s/p steroids and inhalers  - Repeat CXR showing no more bibasilar opacities    #?CHF  *CXR (12/8): Subtle bibasilar peribronchial opacities  - c/w aspirin 81 mg qd  - c/w Atorvastatin 40mg  - c/w metoprolol  - Cardiology referral as outpatient    #Hx hypothyroid  - c/w levothyroxine    #Hx Schizophrenia  #Extrapyramidal symptoms  - benztropine 1 mg bid  - cloZAPine 100 mg bid  - divalproex 500 mg tid, 250 mg qHS  - Ativan PO 2 mg qHS     #Hx DLD  - c/w atorvastatin    #Hx DM   - C/w home metformin   Patient is a 52 yo M nonsmoker living in a group home w/PMH of schizophrenia, HTN, and DL, who presented due to 3 days of fever and productive cough associated with dyspnea and lower chest pain w/ED vitals notable for T 100.4, tachycardia to 131, and SPO2 97% on NC 2L, labs notable for , and CXR notable for subtle bibasilar peribronchial opacities now s/p levaquin x 1 and 2L NS in ED, admitted for work up and management of fever, cough w/CP, and dyspnea.    #Likely acute viral infection (resolved)  *CXR (12/10): No acute cardiopulmonary disease  *CXR (12/8): Subtle bibasilar peribronchial opacities  - on admission: febrile 100.7, tachycardic 117, normotensive. originally sating well on RA but now requiring supplemental 02  - no leukocytosis, no LA   - UA unremarkable  - VBG unremarkable  - RVP negative  - BCx (12/8): NGTD  - Procal (12/9): 0.08  - s/p levoflocacin and fluid bolus in ED  - s/p levofloxacin  - s/p steroids and inhalers  - Repeat CXR showing no more bibasilar opacities    #?CHF  *CXR (12/8): Subtle bibasilar peribronchial opacities  - c/w Atorvastatin 40mg  - c/w metoprolol  - Cardiology referral as outpatient    #Hx hypothyroid  - c/w levothyroxine    #Hx Schizophrenia  #Extrapyramidal symptoms  - benztropine 1 mg bid  - cloZAPine 100 mg bid  - divalproex 500 mg tid, 250 mg qHS  - Ativan PO 2 mg qHS     #Hx DLD  - c/w atorvastatin    #Hx DM   - C/w home metformin

## 2023-12-11 NOTE — DISCHARGE NOTE NURSING/CASE MANAGEMENT/SOCIAL WORK - PATIENT PORTAL LINK FT
You can access the FollowMyHealth Patient Portal offered by [facility] by registering at the following website: [email or web address]. By joining [employer]’s FollowMyHealth portal, you will also be able to view your health information using other applications (apps) compatible with our system. You can access the FollowMyHealth Patient Portal offered by [facility] by registering at the following website: [email or web address]. By joining [employer]’s FollowMyHealth portal, you will also be able to view your health information using other applications (apps) compatible with our system.

## 2023-12-11 NOTE — DISCHARGE NOTE PROVIDER - NSDCMRMEDTOKEN_GEN_ALL_CORE_FT
atorvastatin 40 mg oral tablet: 1 tab(s) orally once a day (at bedtime)  benztropine 1 mg oral tablet: 1 tab(s) orally 2 times a day  cloZAPine 100 mg oral tablet: 1 tab(s) orally 2 times a day  Colace 100 mg oral capsule: 3 cap(s) orally once a day  divalproex sodium 250 mg oral delayed release tablet: 1 tab(s) orally once a day (at bedtime)  divalproex sodium 500 mg oral delayed release tablet: 1 tab(s) orally 2 times a day  Dulcolax Laxative 5 mg oral delayed release tablet: 1 tab(s) orally once a day (at bedtime)  Invega Sustenna 234 mg/1.5 mL intramuscular suspension, extended release: 234 milligram(s) intramuscularly once a month  levothyroxine 25 mcg (0.025 mg) oral tablet: 1 tab(s) orally once a day  LORazepam 2 mg oral tablet: 1 tab(s) orally once a day (at bedtime)  metFORMIN 1000 mg oral tablet: 1 tab(s) orally 2 times a day  metoprolol succinate 25 mg oral tablet, extended release: 1 tab(s) orally once a day  Tylenol 325 mg oral tablet: 2 tab(s) orally every 6 hours as needed for  pain or fever

## 2023-12-11 NOTE — DISCHARGE NOTE PROVIDER - NSDCCPCAREPLAN_GEN_ALL_CORE_FT
PRINCIPAL DISCHARGE DIAGNOSIS  Diagnosis: Dyspnea  Assessment and Plan of Treatment: You came to the hospital with shortness of breath, cough, and fever and were worked up for a pneumonia. We treated you with antibiotics, inhalers, and steroids in the meantime. None of the labs or imaging we did showed a pneumonia. Your symptoms were most likely due to a viral respiratory infection. Your symptoms have resolved. Please follow up with your primary medical physician in 2 weeks to assess for further symptoms and to consider referral for cardiology for work up of congestive heart failure as your last echo did show signs of it on 5/8/2023. Please return to the emergency room if you notice significant worsening shortness of breath or cough.

## 2023-12-11 NOTE — DISCHARGE NOTE NURSING/CASE MANAGEMENT/SOCIAL WORK - NSDCVIVACCINE_GEN_ALL_CORE_FT
Tdap; 21-Aug-2022 18:52; Kathrin Lebron (RN); [employer]; [id number] (Exp. Date: 22-Sep-2024); IntraMuscular; Deltoid Right.; 0.5 milliLiter(s); VIS (VIS Published: 09-May-2013, VIS Presented: 21-Aug-2022);    Tdap; 21-Aug-2022 18:52; Kathrin Lebron (RN); [employer]; [id number] (Exp. Date: 22-Sep-2024); IntraMuscular; Deltoid Right.; 0.5 milliLiter(s); VIS (VIS Published: 09-May-2013, VIS Presented: 21-Aug-2022);

## 2023-12-11 NOTE — DISCHARGE NOTE PROVIDER - REASON FOR ADMISSION
Phoned pt spoke w/ on HIPAA reviewed voiced understanding wants to know if repeat hep will be done prior to 11/10 appt or at the time of the appt Dyspnea, cough and fever

## 2023-12-12 ENCOUNTER — EMERGENCY (EMERGENCY)
Facility: HOSPITAL | Age: 53
LOS: 0 days | Discharge: HOME | End: 2023-12-12
Attending: EMERGENCY MEDICINE
Payer: MEDICAID

## 2023-12-12 VITALS
TEMPERATURE: 98 F | HEART RATE: 105 BPM | WEIGHT: 190.04 LBS | OXYGEN SATURATION: 99 % | DIASTOLIC BLOOD PRESSURE: 71 MMHG | HEIGHT: 66 IN | RESPIRATION RATE: 18 BRPM | SYSTOLIC BLOOD PRESSURE: 123 MMHG

## 2023-12-12 VITALS
SYSTOLIC BLOOD PRESSURE: 130 MMHG | TEMPERATURE: 98 F | DIASTOLIC BLOOD PRESSURE: 67 MMHG | OXYGEN SATURATION: 97 % | HEART RATE: 100 BPM | RESPIRATION RATE: 18 BRPM

## 2023-12-12 DIAGNOSIS — S09.90XA UNSPECIFIED INJURY OF HEAD, INITIAL ENCOUNTER: ICD-10-CM

## 2023-12-12 DIAGNOSIS — D64.9 ANEMIA, UNSPECIFIED: ICD-10-CM

## 2023-12-12 DIAGNOSIS — E11.9 TYPE 2 DIABETES MELLITUS WITHOUT COMPLICATIONS: ICD-10-CM

## 2023-12-12 DIAGNOSIS — Z91.014 ALLERGY TO MAMMALIAN MEATS: ICD-10-CM
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
## 2023-12-12 DIAGNOSIS — E03.9 HYPOTHYROIDISM, UNSPECIFIED: ICD-10-CM

## 2023-12-12 DIAGNOSIS — Z91.013 ALLERGY TO SEAFOOD: ICD-10-CM

## 2023-12-12 DIAGNOSIS — Z88.4 ALLERGY STATUS TO ANESTHETIC AGENT: ICD-10-CM

## 2023-12-12 DIAGNOSIS — J45.909 UNSPECIFIED ASTHMA, UNCOMPLICATED: ICD-10-CM

## 2023-12-12 DIAGNOSIS — R05.9 COUGH, UNSPECIFIED: ICD-10-CM

## 2023-12-12 DIAGNOSIS — I10 ESSENTIAL (PRIMARY) HYPERTENSION: ICD-10-CM

## 2023-12-12 DIAGNOSIS — F25.9 SCHIZOAFFECTIVE DISORDER, UNSPECIFIED: ICD-10-CM
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
## 2023-12-12 DIAGNOSIS — E78.5 HYPERLIPIDEMIA, UNSPECIFIED: ICD-10-CM

## 2023-12-12 DIAGNOSIS — Z88.8 ALLERGY STATUS TO OTHER DRUGS, MEDICAMENTS AND BIOLOGICAL SUBSTANCES: ICD-10-CM

## 2023-12-12 DIAGNOSIS — Z79.84 LONG TERM (CURRENT) USE OF ORAL HYPOGLYCEMIC DRUGS: ICD-10-CM

## 2023-12-12 DIAGNOSIS — R06.2 WHEEZING: ICD-10-CM

## 2023-12-12 DIAGNOSIS — Z88.0 ALLERGY STATUS TO PENICILLIN: ICD-10-CM

## 2023-12-12 PROBLEM — R93.1 ABNORMAL FINDINGS ON DIAGNOSTIC IMAGING OF HEART AND CORONARY CIRCULATION: Chronic | Status: ACTIVE | Noted: 2023-12-08

## 2023-12-12 LAB
ACANTHOCYTES BLD QL SMEAR: SLIGHT — SIGNIFICANT CHANGE UP
ACANTHOCYTES BLD QL SMEAR: SLIGHT — SIGNIFICANT CHANGE UP
ALBUMIN SERPL ELPH-MCNC: 4 G/DL — SIGNIFICANT CHANGE UP (ref 3.5–5.2)
ALBUMIN SERPL ELPH-MCNC: 4 G/DL — SIGNIFICANT CHANGE UP (ref 3.5–5.2)
ALP SERPL-CCNC: 83 U/L — SIGNIFICANT CHANGE UP (ref 30–115)
ALP SERPL-CCNC: 83 U/L — SIGNIFICANT CHANGE UP (ref 30–115)
ALT FLD-CCNC: <5 U/L — SIGNIFICANT CHANGE UP (ref 0–41)
ALT FLD-CCNC: <5 U/L — SIGNIFICANT CHANGE UP (ref 0–41)
ANION GAP SERPL CALC-SCNC: 11 MMOL/L — SIGNIFICANT CHANGE UP (ref 7–14)
ANION GAP SERPL CALC-SCNC: 11 MMOL/L — SIGNIFICANT CHANGE UP (ref 7–14)
AST SERPL-CCNC: 9 U/L — SIGNIFICANT CHANGE UP (ref 0–41)
AST SERPL-CCNC: 9 U/L — SIGNIFICANT CHANGE UP (ref 0–41)
BASOPHILS # BLD AUTO: 0 K/UL — SIGNIFICANT CHANGE UP (ref 0–0.2)
BASOPHILS # BLD AUTO: 0 K/UL — SIGNIFICANT CHANGE UP (ref 0–0.2)
BASOPHILS NFR BLD AUTO: 0 % — SIGNIFICANT CHANGE UP (ref 0–1)
BASOPHILS NFR BLD AUTO: 0 % — SIGNIFICANT CHANGE UP (ref 0–1)
BILIRUB SERPL-MCNC: <0.2 MG/DL — SIGNIFICANT CHANGE UP (ref 0.2–1.2)
BILIRUB SERPL-MCNC: <0.2 MG/DL — SIGNIFICANT CHANGE UP (ref 0.2–1.2)
BUN SERPL-MCNC: 12 MG/DL — SIGNIFICANT CHANGE UP (ref 10–20)
BUN SERPL-MCNC: 12 MG/DL — SIGNIFICANT CHANGE UP (ref 10–20)
BURR CELLS BLD QL SMEAR: PRESENT — SIGNIFICANT CHANGE UP
BURR CELLS BLD QL SMEAR: PRESENT — SIGNIFICANT CHANGE UP
CALCIUM SERPL-MCNC: 9.7 MG/DL — SIGNIFICANT CHANGE UP (ref 8.4–10.4)
CALCIUM SERPL-MCNC: 9.7 MG/DL — SIGNIFICANT CHANGE UP (ref 8.4–10.4)
CHLORIDE SERPL-SCNC: 101 MMOL/L — SIGNIFICANT CHANGE UP (ref 98–110)
CHLORIDE SERPL-SCNC: 101 MMOL/L — SIGNIFICANT CHANGE UP (ref 98–110)
CO2 SERPL-SCNC: 30 MMOL/L — SIGNIFICANT CHANGE UP (ref 17–32)
CO2 SERPL-SCNC: 30 MMOL/L — SIGNIFICANT CHANGE UP (ref 17–32)
CREAT SERPL-MCNC: 0.7 MG/DL — SIGNIFICANT CHANGE UP (ref 0.7–1.5)
CREAT SERPL-MCNC: 0.7 MG/DL — SIGNIFICANT CHANGE UP (ref 0.7–1.5)
EGFR: 111 ML/MIN/1.73M2 — SIGNIFICANT CHANGE UP
EGFR: 111 ML/MIN/1.73M2 — SIGNIFICANT CHANGE UP
EOSINOPHIL # BLD AUTO: 0.14 K/UL — SIGNIFICANT CHANGE UP (ref 0–0.7)
EOSINOPHIL # BLD AUTO: 0.14 K/UL — SIGNIFICANT CHANGE UP (ref 0–0.7)
EOSINOPHIL NFR BLD AUTO: 1.7 % — SIGNIFICANT CHANGE UP (ref 0–8)
EOSINOPHIL NFR BLD AUTO: 1.7 % — SIGNIFICANT CHANGE UP (ref 0–8)
GLUCOSE SERPL-MCNC: 82 MG/DL — SIGNIFICANT CHANGE UP (ref 70–99)
GLUCOSE SERPL-MCNC: 82 MG/DL — SIGNIFICANT CHANGE UP (ref 70–99)
HCT VFR BLD CALC: 38.3 % — LOW (ref 42–52)
HCT VFR BLD CALC: 38.3 % — LOW (ref 42–52)
HGB BLD-MCNC: 12.1 G/DL — LOW (ref 14–18)
HGB BLD-MCNC: 12.1 G/DL — LOW (ref 14–18)
HYPOCHROMIA BLD QL: SLIGHT — SIGNIFICANT CHANGE UP
HYPOCHROMIA BLD QL: SLIGHT — SIGNIFICANT CHANGE UP
LYMPHOCYTES # BLD AUTO: 2.39 K/UL — SIGNIFICANT CHANGE UP (ref 1.2–3.4)
LYMPHOCYTES # BLD AUTO: 2.39 K/UL — SIGNIFICANT CHANGE UP (ref 1.2–3.4)
LYMPHOCYTES # BLD AUTO: 28.7 % — SIGNIFICANT CHANGE UP (ref 20.5–51.1)
LYMPHOCYTES # BLD AUTO: 28.7 % — SIGNIFICANT CHANGE UP (ref 20.5–51.1)
MAGNESIUM SERPL-MCNC: 2.2 MG/DL — SIGNIFICANT CHANGE UP (ref 1.8–2.4)
MAGNESIUM SERPL-MCNC: 2.2 MG/DL — SIGNIFICANT CHANGE UP (ref 1.8–2.4)
MANUAL SMEAR VERIFICATION: SIGNIFICANT CHANGE UP
MANUAL SMEAR VERIFICATION: SIGNIFICANT CHANGE UP
MCHC RBC-ENTMCNC: 26 PG — LOW (ref 27–31)
MCHC RBC-ENTMCNC: 26 PG — LOW (ref 27–31)
MCHC RBC-ENTMCNC: 31.6 G/DL — LOW (ref 32–37)
MCHC RBC-ENTMCNC: 31.6 G/DL — LOW (ref 32–37)
MCV RBC AUTO: 82.2 FL — SIGNIFICANT CHANGE UP (ref 80–94)
MCV RBC AUTO: 82.2 FL — SIGNIFICANT CHANGE UP (ref 80–94)
MONOCYTES # BLD AUTO: 0.72 K/UL — HIGH (ref 0.1–0.6)
MONOCYTES # BLD AUTO: 0.72 K/UL — HIGH (ref 0.1–0.6)
MONOCYTES NFR BLD AUTO: 8.7 % — SIGNIFICANT CHANGE UP (ref 1.7–9.3)
MONOCYTES NFR BLD AUTO: 8.7 % — SIGNIFICANT CHANGE UP (ref 1.7–9.3)
NEUTROPHILS # BLD AUTO: 4.48 K/UL — SIGNIFICANT CHANGE UP (ref 1.4–6.5)
NEUTROPHILS # BLD AUTO: 4.48 K/UL — SIGNIFICANT CHANGE UP (ref 1.4–6.5)
NEUTROPHILS NFR BLD AUTO: 53.9 % — SIGNIFICANT CHANGE UP (ref 42.2–75.2)
NEUTROPHILS NFR BLD AUTO: 53.9 % — SIGNIFICANT CHANGE UP (ref 42.2–75.2)
PLAT MORPH BLD: NORMAL — SIGNIFICANT CHANGE UP
PLAT MORPH BLD: NORMAL — SIGNIFICANT CHANGE UP
PLATELET # BLD AUTO: 326 K/UL — SIGNIFICANT CHANGE UP (ref 130–400)
PLATELET # BLD AUTO: 326 K/UL — SIGNIFICANT CHANGE UP (ref 130–400)
PMV BLD: 9.7 FL — SIGNIFICANT CHANGE UP (ref 7.4–10.4)
PMV BLD: 9.7 FL — SIGNIFICANT CHANGE UP (ref 7.4–10.4)
POIKILOCYTOSIS BLD QL AUTO: SLIGHT — SIGNIFICANT CHANGE UP
POIKILOCYTOSIS BLD QL AUTO: SLIGHT — SIGNIFICANT CHANGE UP
POLYCHROMASIA BLD QL SMEAR: SLIGHT — SIGNIFICANT CHANGE UP
POLYCHROMASIA BLD QL SMEAR: SLIGHT — SIGNIFICANT CHANGE UP
POTASSIUM SERPL-MCNC: 4.4 MMOL/L — SIGNIFICANT CHANGE UP (ref 3.5–5)
POTASSIUM SERPL-MCNC: 4.4 MMOL/L — SIGNIFICANT CHANGE UP (ref 3.5–5)
POTASSIUM SERPL-SCNC: 4.4 MMOL/L — SIGNIFICANT CHANGE UP (ref 3.5–5)
POTASSIUM SERPL-SCNC: 4.4 MMOL/L — SIGNIFICANT CHANGE UP (ref 3.5–5)
PROT SERPL-MCNC: 7.6 G/DL — SIGNIFICANT CHANGE UP (ref 6–8)
PROT SERPL-MCNC: 7.6 G/DL — SIGNIFICANT CHANGE UP (ref 6–8)
RBC # BLD: 4.66 M/UL — LOW (ref 4.7–6.1)
RBC # BLD: 4.66 M/UL — LOW (ref 4.7–6.1)
RBC # FLD: 17.4 % — HIGH (ref 11.5–14.5)
RBC # FLD: 17.4 % — HIGH (ref 11.5–14.5)
RBC BLD AUTO: ABNORMAL
RBC BLD AUTO: ABNORMAL
SODIUM SERPL-SCNC: 142 MMOL/L — SIGNIFICANT CHANGE UP (ref 135–146)
SODIUM SERPL-SCNC: 142 MMOL/L — SIGNIFICANT CHANGE UP (ref 135–146)
TARGETS BLD QL SMEAR: SLIGHT — SIGNIFICANT CHANGE UP
TARGETS BLD QL SMEAR: SLIGHT — SIGNIFICANT CHANGE UP
TROPONIN T SERPL-MCNC: <0.01 NG/ML — SIGNIFICANT CHANGE UP
TROPONIN T SERPL-MCNC: <0.01 NG/ML — SIGNIFICANT CHANGE UP
VARIANT LYMPHS # BLD: 7 % — HIGH (ref 0–5)
VARIANT LYMPHS # BLD: 7 % — HIGH (ref 0–5)
WBC # BLD: 8.32 K/UL — SIGNIFICANT CHANGE UP (ref 4.8–10.8)
WBC # BLD: 8.32 K/UL — SIGNIFICANT CHANGE UP (ref 4.8–10.8)
WBC # FLD AUTO: 8.32 K/UL — SIGNIFICANT CHANGE UP (ref 4.8–10.8)
WBC # FLD AUTO: 8.32 K/UL — SIGNIFICANT CHANGE UP (ref 4.8–10.8)

## 2023-12-12 PROCEDURE — 93010 ELECTROCARDIOGRAM REPORT: CPT

## 2023-12-12 PROCEDURE — 93005 ELECTROCARDIOGRAM TRACING: CPT

## 2023-12-12 PROCEDURE — 72125 CT NECK SPINE W/O DYE: CPT | Mod: 26,MA

## 2023-12-12 PROCEDURE — 84484 ASSAY OF TROPONIN QUANT: CPT

## 2023-12-12 PROCEDURE — 99285 EMERGENCY DEPT VISIT HI MDM: CPT

## 2023-12-12 PROCEDURE — 99285 EMERGENCY DEPT VISIT HI MDM: CPT | Mod: 25

## 2023-12-12 PROCEDURE — 71045 X-RAY EXAM CHEST 1 VIEW: CPT

## 2023-12-12 PROCEDURE — 70450 CT HEAD/BRAIN W/O DYE: CPT | Mod: MA

## 2023-12-12 PROCEDURE — 83735 ASSAY OF MAGNESIUM: CPT

## 2023-12-12 PROCEDURE — 36415 COLL VENOUS BLD VENIPUNCTURE: CPT

## 2023-12-12 PROCEDURE — 70450 CT HEAD/BRAIN W/O DYE: CPT | Mod: 26,MA

## 2023-12-12 PROCEDURE — 94640 AIRWAY INHALATION TREATMENT: CPT

## 2023-12-12 PROCEDURE — 80053 COMPREHEN METABOLIC PANEL: CPT

## 2023-12-12 PROCEDURE — 72125 CT NECK SPINE W/O DYE: CPT | Mod: MA

## 2023-12-12 PROCEDURE — 71045 X-RAY EXAM CHEST 1 VIEW: CPT | Mod: 26

## 2023-12-12 PROCEDURE — 85025 COMPLETE CBC W/AUTO DIFF WBC: CPT

## 2023-12-12 RX ORDER — ALBUTEROL 90 UG/1
1 AEROSOL, METERED ORAL ONCE
Refills: 0 | Status: COMPLETED | OUTPATIENT
Start: 2023-12-12 | End: 2023-12-12

## 2023-12-12 RX ORDER — IPRATROPIUM/ALBUTEROL SULFATE 18-103MCG
3 AEROSOL WITH ADAPTER (GRAM) INHALATION ONCE
Refills: 0 | Status: COMPLETED | OUTPATIENT
Start: 2023-12-12 | End: 2023-12-12

## 2023-12-12 RX ORDER — SODIUM CHLORIDE 9 MG/ML
500 INJECTION, SOLUTION INTRAVENOUS
Refills: 0 | Status: DISCONTINUED | OUTPATIENT
Start: 2023-12-12 | End: 2023-12-12

## 2023-12-12 RX ADMIN — Medication 3 MILLILITER(S): at 21:13

## 2023-12-12 RX ADMIN — ALBUTEROL 1 PUFF(S): 90 AEROSOL, METERED ORAL at 20:31

## 2023-12-12 NOTE — ED ADULT NURSE REASSESSMENT NOTE - NS ED NURSE REASSESS COMMENT FT1
Fall precautions placed on pt for safety. Fall alarm audible, fall bracelet placed, red socks placed. Pt educated on safety and to ask when needing to ambulate. Pt also placed near nurses desk for safety

## 2023-12-12 NOTE — ED PROVIDER NOTE - CLINICAL SUMMARY MEDICAL DECISION MAKING FREE TEXT BOX
53-year-old male with history of HTN, HLD, DM, hypothyroid, schizoaffective disorder, sent from [facility] for evaluation s/p fall earlier today.  Patient was recently admitted to hospital 4 days ago for pneumonia, was d/c'd living facility yesterday.  Patient states he lost his balance and fell, hit head on wall.  Denies any LOC. Denies any preceding dizziness/lightheadedness, no weakness.  Denies any CP/SOB.  No abdominal pain.  Denies any fever.  + Normal p.o. intake.  No extremity pain/injury.  Nonmeat AC meds.  PE - nad, nc/at, eomi, perrl, op - clear, mmm, neck supple, cta b/l, no w/r/r, rrr, abd- soft, nt/nd, nabs, from x 4, no LE swelling/tenderness, A&O x 3, cn 2-12 intact, no focal motor/sensory deficits.   -Labs reviewed, CBC with mild anemia (Hgb 12.1, otherwise unremarkable, CMP unremarkable.  CT head/C-spine negative for acute injury.  CXR negative.  While in ER patient had episode of coughing/wheezing, given nebulizer.  Patient has h/o asthma, was treated with nebs while in hospital.  On reevaluation, feeling much better.  To DC back to living facility.  Told return to ER for any new/concerning symptoms.  Patient understands agrees with plan. 53-year-old male with history of HTN, HLD, DM, hypothyroid, schizoaffective disorder, sent from [facility] for evaluation s/p fall earlier today.  Patient was recently admitted to hospital 4 days ago for pneumonia, was d/c'd living facility yesterday.  Patient states he lost his balance and fell, hit head on wall.  Denies any LOC. Denies any preceding dizziness/lightheadedness, no weakness.  Denies any CP/SOB.  No abdominal pain.  Denies any fever.  + Normal p.o. intake.  No extremity pain/injury.  Nonmeat AC meds.  PE - nad, nc/at, eomi, perrl, op - clear, mmm, neck supple, cta b/l, no w/r/r, rrr, abd- soft, nt/nd, nabs, from x 4, no LE swelling/tenderness, A&O x 3, cn 2-12 intact, no focal motor/sensory deficits.   -Labs reviewed, CBC with mild anemia (Hgb 12.1, otherwise unremarkable, CMP unremarkable.  CT head/C-spine negative for acute injury.  CXR negative.  While in ER patient had episode of coughing/wheezing, given nebulizer.  Patient has h/o asthma, was treated with nebs while in hospital.  On reevaluation, feeling much better.  To DC back to living facility.  Told return to ER for any new/concerning symptoms.  Patient understands agrees with plan.

## 2023-12-12 NOTE — ED PROVIDER NOTE - PATIENT PORTAL LINK FT
You can access the FollowMyHealth Patient Portal offered by [facility] by registering at the following website: [email or web address]. By joining [facility]’s FollowMyHealth portal, you will also be able to view your health information using other applications (apps) compatible with our system. You can access the FollowMyHealth Patient Portal offered by [facility] by registering at the following website: [email or web address]. By joining [facility]’s FollowMyHealth portal, you will also be able to view your health information using other applications (apps) compatible with our system.

## 2023-12-12 NOTE — ED PROVIDER NOTE - OBJECTIVE STATEMENT
53 year old M from [facility], non smoker, DM on metformin, schizophrenia, htn, hld, recent admission for pneumonia presenting to er for eval of head injury. As per staff pt lost balance fell and hit head into wall. No loc. Pt denies any acute complaints in ed. No assoc dizziness, chest pain, sob, visual changes, extremity weakness/numbness, voice changes, inability to ambulate. 53 year old M from [facility], non smoker, DM on metformin, schizophrenia, htn, hld, recent admission for pneumonia presenting to er for eval of head injury. As per staff pt lost balance fell and hit head into wall. No loc. Pt denies any acute complaints in ed. No assoc dizziness, chest pain, sob, visual changes, extremity weakness/numbness, voice changes, inability to ambulate.

## 2023-12-12 NOTE — ED ADULT NURSE NOTE - NSFALLRISKINTERV_ED_ALL_ED
Communicate fall risk and risk factors to all staff, patient, and family/Provide visual cue: yellow wristband, yellow gown, etc/Reinforce activity limits and safety measures with patient and family/Call bell, personal items and telephone in reach/Instruct patient to call for assistance before getting out of bed/chair/stretcher/Non-slip footwear applied when patient is off stretcher/[place] to call system/Physically safe environment - no spills, clutter or unnecessary equipment/Purposeful Proactive Rounding/Room/bathroom lighting operational, light cord in reach Communicate fall risk and risk factors to all staff, patient, and family/Provide visual cue: yellow wristband, yellow gown, etc/Reinforce activity limits and safety measures with patient and family/Call bell, personal items and telephone in reach/Instruct patient to call for assistance before getting out of bed/chair/stretcher/Non-slip footwear applied when patient is off stretcher/[place] to call system/Physically safe environment - no spills, clutter or unnecessary equipment/Purposeful Proactive Rounding/Room/bathroom lighting operational, light cord in reach

## 2023-12-12 NOTE — ED PROVIDER NOTE - NSFOLLOWUPINSTRUCTIONS_ED_ALL_ED_FT
Closed Head Injury    Closed head injury in an injury to your head that may or may not involve a traumatic brain injury (TBI). Symptoms of TBI can be short or long lasting and include headache, dizziness, interference with memory or speech, fatigue, confusion, changes in sleep, mood changes, nausea, depression/anxiety, and dulling of senses. Make sure to obtain proper rest which includes getting plenty of sleep, avoiding excessive visual stimulation, and avoiding activities that may cause physical or mental stress. Avoid any situation where there is potential for another head injury including sports.    SEEK MEDICAL CARE IF YOU HAVE THE FOLLOWING SYMPTOMS: unusual drowsiness, vomiting, severe dizziness, seizures, lightheadedness, muscular weakness, different pupil sizes, visual changes, or clear or bloody discharge from your ears or nose.    Please follow up with your primary care doctor within one week.

## 2023-12-12 NOTE — ED PROVIDER NOTE - PHYSICAL EXAMINATION
CONSTITUTIONAL: Well-appearing; well-nourished; in no apparent distress.   EYES: PERRL; EOM intact.   ENT: normal nose; no rhinorrhea; normal pharynx with no tonsillar hypertrophy.   NECK: Supple; non-tender; no cervical lymphadenopathy.   CARDIOVASCULAR: Normal S1, S2; no murmurs, rubs, or gallops. Equal radial pulses  RESPIRATORY: Normal chest excursion with respiration; breath sounds clear and equal bilaterally; no wheezes, rhonchi, or rales.  GI/: Normal bowel sounds; non-distended; non-tender; no palpable organomegaly.   MS: No evidence of trauma or deformity. Normal ROM in all four extremities; non-tender to palpation; distal pulses are normal.   SKIN: Normal for age and race; warm; dry; good turgor; no apparent lesions or exudate.   NEURO/PSYCH: A & O x 4; grossly unremarkable. mood and manner are appropriate. No focal deficits. No facial droop. No tongue deviation. Cerebellar intact. Sensation intact. Strength equal bl/ 5/5 throughout.

## 2023-12-13 LAB
CULTURE RESULTS: SIGNIFICANT CHANGE UP
SPECIMEN SOURCE: SIGNIFICANT CHANGE UP

## 2023-12-15 DIAGNOSIS — J12.9 VIRAL PNEUMONIA, UNSPECIFIED: ICD-10-CM

## 2023-12-15 DIAGNOSIS — I50.9 HEART FAILURE, UNSPECIFIED: ICD-10-CM

## 2023-12-15 DIAGNOSIS — E11.9 TYPE 2 DIABETES MELLITUS WITHOUT COMPLICATIONS: ICD-10-CM

## 2023-12-15 DIAGNOSIS — Z88.8 ALLERGY STATUS TO OTHER DRUGS, MEDICAMENTS AND BIOLOGICAL SUBSTANCES: ICD-10-CM

## 2023-12-15 DIAGNOSIS — J20.8 ACUTE BRONCHITIS DUE TO OTHER SPECIFIED ORGANISMS: ICD-10-CM

## 2023-12-15 DIAGNOSIS — A41.89 OTHER SPECIFIED SEPSIS: ICD-10-CM

## 2023-12-15 DIAGNOSIS — Z79.890 HORMONE REPLACEMENT THERAPY: ICD-10-CM

## 2023-12-15 DIAGNOSIS — F20.9 SCHIZOPHRENIA, UNSPECIFIED: ICD-10-CM

## 2023-12-15 DIAGNOSIS — Z79.84 LONG TERM (CURRENT) USE OF ORAL HYPOGLYCEMIC DRUGS: ICD-10-CM

## 2023-12-15 DIAGNOSIS — Z11.52 ENCOUNTER FOR SCREENING FOR COVID-19: ICD-10-CM

## 2023-12-15 DIAGNOSIS — J06.9 ACUTE UPPER RESPIRATORY INFECTION, UNSPECIFIED: ICD-10-CM

## 2023-12-15 DIAGNOSIS — I11.0 HYPERTENSIVE HEART DISEASE WITH HEART FAILURE: ICD-10-CM

## 2023-12-15 DIAGNOSIS — E78.5 HYPERLIPIDEMIA, UNSPECIFIED: ICD-10-CM

## 2023-12-15 DIAGNOSIS — Z88.0 ALLERGY STATUS TO PENICILLIN: ICD-10-CM

## 2023-12-15 DIAGNOSIS — E03.9 HYPOTHYROIDISM, UNSPECIFIED: ICD-10-CM

## 2023-12-20 NOTE — ED BEHAVIORAL HEALTH ASSESSMENT NOTE - PAST PSYCHOTROPIC MEDICATION
Detail Level: Zone
Render In Strict Bullet Format?: No
Initiate Treatment: hydrocortisone 2.5 % topical ointment Apply to AA on lip BID for up to 2 weeks, then take a 2 week break. Can repeat cycle for flares. Vaseline on top.
Per chart - fluphenazine decanoate

## 2023-12-27 NOTE — ED PROVIDER NOTE - PROGRESS NOTE DETAILS
Appears very well, states he is feeling much better, ate in ED, eager to go home.  Advised to follow up with GI. 27-Dec-2023 16:54

## 2024-01-09 NOTE — ED PROVIDER NOTE - NSTIMEPROVIDERCAREINITIATE_GEN_ER
Abdomen , soft, nontender, nondistended , no guarding or rigidity , no masses palpable , normal bowel sounds , Liver and Spleen , no hepatomegaly present , no hepatosplenomegaly , liver nontender , spleen not palpable
10-Dec-2022 16:17

## 2024-01-21 ENCOUNTER — EMERGENCY (EMERGENCY)
Facility: HOSPITAL | Age: 54
LOS: 0 days | Discharge: HOME | End: 2024-01-22
Attending: STUDENT IN AN ORGANIZED HEALTH CARE EDUCATION/TRAINING PROGRAM
Payer: MEDICAID

## 2024-01-21 VITALS
SYSTOLIC BLOOD PRESSURE: 139 MMHG | DIASTOLIC BLOOD PRESSURE: 85 MMHG | OXYGEN SATURATION: 100 % | TEMPERATURE: 99 F | HEART RATE: 106 BPM | HEIGHT: 66 IN | WEIGHT: 169.98 LBS | RESPIRATION RATE: 16 BRPM

## 2024-01-21 DIAGNOSIS — F20.9 SCHIZOPHRENIA, UNSPECIFIED: ICD-10-CM

## 2024-01-21 DIAGNOSIS — R44.0 AUDITORY HALLUCINATIONS: ICD-10-CM

## 2024-01-21 DIAGNOSIS — Z88.0 ALLERGY STATUS TO PENICILLIN: ICD-10-CM

## 2024-01-21 DIAGNOSIS — Z88.8 ALLERGY STATUS TO OTHER DRUGS, MEDICAMENTS AND BIOLOGICAL SUBSTANCES: ICD-10-CM

## 2024-01-21 DIAGNOSIS — E11.9 TYPE 2 DIABETES MELLITUS WITHOUT COMPLICATIONS: ICD-10-CM

## 2024-01-21 DIAGNOSIS — I10 ESSENTIAL (PRIMARY) HYPERTENSION: ICD-10-CM

## 2024-01-21 DIAGNOSIS — Z91.014 ALLERGY TO MAMMALIAN MEATS: ICD-10-CM

## 2024-01-21 DIAGNOSIS — R45.851 SUICIDAL IDEATIONS: ICD-10-CM

## 2024-01-21 DIAGNOSIS — Z91.013 ALLERGY TO SEAFOOD: ICD-10-CM

## 2024-01-21 DIAGNOSIS — E78.5 HYPERLIPIDEMIA, UNSPECIFIED: ICD-10-CM

## 2024-01-21 DIAGNOSIS — Z88.4 ALLERGY STATUS TO ANESTHETIC AGENT: ICD-10-CM

## 2024-01-21 LAB
ALBUMIN SERPL ELPH-MCNC: 4 G/DL — SIGNIFICANT CHANGE UP (ref 3.5–5.2)
ALP SERPL-CCNC: 78 U/L — SIGNIFICANT CHANGE UP (ref 30–115)
ALT FLD-CCNC: <5 U/L — SIGNIFICANT CHANGE UP (ref 0–41)
ANION GAP SERPL CALC-SCNC: 13 MMOL/L — SIGNIFICANT CHANGE UP (ref 7–14)
AST SERPL-CCNC: 9 U/L — SIGNIFICANT CHANGE UP (ref 0–41)
BASOPHILS # BLD AUTO: 0.01 K/UL — SIGNIFICANT CHANGE UP (ref 0–0.2)
BASOPHILS NFR BLD AUTO: 0.2 % — SIGNIFICANT CHANGE UP (ref 0–1)
BILIRUB SERPL-MCNC: <0.2 MG/DL — SIGNIFICANT CHANGE UP (ref 0.2–1.2)
BUN SERPL-MCNC: 9 MG/DL — LOW (ref 10–20)
CALCIUM SERPL-MCNC: 9.6 MG/DL — SIGNIFICANT CHANGE UP (ref 8.4–10.5)
CHLORIDE SERPL-SCNC: 103 MMOL/L — SIGNIFICANT CHANGE UP (ref 98–110)
CO2 SERPL-SCNC: 24 MMOL/L — SIGNIFICANT CHANGE UP (ref 17–32)
CREAT SERPL-MCNC: 0.7 MG/DL — SIGNIFICANT CHANGE UP (ref 0.7–1.5)
EGFR: 111 ML/MIN/1.73M2 — SIGNIFICANT CHANGE UP
EOSINOPHIL # BLD AUTO: 0.1 K/UL — SIGNIFICANT CHANGE UP (ref 0–0.7)
EOSINOPHIL NFR BLD AUTO: 1.8 % — SIGNIFICANT CHANGE UP (ref 0–8)
ETHANOL SERPL-MCNC: <10 MG/DL — SIGNIFICANT CHANGE UP
GLUCOSE SERPL-MCNC: 145 MG/DL — HIGH (ref 70–99)
HCT VFR BLD CALC: 33.6 % — LOW (ref 42–52)
HGB BLD-MCNC: 10.5 G/DL — LOW (ref 14–18)
IMM GRANULOCYTES NFR BLD AUTO: 0.2 % — SIGNIFICANT CHANGE UP (ref 0.1–0.3)
LYMPHOCYTES # BLD AUTO: 2.36 K/UL — SIGNIFICANT CHANGE UP (ref 1.2–3.4)
LYMPHOCYTES # BLD AUTO: 42 % — SIGNIFICANT CHANGE UP (ref 20.5–51.1)
MCHC RBC-ENTMCNC: 25.4 PG — LOW (ref 27–31)
MCHC RBC-ENTMCNC: 31.3 G/DL — LOW (ref 32–37)
MCV RBC AUTO: 81.2 FL — SIGNIFICANT CHANGE UP (ref 80–94)
MONOCYTES # BLD AUTO: 0.42 K/UL — SIGNIFICANT CHANGE UP (ref 0.1–0.6)
MONOCYTES NFR BLD AUTO: 7.5 % — SIGNIFICANT CHANGE UP (ref 1.7–9.3)
NEUTROPHILS # BLD AUTO: 2.72 K/UL — SIGNIFICANT CHANGE UP (ref 1.4–6.5)
NEUTROPHILS NFR BLD AUTO: 48.3 % — SIGNIFICANT CHANGE UP (ref 42.2–75.2)
NRBC # BLD: 0 /100 WBCS — SIGNIFICANT CHANGE UP (ref 0–0)
PLATELET # BLD AUTO: 294 K/UL — SIGNIFICANT CHANGE UP (ref 130–400)
PMV BLD: 10.5 FL — HIGH (ref 7.4–10.4)
POTASSIUM SERPL-MCNC: 4 MMOL/L — SIGNIFICANT CHANGE UP (ref 3.5–5)
POTASSIUM SERPL-SCNC: 4 MMOL/L — SIGNIFICANT CHANGE UP (ref 3.5–5)
PROT SERPL-MCNC: 6.9 G/DL — SIGNIFICANT CHANGE UP (ref 6–8)
RBC # BLD: 4.14 M/UL — LOW (ref 4.7–6.1)
RBC # FLD: 17.5 % — HIGH (ref 11.5–14.5)
SODIUM SERPL-SCNC: 140 MMOL/L — SIGNIFICANT CHANGE UP (ref 135–146)
WBC # BLD: 5.62 K/UL — SIGNIFICANT CHANGE UP (ref 4.8–10.8)
WBC # FLD AUTO: 5.62 K/UL — SIGNIFICANT CHANGE UP (ref 4.8–10.8)

## 2024-01-21 PROCEDURE — 80053 COMPREHEN METABOLIC PANEL: CPT

## 2024-01-21 PROCEDURE — 80307 DRUG TEST PRSMV CHEM ANLYZR: CPT

## 2024-01-21 PROCEDURE — 99283 EMERGENCY DEPT VISIT LOW MDM: CPT

## 2024-01-21 PROCEDURE — 93005 ELECTROCARDIOGRAM TRACING: CPT

## 2024-01-21 PROCEDURE — 99285 EMERGENCY DEPT VISIT HI MDM: CPT

## 2024-01-21 PROCEDURE — 36415 COLL VENOUS BLD VENIPUNCTURE: CPT

## 2024-01-21 PROCEDURE — G0378: CPT

## 2024-01-21 PROCEDURE — 85025 COMPLETE CBC W/AUTO DIFF WBC: CPT

## 2024-01-21 NOTE — ED PROVIDER NOTE - OBJECTIVE STATEMENT
Patient is a 53-year-old male past medical history of schizophrenia, DM, hypertension, hyperlipidemia presenting from [facility] for evaluation of suicidal ideations.  Patient states that he is having auditory hallucinations and suicidal ideations with a plan to jump in front of a car.  Patient denies any homicidal ideations or substance use including alcohol and other drugs.  Patient denies any fevers, chills, chest pain, shortness of breath, abdominal pain.

## 2024-01-21 NOTE — ED PROVIDER NOTE - PHYSICAL EXAMINATION
VITAL SIGNS: I have reviewed nursing notes and confirm.  CONSTITUTIONAL: well-appearing, non-toxic, NAD  SKIN: Warm dry, normal skin turgor  HEAD: NCAT  EYES: EOMI, PERRLA, no scleral icterus  ENT: Moist mucous membranes, normal pharynx with no erythema or exudates  NECK: Supple; non tender.   CARD: RRR, no murmurs, rubs or gallops  RESP: clear to ausculation b/l.  No rales, rhonchi, or wheezing.  ABD: soft, non-tender, non-distended, no rebound or guarding.   EXT: Full ROM, no pedal edema.  NEURO: normal motor. normal sensory.   PSYCH: Cooperative, flat affect.

## 2024-01-21 NOTE — ED PROVIDER NOTE - PROGRESS NOTE DETAILS
MS–consult placed to telepsychiatry who will evaluate the patient. Patient laying in stretcher comfortably, eating.  No complaints at this time.

## 2024-01-21 NOTE — ED PROVIDER NOTE - CLINICAL SUMMARY MEDICAL DECISION MAKING FREE TEXT BOX
54 yo man w/ DM, HTN, HLD, schizophrenia here w/ SI  states + audiroty hallucinations and thoughts of wanting to jump in front of a car  no other complaints    wd/wn  + internall stimulated  rrr s1s2 wnl  cta b/l  alert, oriented  neuro intact    54 yo man w/ SI  labs, psych consult

## 2024-01-22 VITALS
OXYGEN SATURATION: 100 % | RESPIRATION RATE: 18 BRPM | SYSTOLIC BLOOD PRESSURE: 125 MMHG | DIASTOLIC BLOOD PRESSURE: 78 MMHG | HEART RATE: 101 BPM | TEMPERATURE: 98 F

## 2024-01-22 DIAGNOSIS — F20.9 SCHIZOPHRENIA, UNSPECIFIED: ICD-10-CM

## 2024-01-22 LAB
APAP SERPL-MCNC: <5 UG/ML — LOW (ref 10–30)
SALICYLATES SERPL-MCNC: <0.3 MG/DL — LOW (ref 4–30)

## 2024-01-22 PROCEDURE — 99223 1ST HOSP IP/OBS HIGH 75: CPT

## 2024-01-22 PROCEDURE — 90792 PSYCH DIAG EVAL W/MED SRVCS: CPT | Mod: 95

## 2024-01-22 PROCEDURE — 93010 ELECTROCARDIOGRAM REPORT: CPT

## 2024-01-22 RX ORDER — DIVALPROEX SODIUM 500 MG/1
500 TABLET, DELAYED RELEASE ORAL
Refills: 0 | Status: DISCONTINUED | OUTPATIENT
Start: 2024-01-22 | End: 2024-01-22

## 2024-01-22 RX ORDER — BENZTROPINE MESYLATE 1 MG
1 TABLET ORAL ONCE
Refills: 0 | Status: COMPLETED | OUTPATIENT
Start: 2024-01-22 | End: 2024-01-22

## 2024-01-22 RX ORDER — ATORVASTATIN CALCIUM 80 MG/1
40 TABLET, FILM COATED ORAL AT BEDTIME
Refills: 0 | Status: DISCONTINUED | OUTPATIENT
Start: 2024-01-22 | End: 2024-01-22

## 2024-01-22 RX ORDER — BENZTROPINE MESYLATE 1 MG
1 TABLET ORAL
Refills: 0 | Status: DISCONTINUED | OUTPATIENT
Start: 2024-01-22 | End: 2024-01-22

## 2024-01-22 RX ORDER — LEVOTHYROXINE SODIUM 125 MCG
25 TABLET ORAL DAILY
Refills: 0 | Status: DISCONTINUED | OUTPATIENT
Start: 2024-01-22 | End: 2024-01-22

## 2024-01-22 RX ORDER — DIVALPROEX SODIUM 500 MG/1
500 TABLET, DELAYED RELEASE ORAL ONCE
Refills: 0 | Status: COMPLETED | OUTPATIENT
Start: 2024-01-22 | End: 2024-01-22

## 2024-01-22 RX ORDER — METFORMIN HYDROCHLORIDE 850 MG/1
1000 TABLET ORAL ONCE
Refills: 0 | Status: COMPLETED | OUTPATIENT
Start: 2024-01-22 | End: 2024-01-22

## 2024-01-22 RX ORDER — METFORMIN HYDROCHLORIDE 850 MG/1
1000 TABLET ORAL
Refills: 0 | Status: DISCONTINUED | OUTPATIENT
Start: 2024-01-22 | End: 2024-01-22

## 2024-01-22 RX ORDER — DIVALPROEX SODIUM 500 MG/1
250 TABLET, DELAYED RELEASE ORAL AT BEDTIME
Refills: 0 | Status: DISCONTINUED | OUTPATIENT
Start: 2024-01-22 | End: 2024-01-22

## 2024-01-22 RX ORDER — CLOZAPINE 150 MG/1
100 TABLET, ORALLY DISINTEGRATING ORAL ONCE
Refills: 0 | Status: COMPLETED | OUTPATIENT
Start: 2024-01-22 | End: 2024-01-22

## 2024-01-22 RX ORDER — CLOZAPINE 150 MG/1
100 TABLET, ORALLY DISINTEGRATING ORAL
Refills: 0 | Status: DISCONTINUED | OUTPATIENT
Start: 2024-01-22 | End: 2024-01-22

## 2024-01-22 RX ORDER — METOPROLOL TARTRATE 50 MG
25 TABLET ORAL DAILY
Refills: 0 | Status: DISCONTINUED | OUTPATIENT
Start: 2024-01-22 | End: 2024-01-22

## 2024-01-22 RX ORDER — ACETAMINOPHEN 500 MG
650 TABLET ORAL EVERY 6 HOURS
Refills: 0 | Status: DISCONTINUED | OUTPATIENT
Start: 2024-01-22 | End: 2024-01-22

## 2024-01-22 RX ADMIN — CLOZAPINE 100 MILLIGRAM(S): 150 TABLET, ORALLY DISINTEGRATING ORAL at 12:19

## 2024-01-22 RX ADMIN — DIVALPROEX SODIUM 500 MILLIGRAM(S): 500 TABLET, DELAYED RELEASE ORAL at 12:27

## 2024-01-22 RX ADMIN — Medication 1 MILLIGRAM(S): at 11:10

## 2024-01-22 RX ADMIN — METFORMIN HYDROCHLORIDE 1000 MILLIGRAM(S): 850 TABLET ORAL at 11:10

## 2024-01-22 RX ADMIN — Medication 25 MILLIGRAM(S): at 11:10

## 2024-01-22 RX ADMIN — Medication 25 MICROGRAM(S): at 11:10

## 2024-01-22 NOTE — ED CDU PROVIDER INITIAL DAY NOTE - OBJECTIVE STATEMENT
Patient is a 53-year-old male past medical history of schizophrenia, DM, hypertension, hyperlipidemia presenting from [facility] for evaluation of suicidal ideations.  Patient states that he is having auditory hallucinations and suicidal ideations with a plan to jump in front of a car.  Patient denies any homicidal ideations or substance use including alcohol and other drugs.  Patient denies any fevers, chills, chest pain, shortness of breath, abdominal pain.

## 2024-01-22 NOTE — ED CDU PROVIDER INITIAL DAY NOTE - PHYSICAL EXAMINATION
CONSTITUTIONAL: well-appearing, non-toxic, NAD  SKIN: Warm dry, normal skin turgor  HEAD: NCAT  EYES: EOMI, PERRLA, no scleral icterus  ENT: Moist mucous membranes, normal pharynx with no erythema or exudates  NECK: Supple; non tender.   CARD: RRR, no murmurs, rubs or gallops  RESP: clear to ausculation b/l.  No rales, rhonchi, or wheezing.  ABD: soft, non-tender, non-distended, no rebound or guarding.   EXT: Full ROM, no pedal edema.  NEURO: normal motor. normal sensory.   PSYCH: Cooperative, flat affect.

## 2024-01-22 NOTE — ED CDU PROVIDER DISPOSITION NOTE - NSFOLLOWUPINSTRUCTIONS_ED_ALL_ED_FT
Suicidal Feelings: How to Help Yourself  Suicide is when you end your own life. There are many things you can do to help yourself feel better when struggling with these feelings. Many services and people are available to support you and others who struggle with similar feelings.     If you ever feel like you may hurt yourself or others, or have thoughts about taking your own life, get help right away. To get help:   Call your local emergency services (911 in the [place]   Go to your nearest emergency department.   Call a suicide hotline to speak with a trained counselor. The following suicide hotlines are available in the [place]:  9-422-562-TALK (1-556.908.3630).  4-136-LAZLION (1-857.994.8407).  1-707.922.3714. This is a hotline for Nepali speakers.  1-758.865.9595. This is a hotline for TTY users.  8-741-3-U-PATRICK (1-587.952.2784). This is a hotline for lesbian, mcneil, bisexual, transgender, or questioning youth.  For a list of hotlines in [place], visit www.suicide.org/hotlines/international/gfxptk-uebpapx-rmakqqaz.html  Contact a crisis center or a local suicide prevention center. To find a crisis center or suicide prevention center:  Call your local hospital, clinic, community service organization, mental health center, social service provider, or health department. Ask for help with connecting to a crisis center.  For a list of crisis centers in the [place], visit: suicidepreventionlifeline.org  For a list of crisis centers in [place], visit: suicideprevention.ca  How to help yourself feel better  Image   Promise yourself that you will not do anything extreme when you have suicidal feelings. Remember, there is hope. Many people have gotten through suicidal thoughts and feelings, and you can too. If you have had these feelings before, remind yourself that you can get through them again.  Let family, friends, teachers, or counselors know how you are feeling. Try not to separate yourself from those who care about you and want to help you. Talk with someone every day, even if you do not feel sociable. Face-to-face conversation is best to help them understand your feelings.  Contact a mental health care provider and work with this person regularly.  Make a safety plan that you can follow during a crisis. Include phone numbers of suicide prevention hotlines, mental health professionals, and trusted friends and family members you can call during an emergency. Save these numbers on your phone.  If you are thinking of taking a lot of medicine, give your medicine to someone who can give it to you as prescribed. If you are on antidepressants and are concerned you will overdose, tell your health care provider so that he or she can give you safer medicines.  Try to stick to your routines. Follow a schedule every day. Make self-care a priority.  Make a list of realistic goals, and cross them off when you achieve them. Accomplishments can give you a sense of worth.  Wait until you are feeling better before doing things that you find difficult or unpleasant.  Do things that you have always enjoyed to take your mind off your feelings. Try reading a book, or listening to or playing music. Spending time outside, in nature, may help you feel better.  Follow these instructions at home:  Image   Visit your primary health care provider every year for a checkup.  Work with a mental health care provider as needed.  Eat a well-balanced diet, and eat regular meals.  Get plenty of rest.  Exercise if you are able. Just 30 minutes of exercise each day can help you feel better.  Take over-the-counter and prescription medicines only as told by your health care provider. Ask your mental health care provider about the possible side effects of any medicines you are taking.  Do not use alcohol or drugs, and remove these substances from your home.  Remove weapons, poisons, knives, and other deadly items from your home.  General recommendations  Keep your living space well lit.  When you are feeling well, write yourself a letter with tips and support that you can read when you are not feeling well.  Remember that life's difficulties can be sorted out with help. Conditions can be treated, and you can learn behaviors and ways of thinking that will help you.  Where to find more information  National Suicide Prevention Lifeline: www.suicidepreventionlifeline.org  Hopeline: www.hopeline.com  American Foundation for Suicide Prevention: www.afsp.org  The [place] Project (for lesbian, mcneil, bisexual, transgender, or questioning youth): www.theLocoX.comvorproject.org  Contact a health care provider if:  You feel as though you are a burden to others.  You feel agitated, angry, vengeful, or have extreme mood swings.  You have withdrawn from family and friends.  Get help right away if:  You are talking about suicide or wishing to die.  You start making plans for how to commit suicide.  You feel that you have no reason to live.  You start making plans for putting your affairs in order, saying goodbye, or giving your possessions away.  You feel guilt, shame, or unbearable pain, and it seems like there is no way out.  You are frequently using drugs or alcohol.  You are engaging in risky behaviors that could lead to death.  If you have any of these symptoms, get help right away. Call emergency services, go to your nearest emergency department or crisis center, or call a suicide crisis helpline.     Summary  Suicide is when you take your own life.  Promise yourself that you will not do anything extreme when you have suicidal feelings.  Let family, friends, teachers, or counselors know how you are feeling.  Get help right away if you feel as though life is getting too tough to handle and you are thinking about suicide.  This information is not intended to replace advice given to you by your health care provider. Make sure you discuss any questions you have with your health care provider.

## 2024-01-22 NOTE — ED BEHAVIORAL HEALTH PROGRESS NOTE - NSBHATTESTCOMMENTATTENDFT_PSY_A_CORE
Patient seen and re-examined this morning with Dr. Orosco. Chart reviewed. Case discussed with EM attending. Patient is reporting improvement after observation in the ED. He has been consistently denying any suicidal ideation, intent or plan since arrival. Patient is not endorsing any acutely dangerous psychiatric symptoms or any symptoms consistent with an acute mood or psychotic disorder that would be amenable to inpatient treatment. Patient is also not exhibiting any acutely dangerous behaviors in the ED. Collateral was obtained from patient's group home and patient is reportedly at his baseline. No acute safety concerns raised by staff who know patient well at group home. Patient is requesting to go return to residence at this time and when offered voluntary hospitalization, he declines. Patient does not meet criteria for involuntary psychiatric hospitalization. Agree with assessment and plan as detailed by Dr. Orosco.

## 2024-01-22 NOTE — ED BEHAVIORAL HEALTH PROGRESS NOTE - CASE SUMMARY/FORMULATION (CLEARLY DOCUMENT RATIONALE FOR DISPOSITION CHANGE)
Patient is a 53 year old male, single, no known dependents, disabled, domiciled at [facility] ([phone]), with PMH of DM, HLD, HTN, and hypothyroidism, past psychiatric history of schizoaffective disorder on Clozaril, Invega Sustenna, and Depakote, in outpatient treatment at [facility], per chart frequent ED visits and multiple prior inpatient psychiatric hospitalizations (last admitted to [facility] 7/24/23-7/28/23), past suicide attempts vs NSSIB by cutting, banging head, past substance use disorders, who was BIB EMS from his residence after patient endorsed SI. Psychiatry was consulted to evaluate for suicidality.     Upon initial evaluation, patient was a limited historian and reported feeling depressed and having SI in context of people talking bad things about him at group home and not having family. He did not provide further insight into his symptoms and mentioned his interested in admission to have some time away from his group home. Further collaterals from his group home is required to gain insight into his presentation.     Upon today’s evaluation, patient is calm, cooperative, organized, with poor speech articulation. Per collateral, patient has a extensive history of chronic suicidal ideations, with self injurious behavior (last known 8 months ago); patient's appears to be a baseline. Patient remains at a chronic increased risk of harm to self, but at this time, appears to be at a low risk of acute harm. Patient has risk factors of male gender, history of psychiatric hospitalizations, history of psychotic disorder, history of previous SA, and being single. He has protective factors of seeking out treatment, domiciled status, lack of current suicidality or homicidally, lack of urges to self-harm or engage in NSSIB, identifies various reasons for living, and willingness and ability to readily engage in safety planning. Patient's prior suicidal statements appears to be impulsive and situational, as he now adamantly denying passive or active suicidal ideations. Patient was offered voluntary psychiatric hospitalization and he declined. Patient does not meet criteria for involuntary psychiatric hospitalization at this time (no evidence of imminent danger to self or others). He is currently clinically stable for outpatient treatment; he has an extensive outpatient network of a psychiatrist, therapist, and supportive housing. Patient is able to commit to safety; agreed to return to ED or call 911 if feeling unsafe.   Patient is a 53 year old male, single, no known dependents, disabled, domiciled at [facility] ([phone]), with PMH of DM, HLD, HTN, and hypothyroidism, past psychiatric history of schizoaffective disorder on Clozaril, Invega Sustenna, and Depakote, in outpatient treatment at [facility], per chart frequent ED visits and multiple prior inpatient psychiatric hospitalizations (last admitted to [facility] 7/24/23-7/28/23), past suicide attempts vs NSSIB by cutting, banging head, past substance use disorders, who was BIB EMS from his residence after patient endorsed SI. Psychiatry was consulted to evaluate for suicidality.     Upon initial evaluation, patient was a limited historian and reported feeling depressed and having SI in context of people talking bad things about him at group home and not having family. He did not provide further insight into his symptoms and mentioned his interested in admission to have some time away from his group home. Further collaterals from his group home is required to gain insight into his presentation.     Upon today’s evaluation, patient is calm, cooperative, organized, with poor speech articulation. Per collateral, patient has a extensive history of chronic suicidal ideations, with self injurious behavior (last known 8 months ago); patient appears to be at his baseline. Patient remains at a chronic increased risk of harm to self, but at this time, appears to be at an  low risk of acute harm. Patient has risk factors of male gender, history of psychiatric hospitalizations, history of psychotic disorder, history of previous SA, and being single. He has protective factors of seeking out treatment, domiciled status, lack of current suicidality or homicidally, lack of urges to self-harm or engage in NSSIB, identifies various reasons for living, and willingness and ability to readily engage in safety planning. Patient's prior suicidal statements appears to be impulsive and situational, as he now adamantly denying passive or active suicidal ideations. Patient was offered voluntary psychiatric hospitalization and he declined. Patient does not meet criteria for involuntary psychiatric hospitalization at this time (no evidence of imminent danger to self or others). He is currently clinically stable for outpatient treatment; he has an extensive outpatient network of a psychiatrist, therapist, and supportive housing. Patient is able to commit to safety; patient agreed to return to ED or call 911 if feeling unsafe.

## 2024-01-22 NOTE — ED BEHAVIORAL HEALTH ASSESSMENT NOTE - NSBHMSEAFFQUAL_PSY_A_CORE
PA required on: buprenorphine 8mg SL tab  Medication NDC is: [id number]  Patient Insurance is: [facility]  Insurance ID is: [id number]  Insurance phone number is: [phone]      Please let us know when PA is granted or denied. Thank you.    Dionisio Romero  [phone]   constricted

## 2024-01-22 NOTE — ED BEHAVIORAL HEALTH ASSESSMENT NOTE - PAST PSYCHOTROPIC MEDICATION
unknown -Pain management as needed  -OOB and ambulate  -cbc in the am  -Advance diet to regular  -Encourage breastfeeding

## 2024-01-22 NOTE — ED BEHAVIORAL HEALTH PROGRESS NOTE - RISK ASSESSMENT
Warning signs: isolation, anxiety  Static risk factors: male gender, history of psychiatric hospitalizations; history of psychotic disorder; history of previous SA   Modifiable risk factors:  psychiatric illness, single, access to means, unemployment  Protective factors: social support, seeking out treatment/hopefulness, domiciled status; future-orientation; lack of current suicidality or homicidally; lack of urges to self-harm or engage in NSSIB; identifies various reasons for living; willingness and ability to readily engage in safety planning  Access to lethal: denies  Level of risk: low

## 2024-01-22 NOTE — PHARMACOTHERAPY INTERVENTION NOTE - NSPHARMCOMMMONITOR
"Indigo Lanierica" Pollo was seen and treated in our emergency department on 8/3/2022.  She may return to work on 08/05/2022.       If you have any questions or concerns, please don't hesitate to call.      Fern MICHAEL RN    "
"Indigo Lanierica" Pollo was seen and treated in our emergency department on 8/3/2022.  She may return to work on 08/05/2022.       If you have any questions or concerns, please don't hesitate to call.      Fern MICHAEL RN    "
Lab/Test Recommended

## 2024-01-22 NOTE — ED CDU PROVIDER DISPOSITION NOTE - PATIENT PORTAL LINK FT
You can access the FollowMyHealth Patient Portal offered by [facility] by registering at the following website: [email or web address]. By joining [facility]’s FollowMyHealth portal, you will also be able to view your health information using other applications (apps) compatible with our system.

## 2024-01-22 NOTE — ED BEHAVIORAL HEALTH ASSESSMENT NOTE - NSTELEHLTHVISTYPE_GEN_ALL_CORE
2019  EMPLOYEE INFORMATION: EMPLOYER INFORMATION:   NAME: Les WOODRUFF   : 1985 [phone]   DATE OF INJURY/EVENT: 11/10/2018           Location: [facility]   Treating Provider: Caden Vasques DO  Time In:  3:01 PM Time Out:  3:38 PM      DIAGNOSIS:   1. Closed displaced fracture of body of right calcaneus with routine healing      STATUS: This injury is determined to be WORK RELATED.    RETURN TO WORK:   Employee may return to work without restrictions.  Employee is discharged from care.   Return Date: 2019            RESTRICTIONS:   Restrictions are to be followed at work and at home.  Restrictions are in effect until next follow-up visit.  No Restrictions    Thank you for the privilege of providing medical care for this injury/condition.  If there are any questions, please call the occupational health clinic at Dept: [phone].  Electronically signed on 2019 at 3:38 PM by: Dread Erazo LPN   [place] [facility]. On 2019, IDread LPN scribed the services personally performed by Caden Vasques DO     Audio/Video

## 2024-01-22 NOTE — ED BEHAVIORAL HEALTH ASSESSMENT NOTE - SUMMARY
54 yo male living at [facility] ([phone]) with PMH DM, HLD, HTN, hypothyroidism, and psychiatric h/o schizoaffective disorder, past substance use disorders, frequent ED visits and hospitalizations, past suicide attempts vs NSSIB by cutting, banging head, on Clozaril and Invega Sustenna, who was BIB EMS from his residence after patient endorsed SI.    Patient was a limited historian and reported feeling depressed and having SI in context of people talking bad things about him at group home and not having family. He did not provide further insight into his symptoms and mentioned his interested in admission to have some time away from his group home. Further collaterals from his group home is required to gain insight into his presentation.

## 2024-01-22 NOTE — PHARMACOTHERAPY INTERVENTION NOTE - COMMENTS
Checked Clozapine REMS for Juma Gurmeet. Most recent ANC was over a month ago, on 12/12/23 ANC 4,480. I checked ANC (without bands) value based on WBC and % neutrophils from today's 1/22/24 lab values and ANC was 2,714. So I confirmed out clozapine 100mg daily dose.  Checked Clozapine REMS for Juma Gurmeet. Most recent ANC was over a month ago, on 12/12/23 ANC 4,480. I checked ANC (without bands) value based on WBC and % neutrophils from today's 1/22/24 lab values and ANC was 2,714. So I confirmed out clozapine 100mg twice daily dose.

## 2024-01-22 NOTE — ED CDU PROVIDER SUBSEQUENT DAY NOTE - HISTORY
pending further collateral from group home by psych, otherwise calm and cooperative, resting in stretcher eating breakfast

## 2024-01-22 NOTE — ED CDU PROVIDER SUBSEQUENT DAY NOTE - PHYSICAL EXAMINATION
VITAL SIGNS: I have reviewed nursing notes and confirm.  CONSTITUTIONAL: non-toxic, well appearing  SKIN: no rash, no petechiae.  EYES: pink conjunctiva, anicteric  ENT: tongue midline, no exudates, MMM  NECK: Supple; no meningismus  CARD: RRR  RESP: no respiratory distress  EXT: Normal ROM x4. No edema.   NEURO: Alert, oriented. no focal deficits   PSYCH: Cooperative, appropriate, calm

## 2024-01-22 NOTE — ED CDU PROVIDER DISPOSITION NOTE - NSFOLLOWUPCLINICS_GEN_ALL_ED_FT
[facility]  OP Mental Health  [address] [place] [id number]  Phone: [phone]  Fax:   Follow Up Time: 1-3 Days

## 2024-01-22 NOTE — ED BEHAVIORAL HEALTH NOTE - BEHAVIORAL HEALTH NOTE
==================  PRE-HOSPITAL COURSE  ===================  SOURCE:  OLIVER Keller and secondhand ED documentation  DETAILS: BIB EMS for SI  =========  ED COURSE  =========  SOURCE:  OLIVER Keller and secondhand ED documentation.  ARRIVAL:  Per chart and RN, patient arrived via EMS. Per RN, patient was calm upon arrival, and cooperative with triage process.  BELONGINGS:  Per RN, patient arrived with belongings. All belongings were provided to hospital security, and patient currently in a gown with a 1:1 staff member.  BEHAVIOR: RN described patient to be calm and cooperative, presenting with linear thought process, AAOx3, presenting with normal mood and congruent affect, remains in behavioral and impulse control, is not violent/aggressive. RN stated that the patient is endorsing SI. RN stated that there are no visible marks, bruises, or lacerations on the body. RN stated that the patient appears to be well-groomed, maintains good hygiene.  TREATMENT:  Per chart and RN, patient did not receive PRN medications.   VISITORS:  Per RN, no visitors at bedside.        COVID Exposure Screen- collateral (i.e. third-party, chart review, belongings, etc; include EMS and ED staff)   ---------------------------------------------------  1. Has the patient had a COVID-19 test in the last 90 days? Unknown.   2. Has the patient tested positive for COVID-19 antibodies? Unknown.   3. Has the patient received 2 doses of the COVID-19 vaccine? Unknown  4. In the past 10 days, has the patient been around anyone with a positive COVID-19 test?* Unknown.   5. Has the patient been out of [place] within the past 10 days? Unknown

## 2024-01-22 NOTE — ED BEHAVIORAL HEALTH PROGRESS NOTE - DETAILS:
Per chart review the patient has not required any behavioral PRNS since the last assessment.    Chart reviewed, patient seen and evaluated in AM. Patient is calm, cooperative, organized, with poor speech articulation. Patient appears to be a poor historian. Patient reports that he would like to go home and does not want inpatient psychiatric admission. He states that he came to the hospital after "getting angry with myself and saying I would kill myself." Patient denies any current passive or active suicidal ideation, intent or plan and denies any homicidal ideation.

## 2024-01-22 NOTE — ED CDU PROVIDER DISPOSITION NOTE - CLINICAL COURSE
53-year-old male past medical history of schizophrenia, DM, hypertension, hyperlipidemia presenting from intermediate for evaluation of suicidal ideations.  Patient states that he is having auditory hallucinations and suicidal ideations with a plan to jump in front of a car.  Psych evaluated pt and obtained collateral from group home. Patient reassessed this morning, calm and cooperative, states he no longer has suicidal ideation or plan, and denies any auditory or visual hallucination.  Patient reports he was upset with members from the group home, but no longer has these angry feelings.  Patient was evaluated by psychiatry and was cleared for discharge, does not require involuntary admission, no evidence of imminent danger to himself at this time.

## 2024-01-22 NOTE — ED BEHAVIORAL HEALTH NOTE - BEHAVIORAL HEALTH NOTE
========================      FOR EACH COLLATERAL      ========================      Collateral below has requested that the information provided remain confidential: Yes [  ] No [ x]      Collateral below has provided information that patient is/may be unaware of: Yes [  ] No [ x]      Patient gives permission to obtain collateral from _____:       ( ) Yes      ( x)  No      Rationale for overriding objection                (  ) Lack of capacity. Details: ________                ( x) Assessing risk of danger to self/others. Details: ________      Rationale for selecting specific collateral source                ( ) Potential to impact risk of danger to self/others and no alternative equivalent. Details:            NAME:  Mala Sutton     NUMBER:  [phone]     RELATIONSHIP:  Director of Program     RELIABILITY:  Somewhat Reliable     COMMENTS: Collateral feels the pt should be admitted solely for the purpose of receiving a “checkup” to ensure that the pt is doing ok, and to assess why he the pt is “acting up” again.       ========================      PATIENT DEMOGRAPHICS:      ========================      HPI      BASELINE FUNCTIONING:       The pt is an unemployed, single 53-year-old male, a full-time resident at the [facility], where he was admitted to on August 4, 2022, when he was transferred from [facility]. Per collateral, at baseline functioning the pt is friendly, sometimes likes to stay by himself, attends and engages in group sessions and likes attention. Per collateral, the pt has no current family member who visits him at the group home.     DATE HPI STARTED: Unknown to collateral, however, collateral reported that the pt was brought to ER over the weekend. Collateral was unclear of the exact date of ER arrival.     DECOMPENSATION: The collateral reported that she was not onsite at the time of the pt’s presentation that led to his ED admission, however, the collateral reported that she received an email that the pt was taken to the ED. Per collateral, she received an email that the pt was taken to the ED over the weekend due to SI. Collateral could not describe events that led to the pt’s SI and presentation. However, collateral reports that the pt has a hx of SI, with SI gestures, such as “banging his head on the wall and cutting himself with a can”.  The collateral reports that she was surprised to hear that the pt was endorsing SI because “it's been a while”.     SUICIDALITY: Per collateral, the pt has endorsed SI over the weekend, making statements such as “I want to hurt myself”. Collateral reports that the pt has not endorsed any SA and HI. Collateral was unable to confirm AVH.     VIOLENCE: Per collateral, the pt has not been violent.     SUBSTANCE: Per collateral, the pt has no substance use.     ========================      PAST PSYCHIATRIC HISTORY      ========================      DATE PAST PSYCHIATRIC HISTORY STARTED: Per collateral, the pt phx started in his “20’s”.     MAIN PSYCHIATRIC DIAGNOSIS: Per collateral, the pt’s psych dx is Schizoaffective- Bipolar Type. Collateral was unable to specify the date of the dx.     PSYCHIATRIC HOSPITALIZATIONS: Per collateral, the pt’s psych hospitalizations are as follows: July 2023 to August 2023 at [facility] unit, due to SI (Collateral reported that the pt called a suicide hotline and was brought in to ED), and October 10/16/2023 at [facility] for anxiety but was T&R (same day). The collateral also reported that the pt went to the ER on August 8, 2023, at [facility] for chest pain—The pt was T&R.     PRIOR ILLNESS: Per collateral, the pt is currently in treatment and receives outpatient services with a therapist: Akosua Partida-[phone] and a psychiatrist Dr. Mcguire: [phone].     SUICIDALITY: Per collateral endorsed SI in the past and that pt engages in “superficial suicidal attempts” such as cutting self himself. Per collateral, the pt’s last “superficial suicidal attempt” was 8mths / 9mths ago. Collateral was unable to confirm AVH in the past.     VIOLENCE: Per collateral, the pt is not violent towards others, however, when the pt is frustrated, he is violent towards property and objects.          SUBSTANCE USE: Per collateral, the pt has no substance use.     ==============      OTHER HISTORY      ==============      CURRENT MEDICATION: Per collateral, the pt takes the following medications for psychiatric purposes: Clozaril-100 mg twice day, Adavan- 2mg at night, Cogentin 1ml 2x daily, Depakote, 250mg at nights and Depakote 500mg 2x a day, Invega injection 234 mg, 1 a month. Per collateral, the pt takes the following medication for medical purposes: Metformin 1000 ml 2x for diabetes, Lopressor 25mg for blood pressure at 9 am, synthroid to treat thyroid, Colace a stool softer 3 capsule at 9am, Dulcolax, a laxative-5mg-1 tab at 9pm, Lipitor to treat cholesterol 4mg a night, and Tylenol as needed.     MEDICAL HISTORY: Per collateral, the pt has type 2 diabetes, gastroenteritis, colitis, hyperlipidemia and primary hypertension.     ALLERGIES: Per collateral, the pt is allergic to chlorpromazine, Haldol, pork, freshwater fish, and thiothixene.     LEGAL ISSUES: Per collateral the pt has no legal history.     FIREARM ACCESS: Per collateral the pt has no access to firearms or lethal weapons.     SOCIAL HISTORY:  Unknown     FAMILY HISTORY:  unknown     DEVELOPMENTAL HISTORY: Unknown, however collateral reports that the pt appears to process information at a slower rate.     -----------------------------------------------      COVID Exposure Screen- collateral (i.e. third-party, chart review, belongings, etc; include EMS and ED staff)      ---------------------------------------------------      1. Has the patient had a COVID-19 test in the last 90 days? No     2. Has the patient tested positive for COVID-19 antibodies? Unknown     3.Has the patient received 2 doses of the COVID-19 vaccine?  Unknown.      4. In the past 10 days, has the patient been around anyone with a positive COVID-19 test?* Unknown.      5. Has the patient been out of [place] within the past 10 days? Unknown. ========================      FOR EACH COLLATERAL      ========================      Collateral below has requested that the information provided remain confidential: Yes [  ] No [ x]      Collateral below has provided information that patient is/may be unaware of: Yes [  ] No [ x]      Patient gives permission to obtain collateral from _____:       ( ) Yes      ( x)  No      Rationale for overriding objection                (  ) Lack of capacity. Details: ________                ( x) Assessing risk of danger to self/others. Details: ________      Rationale for selecting specific collateral source                ( ) Potential to impact risk of danger to self/others and no alternative equivalent. Details:            NAME:  Mala Sutton     NUMBER:  [phone]     RELATIONSHIP:  Director of Program     RELIABILITY:  Somewhat Reliable     COMMENTS: Collateral feels the pt should be admitted solely for the purpose of receiving a “checkup” to ensure that the pt is doing ok, and to assess why he the pt is “acting up” again.       ========================      PATIENT DEMOGRAPHICS:      ========================      HPI      BASELINE FUNCTIONING:       The pt is an unemployed, single 53-year-old male, a full-time resident at the [facility] where he was admitted to on August 4, 2022, when he was transferred from [facility]. Per collateral, at baseline functioning the pt is friendly, sometimes likes to stay by himself, attends and engages in group sessions and likes attention. Per collateral, the pt has no current family member who visits him at the group home.     DATE HPI STARTED: Unknown to collateral, however, collateral reported that the pt was brought to ER over the weekend. Collateral was unclear of the exact date of ER arrival.     DECOMPENSATION: The collateral reported that she was not onsite at the time of the pt’s presentation that led to his ED admission, however, the collateral reported that she received an email that the pt was taken to the ED. Per collateral, she received an email that the pt was taken to the ED over the weekend due to SI. Collateral could not describe events that led to the pt’s SI and presentation. However, collateral reports that the pt has a hx of SI, with SI gestures, such as “banging his head on the wall and cutting himself with a can”.  The collateral reports that she was surprised to hear that the pt was endorsing SI because “it's been a while”.     SUICIDALITY: Per collateral, the pt has endorsed SI over the weekend, making statements such as “I want to hurt myself”. Collateral reports that the pt has not endorsed any SA and HI. Collateral was unable to confirm AVH.     VIOLENCE: Per collateral, the pt has not been violent.     SUBSTANCE: Per collateral, the pt has no substance use.     ========================      PAST PSYCHIATRIC HISTORY      ========================      DATE PAST PSYCHIATRIC HISTORY STARTED: Per collateral, the pt phx started in his “20’s”.     MAIN PSYCHIATRIC DIAGNOSIS: Per collateral, the pt’s psych dx is Schizoaffective- Bipolar Type. Collateral was unable to specify the date of the dx.     PSYCHIATRIC HOSPITALIZATIONS: Per collateral, the pt’s psych hospitalizations are as follows: July 2023 to August 2023 at [facility] unit, due to SI (Collateral reported that the pt called a suicide hotline and was brought in to ED), and October 10/16/2023 at [facility] for anxiety but was T&R (same day). The collateral also reported that the pt went to the ER on August 8, 2023, at [facility] for chest pain—The pt was T&R.     PRIOR ILLNESS: Per collateral, the pt is currently in treatment and receives outpatient services with a therapist: Akosua Partida-[phone] and a psychiatrist Dr. Mcguire: [phone].     SUICIDALITY: Per collateral endorsed SI in the past and that pt engages in “superficial suicidal attempts” such as cutting self himself. Per collateral, the pt’s last “superficial suicidal attempt” was 8mths / 9mths ago. Collateral was unable to confirm AVH in the past.     VIOLENCE: Per collateral, the pt is not violent towards others, however, when the pt is frustrated, he is violent towards property and objects.          SUBSTANCE USE: Per collateral, the pt has no substance use.     ==============      OTHER HISTORY      ==============      CURRENT MEDICATION: Per collateral, the pt takes the following medications for psychiatric purposes: Clozaril-100 mg twice day, Adavan- 2mg at night, Cogentin 1ml 2x daily, Depakote, 250mg at nights and Depakote 500mg 2x a day, Invega injection 234 mg, 1 a month. Per collateral, the pt takes the following medication for medical purposes: Metformin 1000 ml 2x for diabetes, Lopressor 25mg for blood pressure at 9 am, synthroid to treat thyroid, Colace a stool softer 3 capsule at 9am, Dulcolax, a laxative-5mg-1 tab at 9pm, Lipitor to treat cholesterol 4mg a night, and Tylenol as needed.     MEDICAL HISTORY: Per collateral, the pt has type 2 diabetes, gastroenteritis, colitis, hyperlipidemia and primary hypertension.     ALLERGIES: Per collateral, the pt is allergic to chlorpromazine, Haldol, pork, freshwater fish, and thiothixene.     LEGAL ISSUES: Per collateral the pt has no legal history.     FIREARM ACCESS: Per collateral the pt has no access to firearms or lethal weapons.     SOCIAL HISTORY:  Unknown     FAMILY HISTORY:  unknown     DEVELOPMENTAL HISTORY: Unknown, however collateral reports that the pt appears to process information at a slower rate.     -----------------------------------------------    COVID Exposure Screen- collateral (i.e. third-party, chart review, belongings, etc; include EMS and ED staff     ==============     Has the patient been tested for COVID-19 in the last 90 days?  (  ) Yes   ( X ) No   (  ) Unknown- Reason: _____     IF YES: Date of test(s), type of test(s), result(s) for ALL tests in last 90 days: ________     In the past 10 days, has the patient been around anyone with a positive COVID-19 test? (  ) Yes   ( X ) No   (  ) Unknown- Reason: ____     IF YES: Was the patient closer than 6 feet of them for a total of 15 minutes or more in a 24 hour period? (  ) Yes   (  ) No   (  ) Unknown- Reason: _____ ========================      FOR EACH COLLATERAL      ========================      Collateral below has requested that the information provided remain confidential: Yes [  ] No [ x]      Collateral below has provided information that patient is/may be unaware of: Yes [  ] No [ x]      Patient gives permission to obtain collateral from _____:      ( ) Yes      ( x)  No      Rationale for overriding objection                (  ) Lack of capacity. Details: ________                ( x) Assessing risk of danger to self/others. Details: ________      Rationale for selecting specific collateral source                ( ) Potential to impact risk of danger to self/others and no alternative equivalent. Details:      NAME:  Mala Sutton    NUMBER:  [phone]    RELATIONSHIP:  Director of Program    RELIABILITY:  Somewhat Reliable    COMMENTS: Collateral feels the pt should be admitted solely for the purpose of receiving a “checkup” to ensure that the pt is doing well, and to assess why pt is not at baseline.     ========================      PATIENT DEMOGRAPHICS:      ========================      HPI      BASELINE FUNCTIONING:      Patient is a single 53-year-old male, employed, full-time resident at the [facility] since August 4, 2022, when he was transferred from [facility]. Per collateral, at baseline functioning the pt is friendly, sometimes likes to stay by himself, attends and engages in group sessions and enjoys attention. Per collateral, the pt has no current family member who visit him at the group home.    DATE HPI STARTED: Unknown to collateral, however, collateral reported that the pt was brought to ER over the weekend. Collateral was unclear of the exact date of ER arrival.    DECOMPENSATION: The collateral reported that she was not onsite at the time of the pt’s presentation that led to his ED admission, however, the collateral received an email that the pt was taken to the ED over the weekend due to SI. Collateral could not describe events that led to the pt’s SI and presentation. However, collateral reports that the pt has a hx of SI, with SI gestures, such as “banging his head on the wall and cutting himself with a can”.  The collateral reports that she was surprised to hear that the pt was endorsing SI because “it's been a while”.    SUICIDALITY: Per collateral, the pt has endorsed SI over the weekend, making statements such as “I want to hurt myself”. Collateral reports that the pt has not endorsed any SA and HI. Collateral was unable to confirm AVH.    VIOLENCE: Per collateral, the pt has not been violent.    SUBSTANCE: Per collateral, the pt has no substance use.    ========================      PAST PSYCHIATRIC HISTORY      ========================      DATE PAST PSYCHIATRIC HISTORY STARTED: Per collateral, the pt pphx started in his “20’s”.    MAIN PSYCHIATRIC DIAGNOSIS: Per collateral, the pt’s psych dx is Schizoaffective- Bipolar Type. Collateral was unable to specify the date of the dx.    PSYCHIATRIC HOSPITALIZATIONS: Per collateral, the pt’s psych hospitalizations are as follows: July 2023 to August 2023 at [facility] unit, due to SI (Collateral reported that the pt called a suicide hotline and was brought in to ED), and October 10/16/2023 at [facility] for anxiety but was T&R (same day). The collateral also reported that the pt went to the ER on August 8, 2023, at [facility] for chest pain—The pt was T&R.    PRIOR ILLNESS: Per collateral, the pt is currently in treatment and receives outpatient services with a therapist: Akosua Partida-[phone] and a psychiatrist Dr. Mcguire: [phone].    SUICIDALITY: Per collateral pt endorsed SI in the past and has hx of engaging in “superficial suicidal attempts” in the context of cutting himself. Per collateral, pt’s last “superficial suicidal attempt” was 8mths / 9mths ago. Collateral was unable to confirm AVH in the past.    VIOLENCE: Per collateral, the pt is not violent towards others, however, when pt becomes frustrated, he is violent towards property and objects.    SUBSTANCE USE: Per collateral, the pt has no substance use.    ==============      OTHER HISTORY      ==============      CURRENT MEDICATION: Per collateral, the pt takes the following medications for psychiatric purposes: Clozaril-100 mg twice day, Ativan 2mg at night, Cogentin 1mg 2x daily, Depakote, 250mg at night and Depakote 500mg 2x a day, Invega injection 234 mg, 1x a month. Per collateral, pt takes the following medication for medical purposes: Metformin 1000 ml 2x for diabetes, Lopressor 25mg for blood pressure at 9 am, Synthroid to treat thyroid, Colace a stool softer 3 capsule at 9am, Dulcolax, a laxative-5mg-1 tab at 9pm, Lipitor to treat cholesterol 4mg a night, and Tylenol as needed.    MEDICAL HISTORY: Per collateral, the pt has type 2 diabetes, gastroenteritis, colitis, hyperlipidemia and primary hypertension.    ALLERGIES: Per [facility], the pt is allergic to the following medication chlorpromazine, haldol and thiothixene as well as the following food: pork and freshwater fish.    LEGAL ISSUES: Per collateral the pt has no legal history.    FIREARM ACCESS: Per collateral pt has no access to firearms or lethal weapons.    SOCIAL HISTORY:  Unknown    FAMILY HISTORY:  unknown    DEVELOPMENTAL HISTORY: Unknown    -----------------------------------------------    COVID Exposure Screen- collateral (i.e. third-party, chart review, belongings, etc; include EMS and ED staff    ==============    Has the patient been tested for COVID-19 in the last 90 days?  (  ) Yes   ( X ) No   (  ) Unknown- Reason: _____    IF YES: Date of test(s), type of test(s), result(s) for ALL tests in last 90 days: ________    In the past 10 days, has the patient been around anyone with a positive COVID-19 test? (  ) Yes   ( X ) No   (  ) Unknown- Reason: ____    IF YES: Was the patient closer than 6 feet of them for a total of 15 minutes or more in a 24 hour period? (  ) Yes   (  ) No   (  ) Unknown- Reason: _____.

## 2024-01-25 NOTE — ED BEHAVIORAL HEALTH ASSESSMENT NOTE - OTHER PAST PSYCHIATRIC HISTORY (INCLUDE DETAILS REGARDING ONSET, COURSE OF ILLNESS, INPATIENT/OUTPATIENT TREATMENT)
Detail Level: Detailed see HPI Topical Sulfur Applications Counseling: Topical Sulfur Counseling: Patient counseled that this medication may cause skin irritation or allergic reactions.  In the event of skin irritation, the patient was advised to reduce the amount of the drug applied or use it less frequently.   The patient verbalized understanding of the proper use and possible adverse effects of topical sulfur application.  All of the patient's questions and concerns were addressed. Include Pregnancy/Lactation Warning?: No Doxycycline Counseling:  Patient counseled regarding possible photosensitivity and increased risk for sunburn.  Patient instructed to avoid sunlight, if possible.  When exposed to sunlight, patients should wear protective clothing, sunglasses, and sunscreen.  The patient was instructed to call the office immediately if the following severe adverse effects occur:  hearing changes, easy bruising/bleeding, severe headache, or vision changes.  The patient verbalized understanding of the proper use and possible adverse effects of doxycycline.  All of the patient's questions and concerns were addressed. High Dose Vitamin A Pregnancy And Lactation Text: High dose vitamin A therapy is contraindicated during pregnancy and breast feeding. Benzoyl Peroxide Pregnancy And Lactation Text: This medication is Pregnancy Category C. It is unknown if benzoyl peroxide is excreted in breast milk. Tetracycline Counseling: Patient counseled regarding possible photosensitivity and increased risk for sunburn.  Patient instructed to avoid sunlight, if possible.  When exposed to sunlight, patients should wear protective clothing, sunglasses, and sunscreen.  The patient was instructed to call the office immediately if the following severe adverse effects occur:  hearing changes, easy bruising/bleeding, severe headache, or vision changes.  The patient verbalized understanding of the proper use and possible adverse effects of tetracycline.  All of the patient's questions and concerns were addressed. Patient understands to avoid pregnancy while on therapy due to potential birth defects. Minocycline Pregnancy And Lactation Text: This medication is Pregnancy Category D and not consider safe during pregnancy. It is also excreted in breast milk. Erythromycin Counseling:  I discussed with the patient the risks of erythromycin including but not limited to GI upset, allergic reaction, drug rash, diarrhea, increase in liver enzymes, and yeast infections. Topical Retinoid Pregnancy And Lactation Text: This medication is Pregnancy Category C. It is unknown if this medication is excreted in breast milk. Winlevi Counseling:  I discussed with the patient the risks of topical clascoterone including but not limited to erythema, scaling, itching, and stinging. Patient voiced their understanding. Bactrim Pregnancy And Lactation Text: This medication is Pregnancy Category D and is known to cause fetal risk.  It is also excreted in breast milk. Erythromycin Pregnancy And Lactation Text: This medication is Pregnancy Category B and is considered safe during pregnancy. It is also excreted in breast milk. Birth Control Pills Counseling: Birth Control Pill Counseling: I discussed with the patient the potential side effects of OCPs including but not limited to increased risk of stroke, heart attack, thrombophlebitis, deep venous thrombosis, hepatic adenomas, breast changes, GI upset, headaches, and depression.  The patient verbalized understanding of the proper use and possible adverse effects of OCPs. All of the patient's questions and concerns were addressed. Aklief Pregnancy And Lactation Text: It is unknown if this medication is safe to use during pregnancy.  It is unknown if this medication is excreted in breast milk.  Breastfeeding women should use the topical cream on the smallest area of the skin for the shortest time needed while breastfeeding.  Do not apply to nipple and areola. Sarecycline Counseling: Patient advised regarding possible photosensitivity and discoloration of the teeth, skin, lips, tongue and gums.  Patient instructed to avoid sunlight, if possible.  When exposed to sunlight, patients should wear protective clothing, sunglasses, and sunscreen.  The patient was instructed to call the office immediately if the following severe adverse effects occur:  hearing changes, easy bruising/bleeding, severe headache, or vision changes.  The patient verbalized understanding of the proper use and possible adverse effects of sarecycline.  All of the patient's questions and concerns were addressed. Tazorac Counseling:  Patient advised that medication is irritating and drying.  Patient may need to apply sparingly and wash off after an hour before eventually leaving it on overnight.  The patient verbalized understanding of the proper use and possible adverse effects of tazorac.  All of the patient's questions and concerns were addressed. Azithromycin Pregnancy And Lactation Text: This medication is considered safe during pregnancy and is also secreted in breast milk. Detail Level: Zone Dapsone Counseling: I discussed with the patient the risks of dapsone including but not limited to hemolytic anemia, agranulocytosis, rashes, methemoglobinemia, kidney failure, peripheral neuropathy, headaches, GI upset, and liver toxicity.  Patients who start dapsone require monitoring including baseline LFTs and weekly CBCs for the first month, then every month thereafter.  The patient verbalized understanding of the proper use and possible adverse effects of dapsone.  All of the patient's questions and concerns were addressed. Isotretinoin Pregnancy And Lactation Text: This medication is Pregnancy Category X and is considered extremely dangerous during pregnancy. It is unknown if it is excreted in breast milk. Azelaic Acid Pregnancy And Lactation Text: This medication is considered safe during pregnancy and breast feeding. Spironolactone Counseling: Patient advised regarding risks of diarrhea, abdominal pain, hyperkalemia, birth defects (for female patients), liver toxicity and renal toxicity. The patient may need blood work to monitor liver and kidney function and potassium levels while on therapy. The patient verbalized understanding of the proper use and possible adverse effects of spironolactone.  All of the patient's questions and concerns were addressed. Topical Clindamycin Counseling: Patient counseled that this medication may cause skin irritation or allergic reactions.  In the event of skin irritation, the patient was advised to reduce the amount of the drug applied or use it less frequently.   The patient verbalized understanding of the proper use and possible adverse effects of clindamycin.  All of the patient's questions and concerns were addressed. Spironolactone Pregnancy And Lactation Text: This medication can cause feminization of the male fetus and should be avoided during pregnancy. The active metabolite is also found in breast milk. Benzoyl Peroxide Counseling: Patient counseled that medicine may cause skin irritation and bleach clothing.  In the event of skin irritation, the patient was advised to reduce the amount of the drug applied or use it less frequently.   The patient verbalized understanding of the proper use and possible adverse effects of benzoyl peroxide.  All of the patient's questions and concerns were addressed. Dapsone Pregnancy And Lactation Text: This medication is Pregnancy Category C and is not considered safe during pregnancy or breast feeding. High Dose Vitamin A Counseling: Side effects reviewed, pt to contact office should one occur. Topical Retinoid counseling:  Patient advised to apply a pea-sized amount only at bedtime and wait 30 minutes after washing their face before applying.  If too drying, patient may add a non-comedogenic moisturizer. The patient verbalized understanding of the proper use and possible adverse effects of retinoids.  All of the patient's questions and concerns were addressed. Topical Sulfur Applications Pregnancy And Lactation Text: This medication is Pregnancy Category C and has an unknown safety profile during pregnancy. It is unknown if this topical medication is excreted in breast milk. Winlevi Pregnancy And Lactation Text: This medication is considered safe during pregnancy and breastfeeding. Doxycycline Pregnancy And Lactation Text: This medication is Pregnancy Category D and not consider safe during pregnancy. It is also excreted in breast milk but is considered safe for shorter treatment courses. Minocycline Counseling: Patient advised regarding possible photosensitivity and discoloration of the teeth, skin, lips, tongue and gums.  Patient instructed to avoid sunlight, if possible.  When exposed to sunlight, patients should wear protective clothing, sunglasses, and sunscreen.  The patient was instructed to call the office immediately if the following severe adverse effects occur:  hearing changes, easy bruising/bleeding, severe headache, or vision changes.  The patient verbalized understanding of the proper use and possible adverse effects of minocycline.  All of the patient's questions and concerns were addressed. Aklief counseling:  Patient advised to apply a pea-sized amount only at bedtime and wait 30 minutes after washing their face before applying.  If too drying, patient may add a non-comedogenic moisturizer.  The most commonly reported side effects including irritation, redness, scaling, dryness, stinging, burning, itching, and increased risk of sunburn.  The patient verbalized understanding of the proper use and possible adverse effects of retinoids.  All of the patient's questions and concerns were addressed. Azelaic Acid Counseling: Patient counseled that medicine may cause skin irritation and to avoid applying near the eyes.  In the event of skin irritation, the patient was advised to reduce the amount of the drug applied or use it less frequently.   The patient verbalized understanding of the proper use and possible adverse effects of azelaic acid.  All of the patient's questions and concerns were addressed. Birth Control Pills Pregnancy And Lactation Text: This medication should be avoided if pregnant and for the first 30 days post-partum. Isotretinoin Counseling: Patient should get monthly blood tests, not donate blood, not drive at night if vision affected, not share medication, and not undergo elective surgery for 6 months after tx completed. Side effects reviewed, pt to contact office should one occur. Tazorac Pregnancy And Lactation Text: This medication is not safe during pregnancy. It is unknown if this medication is excreted in breast milk. Bactrim Counseling:  I discussed with the patient the risks of sulfa antibiotics including but not limited to GI upset, allergic reaction, drug rash, diarrhea, dizziness, photosensitivity, and yeast infections.  Rarely, more serious reactions can occur including but not limited to aplastic anemia, agranulocytosis, methemoglobinemia, blood dyscrasias, liver or kidney failure, lung infiltrates or desquamative/blistering drug rashes. Topical Clindamycin Pregnancy And Lactation Text: This medication is Pregnancy Category B and is considered safe during pregnancy. It is unknown if it is excreted in breast milk. Azithromycin Counseling:  I discussed with the patient the risks of azithromycin including but not limited to GI upset, allergic reaction, drug rash, diarrhea, and yeast infections. Detail Level: Simple

## 2024-02-02 ENCOUNTER — OUTPATIENT (OUTPATIENT)
Dept: OUTPATIENT SERVICES | Facility: HOSPITAL | Age: 54
LOS: 1 days | End: 2024-02-02
Payer: MEDICAID

## 2024-02-02 ENCOUNTER — APPOINTMENT (OUTPATIENT)
Dept: OPHTHALMOLOGY | Facility: CLINIC | Age: 54
End: 2024-02-02
Payer: MEDICAID

## 2024-02-02 DIAGNOSIS — H53.8 OTHER VISUAL DISTURBANCES: ICD-10-CM

## 2024-02-02 PROCEDURE — 92012 INTRM OPH EXAM EST PATIENT: CPT

## 2024-02-08 DIAGNOSIS — H52.4 PRESBYOPIA: ICD-10-CM

## 2024-02-08 DIAGNOSIS — H16.142 PUNCTATE KERATITIS, LEFT EYE: ICD-10-CM

## 2024-02-08 DIAGNOSIS — H35.039 HYPERTENSIVE RETINOPATHY, UNSPECIFIED EYE: ICD-10-CM

## 2024-02-08 DIAGNOSIS — E11.9 TYPE 2 DIABETES MELLITUS WITHOUT COMPLICATIONS: ICD-10-CM

## 2024-02-20 NOTE — ED PROVIDER NOTE - NS_EDPROVIDERDISPOUSERTYPE_ED_A_ED
Primary Care Provider  Jeaneth Barker APRN   Referring Provider  GUNJAN Malloy      Patient Complaint  Establish Care (Pseudomonas)      Subjective          Jeimy Willard presents to [facility]      History of Presenting Illness  Jeimy Willard is a 67 y.o. female here as a new patient for pseudomonal pneumonia.    Patient has history of gastric cancer status postgastrectomy.  She was also found to have pseudomonal pneumonia status post multiple rounds of oral antibiotics, including Levaquin, ciprofloxacin.  She recently completed her last round of antibiotics Friday.  She has a chronic cough and sputum production that is mostly clear/yellow at times.  Sputum production positive for Pseudomonas.  Chest CT reviewed.  Patient is a never smoker.  She is up-to-date with her vaccinations.  She denies fever, chills, hemoptysis at this time. I have personally reviewed patient's past family, social, medical and surgical histories and agree with their findings.      Review of Systems  Constitutional:  No fever. No chills. No weakness.  Eyes: No pain, erythema, or discharge. No blurring of vision.  ENT:  No sore throat, URI symptoms.   Cardiovascular:  No chest pain. No palpitations. No lower extremity edema.  Respiratory:  No shortness of breath, +chronic cough, pleuritic chest pain. No hemoptysis. No dyspnea.   GI:  Normal appetite. No nausea, vomiting, diarrhea. No pain. No melena.  Musculoskeletal:  No arthralgias or myalgias.  Neurologic:  No headache. No weakness.    Family History   Problem Relation Age of Onset    Breast cancer Mother     Stroke Mother     Heart disease Mother     Hypertension Mother     Cancer Mother         unspecified    Diabetes Mother         unspecified type    Kidney failure Mother     Arthritis Mother     Kidney disease Mother     Miscarriages / Stillbirths Mother     Heart failure Mother     Diabetes Father         unspecified type     Heart disease Brother     Hypertension Brother     Cancer Brother         unspecified    Diabetes Brother         unspecified type    Kidney disease Brother     Lung cancer Maternal Grandmother         malignant    Alzheimer's disease Maternal Grandfather     Esophageal cancer Paternal Grandmother         malignant    Miscarriages / Stillbirths Sister         Social History     Socioeconomic History    Marital status:    Tobacco Use    Smoking status: Never     Passive exposure: Never    Smokeless tobacco: Never   Vaping Use    Vaping Use: Never used   Substance and Sexual Activity    Alcohol use: Never    Drug use: Never    Sexual activity: Not Currently     Partners: Male     Birth control/protection: Post-menopausal        Past Medical History:   Diagnosis Date    Allergic rhinitis     Anemia     Arthritis     Cancer     Cholelithiasis 2000    Colon polyp     GERD (gastroesophageal reflux disease)     History of transfusion     Hypertension     Insomnia     Lymphoma 1999    malignant    Osteoporosis     Seasonal allergies     Stomach cancer 2005    malignant    Stomach disorder     Ulcer (traumatic) of oral mucosa     Vitamin D deficiency         Immunization History   Administered Date(s) Administered    COVID-19 (PFIZER) Purple Cap Monovalent 03/10/2021, 03/31/2021, 10/22/2021    Covid-19 (Pfizer) Gray Cap Monovalent 04/16/2022    Flu Vaccine Quad PF >36MO 09/26/2020    FluMist 2-49yrs 09/26/2020    Fluzone (or Fluarix & Flulaval for VFC) >6mos 09/17/2021    Fluzone High-Dose 65+yrs 10/04/2023    Fluzone Quad >6mos (Multi-dose) 09/26/2020    Pneumococcal Conjugate 20-Valent (PCV20) 11/23/2022    Shingrix 12/19/2021, 03/11/2022       Allergies   Allergen Reactions    Heparin Other (See Comments)     Fever with chills          Current Outpatient Medications:     acetaminophen (TYLENOL) 325 MG tablet, Take 2 tablets by mouth Every 6 (Six) Hours As Needed., Disp: , Rfl:     albuterol (PROVENTIL) (2.5  MG/3ML) 0.083% nebulizer solution, Take 2.5 mg by nebulization Every 4 (Four) Hours As Needed for Wheezing., Disp: 90 each, Rfl: 2    azelastine (ASTELIN) 0.1 % nasal spray, , Disp: , Rfl:     brompheniramine-pseudoephedrine-DM 30-2-10 MG/5ML syrup, Take 5 mL by mouth 4 (Four) Times a Day As Needed for Allergies., Disp: 473 mL, Rfl: 0    Calcium Carb-Cholecalciferol (Os-Nick Calcium + D3) 500-5 MG-MCG tablet per tablet, Take 1 tablet by mouth 2 (Two) Times a Day., Disp: 180 tablet, Rfl: 1    celecoxib (CeleBREX) 200 MG capsule, Take 1 capsule by mouth Daily., Disp: 90 capsule, Rfl: 1    cetirizine (zyrTEC) 10 MG tablet, , Disp: , Rfl:     cyanocobalamin 1000 MCG/ML injection, , Disp: , Rfl:     cyclobenzaprine (FLEXERIL) 10 MG tablet, Take 1 tablet by mouth 3 (Three) Times a Day As Needed for Muscle Spasms., Disp: 30 tablet, Rfl: 2    Diclofenac Sodium (VOLTAREN) 1 % gel gel, Apply 2 g topically to the appropriate area as directed 4 (Four) Times a Day As Needed (knee pain)., Disp: 100 g, Rfl: 1    diphenhydrAMINE (BENADRYL) 25 mg capsule, Take 1 capsule by mouth Every 6 (Six) Hours As Needed., Disp: , Rfl:     DULoxetine (CYMBALTA) 30 MG capsule, Take 1 capsule by mouth 2 (Two) Times a Day., Disp: 60 capsule, Rfl: 2    EPINEPHrine (EPIPEN) 0.3 MG/0.3ML solution auto-injector injection, , Disp: , Rfl:     famotidine (PEPCID) 40 MG tablet, Take 1 tablet by mouth Daily., Disp: 90 tablet, Rfl: 2    fluticasone (FLONASE) 50 MCG/ACT nasal spray, , Disp: , Rfl:     folic acid (FOLVITE) 1 MG tablet, Take 1 tablet by mouth Daily., Disp: 90 tablet, Rfl: 3    gabapentin (NEURONTIN) 300 MG capsule, Take 2 capsules by mouth every night at bedtime., Disp: , Rfl:     Iron-Vitamin C (Vitron-C)  MG tablet, , Disp: , Rfl:     lisinopril (PRINIVIL,ZESTRIL) 10 MG tablet, Take 0.5 tablets by mouth Daily., Disp: , Rfl:     multivitamins-minerals (PRESERVISION AREDS 2) capsule capsule, , Disp: , Rfl:     olopatadine (PATANOL) 0.1 %  "ophthalmic solution, 1 drop., Disp: , Rfl:     pantoprazole (PROTONIX) 40 MG EC tablet, Take 1 tablet by mouth Daily., Disp: 90 tablet, Rfl: 1    Premarin 0.625 MG/GM vaginal cream, Insert 2 g into the vagina As Needed (Vaginal irritation)., Disp: 30 g, Rfl: 2    Pyridoxine HCl (VITAMIN B-6 PO), , Disp: , Rfl:     zolpidem CR (AMBIEN CR) 6.25 MG CR tablet, Take 1 tablet by mouth At Night As Needed for Sleep., Disp: 90 tablet, Rfl: 0    Current Facility-Administered Medications:     albuterol (PROVENTIL) nebulizer solution 0.042% 1.25 mg/3mL, 1.25 mg, Nebulization, Q6H PRN, Marzena Lim, GUNJAN     Objective     Vital Signs:   /95 (BP Location: Left arm, Patient Position: Sitting, Cuff Size: Adult)   Pulse 92   Resp 18   Ht 160 cm (63\")   Wt 83.9 kg (185 lb)   SpO2 98% Comment: Room air  BMI 32.77 kg/m²     Physical Exam  Vitals:    02/20/24 1504   BP: 142/95   Pulse: 92   Resp: 18   SpO2: 98%       General: Alert, NAD  HEENT:  EOMI, no sinus tenderness  Neck:  Supple, no thyromegaly,  no JVD  Lymph: no cervical, supraclavicular lymphadenopathy bilaterally  Chest:  clear to auscultation bilaterally, no wheezing or crackles; no work of breathing noted  CV: RRR, no M/G/R, pulses 2+, equal.  Abd:  Soft, NT, ND, +BS  EXT:  no clubbing, no cyanosis, no edema b/l  Neuro:  A&Ox3, CN grossly intact, no focal deficits  Skin: No rashes or lesions noted       Result Review :   I have personally reviewed patient's labs and images.          Assessment and Plan      Patient Active Problem List   Diagnosis    Anemia    Arthritis    Cancer    GERD (gastroesophageal reflux disease)    Stomach disorder    Hypertension    Insomnia    Osteoporosis    Seasonal allergic rhinitis    Ulcerative lesion    Vitamin D deficiency    Lymphoma    Aftercare following surgery of Left Total Knee Arthroplasty, 11/4/2020    Rheumatoid arthritis involving multiple sites with positive rheumatoid factor    Menopausal disorder    S/P TKR " (total knee replacement), left    Lymphadenopathy    Acute left-sided low back pain with left-sided sciatica    Herpes zoster without complication       Impression and Plan:    Chronic cough  History of pseudomonal pneumonia  Abnormal chest CT findings  Bronchiectasis  Never smoker      -Chest CT done 2/5/2024 showed stable chronic bronchiectasis and atelectasis of right upper lobe, right middle lobe, and lingula.  There is also bronchiectasis with bronchial wall thickening in the lower lobe that appears to be worse compared to previous study.  Tree-in-bud opacities and airspace opacity lower lobe compatible with bronchiolitis and atelectasis/pneumonia.  -Will need to optimize airway clearance, will start with flutter valve; patient may need chest vest therapy in the future  -Patient has failed outpatient oral antibiotics twice.  If symptoms worsen, consider inpatient IV antibiotics  -Will start David nebs every other month  -Will order PFTs  -Follow-up in 3 months or earlier as needed    Vaccination status: Patient is up-to-date with her COVID, flu, and pneumonia vaccinations at this time    Medications personally reviewed.    Follow Up   No follow-ups on file.  Patient was given instructions and counseling regarding her condition or for health maintenance advice. Please see specific information pulled into the AVS if appropriate.         Attending Attestation (For Attendings USE Only)...

## 2024-02-26 ENCOUNTER — EMERGENCY (EMERGENCY)
Facility: HOSPITAL | Age: 54
LOS: 0 days | Discharge: HOME | End: 2024-02-26
Attending: EMERGENCY MEDICINE
Payer: MEDICAID

## 2024-02-26 VITALS
RESPIRATION RATE: 17 BRPM | DIASTOLIC BLOOD PRESSURE: 81 MMHG | SYSTOLIC BLOOD PRESSURE: 139 MMHG | WEIGHT: 179.9 LBS | TEMPERATURE: 98 F | OXYGEN SATURATION: 97 % | HEART RATE: 115 BPM | HEIGHT: 66 IN

## 2024-02-26 DIAGNOSIS — M25.561 PAIN IN RIGHT KNEE: ICD-10-CM

## 2024-02-26 DIAGNOSIS — Z88.8 ALLERGY STATUS TO OTHER DRUGS, MEDICAMENTS AND BIOLOGICAL SUBSTANCES: ICD-10-CM

## 2024-02-26 DIAGNOSIS — I10 ESSENTIAL (PRIMARY) HYPERTENSION: ICD-10-CM

## 2024-02-26 DIAGNOSIS — Z91.018 ALLERGY TO OTHER FOODS: ICD-10-CM

## 2024-02-26 DIAGNOSIS — M25.562 PAIN IN LEFT KNEE: ICD-10-CM

## 2024-02-26 DIAGNOSIS — Z88.0 ALLERGY STATUS TO PENICILLIN: ICD-10-CM

## 2024-02-26 DIAGNOSIS — F25.9 SCHIZOAFFECTIVE DISORDER, UNSPECIFIED: ICD-10-CM

## 2024-02-26 DIAGNOSIS — E11.9 TYPE 2 DIABETES MELLITUS WITHOUT COMPLICATIONS: ICD-10-CM

## 2024-02-26 DIAGNOSIS — Z91.013 ALLERGY TO SEAFOOD: ICD-10-CM

## 2024-02-26 DIAGNOSIS — E78.5 HYPERLIPIDEMIA, UNSPECIFIED: ICD-10-CM

## 2024-02-26 PROCEDURE — 99282 EMERGENCY DEPT VISIT SF MDM: CPT

## 2024-02-26 PROCEDURE — 99283 EMERGENCY DEPT VISIT LOW MDM: CPT

## 2024-02-26 RX ORDER — ACETAMINOPHEN 500 MG
650 TABLET ORAL ONCE
Refills: 0 | Status: COMPLETED | OUTPATIENT
Start: 2024-02-26 | End: 2024-02-26

## 2024-02-26 RX ADMIN — Medication 650 MILLIGRAM(S): at 13:33

## 2024-02-26 NOTE — ED PROVIDER NOTE - PHYSICAL EXAMINATION
CONSTITUTIONAL: Well-appearing; in no apparent distress.   HEAD: Normocephalic; atraumatic.   EYES: Pupils are round and reactive, extra-ocular muscles are intact. Eyelids are normal in appearance without swelling or lesions.   ENT: Hearing is intact with good acuity to spoken voice.  Patient is speaking clearly, not muffled and airway is intact.   RESPIRATORY: No signs of respiratory distress. Lung sounds are clear in all lobes bilaterally without rales, rhonchi, or wheezes.  CARDIOVASCULAR: Regular rate and rhythm.   GI: Abdomen is soft, non-tender, and without distention. Bowel sounds are present and normoactive in all four quadrants. No masses are noted.   BACK: No evidence of trauma or deformity. No midline tenderness. Normal ROM.   MS: B/L knee with no obvious deformity or swelling; nontender to palpation; full ROM; sensory function intact; distal pulse present. Rest of the upper and lower extremities unremarkable. Steady gait noted.   NEURO: A & O x 3. Normal speech. No focal deficit.  PSYCHOLOGICAL: Appropriate mood and affect. Good judgement and insight.

## 2024-02-26 NOTE — ED PROVIDER NOTE - OBJECTIVE STATEMENT
53-year-old male with a past medical history of schizoaffective disorder, diabetes, hypertension, and hyperlipidemia who presents to the ED for evaluation.  Reports that he was walking outside and tripped and landed on his bilateral knees prior to arrival; pain is intermittent, worse with movement.  Denies head/neck injury, LOC, anticoagulant use.  Denies pain or discomfort or injury elsewhere.

## 2024-02-26 NOTE — ED PROVIDER NOTE - PATIENT PORTAL LINK FT
You can access the FollowMyHealth Patient Portal offered by [facility] by registering at the following website: [email or web address]. By joining [employer]’s FollowMyHealth portal, you will also be able to view your health information using other applications (apps) compatible with our system.

## 2024-02-26 NOTE — ED PROVIDER NOTE - CLINICAL SUMMARY MEDICAL DECISION MAKING FREE TEXT BOX
53-year-old male presents to the ED status post fall to his knees and reports of bilateral knee pain.  Full range of motion of bilateral knee with flexion extension that is 5 out of 5.  Sensation is intact.  Distal pulses present.  Patient is able to ambulate with no gait dysfunction.  Currently no evidence of traumatic injury and need for x-rays.  Tylenol given for pain.  Discharged.

## 2024-02-26 NOTE — ED PROVIDER NOTE - NSFOLLOWUPINSTRUCTIONS_ED_ALL_ED_FT
Musculoskeletal Pain    Musculoskeletal pain is muscle and mynor aches and pains. These pains can occur in any part of the body. Your caregiver may treat you without knowing the cause of the pain. They may treat you if blood or urine tests, X-rays, and other tests were normal.     CAUSES  There is often not a definite cause or reason for these pains. These pains may be caused by a type of germ (virus). The discomfort may also come from overuse. Overuse includes working out too hard when your body is not fit. Mynor aches also come from weather changes. Bone is sensitive to atmospheric pressure changes.    HOME CARE INSTRUCTIONS  Ask when your test results will be ready. Make sure you get your test results.  Only take over-the-counter or prescription medicines for pain, discomfort, or fever as directed by your caregiver. If you were given medications for your condition, do not drive, operate machinery or power tools, or sign legal documents for 24 hours. Do not drink alcohol. Do not take sleeping pills or other medications that may interfere with treatment.  Continue all activities unless the activities cause more pain. When the pain lessens, slowly resume normal activities. Gradually increase the intensity and duration of the activities or exercise.   During periods of severe pain, bed rest may be helpful. Lay or sit in any position that is comfortable.   Putting ice on the injured area.  Put ice in a bag.   Place a towel between your skin and the bag.  Leave the ice on for 15 to 20 minutes, 3 to 4 times a day.   Follow up with your caregiver for continued problems and no reason can be found for the pain. If the pain becomes worse or does not go away, it may be necessary to repeat tests or do additional testing. Your caregiver may need to look further for a possible cause.    SEEK IMMEDIATE MEDICAL CARE IF:  You have pain that is getting worse and is not relieved by medications.  You develop chest pain that is associated with shortness or breath, sweating, feeling sick to your stomach (nauseous), or throw up (vomit).  Your pain becomes localized to the abdomen.  You develop any new symptoms that seem different or that concern you.    MAKE SURE YOU:  Understand these instructions.   Will watch your condition.  Will get help right away if you are not doing well or get worse.    ADDITIONAL NOTES AND INSTRUCTIONS    Please follow up with your Primary MD in 24-48 hr.  Seek immediate medical care for any new/worsening signs or symptoms.

## 2024-02-28 ENCOUNTER — OUTPATIENT (OUTPATIENT)
Dept: OUTPATIENT SERVICES | Facility: HOSPITAL | Age: 54
LOS: 1 days | End: 2024-02-28
Payer: MEDICAID

## 2024-02-28 ENCOUNTER — APPOINTMENT (OUTPATIENT)
Dept: PODIATRY | Facility: CLINIC | Age: 54
End: 2024-02-28
Payer: MEDICAID

## 2024-02-28 DIAGNOSIS — B35.1 TINEA UNGUIUM: ICD-10-CM

## 2024-02-28 DIAGNOSIS — Z00.00 ENCOUNTER FOR GENERAL ADULT MEDICAL EXAMINATION WITHOUT ABNORMAL FINDINGS: ICD-10-CM

## 2024-02-28 PROCEDURE — 99203 OFFICE O/P NEW LOW 30 MIN: CPT

## 2024-03-01 ENCOUNTER — EMERGENCY (EMERGENCY)
Facility: HOSPITAL | Age: 54
LOS: 0 days | Discharge: HOME | End: 2024-03-01
Attending: STUDENT IN AN ORGANIZED HEALTH CARE EDUCATION/TRAINING PROGRAM
Payer: MEDICAID

## 2024-03-01 VITALS
SYSTOLIC BLOOD PRESSURE: 135 MMHG | WEIGHT: 179.9 LBS | HEART RATE: 80 BPM | TEMPERATURE: 98 F | RESPIRATION RATE: 18 BRPM | HEIGHT: 66 IN | OXYGEN SATURATION: 98 % | DIASTOLIC BLOOD PRESSURE: 70 MMHG

## 2024-03-01 DIAGNOSIS — R47.82 FLUENCY DISORDER IN CONDITIONS CLASSIFIED ELSEWHERE: ICD-10-CM

## 2024-03-01 DIAGNOSIS — F25.9 SCHIZOAFFECTIVE DISORDER, UNSPECIFIED: ICD-10-CM

## 2024-03-01 DIAGNOSIS — Z88.8 ALLERGY STATUS TO OTHER DRUGS, MEDICAMENTS AND BIOLOGICAL SUBSTANCES: ICD-10-CM

## 2024-03-01 DIAGNOSIS — R25.1 TREMOR, UNSPECIFIED: ICD-10-CM

## 2024-03-01 DIAGNOSIS — I10 ESSENTIAL (PRIMARY) HYPERTENSION: ICD-10-CM

## 2024-03-01 DIAGNOSIS — Z91.018 ALLERGY TO OTHER FOODS: ICD-10-CM

## 2024-03-01 DIAGNOSIS — Z88.0 ALLERGY STATUS TO PENICILLIN: ICD-10-CM

## 2024-03-01 DIAGNOSIS — Z91.013 ALLERGY TO SEAFOOD: ICD-10-CM

## 2024-03-01 DIAGNOSIS — E78.5 HYPERLIPIDEMIA, UNSPECIFIED: ICD-10-CM

## 2024-03-01 DIAGNOSIS — E11.9 TYPE 2 DIABETES MELLITUS WITHOUT COMPLICATIONS: ICD-10-CM

## 2024-03-01 PROCEDURE — 99282 EMERGENCY DEPT VISIT SF MDM: CPT

## 2024-03-01 PROCEDURE — 99283 EMERGENCY DEPT VISIT LOW MDM: CPT

## 2024-03-01 NOTE — ED PROVIDER NOTE - PHYSICAL EXAMINATION
CONSTITUTIONAL: Well-appearing; in no apparent distress.   HEAD: Normocephalic; atraumatic.   EYES: Pupils are round and reactive, extra-ocular muscles are intact. Eyelids are normal in appearance without swelling or lesions.   ENT: Hearing is intact with good acuity to spoken voice.  Patient is speaking clearly, not muffled and airway is intact.   RESPIRATORY: No signs of respiratory distress. Lung sounds are clear in all lobes bilaterally without rales, rhonchi, or wheezes.  CARDIOVASCULAR: Regular rate and rhythm.   GI: Abdomen is soft, non-tender, and without distention. Bowel sounds are present and normoactive in all four quadrants. No masses are noted.   NEURO: A & O x 3. Visual fields are full to confrontation. Pupils are equally reactive to light. Extraocular movement is intact. There is no eye deviation. Facial sensation is intact to light touch. Face is symmetric with normal eye closure and smile. Hearing is normal to spoken voice. Phonation is normal. No upper or lower extremity drift.  PSYCHOLOGICAL: Appropriate mood and affect. Good judgement and insight.

## 2024-03-01 NOTE — ED ADULT NURSE NOTE - CHIEF COMPLAINT QUOTE
Pt BIBA from [facility] c/o of not being "able to find his words" and heavy tongue. Pt states he felt different starting x1 month ago.

## 2024-03-01 NOTE — ED ADULT TRIAGE NOTE - CHIEF COMPLAINT QUOTE
Pt BIBA from [facility] c/o of not being "able to find his words" and heavy tongue. Pt states he felt different starting x1 month ago.

## 2024-03-01 NOTE — ED PROVIDER NOTE - PATIENT PORTAL LINK FT
You can access the FollowMyHealth Patient Portal offered by [facility] by registering at the following website: [email or web address]. By joining [facility]’s FollowMyHealth portal, you will also be able to view your health information using other applications (apps) compatible with our system.

## 2024-03-01 NOTE — ED PROVIDER NOTE - OBJECTIVE STATEMENT
53-year-old male with past medical history of schizoaffective disorder, diabetes, hypertension, and hyperlipidemia who was sent in from his facility for evaluation.  Patient does not know exactly why he is in the hospital.  I spoke with the staff from his unit and was told that patient has a tendency of visiting his previous unit; staff from that unit noticed that patient was tuning out and 1 episode of shivering, so patient was sent to the ED.  The staff from his current unit reports that patient binge drink several bottles of soda before going to the other unit and was at his baseline before he was sent to the ED. Denies fever, shortness of breath, chest pain, nausea, vomiting, abdominal pain, urinary symptoms, change in bowel movement, slurred speech, vision change, and focal weakness.

## 2024-03-01 NOTE — ED PROVIDER NOTE - PROGRESS NOTE DETAILS
Physical exam is benign and the patient reports that he is at his baseline and does not know why he was sent to the hospital and is requesting to be discharged.

## 2024-03-01 NOTE — ED ADULT NURSE REASSESSMENT NOTE - NS ED NURSE REASSESS COMMENT FT1
received handoff from RN. Pt ANOx3, RR even and unlabored, no distress noted at this time. awaiting transport back to facility.

## 2024-03-01 NOTE — ED ADULT NURSE NOTE - NSFALLUNIVINTERV_ED_ALL_ED
Bed/Stretcher in lowest position, wheels locked, appropriate side rails in place/Call bell, personal items and telephone in reach/Instruct patient to call for assistance before getting out of bed/chair/stretcher/Non-slip footwear applied when patient is off stretcher/[place] to call system/Physically safe environment - no spills, clutter or unnecessary equipment/Purposeful proactive rounding/Room/bathroom lighting operational, light cord in reach

## 2024-03-01 NOTE — ED ADULT NURSE NOTE - OBJECTIVE STATEMENT
Pt BIBA from [facility] c/o of not being "able to find his words" and heavy tongue. Pt states he felt different starting x1 month ago.

## 2024-03-01 NOTE — ED ADULT NURSE NOTE - CAS TRG GEN SKIN CONDITION
Warm/Dry Patient Specific Otc Recommendations (Will Not Stick From Patient To Patient): [place] Detail Level: Zone

## 2024-03-01 NOTE — ED ADULT NURSE NOTE - TEMPLATE LIST FOR HEAD TO TOE ASSESSMENT
Detail Level: Detailed Continue Regimen: HcC2.5 % cream mixed with lamisil qd Render In Strict Bullet Format?: No General

## 2024-03-01 NOTE — ED PROVIDER NOTE - CLINICAL SUMMARY MEDICAL DECISION MAKING FREE TEXT BOX
52 yo male, PMHx of schizoaffective disorder, diabetes, hypertension, and hyperlipidemia, presenting for evaluation. He states he felt like he was stuttering his words when he bends over for the last month but now feels fine and would like to go home. Denies headache, dizziness, nausea, vomiting, abdominal pain, chest pain, shortness of breath. Discussed with [facility] who states patient was sent in for evaluation because he was up on the wrong floor in the facility but has been acting per baseline. Discussed return precautions and follow up outpatient. Patient would like to return to his residence and is agreeable to plan.

## 2024-03-04 ENCOUNTER — APPOINTMENT (OUTPATIENT)
Dept: INTERNAL MEDICINE | Facility: CLINIC | Age: 54
End: 2024-03-04

## 2024-03-04 ENCOUNTER — OUTPATIENT (OUTPATIENT)
Dept: OUTPATIENT SERVICES | Facility: HOSPITAL | Age: 54
LOS: 1 days | End: 2024-03-04
Payer: MEDICAID

## 2024-03-04 VITALS
WEIGHT: 190 LBS | HEART RATE: 120 BPM | TEMPERATURE: 97.6 F | DIASTOLIC BLOOD PRESSURE: 83 MMHG | BODY MASS INDEX: 31.65 KG/M2 | SYSTOLIC BLOOD PRESSURE: 138 MMHG | OXYGEN SATURATION: 100 % | HEIGHT: 65 IN

## 2024-03-04 DIAGNOSIS — F31.9 BIPOLAR DISORDER, UNSPECIFIED: ICD-10-CM

## 2024-03-04 DIAGNOSIS — E11.9 TYPE 2 DIABETES MELLITUS W/OUT COMPLICATIONS: ICD-10-CM

## 2024-03-04 DIAGNOSIS — Z00.00 ENCOUNTER FOR GENERAL ADULT MEDICAL EXAMINATION WITHOUT ABNORMAL FINDINGS: ICD-10-CM

## 2024-03-04 DIAGNOSIS — I10 ESSENTIAL (PRIMARY) HYPERTENSION: ICD-10-CM

## 2024-03-04 DIAGNOSIS — M25.519 PAIN IN UNSPECIFIED SHOULDER: ICD-10-CM

## 2024-03-04 DIAGNOSIS — E78.5 HYPERLIPIDEMIA, UNSPECIFIED: ICD-10-CM

## 2024-03-04 DIAGNOSIS — K59.00 CONSTIPATION, UNSPECIFIED: ICD-10-CM

## 2024-03-04 DIAGNOSIS — Z00.00 ENCOUNTER FOR GENERAL ADULT MEDICAL EXAMINATION W/OUT ABNORMAL FINDINGS: ICD-10-CM

## 2024-03-04 PROBLEM — B35.1 ONYCHOMYCOSIS: Status: ACTIVE | Noted: 2024-03-04

## 2024-03-04 PROCEDURE — 99204 OFFICE O/P NEW MOD 45 MIN: CPT

## 2024-03-04 RX ORDER — LEVOTHYROXINE SODIUM 0.03 MG/1
25 TABLET ORAL DAILY
Qty: 30 | Refills: 3 | Status: ACTIVE | COMMUNITY
Start: 2024-03-04 | End: 1900-01-01

## 2024-03-04 RX ORDER — CLOZAPINE 100 MG/1
100 TABLET, ORALLY DISINTEGRATING ORAL
Refills: 0 | Status: ACTIVE | COMMUNITY
Start: 2024-03-04

## 2024-03-04 RX ORDER — DOCUSATE SODIUM 100 MG/1
100 CAPSULE ORAL TWICE DAILY
Qty: 60 | Refills: 3 | Status: ACTIVE | COMMUNITY
Start: 2024-03-04 | End: 1900-01-01

## 2024-03-04 RX ORDER — BISACODYL 5 MG/1
5 TABLET, DELAYED RELEASE ORAL
Qty: 30 | Refills: 3 | Status: ACTIVE | COMMUNITY
Start: 2024-03-04 | End: 1900-01-01

## 2024-03-04 RX ORDER — BENZTROPINE MESYLATE 1 MG/1
1 TABLET ORAL TWICE DAILY
Refills: 0 | Status: ACTIVE | COMMUNITY
Start: 2024-03-04

## 2024-03-04 RX ORDER — METOPROLOL SUCCINATE 25 MG/1
25 TABLET, EXTENDED RELEASE ORAL DAILY
Qty: 30 | Refills: 3 | Status: ACTIVE | COMMUNITY
Start: 2024-03-04 | End: 1900-01-01

## 2024-03-04 RX ORDER — LORAZEPAM 2 MG/1
2 TABLET ORAL DAILY
Refills: 0 | Status: ACTIVE | COMMUNITY
Start: 2024-03-04

## 2024-03-04 RX ORDER — METFORMIN HYDROCHLORIDE 1000 MG/1
1000 TABLET, COATED ORAL
Qty: 180 | Refills: 3 | Status: ACTIVE | COMMUNITY
Start: 2024-03-04 | End: 1900-01-01

## 2024-03-04 RX ORDER — ACETAMINOPHEN 325 MG/1
325 TABLET, FILM COATED ORAL
Qty: 120 | Refills: 0 | Status: ACTIVE | COMMUNITY
Start: 2024-03-04 | End: 1900-01-01

## 2024-03-04 RX ORDER — ATORVASTATIN CALCIUM 40 MG/1
40 TABLET, FILM COATED ORAL
Qty: 30 | Refills: 3 | Status: ACTIVE | COMMUNITY
Start: 2024-03-04 | End: 1900-01-01

## 2024-03-04 RX ORDER — DIVALPROEX SODIUM 500 MG/1
500 TABLET, DELAYED RELEASE ORAL
Refills: 0 | Status: ACTIVE | COMMUNITY
Start: 2024-03-04

## 2024-03-04 NOTE — PHYSICAL EXAM
[1+] : left foot dorsalis pedis 1+ [Diminished Throughout Right Foot] : diminished sensation with monofilament testing throughout right foot [Diminished Throughout Left Foot] : diminished sensation with monofilament testing throughout left foot [FreeTextEntry1] : elongated mycotic nails x10.  xerosis b/l feet.  [Oriented To Time, Place, And Person] : oriented to person, place, and time [Impaired Insight] : insight and judgment were intact

## 2024-03-04 NOTE — ASSESSMENT
[FreeTextEntry1] : 53 years old male patient with PMH of DM, HTN, HLD, hypothyroidism? bipolar disorder came here to establish care from [facility].   Patient accompanied by Mr Renard (case management).  Reports no active complaints.     #Hypertension  - Pt tachycardic on exam to . ekg obtained, sinus tachycardia.  - cw home meds - metoprolol   #DM - fu a1c  - fu opthalmology, fu Podiatery  - cw home meds , metformin   #Hypothyroidism - fu tsh level. - cw synthroid   #HLD - fu lipid panel  - cw home meds . lipitor  #Bipolar and schizoaffective disorder - fu with psychiatrist  - cw paliperidone, clozapine, lorazepam, depakote, benztropine     #HCM - Flu vaccine-- as per pt he already took it  - Colonoscopy --- refer to GI for screening colonoscopy

## 2024-03-04 NOTE — HISTORY OF PRESENT ILLNESS
[FreeTextEntry1] : To establish care  [de-identified] : 53 years old male patient with PMH of DM, HTN, HLD, hypothyroidism? bipolar disorder came here to establish care from [facility].   Patient accompanied by Mr Glynn (case management).  Reports no active complaints.

## 2024-03-04 NOTE — END OF VISIT
[] : Resident [Resident] : Resident [FreeTextEntry3] : diabetic neurovascular exam performed. agree with above findings. [FreeTextEntry2] : -nails debrided to patients tolerance

## 2024-03-04 NOTE — ASSESSMENT
[FreeTextEntry1] : AMA diabetic risk class classification 1 nails sharply debrided x10. using large .  + LOPS, - no hx amp, no PAD.   RTC 3 months for routine nail care.

## 2024-03-06 DIAGNOSIS — M25.519 PAIN IN UNSPECIFIED SHOULDER: ICD-10-CM

## 2024-03-06 DIAGNOSIS — E03.9 HYPOTHYROIDISM, UNSPECIFIED: ICD-10-CM

## 2024-03-06 DIAGNOSIS — F31.9 BIPOLAR DISORDER, UNSPECIFIED: ICD-10-CM

## 2024-03-06 DIAGNOSIS — E11.9 TYPE 2 DIABETES MELLITUS WITHOUT COMPLICATIONS: ICD-10-CM

## 2024-03-06 DIAGNOSIS — I10 ESSENTIAL (PRIMARY) HYPERTENSION: ICD-10-CM

## 2024-03-06 DIAGNOSIS — E78.5 HYPERLIPIDEMIA, UNSPECIFIED: ICD-10-CM
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
## 2024-03-06 DIAGNOSIS — B35.1 TINEA UNGUIUM: ICD-10-CM

## 2024-03-06 DIAGNOSIS — K59.00 CONSTIPATION, UNSPECIFIED: ICD-10-CM

## 2024-03-06 DIAGNOSIS — Z00.00 ENCOUNTER FOR GENERAL ADULT MEDICAL EXAMINATION WITHOUT ABNORMAL FINDINGS: ICD-10-CM
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
## 2024-03-08 ENCOUNTER — APPOINTMENT (OUTPATIENT)
Dept: INTERNAL MEDICINE | Facility: CLINIC | Age: 54
End: 2024-03-08

## 2024-03-13 ENCOUNTER — LABORATORY RESULT (OUTPATIENT)
Age: 54
End: 2024-03-13

## 2024-03-13 ENCOUNTER — OUTPATIENT (OUTPATIENT)
Dept: OUTPATIENT SERVICES | Facility: HOSPITAL | Age: 54
LOS: 1 days | End: 2024-03-13
Payer: MEDICAID

## 2024-03-13 DIAGNOSIS — I10 ESSENTIAL (PRIMARY) HYPERTENSION: ICD-10-CM

## 2024-03-13 PROCEDURE — 82306 VITAMIN D 25 HYDROXY: CPT

## 2024-03-13 PROCEDURE — 83036 HEMOGLOBIN GLYCOSYLATED A1C: CPT

## 2024-03-13 PROCEDURE — 84439 ASSAY OF FREE THYROXINE: CPT

## 2024-03-13 PROCEDURE — 82043 UR ALBUMIN QUANTITATIVE: CPT

## 2024-03-13 PROCEDURE — 80053 COMPREHEN METABOLIC PANEL: CPT

## 2024-03-13 PROCEDURE — 80061 LIPID PANEL: CPT

## 2024-03-13 PROCEDURE — 85027 COMPLETE CBC AUTOMATED: CPT

## 2024-03-13 PROCEDURE — 84443 ASSAY THYROID STIM HORMONE: CPT

## 2024-03-14 DIAGNOSIS — I10 ESSENTIAL (PRIMARY) HYPERTENSION: ICD-10-CM

## 2024-03-15 LAB
25(OH)D3 SERPL-MCNC: 10 NG/ML
ALBUMIN SERPL ELPH-MCNC: 4.3 G/DL
ALP BLD-CCNC: 80 U/L
ALT SERPL-CCNC: 6 U/L
ANION GAP SERPL CALC-SCNC: 16 MMOL/L
APPEARANCE: CLEAR
AST SERPL-CCNC: 12 U/L
BASOPHILS # BLD AUTO: 0.01 K/UL
BASOPHILS NFR BLD AUTO: 0.1 %
BILIRUB SERPL-MCNC: <0.2 MG/DL
BILIRUBIN URINE: NEGATIVE
BLOOD URINE: NEGATIVE
BUN SERPL-MCNC: 9 MG/DL
CALCIUM SERPL-MCNC: 9.4 MG/DL
CHLORIDE SERPL-SCNC: 99 MMOL/L
CHOLEST SERPL-MCNC: 184 MG/DL
CO2 SERPL-SCNC: 23 MMOL/L
COLOR: YELLOW
CREAT SERPL-MCNC: 0.8 MG/DL
CREAT SPEC-SCNC: 27 MG/DL
EGFR: 106 ML/MIN/1.73M2
EOSINOPHIL # BLD AUTO: 0.1 K/UL
EOSINOPHIL NFR BLD AUTO: 1.4 %
ESTIMATED AVERAGE GLUCOSE: 137 MG/DL
GLUCOSE QUALITATIVE U: NEGATIVE MG/DL
GLUCOSE SERPL-MCNC: 80 MG/DL
HBA1C MFR BLD HPLC: 6.4 %
HCT VFR BLD CALC: 36.2 %
HDLC SERPL-MCNC: 45 MG/DL
HGB BLD-MCNC: 10.9 G/DL
IMM GRANULOCYTES NFR BLD AUTO: 0.6 %
KETONES URINE: NEGATIVE MG/DL
LDLC SERPL CALC-MCNC: 122 MG/DL
LEUKOCYTE ESTERASE URINE: NEGATIVE
LYMPHOCYTES # BLD AUTO: 2.8 K/UL
LYMPHOCYTES NFR BLD AUTO: 38.8 %
MAN DIFF?: NORMAL
MCHC RBC-ENTMCNC: 24.9 PG
MCHC RBC-ENTMCNC: 30.1 G/DL
MCV RBC AUTO: 82.8 FL
MICROALBUMIN 24H UR DL<=1MG/L-MCNC: <1.2 MG/DL
MICROALBUMIN/CREAT 24H UR-RTO: NORMAL MG/G
MONOCYTES # BLD AUTO: 0.82 K/UL
MONOCYTES NFR BLD AUTO: 11.4 %
NEUTROPHILS # BLD AUTO: 3.44 K/UL
NEUTROPHILS NFR BLD AUTO: 47.7 %
NITRITE URINE: NEGATIVE
NONHDLC SERPL-MCNC: 139 MG/DL
PH URINE: 6
PLATELET # BLD AUTO: 242 K/UL
PMV BLD AUTO: 0 /100 WBCS
POTASSIUM SERPL-SCNC: 4.3 MMOL/L
PROT SERPL-MCNC: 7.9 G/DL
PROTEIN URINE: NEGATIVE MG/DL
RBC # BLD: 4.37 M/UL
RBC # FLD: 18.8 %
SODIUM SERPL-SCNC: 138 MMOL/L
SPECIFIC GRAVITY URINE: 1.01
TRIGL SERPL-MCNC: 87 MG/DL
TSH SERPL-ACNC: 8.56 UIU/ML
UROBILINOGEN URINE: 0.2 MG/DL
WBC # FLD AUTO: 7.21 K/UL

## 2024-03-19 NOTE — BH PATIENT PROFILE - NSSBIRTALCACTION/INTER_GEN_A_CORE
Eleazar Renteria called to request a refill on his medication.      Last office visit : 12/28/2023   Next office visit : 3/28/2024     Last UDS:   Amphetamine Screen, Urine   Date Value Ref Range Status   12/28/2023 NEG  Final     Barbiturate Screen, Urine   Date Value Ref Range Status   12/28/2023 NEG  Final     Benzodiazepine Screen, Urine   Date Value Ref Range Status   12/28/2023 POS  Final     Comment:     PT TAKES ATIVAN     Buprenorphine Urine   Date Value Ref Range Status   12/28/2023 NEG  Final     Cocaine Metabolite Screen, Urine   Date Value Ref Range Status   12/28/2023 NEG  Final     Gabapentin Screen, Urine   Date Value Ref Range Status   12/28/2023 N/A  Final     MDMA, Urine   Date Value Ref Range Status   12/28/2023 NEG  Final     Methamphetamine, Urine   Date Value Ref Range Status   12/28/2023 NEG  Final     Opiate Scrn, Ur   Date Value Ref Range Status   12/28/2023 POS  Final     Comment:     PT TAKES PERCOCET     Oxycodone Screen, Ur   Date Value Ref Range Status   12/28/2023 POS  Final     Comment:     PT TAKES PERCOCET     PCP Screen, Urine   Date Value Ref Range Status   12/28/2023 N/A  Final     Propoxyphene Screen, Urine   Date Value Ref Range Status   12/28/2023 N/A  Final     THC Screen, Urine   Date Value Ref Range Status   12/28/2023 NEG  Final     Tricyclic Antidepressants, Urine   Date Value Ref Range Status   06/19/2023 NEG  Final       Last Bryan: 03/05/2024  Medication Contract: 06/19/2023     Requested Prescriptions     Pending Prescriptions Disp Refills    oxyCODONE-acetaminophen (PERCOCET)  MG per tablet [Pharmacy Med Name: OXYCODONE/ACETAMINOPHEN 10-325MG TABLET] 120 tablet 0     Sig: TAKE ONE (1) TABLET BY MOUTH EVERY SIX (6) HOURS AS NEEDED FOR PAIN FOR UP TO 30 DAYS. MAX DAILY AMOUNT: FOUR (4) TABLETS         Please approve or refuse this medication.   Salina Pimentel, WANDAN   None Positive reinforcement

## 2024-03-24 NOTE — PROGRESS NOTE BEHAVIORAL HEALTH - PROBLEM SELECTOR PLAN 1
24-Mar-2024
Continue   - Depakote 500mg Qam/750mg Qhs  - Clozaril 300mg Qam/400mg Qhs  - Risperdal 3mg Qhs  - ativan 2mg Qhs  Further Collateral needed regarding - Invega Sustenna 78mg IM [id number]
Continue  - Depakote 500mg Qam/750mg Qhs  - Clozaril 300mg Qam/400mg Qhs  - Risperdal 3mg Qhs  - ativan 2mg Qhs  - Invega Sustenna 78mg IM [id number]; last injection  next due May 3rd 2022
Continue   - Depakote 500mg Qam/750mg Qhs  - Clozaril 300mg Qam/400mg Qhs  - Risperdal 3mg Qhs  - ativan 2mg Qhs  Further Collateral needed regarding - Invega Sustenna 78mg IM [id number]
Continue   - Depakote 500mg Qam/750mg Qhs  - Clozaril 300mg Qam/400mg Qhs  - Risperdal 3mg Qhs  - ativan 2mg Qhs  Further Collateral needed regarding - Invega Sustenna 78mg IM [id number]

## 2024-04-04 ENCOUNTER — APPOINTMENT (OUTPATIENT)
Dept: ENDOCRINOLOGY | Facility: CLINIC | Age: 54
End: 2024-04-04

## 2024-04-04 ENCOUNTER — OUTPATIENT (OUTPATIENT)
Dept: OUTPATIENT SERVICES | Facility: HOSPITAL | Age: 54
LOS: 1 days | End: 2024-04-04
Payer: MEDICAID

## 2024-04-04 ENCOUNTER — EMERGENCY (EMERGENCY)
Facility: HOSPITAL | Age: 54
LOS: 0 days | Discharge: HOME | End: 2024-04-05
Attending: STUDENT IN AN ORGANIZED HEALTH CARE EDUCATION/TRAINING PROGRAM
Payer: MEDICAID

## 2024-04-04 VITALS
WEIGHT: 193 LBS | BODY MASS INDEX: 32.15 KG/M2 | SYSTOLIC BLOOD PRESSURE: 144 MMHG | DIASTOLIC BLOOD PRESSURE: 94 MMHG | HEART RATE: 118 BPM | HEIGHT: 65 IN

## 2024-04-04 VITALS
WEIGHT: 154.98 LBS | RESPIRATION RATE: 16 BRPM | SYSTOLIC BLOOD PRESSURE: 136 MMHG | OXYGEN SATURATION: 99 % | HEART RATE: 86 BPM | HEIGHT: 66 IN | DIASTOLIC BLOOD PRESSURE: 76 MMHG | TEMPERATURE: 98 F

## 2024-04-04 DIAGNOSIS — R44.0 AUDITORY HALLUCINATIONS: ICD-10-CM

## 2024-04-04 DIAGNOSIS — E03.9 HYPOTHYROIDISM, UNSPECIFIED: ICD-10-CM

## 2024-04-04 DIAGNOSIS — R44.1 VISUAL HALLUCINATIONS: ICD-10-CM

## 2024-04-04 DIAGNOSIS — R45.851 SUICIDAL IDEATIONS: ICD-10-CM

## 2024-04-04 DIAGNOSIS — R00.0 TACHYCARDIA, UNSPECIFIED: ICD-10-CM

## 2024-04-04 DIAGNOSIS — F25.9 SCHIZOAFFECTIVE DISORDER, UNSPECIFIED: ICD-10-CM

## 2024-04-04 DIAGNOSIS — Z88.0 ALLERGY STATUS TO PENICILLIN: ICD-10-CM

## 2024-04-04 DIAGNOSIS — E78.5 HYPERLIPIDEMIA, UNSPECIFIED: ICD-10-CM

## 2024-04-04 DIAGNOSIS — Z91.013 ALLERGY TO SEAFOOD: ICD-10-CM

## 2024-04-04 DIAGNOSIS — Z00.00 ENCOUNTER FOR GENERAL ADULT MEDICAL EXAMINATION WITHOUT ABNORMAL FINDINGS: ICD-10-CM

## 2024-04-04 DIAGNOSIS — E11.9 TYPE 2 DIABETES MELLITUS WITHOUT COMPLICATIONS: ICD-10-CM

## 2024-04-04 DIAGNOSIS — I10 ESSENTIAL (PRIMARY) HYPERTENSION: ICD-10-CM

## 2024-04-04 DIAGNOSIS — Z88.4 ALLERGY STATUS TO ANESTHETIC AGENT: ICD-10-CM

## 2024-04-04 DIAGNOSIS — Z91.014 ALLERGY TO MAMMALIAN MEATS: ICD-10-CM

## 2024-04-04 DIAGNOSIS — Z88.8 ALLERGY STATUS TO OTHER DRUGS, MEDICAMENTS AND BIOLOGICAL SUBSTANCES: ICD-10-CM

## 2024-04-04 LAB
ANION GAP SERPL CALC-SCNC: 13 MMOL/L — SIGNIFICANT CHANGE UP (ref 7–14)
APAP SERPL-MCNC: <5 UG/ML — LOW (ref 10–30)
APPEARANCE UR: CLEAR — SIGNIFICANT CHANGE UP
BASOPHILS # BLD AUTO: 0.02 K/UL — SIGNIFICANT CHANGE UP (ref 0–0.2)
BASOPHILS NFR BLD AUTO: 0.3 % — SIGNIFICANT CHANGE UP (ref 0–1)
BILIRUB UR-MCNC: NEGATIVE — SIGNIFICANT CHANGE UP
BUN SERPL-MCNC: 6 MG/DL — LOW (ref 10–20)
CALCIUM SERPL-MCNC: 9.7 MG/DL — SIGNIFICANT CHANGE UP (ref 8.4–10.4)
CHLORIDE SERPL-SCNC: 102 MMOL/L — SIGNIFICANT CHANGE UP (ref 98–110)
CO2 SERPL-SCNC: 24 MMOL/L — SIGNIFICANT CHANGE UP (ref 17–32)
COLOR SPEC: YELLOW — SIGNIFICANT CHANGE UP
CREAT SERPL-MCNC: 0.8 MG/DL — SIGNIFICANT CHANGE UP (ref 0.7–1.5)
DIFF PNL FLD: NEGATIVE — SIGNIFICANT CHANGE UP
EGFR: 106 ML/MIN/1.73M2 — SIGNIFICANT CHANGE UP
EOSINOPHIL # BLD AUTO: 0.15 K/UL — SIGNIFICANT CHANGE UP (ref 0–0.7)
EOSINOPHIL NFR BLD AUTO: 2.1 % — SIGNIFICANT CHANGE UP (ref 0–8)
ETHANOL SERPL-MCNC: <10 MG/DL — SIGNIFICANT CHANGE UP
GLUCOSE SERPL-MCNC: 133 MG/DL — HIGH (ref 70–99)
GLUCOSE UR QL: NEGATIVE MG/DL — SIGNIFICANT CHANGE UP
HCT VFR BLD CALC: 35.6 % — LOW (ref 42–52)
HGB BLD-MCNC: 10.9 G/DL — LOW (ref 14–18)
IMM GRANULOCYTES NFR BLD AUTO: 0.3 % — SIGNIFICANT CHANGE UP (ref 0.1–0.3)
KETONES UR-MCNC: NEGATIVE MG/DL — SIGNIFICANT CHANGE UP
LEUKOCYTE ESTERASE UR-ACNC: NEGATIVE — SIGNIFICANT CHANGE UP
LYMPHOCYTES # BLD AUTO: 2.52 K/UL — SIGNIFICANT CHANGE UP (ref 1.2–3.4)
LYMPHOCYTES # BLD AUTO: 35.1 % — SIGNIFICANT CHANGE UP (ref 20.5–51.1)
MCHC RBC-ENTMCNC: 25.6 PG — LOW (ref 27–31)
MCHC RBC-ENTMCNC: 30.6 G/DL — LOW (ref 32–37)
MCV RBC AUTO: 83.8 FL — SIGNIFICANT CHANGE UP (ref 80–94)
MONOCYTES # BLD AUTO: 0.66 K/UL — HIGH (ref 0.1–0.6)
MONOCYTES NFR BLD AUTO: 9.2 % — SIGNIFICANT CHANGE UP (ref 1.7–9.3)
NEUTROPHILS # BLD AUTO: 3.81 K/UL — SIGNIFICANT CHANGE UP (ref 1.4–6.5)
NEUTROPHILS NFR BLD AUTO: 53 % — SIGNIFICANT CHANGE UP (ref 42.2–75.2)
NITRITE UR-MCNC: NEGATIVE — SIGNIFICANT CHANGE UP
NRBC # BLD: 0 /100 WBCS — SIGNIFICANT CHANGE UP (ref 0–0)
PH UR: 6.5 — SIGNIFICANT CHANGE UP (ref 5–8)
PLATELET # BLD AUTO: 197 K/UL — SIGNIFICANT CHANGE UP (ref 130–400)
PMV BLD: 10.6 FL — HIGH (ref 7.4–10.4)
POTASSIUM SERPL-MCNC: 3.8 MMOL/L — SIGNIFICANT CHANGE UP (ref 3.5–5)
POTASSIUM SERPL-SCNC: 3.8 MMOL/L — SIGNIFICANT CHANGE UP (ref 3.5–5)
PROT UR-MCNC: NEGATIVE MG/DL — SIGNIFICANT CHANGE UP
RBC # BLD: 4.25 M/UL — LOW (ref 4.7–6.1)
RBC # FLD: 17.6 % — HIGH (ref 11.5–14.5)
SALICYLATES SERPL-MCNC: <0.3 MG/DL — LOW (ref 4–30)
SODIUM SERPL-SCNC: 139 MMOL/L — SIGNIFICANT CHANGE UP (ref 135–146)
SP GR SPEC: 1.01 — SIGNIFICANT CHANGE UP (ref 1–1.03)
UROBILINOGEN FLD QL: 0.2 MG/DL — SIGNIFICANT CHANGE UP (ref 0.2–1)
WBC # BLD: 7.18 K/UL — SIGNIFICANT CHANGE UP (ref 4.8–10.8)
WBC # FLD AUTO: 7.18 K/UL — SIGNIFICANT CHANGE UP (ref 4.8–10.8)

## 2024-04-04 PROCEDURE — 86800 THYROGLOBULIN ANTIBODY: CPT

## 2024-04-04 PROCEDURE — 93005 ELECTROCARDIOGRAM TRACING: CPT

## 2024-04-04 PROCEDURE — 81003 URINALYSIS AUTO W/O SCOPE: CPT

## 2024-04-04 PROCEDURE — 85025 COMPLETE CBC W/AUTO DIFF WBC: CPT

## 2024-04-04 PROCEDURE — 84439 ASSAY OF FREE THYROXINE: CPT

## 2024-04-04 PROCEDURE — 99204 OFFICE O/P NEW MOD 45 MIN: CPT

## 2024-04-04 PROCEDURE — 99285 EMERGENCY DEPT VISIT HI MDM: CPT

## 2024-04-04 PROCEDURE — 80307 DRUG TEST PRSMV CHEM ANLYZR: CPT

## 2024-04-04 PROCEDURE — G0378: CPT

## 2024-04-04 PROCEDURE — 36415 COLL VENOUS BLD VENIPUNCTURE: CPT

## 2024-04-04 PROCEDURE — 93010 ELECTROCARDIOGRAM REPORT: CPT

## 2024-04-04 PROCEDURE — 80048 BASIC METABOLIC PNL TOTAL CA: CPT

## 2024-04-04 PROCEDURE — 96372 THER/PROPH/DIAG INJ SC/IM: CPT

## 2024-04-04 PROCEDURE — 84443 ASSAY THYROID STIM HORMONE: CPT

## 2024-04-04 NOTE — HISTORY OF PRESENT ILLNESS
[FreeTextEntry1] : 53 years old male patient with PMH of DM, HTN, HLD, hypothyroidism bipolar disorder came here to establish care from [facility]. Mr. Levi is accompanied by his  Mr. Renard Shelton. As per the discussion with them there is some concern tthat Mr. levi may not be getting his levothyroxine regularly as he sometimes does not wake up early in  the morning and his meds are administered by different staff members for the morning, afternoon and evenign dose.   Fax number [phone] email for  : [email or web address]

## 2024-04-04 NOTE — ED PROVIDER NOTE - PHYSICAL EXAMINATION
VITAL SIGNS: I have reviewed nursing notes and confirm.  CONSTITUTIONAL: non-toxic, well appearing  SKIN: no rash, no petechiae.  EYES: pink conjunctiva, anicteric  ENT: tongue midline, no exudates, MMM  NECK: Supple; no meningismus  CARD: RRR, no murmurs, equal radial pulses bilaterally 2+  RESP: CTAB, no respiratory distress  ABD: Soft, non-tender  EXT: Normal ROM x4. No edema.   NEURO: Alert, oriented, nl gait.  PSYCH: Cooperative, appropriate.

## 2024-04-04 NOTE — ED PROVIDER NOTE - ATTENDING CONTRIBUTION TO CARE
53 year old male, single, no known dependents, disabled, domiciled at [facility] ([phone]), with PMH of DM, HLD, HTN, and hypothyroidism, past psychiatric history of schizoaffective disorder on Clozaril, from [facility] Presenting today for auditory and visual hallucinations.  Patient states that he stopped taking his medications 2 to 3 days prior.  Since then has had auditory hallucinations, visual sedations, suicidal ideations as well as homicidal ideations.  Denies any medical complaints.     vss, nontoxic, well appearing, pink conj, anicteric, MMM, neck supple, CTAB, RRR, equal radial pulses bilat, abd soft/nt/nd, no cva tend. no calves tend, no edema, no fnd. no rashes.

## 2024-04-04 NOTE — ED PROVIDER NOTE - PROGRESS NOTE DETAILS
Authored by Dr. Irvin: consulted telepsych DC: Patient endorsed to Dr. Barakat. TN - pt set up for telepsych, became agitated and attempted to walk out; patient redirected and brought back to room for assessment by telepsych. patient was sitting comfortably   momentarily, patient began banging his head against the wall; verbally redirected, however patient then began slamming his hands tables; patient given ativan, and he calmed down.     patient moved to stretcher. Authored by Dr. Irvin: telepsych recommends reevaluation in AM as pt was too sleepy and unarouseable during exam

## 2024-04-04 NOTE — ED PROVIDER NOTE - OBJECTIVE STATEMENT
53 year old male w PMHx of DM, HLD, HTN, and hypothyroidism, past psychiatric history of schizoaffective disorder on Clozaril, from [facility] presenting today for auditory and visual hallucinations.  Patient states that he stopped taking his medications for past few days. Pt reports auditory and visual hallucinations telling him to hurt himself and others. Pt reports self injury in the past and has SI currently, but no particular pain. Denies fever, recent illness, cp, sob, n/v, abd pain.

## 2024-04-04 NOTE — ED PROVIDER NOTE - NS ED MD DISPO DIVISION OBS
[facility] Call Center    Phone Message    May a detailed message be left on voicemail: yes     Reason for Call: Other: Pt believes that she has a UTI. When she goes to the bathroom, it burns and is painful. She said she has previously been prescribed medication for this without needing an appt. Please contact pt.     Action Taken: Message routed to:  Other: [facility]    Travel Screening: Not Applicable                                                                    [facility]

## 2024-04-04 NOTE — ED PROVIDER NOTE - CLINICAL SUMMARY MEDICAL DECISION MAKING FREE TEXT BOX
Yuval: Sign out received from Dr. Hollins. Pt with hx of schizophrenia presents with worsened anxiety, depression, and increased hallucinations. Pt cleared medically and psych eval attempted however pt was agitated and required chemical sedation. Pt to be reevaluated by psych by the day team. Pt to be placedin EDOU.

## 2024-04-04 NOTE — ED ADULT TRIAGE NOTE - HEIGHT IN FEET
Dermatology Rooming Note    Mavis Grande's goals for this visit include:   Chief Complaint   Patient presents with     Derm Problem     Mavis is here for rash on hands.     Chasity Kuhn, CMA    
5

## 2024-04-04 NOTE — ASSESSMENT
[FreeTextEntry1] : 53 years old male patient with PMH of DM, HTN, HLD, hypothyroidism? bipolar disorder came here to establish care from [facility]. As per the discussion with them there is some concern tthat Mr. vallejo may not be getting his levothyroxine regularly as he sometimes does not wake up early in  the morning and his meds are administered by different staff members for the morning, afternoon and evenign dose  #Hypothyroidism  - TSH 8.56 Ft4 1.0 consistent with hypothyroidism - Overally clinically appears euthyroid, questionable compliance as he has different staff members administering his meds at dfferent times of the day, and sometimes he tends to get up late and miss his morning meds which includes levothyroxine - Recommend to continue with levothyroxine 25 mcgs daily for now ( ideally should be on an empty stomach 30-60mins apart from food or other meds, but if not able to do the same, it is still prudent that he at least gets the medication daily) - Will give a trial of levothyroxine 25 mcgs daily and repeat levels in 2 months, if blood work continues to show elevated TSH will consider higher dose - also has persistent mild chronic tachycardia,unrelated to his thyroid condition, will continue to follow with his PCP  #type 2 DM - under good control with A1c 6.4 - appears to be on metformin 1000 mg BID, continue f/u with PCP

## 2024-04-05 VITALS
HEART RATE: 94 BPM | TEMPERATURE: 98 F | SYSTOLIC BLOOD PRESSURE: 120 MMHG | RESPIRATION RATE: 16 BRPM | DIASTOLIC BLOOD PRESSURE: 64 MMHG | OXYGEN SATURATION: 98 %

## 2024-04-05 PROCEDURE — 90792 PSYCH DIAG EVAL W/MED SRVCS: CPT | Mod: 95

## 2024-04-05 PROCEDURE — 99236 HOSP IP/OBS SAME DATE HI 85: CPT

## 2024-04-05 RX ORDER — DIVALPROEX SODIUM 500 MG/1
500 TABLET, DELAYED RELEASE ORAL ONCE
Refills: 0 | Status: COMPLETED | OUTPATIENT
Start: 2024-04-05 | End: 2024-04-05

## 2024-04-05 RX ORDER — BENZTROPINE MESYLATE 1 MG
1 TABLET ORAL ONCE
Refills: 0 | Status: COMPLETED | OUTPATIENT
Start: 2024-04-05 | End: 2024-04-05

## 2024-04-05 RX ORDER — ATORVASTATIN CALCIUM 80 MG/1
40 TABLET, FILM COATED ORAL ONCE
Refills: 0 | Status: COMPLETED | OUTPATIENT
Start: 2024-04-05 | End: 2024-04-05

## 2024-04-05 RX ORDER — CLOZAPINE 150 MG/1
100 TABLET, ORALLY DISINTEGRATING ORAL ONCE
Refills: 0 | Status: COMPLETED | OUTPATIENT
Start: 2024-04-05 | End: 2024-04-05

## 2024-04-05 RX ORDER — HALOPERIDOL DECANOATE 100 MG/ML
5 INJECTION INTRAMUSCULAR ONCE
Refills: 0 | Status: COMPLETED | OUTPATIENT
Start: 2024-04-05 | End: 2024-04-05

## 2024-04-05 RX ADMIN — Medication 5 MILLIGRAM(S): at 02:57

## 2024-04-05 RX ADMIN — Medication 1 MILLIGRAM(S): at 02:57

## 2024-04-05 RX ADMIN — CLOZAPINE 100 MILLIGRAM(S): 150 TABLET, ORALLY DISINTEGRATING ORAL at 02:56

## 2024-04-05 RX ADMIN — DIVALPROEX SODIUM 500 MILLIGRAM(S): 500 TABLET, DELAYED RELEASE ORAL at 02:56

## 2024-04-05 RX ADMIN — Medication 2 MILLIGRAM(S): at 03:30

## 2024-04-05 RX ADMIN — ATORVASTATIN CALCIUM 40 MILLIGRAM(S): 80 TABLET, FILM COATED ORAL at 02:57

## 2024-04-05 NOTE — ED CDU PROVIDER INITIAL DAY NOTE - CLINICAL SUMMARY MEDICAL DECISION MAKING FREE TEXT BOX
Yuval: Sign out received from Dr. Hollins. Pt with hx of schizophrenia presents with worsened anxiety, depression, and increased hallucinations. Pt cleared medically and psych eval attempted however pt was agitated and required chemical sedation. Pt to be reevaluated by psych by the day team. Pt to be placed in EDOU.

## 2024-04-05 NOTE — ED CDU PROVIDER DISPOSITION NOTE - PATIENT PORTAL LINK FT
You can access the FollowMyHealth Patient Portal offered by [facility] by registering at the following website: [email or web address]. By joining [facility]’s FollowMyHealth portal, you will also be able to view your health information using other applications (apps) compatible with our system.

## 2024-04-05 NOTE — ED BEHAVIORAL HEALTH ASSESSMENT NOTE - SUMMARY
54 yo male living at [facility] ([phone]) with PMH DM, HLD, HTN, hypothyroidism, and psychiatric h/o schizoaffective disorder, past substance use disorders, frequent ED visits and hospitalizations, past suicide attempts vs NSSIB by cutting, banging head, on Clozaril and Invega Sustenna, who was BIB EMS from his residence     Patient was a limited historian and 54 yo male living at [facility] ([phone]) with PMH DM, HLD, HTN, hypothyroidism, and psychiatric h/o schizoaffective disorder, past substance use disorders, frequent ED visits and hospitalizations, past suicide attempts vs NSSIB by cutting, banging head, on Clozaril and Invega Sustenna, who was BIB EMS from his residence for visual and command auditory hallucinations to hurt himself.     Patient presents with reported SI and command AH and VH to harm himself or others. Eval limited by sedation due to behavioral prns given after episode of agitation over belongings. Pt is a poor historian with impaired articulation and speech. He has been noncompliant with his meds for months and has a history of poor frustration tolerance and rapid symptom resolution in ER. Hold for reassess when patient is more awake and alert and collateral from  obtained from day staff.

## 2024-04-05 NOTE — ED CDU PROVIDER INITIAL DAY NOTE - OBJECTIVE STATEMENT
53 year old male w PMHx of DM, HLD, HTN, and hypothyroidism, past psychiatric history of schizoaffective disorder on Clozaril, from [facility] presenting today for auditory and visual hallucinations.  Patient states that he stopped taking his medications for past few days. Pt reports auditory and visual hallucinations telling him to hurt himself and others. Pt reports self injury in the past and has SI currently, but no particular pain. Denies fever, recent illness, cp, sob, n/v, abd pain.

## 2024-04-05 NOTE — ED BEHAVIORAL HEALTH PROGRESS NOTE - SUMMARY
54 yo male living at [facility] ([phone]) with PMH DM, HLD, HTN, hypothyroidism, and psychiatric h/o schizoaffective disorder, past substance use disorders, frequent ED visits and hospitalizations, past suicide attempts vs NSSIB by cutting, banging head, on Clozaril and Invega Sustenna, who was BIB EMS from his residence for visual and command auditory hallucinations to hurt himself.     Patient presents with reported SI and command AH and VH to harm himself or others. Eval last night was limited by sedation due to behavioral prns given after episode of agitation over belongings. Pt is a poor historian with impaired articulation and speech. He has been noncompliant with his meds for months and has a history of poor frustration tolerance and rapid symptom resolution in ER. Hold for reassess when patient is more awake and alert and collateral from  obtained from day staff. see below

## 2024-04-05 NOTE — ED CDU PROVIDER INITIAL DAY NOTE - ATTENDING CONTRIBUTION TO CARE
I personally evaluated the patient. I reviewed the Resident’s or Physician Assistant’s note (as assigned above), and agree with the findings and plan except as documented in my note.  Yuval: Sign out received from Dr. Hollins. Pt with hx of schizophrenia presents with worsened anxiety, depression, and increased hallucinations. Pt cleared medically and psych eval attempted however pt was agitated and required chemical sedation. Pt to be reevaluated by psych by the day team. Pt to be placedin EDOU.

## 2024-04-05 NOTE — ED ADULT NURSE NOTE - PATIENT'S SEXUAL ORIENTATION
[Dear  ___] : Dear  [unfilled], [Consult Letter:] : I had the pleasure of evaluating your patient, [unfilled]. [Please see my note below.] : Please see my note below. [Sincerely,] : Sincerely, [FreeTextEntry3] : Elvis Rondon DO\par Attending Physician,\par Hematology/ Medical Oncology\par [phone] office\par \par  Withheld/Decline to Answer

## 2024-04-05 NOTE — ED CDU PROVIDER DISPOSITION NOTE - CLINICAL COURSE
53 year old male w PMHx of DM, HLD, HTN, and hypothyroidism, past psychiatric history of schizoaffective disorder on Clozaril, from [facility] presenting today for auditory and visual hallucinations.  Patient states that he stopped taking his medications for past few days. Placed in obs for psych reassessment. Cleared by psych - plan to continue meds and fu op.

## 2024-04-05 NOTE — ED BEHAVIORAL HEALTH PROGRESS NOTE - RISK ASSESSMENT
Chronic and acute risk is elevated - rf of SI, chronic mental illness, noncompliance, self harming behaviors, Chronic risk factors including severe mental illness, partial noncompliance, past suicidal behaviors    Protective: is help seeking and presents to ER when under distress, well connected to care, has supportive residence

## 2024-04-05 NOTE — ED BEHAVIORAL HEALTH ASSESSMENT NOTE - HPI (INCLUDE ILLNESS QUALITY, SEVERITY, DURATION, TIMING, CONTEXT, MODIFYING FACTORS, ASSOCIATED SIGNS AND SYMPTOMS)
52 yo male living at [facility] ([phone]) with PMH DM, HLD, HTN, hypothyroidism, and psychiatric h/o schizoaffective disorder, past substance use disorders, frequent ED visits and hospitalizations, past suicide attempts vs NSSIB by cutting, banging head, on Clozaril and Invega Sustenna, who was BIB EMS from his residence for     Patient is a limited historian. 52 yo male living at [facility] ([phone]) with PMH DM, HLD, HTN, hypothyroidism, and psychiatric h/o schizoaffective disorder, past substance use disorders, frequent ED visits and hospitalizations, past suicide attempts vs NSSIB by cutting, banging head, on Clozaril and Invega Sustenna, who was BIB EMS from his residence for visual and command auditory hallucinations to hurt himself.     Patient observed laying in bed, drowsy and appears sedated. Prior to eval, code grey was called after pt became agitated over his belongings being taken away. He received prns and his night time meds. Pt is a limited historian and speech is difficult to understand d/t possible speech impediment and incoherence. He reports he hasn't had his medications in months and is upset they tried to take his property away. He was doing good all day but then the SI and AH came on and seeing visions of the [employer] crashing down on him. Pt falling asleep throughout interview and increasingly disorganized.

## 2024-04-05 NOTE — ED BEHAVIORAL HEALTH PROGRESS NOTE - DETAILS:
This morning when woken up, patient is very irritable and agitated, upset that he was medicated last night and is feeling somewhat sedated. He initially hits the wall next to his stretcher with his fist, but is able to calm down and be redirected for interview. He is able to sit up and remain attentive and cooperative for the remainder of the interview. Patient is a poor, disorganized historian. He talks about how yesterday he was on the bus and the bus stop said "something different" and was referring to him in a specific way. He reports recently hearing people outside his room and feeling afraid to leave his room or be outside. During the interview he seems internally preoccupied, with increased speech latency. He feels like other people are trying to kill him, and therefore "feel like killing myself," though denies any specific plan/intent. Patient reports he has been banging his on the wall more frequently due to feeling "frustrated" also. He claims he has not taken his psychiatric medications for months.    SW intern spoke to pt's  who says pt last night didn't take his medications, but otherwise has been compliant. She denies noticing any major behavioral changes, but also learned of pt's hallucinations through one of his friends. She reports pt's ACT team saw/spoke to pt yesterday. This morning when woken up, patient is very irritable and agitated, upset that he was medicated last night and is feeling somewhat sedated. He initially hits the wall next to his stretcher with his fist, but is able to calm down and be redirected for interview. He is able to sit up and remain attentive and cooperative for the remainder of the interview. Patient is a poor, disorganized historian. He talks about how yesterday he was on the bus and the bus stop said "something different" and was referring to him in a specific way. He reports recently hearing people outside his room and feeling afraid to leave his room or be outside. During the interview he seems internally preoccupied, with increased speech latency. He feels like other people are trying to kill him, and therefore "feel like killing myself," though denies any specific plan/intent. Patient reports he has been banging his on the wall more frequently due to feeling "frustrated" also. He claims he has not taken his psychiatric medications for months.    SW intern spoke to pt's  who says pt last night didn't take his medications, but otherwise has been compliant. She denies noticing any major behavioral changes, but also learned of pt's hallucinations through one of his friends. On initial interview this morning, patient was quite irritable and agitated, and particularly was triggered by receiving PRN medication and feeling slightly sedated. He initially hits the wall next to his stretcher with his fist, but is able to calm down and be redirected for interview. He is able to sit up and remain attentive and cooperative for the remainder of the interview.    The patient is initially a poor, somewhat disorganized historian. He makes references to seeing a bus stop that was referring to him in a specific way, about hearing people outside his room, fear of being outside, and thoughts that some others are trying to kill him. He says he has not taken his medication for months and that is when the symptoms started.    Our SW intern spoke to the patient's  who states that patient in fact HAS been compliant with his treatment, and he has been doing fine and has been at his baseline. He received his Invega Sustenna 234mg injection on 3/19.    I also spoke w/ patient's psychiatrist Dr. Alvarez ([phone]) who reports that patient presenting frequently to the emergency room and reporting SI is his baseline. When last seen on 3/19, he was doing fine, and she also confirms he has been compliant w/ medications to her knowledge. She says he enjoys going to the emergency room.    I reassessed the patient around 3:30pm in the afternoon, and he is suddenly very pleasant, smiling, and stating that he feels much better. He reports feeling well and wanting to return to his residence. He says he likes his psychiatrist and will continue to follow up. He denies any SI, does not appear to be RIS.    Meds:  Invega Sustenna 234mg last given 3/19, next due 4/17  Clozaril 500mg qhs  Lexapro 10mg  Depakote 500mg qam, 750mg qhs  Ativan 2mg qhs

## 2024-04-05 NOTE — ED CDU PROVIDER DISPOSITION NOTE - NSFOLLOWUPINSTRUCTIONS_ED_ALL_ED_FT
Schizoaffective Disorder    Schizoaffective disorder (ScAD) is a mental illness. It causes symptoms that are a mixture of schizophrenia (a psychotic disorder) and an affective (mood) disorder. The schizophrenic symptoms may include delusions, hallucinations, or odd behavior. The mood symptoms may be similar to major depression or bipolar disorder. ScAD may interfere with personal relationships or normal daily activities. People with ScAD are at increased risk for job loss, social isolation, physical health problems, anxiety and substance use disorders, and suicide.    ScAD usually occurs in cycles. Periods of severe symptoms are followed by periods of  less severe symptoms or improvement. The illness affects men and women equally but usually appears at an earlier age (teenage or early adult years) in men. People who have family members with schizophrenia, bipolar disorder, or ScAD are at higher risk of developing ScAD.    SYMPTOMS  At any one time, people with ScAD may have psychotic symptoms only or both psychotic and mood symptoms. The psychotic symptoms include one or more of the following:    Hearing, seeing, or feeling things that are not there (hallucinations).    Having fixed, false beliefs (delusions). The delusions usually are of being attacked, harassed, cheated, persecuted, or conspired against (paranoid delusions).  Speaking in a way that makes no sense to others (disorganized speech).    The psychotic symptoms of ScAD may also include confusing or odd behavior or any of the negative symptoms of schizophrenia. These include loss of motivation for normal daily activities, such as bathing or grooming, withdrawal from other people, and lack of emotions.       The mood symptoms of ScAD occur more often than not. They resemble major depressive disorder or bipolar cirilo. Symptoms of major depression include depressed mood and four or more of the following:    Loss of interest in usually pleasurable activities (anhedonia).  Sleeping more or less than normal.  Feeling worthless or excessively guilty.  Lack of energy or motivation.  Trouble concentrating.  Eating more or less than usual.  Thinking a lot about death or suicide.    Symptoms of bipolar cirilo include abnormally elevated or irritable mood and increased energy or activity, plus three or more of the following:      More confidence than normal or feeling that you are able to do anything (grandiosity).  Feeling rested with less sleep than normal.    Being easily distracted.    Talking more than usual or feeling pressured to keep talking.    Feeling that your thoughts are racing.   Engaging in high-risk activities such as buying sprees or foolish business decisions.    DIAGNOSIS  ScAD is diagnosed through an assessment by your health care provider. Your health care provider will observe and ask questions about your thoughts, behavior, mood, and ability to function in daily life. Your health care provider may also ask questions about your medical history and use of drugs, including prescription medicines. Your health care provider may also order blood tests and imaging exams. Certain medical conditions and substances can cause symptoms that resemble ScAD. Your health care provider may refer you to a mental health specialist for evaluation.     ScAD is divided into two types. The depressive type is diagnosed if your mood symptoms are limited to major depression. The bipolar type is diagnosed if your mood symptoms are manic or a mixture of manic and depressive symptoms    TREATMENT  ScAD is usually a lifelong illness. Long-term treatment is necessary. The following treatments are available:     Medicine. Different types of medicine are used to treat ScAD. The exact combination depends on the type and severity of your symptoms. Antipsychotic medicine is used to control psychotic symptoms such as delusions, paranoia, and hallucinations. Mood stabilizers can even the highs and lows of bipolar manic mood swings. Antidepressant medicines are used to treat major depressive symptoms.   Counseling or talk therapy. Individual, group, or family counseling may be helpful in providing education, support, and guidance. Many people with ScAD also benefit from social skills and job skills (vocational) training.     A combination of medicine and counseling is usually best for managing the disorder over time. A procedure in which electricity is applied to the brain through the scalp (electroconvulsive therapy) may be used to treat people with severe manic symptoms that do not respond to medicine and counseling.    HOME CARE INSTRUCTIONS  Take all your medicine as prescribed.  Check with your health care provider before starting new prescription or over-the-counter medicines.  Keep all follow up appointments with your health care provider.    SEEK MEDICAL CARE IF:  If you are not able to take your medicines as prescribed.  If your symptoms get worse.    SEEK IMMEDIATE MEDICAL CARE IF:  You have serious thoughts about hurting yourself or others.

## 2024-04-05 NOTE — ED BEHAVIORAL HEALTH PROGRESS NOTE - SUICIDAL IDEATION DETAILS
Spoke with mom in babies room.  Has not pumped in a few days since she visited last.  Gave mom a hand pump for home and education given on how to use it.  Also talked about washing her pumping pieces after each pump with the washing supplies in her room.  Written education given given again and reviewed on how to establish milk supple, store and label milk, prevention of nipple soreness.  Mom said the only medication she is taking is Ibuprofen.   see HPI

## 2024-04-05 NOTE — ED BEHAVIORAL HEALTH ASSESSMENT NOTE - RISK ASSESSMENT
Chronic risk is elevated Chronic and acute risk is elevated - rf of SI, chronic mental illness, noncompliance, self harming behaviors,

## 2024-04-05 NOTE — ED BEHAVIORAL HEALTH PROGRESS NOTE - CASE SUMMARY/FORMULATION (CLEARLY DOCUMENT RATIONALE FOR DISPOSITION CHANGE)
52 yo male living at [facility] ([phone]) with PMH DM, HLD, HTN, hypothyroidism, and psychiatric h/o schizoaffective disorder, past substance use disorders, frequent ED visits and hospitalizations, past suicide attempts vs NSSIB by cutting, banging head, on Clozaril and Invega Sustenna, who was BIB EMS from his residence for visual and command auditory hallucinations to hurt himself.     Patient presents with paranoid ideation, ideas of reference, and recently worse hallucinations in the context of inconsistent medication compliance. He himself reports he stopped medications months ago, although his  is stating generally he has been compliant although they have known him to not take meds at least on a handful of occasions, including yesterday. On interview the patient is disorganized, irritable/labile, internally preoccupied. Patient is stating also that as a result of his fears that others are trying to kill him, he has urges to kill himself, though he does not have any specific plan. He appears to be having an exacerbation of his chronic psychosis and would benefit from inpatient stabilization. 52 yo male living at [facility] ([phone]) with PMH DM, HLD, HTN, hypothyroidism, and psychiatric h/o schizoaffective disorder, past substance use disorders, frequent ED visits and hospitalizations, past suicide attempts vs NSSIB by cutting, banging head, on Clozaril and Invega Sustenna, who was BIB EMS from his residence for visual and command auditory hallucinations to hurt himself.    On initial assessment this morning, the patient seemed quite irritable but had gotten medication for mild agitation and was upset that he was feeling sedated. On repeat assessment in the afternoon, he was dramatically better and pleasant, consistent with how I had seen him on previous visit. His outpatient psychiatrist and  corroborate that he has been at his baseline, and this is his usual behavioral pattern. He is denying SI, appears somewhat childlike on conversation. Given his affective and behavioral reactivity (pt earlier was hitting the wall when upset) that is able to be redirected under the right environmental setting, an intellectual disability might partly explain his behavior. Right now there is no longer an indication for hospitalization and pt expresses wish to return to his residence, which is a supervised setting.

## 2024-04-05 NOTE — ED BEHAVIORAL HEALTH NOTE - BEHAVIORAL HEALTH NOTE
========================      FOR EACH COLLATERAL      ========================      Collateral below has requested that the information provided remain confidential: Yes [  ] No [x ]      Collateral below has provided information that patient is/may be unaware of: Yes [  ] No [x ]      Patient gives permission to obtain collateral from _____:       ( ) Yes      ( x)  No      Rationale for overriding objection                (  ) Lack of capacity. Details: ________                ( x) Assessing risk of danger to self/others. Details: ________      Rationale for selecting specific collateral source                ( ) Potential to impact risk of danger to self/others and no alternative equivalent. Details:            NAME:  Carlita     NUMBER:  [phone]     RELATIONSHIP:       RELIABILITY:  Reliable     COMMENTS:        ========================      PATIENT DEMOGRAPHICS:        ========================      HPI      BASELINE FUNCTIONING: The pt is a single 53-year-old male, unemployed, has no children and was transferred from [place] to [facility] assisted living [facility] where he domiciled since 8/4/2022 until presently. Per collateral, at baseline functioning, the pt has a hx of schizoaffective d/o and bi-polar d/o and is typically a “jolly and pleasant person with a happy demeanor” and has a normal appetite. However, collateral reported the pt has made complaints about having trouble sleeping in the last week stating that “man I am having through sleeping”, he presents sluggish in energy daily and can be sometimes appear distracted but is redirectable. Collateral also reported that the pt is sometimes camilo but can regulate his emotions. Per collateral, that the pt is able to engage in daily activities if prompted correctly and is able to clean his room and change his clothes. Also, collateral reported that the pt is also able to engage in social activities such as board games and can engage in healthy social interactions with peers and maintain a healthy relationship.      DATE HPI STARTED: Unknown     DECOMPENSATION: The collateral was unaware that the pt was at the ED (just came on shift) and was unable to provide information as to why the pt was taken to the ED. 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 Per collateral, the pt displayed excitement and stated that he was excited to have [employer] while he was out with the ACT team. The collateral reported that she last saw the pt at the end of her shift yesterday (April 4, 2024) around 4 pm and the pt presented to be doing well. When asked about whether the pt has been compliant with his medication, the collateral reported that the pt had taken all his morning medications yesterday and has been complaint with his medication in the last four days. However, collateral reported that while the pt is typically compliant with his medication most days, there are days when the pt has difficulties waking in the morning to take his medications which sometimes causes the pt to skip his morning dosages. Per collateral, when the pt does not take his medications, it is evident in the pt’s presentation as the pt face tends to appear “drag”, the pt experience difficulties talking, AH and drools. Overall, the collateral believes that the pt may have walked to the ED in the afternoon on his own (since supervision is not mandatory at the facility), and no one has marked the pt missing since it has not been 24hr since the pt was last seen at the facility.     SUICIDALITY: Per collateral, the pt has not endorsed any SI, SIB or SA recently. However, collateral reported that the pt endorses AH recently and told a friend that he was hearing voices of a male name “Ghassan” and a female (name unknown) talking to him. Collateral was unable to state the content of the pt’s AH.      VIOLENCE: Per collateral, the pt is typically a calm person and has not displayed any violent behavior recently.     SUBSTANCE: Per collateral, the pt has not engaged in any substance use.     ========================      PAST PSYCHIATRIC HISTORY      ========================      DATE PAST PSYCHIATRIC HISTORY STARTED:  Unknown      MAIN PSYCHIATRIC DIAGNOSIS: Per collateral, the pt’s main psych dx are schizoaffective and bipolar d/o.     PSYCHIATRIC HOSPITALIZATIONS: Unknown, however collateral reported that the pt has no recent psych admission.       PRIOR ILLNESS: Per collateral, the pt currently has a therapist: Ceci Partida- contact number: [phone] and a psychiatrist: Dr Pearson # [phone].      SUICIDALITY: Per collateral, the pt has not endorsed any SI, SA , HI or AVH in the past.     VIOLENCE: Per collateral, the pt has no hx of violence.     SUBSTANCE USE: Per collateral, the pt’s chart showed a hx of tobacco use.     ==============      OTHER HISTORY      ==============      CURRENT MEDICATION:  Per collateral, the pt takes the following medications in the mornings: Between the hours of 9-10:30 pm: benztropine, Clozaril 100 mg, Depakote 500 mg, Synthroid 25 mg, metformin 1000 mg, Lopressor 25mg. And at nights between the hours of 7-9pm: Lipitor 40ml, benztropine 1ml, Clozaril 100mg, Depakote 250mg and 500mg (takes both), Ativan 2ml. Lorazepam 2ml every night,     MEDICAL HISTORY: Per collateral, the pt has a medical hx of Hypothyroidism, Type 2 diabetes and HTN.     ALLERGIES:  Per collateral, the pt is allergic to the following: Chlorpromazine, fish, pork, Haldol, and thiothixene     LEGAL ISSUES: Per collateral, the pt has no legal hx/issues.     FIREARM ACCESS: Per collateral, the pt has no access to firearms or lethal weapons.     SOCIAL HISTORY: Unknown     FAMILY HISTORY:  Unknown     DEVELOPMENTAL HISTORY:  Unknown     -----------------------------------------------       COVID Exposure Screen- collateral (i.e. third-party, chart review, belongings, etc; include EMS and ED staff     ==============     Has the patient been tested for COVID-19 in the last 90 days?  (  ) Yes   ( X ) No   (  ) Unknown- Reason: _____     IF YES: Date of test(s), type of test(s), result(s) for ALL tests in last 90 days: ________     In the past 10 days, has the patient been around anyone with a positive COVID-19 test? (  ) Yes   (  X) No   (  ) Unknown- Reason: ____     IF YES: Was the patient closer than 6 feet of them for a total of 15 minutes or more in a 24 hour period? (  ) Yes   (  ) No   (  ) Unknown- Reason: _____ ========================      FOR EACH COLLATERAL      ========================      Collateral below has requested that the information provided remain confidential: Yes [  ] No [x ]      Collateral below has provided information that patient is/may be unaware of: Yes [  ] No [x ]      Patient gives permission to obtain collateral from _____:       ( ) Yes      ( x)  No      Rationale for overriding objection                (  ) Lack of capacity. Details: ________                ( x) Assessing risk of danger to self/others. Details: ________      Rationale for selecting specific collateral source                ( ) Potential to impact risk of danger to self/others and no alternative equivalent. Details:            NAME:  Carlita     NUMBER:  [phone]     RELATIONSHIP:       RELIABILITY:  Reliable     COMMENTS:        ========================      PATIENT DEMOGRAPHICS:        ========================      HPI      BASELINE FUNCTIONING: The pt is a single 53-year-old male, unemployed, has no children and was transferred from [place] to [facility] assisted living [facility] where he domiciled since 8/4/2022 until presently. Per collateral, at baseline functioning, the pt has a hx of schizoaffective d/o and bi-polar d/o and is typically a “jolly and pleasant person with a happy demeanor” and has a normal appetite. However, collateral reported the pt has made complaints about having trouble sleeping in the last week stating that “man I am having through sleeping”, he presents sluggish in energy daily and can be sometimes appear distracted but is redirectable. Collateral also reported that the pt is sometimes camilo but can regulate his emotions. Per collateral, that the pt is able to engage in daily activities if prompted correctly and is able to clean his room and change his clothes. Also, collateral reported that the pt is also able to engage in social activities such as board games and can engage in healthy social interactions with peers and maintain a healthy relationship.      DATE HPI STARTED: Unknown     DECOMPENSATION: The collateral was unaware that the pt was at the ED (just came on shift) and was unable to provide information as to why the pt was taken to the ED. Collateral however reported that she saw the pt in the morning (yesterday April 4, 2024) when he was leaving with his Care Coordinator. Per collateral, the pt displayed excitement and stated that he was excited to have [employer] while he was out with the Care Coordinator (Renard Dee #[phone]) . The collateral reported that she last saw the pt at the end of her shift yesterday (April 4, 2024) around 4 pm and the pt presented to be doing well. When asked about whether the pt has been compliant with his medication, the collateral reported that the pt had taken all his morning medications yesterday and has been complaint with his medication in the last four days. However, collateral reported that while the pt is typically compliant with his medication most days, there are days when the pt has difficulties waking in the morning to take his medications which sometimes causes the pt to skip his morning dosages. Per collateral, when the pt does not take his medications, it is evident in the pt’s presentation as the pt face tends to appear “drag”, the pt experience difficulties talking, AH and drools. Overall, the collateral believes that the pt may have walked to the ED in the afternoon on his own (since supervision is not mandatory at the facility), and no one has marked the pt missing since it has not been 24hr since the pt was last seen at the facility.     SUICIDALITY: Per collateral, the pt has not endorsed any SI, SIB or SA recently. However, collateral reported that the pt endorses AH recently and told a friend that he was hearing voices of a male name “Ghassan” and a female (name unknown) talking to him. Collateral was unable to state the content of the pt’s AH.      VIOLENCE: Per collateral, the pt is typically a calm person and has not displayed any violent behavior recently.     SUBSTANCE: Per collateral, the pt has not engaged in any substance use.     ========================      PAST PSYCHIATRIC HISTORY      ========================      DATE PAST PSYCHIATRIC HISTORY STARTED:  Unknown      MAIN PSYCHIATRIC DIAGNOSIS: Per collateral, the pt’s main psych dx are schizoaffective and bipolar d/o.     PSYCHIATRIC HOSPITALIZATIONS: Unknown, however collateral reported that the pt has no recent psych admission.       PRIOR ILLNESS: Per collateral, the pt currently has a therapist: Ceci Partida- contact number: [phone] and a psychiatrist: Dr Pearson # [phone].      SUICIDALITY: Per collateral, the pt has not endorsed any SI, SA , HI or AVH in the past.     VIOLENCE: Per collateral, the pt has no hx of violence.     SUBSTANCE USE: Per collateral, the pt’s chart showed a hx of tobacco use.     ==============      OTHER HISTORY      ==============      CURRENT MEDICATION:  Per collateral, the pt takes the following medications in the mornings: Between the hours of 9-10:30 pm: benztropine, Clozaril 100 mg, Depakote 500 mg, Synthroid 25 mg, metformin 1000 mg, Lopressor 25mg. And at nights between the hours of 7-9pm: Lipitor 40ml, benztropine 1ml, Clozaril 100mg, Depakote 250mg and 500mg (takes both), Ativan 2ml. Lorazepam 2ml every night,     MEDICAL HISTORY: Per collateral, the pt has a medical hx of Hypothyroidism, Type 2 diabetes and HTN.     ALLERGIES:  Per collateral, the pt is allergic to the following: Chlorpromazine, fish, pork, Haldol, and thiothixene     LEGAL ISSUES: Per collateral, the pt has no legal hx/issues.     FIREARM ACCESS: Per collateral, the pt has no access to firearms or lethal weapons.     SOCIAL HISTORY: Unknown     FAMILY HISTORY:  Unknown     DEVELOPMENTAL HISTORY:  Unknown     -----------------------------------------------       COVID Exposure Screen- collateral (i.e. third-party, chart review, belongings, etc; include EMS and ED staff     ==============     Has the patient been tested for COVID-19 in the last 90 days?  (  ) Yes   ( X ) No   (  ) Unknown- Reason: _____     IF YES: Date of test(s), type of test(s), result(s) for ALL tests in last 90 days: ________     In the past 10 days, has the patient been around anyone with a positive COVID-19 test? (  ) Yes   (  X) No   (  ) Unknown- Reason: ____     IF YES: Was the patient closer than 6 feet of them for a total of 15 minutes or more in a 24 hour period? (  ) Yes   (  ) No   (  ) Unknown- Reason: _____ ========================      FOR EACH COLLATERAL      ========================      Collateral below has requested that the information provided remain confidential: Yes [  ] No [x ]      Collateral below has provided information that patient is/may be unaware of: Yes [  ] No [x ]      Patient gives permission to obtain collateral from _____:       ( ) Yes      ( x)  No      Rationale for overriding objection                (  ) Lack of capacity. Details: ________                ( x) Assessing risk of danger to self/others. Details: ________      Rationale for selecting specific collateral source                ( ) Potential to impact risk of danger to self/others and no alternative equivalent. Details:            NAME:  Carlita     NUMBER:  [phone]     RELATIONSHIP:       RELIABILITY:  Reliable     COMMENTS: The collateral has been working with the pt for the last 5months.    ========================      PATIENT DEMOGRAPHICS:        ========================      HPI      BASELINE FUNCTIONING: The pt is a single 53-year-old male, unemployed, has no children and was transferred from [place] to [facility] assisted living [facility] where he domiciled since 2022 until presently. Per collateral, at baseline functioning, the pt has a hx of schizoaffective d/o and bi-polar d/o and is typically a “jolly and pleasant person with a happy demeanor” and has a normal appetite. However, collateral reported the pt has made complaints about having trouble sleeping in the last week stating that “man I am having through sleeping”, he presents sluggish in energy daily and can be sometimes appear distracted but is redirectable. Collateral also reported that the pt is sometimes camilo but can regulate his emotions. Per collateral, that the pt is able to engage in daily activities if prompted correctly and is able to clean his room and change his clothes. Also, collateral reported that the pt is also able to engage in social activities such as board games and can engage in healthy social interactions with peers and maintain a healthy relationship.      DATE HPI STARTED: Unknown     DECOMPENSATION: The collateral was unaware that the pt was at the ED (just came on shift) and was unable to provide information as to why the pt was taken to the ED. Collateral however reported that she saw the pt in the morning (yesterday 2024) when he was leaving with his Care Coordinator. Per collateral, the pt displayed excitement and stated that he was excited to have [employer] while he was out with the Care Coordinator (Renard Dee #[phone]) . The collateral reported that she last saw the pt at the end of her shift yesterday (2024) around 4 pm and the pt presented to be doing well. When asked about whether the pt has been compliant with his medication, the collateral reported that the pt had taken all his morning medications yesterday and has been complaint with his medication in the last four days. However, collateral reported that while the pt is typically compliant with his medication most days, there are days when the pt has difficulties waking in the morning to take his medications which sometimes causes the pt to skip his morning dosages. Per collateral, when the pt does not take his medications, it is evident in the pt’s presentation as the pt face tends to appear “drag”, the pt experience difficulties talking, AH and drools. Overall, the collateral believes that the pt may have walked to the ED in the afternoon on his own (since supervision is not mandatory at the facility), and no one has marked the pt missing since it has not been 24hr since the pt was last seen at the facility.     SUICIDALITY: Per collateral, the pt has not endorsed any SI, SIB or SA recently. However, collateral reported that the pt endorses AH recently and told a friend that he was hearing voices of a male name “Ghassan” and a female (name unknown) talking to him. Collateral was unable to state the content of the pt’s AH.      VIOLENCE: Per collateral, the pt is typically a calm person and has not displayed any violent behavior recently.     SUBSTANCE: Per collateral, the pt has not engaged in any substance use.     ========================      PAST PSYCHIATRIC HISTORY      ========================      DATE PAST PSYCHIATRIC HISTORY STARTED:  Unknown      MAIN PSYCHIATRIC DIAGNOSIS: Per collateral, the pt’s main psych dx are schizoaffective and bipolar d/o.     PSYCHIATRIC HOSPITALIZATIONS: Unknown, however collateral reported that the pt has no recent psych admission.       PRIOR ILLNESS: Per collateral, the pt currently has a therapist: Akosua Partida- contact number: [phone] and a psychiatrist: Dr Pearson # [phone].      SUICIDALITY: Per collateral, the pt has not endorsed any SI, SA , HI or AVH in the past.     VIOLENCE: Per collateral, the pt has no hx of violence.     SUBSTANCE USE: Per collateral, the pt’s chart showed a hx of tobacco use.     ==============      OTHER HISTORY      ==============      CURRENT MEDICATION:  Per collateral, the pt takes the following medications in the mornings: Between the hours of 9-10:30 pm: benztropine, Clozaril 100 mg, Depakote 500 mg, Synthroid 25 mg, metformin 1000 mg, Lopressor 25mg. And at nights between the hours of 7-9pm: Lipitor 40ml, benztropine 1ml, Clozaril 100mg, Depakote 250mg and 500mg (takes both), Ativan 2ml. Lorazepam 2ml every night,     MEDICAL HISTORY: Per collateral, the pt has a medical hx of Hypothyroidism, Type 2 diabetes and HTN.     ALLERGIES:  Per collateral, the pt is allergic to the following: Chlorpromazine, fish, pork, Haldol, and thiothixene     LEGAL ISSUES: Per collateral, the pt has no legal hx/issues.     FIREARM ACCESS: Per collateral, the pt has no access to firearms or lethal weapons.     SOCIAL HISTORY: Unknown     FAMILY HISTORY:  Unknown     DEVELOPMENTAL HISTORY:  Unknown     -----------------------------------------------       COVID Exposure Screen- collateral (i.e. third-party, chart review, belongings, etc; include EMS and ED staff     ==============     Has the patient been tested for COVID-19 in the last 90 days?  (  ) Yes   ( X ) No   (  ) Unknown- Reason: _____     IF YES: Date of test(s), type of test(s), result(s) for ALL tests in last 90 days: ________     In the past 10 days, has the patient been around anyone with a positive COVID-19 test? (  ) Yes   (  X) No   (  ) Unknown- Reason: ____     IF YES: Was the patient closer than 6 feet of them for a total of 15 minutes or more in a 24 hour period? (  ) Yes   (  ) No   (  ) Unknown- Reason: _____        FOR EACH COLLATERAL      ========================      Collateral below has requested that the information provided remain confidential: Yes [  ] No [ x]      Collateral below has provided information that patient is/may be unaware of: Yes [  ] No [x ]      Patient gives permission to obtain collateral from _____:       ( ) Yes      (x )  No      Rationale for overriding objection                (  ) Lack of capacity. Details: ________                ( x) Assessing risk of danger to self/others. Details: ________      Rationale for selecting specific collateral source                ( ) Potential to impact risk of danger to self/others and no alternative equivalent. Details:            NAME:  Carlita     NUMBER: [phone]     RELATIONSHIP:       RELIABILITY:  Reliable     COMMENTS: Following the psych evaluation of the pt carried out by the psychiatrist at [facility], the writer reached out to the collateral a second time to clarify previous data collected and was given additional data from second-hand documentation by the collateral. Per collateral, the pt’s psychosocial assessment from  stated that the pt’s mother is  due to suicide, and that the pt has a hx of legal issues, particularly an arrest in . The collateral also reported that the pt’s psychosocial also stated that the pt has a long hx of mental illness, SI and SA (details were not included in psychosocial), and the pt has displayed aggressive behavior toward staff at his previous living facility (either [facility] or [facility]). Additionally, the collateral reported that the pt psychosocial stated that the pt at previous facility (either [facility] or [facility]) had drunk a toxic cleaning liquid, collateral could not specify whether this was a SA. When asked again if the pt has ever endorse any SI or SA before to the collateral, or endorsed any AVH, the collateral reported that the pt did not endorsed any SI or SA, but she recently learned last week that the pt was “hearing voices” through the pt’s friend at the facility. Collateral also reported as she did earlier when we spoke that the pt has been compliant with his medication (from  to presently) and that last week the pt skips 4/7 of his morning medications but has been compliant with his night medications during that week. Collateral also reiterated that the pt’s living facility is an independent living facility and that the pt is not forced to take his medication as well as, the pt self-administers his medications. Collateral also reported that the pt has no recent hospitalization for SI or SA, however, the pt was hospitalized (hospital unknown) 7/10/2023 for abdominal pain.      ========================      FOR EACH COLLATERAL      ========================      Collateral below has requested that the information provided remain confidential: Yes [  ] No [ x]      Collateral below has provided information that patient is/may be unaware of: Yes [  ] No [x ]      Patient gives permission to obtain collateral from _____:       ( ) Yes      (x )  No      Rationale for overriding objection                (  ) Lack of capacity. Details: ________                ( x) Assessing risk of danger to self/others. Details: ________      Rationale for selecting specific collateral source                ( ) Potential to impact risk of danger to self/others and no alternative equivalent. Details:            NAME:  Dr Pearson     NUMBER: [phone]     RELATIONSHIP: Psychiatrist     RELIABILITY:  Reliable     COMMENTS: Following the psych evaluation of the pt carried out by the psychiatrist at [facility], the writer reached out to the pt’s psychiatrist to verify whether the psychiatrist at [facility] psych evaluation of the pt was consistent with what the pt’s psychiatrist observed during their interactions (See Psychiatrist notes for details). Per collateral, the pt’ s baseline functioning is consistent with what the psychiatrist at the [facility] psych observed (SI, AH). Collateral also reported that the pt has a hx of schizophrenia, command hallucination and that the pt frequently visits the [facility] with SI. Per collateral, she sees the pt once a week monthly when he comes to do his blood test, as well as his monthly [employer] 234 injection. Collateral reported that she last saw the pt on 2024, for his injection and blood work and is scheduled to see the pt again on 2024. Per collateral, the pt also takes the following medications: Clozapine 500mg for command AH, Lexapro 10mg, Depakote 500mg 2x daily and 250mg at bedtime and lorazepam 2mg at bedtime.  Overall, the collateral reported that the pt is “chronically ill” but has been doing well in the past few months.

## 2024-04-05 NOTE — ED CDU PROVIDER DISPOSITION NOTE - NSFOLLOWUPCLINICS_GEN_ALL_ED_FT
[facility]  OP Mental Health  [address] [place] [id number]  Phone: [phone]  Fax:   Follow Up Time: 1-3 Days

## 2024-04-05 NOTE — ED CDU PROVIDER DISPOSITION NOTE - CARE PROVIDER_API CALL
Natali Alvarez  Psychiatry  [address] [facility] [place] [address]  Phone: [phone]  Fax: [phone]  Follow Up Time: 1-3 Days

## 2024-04-06 DIAGNOSIS — E03.9 HYPOTHYROIDISM, UNSPECIFIED: ICD-10-CM

## 2024-04-08 PROBLEM — E03.9 HYPOTHYROIDISM, UNSPECIFIED TYPE: Status: ACTIVE | Noted: 2024-03-04

## 2024-04-08 LAB
T4 FREE SERPL-MCNC: 1 NG/DL
THYROGLOB AB SERPL-ACNC: <20 IU/ML
THYROPEROXIDASE AB SERPL IA-ACNC: 12.9 IU/ML
TSH SERPL-ACNC: 6.98 UIU/ML

## 2024-04-09 DIAGNOSIS — E03.9 HYPOTHYROIDISM, UNSPECIFIED: ICD-10-CM

## 2024-04-09 DIAGNOSIS — E11.65 TYPE 2 DIABETES MELLITUS WITH HYPERGLYCEMIA: ICD-10-CM

## 2024-04-10 ENCOUNTER — INPATIENT (INPATIENT)
Facility: HOSPITAL | Age: 54
LOS: 7 days | Discharge: HOME | DRG: 756 | End: 2024-04-18
Attending: PSYCHIATRY & NEUROLOGY | Admitting: PSYCHIATRY & NEUROLOGY
Payer: MEDICAID

## 2024-04-10 VITALS
HEART RATE: 105 BPM | TEMPERATURE: 98 F | OXYGEN SATURATION: 99 % | RESPIRATION RATE: 20 BRPM | SYSTOLIC BLOOD PRESSURE: 168 MMHG | HEIGHT: 66 IN | DIASTOLIC BLOOD PRESSURE: 90 MMHG

## 2024-04-10 LAB — ETHANOL SERPL-MCNC: <10 MG/DL — SIGNIFICANT CHANGE UP

## 2024-04-10 PROCEDURE — 99285 EMERGENCY DEPT VISIT HI MDM: CPT

## 2024-04-10 RX ORDER — CLONAZEPAM 1 MG
1 TABLET ORAL ONCE
Refills: 0 | Status: DISCONTINUED | OUTPATIENT
Start: 2024-04-10 | End: 2024-04-10

## 2024-04-10 RX ADMIN — Medication 1 MILLIGRAM(S): at 23:02

## 2024-04-10 NOTE — ED ADULT NURSE NOTE - OBJECTIVE STATEMENT
BIBA from [address]. pt. got angry at the staff and cut his left wrist with a tin can. denies any SI/HI. Bleeding controlled in triage . 1:1 initiated for safety

## 2024-04-10 NOTE — ED PROVIDER NOTE - CLINICAL SUMMARY MEDICAL DECISION MAKING FREE TEXT BOX
53yM schizoaffective p/w suicidal ideation and self-injurious behavior.  Pt calm, cooperative in the ED w/o need for chemical or mechanical sedation.  Labs reassuring w/o PA or electrolyte abnormality.  EKG w/o ischemia or arrhythmia.  UA w/o UTI.  Telepsych consulted and will adm for further care.  Home meds ordered while awaiting bed availability.

## 2024-04-10 NOTE — ED PROVIDER NOTE - OBJECTIVE STATEMENT
52 yo m hx of schizoaff/bipolar d/o lifes at [facility] [address] [facility], sts he was upset at another resident there and he took a tin can and scratched his left wrist. he denies si hi. denies avh. sts he was frustrated, and he is anxious and due for his klonopin.  he has no somatic complaints.

## 2024-04-10 NOTE — ED PROVIDER NOTE - PROGRESS NOTE DETAILS
Pt evaluated after sign out. Meds ordered as per psyc recommendations. Psyc will continue to reevaluate. Pt will be admitted.

## 2024-04-10 NOTE — ED PROVIDER NOTE - CARE PLAN
Assessment and plan of treatment:	self injurious behavior/anxiety  psych eval   1 Principal Discharge DX:	Suicidal ideation  Assessment and plan of treatment:	self injurious behavior/anxiety  psych eval

## 2024-04-10 NOTE — ED ADULT NURSE NOTE - NSFALLUNIVINTERV_ED_ALL_ED
Assistance OOB with selected safe patient handling equipment if applicable/Bed/Stretcher in lowest position, wheels locked, appropriate side rails in place/Call bell, personal items and telephone in reach/Instruct patient to call for assistance before getting out of bed/chair/stretcher/Non-slip footwear applied when patient is off stretcher/[place] to call system/Physically safe environment - no spills, clutter or unnecessary equipment/Purposeful proactive rounding/Room/bathroom lighting operational, light cord in reach

## 2024-04-10 NOTE — ED ADULT NURSE NOTE - CHIEF COMPLAINT QUOTE
BIBA from [address]. pt. got angry at the staff and cut his left wrist with a tin can. denies any SI/HI. Bleeding controlled in triage . 1:1 initiated for safety

## 2024-04-10 NOTE — ED ADULT TRIAGE NOTE - CHIEF COMPLAINT QUOTE
BIBA from [address]. pt. got angry at the staff and cut his left wrist with a tin can. Bleeding controlled in triage . 1:1 initiated for safety BIBA from [address]. pt. got angry at the staff and cut his left wrist with a tin can. denies any SI/HI. Bleeding controlled in triage . 1:1 initiated for safety

## 2024-04-10 NOTE — ED ADULT NURSE NOTE - CAS EDN DISCHARGE ASSESSMENT
pt departed ED with ambulance personal. Pt awake alert oriented./Alert and oriented to person, place and time/Awake

## 2024-04-10 NOTE — ED PROVIDER NOTE - PHYSICAL EXAMINATION
VITAL SIGNS: I have reviewed nursing notes and confirm.  CONSTITUTIONAL: Well-developed; well-nourished; in no acute distress.  SKIN: Skin exam is warm and dry, no acute rash.  HEAD: Normocephalic; atraumatic.  EYES: PERRL, EOM intact; conjunctiva and sclera clear.  ENT: No nasal discharge; airway clear.   NECK: Supple; non tender.  CARD:+ S1, S2   RESP: No wheezes, rales or rhonchi.  ABD: Normal bowel sounds; soft; non-distended; non-tender;  EXT: Normal ROM. No cyanosis or edema. L ventral wrist appears to have excoriations of the epidermis/dry skin. skin is intact there is no break in the skin. no bleeding. no swelling, no erythema. pulses and cap refill are normal.   LYMPH: No acute adenopathy.  NEURO: Alert. Grossly unremarkable. No focal deficits.  PSYCH: Cooperative, appropriate.

## 2024-04-11 DIAGNOSIS — F25.9 SCHIZOAFFECTIVE DISORDER, UNSPECIFIED: ICD-10-CM

## 2024-04-11 LAB
ACANTHOCYTES BLD QL SMEAR: SLIGHT — SIGNIFICANT CHANGE UP
ALBUMIN SERPL ELPH-MCNC: 4.1 G/DL — SIGNIFICANT CHANGE UP (ref 3.5–5.2)
ALP SERPL-CCNC: 100 U/L — SIGNIFICANT CHANGE UP (ref 30–115)
ALT FLD-CCNC: 11 U/L — SIGNIFICANT CHANGE UP (ref 0–41)
ANION GAP SERPL CALC-SCNC: 13 MMOL/L — SIGNIFICANT CHANGE UP (ref 7–14)
APAP SERPL-MCNC: <5 UG/ML — LOW (ref 10–30)
APPEARANCE UR: CLEAR — SIGNIFICANT CHANGE UP
AST SERPL-CCNC: 13 U/L — SIGNIFICANT CHANGE UP (ref 0–41)
BASOPHILS # BLD AUTO: 0 K/UL — SIGNIFICANT CHANGE UP (ref 0–0.2)
BASOPHILS NFR BLD AUTO: 0 % — SIGNIFICANT CHANGE UP (ref 0–1)
BILIRUB SERPL-MCNC: <0.2 MG/DL — SIGNIFICANT CHANGE UP (ref 0.2–1.2)
BILIRUB UR-MCNC: NEGATIVE — SIGNIFICANT CHANGE UP
BUN SERPL-MCNC: 5 MG/DL — LOW (ref 10–20)
BURR CELLS BLD QL SMEAR: PRESENT — SIGNIFICANT CHANGE UP
CALCIUM SERPL-MCNC: 9.5 MG/DL — SIGNIFICANT CHANGE UP (ref 8.4–10.5)
CHLORIDE SERPL-SCNC: 102 MMOL/L — SIGNIFICANT CHANGE UP (ref 98–110)
CO2 SERPL-SCNC: 22 MMOL/L — SIGNIFICANT CHANGE UP (ref 17–32)
COLOR SPEC: YELLOW — SIGNIFICANT CHANGE UP
CREAT SERPL-MCNC: 0.8 MG/DL — SIGNIFICANT CHANGE UP (ref 0.7–1.5)
DACRYOCYTES BLD QL SMEAR: SLIGHT — SIGNIFICANT CHANGE UP
DIFF PNL FLD: NEGATIVE — SIGNIFICANT CHANGE UP
EGFR: 106 ML/MIN/1.73M2 — SIGNIFICANT CHANGE UP
EOSINOPHIL # BLD AUTO: 0.17 K/UL — SIGNIFICANT CHANGE UP (ref 0–0.7)
EOSINOPHIL NFR BLD AUTO: 1.8 % — SIGNIFICANT CHANGE UP (ref 0–8)
GIANT PLATELETS BLD QL SMEAR: PRESENT — SIGNIFICANT CHANGE UP
GLUCOSE SERPL-MCNC: 130 MG/DL — HIGH (ref 70–99)
GLUCOSE UR QL: NEGATIVE MG/DL — SIGNIFICANT CHANGE UP
HCT VFR BLD CALC: 36.4 % — LOW (ref 42–52)
HGB BLD-MCNC: 11.1 G/DL — LOW (ref 14–18)
HYPOCHROMIA BLD QL: SLIGHT — SIGNIFICANT CHANGE UP
KETONES UR-MCNC: NEGATIVE MG/DL — SIGNIFICANT CHANGE UP
LEUKOCYTE ESTERASE UR-ACNC: NEGATIVE — SIGNIFICANT CHANGE UP
LYMPHOCYTES # BLD AUTO: 3.63 K/UL — HIGH (ref 1.2–3.4)
LYMPHOCYTES # BLD AUTO: 38.9 % — SIGNIFICANT CHANGE UP (ref 20.5–51.1)
MANUAL SMEAR VERIFICATION: SIGNIFICANT CHANGE UP
MCHC RBC-ENTMCNC: 25.5 PG — LOW (ref 27–31)
MCHC RBC-ENTMCNC: 30.5 G/DL — LOW (ref 32–37)
MCV RBC AUTO: 83.7 FL — SIGNIFICANT CHANGE UP (ref 80–94)
MONOCYTES # BLD AUTO: 0.82 K/UL — HIGH (ref 0.1–0.6)
MONOCYTES NFR BLD AUTO: 8.8 % — SIGNIFICANT CHANGE UP (ref 1.7–9.3)
NEUTROPHILS # BLD AUTO: 3.97 K/UL — SIGNIFICANT CHANGE UP (ref 1.4–6.5)
NEUTROPHILS NFR BLD AUTO: 42.5 % — SIGNIFICANT CHANGE UP (ref 42.2–75.2)
NITRITE UR-MCNC: NEGATIVE — SIGNIFICANT CHANGE UP
OVALOCYTES BLD QL SMEAR: SLIGHT — SIGNIFICANT CHANGE UP
PH UR: 6 — SIGNIFICANT CHANGE UP (ref 5–8)
PLAT MORPH BLD: ABNORMAL
PLATELET # BLD AUTO: 253 K/UL — SIGNIFICANT CHANGE UP (ref 130–400)
PMV BLD: 10.1 FL — SIGNIFICANT CHANGE UP (ref 7.4–10.4)
POIKILOCYTOSIS BLD QL AUTO: SLIGHT — SIGNIFICANT CHANGE UP
POLYCHROMASIA BLD QL SMEAR: SLIGHT — SIGNIFICANT CHANGE UP
POTASSIUM SERPL-MCNC: 4.7 MMOL/L — SIGNIFICANT CHANGE UP (ref 3.5–5)
POTASSIUM SERPL-SCNC: 4.7 MMOL/L — SIGNIFICANT CHANGE UP (ref 3.5–5)
PROT SERPL-MCNC: 7.1 G/DL — SIGNIFICANT CHANGE UP (ref 6–8)
PROT UR-MCNC: NEGATIVE MG/DL — SIGNIFICANT CHANGE UP
RBC # BLD: 4.35 M/UL — LOW (ref 4.7–6.1)
RBC # FLD: 17.7 % — HIGH (ref 11.5–14.5)
RBC BLD AUTO: ABNORMAL
SALICYLATES SERPL-MCNC: <0.3 MG/DL — LOW (ref 4–30)
SARS-COV-2 RNA SPEC QL NAA+PROBE: SIGNIFICANT CHANGE UP
SMUDGE CELLS # BLD: PRESENT — SIGNIFICANT CHANGE UP
SODIUM SERPL-SCNC: 137 MMOL/L — SIGNIFICANT CHANGE UP (ref 135–146)
SP GR SPEC: 1.01 — SIGNIFICANT CHANGE UP (ref 1–1.03)
UROBILINOGEN FLD QL: 0.2 MG/DL — SIGNIFICANT CHANGE UP (ref 0.2–1)
VARIANT LYMPHS # BLD: 8 % — HIGH (ref 0–5)
WBC # BLD: 9.34 K/UL — SIGNIFICANT CHANGE UP (ref 4.8–10.8)
WBC # FLD AUTO: 9.34 K/UL — SIGNIFICANT CHANGE UP (ref 4.8–10.8)

## 2024-04-11 PROCEDURE — 93010 ELECTROCARDIOGRAM REPORT: CPT

## 2024-04-11 PROCEDURE — 90792 PSYCH DIAG EVAL W/MED SRVCS: CPT | Mod: 95

## 2024-04-11 PROCEDURE — 85025 COMPLETE CBC W/AUTO DIFF WBC: CPT

## 2024-04-11 PROCEDURE — 83036 HEMOGLOBIN GLYCOSYLATED A1C: CPT

## 2024-04-11 PROCEDURE — 80061 LIPID PANEL: CPT

## 2024-04-11 PROCEDURE — 80159 DRUG ASSAY CLOZAPINE: CPT

## 2024-04-11 PROCEDURE — 80164 ASSAY DIPROPYLACETIC ACD TOT: CPT

## 2024-04-11 RX ORDER — DIVALPROEX SODIUM 500 MG/1
500 TABLET, DELAYED RELEASE ORAL DAILY
Refills: 0 | Status: DISCONTINUED | OUTPATIENT
Start: 2024-04-11 | End: 2024-04-12

## 2024-04-11 RX ORDER — METOPROLOL TARTRATE 50 MG
25 TABLET ORAL DAILY
Refills: 0 | Status: DISCONTINUED | OUTPATIENT
Start: 2024-04-11 | End: 2024-04-12

## 2024-04-11 RX ORDER — METOPROLOL TARTRATE 50 MG
25 TABLET ORAL ONCE
Refills: 0 | Status: COMPLETED | OUTPATIENT
Start: 2024-04-11 | End: 2024-04-11

## 2024-04-11 RX ORDER — METFORMIN HYDROCHLORIDE 850 MG/1
1000 TABLET ORAL ONCE
Refills: 0 | Status: COMPLETED | OUTPATIENT
Start: 2024-04-11 | End: 2024-04-11

## 2024-04-11 RX ORDER — METOPROLOL TARTRATE 50 MG
25 TABLET ORAL DAILY
Refills: 0 | Status: DISCONTINUED | OUTPATIENT
Start: 2024-04-11 | End: 2024-04-18

## 2024-04-11 RX ORDER — BENZTROPINE MESYLATE 1 MG
1 TABLET ORAL
Refills: 0 | Status: DISCONTINUED | OUTPATIENT
Start: 2024-04-11 | End: 2024-04-12

## 2024-04-11 RX ORDER — METFORMIN HYDROCHLORIDE 850 MG/1
1000 TABLET ORAL
Refills: 0 | Status: DISCONTINUED | OUTPATIENT
Start: 2024-04-11 | End: 2024-04-12

## 2024-04-11 RX ORDER — CLOZAPINE 150 MG/1
100 TABLET, ORALLY DISINTEGRATING ORAL ONCE
Refills: 0 | Status: COMPLETED | OUTPATIENT
Start: 2024-04-11 | End: 2024-04-11

## 2024-04-11 RX ORDER — DIPHENHYDRAMINE HCL 50 MG
50 CAPSULE ORAL EVERY 6 HOURS
Refills: 0 | Status: DISCONTINUED | OUTPATIENT
Start: 2024-04-11 | End: 2024-04-18

## 2024-04-11 RX ORDER — BENZTROPINE MESYLATE 1 MG
1 TABLET ORAL
Refills: 0 | Status: DISCONTINUED | OUTPATIENT
Start: 2024-04-11 | End: 2024-04-18

## 2024-04-11 RX ORDER — LEVOTHYROXINE SODIUM 125 MCG
25 TABLET ORAL DAILY
Refills: 0 | Status: DISCONTINUED | OUTPATIENT
Start: 2024-04-11 | End: 2024-04-18

## 2024-04-11 RX ORDER — DIVALPROEX SODIUM 500 MG/1
750 TABLET, DELAYED RELEASE ORAL AT BEDTIME
Refills: 0 | Status: DISCONTINUED | OUTPATIENT
Start: 2024-04-11 | End: 2024-04-12

## 2024-04-11 RX ORDER — POLYETHYLENE GLYCOL 3350 17 G/17G
17 POWDER, FOR SOLUTION ORAL ONCE
Refills: 0 | Status: DISCONTINUED | OUTPATIENT
Start: 2024-04-11 | End: 2024-04-18

## 2024-04-11 RX ORDER — ATORVASTATIN CALCIUM 80 MG/1
40 TABLET, FILM COATED ORAL AT BEDTIME
Refills: 0 | Status: DISCONTINUED | OUTPATIENT
Start: 2024-04-11 | End: 2024-04-18

## 2024-04-11 RX ORDER — BENZTROPINE MESYLATE 1 MG
1 TABLET ORAL ONCE
Refills: 0 | Status: COMPLETED | OUTPATIENT
Start: 2024-04-11 | End: 2024-04-11

## 2024-04-11 RX ORDER — DIVALPROEX SODIUM 500 MG/1
750 TABLET, DELAYED RELEASE ORAL AT BEDTIME
Refills: 0 | Status: DISCONTINUED | OUTPATIENT
Start: 2024-04-11 | End: 2024-04-18

## 2024-04-11 RX ORDER — LEVOTHYROXINE SODIUM 125 MCG
25 TABLET ORAL DAILY
Refills: 0 | Status: DISCONTINUED | OUTPATIENT
Start: 2024-04-11 | End: 2024-04-12

## 2024-04-11 RX ORDER — DIVALPROEX SODIUM 500 MG/1
500 TABLET, DELAYED RELEASE ORAL DAILY
Refills: 0 | Status: DISCONTINUED | OUTPATIENT
Start: 2024-04-11 | End: 2024-04-18

## 2024-04-11 RX ORDER — DIVALPROEX SODIUM 500 MG/1
500 TABLET, DELAYED RELEASE ORAL ONCE
Refills: 0 | Status: COMPLETED | OUTPATIENT
Start: 2024-04-11 | End: 2024-04-11

## 2024-04-11 RX ORDER — CLOZAPINE 150 MG/1
100 TABLET, ORALLY DISINTEGRATING ORAL
Refills: 0 | Status: DISCONTINUED | OUTPATIENT
Start: 2024-04-11 | End: 2024-04-12

## 2024-04-11 RX ORDER — METFORMIN HYDROCHLORIDE 850 MG/1
1000 TABLET ORAL
Refills: 0 | Status: DISCONTINUED | OUTPATIENT
Start: 2024-04-11 | End: 2024-04-18

## 2024-04-11 RX ORDER — HALOPERIDOL DECANOATE 100 MG/ML
5 INJECTION INTRAMUSCULAR EVERY 6 HOURS
Refills: 0 | Status: DISCONTINUED | OUTPATIENT
Start: 2024-04-11 | End: 2024-04-18

## 2024-04-11 RX ORDER — ATORVASTATIN CALCIUM 80 MG/1
40 TABLET, FILM COATED ORAL AT BEDTIME
Refills: 0 | Status: DISCONTINUED | OUTPATIENT
Start: 2024-04-11 | End: 2024-04-12

## 2024-04-11 RX ORDER — CLOZAPINE 150 MG/1
100 TABLET, ORALLY DISINTEGRATING ORAL
Refills: 0 | Status: DISCONTINUED | OUTPATIENT
Start: 2024-04-11 | End: 2024-04-18

## 2024-04-11 RX ADMIN — Medication 1 MILLIGRAM(S): at 12:29

## 2024-04-11 RX ADMIN — Medication 5 MILLIGRAM(S): at 22:38

## 2024-04-11 RX ADMIN — Medication 25 MILLIGRAM(S): at 12:30

## 2024-04-11 RX ADMIN — ATORVASTATIN CALCIUM 40 MILLIGRAM(S): 80 TABLET, FILM COATED ORAL at 22:38

## 2024-04-11 RX ADMIN — DIVALPROEX SODIUM 500 MILLIGRAM(S): 500 TABLET, DELAYED RELEASE ORAL at 12:29

## 2024-04-11 RX ADMIN — CLOZAPINE 100 MILLIGRAM(S): 150 TABLET, ORALLY DISINTEGRATING ORAL at 12:29

## 2024-04-11 RX ADMIN — DIVALPROEX SODIUM 750 MILLIGRAM(S): 500 TABLET, DELAYED RELEASE ORAL at 22:38

## 2024-04-11 RX ADMIN — Medication 2 MILLIGRAM(S): at 18:17

## 2024-04-11 RX ADMIN — METFORMIN HYDROCHLORIDE 1000 MILLIGRAM(S): 850 TABLET ORAL at 12:29

## 2024-04-11 RX ADMIN — Medication 25 MICROGRAM(S): at 22:38

## 2024-04-11 RX ADMIN — CLOZAPINE 100 MILLIGRAM(S): 150 TABLET, ORALLY DISINTEGRATING ORAL at 22:38

## 2024-04-11 RX ADMIN — Medication 1 MILLIGRAM(S): at 22:38

## 2024-04-11 NOTE — ED BEHAVIORAL HEALTH NOTE - BEHAVIORAL HEALTH NOTE
Collateral Note:    I placed a call to [facility]. I was informed by staff that morning/daytime manager would be most appropriate to discuss case with.    I was informed telepsych can reach out to manager, Mala, at [facility] [phone].    Informed that facility notes requested patient to be evaluated and observed overnight.

## 2024-04-11 NOTE — ED BEHAVIORAL HEALTH ASSESSMENT NOTE - RISK ASSESSMENT
Chronic and acute risk is elevated - rf of SI, chronic mental illness, noncompliance, self harming behaviors,

## 2024-04-11 NOTE — ED BEHAVIORAL HEALTH ASSESSMENT NOTE - REASON
patient is unsafe to leave the ED at this time; patient will need inpatient psychiatry bed; bed search underway as per SW

## 2024-04-11 NOTE — ED ADULT NURSE REASSESSMENT NOTE - NS ED NURSE REASSESS COMMENT FT1
received pt from previous nurse [id number], no resp distress noted, equal chest rise. no further requests from pt at this time.

## 2024-04-11 NOTE — ED BEHAVIORAL HEALTH ASSESSMENT NOTE - ACCESS TO FIREARM
Per EMS/father pt hit head on end table. Denies LOC. Father states pt acting appropriately. Small lac to forehead. Gauze/bandaid applied.    Unable to assess

## 2024-04-11 NOTE — ED BEHAVIORAL HEALTH ASSESSMENT NOTE - DESCRIPTION
LIves in a group home [facility] see bh note HTN, HLD, DM as per HPI; lives in a group home [facility] Patient arrived in ED and has been calm and cooperative in no acute distress. Telepsychiatry consulted.

## 2024-04-11 NOTE — ED BEHAVIORAL HEALTH ASSESSMENT NOTE - SUICIDE ATTEMPT:
.PRE-OP INSTRUCTIONS    Your procedure has been scheduled at:    [facility]pre-admit nurse  [phone]      DAY monday    DATE 02/11/19       Please call the pre-op nurse to schedule your pre-op testing and registration  Someone from the hospital will call you the evening before surgery to let you know what time you need to be at the hospital for surgery.                                               1:  DO NOT EAT OR DRINK ANYTHING AFTER MIDNIGHT THE NIGHT BEFORE SURGERY.     2:  You will need to stop any blood thinners 1 week prior to surgery.  This includes Aspirin, fish oil, Pradaxa, Coumadin, Plavix, Pletal.  Please contact the prescribing doctor to be sure it is ok to stop these medicines.    3:  Pre-admit nurse will go over your medicines and let you know which ones not to take the morning of surgery    4:  Plan to have someone drive you home after you are released from the hospital.  You WILL NOT be able to drive yourself.    5:  If you have any questions or need to change your surgery date, please call Cecilia at [phone]    AFTER SURGERY:    You can shower 48 hours after surgery, REMOVE WET BANDAGES AND BANDAIDS, leave the steri- strips on.  If you have not had a bowel movement within 3 days after surgery you may take a laxative of your choice.  Do not lift anything over 5-10 pounds.    You need to have a follow up appointment 7-14 days after surgery, call the office to schedule or if you have questions or concerns.    For [facility] patients, you will receive a [facility] message with a link to schedule your post op appointment.   Unable to assess

## 2024-04-11 NOTE — ED BEHAVIORAL HEALTH ASSESSMENT NOTE - CURRENT MEDICATION
Invega Sustenna, Clozaril, Depakote, Ativan, Clozaril, metformin Invega Sustenna 234 mg last given 3/19, next due 4/17  Clozaril 100 mg twice daily  Cogentin 1 mg twice daily  Depakote 500 mg qAM and 750 mg at bedtime  Ativan 2 mg at bedtime  metoprolol ER 25 mg daily  metformin 1000 mg twice daily  Colace 100 mg twice daily  Ducolax 5 mg daily at bedtime  Synthryoid 25 mcg daily  atorvastatin 40 mg daily at bedtime

## 2024-04-11 NOTE — ED BEHAVIORAL HEALTH ASSESSMENT NOTE - PSYCHIATRIC ISSUES AND PLAN (INCLUDE STANDING AND PRN MEDICATION)
Admit on 9.13 pending bed availability; continue home medications; For severe agitation not responding to verbal redirection or behavioral intervention, may give Haldol 5 mg PO q6hrs PRN, Ativan 2 mg PO q6hrs PRN, Benadryl 50 mg PO q6hrs PRN, with escalation to IM if patient refusing PO and remains an imminent danger to self or others. If IM antipsychotic is administered, please perform follow-up ECG for QTc monitoring.

## 2024-04-11 NOTE — ED BEHAVIORAL HEALTH ASSESSMENT NOTE - HPI (INCLUDE ILLNESS QUALITY, SEVERITY, DURATION, TIMING, CONTEXT, MODIFYING FACTORS, ASSOCIATED SIGNS AND SYMPTOMS)
52 yo male living at [facility] ([phone]) with PMH DM, HLD, HTN, hypothyroidism, and psychiatric h/o schizoaffective disorder, past substance use disorders, frequent ED visits and hospitalizations, past suicide attempts vs NSSIB by cutting, banging head, on Clozaril and Invega Sustenna, hx of SI/HI/AVH, who was BIBA from his residence for cutting left wrist after argument with his roommate. Last night, pt reports his roommate was turning on the light in the room around 3am and he could not sleep, became aggravated, and then cut his wrists; reports this was SA, unable to assess if pt currently has plan or intent. Pt is a limited historian and speech is difficult to understand d/t possible speech impediment and incoherence.    Pt reports visual and command auditory hallucinations to hurt himself and others; reports he has never tried to hurt other people. Pt states that he was in [facility] ED last week for similar thoughts of hurting himself, had dreams about dead bodies, heard the tv talking to him, and saw ghosts. Juma is a 54 yo male living at [facility] ([phone]) with PMH DM, HLD, HTN, hypothyroidism, and psychiatric h/o schizoaffective disorder, past substance use disorders, frequent ED visits and hospitalizations, past suicide attempts vs NSSIB by cutting, banging head, on Clozaril and Invega Sustenna, hx of SI/HI/AVH, who was BIBA from his residence for cutting left wrist after argument with his roommate. Last night, pt reports his roommate was turning on the light in the room around 3am and he could not sleep, became aggravated, and then cut his wrists; reports this was SA, unable to assess if pt currently has plan or intent. Pt is a limited historian and speech is difficult to understand d/t possible speech impediment and incoherence. Pt reports visual and command auditory hallucinations to hurt himself and others; reports he has never tried to hurt other people. Pt states that he was in [facility] ED last week for similar thoughts of hurting himself, had dreams about dead bodies, heard the tv talking to him, and saw ghosts. Patient this morning continues to endorse active SI with thoughts of wanting to cut his wrists again if he were to leave the hospital at this time. Also endorse HI toward his roommate at the residence. Patient is unable to meaningfully safety plan in the ED at this time.

## 2024-04-11 NOTE — ED BEHAVIORAL HEALTH ASSESSMENT NOTE - FAMILY HISTORY OF PSYCHIATRIC ILLNESS / SUICIDALITY
unable to assess due to pt with difficulty following commands of assessment due to decreased cognition unable to assess as pt with difficulty following commands of assessment due to decreased cognition Yes

## 2024-04-11 NOTE — ED BEHAVIORAL HEALTH ASSESSMENT NOTE - SUMMARY
52 yo male living at [facility] ([phone]) with PMH DM, HLD, HTN, hypothyroidism, and psychiatric h/o schizoaffective disorder, past substance use disorders, frequent ED visits and hospitalizations, past suicide attempts vs NSSIB by cutting, banging head, on Clozaril and Invega Sustenna, hx of SI/HI/AVH, who was BIBA from his residence for cutting left wrist after argument with his roommate. Last night, pt reports his roommate was turning on the light in the room around 3am and he could not sleep, became aggravated, and then cut his wrists; reports this was SA, unable to assess if pt currently has plan or intent. Pt is a limited historian and speech is difficult to understand d/t possible speech impediment and incoherence. 54 yo male living at [facility] ([phone]) with PMH DM, HLD, HTN, hypothyroidism, and psychiatric h/o schizoaffective disorder, past substance use disorders, frequent ED visits and hospitalizations, past suicide attempts vs NSSIB by cutting, banging head, on Clozaril and Invega Sustenna, hx of SI/HI/AVH, who was BIBA from his residence for cutting left wrist after argument with his roommate. On exam this morning, patient continues to endorse SI and HI, he is irritable and unable to engage meaningfully in safety planning at this time. Patient agrees to psychiatric hospitalization for safety, stabilization and appropriate aftercare planning.

## 2024-04-12 DIAGNOSIS — E11.9 TYPE 2 DIABETES MELLITUS WITHOUT COMPLICATIONS: ICD-10-CM

## 2024-04-12 DIAGNOSIS — F31.9 BIPOLAR DISORDER, UNSPECIFIED: ICD-10-CM

## 2024-04-12 DIAGNOSIS — R45.851 SUICIDAL IDEATIONS: ICD-10-CM

## 2024-04-12 DIAGNOSIS — E78.5 HYPERLIPIDEMIA, UNSPECIFIED: ICD-10-CM

## 2024-04-12 DIAGNOSIS — E03.9 HYPOTHYROIDISM, UNSPECIFIED: ICD-10-CM

## 2024-04-12 LAB
AMPHET UR-MCNC: NEGATIVE — SIGNIFICANT CHANGE UP
BARBITURATES UR SCN-MCNC: NEGATIVE — SIGNIFICANT CHANGE UP
BENZODIAZ UR-MCNC: NEGATIVE — SIGNIFICANT CHANGE UP
COCAINE METAB.OTHER UR-MCNC: NEGATIVE — SIGNIFICANT CHANGE UP
DRUG SCREEN 1, URINE RESULT: SIGNIFICANT CHANGE UP
FENTANYL UR QL: NEGATIVE — SIGNIFICANT CHANGE UP
METHADONE UR-MCNC: NEGATIVE — SIGNIFICANT CHANGE UP
OPIATES UR-MCNC: NEGATIVE — SIGNIFICANT CHANGE UP
OXYCODONE UR-MCNC: NEGATIVE — SIGNIFICANT CHANGE UP
PCP UR-MCNC: NEGATIVE — SIGNIFICANT CHANGE UP
PROPOXYPHENE QUALITATIVE URINE RESULT: NEGATIVE — SIGNIFICANT CHANGE UP
THC UR QL: NEGATIVE — SIGNIFICANT CHANGE UP

## 2024-04-12 PROCEDURE — 99231 SBSQ HOSP IP/OBS SF/LOW 25: CPT

## 2024-04-12 RX ORDER — HYDROXYZINE HCL 10 MG
50 TABLET ORAL EVERY 6 HOURS
Refills: 0 | Status: DISCONTINUED | OUTPATIENT
Start: 2024-04-12 | End: 2024-04-18

## 2024-04-12 RX ADMIN — Medication 2 MILLIGRAM(S): at 02:47

## 2024-04-12 RX ADMIN — METFORMIN HYDROCHLORIDE 1000 MILLIGRAM(S): 850 TABLET ORAL at 08:37

## 2024-04-12 RX ADMIN — CLOZAPINE 100 MILLIGRAM(S): 150 TABLET, ORALLY DISINTEGRATING ORAL at 08:36

## 2024-04-12 RX ADMIN — ATORVASTATIN CALCIUM 40 MILLIGRAM(S): 80 TABLET, FILM COATED ORAL at 20:27

## 2024-04-12 RX ADMIN — DIVALPROEX SODIUM 750 MILLIGRAM(S): 500 TABLET, DELAYED RELEASE ORAL at 20:27

## 2024-04-12 RX ADMIN — Medication 1 MILLIGRAM(S): at 08:36

## 2024-04-12 RX ADMIN — DIVALPROEX SODIUM 500 MILLIGRAM(S): 500 TABLET, DELAYED RELEASE ORAL at 08:36

## 2024-04-12 RX ADMIN — Medication 2 MILLIGRAM(S): at 20:28

## 2024-04-12 RX ADMIN — METFORMIN HYDROCHLORIDE 1000 MILLIGRAM(S): 850 TABLET ORAL at 20:28

## 2024-04-12 RX ADMIN — Medication 50 MILLIGRAM(S): at 02:47

## 2024-04-12 RX ADMIN — Medication 25 MILLIGRAM(S): at 08:36

## 2024-04-12 RX ADMIN — Medication 5 MILLIGRAM(S): at 20:33

## 2024-04-12 RX ADMIN — HALOPERIDOL DECANOATE 5 MILLIGRAM(S): 100 INJECTION INTRAMUSCULAR at 02:47

## 2024-04-12 RX ADMIN — CLOZAPINE 100 MILLIGRAM(S): 150 TABLET, ORALLY DISINTEGRATING ORAL at 20:27

## 2024-04-12 RX ADMIN — Medication 1 MILLIGRAM(S): at 20:28

## 2024-04-12 NOTE — BH SOCIAL WORK INITIAL PSYCHOSOCIAL EVALUATION - OTHER PAST PSYCHIATRIC HISTORY (INCLUDE DETAILS REGARDING ONSET, COURSE OF ILLNESS, INPATIENT/OUTPATIENT TREATMENT)
Juma has a history of Schizoaffective Disorder, past substance use disorders, frequent ED visits and hospitalizations and past suicide attempts vs NSSIB by cutting, and banging his head.

## 2024-04-12 NOTE — BH PATIENT PROFILE - STATED REASON FOR ADMISSION
Patient came in for cutting his wrist with a tin can after getting annoyed at his roommate. Also complains of audio and visual hallucinations as well as suicidal ideation to cut self and self injurious thoughts to bang head on the wall. he also says he has homicidal ideation toward his roommate.

## 2024-04-12 NOTE — BH INPATIENT PSYCHIATRY ASSESSMENT NOTE - NSDCCRITERIA_PSY_ALL_CORE
Until patient can be safely discharged to home at [facility]

## 2024-04-12 NOTE — BH TREATMENT PLAN - NSTXSUICIDINTERSW_PSY_ALL_CORE
will meet with Juma, along with treatment team, daily for education and support.  will encourage Juma to attend groups on the unit, including DBT Crisis Skills, to learn healthy and effective coping skills.  will reinforce these skills during rounds.  will strongly encourage mental health outpatient follow up to help strengthen coping skills.

## 2024-04-12 NOTE — BH PATIENT PROFILE - HOME MEDICATIONS
LORazepam 2 mg oral tablet , 1 tab(s) orally once a day (at bedtime)  metoprolol succinate 25 mg oral tablet, extended release , 1 tab(s) orally once a day  metFORMIN 1000 mg oral tablet , 1 tab(s) orally 2 times a day  divalproex sodium 500 mg oral delayed release tablet , 1 tab(s) orally 2 times a day  divalproex sodium 250 mg oral delayed release tablet , 1 tab(s) orally once a day (at bedtime)  benztropine 1 mg oral tablet , 1 tab(s) orally 2 times a day  Invega Sustenna 234 mg/1.5 mL intramuscular suspension, extended release , 234 milligram(s) intramuscularly once a month  levothyroxine 25 mcg (0.025 mg) oral tablet , 1 tab(s) orally once a day  Colace 100 mg oral capsule , 3 cap(s) orally once a day  cloZAPine 100 mg oral tablet , 1 tab(s) orally 2 times a day  Dulcolax Laxative 5 mg oral delayed release tablet , 1 tab(s) orally once a day (at bedtime)  atorvastatin 40 mg oral tablet , 1 tab(s) orally once a day (at bedtime)  Tylenol 325 mg oral tablet , 2 tab(s) orally every 6 hours as needed for  pain or fever

## 2024-04-12 NOTE — BH INPATIENT PSYCHIATRY ASSESSMENT NOTE - NSBHCONSIPREASONDETAILS_PSY_A_CORE FT
Patient cutting left wrist with a tin can after argument with his roommate, command hallucinations regarding SI/HI.

## 2024-04-12 NOTE — BH INPATIENT PSYCHIATRY ASSESSMENT NOTE - NSBHINDICATION_PSY_ALL_CORE
1:1 sit for mentioned risk of SI and HI 1:1 sit for mentioned risk of self harm (hx of head banging while on IPP)

## 2024-04-12 NOTE — UM REPORT PROGRESS NOTE - NSUMRMPROVIDER_GEN_A_CORE FT
[facility]  ID# [id number]  [phone]    4/12- Spoke with Deepika from [facility]. Pending auth# [id number]. Faxed clinicals to [phone]. Awaiting for CM assignment and determination. [facility]  ID# [id number]  [phone]    4/12- Spoke with Deepika from [facility]. Pending auth# [id number]. Faxed clinicals to [phone]. Awaiting for CM assignment and determination.  4/12/24 Karissa - Received call from CM [facility] [phone]. [phone] fax [phone] - patient approved for 7 days. LCD and review on 4/17/24. AUTH is same as above [id number] [facility]  ID# [id number]  [phone]    4/12- Spoke with Deepika from [facility]. Pending auth# [id number]. Faxed clinicals to [phone]. Awaiting for CM assignment and determination.    4/12/24 Karissa - Received call from CM [facility] [phone]. [phone] fax [phone] - patient approved for 7 days. LCD and review on 4/17/24. AUTH is same as above [id number]    4/18- Pt discharged to day. Faxed DC clinicals to [phone] and left DC clinicals on Hien's vm as well.

## 2024-04-12 NOTE — BH SAFETY PLAN - WARNING SIGN 2
I get tired of always putting up with other peoples moods where I live at [place] and I got depressed.

## 2024-04-12 NOTE — BH INPATIENT PSYCHIATRY ASSESSMENT NOTE - NSBHASSESSSUMMFT_PSY_ALL_CORE
Patient is a 52 y/o male domiciled at [facility] ([phone], located at [address] [place] [address]) with PMH DM, HLD, HTN, hypothyroidism, and psychiatric h/o schizoaffective disorder, bipolar disorder, past substance use disorders, frequent ED visits and recent IPP hospitalizations (March, May, July 2023), past suicide attempts vs NSSIB by cutting, banging head. Hx of SI/HI/AVH. Presented to [facility] ED on 4/10 by BIBA from his residence for cutting left wrist with a tin can after argument with his roommate. Collateral states that patient went without regular Ativan 2mg dose from 4/5-4/10 due to nursing home shortage.    Plan  - continue Risk Assessment   - c/w 1:1 constant observation for SI/HI  - c/w Zyprexa 100mg BID for psychosis  - c/w Ativan 2mg for anxiety  - c/w Valproate BID 500mg/750mg for mood disorder  - c/w Benzotropine 1mg BID  - c/w other home meds: Metformin 1000mg BID, Atorvostatin 40mg, Levothyroxine 25 microgram  - PRN Haldol 5mg, Ativan 2mg, Diphenhydramine 50mg  Patient is a 54 y/o male domiciled at [facility] ([phone], located at [address] [place] [address]) with PMH DM, HLD, HTN, hypothyroidism, and psychiatric h/o schizoaffective disorder, bipolar disorder, past substance use disorders, frequent ED visits and recent IPP hospitalizations (March, May, July 2023), past suicide attempts vs NSSIB by cutting, banging head. Hx of SI/HI/AVH. Presented to [facility] ED on 4/10 by BIBA from his residence for a shallow cutting of left wrist with a tin can after argument with his roommate. Collateral states that patient went without regular Ativan 2mg dose from 4/5-4/10 due to nursing home shortage.    On initial assessment on IPP, patient presented as completely oriented, though he was lethargic and not generally cooperative with interview. Patient denied acute suicidality or homicidality, but did express that he does not feel "safe" at his residence and he "does not want to return". Per chart review, patient has been to the ED 4 times in the past 4 months for behavioral presentations, sent by his residence, and each time he was discharged back to there care with minimal safety concerns. Patient has several risk factors of self harm and harming others, including static risk factors of current and past psychiatric diagnosis (schizophrenia, bipolar disorder) and command hallucinations; modifiable risk factors of lack of access/noncompliance to anxiety medication, housing means, ability to cope with stress, and frustration tolerance. Patient also has protective factors of good compliance/response to meds and residential stability, as well as being connected to care. At the time, given [facility] and the cutting (even if shallow), we believe the patient would benefit from inpatient psychiatric hospitalization for mood stabilization, medication management, and aftercare planning.     Plan  - c/w 1:1 constant observation for hx of head banging while on IPP, can reassess for need tomorrow  - c/w Clozapine 100mg BID for psychosis  - c/w Ativan 2mg PO qhs for anxiety  - c/w Valproate BID 500mg/750mg PO for mood stabilization  - c/w Benztropine 1mg BID for EPS prevention  - c/w other home meds: Metformin 1000mg BID, Atorvastatin 40mg, Levothyroxine 25 microgram  - f/u hospitalist consult  - For severe agitation not responding to behavioral intervention, consider offering haldol 5 mg po q6h prn, ativan 2 mg PO q6h prn, benadryl 50 mg po q6h prn, with escalation to IM if patient refusing PO and remains an imminent danger to self or others. If IM antipsychotic is administered, please perform follow-up ECG for QTc monitoring. Hold antipsychotics if Qtc>500 ms.

## 2024-04-12 NOTE — BH TREATMENT PLAN - NSTXPLANTHERAPYSESSIONSFT_PSY_ALL_CORE
04-12-24  Type of therapy: Coping skills, Creative arts therapy, Inspiration and motiviation, Leisure development, Self-destructive behavior, Social skills training, Stress management, Symptom management  Type of session: Individual  Level of patient participation: Resistance to participation  Duration of participation: Less than 15 minutes  Therapy conducted by: Psych rehab  Therapy Summary: Pt will need increased , and ongoing encouragement

## 2024-04-12 NOTE — PROGRESS NOTE ADULT - ASSESSMENT
shizophrenia   - mgmt as per psych    hld  - statin    dm2  - on metformin  - outpt f/u with pmd    hypothyroid  - on synthroid    recall prn

## 2024-04-12 NOTE — BH INPATIENT PSYCHIATRY ASSESSMENT NOTE - PAST PSYCHOTROPIC MEDICATION
- Zyprexa 100mg BID for psychosis  - Ativan 2mg for anxiety  - Valproate BID 500mg/750mg for mood disorder

## 2024-04-12 NOTE — BH PATIENT PROFILE - CURRENT PLAN
I saw Joseph for the CIRT study visit 8. He had labs drawn on 2/7/18. I went through study questions, took vitals. I gave him two packs of SD as he had one pack of 8 weeks left in it. He has had no Ae/SAEs since the last visit.  Titration algorithm was ran and he will continue on 20mg of SD daily. Next appt is in April. He has a lab slip for the next visit.   Yes

## 2024-04-12 NOTE — BH INPATIENT PSYCHIATRY ASSESSMENT NOTE - NSBHCRANIAL_PSY_ALL_CORE
Recognizes 2 fingers or can read (II)/Smiles, shows teeth, opens mouth, sticks out tongue (V, VII, XI)/Extraocular Eye Movement Intact  (III, IV, VI)/Hearing intact (VIII)

## 2024-04-12 NOTE — BH PATIENT PROFILE - NSDYSPHAGSECTONE_PSY_ALL_CORE
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 Now admitted for cycle 2 Consolidation with [facility].   N/A

## 2024-04-12 NOTE — PROGRESS NOTE ADULT - SUBJECTIVE AND OBJECTIVE BOX
Pt feels well, resting when approached but easily arrousable. Somnelent    T(F): , Max: 96.6 (04-12-24 @ 08:43)  HR: 95 (04-12-24 @ 08:43) (87 - 110)  BP: 131/77 (04-12-24 @ 08:43)  RR: 18 (04-12-24 @ 08:43)  SpO2: 98% (04-12-24 @ 02:14)88.813  General: No apparent distress  Cardiovascular: S1, S2  Gastrointestinal: Soft, Non-tender, Non-distended  Respiratory: Good air entry bilaterally  Musculoskeletal: Moves all extremities  Lymphatic: No edema  Neurologic: No gross motor deficit  Dermatologic: Skin dry                          11.1   9.34  )-----------( 253      ( 11 Apr 2024 20:15 )             36.4     04-11    137  |  102  |  5<L>  ----------------------------<  130<H>  4.7   |  22  |  0.8    Ca    9.5      11 Apr 2024 20:15    TPro  7.1  /  Alb  4.1  /  TBili  <0.2  /  DBili  x   /  AST  13  /  ALT  11  /  AlkPhos  100  04-11

## 2024-04-12 NOTE — BH TREATMENT PLAN - NSTXPSYCHOINTERRN_PSY_ALL_CORE
RN will assess for halluciantions and content of hallucinations to appropraitely assist patient in managing them.

## 2024-04-12 NOTE — BH INPATIENT PSYCHIATRY ASSESSMENT NOTE - CURRENT MEDICATION
MEDICATIONS  (STANDING):  atorvastatin 40 milliGRAM(s) Oral at bedtime  benztropine 1 milliGRAM(s) Oral two times a day  bisacodyl 5 milliGRAM(s) Oral at bedtime  cloZAPine 100 milliGRAM(s) Oral two times a day  divalproex  milliGRAM(s) Oral at bedtime  divalproex  milliGRAM(s) Oral daily  levothyroxine 25 MICROGram(s) Oral daily  LORazepam     Tablet 2 milliGRAM(s) Oral at bedtime  metFORMIN 1000 milliGRAM(s) Oral two times a day  metoprolol succinate ER 25 milliGRAM(s) Oral daily    MEDICATIONS  (PRN):  diphenhydrAMINE 50 milliGRAM(s) Oral every 6 hours PRN Extrapyramidal prophylaxis  haloperidol     Tablet 5 milliGRAM(s) Oral every 6 hours PRN agitation  LORazepam     Tablet 2 milliGRAM(s) Oral every 6 hours PRN agitation with haldol  polyethylene glycol 3350 17 Gram(s) Oral Once PRN Constipation   MEDICATIONS  (STANDING):  atorvastatin 40 milliGRAM(s) Oral at bedtime  benztropine 1 milliGRAM(s) Oral two times a day  bisacodyl 5 milliGRAM(s) Oral at bedtime  cloZAPine 100 milliGRAM(s) Oral two times a day  divalproex  milliGRAM(s) Oral daily  divalproex  milliGRAM(s) Oral at bedtime  levothyroxine 25 MICROGram(s) Oral daily  LORazepam     Tablet 2 milliGRAM(s) Oral at bedtime  metFORMIN 1000 milliGRAM(s) Oral two times a day  metoprolol succinate ER 25 milliGRAM(s) Oral daily    MEDICATIONS  (PRN):  diphenhydrAMINE 50 milliGRAM(s) Oral every 6 hours PRN Extrapyramidal prophylaxis  haloperidol     Tablet 5 milliGRAM(s) Oral every 6 hours PRN agitation  LORazepam     Tablet 2 milliGRAM(s) Oral every 6 hours PRN agitation with haldol  polyethylene glycol 3350 17 Gram(s) Oral Once PRN Constipation   MEDICATIONS  (STANDING):  atorvastatin 40 milliGRAM(s) Oral at bedtime  benztropine 1 milliGRAM(s) Oral two times a day  bisacodyl 5 milliGRAM(s) Oral at bedtime  cloZAPine 100 milliGRAM(s) Oral two times a day  divalproex  milliGRAM(s) Oral daily  divalproex  milliGRAM(s) Oral at bedtime  levothyroxine 25 MICROGram(s) Oral daily  LORazepam     Tablet 2 milliGRAM(s) Oral at bedtime  metFORMIN 1000 milliGRAM(s) Oral two times a day  metoprolol succinate ER 25 milliGRAM(s) Oral daily    MEDICATIONS  (PRN):  diphenhydrAMINE 50 milliGRAM(s) Oral every 6 hours PRN Extrapyramidal prophylaxis  haloperidol     Tablet 5 milliGRAM(s) Oral every 6 hours PRN agitation  hydrOXYzine hydrochloride 50 milliGRAM(s) Oral every 6 hours PRN Anxiety  LORazepam     Tablet 2 milliGRAM(s) Oral every 6 hours PRN agitation with haldol  polyethylene glycol 3350 17 Gram(s) Oral Once PRN Constipation

## 2024-04-12 NOTE — BH INPATIENT PSYCHIATRY ASSESSMENT NOTE - NSBHCHARTREVIEWINVESTIGATE_PSY_A_CORE FT
Ventricular Rate 93 BPM    Atrial Rate 93 BPM    P-R Interval 128 ms    QRS Duration 94 ms    Q-T Interval 370 ms    QTC Calculation(Bazett) 460 ms    P Axis 88 degrees    R Axis 39 degrees    T Axis -5 degrees    Diagnosis Line Normal sinus rhythm  Possible Inferior infarct , age undetermined    Abnormal ECG

## 2024-04-12 NOTE — BH INPATIENT PSYCHIATRY ASSESSMENT NOTE - RISK ASSESSMENT
Static risk factors - current and past psychiatric diagnosis (schizophrenia, bipolar disorder), command hallucinations,   Modifiable risk factors - lack of access/noncompliance to anxiety medication, housing means, ability to cope with stress, frustration tolerance  Protective factors - good compliance/response to meds, residential stability     Patient is a moderate level of risk for potential violence and suicide risk.  Static risk factors - current and past psychiatric diagnosis (schizophrenia, bipolar disorder), command hallucinations,   Modifiable risk factors - lack of access/noncompliance to anxiety medication, housing means, ability to cope with stress, frustration tolerance  Protective factors - good compliance/response to meds, residential stability
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
## 2024-04-12 NOTE — BH INPATIENT PSYCHIATRY ASSESSMENT NOTE - NSBHMETABOLIC_PSY_ALL_CORE_FT
BMI: BMI (kg/m2): 30.7 (04-12-24 @ 02:14)  HbA1c: A1C with Estimated Average Glucose Result: 6.2 % (12-09-23 @ 11:31)    Glucose: POCT Blood Glucose.: 166 mg/dL (12-11-23 @ 11:45)    BP: 131/77 (04-12-24 @ 08:43) (112/61 - 168/90)Vital Signs Last 24 Hrs  T(C): 35.9 (04-12-24 @ 08:43), Max: 35.9 (04-12-24 @ 08:43)  T(F): 96.6 (04-12-24 @ 08:43), Max: 96.6 (04-12-24 @ 08:43)  HR: 95 (04-12-24 @ 08:43) (87 - 110)  BP: 131/77 (04-12-24 @ 08:43) (112/70 - 145/71)  BP(mean): --  RR: 18 (04-12-24 @ 08:43) (18 - 19)  SpO2: 98% (04-12-24 @ 02:14) (96% - 100%)      Lipid Panel: Date/Time: 07-26-23 @ 08:30  Cholesterol, Serum: 226  LDL Cholesterol Calculated: 173  HDL Cholesterol, Serum: 26  Total Cholesterol/HDL Ration Measurement: --  Triglycerides, Serum: 137   BMI: BMI (kg/m2): 30.7 (04-12-24 @ 02:14)  HbA1c: A1C with Estimated Average Glucose Result: 6.2 % (12-09-23 @ 11:31)    Glucose: POCT Blood Glucose.: 166 mg/dL (12-11-23 @ 11:45)    BP: 131/77 (04-12-24 @ 08:43) (112/61 - 168/90)Vital Signs Last 24 Hrs  T(C): 35.9 (04-12-24 @ 08:43), Max: 35.9 (04-12-24 @ 08:43)  T(F): 96.6 (04-12-24 @ 08:43), Max: 96.6 (04-12-24 @ 08:43)  HR: 95 (04-12-24 @ 08:43) (87 - 110)  BP: 131/77 (04-12-24 @ 08:43) (112/70 - 145/71)  BP(mean): --  RR: 18 (04-12-24 @ 08:43) (18 - 19)  SpO2: 98% (04-12-24 @ 02:14) (98% - 100%)      Lipid Panel: Date/Time: 07-26-23 @ 08:30  Cholesterol, Serum: 226  LDL Cholesterol Calculated: 173  HDL Cholesterol, Serum: 26  Total Cholesterol/HDL Ration Measurement: --  Triglycerides, Serum: 137

## 2024-04-12 NOTE — BH INPATIENT PSYCHIATRY ASSESSMENT NOTE - NSBHCHARTREVIEWVS_PSY_A_CORE FT
Vital Signs Last 24 Hrs  T(C): 35.9 (04-12-24 @ 08:43), Max: 35.9 (04-12-24 @ 08:43)  T(F): 96.6 (04-12-24 @ 08:43), Max: 96.6 (04-12-24 @ 08:43)  HR: 95 (04-12-24 @ 08:43) (87 - 110)  BP: 131/77 (04-12-24 @ 08:43) (112/70 - 145/71)  BP(mean): --  RR: 18 (04-12-24 @ 08:43) (18 - 19)  SpO2: 98% (04-12-24 @ 02:14) (96% - 100%)     Vital Signs Last 24 Hrs  T(C): 35.9 (04-12-24 @ 08:43), Max: 35.9 (04-12-24 @ 08:43)  T(F): 96.6 (04-12-24 @ 08:43), Max: 96.6 (04-12-24 @ 08:43)  HR: 95 (04-12-24 @ 08:43) (87 - 110)  BP: 131/77 (04-12-24 @ 08:43) (112/70 - 145/71)  BP(mean): --  RR: 18 (04-12-24 @ 08:43) (18 - 19)  SpO2: 98% (04-12-24 @ 02:14) (98% - 100%)

## 2024-04-12 NOTE — BH INPATIENT PSYCHIATRY ASSESSMENT NOTE - NSICDXBHSECONDARYDX_PSY_ALL_CORE
Bipolar disorder   F31.9  Diabetes mellitus   E11.9  Hyperlipidemia   E78.5  Hypothyroidism   E03.9  Suicide ideation   R45.851

## 2024-04-13 PROCEDURE — 99232 SBSQ HOSP IP/OBS MODERATE 35: CPT

## 2024-04-13 RX ADMIN — Medication 25 MICROGRAM(S): at 08:24

## 2024-04-13 RX ADMIN — Medication 50 MILLIGRAM(S): at 17:20

## 2024-04-13 RX ADMIN — Medication 1 MILLIGRAM(S): at 21:36

## 2024-04-13 RX ADMIN — ATORVASTATIN CALCIUM 40 MILLIGRAM(S): 80 TABLET, FILM COATED ORAL at 21:36

## 2024-04-13 RX ADMIN — DIVALPROEX SODIUM 750 MILLIGRAM(S): 500 TABLET, DELAYED RELEASE ORAL at 21:36

## 2024-04-13 RX ADMIN — Medication 5 MILLIGRAM(S): at 21:48

## 2024-04-13 RX ADMIN — Medication 1 MILLIGRAM(S): at 08:25

## 2024-04-13 RX ADMIN — METFORMIN HYDROCHLORIDE 1000 MILLIGRAM(S): 850 TABLET ORAL at 21:37

## 2024-04-13 RX ADMIN — CLOZAPINE 100 MILLIGRAM(S): 150 TABLET, ORALLY DISINTEGRATING ORAL at 08:25

## 2024-04-13 RX ADMIN — Medication 50 MILLIGRAM(S): at 08:31

## 2024-04-13 RX ADMIN — METFORMIN HYDROCHLORIDE 1000 MILLIGRAM(S): 850 TABLET ORAL at 08:25

## 2024-04-13 RX ADMIN — Medication 2 MILLIGRAM(S): at 21:37

## 2024-04-13 RX ADMIN — DIVALPROEX SODIUM 500 MILLIGRAM(S): 500 TABLET, DELAYED RELEASE ORAL at 08:26

## 2024-04-13 RX ADMIN — CLOZAPINE 100 MILLIGRAM(S): 150 TABLET, ORALLY DISINTEGRATING ORAL at 21:36

## 2024-04-13 RX ADMIN — Medication 25 MILLIGRAM(S): at 08:26

## 2024-04-13 NOTE — BH INPATIENT PSYCHIATRY PROGRESS NOTE - CURRENT MEDICATION
MEDICATIONS  (STANDING):  atorvastatin 40 milliGRAM(s) Oral at bedtime  benztropine 1 milliGRAM(s) Oral two times a day  bisacodyl 5 milliGRAM(s) Oral at bedtime  cloZAPine 100 milliGRAM(s) Oral two times a day  divalproex  milliGRAM(s) Oral daily  divalproex  milliGRAM(s) Oral at bedtime  levothyroxine 25 MICROGram(s) Oral daily  LORazepam     Tablet 2 milliGRAM(s) Oral at bedtime  metFORMIN 1000 milliGRAM(s) Oral two times a day  metoprolol succinate ER 25 milliGRAM(s) Oral daily    MEDICATIONS  (PRN):  diphenhydrAMINE 50 milliGRAM(s) Oral every 6 hours PRN Extrapyramidal prophylaxis  haloperidol     Tablet 5 milliGRAM(s) Oral every 6 hours PRN agitation  hydrOXYzine hydrochloride 50 milliGRAM(s) Oral every 6 hours PRN Anxiety  LORazepam     Tablet 2 milliGRAM(s) Oral every 6 hours PRN agitation with haldol  polyethylene glycol 3350 17 Gram(s) Oral Once PRN Constipation

## 2024-04-13 NOTE — BH INPATIENT PSYCHIATRY PROGRESS NOTE - NSBHFUPINTERVALHXFT_PSY_A_CORE
As per nurses, patient still reported SI. He takes medications and received Atarax this morning. He asked for double food portions. Patient was interviewed in his room. He presented superficially related with blunted affect. His answers were vague at times. He reports "bad thoughts about killing his roommate "in the home" (ie., his residence)". However, currently he denies AH, HI or SI. He reports good sleep at night but has "not much" of appetite.
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
## 2024-04-14 RX ADMIN — Medication 25 MICROGRAM(S): at 06:48

## 2024-04-14 RX ADMIN — Medication 1 MILLIGRAM(S): at 20:26

## 2024-04-14 RX ADMIN — Medication 1 MILLIGRAM(S): at 08:13

## 2024-04-14 RX ADMIN — CLOZAPINE 100 MILLIGRAM(S): 150 TABLET, ORALLY DISINTEGRATING ORAL at 08:16

## 2024-04-14 RX ADMIN — Medication 2 MILLIGRAM(S): at 20:25

## 2024-04-14 RX ADMIN — DIVALPROEX SODIUM 750 MILLIGRAM(S): 500 TABLET, DELAYED RELEASE ORAL at 20:26

## 2024-04-14 RX ADMIN — DIVALPROEX SODIUM 500 MILLIGRAM(S): 500 TABLET, DELAYED RELEASE ORAL at 08:16

## 2024-04-14 RX ADMIN — METFORMIN HYDROCHLORIDE 1000 MILLIGRAM(S): 850 TABLET ORAL at 08:16

## 2024-04-14 RX ADMIN — METFORMIN HYDROCHLORIDE 1000 MILLIGRAM(S): 850 TABLET ORAL at 20:26

## 2024-04-14 RX ADMIN — CLOZAPINE 100 MILLIGRAM(S): 150 TABLET, ORALLY DISINTEGRATING ORAL at 20:25

## 2024-04-14 RX ADMIN — Medication 5 MILLIGRAM(S): at 20:25

## 2024-04-14 RX ADMIN — ATORVASTATIN CALCIUM 40 MILLIGRAM(S): 80 TABLET, FILM COATED ORAL at 20:26

## 2024-04-14 RX ADMIN — Medication 25 MILLIGRAM(S): at 08:13

## 2024-04-14 NOTE — BH INPATIENT PSYCHIATRY PROGRESS NOTE - NSBHCHARTREVIEWINVESTIGATE_PSY_A_CORE FT
Ventricular Rate 93 BPM    Atrial Rate 93 BPM    P-R Interval 128 ms    QRS Duration 94 ms    Q-T Interval 370 ms    QTC Calculation(Bazett) 460 ms    P Axis 88 degrees    R Axis 39 degrees    T Axis -5 degrees    Diagnosis Line Normal sinus rhythm  Possible Inferior infarct , age undetermined    Abnormal ECG  
Ventricular Rate 93 BPM    Atrial Rate 93 BPM    P-R Interval 128 ms    QRS Duration 94 ms    Q-T Interval 370 ms    QTC Calculation(Bazett) 460 ms    P Axis 88 degrees    R Axis 39 degrees    T Axis -5 degrees    Diagnosis Line Normal sinus rhythm  Possible Inferior infarct , age undetermined    Abnormal ECG

## 2024-04-14 NOTE — BH INPATIENT PSYCHIATRY PROGRESS NOTE - NSBHMSETHTASSOC_PSY_A_CORE
01/10/23 1955   Assessment   Charting Type Shift assessment   Psychosocial   Psychosocial (WDL) WDL   Neurological   Neuro (WDL) WDL   Level of Consciousness 0   Orientation Level Oriented X4   Cognition Appropriate judgement; Appropriate safety awareness; Appropriate attention/concentration; Appropriate for developmental age; Follows commands   Speech Clear   Aylett Coma Scale   Eye Opening 4   Best Verbal Response 5   Best Motor Response 6   Aylett Coma Scale Score 15   HEENT (Head, Ears, Eyes, Nose, & Throat)   HEENT (WDL) X   Right Eye Impaired vision;Glasses   Left Eye Impaired vision;Glasses   Right Ear Impaired hearing   Left Ear Impaired hearing   Teeth Missing teeth   Respiratory   Respiratory (WDL) X   Respiratory Pattern Regular   Respiratory Depth Normal   Respiratory Quality/Effort Unlabored   Chest Assessment Chest expansion symmetrical   L Breath Sounds Diminished   R Breath Sounds Diminished   Cardiac   Cardiac (WDL) WDL   Cardiac Regularity Regular   Heart Sounds S1, S2   Cardiac Rhythm Sinus rhythm   Rhythm Interpretation   Heart Rate (!) 111   Cardiac Monitor   Telemetry Box Number [id number]   Bedside Monitor Alarm Parameters    Telemetry Monitor Alarm Parameters    Gastrointestinal   Abdominal (WDL) WDL   RUQ Bowel Sounds Active   LUQ Bowel Sounds Active   RLQ Bowel Sounds Active   LLQ Bowel Sounds Active   Abdomen Inspection Soft   Tenderness Soft;Nontender   Passing Flatus Y   Hernia R inguinal   Genitourinary   Genitourinary (WDL) X  (miranda)   Suprapubic Tenderness OSCAR   Dysuria (Pain/Burning w/Urination) OSCAR   Urine Assessment   Urine Color Red  (MD aware)   Urine Appearance Hazy;Cloudy   Urine Odor Malodorous   Peripheral Vascular   Peripheral Vascular (WDL) WDL   Edema None   RLE Neurovascular Assessment   Capillary Refill Less than/Equal to 3 seconds   Color Appropriate for Ethnicity   Temperature Warm   R Pedal Pulse +1   LLE Neurovascular Assessment   Capillary Refill Less than/Equal to 3 seconds   Color Appropriate for Ethnicity   Temperature Warm   L Pedal Pulse +1   Skin Integumentary    Skin Integumentary (WDL) X   Skin Color Pale   Skin Condition/Temp Warm   Skin Integrity Ecchymosis   Location scattered   Skin Integrity Site 2   Skin Integrity Location 2 Wound (see LDA)   Location 2 coccyx   Skin Integrity Site 3   Skin Integrity Location 3 Excoriation    Location 3 angelica area   Musculoskeletal   Musculoskeletal (WDL) X   RUE Weakness   LUE Weakness   RL Extremity Weakness   LL Extremity Weakness   Wound 12/16/22 Heel Left Dry Eschar   Date First Assessed/Time First Assessed: 12/16/22 1155   Present on Hospital Admission: Yes  Primary Wound Type: Pressure Injury  Location: Heel  Wound Location Orientation: Left  Wound Description (Comments): Dry Eschar   Wound Etiology Pressure Unstageable   Dressing Status Dry; Intact   Offloading for Diabetic Foot Ulcers Offloading boot   Wound Assessment Eschar dry   Drainage Amount None   Odor None   Angelica-wound Assessment Blanchable erythema;Dry/flaky   Wound 12/16/22 Buttocks Left   Date First Assessed/Time First Assessed: 12/16/22 1155   Present on Hospital Admission: Yes  Primary Wound Type: Pressure Injury  Location: Buttocks  Wound Location Orientation: Left   Wound Etiology Pressure Stage 2   Dressing Status Clean;Dry; Intact   Dressing/Treatment Foam   Wound 12/16/22 Sacrum   Date First Assessed/Time First Assessed: 12/16/22 1156   Present on Hospital Admission: Yes  Primary Wound Type: Pressure Injury  Location: Sacrum   Wound Etiology Pressure Stage 2   Dressing Status Clean;Dry; Intact   Dressing/Treatment Pharmaceutical agent (see MAR)   Wound Leg Left;Posterior   No Date First Assessed or Time First Assessed found. Primary Wound Type: Other (comment)  Location: Leg  Wound Location Orientation: Left;Posterior   Wound Etiology Traumatic   Dressing Status Dry; Intact   Drainage Amount None   Urinary Catheter Burk   No placement date or time found.    Present on Admission/Arrival: Yes  Catheter Type: Burk   Catheter Indications Urinary retention (acute or chronic), continuous bladder irrigation or bladder outlet obstruction   Site Assessment No urethral drainage   Urine Color Cherry;Bloody   Urine Appearance Cloudy   Collection Container Standard   Securement Method Securing device (Describe)   Catheter Best Practices  Catheter secured to thigh   Status Draining Unable to assess

## 2024-04-14 NOTE — BH INPATIENT PSYCHIATRY PROGRESS NOTE - NSBHFUPINTERVALHXFT_PSY_A_CORE
Chart reviewed , discussed with staff , patient evaluated.    No acute event overnight.    He si compliant with medications.   visible on the unit.        He takes medications and received Atarax  PRN  this morning.. He   denies  any suicidal /homicidal ideations intent or plan . He reports good sleep at night , but nos much appetite . 1:1  is discontinued.  on routine check
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
## 2024-04-14 NOTE — BH INPATIENT PSYCHIATRY PROGRESS NOTE - NSBHCHARTREVIEWLAB_PSY_A_CORE FT
11.1   9.34  )-----------( 253      ( 11 Apr 2024 20:15 )             36.4   04-11    137  |  102  |  5<L>  ----------------------------<  130<H>  4.7   |  22  |  0.8    Ca    9.5      11 Apr 2024 20:15    TPro  7.1  /  Alb  4.1  /  TBili  <0.2  /  DBili  x   /  AST  13  /  ALT  11  /  AlkPhos  100  04-11  
                      11.1   9.34  )-----------( 253      ( 11 Apr 2024 20:15 )             36.4   04-11    137  |  102  |  5<L>  ----------------------------<  130<H>  4.7   |  22  |  0.8    Ca    9.5      11 Apr 2024 20:15    TPro  7.1  /  Alb  4.1  /  TBili  <0.2  /  DBili  x   /  AST  13  /  ALT  11  /  AlkPhos  100  04-11

## 2024-04-15 RX ADMIN — Medication 25 MICROGRAM(S): at 07:36

## 2024-04-15 RX ADMIN — ATORVASTATIN CALCIUM 40 MILLIGRAM(S): 80 TABLET, FILM COATED ORAL at 20:51

## 2024-04-15 RX ADMIN — Medication 5 MILLIGRAM(S): at 20:34

## 2024-04-15 RX ADMIN — METFORMIN HYDROCHLORIDE 1000 MILLIGRAM(S): 850 TABLET ORAL at 08:06

## 2024-04-15 RX ADMIN — DIVALPROEX SODIUM 500 MILLIGRAM(S): 500 TABLET, DELAYED RELEASE ORAL at 08:06

## 2024-04-15 RX ADMIN — Medication 1 MILLIGRAM(S): at 20:31

## 2024-04-15 RX ADMIN — Medication 25 MILLIGRAM(S): at 08:07

## 2024-04-15 RX ADMIN — Medication 2 MILLIGRAM(S): at 20:31

## 2024-04-15 RX ADMIN — DIVALPROEX SODIUM 750 MILLIGRAM(S): 500 TABLET, DELAYED RELEASE ORAL at 20:33

## 2024-04-15 RX ADMIN — METFORMIN HYDROCHLORIDE 1000 MILLIGRAM(S): 850 TABLET ORAL at 20:31

## 2024-04-15 RX ADMIN — Medication 50 MILLIGRAM(S): at 23:06

## 2024-04-15 RX ADMIN — CLOZAPINE 100 MILLIGRAM(S): 150 TABLET, ORALLY DISINTEGRATING ORAL at 08:06

## 2024-04-15 RX ADMIN — CLOZAPINE 100 MILLIGRAM(S): 150 TABLET, ORALLY DISINTEGRATING ORAL at 20:31

## 2024-04-15 RX ADMIN — Medication 1 MILLIGRAM(S): at 08:06
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
## 2024-04-15 NOTE — BH INPATIENT PSYCHIATRY PROGRESS NOTE - NSBHFUPINTERVALHXFT_PSY_A_CORE
Nurse reports no overnight or interval events. Patient took all prescribed medications. Patient required no PRN medications since the last assessment.    On approach patient was located in his room, lying in bed, sleeping. Patient was lethargic and not very cooperative with interview, but with continues asking patient stated "I ate breakfast and watched tv, I'm okay". Patient was alert to self, location, date, and situation. Patient states that he feels safe, and is not opposed to returning back to his residence.    Patient is denying SI/AVH at this time. Patient is reporting no new symptoms. Patient is reporting no new side effects from medications. Patient is reporting that they are sleeping and eating okay. Patient is not reporting any additional questions or concerns at this time.

## 2024-04-16 LAB
A1C WITH ESTIMATED AVERAGE GLUCOSE RESULT: 6.3 % — HIGH (ref 4–5.6)
ANISOCYTOSIS BLD QL: SLIGHT — SIGNIFICANT CHANGE UP
BASOPHILS # BLD AUTO: 0 K/UL — SIGNIFICANT CHANGE UP (ref 0–0.2)
BASOPHILS NFR BLD AUTO: 0 % — SIGNIFICANT CHANGE UP (ref 0–1)
CHOLEST SERPL-MCNC: 176 MG/DL — SIGNIFICANT CHANGE UP
CLOZAPINE SERPL-MCNC: 208 NG/ML — LOW (ref 350–600)
EOSINOPHIL # BLD AUTO: 0.16 K/UL — SIGNIFICANT CHANGE UP (ref 0–0.7)
EOSINOPHIL NFR BLD AUTO: 2 % — SIGNIFICANT CHANGE UP (ref 0–8)
ESTIMATED AVERAGE GLUCOSE: 134 MG/DL — HIGH (ref 68–114)
HCT VFR BLD CALC: 35.4 % — LOW (ref 42–52)
HDLC SERPL-MCNC: 45 MG/DL — SIGNIFICANT CHANGE UP
HGB BLD-MCNC: 11.1 G/DL — LOW (ref 14–18)
LIPID PNL WITH DIRECT LDL SERPL: 99 MG/DL — SIGNIFICANT CHANGE UP
LYMPHOCYTES # BLD AUTO: 3.36 K/UL — SIGNIFICANT CHANGE UP (ref 1.2–3.4)
LYMPHOCYTES # BLD AUTO: 43 % — SIGNIFICANT CHANGE UP (ref 20.5–51.1)
MACROCYTES BLD QL: SLIGHT — SIGNIFICANT CHANGE UP
MANUAL SMEAR VERIFICATION: SIGNIFICANT CHANGE UP
MCHC RBC-ENTMCNC: 25.8 PG — LOW (ref 27–31)
MCHC RBC-ENTMCNC: 31.4 G/DL — LOW (ref 32–37)
MCV RBC AUTO: 82.1 FL — SIGNIFICANT CHANGE UP (ref 80–94)
MICROCYTES BLD QL: SLIGHT — SIGNIFICANT CHANGE UP
MONOCYTES # BLD AUTO: 1.09 K/UL — HIGH (ref 0.1–0.6)
MONOCYTES NFR BLD AUTO: 14 % — HIGH (ref 1.7–9.3)
NEUTROPHILS # BLD AUTO: 3.21 K/UL — SIGNIFICANT CHANGE UP (ref 1.4–6.5)
NEUTROPHILS NFR BLD AUTO: 41 % — LOW (ref 42.2–75.2)
NON HDL CHOLESTEROL: 131 MG/DL — HIGH
NRBC # BLD: 0 /100 WBCS — SIGNIFICANT CHANGE UP (ref 0–0)
NRBC # BLD: SIGNIFICANT CHANGE UP /100 WBCS (ref 0–0)
OVALOCYTES BLD QL SMEAR: SLIGHT — SIGNIFICANT CHANGE UP
PLAT MORPH BLD: NORMAL — SIGNIFICANT CHANGE UP
PLATELET # BLD AUTO: 267 K/UL — SIGNIFICANT CHANGE UP (ref 130–400)
PMV BLD: 9.5 FL — SIGNIFICANT CHANGE UP (ref 7.4–10.4)
RBC # BLD: 4.31 M/UL — LOW (ref 4.7–6.1)
RBC # FLD: 17.2 % — HIGH (ref 11.5–14.5)
RBC BLD AUTO: ABNORMAL
TARGETS BLD QL SMEAR: SLIGHT — SIGNIFICANT CHANGE UP
TRIGL SERPL-MCNC: 161 MG/DL — HIGH
VALPROATE SERPL-MCNC: 65 UG/ML — SIGNIFICANT CHANGE UP (ref 50–100)
WBC # BLD: 7.82 K/UL — SIGNIFICANT CHANGE UP (ref 4.8–10.8)
WBC # FLD AUTO: 7.82 K/UL — SIGNIFICANT CHANGE UP (ref 4.8–10.8)

## 2024-04-16 RX ORDER — PALIPERIDONE 1.5 MG/1
234 TABLET, EXTENDED RELEASE ORAL ONCE
Refills: 0 | Status: COMPLETED | OUTPATIENT
Start: 2024-04-17 | End: 2024-04-17

## 2024-04-16 RX ADMIN — METFORMIN HYDROCHLORIDE 1000 MILLIGRAM(S): 850 TABLET ORAL at 08:34

## 2024-04-16 RX ADMIN — Medication 25 MILLIGRAM(S): at 08:34

## 2024-04-16 RX ADMIN — Medication 2 MILLIGRAM(S): at 20:16

## 2024-04-16 RX ADMIN — ATORVASTATIN CALCIUM 40 MILLIGRAM(S): 80 TABLET, FILM COATED ORAL at 20:16

## 2024-04-16 RX ADMIN — Medication 1 MILLIGRAM(S): at 08:34

## 2024-04-16 RX ADMIN — Medication 50 MILLIGRAM(S): at 22:40

## 2024-04-16 RX ADMIN — METFORMIN HYDROCHLORIDE 1000 MILLIGRAM(S): 850 TABLET ORAL at 20:16

## 2024-04-16 RX ADMIN — DIVALPROEX SODIUM 500 MILLIGRAM(S): 500 TABLET, DELAYED RELEASE ORAL at 08:33

## 2024-04-16 RX ADMIN — CLOZAPINE 100 MILLIGRAM(S): 150 TABLET, ORALLY DISINTEGRATING ORAL at 20:16

## 2024-04-16 RX ADMIN — Medication 25 MICROGRAM(S): at 06:45

## 2024-04-16 RX ADMIN — Medication 5 MILLIGRAM(S): at 20:16

## 2024-04-16 RX ADMIN — Medication 1 MILLIGRAM(S): at 20:16

## 2024-04-16 RX ADMIN — DIVALPROEX SODIUM 750 MILLIGRAM(S): 500 TABLET, DELAYED RELEASE ORAL at 20:17

## 2024-04-16 RX ADMIN — CLOZAPINE 100 MILLIGRAM(S): 150 TABLET, ORALLY DISINTEGRATING ORAL at 08:34

## 2024-04-16 NOTE — BH INPATIENT PSYCHIATRY PROGRESS NOTE - CURRENT MEDICATION
MEDICATIONS  (STANDING):  atorvastatin 40 milliGRAM(s) Oral at bedtime  benztropine 1 milliGRAM(s) Oral two times a day  bisacodyl 5 milliGRAM(s) Oral at bedtime  cloZAPine 100 milliGRAM(s) Oral two times a day  divalproex  milliGRAM(s) Oral at bedtime  divalproex  milliGRAM(s) Oral daily  levothyroxine 25 MICROGram(s) Oral daily  LORazepam     Tablet 2 milliGRAM(s) Oral at bedtime  metFORMIN 1000 milliGRAM(s) Oral two times a day  metoprolol succinate ER 25 milliGRAM(s) Oral daily    MEDICATIONS  (PRN):  diphenhydrAMINE 50 milliGRAM(s) Oral every 6 hours PRN Extrapyramidal prophylaxis  haloperidol     Tablet 5 milliGRAM(s) Oral every 6 hours PRN agitation  hydrOXYzine hydrochloride 50 milliGRAM(s) Oral every 6 hours PRN Anxiety  LORazepam     Tablet 2 milliGRAM(s) Oral every 6 hours PRN agitation with haldol  polyethylene glycol 3350 17 Gram(s) Oral Once PRN Constipation   MEDICATIONS  (STANDING):  atorvastatin 40 milliGRAM(s) Oral at bedtime  benztropine 1 milliGRAM(s) Oral two times a day  bisacodyl 5 milliGRAM(s) Oral at bedtime  cloZAPine 100 milliGRAM(s) Oral two times a day  divalproex  milliGRAM(s) Oral daily  divalproex  milliGRAM(s) Oral at bedtime  levothyroxine 25 MICROGram(s) Oral daily  LORazepam     Tablet 2 milliGRAM(s) Oral at bedtime  metFORMIN 1000 milliGRAM(s) Oral two times a day  metoprolol succinate ER 25 milliGRAM(s) Oral daily    MEDICATIONS  (PRN):  diphenhydrAMINE 50 milliGRAM(s) Oral every 6 hours PRN Extrapyramidal prophylaxis  haloperidol     Tablet 5 milliGRAM(s) Oral every 6 hours PRN agitation  hydrOXYzine hydrochloride 50 milliGRAM(s) Oral every 6 hours PRN Anxiety  LORazepam     Tablet 2 milliGRAM(s) Oral every 6 hours PRN agitation with haldol  polyethylene glycol 3350 17 Gram(s) Oral Once PRN Constipation

## 2024-04-16 NOTE — BH INPATIENT PSYCHIATRY PROGRESS NOTE - NSBHFUPINTERVALHXFT_PSY_A_CORE
Nurse reports no overnight or interval events. Patient took all prescribed medications. Patient required no PRN medications since the last assessment.    On approach patient was located in the hallway, walking around , and was agreeable to speaking in the room. When speaking to him in the room, patient was alert to self, location, date, and situation. When asked how you are doing today, patient responded "I'm good". Patient was lethargic but is notably more active, cooperative and engaged than prior encounters. When asked if he felt okay about going back to his residence, [facility], he responded "yes". When asked how he felt about his roommate Woody, and if Woody were in the room with him at this time he responded "not good". When asked if he feels like he would hurt Woody, he responded "no", when asked if he would hurt himself he said "no".  When asked if he'd feel better if he had a new roommate he said yes, he would feel better if he was not with Woody. We explained to him that he will soon be able to return to [facility], that they will probably want to speak with him prior to discharge and he was agreeable.     Patient is denying SI/AVH at this time. Patient is reporting no new symptoms. Patient is reporting no new side effects from medications. Patient is reporting that they are sleeping and eating okay. Patient is not reporting any additional questions or concerns at this time.
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
## 2024-04-17 RX ORDER — BENZTROPINE MESYLATE 1 MG
1 TABLET ORAL
Refills: 0 | DISCHARGE

## 2024-04-17 RX ORDER — DIVALPROEX SODIUM 500 MG/1
3 TABLET, DELAYED RELEASE ORAL
Qty: 42 | Refills: 0
Start: 2024-04-17 | End: 2024-04-30

## 2024-04-17 RX ORDER — LEVOTHYROXINE SODIUM 125 MCG
1 TABLET ORAL
Refills: 0 | DISCHARGE

## 2024-04-17 RX ORDER — CLOZAPINE 150 MG/1
1 TABLET, ORALLY DISINTEGRATING ORAL
Qty: 28 | Refills: 0
Start: 2024-04-17 | End: 2024-04-30

## 2024-04-17 RX ORDER — BENZTROPINE MESYLATE 1 MG
1 TABLET ORAL
Qty: 28 | Refills: 0
Start: 2024-04-17 | End: 2024-04-30

## 2024-04-17 RX ORDER — METFORMIN HYDROCHLORIDE 850 MG/1
1 TABLET ORAL
Refills: 0 | DISCHARGE

## 2024-04-17 RX ORDER — ACETAMINOPHEN 500 MG
2 TABLET ORAL
Refills: 0 | DISCHARGE

## 2024-04-17 RX ORDER — DIVALPROEX SODIUM 500 MG/1
1 TABLET, DELAYED RELEASE ORAL
Refills: 0 | DISCHARGE

## 2024-04-17 RX ORDER — METOPROLOL TARTRATE 50 MG
1 TABLET ORAL
Refills: 0 | DISCHARGE

## 2024-04-17 RX ORDER — CLOZAPINE 150 MG/1
1 TABLET, ORALLY DISINTEGRATING ORAL
Refills: 0 | DISCHARGE

## 2024-04-17 RX ORDER — DIVALPROEX SODIUM 500 MG/1
1 TABLET, DELAYED RELEASE ORAL
Qty: 14 | Refills: 0
Start: 2024-04-17 | End: 2024-04-30

## 2024-04-17 RX ORDER — ATORVASTATIN CALCIUM 80 MG/1
1 TABLET, FILM COATED ORAL
Refills: 0 | DISCHARGE

## 2024-04-17 RX ORDER — DOCUSATE SODIUM 100 MG
3 CAPSULE ORAL
Refills: 0 | DISCHARGE

## 2024-04-17 RX ADMIN — DIVALPROEX SODIUM 750 MILLIGRAM(S): 500 TABLET, DELAYED RELEASE ORAL at 20:39

## 2024-04-17 RX ADMIN — METFORMIN HYDROCHLORIDE 1000 MILLIGRAM(S): 850 TABLET ORAL at 20:40

## 2024-04-17 RX ADMIN — Medication 1 MILLIGRAM(S): at 08:46

## 2024-04-17 RX ADMIN — METFORMIN HYDROCHLORIDE 1000 MILLIGRAM(S): 850 TABLET ORAL at 08:47

## 2024-04-17 RX ADMIN — DIVALPROEX SODIUM 500 MILLIGRAM(S): 500 TABLET, DELAYED RELEASE ORAL at 08:46

## 2024-04-17 RX ADMIN — Medication 25 MILLIGRAM(S): at 08:46

## 2024-04-17 RX ADMIN — Medication 25 MICROGRAM(S): at 06:13

## 2024-04-17 RX ADMIN — Medication 2 MILLIGRAM(S): at 20:39

## 2024-04-17 RX ADMIN — Medication 5 MILLIGRAM(S): at 20:40

## 2024-04-17 RX ADMIN — CLOZAPINE 100 MILLIGRAM(S): 150 TABLET, ORALLY DISINTEGRATING ORAL at 08:47

## 2024-04-17 RX ADMIN — CLOZAPINE 100 MILLIGRAM(S): 150 TABLET, ORALLY DISINTEGRATING ORAL at 20:40

## 2024-04-17 RX ADMIN — ATORVASTATIN CALCIUM 40 MILLIGRAM(S): 80 TABLET, FILM COATED ORAL at 20:43

## 2024-04-17 RX ADMIN — PALIPERIDONE 234 MILLIGRAM(S): 1.5 TABLET, EXTENDED RELEASE ORAL at 16:08

## 2024-04-17 RX ADMIN — Medication 1 MILLIGRAM(S): at 20:40

## 2024-04-17 NOTE — BH INPATIENT PSYCHIATRY PROGRESS NOTE - NSICDXBHPRIMARYDX_PSY_ALL_CORE
Schizoaffective disorder, unspecified type   F25.9  

## 2024-04-17 NOTE — BH INPATIENT PSYCHIATRY DISCHARGE NOTE - NSBHFUPINTERVALHXFT_PSY_A_CORE
Nurse reports no overnight or interval events. Patient took all prescribed medications. Patient required no PRN medications since the last assessment.    On approach patient was located in his room , lying in his bed. Patient was alert to self, location, date, and situation. When asked how you are doing today, patient responded "I'm feeling good". Patient was alert. When asked how he feels about going back to his group home, [facility], he said he feels good about it.     Patient is denying SI/AVH at this time. Patient is reporting no new symptoms. Patient is reporting no new side effects from medications. Patient is reporting that they are sleeping and eating okay. Patient is not reporting any additional questions or concerns at this time.    Patient to be discharged back to residence later this morning.

## 2024-04-17 NOTE — BH INPATIENT PSYCHIATRY DISCHARGE NOTE - NSDCMRMEDTOKEN_GEN_ALL_CORE_FT
benztropine 1 mg oral tablet: 1 tab(s) orally 2 times a day MDD: 2mg  cloZAPine 100 mg oral tablet: 1 tab(s) orally 2 times a day MDD: 200mg  divalproex sodium 250 mg oral delayed release tablet: 3 tab(s) orally once a day (at bedtime) MDD: 1250mg  divalproex sodium 500 mg oral delayed release tablet: 1 tab(s) orally once a day morning dose MDD: 1250mg  Invega Sustenna 234 mg/1.5 mL intramuscular suspension, extended release: 234 milligram(s) intramuscularly once a month  
Opt out

## 2024-04-17 NOTE — BH INPATIENT PSYCHIATRY PROGRESS NOTE - NSBHCONSBHPROVDETAILS_PSY_A_CORE  FT
[facility], ready to discharge for tomorrow 4/18
Spoke to [facility] residence today, that said will visit patient 4/17 around 1pm

## 2024-04-17 NOTE — BH INPATIENT PSYCHIATRY DISCHARGE NOTE - REASON FOR ADMISSION
You were admitted to the inpatient psychiatry unit because you were agitated at your living facility in the context of potential psychiatric decompensation.

## 2024-04-17 NOTE — BH INPATIENT PSYCHIATRY PROGRESS NOTE - NSDCCRITERIA_PSY_ALL_CORE
Until patient can be safely discharged to home at [facility]
Until patient can be safely discharged to home at [facility]
Until patient can be safely discharged to home at [facility]
Until patient can be safely discharged to home at [facility]
Until patient can be safely discharged to home at [facility]

## 2024-04-17 NOTE — BH INPATIENT PSYCHIATRY PROGRESS NOTE - NSBHATTESTCOMMENTATTENDFT_PSY_A_CORE
Pt seen, chart reviewed, and case discussed with treatment team. Agree with assessment and plan.
Pt seen, chart reviewed and case discussed with treatment team. Agree with assessment and plan
Pt seen, chart reviewed , and case discussed with treatment team. Agree with assessment and plan.
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
## 2024-04-17 NOTE — BH INPATIENT PSYCHIATRY PROGRESS NOTE - NSBHMSESPEECH_PSY_A_CORE
unclear speech/Abnormal as indicated, otherwise normal...

## 2024-04-17 NOTE — BH INPATIENT PSYCHIATRY PROGRESS NOTE - NSBHFUPINTERVALHXFT_PSY_A_CORE
Nurse reports no overnight or interval events. Patient took all prescribed medications. Patient required no PRN medications since the last assessment.    On approach patient was located in his room , lying in his bed. Patient was alert to self, location, date, and situation. When asked how you are doing today, patient responded "I'm feeling good". Patient was alert. When asked how he feels about going back to his group home, [facility], he said he feels good about it. We explained to him that today he will be getting his Invega Sustenna shot, and plan to be discharged tomorro 4/18, and Renard from [facility] would be visiting him at 1pm today, he was agreeable to all of these requests.    Patient is denying SI/AVH at this time. Patient is reporting no new symptoms. Patient is reporting no new side effects from medications. Patient is reporting that they are sleeping and eating okay. Patient is not reporting any additional questions or concerns at this time. Nurse reports no overnight or interval events. Patient took all prescribed medications. Patient required no PRN medications since the last assessment.    On approach patient was located in his room , lying in his bed. Patient was alert to self, location, date, and situation. When asked how you are doing today, patient responded "I'm feeling good". Patient was alert. When asked how he feels about going back to his group home, [facility], he said he feels good about it. We explained to him that today he will be getting his Invega Sustenna shot, and plan to be discharged tomorrow 4/18, and Renard from [facility] would be visiting him at 1pm today, he was agreeable to all of these requests.    Patient is denying SI/AVH at this time. Patient is reporting no new symptoms. Patient is reporting no new side effects from medications. Patient is reporting that they are sleeping and eating okay. Patient is not reporting any additional questions or concerns at this time.
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
## 2024-04-17 NOTE — BH INPATIENT PSYCHIATRY PROGRESS NOTE - CURRENT MEDICATION
MEDICATIONS  (STANDING):  atorvastatin 40 milliGRAM(s) Oral at bedtime  benztropine 1 milliGRAM(s) Oral two times a day  bisacodyl 5 milliGRAM(s) Oral at bedtime  cloZAPine 100 milliGRAM(s) Oral two times a day  divalproex  milliGRAM(s) Oral daily  divalproex  milliGRAM(s) Oral at bedtime  levothyroxine 25 MICROGram(s) Oral daily  LORazepam     Tablet 2 milliGRAM(s) Oral at bedtime  metFORMIN 1000 milliGRAM(s) Oral two times a day  metoprolol succinate ER 25 milliGRAM(s) Oral daily  paliperidone Injectable, Long Acting 234 milliGRAM(s) IntraMuscular once    MEDICATIONS  (PRN):  diphenhydrAMINE 50 milliGRAM(s) Oral every 6 hours PRN Extrapyramidal prophylaxis  haloperidol     Tablet 5 milliGRAM(s) Oral every 6 hours PRN agitation  hydrOXYzine hydrochloride 50 milliGRAM(s) Oral every 6 hours PRN Anxiety  LORazepam     Tablet 2 milliGRAM(s) Oral every 6 hours PRN agitation with haldol  polyethylene glycol 3350 17 Gram(s) Oral Once PRN Constipation   MEDICATIONS  (STANDING):  atorvastatin 40 milliGRAM(s) Oral at bedtime  benztropine 1 milliGRAM(s) Oral two times a day  bisacodyl 5 milliGRAM(s) Oral at bedtime  cloZAPine 100 milliGRAM(s) Oral two times a day  divalproex  milliGRAM(s) Oral at bedtime  divalproex  milliGRAM(s) Oral daily  levothyroxine 25 MICROGram(s) Oral daily  LORazepam     Tablet 2 milliGRAM(s) Oral at bedtime  metFORMIN 1000 milliGRAM(s) Oral two times a day  metoprolol succinate ER 25 milliGRAM(s) Oral daily  paliperidone Injectable, Long Acting 234 milliGRAM(s) IntraMuscular once    MEDICATIONS  (PRN):  diphenhydrAMINE 50 milliGRAM(s) Oral every 6 hours PRN Extrapyramidal prophylaxis  haloperidol     Tablet 5 milliGRAM(s) Oral every 6 hours PRN agitation  hydrOXYzine hydrochloride 50 milliGRAM(s) Oral every 6 hours PRN Anxiety  LORazepam     Tablet 2 milliGRAM(s) Oral every 6 hours PRN agitation with haldol  polyethylene glycol 3350 17 Gram(s) Oral Once PRN Constipation

## 2024-04-17 NOTE — BH INPATIENT PSYCHIATRY PROGRESS NOTE - NSTXPSYCHOGOAL_PSY_ALL_CORE
Will ask for PRN medication to manage hallucinations

## 2024-04-17 NOTE — BH INPATIENT PSYCHIATRY PROGRESS NOTE - NSTXPSYCHOINTERMD_PSY_ALL_CORE
medication management, aftercare planning
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
## 2024-04-17 NOTE — BH INPATIENT PSYCHIATRY PROGRESS NOTE - NSBHCHARTREVIEWVS_PSY_A_CORE FT
Vital Signs Last 24 Hrs  T(C): 36.4 (04-16-24 @ 17:27), Max: 36.4 (04-16-24 @ 17:27)  T(F): 97.5 (04-16-24 @ 17:27), Max: 97.5 (04-16-24 @ 17:27)  HR: 94 (04-17-24 @ 08:30) (94 - 109)  BP: 127/60 (04-17-24 @ 08:30) (127/60 - 127/96)  BP(mean): --  RR: 18 (04-16-24 @ 17:27) (18 - 18)  SpO2: --    
Vital Signs Last 24 Hrs  T(C): 35.7 (04-13-24 @ 21:30), Max: 35.7 (04-13-24 @ 21:30)  T(F): 96.2 (04-13-24 @ 21:30), Max: 96.2 (04-13-24 @ 21:30)  HR: 105 (04-13-24 @ 21:30) (105 - 116)  BP: 132/89 (04-13-24 @ 21:30) (132/89 - 134/84)  BP(mean): --  RR: 18 (04-13-24 @ 15:43) (18 - 18)  SpO2: --    
Vital Signs Last 24 Hrs  T(C): 35.6 (04-13-24 @ 08:28), Max: 35.6 (04-13-24 @ 08:28)  T(F): 96.1 (04-13-24 @ 08:28), Max: 96.1 (04-13-24 @ 08:28)  HR: 90 (04-13-24 @ 08:28) (90 - 90)  BP: 112/62 (04-13-24 @ 08:28) (112/62 - 112/62)  BP(mean): --  RR: 18 (04-13-24 @ 08:28) (18 - 18)  SpO2: --    
Vital Signs Last 24 Hrs  T(C): 35.7 (04-14-24 @ 15:46), Max: 35.7 (04-14-24 @ 15:46)  T(F): 96.3 (04-14-24 @ 15:46), Max: 96.3 (04-14-24 @ 15:46)  HR: 119 (04-15-24 @ 09:09) (105 - 119)  BP: 135/94 (04-15-24 @ 09:09) (128/85 - 135/94)  BP(mean): --  RR: 18 (04-15-24 @ 09:09) (16 - 18)  SpO2: --    
Vital Signs Last 24 Hrs  T(C): 35.9 (04-16-24 @ 08:00), Max: 36.9 (04-15-24 @ 17:10)  T(F): 96.7 (04-16-24 @ 08:00), Max: 98.4 (04-15-24 @ 17:10)  HR: 96 (04-16-24 @ 08:00) (96 - 101)  BP: 119/70 (04-16-24 @ 08:00) (119/70 - 186/79)  BP(mean): --  RR: 16 (04-16-24 @ 08:00) (16 - 17)  SpO2: --

## 2024-04-17 NOTE — BH INPATIENT PSYCHIATRY PROGRESS NOTE - PRN MEDS
MEDICATIONS  (PRN):  diphenhydrAMINE 50 milliGRAM(s) Oral every 6 hours PRN Extrapyramidal prophylaxis  haloperidol     Tablet 5 milliGRAM(s) Oral every 6 hours PRN agitation  hydrOXYzine hydrochloride 50 milliGRAM(s) Oral every 6 hours PRN Anxiety  LORazepam     Tablet 2 milliGRAM(s) Oral every 6 hours PRN agitation with haldol  polyethylene glycol 3350 17 Gram(s) Oral Once PRN Constipation  

## 2024-04-17 NOTE — BH INPATIENT PSYCHIATRY PROGRESS NOTE - NSBHATTESTBILLING_PSY_A_CORE
35297-Aghraszmlj OBS or IP - moderate complexity OR 35-49 mins
73170-Uqnpkfybou OBS or IP - moderate complexity OR 35-49 mins
89266-Pdpbvcdert OBS or IP - low complexity OR 25-34 mins
17275-Beitislqut OBS or IP - low complexity OR 25-34 mins
72060-Ztdnnozszv OBS or IP - low complexity OR 25-34 mins

## 2024-04-17 NOTE — BH INPATIENT PSYCHIATRY PROGRESS NOTE - NSBHASSESSSUMMFT_PSY_ALL_CORE
Patient is a 52 y/o male domiciled at [facility] ([phone], located at [address] [place] [address]) with PMH DM, HLD, HTN, hypothyroidism, and psychiatric h/o schizoaffective disorder, bipolar disorder, past substance use disorders, frequent ED visits and recent IPP hospitalizations (March, May, July 2023), past suicide attempts vs NSSIB by cutting, banging head. Hx of SI/HI/AVH. Presented to [facility] ED on 4/10 by BIBA from his residence for a shallow cutting of left wrist with a tin can after argument with his roommate. Collateral states that patient went without regular Ativan 2mg dose from 4/5-4/10 due to nursing home shortage.    On initial assessment on IPP, patient presented as completely oriented, though he was lethargic and not generally cooperative with interview. Patient denied acute suicidality or homicidality, but did express that he does not feel "safe" at his residence and he "does not want to return". Per chart review, patient has been to the ED 4 times in the past 4 months for behavioral presentations, sent by his residence, and each time he was discharged back to there care with minimal safety concerns. Patient has several risk factors of self harm and harming others, including static risk factors of current and past psychiatric diagnosis (schizophrenia, bipolar disorder) and command hallucinations; modifiable risk factors of lack of access/noncompliance to anxiety medication, housing means, ability to cope with stress, and frustration tolerance. Patient also has protective factors of good compliance/response to meds and residential stability, as well as being connected to care. At the time, given the altercation at the snf and the cutting (even if shallow), we believe the patient would benefit from inpatient psychiatric hospitalization for mood stabilization, medication management, and aftercare planning.     On continued assessment on IPP, patient has been calm and medication compliant without constant observation, and he has had no behavioral disturbances. Patient seems to be approaching baseline level of functioning, and plan is to connect with collateral from the residence to coordinate aftercare and address strategies to reduce recidivism. [facility] ([facility]) will visit patient in IP tomorrow to reevaluate for return. Pt is expected to return to snf and be discharged this week.    Plan  - today Invega Sustenna 234mg IM for schizophrenia maintenance (4/17/24)  - plan to d/c to [facility] (4/18/24)  - c/w Clozapine 100mg BID for psychosis  - c/w Ativan 2mg PO qhs for anxiety  - c/w Valproate BID 500mg/750mg PO for mood stabilization  - c/w Benztropine 1mg BID for EPS prevention  - c/w other home meds: Metformin 1000mg BID, Atorvastatin 40mg, Levothyroxine 25 microgram  - f/u clozapine level, depakote level, ANC  - For severe agitation not responding to behavioral intervention, consider offering haldol 5 mg po q6h prn, ativan 2 mg PO q6h prn, benadryl 50 mg po q6h prn, with escalation to IM if patient refusing PO and remains an imminent danger to self or others. If IM antipsychotic is administered, please perform follow-up ECG for QTc monitoring. Hold antipsychotics if Qtc>500 ms.   Patient is a 52 y/o male domiciled at [facility] ([phone], located at [address] [place] [address]) with PMH DM, HLD, HTN, hypothyroidism, and psychiatric h/o schizoaffective disorder, bipolar disorder, past substance use disorders, frequent ED visits and recent IPP hospitalizations (March, May, July 2023), past suicide attempts vs NSSIB by cutting, banging head. Hx of SI/HI/AVH. Presented to [facility] ED on 4/10 by BIBA from his residence for a shallow cutting of left wrist with a tin can after argument with his roommate. Collateral states that patient went without regular Ativan 2mg dose from 4/5-4/10 due to nursing home shortage.    On initial assessment on IPP, patient presented as completely oriented, though he was lethargic and not generally cooperative with interview. Patient denied acute suicidality or homicidality, but did express that he does not feel "safe" at his residence and he "does not want to return". Per chart review, patient has been to the ED 4 times in the past 4 months for behavioral presentations, sent by his residence, and each time he was discharged back to there care with minimal safety concerns. Patient has several risk factors of self harm and harming others, including static risk factors of current and past psychiatric diagnosis (schizophrenia, bipolar disorder) and command hallucinations; modifiable risk factors of lack of access/noncompliance to anxiety medication, housing means, ability to cope with stress, and frustration tolerance. Patient also has protective factors of good compliance/response to meds and residential stability, as well as being connected to care. At the time, given the altercation at the [facility] and the cutting (even if shallow), we believe the patient would benefit from inpatient psychiatric hospitalization for mood stabilization, medication management, and aftercare planning.     On continued assessment on IPP, patient has been calm and medication compliant without constant observation, and he has had no behavioral disturbances. Patient seems to be approaching baseline level of functioning, and plan is to connect with collateral from the residence to coordinate aftercare and address strategies to reduce recidivism. [facility] ([facility]) has visited patient and believes he is at baseline. Pt is expected to return to [facility] and be discharged tomorrow.    Plan  - today Invega Sustenna 234mg IM for schizophrenia maintenance (4/17/24)  - plan to d/c to [facility] (4/18/24)  - c/w Clozapine 100mg BID for psychosis  - c/w Ativan 2mg PO qhs for anxiety  - c/w Valproate BID 500mg/750mg PO for mood stabilization  - c/w Benztropine 1mg BID for EPS prevention  - c/w other home meds: Metformin 1000mg BID, Atorvastatin 40mg, Levothyroxine 25 microgram  - f/u clozapine level, depakote level, ANC  - For severe agitation not responding to behavioral intervention, consider offering haldol 5 mg po q6h prn, ativan 2 mg PO q6h prn, benadryl 50 mg po q6h prn, with escalation to IM if patient refusing PO and remains an imminent danger to self or others. If IM antipsychotic is administered, please perform follow-up ECG for QTc monitoring. Hold antipsychotics if Qtc>500 ms.

## 2024-04-17 NOTE — BH INPATIENT PSYCHIATRY DISCHARGE NOTE - NSDCCPCAREPLAN_GEN_ALL_CORE_FT
PRINCIPAL DISCHARGE DIAGNOSIS  Diagnosis: Schizoaffective disorder, unspecified type  Assessment and Plan of Treatment:

## 2024-04-17 NOTE — BH INPATIENT PSYCHIATRY DISCHARGE NOTE - NSBHMETABOLIC_PSY_ALL_CORE_FT
BMI: BMI (kg/m2): 30.7 (04-12-24 @ 02:14)  HbA1c: A1C with Estimated Average Glucose Result: 6.3 % (04-16-24 @ 08:14)    Glucose: POCT Blood Glucose.: 166 mg/dL (12-11-23 @ 11:45)    BP: 127/60 (04-17-24 @ 08:30) (119/70 - 186/79)Vital Signs Last 24 Hrs  T(C): 36.4 (04-16-24 @ 17:27), Max: 36.4 (04-16-24 @ 17:27)  T(F): 97.5 (04-16-24 @ 17:27), Max: 97.5 (04-16-24 @ 17:27)  HR: 94 (04-17-24 @ 08:30) (94 - 109)  BP: 127/60 (04-17-24 @ 08:30) (127/60 - 127/96)  BP(mean): --  RR: 18 (04-16-24 @ 17:27) (18 - 18)  SpO2: --      Lipid Panel: Date/Time: 04-16-24 @ 08:14  Cholesterol, Serum: 176  LDL Cholesterol Calculated: 99  HDL Cholesterol, Serum: 45  Total Cholesterol/HDL Ration Measurement: --  Triglycerides, Serum: 161

## 2024-04-17 NOTE — BH INPATIENT PSYCHIATRY PROGRESS NOTE - NSBHMSESPABN_PSY_A_CORE
Impaired articulation

## 2024-04-17 NOTE — BH DISCHARGE NOTE NURSING/SOCIAL WORK/PSYCH REHAB - NSDCNEXTLEVELWHO_PSY_ALL_CORE_FT
Fax to [phone] & email to Renard Shelton Sarah Bell LCSW faxed to [phone] & email to Renard Shelton

## 2024-04-17 NOTE — BH DISCHARGE NOTE NURSING/SOCIAL WORK/PSYCH REHAB - PATIENT PORTAL LINK FT
You can access the FollowMyHealth Patient Portal offered by [facility] by registering at the following website: [email or web address]. By joining [employer]’s FollowMyHealth portal, you will also be able to view your health information using other applications (apps) compatible with our system.

## 2024-04-17 NOTE — BH INPATIENT PSYCHIATRY PROGRESS NOTE - NSBHMETABOLIC_PSY_ALL_CORE_FT
BMI: BMI (kg/m2): 30.7 (04-12-24 @ 02:14)  HbA1c: A1C with Estimated Average Glucose Result: 6.2 % (12-09-23 @ 11:31)    Glucose: POCT Blood Glucose.: 166 mg/dL (12-11-23 @ 11:45)    BP: 112/62 (04-13-24 @ 08:28) (112/61 - 168/90)Vital Signs Last 24 Hrs  T(C): 35.6 (04-13-24 @ 08:28), Max: 35.6 (04-13-24 @ 08:28)  T(F): 96.1 (04-13-24 @ 08:28), Max: 96.1 (04-13-24 @ 08:28)  HR: 90 (04-13-24 @ 08:28) (90 - 90)  BP: 112/62 (04-13-24 @ 08:28) (112/62 - 112/62)  BP(mean): --  RR: 18 (04-13-24 @ 08:28) (18 - 18)  SpO2: --      Lipid Panel: Date/Time: 07-26-23 @ 08:30  Cholesterol, Serum: 226  LDL Cholesterol Calculated: 173  HDL Cholesterol, Serum: 26  Total Cholesterol/HDL Ration Measurement: --  Triglycerides, Serum: 137  
BMI: BMI (kg/m2): 30.7 (04-12-24 @ 02:14)  HbA1c: A1C with Estimated Average Glucose Result: 6.2 % (12-09-23 @ 11:31)    Glucose: POCT Blood Glucose.: 166 mg/dL (12-11-23 @ 11:45)    BP: 119/70 (04-16-24 @ 08:00) (119/70 - 186/79)Vital Signs Last 24 Hrs  T(C): 35.9 (04-16-24 @ 08:00), Max: 36.9 (04-15-24 @ 17:10)  T(F): 96.7 (04-16-24 @ 08:00), Max: 98.4 (04-15-24 @ 17:10)  HR: 96 (04-16-24 @ 08:00) (96 - 101)  BP: 119/70 (04-16-24 @ 08:00) (119/70 - 186/79)  BP(mean): --  RR: 16 (04-16-24 @ 08:00) (16 - 17)  SpO2: --      Lipid Panel: Date/Time: 04-16-24 @ 08:14  Cholesterol, Serum: 176  LDL Cholesterol Calculated: 99  HDL Cholesterol, Serum: 45  Total Cholesterol/HDL Ration Measurement: --  Triglycerides, Serum: 161  
BMI: BMI (kg/m2): 30.7 (04-12-24 @ 02:14)  HbA1c: A1C with Estimated Average Glucose Result: 6.2 % (12-09-23 @ 11:31)    Glucose: POCT Blood Glucose.: 166 mg/dL (12-11-23 @ 11:45)    BP: 132/89 (04-13-24 @ 21:30) (112/62 - 145/71)Vital Signs Last 24 Hrs  T(C): 35.7 (04-13-24 @ 21:30), Max: 35.7 (04-13-24 @ 21:30)  T(F): 96.2 (04-13-24 @ 21:30), Max: 96.2 (04-13-24 @ 21:30)  HR: 105 (04-13-24 @ 21:30) (105 - 116)  BP: 132/89 (04-13-24 @ 21:30) (132/89 - 134/84)  BP(mean): --  RR: 18 (04-13-24 @ 15:43) (18 - 18)  SpO2: --      Lipid Panel: Date/Time: 07-26-23 @ 08:30  Cholesterol, Serum: 226  LDL Cholesterol Calculated: 173  HDL Cholesterol, Serum: 26  Total Cholesterol/HDL Ration Measurement: --  Triglycerides, Serum: 137  
BMI: BMI (kg/m2): 30.7 (04-12-24 @ 02:14)  HbA1c: A1C with Estimated Average Glucose Result: 6.3 % (04-16-24 @ 08:14)    Glucose: POCT Blood Glucose.: 166 mg/dL (12-11-23 @ 11:45)    BP: 127/60 (04-17-24 @ 08:30) (119/70 - 186/79)Vital Signs Last 24 Hrs  T(C): 36.4 (04-16-24 @ 17:27), Max: 36.4 (04-16-24 @ 17:27)  T(F): 97.5 (04-16-24 @ 17:27), Max: 97.5 (04-16-24 @ 17:27)  HR: 94 (04-17-24 @ 08:30) (94 - 109)  BP: 127/60 (04-17-24 @ 08:30) (127/60 - 127/96)  BP(mean): --  RR: 18 (04-16-24 @ 17:27) (18 - 18)  SpO2: --      Lipid Panel: Date/Time: 04-16-24 @ 08:14  Cholesterol, Serum: 176  LDL Cholesterol Calculated: 99  HDL Cholesterol, Serum: 45  Total Cholesterol/HDL Ration Measurement: --  Triglycerides, Serum: 161  
BMI: BMI (kg/m2): 30.7 (04-12-24 @ 02:14)  HbA1c: A1C with Estimated Average Glucose Result: 6.2 % (12-09-23 @ 11:31)    Glucose: POCT Blood Glucose.: 166 mg/dL (12-11-23 @ 11:45)    BP: 135/94 (04-15-24 @ 09:09) (112/62 - 135/94)Vital Signs Last 24 Hrs  T(C): 35.7 (04-14-24 @ 15:46), Max: 35.7 (04-14-24 @ 15:46)  T(F): 96.3 (04-14-24 @ 15:46), Max: 96.3 (04-14-24 @ 15:46)  HR: 119 (04-15-24 @ 09:09) (105 - 119)  BP: 135/94 (04-15-24 @ 09:09) (128/85 - 135/94)  BP(mean): --  RR: 18 (04-15-24 @ 09:09) (16 - 18)  SpO2: --      Lipid Panel: Date/Time: 07-26-23 @ 08:30  Cholesterol, Serum: 226  LDL Cholesterol Calculated: 173  HDL Cholesterol, Serum: 26  Total Cholesterol/HDL Ration Measurement: --  Triglycerides, Serum: 137

## 2024-04-17 NOTE — BH INPATIENT PSYCHIATRY PROGRESS NOTE - GENERAL APPEARANCE
Developmentally delayed/Deformities present

## 2024-04-17 NOTE — BH DISCHARGE NOTE NURSING/SOCIAL WORK/PSYCH REHAB - NSDCVIVACCINE_GEN_ALL_CORE_FT
Tdap; 21-Aug-2022 18:52; Kathrin Lebron (RN); [employer]; [id number] (Exp. Date: 22-Sep-2024); IntraMuscular; Deltoid Right.; 0.5 milliLiter(s); VIS (VIS Published: 09-May-2013, VIS Presented: 21-Aug-2022);

## 2024-04-18 VITALS
HEART RATE: 100 BPM | SYSTOLIC BLOOD PRESSURE: 113 MMHG | TEMPERATURE: 97 F | DIASTOLIC BLOOD PRESSURE: 81 MMHG | RESPIRATION RATE: 16 BRPM

## 2024-04-18 RX ADMIN — Medication 25 MICROGRAM(S): at 06:50

## 2024-04-18 RX ADMIN — DIVALPROEX SODIUM 500 MILLIGRAM(S): 500 TABLET, DELAYED RELEASE ORAL at 08:24

## 2024-04-18 RX ADMIN — METFORMIN HYDROCHLORIDE 1000 MILLIGRAM(S): 850 TABLET ORAL at 08:25

## 2024-04-18 RX ADMIN — Medication 25 MILLIGRAM(S): at 08:25

## 2024-04-18 RX ADMIN — Medication 1 MILLIGRAM(S): at 08:25

## 2024-04-18 RX ADMIN — CLOZAPINE 100 MILLIGRAM(S): 150 TABLET, ORALLY DISINTEGRATING ORAL at 08:25

## 2024-04-19 NOTE — BH SOCIAL WORK CONFIRMATION FOLLOW UP NOTE - NSLINKEDTOLOC_PSY_ALL_CORE
Juma attended his outpatient mental health appointment with Dr. Pearson and his therapist Akos[place] at [facility] as scheduled, [phone].

## 2024-04-25 DIAGNOSIS — Z88.0 ALLERGY STATUS TO PENICILLIN: ICD-10-CM

## 2024-04-25 DIAGNOSIS — F31.9 BIPOLAR DISORDER, UNSPECIFIED: ICD-10-CM

## 2024-04-25 DIAGNOSIS — Z91.A48 CAREGIVER'S OTHER NONCOMPLIANCE WITH PATIENT'S MEDICATION REGIMEN FOR OTHER REASON: ICD-10-CM

## 2024-04-25 DIAGNOSIS — F06.0 PSYCHOTIC DISORDER WITH HALLUCINATIONS DUE TO KNOWN PHYSIOLOGICAL CONDITION: ICD-10-CM

## 2024-04-25 DIAGNOSIS — R45.88 NONSUICIDAL SELF-HARM: ICD-10-CM

## 2024-04-25 DIAGNOSIS — Z91.148 PATIENT'S OTHER NONCOMPLIANCE WITH MEDICATION REGIMEN FOR OTHER REASON: ICD-10-CM

## 2024-04-25 DIAGNOSIS — E78.5 HYPERLIPIDEMIA, UNSPECIFIED: ICD-10-CM

## 2024-04-25 DIAGNOSIS — E03.9 HYPOTHYROIDISM, UNSPECIFIED: ICD-10-CM

## 2024-04-25 DIAGNOSIS — F80.0 PHONOLOGICAL DISORDER: ICD-10-CM

## 2024-04-25 DIAGNOSIS — Z91.51 PERSONAL HISTORY OF SUICIDAL BEHAVIOR: ICD-10-CM

## 2024-04-25 DIAGNOSIS — Z11.52 ENCOUNTER FOR SCREENING FOR COVID-19: ICD-10-CM

## 2024-04-25 DIAGNOSIS — Z79.84 LONG TERM (CURRENT) USE OF ORAL HYPOGLYCEMIC DRUGS: ICD-10-CM

## 2024-04-25 DIAGNOSIS — R45.851 SUICIDAL IDEATIONS: ICD-10-CM

## 2024-04-25 DIAGNOSIS — R45.850 HOMICIDAL IDEATIONS: ICD-10-CM

## 2024-04-25 DIAGNOSIS — I10 ESSENTIAL (PRIMARY) HYPERTENSION: ICD-10-CM

## 2024-04-25 DIAGNOSIS — Z79.890 HORMONE REPLACEMENT THERAPY: ICD-10-CM

## 2024-04-25 DIAGNOSIS — T42.4X6A UNDERDOSING OF BENZODIAZEPINES, INITIAL ENCOUNTER: ICD-10-CM

## 2024-04-25 DIAGNOSIS — Z88.8 ALLERGY STATUS TO OTHER DRUGS, MEDICAMENTS AND BIOLOGICAL SUBSTANCES: ICD-10-CM

## 2024-04-25 DIAGNOSIS — Z91.014 ALLERGY TO MAMMALIAN MEATS: ICD-10-CM

## 2024-04-25 DIAGNOSIS — E11.9 TYPE 2 DIABETES MELLITUS WITHOUT COMPLICATIONS: ICD-10-CM

## 2024-04-25 DIAGNOSIS — Z91.013 ALLERGY TO SEAFOOD: ICD-10-CM

## 2024-04-25 DIAGNOSIS — F25.9 SCHIZOAFFECTIVE DISORDER, UNSPECIFIED: ICD-10-CM

## 2024-05-02 NOTE — ED ADULT TRIAGE NOTE - INTERNATIONAL TRAVEL
In an effort to ensure that our patients LiveWell, a Team Member has reviewed your chart and identified an opportunity to provide the best care possible. An attempt was made to discuss or schedule overdue Preventive or Disease Management screening.     The Outcome was Contact was made, appointment declined. Care Gaps include cav visit.  Name: CRIS AGUILLON    ### Patient Details  YOB: 1953  MRN: [id number]    ### Encounter Details  Arrival Date: N/A  Discharge Date: N/A  Encounter ID: [id number] 8:08    ### Related interaction  [facility]  [place] Comprehensive Annual Visit ([place] CAV Outreach) ([email or web address])   No

## 2024-05-21 NOTE — ED BEHAVIORAL HEALTH ASSESSMENT NOTE - NS ED BHA TELEPSYCH PROVIDER LOCATION
Refer to the Assessment tab to view/cancel completed assessment. [address] [facility], [place], [place]

## 2024-05-23 ENCOUNTER — APPOINTMENT (OUTPATIENT)
Dept: PODIATRY | Facility: CLINIC | Age: 54
End: 2024-05-23

## 2024-05-24 NOTE — ED ADULT TRIAGE NOTE - ARRIVAL FROM
May 24, 2024    To Whom It May Concern:         This is confirmation that Niki Riley attended her scheduled appointment with PILY Garcia on 5/24/24. Please excuse her from work due to an acute injury.         If you have any questions please do not hesitate to call me at the phone number listed below.    Sincerely,          ZEHRA GarciaRDionneN.  [phone]                   Assisted living facility

## 2024-05-31 ENCOUNTER — APPOINTMENT (OUTPATIENT)
Dept: PODIATRY | Facility: CLINIC | Age: 54
End: 2024-05-31

## 2024-06-02 ENCOUNTER — EMERGENCY (EMERGENCY)
Facility: HOSPITAL | Age: 54
LOS: 0 days | Discharge: HOME | End: 2024-06-02
Attending: EMERGENCY MEDICINE
Payer: MEDICAID

## 2024-06-02 VITALS
DIASTOLIC BLOOD PRESSURE: 87 MMHG | TEMPERATURE: 98 F | RESPIRATION RATE: 18 BRPM | SYSTOLIC BLOOD PRESSURE: 131 MMHG | HEART RATE: 77 BPM | OXYGEN SATURATION: 98 % | HEIGHT: 67 IN

## 2024-06-02 DIAGNOSIS — E11.9 TYPE 2 DIABETES MELLITUS WITHOUT COMPLICATIONS: ICD-10-CM

## 2024-06-02 DIAGNOSIS — F41.9 ANXIETY DISORDER, UNSPECIFIED: ICD-10-CM

## 2024-06-02 DIAGNOSIS — F25.9 SCHIZOAFFECTIVE DISORDER, UNSPECIFIED: ICD-10-CM

## 2024-06-02 DIAGNOSIS — R10.12 LEFT UPPER QUADRANT PAIN: ICD-10-CM

## 2024-06-02 DIAGNOSIS — Z91.014 ALLERGY TO MAMMALIAN MEATS: ICD-10-CM

## 2024-06-02 DIAGNOSIS — I10 ESSENTIAL (PRIMARY) HYPERTENSION: ICD-10-CM

## 2024-06-02 DIAGNOSIS — Z88.4 ALLERGY STATUS TO ANESTHETIC AGENT: ICD-10-CM

## 2024-06-02 DIAGNOSIS — E78.5 HYPERLIPIDEMIA, UNSPECIFIED: ICD-10-CM

## 2024-06-02 DIAGNOSIS — Z88.0 ALLERGY STATUS TO PENICILLIN: ICD-10-CM

## 2024-06-02 DIAGNOSIS — E03.9 HYPOTHYROIDISM, UNSPECIFIED: ICD-10-CM

## 2024-06-02 DIAGNOSIS — Z88.5 ALLERGY STATUS TO NARCOTIC AGENT: ICD-10-CM

## 2024-06-02 DIAGNOSIS — R10.32 LEFT LOWER QUADRANT PAIN: ICD-10-CM

## 2024-06-02 DIAGNOSIS — F32.A DEPRESSION, UNSPECIFIED: ICD-10-CM

## 2024-06-02 DIAGNOSIS — Z91.013 ALLERGY TO SEAFOOD: ICD-10-CM

## 2024-06-02 DIAGNOSIS — F20.9 SCHIZOPHRENIA, UNSPECIFIED: ICD-10-CM

## 2024-06-02 LAB
ALBUMIN SERPL ELPH-MCNC: 4.6 G/DL — SIGNIFICANT CHANGE UP (ref 3.5–5.2)
ALP SERPL-CCNC: 83 U/L — SIGNIFICANT CHANGE UP (ref 30–115)
ALT FLD-CCNC: 7 U/L — SIGNIFICANT CHANGE UP (ref 0–41)
ANION GAP SERPL CALC-SCNC: 11 MMOL/L — SIGNIFICANT CHANGE UP (ref 7–14)
APAP SERPL-MCNC: <5 UG/ML — LOW (ref 10–30)
APPEARANCE UR: CLEAR — SIGNIFICANT CHANGE UP
AST SERPL-CCNC: 12 U/L — SIGNIFICANT CHANGE UP (ref 0–41)
BASOPHILS # BLD AUTO: 0.03 K/UL — SIGNIFICANT CHANGE UP (ref 0–0.2)
BASOPHILS NFR BLD AUTO: 0.5 % — SIGNIFICANT CHANGE UP (ref 0–1)
BILIRUB DIRECT SERPL-MCNC: <0.2 MG/DL — SIGNIFICANT CHANGE UP (ref 0–0.3)
BILIRUB INDIRECT FLD-MCNC: SIGNIFICANT CHANGE UP MG/DL (ref 0.2–1.2)
BILIRUB SERPL-MCNC: <0.2 MG/DL — SIGNIFICANT CHANGE UP (ref 0.2–1.2)
BILIRUB UR-MCNC: NEGATIVE — SIGNIFICANT CHANGE UP
BUN SERPL-MCNC: 7 MG/DL — LOW (ref 10–20)
CALCIUM SERPL-MCNC: 9.8 MG/DL — SIGNIFICANT CHANGE UP (ref 8.4–10.5)
CHLORIDE SERPL-SCNC: 106 MMOL/L — SIGNIFICANT CHANGE UP (ref 98–110)
CO2 SERPL-SCNC: 26 MMOL/L — SIGNIFICANT CHANGE UP (ref 17–32)
COLOR SPEC: YELLOW — SIGNIFICANT CHANGE UP
CREAT SERPL-MCNC: 0.7 MG/DL — SIGNIFICANT CHANGE UP (ref 0.7–1.5)
DIFF PNL FLD: NEGATIVE — SIGNIFICANT CHANGE UP
EGFR: 111 ML/MIN/1.73M2 — SIGNIFICANT CHANGE UP
EOSINOPHIL # BLD AUTO: 0.08 K/UL — SIGNIFICANT CHANGE UP (ref 0–0.7)
EOSINOPHIL NFR BLD AUTO: 1.2 % — SIGNIFICANT CHANGE UP (ref 0–8)
ETHANOL SERPL-MCNC: <10 MG/DL — SIGNIFICANT CHANGE UP
GLUCOSE SERPL-MCNC: 86 MG/DL — SIGNIFICANT CHANGE UP (ref 70–99)
GLUCOSE UR QL: NEGATIVE MG/DL — SIGNIFICANT CHANGE UP
HCT VFR BLD CALC: 39.5 % — LOW (ref 42–52)
HGB BLD-MCNC: 12.4 G/DL — LOW (ref 14–18)
IMM GRANULOCYTES NFR BLD AUTO: 0.3 % — SIGNIFICANT CHANGE UP (ref 0.1–0.3)
KETONES UR-MCNC: NEGATIVE MG/DL — SIGNIFICANT CHANGE UP
LEUKOCYTE ESTERASE UR-ACNC: NEGATIVE — SIGNIFICANT CHANGE UP
LIDOCAIN IGE QN: 37 U/L — SIGNIFICANT CHANGE UP (ref 7–60)
LYMPHOCYTES # BLD AUTO: 2.9 K/UL — SIGNIFICANT CHANGE UP (ref 1.2–3.4)
LYMPHOCYTES # BLD AUTO: 43.5 % — SIGNIFICANT CHANGE UP (ref 20.5–51.1)
MCHC RBC-ENTMCNC: 25.8 PG — LOW (ref 27–31)
MCHC RBC-ENTMCNC: 31.4 G/DL — LOW (ref 32–37)
MCV RBC AUTO: 82.1 FL — SIGNIFICANT CHANGE UP (ref 80–94)
MONOCYTES # BLD AUTO: 0.63 K/UL — HIGH (ref 0.1–0.6)
MONOCYTES NFR BLD AUTO: 9.5 % — HIGH (ref 1.7–9.3)
NEUTROPHILS # BLD AUTO: 3 K/UL — SIGNIFICANT CHANGE UP (ref 1.4–6.5)
NEUTROPHILS NFR BLD AUTO: 45 % — SIGNIFICANT CHANGE UP (ref 42.2–75.2)
NITRITE UR-MCNC: NEGATIVE — SIGNIFICANT CHANGE UP
NRBC # BLD: 0 /100 WBCS — SIGNIFICANT CHANGE UP (ref 0–0)
PH UR: 7.5 — SIGNIFICANT CHANGE UP (ref 5–8)
PLATELET # BLD AUTO: 220 K/UL — SIGNIFICANT CHANGE UP (ref 130–400)
PMV BLD: 9.7 FL — SIGNIFICANT CHANGE UP (ref 7.4–10.4)
POTASSIUM SERPL-MCNC: 4.6 MMOL/L — SIGNIFICANT CHANGE UP (ref 3.5–5)
POTASSIUM SERPL-SCNC: 4.6 MMOL/L — SIGNIFICANT CHANGE UP (ref 3.5–5)
PROT SERPL-MCNC: 7.8 G/DL — SIGNIFICANT CHANGE UP (ref 6–8)
PROT UR-MCNC: NEGATIVE MG/DL — SIGNIFICANT CHANGE UP
RBC # BLD: 4.81 M/UL — SIGNIFICANT CHANGE UP (ref 4.7–6.1)
RBC # FLD: 17 % — HIGH (ref 11.5–14.5)
SALICYLATES SERPL-MCNC: <0.3 MG/DL — LOW (ref 4–30)
SODIUM SERPL-SCNC: 143 MMOL/L — SIGNIFICANT CHANGE UP (ref 135–146)
SP GR SPEC: 1.01 — SIGNIFICANT CHANGE UP (ref 1–1.03)
UROBILINOGEN FLD QL: 1 MG/DL — SIGNIFICANT CHANGE UP (ref 0.2–1)
WBC # BLD: 6.66 K/UL — SIGNIFICANT CHANGE UP (ref 4.8–10.8)
WBC # FLD AUTO: 6.66 K/UL — SIGNIFICANT CHANGE UP (ref 4.8–10.8)

## 2024-06-02 PROCEDURE — 80048 BASIC METABOLIC PNL TOTAL CA: CPT

## 2024-06-02 PROCEDURE — 96374 THER/PROPH/DIAG INJ IV PUSH: CPT

## 2024-06-02 PROCEDURE — 83690 ASSAY OF LIPASE: CPT

## 2024-06-02 PROCEDURE — 80076 HEPATIC FUNCTION PANEL: CPT

## 2024-06-02 PROCEDURE — 85025 COMPLETE CBC W/AUTO DIFF WBC: CPT

## 2024-06-02 PROCEDURE — 99285 EMERGENCY DEPT VISIT HI MDM: CPT | Mod: 25

## 2024-06-02 PROCEDURE — 36415 COLL VENOUS BLD VENIPUNCTURE: CPT

## 2024-06-02 PROCEDURE — 99285 EMERGENCY DEPT VISIT HI MDM: CPT

## 2024-06-02 PROCEDURE — 81003 URINALYSIS AUTO W/O SCOPE: CPT

## 2024-06-02 PROCEDURE — 80307 DRUG TEST PRSMV CHEM ANLYZR: CPT

## 2024-06-02 PROCEDURE — 90792 PSYCH DIAG EVAL W/MED SRVCS: CPT | Mod: 95,GC

## 2024-06-02 PROCEDURE — 80354 DRUG SCREENING FENTANYL: CPT

## 2024-06-02 RX ORDER — SODIUM CHLORIDE 9 MG/ML
1000 INJECTION INTRAMUSCULAR; INTRAVENOUS; SUBCUTANEOUS ONCE
Refills: 0 | Status: COMPLETED | OUTPATIENT
Start: 2024-06-02 | End: 2024-06-02

## 2024-06-02 RX ORDER — CLOZAPINE 150 MG/1
100 TABLET, ORALLY DISINTEGRATING ORAL AT BEDTIME
Refills: 0 | Status: DISCONTINUED | OUTPATIENT
Start: 2024-06-02 | End: 2024-06-02

## 2024-06-02 RX ORDER — ATORVASTATIN CALCIUM 80 MG/1
40 TABLET, FILM COATED ORAL AT BEDTIME
Refills: 0 | Status: DISCONTINUED | OUTPATIENT
Start: 2024-06-02 | End: 2024-06-02

## 2024-06-02 RX ORDER — FAMOTIDINE 10 MG/ML
20 INJECTION INTRAVENOUS ONCE
Refills: 0 | Status: COMPLETED | OUTPATIENT
Start: 2024-06-02 | End: 2024-06-02

## 2024-06-02 RX ORDER — BENZTROPINE MESYLATE 1 MG
1 TABLET ORAL ONCE
Refills: 0 | Status: COMPLETED | OUTPATIENT
Start: 2024-06-02 | End: 2024-06-02

## 2024-06-02 RX ORDER — DIVALPROEX SODIUM 500 MG/1
750 TABLET, DELAYED RELEASE ORAL ONCE
Refills: 0 | Status: COMPLETED | OUTPATIENT
Start: 2024-06-02 | End: 2024-06-02

## 2024-06-02 RX ORDER — METFORMIN HYDROCHLORIDE 850 MG/1
1000 TABLET ORAL ONCE
Refills: 0 | Status: COMPLETED | OUTPATIENT
Start: 2024-06-02 | End: 2024-06-02

## 2024-06-02 RX ADMIN — Medication 1 MILLIGRAM(S): at 21:14

## 2024-06-02 RX ADMIN — DIVALPROEX SODIUM 750 MILLIGRAM(S): 500 TABLET, DELAYED RELEASE ORAL at 21:14

## 2024-06-02 RX ADMIN — SODIUM CHLORIDE 1000 MILLILITER(S): 9 INJECTION INTRAMUSCULAR; INTRAVENOUS; SUBCUTANEOUS at 16:41

## 2024-06-02 RX ADMIN — Medication 5 MILLIGRAM(S): at 21:14

## 2024-06-02 RX ADMIN — ATORVASTATIN CALCIUM 40 MILLIGRAM(S): 80 TABLET, FILM COATED ORAL at 21:14

## 2024-06-02 RX ADMIN — METFORMIN HYDROCHLORIDE 1000 MILLIGRAM(S): 850 TABLET ORAL at 21:14

## 2024-06-02 RX ADMIN — CLOZAPINE 100 MILLIGRAM(S): 150 TABLET, ORALLY DISINTEGRATING ORAL at 21:14

## 2024-06-02 RX ADMIN — FAMOTIDINE 20 MILLIGRAM(S): 10 INJECTION INTRAVENOUS at 16:41

## 2024-06-02 RX ADMIN — Medication 2 MILLIGRAM(S): at 21:14

## 2024-06-02 NOTE — ED BEHAVIORAL HEALTH ASSESSMENT NOTE - SUMMARY
54 yo male living at [facility] ([phone]) with PMH DM, HLD, HTN, hypothyroidism, and psychiatric h/o schizoaffective disorder, past substance use disorders, frequent ED visits and hospitalizations, past suicide attempts vs NSSIB by cutting, banging head, on Clozaril and Invega Sustenna, who was BIB EMS from his residence for abdominal pain. After medical evaluation concluded, patient reported suicidal ideation.    Patient adamantly denies suicidal ideation at time of evaluation. Stated that he has thoughts of killing himself intermittently with plan to jump from bridge or to run in to traffic but denies true intent. States that if he were to develop these thoughts, he would speak to staff or return to the ED. Patient denies sx of depression and anxiety. Reports intermittent paranoia and chronic AH which he is able to not act on. States that he is well supported by staff at [facility] and has friends there, who are his reason for living. Patient is mostly medication adherent and has assistance to attend appointments with outpatient psychiatrist. He plans to follow up this week and feels safe returning home. Safety planning complete. No psychiatric contraindications to discharge.

## 2024-06-02 NOTE — ED BEHAVIORAL HEALTH ASSESSMENT NOTE - OTHER
group home [facility]: I discussed with the patient the risks of tildrakizumab including but not limited to immunosuppression, malignancy, posterior leukoencephalopathy syndrome, and serious infections.  The patient understands that monitoring is required including a PPD at baseline and must alert us or the primary physician if symptoms of infection or other concerning signs are noted. other residents

## 2024-06-02 NOTE — ED ADULT TRIAGE NOTE - HEIGHT IN CM
Patient called. She has a question regarding her diagnosis. Please call.   
Spoke to patient and reviewed skin protection guidelines.   
[phone]

## 2024-06-02 NOTE — ED PROVIDER NOTE - NSFOLLOWUPCLINICS_GEN_ALL_ED_FT
[facility]  OP Mental Health  [address] [place] [address]  Phone: [phone]  Fax:

## 2024-06-02 NOTE — ED BEHAVIORAL HEALTH ASSESSMENT NOTE - NSBHTIMEBASEDBILLING_PSY_A_CORE
Verified Results  TSH 04Oct2017 12:01AM GARRISON NUNO   [Oct 5, 2017 1:43PM GARRISON NUNO]  TSH is normal T4 is just a tad low please repeat the testing in 6 weeks.     Test Name Result Flag Reference   TSH 2.370 mcUnits/mL  0.350-5.000     T4, FREE 04Oct2017 12:01AM GARRISON NUNO     Test Name Result Flag Reference   FREE T4 0.7 ng/dl L 0.8-1.5       
no

## 2024-06-02 NOTE — ED BEHAVIORAL HEALTH ASSESSMENT NOTE - NSBHATTESTCOMMENTATTENDFT_PSY_A_CORE
I evaluated pt via telepsych. 52yo M, lives at [facility], w psych hx of multiple admissions, rep dx schizoaffective disorder, frequent ED visits, tx on medications as above, today consulted for SI which pt stated at the end of assessment in ED for abdominal pain. When I evaluated pt he brightly reported feeling 'better!' and reported the hospital was helpful because "people come here for help!' and was not able to describe further. He reported he follows as an outpt at "[facility]" (likely at [place]) and his next appointment is Friday at 2pm. He denied Si/HI/other symptoms and is psychiatrically cleared to leave the ED.

## 2024-06-02 NOTE — ED BEHAVIORAL HEALTH ASSESSMENT NOTE - NSBHATTESTTYPESTAFF_PSY_A_CORE
Spoke with pt daughter Marissa who states her mom has a 10:30 appt with her PCP Dr. Jarod Peterson/ today/video or telephone.  She is worried that her mom's fingers are turning blue under the cast.  After discussion, she agrees to wait to talk PCP, appt is in 10 minutes.  She will call back if that appointment doesn't go through as planned.  
Resident

## 2024-06-02 NOTE — ED PROVIDER NOTE - PATIENT PORTAL LINK FT
You can access the FollowMyHealth Patient Portal offered by [facility] by registering at the following website: [email or web address]. By joining [employer]’s FollowMyHealth portal, you will also be able to view your health information using other applications (apps) compatible with our system.

## 2024-06-02 NOTE — ED BEHAVIORAL HEALTH NOTE - BEHAVIORAL HEALTH NOTE
Collateral (Barby, counselor) has requested that the information provided remain confidential: Yes [X] No []  Collateral (Barby, counselor) has provided information that patient is/may be unaware of: Yes [X] No []    Patient gives permission to obtain collateral from _____:  (  ) Yes  (X)  No  Rationale for overriding objection            (  ) Lack of capacity. Details: ________            (X) Assessing risk of danger to self/others. Details: ________      Rationale for selecting specific collateral source            (X) Potential to impact risk of danger to self/others and no alternative equivalent. Details: _____”    ========================  FOR EACH COLLATERAL:  ========================  NAME: Barby   NUMBER: [phone]  RELATIONSHIP: Counselor at [facility]   ========================  PATIENT DEMOGRAPHICS:  ========================  HPI:    This is a 53-year-old, English speaking male, single, domiciled at [facility] since Aug 2022, hx of inpatient psychiatric hospitalizations, BIBEMS after patient requested an evaluation for dizziness, then later stated suicidal ideations.      Per counselor at [facility], Barby, reports that patient has been doing well the past few days, and reported no immediate concerns. Barby reports that patient has been taking his medications, however misses some doses. Barby reports that patient came up to the counselors this morning, reporting dizziness, and wanted to go to the hospital, then stating suicidal ideations. Barby reports no changes in mood recently, or any other triggers. Barby reports patient currently has the room to himself, no roommate. Barby reports she dos not think patient uses drugs or alcohol. Barby did not report any immediate safety concerns at this time.

## 2024-06-02 NOTE — ED BEHAVIORAL HEALTH ASSESSMENT NOTE - HPI (INCLUDE ILLNESS QUALITY, SEVERITY, DURATION, TIMING, CONTEXT, MODIFYING FACTORS, ASSOCIATED SIGNS AND SYMPTOMS)
52 yo male living at [facility] ([phone]) with PMH DM, HLD, HTN, hypothyroidism, and psychiatric h/o schizoaffective disorder, past substance use disorders, frequent ED visits and hospitalizations, past suicide attempts vs NSSIB by cutting, banging head, on Clozaril and Invega Sustenna, who was BIB EMS from his residence for abdominal pain. After medical evaluation concluded, patient reported suicidal ideation. 52 yo male living at [facility] ([phone]) with PMH DM, HLD, HTN, hypothyroidism, and psychiatric h/o schizoaffective disorder, past substance use disorders, frequent ED visits and hospitalizations, past suicide attempts vs NSSIB by cutting, banging head, on Clozaril and Invega Sustenna, who was BIB EMS from his residence for abdominal pain. After medical evaluation concluded, patient reported suicidal ideation.      fine, smiling  feeling paranoid, scared of everything around me and making me nervous, cry, I don't know what's going on, stuttering and muttering    Sees Dr. Pearson, last saw 2 weeks ago  Every now and then, misses     stabbing self with butter knife due to paranoia  1 day ago, bashed head against, plan - run in to traffic  planning to kill self by jumping off [place] [address]     hope that next day will be better / drooling, vision, headaches    chronic - hears voices all the time, command to punch others - I know what's real and what's not, no VH    music, video games, tv shows,   staff - [place]  having friends 54 yo male living at [facility] ([phone]) with PMH DM, HLD, HTN, hypothyroidism, and psychiatric h/o schizoaffective disorder, past substance use disorders, frequent ED visits and hospitalizations, past suicide attempts vs NSSIB by cutting, banging head, on Clozaril and Invega Sustruddy, who was BIB EMS from his residence for abdominal pain. After medical evaluation concluded, patient reported suicidal ideation.    Patient presents alert and oriented to person and place. He is pleasant and smiling, cooperative. Describes recent worsening of paranoia that makes him feel nervous and as if he does not know what is going on. This feeling leads him to feel like he should end his life by jumping from the [place] bridge or running in to traffic. Patient denies current suicidal ideation, plan and intent. States that if he were to have these thoughts, he would return to the ED.     Patient reports he is following with his outpatient doctor, Dr. Pearson. States he is adherent to his medications, sometimes taking them late or sometimes forgetting to take them. Provided reassurance that taking them off schedule by a half hour or hour is okay. Describes feeling annoyed by side effects of drooling, blurry vision and headaches, which he will continue to discuss with Dr. Pearson. Patient also reports hx of AH, chronic. States that the voices tell him to punch others but that he is able to tell himself that the voices are not real, preventing him from acting on the voices. Denies VH.     Patient denies low, depressed mood, anxiety. Denies substance use. Feels safe to return home.

## 2024-06-02 NOTE — ED BEHAVIORAL HEALTH ASSESSMENT NOTE - DESCRIPTION
Lives at [facility] HTN, HLD, DM Patient BIBEMS from [facility] for abdominal pain. Reported suicidal ideation to ED provider following conclusion of exam.    Vital Signs Last 24 Hrs  T(C): 36.8 (02 Jun 2024 14:47), Max: 36.8 (02 Jun 2024 14:47)  T(F): 98.2 (02 Jun 2024 14:47), Max: 98.2 (02 Jun 2024 14:47)  HR: 77 (02 Jun 2024 14:47) (77 - 77)  BP: 131/87 (02 Jun 2024 14:47) (131/87 - 131/87)  BP(mean): --  RR: 18 (02 Jun 2024 14:47) (18 - 18)  SpO2: 98% (02 Jun 2024 14:47) (98% - 98%)    Parameters below as of 02 Jun 2024 14:47  Patient On (Oxygen Delivery Method): room air

## 2024-06-02 NOTE — ED BEHAVIORAL HEALTH ASSESSMENT NOTE - DETAILS
Mother with hx of attempted suicide documented [facility] aware Reports thoughts of killing self by jumping from bridge or running in to traffic; denies current plan, intent

## 2024-06-02 NOTE — ED PROVIDER NOTE - OBJECTIVE STATEMENT
53 year old M from [facility], with hx of dm, htn, hld, hypothyroid, schizoaffective d/o, past substance abuse, prior SA, multiple prior IPP admissions presenting to er for eval of abdominal pain and SI. Pt c/o L sided abd pain which he has had intermittently for a "long time." Sts pain is brought on by eating certain foods like pasta. Pt also requesting to eat sandwich at bedside. Pt also endorsing SI, sts has plan to harm himself but does not endorse to me. Sts he hears voices daily which is baseline. No fever/chills, nausea, vomiting, diarrhea, hallucinations, HI, recent illicit drug use.

## 2024-06-02 NOTE — ED BEHAVIORAL HEALTH ASSESSMENT NOTE - DEPENDENTS
Operative Note





- Note:


Operative Date: 01/03/20 (marky)


Pre-Operative Diagnosis: right knee medial djd


Operation: right medial renate ukr


Post-Operative Diagnosis: Same as Pre-op


Surgeon: Elier Wolfe


Assistant: Devan Eldridge


Anesthesiologist/CRNA: Robert Ackerman


Anesthesia: Spinal, Local


Specimens Removed: bone fragments


Estimated Blood Loss (mls): 50 None known

## 2024-06-02 NOTE — ED PROVIDER NOTE - NSFOLLOWUPINSTRUCTIONS_ED_ALL_ED_FT
Abdominal Pain, Adult  Abdominal pain can be caused by many things. Often, abdominal pain is not serious and it gets better with no treatment or by being treated at home. However, sometimes abdominal pain is serious. Your health care provider will do a medical history and a physical exam to try to determine the cause of your abdominal pain.    Follow these instructions at home:  Take over-the-counter and prescription medicines only as told by your health care provider. Do not take a laxative unless told by your health care provider.  ImageDrink enough fluid to keep your urine clear or pale yellow.  Watch your condition for any changes.  Keep all follow-up visits as told by your health care provider. This is important.  Contact a health care provider if:  Your abdominal pain changes or gets worse.  You are not hungry or you lose weight without trying.  You are constipated or have diarrhea for more than 2–3 days.  You have pain when you urinate or have a bowel movement.  Your abdominal pain wakes you up at night.  Your pain gets worse with meals, after eating, or with certain foods.  You are throwing up and cannot keep anything down.  You have a fever.  Get help right away if:  Your pain does not go away as soon as your health care provider told you to expect.  You cannot stop throwing up.  Your pain is only in areas of the abdomen, such as the right side or the left lower portion of the abdomen.  You have bloody or black stools, or stools that look like tar.  You have severe pain, cramping, or bloating in your abdomen.  You have signs of dehydration, such as:    Dark urine, very little urine, or no urine.  Cracked lips.  Dry mouth.  Sunken eyes.  Sleepiness.  Weakness.    This information is not intended to replace advice given to you by your health care provider. Make sure you discuss any questions you have with your health care provider.    - If you feel overwhelming hopelessness with thoughts of hurting yourself or others, please call 9-1-1 or go the ER immediately  - Please take your medication as prescribed  - Please follow up with your primary care provider    Please follow up with your primary care doctor within one week.

## 2024-06-02 NOTE — ED ADULT NURSE NOTE - NSFALLRISKASMT_ED_ALL_ED_DT
DISPLAY PLAN FREE TEXT DISPLAY PLAN FREE TEXT DISPLAY PLAN FREE TEXT DISPLAY PLAN FREE TEXT DISPLAY PLAN FREE TEXT DISPLAY PLAN FREE TEXT DISPLAY PLAN FREE TEXT DISPLAY PLAN FREE TEXT DISPLAY PLAN FREE TEXT DISPLAY PLAN FREE TEXT DISPLAY PLAN FREE TEXT DISPLAY PLAN FREE TEXT DISPLAY PLAN FREE TEXT DISPLAY PLAN FREE TEXT DISPLAY PLAN FREE TEXT DISPLAY PLAN FREE TEXT DISPLAY PLAN FREE TEXT DISPLAY PLAN FREE TEXT DISPLAY PLAN FREE TEXT DISPLAY PLAN FREE TEXT DISPLAY PLAN FREE TEXT DISPLAY PLAN FREE TEXT 02-Jun-2024 17:35

## 2024-06-02 NOTE — ED BEHAVIORAL HEALTH ASSESSMENT NOTE - RISK ASSESSMENT
risk factors - chronic schizophrenia, suspected intellectual disability, hx of suicide attempt, hx of inpatient psych admissions  protective factors - residential stability with support, able to safety plan and recongize supports, medication adherent mostly, connected to outpatient care

## 2024-06-02 NOTE — ED PROVIDER NOTE - CLINICAL SUMMARY MEDICAL DECISION MAKING FREE TEXT BOX
53yM p/w LUQ abd pain but also SI.  Pt well appearing w/ abd soft/benign and pt calm, cooperative in the ED.  Labs reassuring.  Meds offered and pt is tolerating PO.  Telepsych evaluated pt and recommend dc with o/p f/u.  Ok to dc with return precautions.

## 2024-06-03 LAB
AMPHET UR-MCNC: NEGATIVE — SIGNIFICANT CHANGE UP
BARBITURATES UR SCN-MCNC: NEGATIVE — SIGNIFICANT CHANGE UP
BENZODIAZ UR-MCNC: NEGATIVE — SIGNIFICANT CHANGE UP
COCAINE METAB.OTHER UR-MCNC: NEGATIVE — SIGNIFICANT CHANGE UP
FENTANYL UR QL: NEGATIVE — SIGNIFICANT CHANGE UP
METHADONE UR-MCNC: NEGATIVE — SIGNIFICANT CHANGE UP
OPIATES UR-MCNC: NEGATIVE — SIGNIFICANT CHANGE UP
PCP SPEC-MCNC: SIGNIFICANT CHANGE UP
PROPOXYPHENE QUALITATIVE URINE RESULT: NEGATIVE — SIGNIFICANT CHANGE UP

## 2024-06-14 ENCOUNTER — APPOINTMENT (OUTPATIENT)
Dept: OPHTHALMOLOGY | Facility: CLINIC | Age: 54
End: 2024-06-14

## 2024-06-20 NOTE — BH SOCIAL WORK INITIAL PSYCHOSOCIAL EVALUATION - NSBHHOUSECOMMENTFT_PSY_ALL_CORE
Attending Attestation (For Attendings USE Only)...
pt frequently states he does not like his residence

## 2024-07-01 ENCOUNTER — APPOINTMENT (OUTPATIENT)
Dept: PODIATRY | Facility: CLINIC | Age: 54
End: 2024-07-01

## 2024-07-01 NOTE — BH DISCHARGE NOTE NURSING/SOCIAL WORK/PSYCH REHAB - NSDCBELONGINGSDISPO_PSY_ALL_CORE
Procedure: Left shoulder arthroscopic rotator cuff repair  Date of procedure: 5/14/2024    HPI:  Annalisa Claire is a 52 y.o. female who is approximately 6 weeks status post aforementioned procedure.  She indicates that she continues to do relatively well.  Her pain is rated as a 4/10.  She denies having any fevers, chills, sweats or any constitutional symptoms.  She has been compliant with her  pendulums and sling immobilization.  States that the pendulums are going well.  She is taking half a Norco mostly at nighttime as she does state it is achy at night trying to sleep.  Otherwise no additional complaints at this time.    Physical examination:  Evaluation of patient's left shoulder and upper extremity demonstrates her incisions to be appropriately healed.  There is no warmth, erythema, wound dehiscence or drainage.  Sensation is grossly intact light touch in all dermatomes and she has a 2+ radial pulse with brisk capillary refill in her fingers.    Impression and plan:  Annalisa Claire is a 52 y.o. female who is 6 weeks status post an arthroscopic left shoulder rotator cuff repair.  She is doing relatively well at this time.  She is to discontinue his sling. I will advance her home exercise program to begin working on active assisted ROM. She was provided and taught this today. She may begin use of this arm for light activities of daily living not to exceed 1 pound of pushing, pulling, or lifting.  I'll see her back for reevaluation in 6 weeks but she was encouraged to return or call earlier with questions and/or concerns.    With patient

## 2024-07-08 ENCOUNTER — APPOINTMENT (OUTPATIENT)
Dept: PODIATRY | Facility: CLINIC | Age: 54
End: 2024-07-08

## 2024-07-29 ENCOUNTER — INPATIENT (INPATIENT)
Facility: HOSPITAL | Age: 54
LOS: 6 days | Discharge: NURSING, REHAB OR LONG-TERM CARE | DRG: 137 | End: 2024-08-05
Attending: INTERNAL MEDICINE | Admitting: STUDENT IN AN ORGANIZED HEALTH CARE EDUCATION/TRAINING PROGRAM
Payer: MEDICAID

## 2024-07-29 VITALS
HEART RATE: 126 BPM | HEIGHT: 67 IN | OXYGEN SATURATION: 94 % | TEMPERATURE: 99 F | WEIGHT: 197.09 LBS | DIASTOLIC BLOOD PRESSURE: 81 MMHG | RESPIRATION RATE: 17 BRPM | SYSTOLIC BLOOD PRESSURE: 143 MMHG

## 2024-07-29 DIAGNOSIS — U07.1 COVID-19: ICD-10-CM

## 2024-07-29 LAB
ALBUMIN SERPL ELPH-MCNC: 4.7 G/DL — SIGNIFICANT CHANGE UP (ref 3.5–5.2)
ALP SERPL-CCNC: 95 U/L — SIGNIFICANT CHANGE UP (ref 30–115)
ALT FLD-CCNC: 11 U/L — SIGNIFICANT CHANGE UP (ref 0–41)
ANION GAP SERPL CALC-SCNC: 13 MMOL/L — SIGNIFICANT CHANGE UP (ref 7–14)
APPEARANCE UR: CLEAR — SIGNIFICANT CHANGE UP
AST SERPL-CCNC: 25 U/L — SIGNIFICANT CHANGE UP (ref 0–41)
BASOPHILS # BLD AUTO: 0.02 K/UL — SIGNIFICANT CHANGE UP (ref 0–0.2)
BASOPHILS NFR BLD AUTO: 0.3 % — SIGNIFICANT CHANGE UP (ref 0–1)
BILIRUB SERPL-MCNC: 0.3 MG/DL — SIGNIFICANT CHANGE UP (ref 0.2–1.2)
BILIRUB UR-MCNC: NEGATIVE — SIGNIFICANT CHANGE UP
BUN SERPL-MCNC: 14 MG/DL — SIGNIFICANT CHANGE UP (ref 10–20)
CALCIUM SERPL-MCNC: 9.9 MG/DL — SIGNIFICANT CHANGE UP (ref 8.4–10.5)
CHLORIDE SERPL-SCNC: 98 MMOL/L — SIGNIFICANT CHANGE UP (ref 98–110)
CO2 SERPL-SCNC: 26 MMOL/L — SIGNIFICANT CHANGE UP (ref 17–32)
COLOR SPEC: YELLOW — SIGNIFICANT CHANGE UP
CREAT SERPL-MCNC: 1 MG/DL — SIGNIFICANT CHANGE UP (ref 0.7–1.5)
D DIMER BLD IA.RAPID-MCNC: <150 NG/ML DDU — SIGNIFICANT CHANGE UP
DIFF PNL FLD: NEGATIVE — SIGNIFICANT CHANGE UP
EGFR: 91 ML/MIN/1.73M2 — SIGNIFICANT CHANGE UP
EOSINOPHIL # BLD AUTO: 0.17 K/UL — SIGNIFICANT CHANGE UP (ref 0–0.7)
EOSINOPHIL NFR BLD AUTO: 2.2 % — SIGNIFICANT CHANGE UP (ref 0–8)
FLUAV AG NPH QL: SIGNIFICANT CHANGE UP
FLUBV AG NPH QL: SIGNIFICANT CHANGE UP
GLUCOSE SERPL-MCNC: 134 MG/DL — HIGH (ref 70–99)
GLUCOSE UR QL: NEGATIVE MG/DL — SIGNIFICANT CHANGE UP
HCT VFR BLD CALC: 37.6 % — LOW (ref 42–52)
HGB BLD-MCNC: 11.6 G/DL — LOW (ref 14–18)
IMM GRANULOCYTES NFR BLD AUTO: 0.4 % — HIGH (ref 0.1–0.3)
KETONES UR-MCNC: NEGATIVE MG/DL — SIGNIFICANT CHANGE UP
LEUKOCYTE ESTERASE UR-ACNC: NEGATIVE — SIGNIFICANT CHANGE UP
LYMPHOCYTES # BLD AUTO: 1.32 K/UL — SIGNIFICANT CHANGE UP (ref 1.2–3.4)
LYMPHOCYTES # BLD AUTO: 17.2 % — LOW (ref 20.5–51.1)
MAGNESIUM SERPL-MCNC: 1.6 MG/DL — LOW (ref 1.8–2.4)
MCHC RBC-ENTMCNC: 26 PG — LOW (ref 27–31)
MCHC RBC-ENTMCNC: 30.9 G/DL — LOW (ref 32–37)
MCV RBC AUTO: 84.1 FL — SIGNIFICANT CHANGE UP (ref 80–94)
MONOCYTES # BLD AUTO: 0.53 K/UL — SIGNIFICANT CHANGE UP (ref 0.1–0.6)
MONOCYTES NFR BLD AUTO: 6.9 % — SIGNIFICANT CHANGE UP (ref 1.7–9.3)
NEUTROPHILS # BLD AUTO: 5.59 K/UL — SIGNIFICANT CHANGE UP (ref 1.4–6.5)
NEUTROPHILS NFR BLD AUTO: 73 % — SIGNIFICANT CHANGE UP (ref 42.2–75.2)
NITRITE UR-MCNC: NEGATIVE — SIGNIFICANT CHANGE UP
NRBC # BLD: 0 /100 WBCS — SIGNIFICANT CHANGE UP (ref 0–0)
PH UR: 6 — SIGNIFICANT CHANGE UP (ref 5–8)
PLATELET # BLD AUTO: 278 K/UL — SIGNIFICANT CHANGE UP (ref 130–400)
PMV BLD: 10.2 FL — SIGNIFICANT CHANGE UP (ref 7.4–10.4)
POTASSIUM SERPL-MCNC: 5.3 MMOL/L — HIGH (ref 3.5–5)
POTASSIUM SERPL-SCNC: 5.3 MMOL/L — HIGH (ref 3.5–5)
PROT SERPL-MCNC: 7.8 G/DL — SIGNIFICANT CHANGE UP (ref 6–8)
PROT UR-MCNC: NEGATIVE MG/DL — SIGNIFICANT CHANGE UP
RBC # BLD: 4.47 M/UL — LOW (ref 4.7–6.1)
RBC # FLD: 18.5 % — HIGH (ref 11.5–14.5)
RSV RNA NPH QL NAA+NON-PROBE: SIGNIFICANT CHANGE UP
SARS-COV-2 RNA SPEC QL NAA+PROBE: DETECTED
SODIUM SERPL-SCNC: 137 MMOL/L — SIGNIFICANT CHANGE UP (ref 135–146)
SP GR SPEC: 1.01 — SIGNIFICANT CHANGE UP (ref 1–1.03)
UROBILINOGEN FLD QL: 1 MG/DL — SIGNIFICANT CHANGE UP (ref 0.2–1)
WBC # BLD: 7.66 K/UL — SIGNIFICANT CHANGE UP (ref 4.8–10.8)
WBC # FLD AUTO: 7.66 K/UL — SIGNIFICANT CHANGE UP (ref 4.8–10.8)

## 2024-07-29 PROCEDURE — 82962 GLUCOSE BLOOD TEST: CPT

## 2024-07-29 PROCEDURE — 80053 COMPREHEN METABOLIC PANEL: CPT

## 2024-07-29 PROCEDURE — 84439 ASSAY OF FREE THYROXINE: CPT

## 2024-07-29 PROCEDURE — 83735 ASSAY OF MAGNESIUM: CPT

## 2024-07-29 PROCEDURE — 81003 URINALYSIS AUTO W/O SCOPE: CPT

## 2024-07-29 PROCEDURE — 71045 X-RAY EXAM CHEST 1 VIEW: CPT | Mod: 26

## 2024-07-29 PROCEDURE — 80076 HEPATIC FUNCTION PANEL: CPT

## 2024-07-29 PROCEDURE — 85025 COMPLETE CBC W/AUTO DIFF WBC: CPT

## 2024-07-29 PROCEDURE — 84100 ASSAY OF PHOSPHORUS: CPT

## 2024-07-29 PROCEDURE — 83605 ASSAY OF LACTIC ACID: CPT

## 2024-07-29 PROCEDURE — 84484 ASSAY OF TROPONIN QUANT: CPT

## 2024-07-29 PROCEDURE — 84481 FREE ASSAY (FT-3): CPT

## 2024-07-29 PROCEDURE — 87040 BLOOD CULTURE FOR BACTERIA: CPT

## 2024-07-29 PROCEDURE — 93970 EXTREMITY STUDY: CPT

## 2024-07-29 PROCEDURE — 85027 COMPLETE CBC AUTOMATED: CPT

## 2024-07-29 PROCEDURE — 85610 PROTHROMBIN TIME: CPT

## 2024-07-29 PROCEDURE — 83036 HEMOGLOBIN GLYCOSYLATED A1C: CPT

## 2024-07-29 PROCEDURE — 36415 COLL VENOUS BLD VENIPUNCTURE: CPT

## 2024-07-29 PROCEDURE — 84443 ASSAY THYROID STIM HORMONE: CPT

## 2024-07-29 PROCEDURE — 97161 PT EVAL LOW COMPLEX 20 MIN: CPT | Mod: GP

## 2024-07-29 PROCEDURE — 82565 ASSAY OF CREATININE: CPT

## 2024-07-29 PROCEDURE — 80048 BASIC METABOLIC PNL TOTAL CA: CPT

## 2024-07-29 PROCEDURE — 93005 ELECTROCARDIOGRAM TRACING: CPT

## 2024-07-29 PROCEDURE — 99285 EMERGENCY DEPT VISIT HI MDM: CPT

## 2024-07-29 RX ORDER — MAGNESIUM SULFATE 500 MG/ML
2 VIAL (ML) INJECTION ONCE
Refills: 0 | Status: COMPLETED | OUTPATIENT
Start: 2024-07-29 | End: 2024-07-29

## 2024-07-29 RX ORDER — IBUPROFEN 200 MG
400 TABLET ORAL ONCE
Refills: 0 | Status: COMPLETED | OUTPATIENT
Start: 2024-07-29 | End: 2024-07-29

## 2024-07-29 RX ORDER — DIVALPROEX SODIUM 125 MG/1
750 CAPSULE, COATED PELLETS ORAL ONCE
Refills: 0 | Status: COMPLETED | OUTPATIENT
Start: 2024-07-29 | End: 2024-07-29

## 2024-07-29 RX ORDER — BENZTROPINE MESYLATE 2 MG
1 TABLET ORAL ONCE
Refills: 0 | Status: COMPLETED | OUTPATIENT
Start: 2024-07-29 | End: 2024-07-29

## 2024-07-29 RX ORDER — BACTERIOSTATIC SODIUM CHLORIDE 0.9 %
1000 VIAL (ML) INJECTION ONCE
Refills: 0 | Status: COMPLETED | OUTPATIENT
Start: 2024-07-29 | End: 2024-07-29

## 2024-07-29 RX ADMIN — Medication 25 GRAM(S): at 22:51

## 2024-07-29 RX ADMIN — DIVALPROEX SODIUM 750 MILLIGRAM(S): 125 CAPSULE, COATED PELLETS ORAL at 21:54

## 2024-07-29 RX ADMIN — Medication 2000 MILLILITER(S): at 20:32

## 2024-07-29 RX ADMIN — Medication 400 MILLIGRAM(S): at 20:33

## 2024-07-29 RX ADMIN — Medication 1 MILLIGRAM(S): at 21:54

## 2024-07-29 NOTE — ED ADULT TRIAGE NOTE - CHIEF COMPLAINT QUOTE
Pt here c/o throat pain, coughing and congestion x 2 days with green sputum. Pt from [address] states " his neighbor has covid"

## 2024-07-29 NOTE — ED PROVIDER NOTE - ATTENDING CONTRIBUTION TO CARE
I personally evaluated the patient. I reviewed the Resident’s or Physician Assistant’s note (as assigned above), and agree with the findings and plan except as documented in my note.  53 year old M from [facility], with history of dm, HTN, HLD, hypothyroid, schizoaffective d/o, past substance abuse, prior SA, multiple prior IPP admissions presents to ED due to cough for 2 days. no sob, chest pain, dysphagia.  CONSTITUTIONAL: well developed; in no acute distress  HEAD: normocephalic; atraumatic  EYES: no conjunctival injection, no scleral icterus  ENT: no nasal discharge; airway clear.  NECK: supple; non tender.  CARD: warm and well perfused, + tachycardic  RESP: breathing comfortably on RA, speaking in full sentences w/o distress  Abdomen: Soft, None Tender, None Distended   EXT: moving all extremities spontaneously, normal ROM.  SKIN: warm and dry, no lesions noted  NEURO: alert, moves all extremities  PSYCH: calm, cooperative no SI or HI  pt is tachycardic to 122, will send labs, ua give IVF and reevaluate

## 2024-07-29 NOTE — ED PROVIDER NOTE - OBJECTIVE STATEMENT
pt is a 52 y/o male w/pmhx schizophrenia, substance use disorder, HTN, DM, c/o cough. Pt is a resident at [facility], and states his cough began 2 days ago, with green phlegm. Pt denies fever, dysphagia, dyspnea. Pt has difficulty answering questions, avoids eye contact, stares blankly, and unresponsive to some questions. Pt denies SI/HI, and states he takes his Rx. Pt agrees when asked if he thinks his medications are helping him.

## 2024-07-29 NOTE — ED PROVIDER NOTE - PHYSICAL EXAMINATION
VITAL SIGNS: I have reviewed nursing notes and confirm.  CONSTITUTIONAL: Well-developed; well-nourished; in no acute distress.  SKIN: Skin exam is warm and dry, no acute rash.  HEAD: Normocephalic; atraumatic.  NECK: Supple; non tender.  CARD: S1, S2 normal; no murmurs, gallops, or rubs. Regular rate and rhythm.  RESP: Normal respiratory effort, no tachypnea or distress. Lungs CTAB, no wheezes, rales or rhonchi.  NEURO: Alert, oriented. Grossly unremarkable. No focal deficits.  PSYCH: Blunted affect, avoids eye contact, does not respond to half of questions

## 2024-07-29 NOTE — ED PROVIDER NOTE - CARE PLAN
1 Principal Discharge DX:	COVID  Secondary Diagnosis:	Hypomagnesemia  Secondary Diagnosis:	Tachycardia

## 2024-07-29 NOTE — ED PROVIDER NOTE - CLINICAL SUMMARY MEDICAL DECISION MAKING FREE TEXT BOX
52 yo male with pmh of schizophrenia, substance use d/o, htn, dm, cough presents for cough productive of green phlegm, and rapid heart rate. Pt with tachycardia, and unclear if his meds are source of this. Pt endorsed to me by Dr Gabriel to follow up labs and likely admit for medical optimization for medications. Pt labs +covid, mild low mag (replaced in ER), UA neg/CBC ok. Pt with persistent sinus tachycardia ?thyroid issues (pending thyroid panel). To admit for further management

## 2024-07-30 LAB
ALBUMIN SERPL ELPH-MCNC: 4.3 G/DL — SIGNIFICANT CHANGE UP (ref 3.5–5.2)
ALP SERPL-CCNC: 80 U/L — SIGNIFICANT CHANGE UP (ref 30–115)
ALT FLD-CCNC: 11 U/L — SIGNIFICANT CHANGE UP (ref 0–41)
ANION GAP SERPL CALC-SCNC: 15 MMOL/L — HIGH (ref 7–14)
AST SERPL-CCNC: 15 U/L — SIGNIFICANT CHANGE UP (ref 0–41)
BILIRUB SERPL-MCNC: <0.2 MG/DL — SIGNIFICANT CHANGE UP (ref 0.2–1.2)
BUN SERPL-MCNC: 10 MG/DL — SIGNIFICANT CHANGE UP (ref 10–20)
CALCIUM SERPL-MCNC: 9.6 MG/DL — SIGNIFICANT CHANGE UP (ref 8.4–10.5)
CHLORIDE SERPL-SCNC: 104 MMOL/L — SIGNIFICANT CHANGE UP (ref 98–110)
CO2 SERPL-SCNC: 23 MMOL/L — SIGNIFICANT CHANGE UP (ref 17–32)
CREAT SERPL-MCNC: 0.8 MG/DL — SIGNIFICANT CHANGE UP (ref 0.7–1.5)
EGFR: 106 ML/MIN/1.73M2 — SIGNIFICANT CHANGE UP
GLUCOSE BLDC GLUCOMTR-MCNC: 100 MG/DL — HIGH (ref 70–99)
GLUCOSE BLDC GLUCOMTR-MCNC: 133 MG/DL — HIGH (ref 70–99)
GLUCOSE BLDC GLUCOMTR-MCNC: 136 MG/DL — HIGH (ref 70–99)
GLUCOSE SERPL-MCNC: 136 MG/DL — HIGH (ref 70–99)
HCT VFR BLD CALC: 34.9 % — LOW (ref 42–52)
HGB BLD-MCNC: 10.9 G/DL — LOW (ref 14–18)
LACTATE SERPL-SCNC: 1.5 MMOL/L — SIGNIFICANT CHANGE UP (ref 0.7–2)
MAGNESIUM SERPL-MCNC: 1.6 MG/DL — LOW (ref 1.8–2.4)
MCHC RBC-ENTMCNC: 25.6 PG — LOW (ref 27–31)
MCHC RBC-ENTMCNC: 31.2 G/DL — LOW (ref 32–37)
MCV RBC AUTO: 82.1 FL — SIGNIFICANT CHANGE UP (ref 80–94)
NRBC # BLD: 0 /100 WBCS — SIGNIFICANT CHANGE UP (ref 0–0)
PLATELET # BLD AUTO: 226 K/UL — SIGNIFICANT CHANGE UP (ref 130–400)
PMV BLD: 9.8 FL — SIGNIFICANT CHANGE UP (ref 7.4–10.4)
POTASSIUM SERPL-MCNC: 4.2 MMOL/L — SIGNIFICANT CHANGE UP (ref 3.5–5)
POTASSIUM SERPL-SCNC: 4.2 MMOL/L — SIGNIFICANT CHANGE UP (ref 3.5–5)
PROT SERPL-MCNC: 7.1 G/DL — SIGNIFICANT CHANGE UP (ref 6–8)
RBC # BLD: 4.25 M/UL — LOW (ref 4.7–6.1)
RBC # FLD: 18.2 % — HIGH (ref 11.5–14.5)
SODIUM SERPL-SCNC: 142 MMOL/L — SIGNIFICANT CHANGE UP (ref 135–146)
T3 SERPL-MCNC: 91 NG/DL — SIGNIFICANT CHANGE UP (ref 80–200)
T4 AB SER-ACNC: 4.2 UG/DL — LOW (ref 4.6–12)
TROPONIN T, HIGH SENSITIVITY RESULT: 21 NG/L — SIGNIFICANT CHANGE UP (ref 6–21)
TROPONIN T, HIGH SENSITIVITY RESULT: 21 NG/L — SIGNIFICANT CHANGE UP (ref 6–21)
TSH SERPL-MCNC: 4.37 UIU/ML — HIGH (ref 0.27–4.2)
TSH SERPL-MCNC: 4.74 UIU/ML — HIGH (ref 0.27–4.2)
WBC # BLD: 6.16 K/UL — SIGNIFICANT CHANGE UP (ref 4.8–10.8)
WBC # FLD AUTO: 6.16 K/UL — SIGNIFICANT CHANGE UP (ref 4.8–10.8)

## 2024-07-30 PROCEDURE — 99223 1ST HOSP IP/OBS HIGH 75: CPT

## 2024-07-30 PROCEDURE — 90792 PSYCH DIAG EVAL W/MED SRVCS: CPT | Mod: 95

## 2024-07-30 PROCEDURE — 93970 EXTREMITY STUDY: CPT | Mod: 26

## 2024-07-30 RX ORDER — DIVALPROEX SODIUM 125 MG/1
750 CAPSULE, COATED PELLETS ORAL AT BEDTIME
Refills: 0 | Status: DISCONTINUED | OUTPATIENT
Start: 2024-07-30 | End: 2024-08-05

## 2024-07-30 RX ORDER — DIVALPROEX SODIUM 125 MG/1
500 CAPSULE, COATED PELLETS ORAL DAILY
Refills: 0 | Status: DISCONTINUED | OUTPATIENT
Start: 2024-07-30 | End: 2024-08-05

## 2024-07-30 RX ORDER — BENZTROPINE MESYLATE 2 MG
1 TABLET ORAL
Refills: 0 | Status: DISCONTINUED | OUTPATIENT
Start: 2024-07-30 | End: 2024-08-05

## 2024-07-30 RX ORDER — REMDESIVIR 100 MG/1
INJECTION, POWDER, LYOPHILIZED, FOR SOLUTION INTRAVENOUS
Refills: 0 | Status: COMPLETED | OUTPATIENT
Start: 2024-07-30 | End: 2024-08-01

## 2024-07-30 RX ORDER — REMDESIVIR 100 MG/1
200 INJECTION, POWDER, LYOPHILIZED, FOR SOLUTION INTRAVENOUS EVERY 24 HOURS
Refills: 0 | Status: COMPLETED | OUTPATIENT
Start: 2024-07-30 | End: 2024-07-30

## 2024-07-30 RX ORDER — ACETAMINOPHEN 500 MG
650 TABLET ORAL EVERY 6 HOURS
Refills: 0 | Status: DISCONTINUED | OUTPATIENT
Start: 2024-07-30 | End: 2024-08-05

## 2024-07-30 RX ORDER — REMDESIVIR 100 MG/1
100 INJECTION, POWDER, LYOPHILIZED, FOR SOLUTION INTRAVENOUS EVERY 24 HOURS
Refills: 0 | Status: COMPLETED | OUTPATIENT
Start: 2024-07-31 | End: 2024-08-01

## 2024-07-30 RX ORDER — DEXTROSE 4 G
15 TABLET,CHEWABLE ORAL ONCE
Refills: 0 | Status: DISCONTINUED | OUTPATIENT
Start: 2024-07-30 | End: 2024-08-05

## 2024-07-30 RX ORDER — ENOXAPARIN SODIUM 120 MG/.8ML
40 INJECTION SUBCUTANEOUS EVERY 24 HOURS
Refills: 0 | Status: DISCONTINUED | OUTPATIENT
Start: 2024-07-30 | End: 2024-08-05

## 2024-07-30 RX ORDER — DEXTROSE 4 G
25 TABLET,CHEWABLE ORAL ONCE
Refills: 0 | Status: DISCONTINUED | OUTPATIENT
Start: 2024-07-30 | End: 2024-08-05

## 2024-07-30 RX ORDER — DEXTROSE MONOHYDRATE, SODIUM CHLORIDE, SODIUM LACTATE, CALCIUM CHLORIDE, MAGNESIUM CHLORIDE 1.5; 538; 448; 18.4; 5.08 G/100ML; MG/100ML; MG/100ML; MG/100ML; MG/100ML
1000 SOLUTION INTRAPERITONEAL
Refills: 0 | Status: DISCONTINUED | OUTPATIENT
Start: 2024-07-30 | End: 2024-08-05

## 2024-07-30 RX ORDER — GLUCAGON INJECTION, SOLUTION 0.5 MG/.1ML
1 INJECTION, SOLUTION SUBCUTANEOUS ONCE
Refills: 0 | Status: DISCONTINUED | OUTPATIENT
Start: 2024-07-30 | End: 2024-08-05

## 2024-07-30 RX ORDER — DEXTROSE MONOHYDRATE, SODIUM CHLORIDE, SODIUM LACTATE, CALCIUM CHLORIDE, MAGNESIUM CHLORIDE 1.5; 538; 448; 18.4; 5.08 G/100ML; MG/100ML; MG/100ML; MG/100ML; MG/100ML
1000 SOLUTION INTRAPERITONEAL
Refills: 0 | Status: DISCONTINUED | OUTPATIENT
Start: 2024-07-30 | End: 2024-08-01

## 2024-07-30 RX ORDER — CLOZAPINE 100 MG/1
100 TABLET ORAL
Refills: 0 | Status: DISCONTINUED | OUTPATIENT
Start: 2024-07-30 | End: 2024-08-05

## 2024-07-30 RX ORDER — MAGNESIUM SULFATE 500 MG/ML
2 VIAL (ML) INJECTION ONCE
Refills: 0 | Status: COMPLETED | OUTPATIENT
Start: 2024-07-30 | End: 2024-07-30

## 2024-07-30 RX ORDER — DEXTROSE MONOHYDRATE, SODIUM CHLORIDE, SODIUM LACTATE, CALCIUM CHLORIDE, MAGNESIUM CHLORIDE 1.5; 538; 448; 18.4; 5.08 G/100ML; MG/100ML; MG/100ML; MG/100ML; MG/100ML
1000 SOLUTION INTRAPERITONEAL
Refills: 0 | Status: DISCONTINUED | OUTPATIENT
Start: 2024-07-30 | End: 2024-07-31

## 2024-07-30 RX ORDER — DEXTROSE 4 G
12.5 TABLET,CHEWABLE ORAL ONCE
Refills: 0 | Status: DISCONTINUED | OUTPATIENT
Start: 2024-07-30 | End: 2024-08-05

## 2024-07-30 RX ORDER — INSULIN LISPRO 100/ML
VIAL (ML) SUBCUTANEOUS
Refills: 0 | Status: DISCONTINUED | OUTPATIENT
Start: 2024-07-30 | End: 2024-08-05

## 2024-07-30 RX ADMIN — Medication 1 MILLIGRAM(S): at 18:04

## 2024-07-30 RX ADMIN — Medication 1 MILLIGRAM(S): at 06:15

## 2024-07-30 RX ADMIN — Medication 650 MILLIGRAM(S): at 13:59

## 2024-07-30 RX ADMIN — CLOZAPINE 100 MILLIGRAM(S): 100 TABLET ORAL at 06:15

## 2024-07-30 RX ADMIN — DIVALPROEX SODIUM 750 MILLIGRAM(S): 125 CAPSULE, COATED PELLETS ORAL at 21:00

## 2024-07-30 RX ADMIN — ENOXAPARIN SODIUM 40 MILLIGRAM(S): 120 INJECTION SUBCUTANEOUS at 06:16

## 2024-07-30 RX ADMIN — REMDESIVIR 200 MILLIGRAM(S): 100 INJECTION, POWDER, LYOPHILIZED, FOR SOLUTION INTRAVENOUS at 18:04

## 2024-07-30 RX ADMIN — Medication 25 GRAM(S): at 18:41

## 2024-07-30 RX ADMIN — DIVALPROEX SODIUM 500 MILLIGRAM(S): 125 CAPSULE, COATED PELLETS ORAL at 11:16

## 2024-07-30 RX ADMIN — DEXTROSE MONOHYDRATE, SODIUM CHLORIDE, SODIUM LACTATE, CALCIUM CHLORIDE, MAGNESIUM CHLORIDE 40 MILLILITER(S): 1.5; 538; 448; 18.4; 5.08 SOLUTION INTRAPERITONEAL at 06:16

## 2024-07-30 RX ADMIN — Medication 650 MILLIGRAM(S): at 21:00

## 2024-07-30 RX ADMIN — DEXTROSE MONOHYDRATE, SODIUM CHLORIDE, SODIUM LACTATE, CALCIUM CHLORIDE, MAGNESIUM CHLORIDE 40 MILLILITER(S): 1.5; 538; 448; 18.4; 5.08 SOLUTION INTRAPERITONEAL at 18:41

## 2024-07-30 NOTE — BH CONSULTATION LIAISON ASSESSMENT NOTE - CURRENT MEDICATION
MEDICATIONS  (STANDING):  benztropine 1 milliGRAM(s) Oral two times a day  cloZAPine 100 milliGRAM(s) Oral two times a day  dextrose 5%. 1000 milliLiter(s) (100 mL/Hr) IV Continuous <Continuous>  dextrose 5%. 1000 milliLiter(s) (50 mL/Hr) IV Continuous <Continuous>  dextrose 50% Injectable 12.5 Gram(s) IV Push once  dextrose 50% Injectable 25 Gram(s) IV Push once  dextrose 50% Injectable 25 Gram(s) IV Push once  divalproex  milliGRAM(s) Oral at bedtime  divalproex  milliGRAM(s) Oral daily  enoxaparin Injectable 40 milliGRAM(s) SubCutaneous every 24 hours  glucagon  Injectable 1 milliGRAM(s) IntraMuscular once  insulin lispro (ADMELOG) corrective regimen sliding scale   SubCutaneous three times a day before meals  lactated ringers. 1000 milliLiter(s) (40 mL/Hr) IV Continuous <Continuous>    MEDICATIONS  (PRN):  acetaminophen     Tablet .. 650 milliGRAM(s) Oral every 6 hours PRN Temp greater or equal to 38C (100.4F), Mild Pain (1 - 3)  dextrose Oral Gel 15 Gram(s) Oral once PRN Blood Glucose LESS THAN 70 milliGRAM(s)/deciliter

## 2024-07-30 NOTE — BH CONSULTATION LIAISON ASSESSMENT NOTE - RISK ASSESSMENT
RF: hx psych hospitalizations, hx of SA/NSSIB, hx substance use  PF: supported at [facility], no current SI/HI, calm during this interview, future oriented, noted to be eating and watching TV, does not present acutely depressed, manic, psychotic at this time, willing to f/up outpatient psych and take psych meds

## 2024-07-30 NOTE — PATIENT PROFILE ADULT - FUNCTIONAL ASSESSMENT - BASIC MOBILITY 6.
3-calculated by average/Not able to assess (calculate score using [facility] averaging method)

## 2024-07-30 NOTE — H&P ADULT - NSHPPHYSICALEXAM_GEN_ALL_CORE
Patient is AAOx3  Regular S1S2  Clear lungs bilaterally  Soft abdomen, non tender, +ve BS  No lower limb edema

## 2024-07-30 NOTE — BH CONSULTATION LIAISON ASSESSMENT NOTE - NSBHINFOSOURCE_PSY_ALL_CORE
The following changes were made to your medications today:   Continue all your current medications.    Discontinue simvastatin, continue atorvastatin     Increase Losartan 50 mg 1 tablet by mouth 2 times daily    Hydralazine 50 mg 1 tablet by mouth three times a day. Take an additional tablet if systolic blood pressure (top number) greater than 160     The following lifestyle modifications are encouraged:  Continue regular exercise at least 30 minutes daily.  Lowfat/Low Cholesterol diet advised.   Low sodium diet <2000mgs/day       The following tests have been ordered:  Repeat BMP in 1-2 weeks    Follow up with primary cardiologist, Dr. Shara Stephens DO  in June.    Additional Educational Resources:  For additional resources regarding your symptoms, diagnosis, or further health information, please visit the Health Resources section on [email or web address] or the Online Health Resources section in [facility].     
Patient/Collateral...

## 2024-07-30 NOTE — H&P ADULT - ATTENDING COMMENTS
Patient seen at bedside. Changes made within note as well. Discussed with medical team that includes resident(s), medical student(s), nursing staff, case management.     as per initial summary   Patient is a 52 y/o male w/pmhx schizophrenia, substance use disorder, Pre DM, presenting for a complaint of cough associated with green phlegm of 2 days duration.   RVP +ve for covid.     #Covid infection  -RVP +ve for covid  -no leukocytosis   -chills +ve, no documented fever    -clear CXR  -No oxygen requirements   -supportive treatment   ID consult. now having fever spikes  started on remdesevir as per ID  follow cultures   monitor lfts     #Schizoaffective disorder  #Substance use disorder   -c/ home meds  -Divaloproex, Benztropine, Clozapine  -Invega once monthly   follow psych     #tachycardia  persistent tachycardia (even before fever spike)  as per patient he has been having some palpitation for last 2 days  d dimer negative   duplex pending  d/w cardiology . will order and echo. and if persistent consult EP for possible IST .(inappropriate sinus tachycardia syndrome)      #DVT ppx: Lovenox 40 mg sq od  #Diet DASH      follow up: monitor for fever spikes   started on remdesevir. follow cultures. monitor HR. EP consult if persisnet tacycarda. (need to rule out IST as per cards).  follow troponin.   follow magnesium.   ID consult   psych consult. Patient seen at bedside. Changes made within note as well. Discussed with medical team that includes resident(s), medical student(s), nursing staff, case management.     as per initial summary   Patient is a 52 y/o male w/pmhx schizophrenia, substance use disorder, Pre DM, presenting for a complaint of cough associated with green phlegm of 2 days duration.   RVP +ve for covid.     #Covid infection  -RVP +ve for covid  -no leukocytosis   -chills +ve, no documented fever    -clear CXR  -No oxygen requirements   -supportive treatment   ID consult. now having fever spikes  started on remdesevir as per ID  follow cultures   monitor lfts     #Schizoaffective disorder  #Substance use disorder   -c/ home meds  -Divaloproex, Benztropine, Clozapine  -Invega once monthly   follow psych     #tachycardia  persistent tachycardia (even before fever spike)  as per patient he has been having some palpitation for last 2 days  d dimer negative   duplex pending  d/w cardiology . will order and echo. and if persistent consult EP for possible IST .(inappropriate sinus tachycardia syndrome)      #DVT ppx: Lovenox 40 mg sq od  #Diet DASH      follow up: monitor for fever spikes   started on remdesevir. follow cultures. monitor HR. EP consult if persisnet tacycarda. (need to rule out IST as per cards).  follow troponin.   follow magnesium.   ID consult   psych consult.  duplex pending

## 2024-07-30 NOTE — BH CONSULTATION LIAISON ASSESSMENT NOTE - VIOLENCE PROTECTIVE FACTORS:
Residential stability/Engagement in treatment Topical Steroids Counseling: I discussed with the patient that prolonged use of topical steroids can result in the increased appearance of superficial blood vessels (telangiectasias), lightening (hypopigmentation) and thinning of the skin (atrophy).  Patient understands to avoid using high potency steroids in skin folds, the groin or the face.  The patient verbalized understanding of the proper use and possible adverse effects of topical steroids.  All of the patient's questions and concerns were addressed.

## 2024-07-30 NOTE — BH CONSULTATION LIAISON ASSESSMENT NOTE - MSE OPTIONS
Spoke with Ladarius Sebastian whom confirmed that she and her spouse Nemesio Toledo would like to stay with [facility], switch to Dr. Alexandria Cardenas once [facility] MARLENE leaves on February 18,2022. Advised once [facility] leaves she and her spouse may call to schedule. Structured MSE

## 2024-07-30 NOTE — PROGRESS NOTE ADULT - ASSESSMENT
Patient is a 52 y/o male w/pmhx schizophrenia, substance use disorder, Pre DM, presenting for a complaint of cough associated with green phlegm of 2 days duration.   RVP +ve for covid.     #persistent tachycardia 2/2 psych disorder vs cardiac origin   - Patient reports anxiety  - No aggression towards others, but have observed episodes of aggression and throwing objects in his room (possibly responding to internal stimuli)  - Psych consult for optimization of medications  - obtain Lactate  - TTE       #Covid infection  -RVP +ve for covid  -no leukocytosis   -chills +ve, no documented fever    -clear CXR  -No oxygen requirements   -supportive treatment     #Schizoaffective disorder  #Substance use disorder   -c/ home meds  -Divaloproex, Benztropine, Clozapine  -Invega once monthly       #DVT ppx: Lovenox 40 mg sq od  #Diet DASH    Pending:  TTE --> if abnormal, consult EP  Lactate Patient is a 54 y/o male w/pmhx schizophrenia, substance use disorder, Pre DM, presenting for a complaint of cough associated with green phlegm of 2 days duration.   RVP +ve for covid.     #persistent tachycardia 2/2 psych disorder vs cardiac origin   - Patient reports anxiety  - No aggression towards others, but have observed episodes of aggression and throwing objects in his room (possibly responding to internal stimuli)  - Psych consult for optimization of medications  - obtain Lactate  - TTE       #Covid infection  -RVP +ve for covid  -no leukocytosis   -chills +ve, no documented fever    -clear CXR  -No oxygen requirements   -Per ID: start Remdesivir for 3 days, 200mg on day 1, and then 2 more days of 100mg     #Schizoaffective disorder  #Substance use disorder   -c/ home meds  -Divaloproex, Benztropine, Clozapine  -Invega once monthly       #DVT ppx: Lovenox 40 mg sq od  #Diet DASH    Pending:  TTE --> if abnormal, consult EP  Lactate

## 2024-07-30 NOTE — CONSULT NOTE ADULT - ASSESSMENT
ASSESSMENT  Patient is a 54 y/o male w/pmhx schizophrenia, substance use disorder, HTN, Pre DM, presenting for a complaint of cough. Pt is a resident at [facility], and states his cough began 2 days ago, with green phlegm.     IMPRESSION  #COVID-19, non-severe  #Schizophrenia  #Substance use disorder     #Obesity BMI (kg/m2): 30.9  #Abx allergy: Navane (Unknown)  Thorazine (Unknown)  penicillin (Unknown)    RECOMMENDATIONS  - symptomatic COVID -- RDV for 3 days   - supportive care otherwise   - monitor O2     Please call or message on [employer] Teams if with any questions.  Spectra [phone]

## 2024-07-30 NOTE — H&P ADULT - HISTORY OF PRESENT ILLNESS
Patient is a 52 y/o male w/pmhx schizophrenia, substance use disorder, HTN, Pre DM, presenting for a complaint of cough. Pt is a resident at [facility], and states his cough began 2 days ago, with green phlegm.   Pt denies fever, dysphagia, dyspnea. Pt has difficulty answering questions, avoids eye contact, stares blankly, and unresponsive to some questions. Pt denies SI/HI, and states he takes his Rx. Pt agrees when asked if he thinks his medications are helping him.    In ED:    Vitals: /81, , Temp 98.7, SaO2 97% on RA    WBC 7.6   Hb 11.6  Platelets 278  D-Dimer <150  Na 137  K 5.3, hemolyzed   Crea 1  Normal LFTs  Mg 1.6    UA -ve   RVP +ve for Covid  CXR clear lungs     s/p 1 L NSS in ED

## 2024-07-30 NOTE — PATIENT PROFILE ADULT - FALL HARM RISK - RISK INTERVENTIONS
Assistance OOB with selected safe patient handling equipment/Assistance with ambulation/Communicate Fall Risk and Risk Factors to all staff, patient, and family/Discuss with provider need for PT consult/Monitor gait and stability/Reinforce activity limits and safety measures with patient and family/Visual Cue: Yellow wristband/Bed in lowest position, wheels locked, appropriate side rails in place/Call bell, personal items and telephone in reach/Instruct patient to call for assistance before getting out of bed or chair/Non-slip footwear when patient is out of bed/[place] to call system/Physically safe environment - no spills, clutter or unnecessary equipment/Purposeful Proactive Rounding/Room/bathroom lighting operational, light cord in reach

## 2024-07-30 NOTE — CHART NOTE - NSCHARTNOTEFT_GEN_A_CORE
================  FOR EACH COLLATERAL   ================  NAME: Director of [facility] on [facility] grounds  NUMBER:[phone]  RELIABILITY: High  Patient gives permission to obtain collateral from director:  ( x ) Yes  (  )  No  Rationale for overriding objection            (  ) Lack of capacity. Details: ________            (  ) Assessing risk of danger to self/others. Details: ________  Rationale for selecting specific collateral source            (  ) Potential to impact risk of danger to self/others and no alternative equivalent. Details:  Collateral has requested that the information provided remain confidential: Yes [  ] No [ x ]  Collateral has provided information that patient is/may be unaware of: Yes [  ] No [ x ]    Patient is a 53y.o. M domiciled at [facility] on [facility] grounds, single, unemployed, PMHx/o HTN and DM, PPHx/o Schizoaffective d/o Bipolar type with past IPP admissions, most recently in April 2024 for agitation, no known legal hx, no access to firearms.,     Per collateral, patient self-presented to the hospital due to c/o coughing and congestion persisting for the last 2 days prior to hospital admission. Collateral states patient has been at baseline, which is marked by impulsivity, unpredictable irritability/aggression, non-suicidal self harm via banging his head on a wall, and limited insight into current psychiatric diagnosis. Collateral states pt ha a hx/o cutting, last instance of cutting took place in April 2024 which led to psych admission. Collateral states patient has been adherent to his medication. Collateral denies recent endorsement of SI/HI/AVH.     Collateral denies recent changes in baseline, denies acute psychiatric safety concern. ================  FOR EACH COLLATERAL   ================  NAME: Director of [facility] on [facility] grounds  NUMBER:[phone]  RELIABILITY: High  Patient gives permission to obtain collateral from director:  ( x ) Yes  (  )  No  Rationale for overriding objection            (  ) Lack of capacity. Details: ________            (  ) Assessing risk of danger to self/others. Details: ________  Rationale for selecting specific collateral source            (  ) Potential to impact risk of danger to self/others and no alternative equivalent. Details:  Collateral has requested that the information provided remain confidential: Yes [  ] No [ x ]  Collateral has provided information that patient is/may be unaware of: Yes [  ] No [ x ]    Patient is a 53y.o. M domiciled at [facility] on [facility] grounds, single, unemployed, PMHx/o HTN and DM, PPHx/o Schizoaffective d/o Bipolar type with past IPP admissions, most recently in April 2024 for agitation, no known legal hx, no access to firearms.,     Per collateral, patient self-presented to the hospital due to c/o coughing and congestion persisting for the last 2 days prior to hospital admission. Collateral states patient has been at baseline, which is marked by impulsivity, unpredictable irritability/aggression, non-suicidal self harm via banging his head on a wall, and limited insight into current psychiatric diagnosis. Collateral states pt ha a hx/o cutting, last instance of cutting took place in April 2024 which led to psych admission. Collateral denies recent endorsement of SI/HI/AVH.     Collateral states patient has been adherent to his medication. Patient is currently prescribed Clozaril 100mg BID, Atican 2mG QPM, Cogentin 1mG QPM, Depakote 250mg QPM and 500mg BID, and Lexapro 10mg QPM.    Collateral denies recent changes in baseline, denies acute psychiatric safety concern.

## 2024-07-30 NOTE — CONSULT NOTE ADULT - SUBJECTIVE AND OBJECTIVE BOX
SUDHA PARKER  53y, Male  Allergy: fish (Unknown)  Navane (Unknown)  Thorazine (Unknown)  penicillin (Unknown)  pork (Unknown)      CHIEF COMPLAINT: Covid (30 Jul 2024 14:03)      LOS  1d    HPI:    Patient is a 52 y/o male w/pmhx schizophrenia, substance use disorder, HTN, Pre DM, presenting for a complaint of cough. Pt is a resident at [facility], and states his cough began 2 days ago, with green phlegm.   Pt denies fever, dysphagia, dyspnea. Pt has difficulty answering questions, avoids eye contact, stares blankly, and unresponsive to some questions. Pt denies SI/HI, and states he takes his Rx. Pt agrees when asked if he thinks his medications are helping him.    In ED:    Vitals: /81, , Temp 98.7, SaO2 97% on RA    WBC 7.6   Hb 11.6  Platelets 278  D-Dimer <150  Na 137  K 5.3, hemolyzed   Crea 1  Normal LFTs  Mg 1.6    UA -ve   RVP +ve for Covid  CXR clear lungs     s/p 1 L NSS in ED  (30 Jul 2024 03:54)      INFECTIOUS DISEASE HISTORY:  History as above.  Reports feeling unwell 2 days ago.   Having myalgias and nasal congestion.   Now febrile and developing cough.   Denies abdominal pain, nausea, vomiting.     PAST MEDICAL & SURGICAL HISTORY:  DM (diabetes mellitus)      HTN (hypertension)      Schizophrenia      Substance use disorder      Echocardiogram abnormal      No significant past surgical history          FAMILY HISTORY  No pertinent family history in first degree relatives    No pertinent family history in first degree relatives        SOCIAL HISTORY  Social History:        ROS  General: Denies rigors, nightsweats  HEENT: Denies headache, rhinorrhea, sore throat, eye pain  CV: Denies CP, palpitations  PULM: Denies wheezing, hemoptysis  GI: Denies hematemesis, hematochezia, melena  : Denies discharge, hematuria  MSK: Denies arthralgias, myalgias  SKIN: Denies rash, lesions  NEURO: Denies paresthesias, weakness  PSYCH: Denies depression, anxiety    VITALS:  T(F): 99.6, Max: 103 (07-30-24 @ 15:45)  HR: 127  BP: 111/70  RR: 18Vital Signs Last 24 Hrs  T(C): 37.6 (30 Jul 2024 18:08), Max: 39.4 (30 Jul 2024 15:45)  T(F): 99.6 (30 Jul 2024 18:08), Max: 103 (30 Jul 2024 15:45)  HR: 127 (30 Jul 2024 15:45) (118 - 140)  BP: 111/70 (30 Jul 2024 15:45) (111/70 - 165/91)  BP(mean): 89 (29 Jul 2024 22:20) (89 - 89)  RR: 18 (30 Jul 2024 15:45) (18 - 18)  SpO2: 96% (30 Jul 2024 15:45) (96% - 98%)    Parameters below as of 30 Jul 2024 15:45  Patient On (Oxygen Delivery Method): room air        PHYSICAL EXAM:  Gen: NAD, resting in bed  HEENT: Normocephalic, atraumatic  Neck: supple, no lymphadenopathy  CV: Regular rate & regular rhythm  Lungs: decreased BS at bases, no fremitus  Abdomen: Soft, BS present  Ext: Warm, well perfused  Neuro: non focal, awake  Skin: no rash, no erythema  Lines: no phlebitis    TESTS & MEASUREMENTS:                        10.9   6.16  )-----------( 226      ( 30 Jul 2024 09:55 )             34.9     07-30    142  |  104  |  10  ----------------------------<  136<H>  4.2   |  23  |  0.8    Ca    9.6      30 Jul 2024 09:55  Mg     1.6     07-30    TPro  7.1  /  Alb  4.3  /  TBili  <0.2  /  DBili  x   /  AST  15  /  ALT  11  /  AlkPhos  80  07-30      LIVER FUNCTIONS - ( 30 Jul 2024 09:55 )  Alb: 4.3 g/dL / Pro: 7.1 g/dL / ALK PHOS: 80 U/L / ALT: 11 U/L / AST: 15 U/L / GGT: x           Urinalysis Basic - ( 30 Jul 2024 09:55 )    Color: x / Appearance: x / SG: x / pH: x  Gluc: 136 mg/dL / Ketone: x  / Bili: x / Urobili: x   Blood: x / Protein: x / Nitrite: x   Leuk Esterase: x / RBC: x / WBC x   Sq Epi: x / Non Sq Epi: x / Bacteria: x        Urinalysis with Rflx Culture (collected 07-29-24 @ 23:24)    Urinalysis with Rflx Culture (collected 06-02-24 @ 17:56)    Culture - Blood (collected 12-08-23 @ 11:05)  Source: .Blood Blood  Final Report (12-13-23 @ 23:00):    No growth at 5 days    Culture - Blood (collected 12-08-23 @ 11:05)  Source: .Blood Blood  Final Report (12-13-23 @ 23:00):    No growth at 5 days        Lactate, Blood: 1.5 mmol/L (07-30-24 @ 17:00)      INFECTIOUS DISEASES TESTING  COVID-19 PCR: NotDetec (04-11-24 @ 19:00)  Procalcitonin, Serum: 0.08 ng/mL (12-09-23 @ 11:31)  COVID-19 PCR: NotDetec (10-04-23 @ 04:14)      RADIOLOGY & ADDITIONAL TESTS:  I have personally reviewed the last Chest xray  CXR  Xray Chest 1 View AP/PA:   ACC: [id number] EXAM:  XR CHEST 1 VIEW   ORDERED BY: CHRISS GRIMALDO     PROCEDURE DATE:  07/29/2024          INTERPRETATION:  Clinical History / Reason for exam: Cough, tachycardia    Comparison : Chest radiograph 12/12/2023.    Technique/Positioning: Single PA chest radiograph.    Findings:    Support devices: None.    Cardiac/mediastinum/hilum: Unchanged.    Lung parenchyma/Pleura: No focal consolidation, effusion or pneumothorax.    Skeleton/soft tissues: Unchanged    Impression:    No radiographic evidence of acute cardiopulmonary disease.        --- End of Report ---            REYMUNDO GIL MD; Attending Radiologist  This document has been electronically signed. Jul 30 2024  9:24AM (07-29-24 @ 19:23)      CT      CARDIOLOGY TESTING  12 Lead ECG:   Ventricular Rate 123 BPM    Atrial Rate 123 BPM    P-R Interval 136 ms    QRS Duration 100 ms    Q-T Interval 328 ms    QTC Calculation(Bazett) 469 ms    P Axis 66 degrees    R Axis -7 degrees    T Axis 33 degrees    Diagnosis Line Sinus tachycardia  Moderate voltage criteria for LVH, may be normal variant  Borderline ECG    Confirmed by Denilson Rizo ([id number]) on 7/29/2024 9:29:09 PM (07-29-24 @ 17:40)      MEDICATIONS  benztropine 1 Oral two times a day  cloZAPine 100 Oral two times a day  dextrose 5%. 1000 IV Continuous <Continuous>  dextrose 5%. 1000 IV Continuous <Continuous>  dextrose 50% Injectable 12.5 IV Push once  dextrose 50% Injectable 25 IV Push once  dextrose 50% Injectable 25 IV Push once  divalproex  Oral at bedtime  divalproex  Oral daily  enoxaparin Injectable 40 SubCutaneous every 24 hours  glucagon  Injectable 1 IntraMuscular once  insulin lispro (ADMELOG) corrective regimen sliding scale  SubCutaneous three times a day before meals  lactated ringers. 1000 IV Continuous <Continuous>  lactated ringers. 1000 IV Continuous <Continuous>  remdesivir  IVPB  IV Intermittent       Weight  Weight (kg): 89.4 (07-29-24 @ 16:48)    ANTIBIOTICS:  remdesivir  IVPB   IV Intermittent       ALLERGIES:  fish (Unknown)  Navane (Unknown)  Thorazine (Unknown)  penicillin (Unknown)  pork (Unknown)

## 2024-07-30 NOTE — BH CONSULTATION LIAISON ASSESSMENT NOTE - HPI (INCLUDE ILLNESS QUALITY, SEVERITY, DURATION, TIMING, CONTEXT, MODIFYING FACTORS, ASSOCIATED SIGNS AND SYMPTOMS)
54yo M, domiciled at [facility], past medical history of DM, HLD, HTN, hypothyroidism, past psychiatric history of schizoaffective disorder, past hx of substance use disorders, frequent ED visits and hospitalizations, hx past SA vs NSSIB by cutting and banging head, last psych hospitalization in April 2024 for agitation at living facility, seen by psych in ED in June 2024 and cleared, who presents to hospital for URI, found to be COVID+. Psych consulted for eval given psych history, anxiety/agitation. Per primary team can be irritable, shout, and throw things; not agitated or violent towards others.    On interview, patient is sitting up in bed, eating and watching TV. He is calm and cooperative. He engages and answers questions appropriately but is a limited historian. Patient states that he came to the hospital as he was coughing and thought he had COVID. States that he was worried about his health but wants to go home soon. Patient states he wants to see his therapist on Thursday or Friday at the residence. Reports that he also has a psychiatrist Dr. Pearson. When asked to name his medications reports he takes "benztropine... metformin... clozaril... it's at the window" (referring to where it is given at his residence). Pt reports willingness to keep taking his medications and seeing his psychiatrist. I ask pt if he recalls being on Invega Sustenna and pt replies "yes, that's the shot?" When asked about past psych hospitalizations pt reports the last he recalls "was a month and a half ago." Currently he denies any depressed mood or anhedonia. Does report some difficulty sleeping. States that he does not have any hallucinations at this time but has in the past. He denies any thoughts of self harm or SI, rather would like to get better. He denies any thoughts of harming others or HI. States that if he has SI/HI "I'd come here like I did this time." Feels safe to return home.    Please see  collateral note for information obtained from residence. They do not have any safety concerns at this time.    Per April 2024 d/c summary:  Discharge Medications   benztropine 1 mg oral tablet: 1 tab(s) orally 2 times a day MDD: 2mg  cloZAPine 100 mg oral tablet: 1 tab(s) orally 2 times a day MDD: 200mg  divalproex sodium 250 mg oral delayed release tablet: 3 tab(s) orally once a day (at bedtime) MDD: 1250mg  divalproex sodium 500 mg oral delayed release tablet: 1 tab(s) orally once a day morning dose MDD: 1250mg  Invega Sustenna 234 mg/1.5 mL intramuscular suspension, extended release: 234 milligram(s) intramuscularly once a month 52yo M, domiciled at [facility], past medical history of DM, HLD, HTN, hypothyroidism, past psychiatric history of schizoaffective disorder, past hx of substance use disorders, frequent ED visits and hospitalizations, hx past SA vs NSSIB by cutting and banging head, last psych hospitalization in April 2024 for agitation at living facility, seen by psych in ED in June 2024 and cleared, who presents to hospital for URI, found to be COVID+. Psych consulted for eval given psych history, anxiety/agitation. Per primary team can be irritable, shout, and throw things; not agitated or violent towards others.    On interview, patient is sitting up in bed, eating and watching TV. He is calm and cooperative. He engages and answers questions appropriately but is a limited historian. Patient states that he came to the hospital as he was coughing and thought he had COVID. States that he was worried about his health but wants to go home soon. Patient states he wants to see his therapist on Thursday or Friday at the residence. Reports that he also has a psychiatrist Dr. Pearson. When asked to name his medications reports he takes "benztropine... metformin... clozaril... it's at the window" (referring to where it is given at his residence). Pt reports willingness to keep taking his medications and seeing his psychiatrist. I ask pt if he recalls being on Invega Sustenna and pt replies "yes, that's the shot?" When asked about past psych hospitalizations pt reports the last he recalls "was a month and a half ago." Currently he denies any depressed mood or anhedonia. Does report some difficulty sleeping. States that he does not have any hallucinations at this time but has in the past. He denies any thoughts of self harm or SI, rather would like to get better. He denies any thoughts of harming others or HI. States that if he has SI/HI "I'd come here like I did this time." Feels safe to return home.    Please see  collateral note for information obtained from residence. They do not have any safety concerns at this time.    Per April 2024 d/c summary:  Discharge Medications   benztropine 1 mg oral tablet: 1 tab(s) orally 2 times a day MDD: 2mg  cloZAPine 100 mg oral tablet: 1 tab(s) orally 2 times a day MDD: 200mg  divalproex sodium 250 mg oral delayed release tablet: 3 tab(s) orally once a day (at bedtime) MDD: 1250mg  divalproex sodium 500 mg oral delayed release tablet: 1 tab(s) orally once a day morning dose MDD: 1250mg  Invega Sustenna 234 mg/1.5 mL intramuscular suspension, extended release: 234 milligram(s) intramuscularly once a month    iSTOP:  Ref [id number]  Prescription Information      PDI Filter:    PDI	Current Rx	Drug Type	Rx Written	Rx Dispensed	Drug	Quantity	Days Supply	Prescriber Name	Prescriber ALE #	Payment Method	Dispenser  A	Y	B	07/01/2024	07/05/2024	lorazepam 2 mg tablet	30	30	Ohn, Sangeetha MCCORMICK MD	[id number]	Medicaid	[facility]  A	N	B	06/03/2024	06/07/2024	lorazepam 2 mg tablet	30	30	Ohn, Sangeetha MCCORMICK MD	[id number]	Medicaid	[facility]  A	N	B	05/10/2024	05/10/2024	lorazepam 2 mg tablet	30	30	[place]	[id number]	Medicaid	[facility]  A	N	B	04/11/2024	04/12/2024	lorazepam 2 mg tablet	30	30	Ohn, Sangeetha MCCORMICK MD	[id number]	Medicaid	[facility]  A	N	B	03/06/2024	03/07/2024	lorazepam 2 mg tablet	30	30	Ohn, Sangeetha MCCORMICK MD	[id number]	Medicaid	[facility]  A	N	B	02/07/2024	02/07/2024	lorazepam 2 mg tablet	30	30	Ohn, Sangeetha MCCORMICK MD	[id number]	Medicaid	[facility]  A	N	B	01/08/2024	01/10/2024	lorazepam 2 mg tablet	30	30	Ohn, Sangeetha MCCORMICK MD	[id number]	Medicaid	[facility]  A	N	B	11/20/2023	12/13/2023	lorazepam 2 mg tablet	30	30	Ohn, Sangeetha MCCORMICK MD	[id number]	Medicaid	[facility]  A	N	B	11/10/2023	11/15/2023	lorazepam 2 mg tablet	30	30	Ohn, Sangeetha MCCORMICK MD	[id number]	Medicaid	[facility]  A	N	B	10/16/2023	10/18/2023	lorazepam 2 mg tablet	30	30	Ohn, Sangeetha MCCORMICK MD	[id number]	Medicaid	[facility]  A	N	B	09/05/2023	09/06/2023	lorazepam 2 mg tablet	30	30	Sangeetha Pearson MD	[id number]	Medicaid	[facility]  A	N	B	07/28/2023	08/11/2023	lorazepam 2 mg tablet	14	14	José Luis Quintero MD	[id number]	Medicaid	[facility]

## 2024-07-30 NOTE — BH CONSULTATION LIAISON ASSESSMENT NOTE - FAMILY HISTORY OF PSYCHIATRIC ILLNESS / SUICIDALITY
Looks like pt cancelled the appt and rescheduled to 4/12/22 at 8 am
Patient appointment was cancelled by fax Dot the nurse was stating that he is under the weather she wants to double check her contact is #[phone]
When calling this number, a lady picks up saying physicians line. I'm not sure what this message is asking and/or means. There is no name given with the message and the message is not clear.
Yes

## 2024-07-30 NOTE — BH CONSULTATION LIAISON ASSESSMENT NOTE - OTHER
did not appear to be responding to internal stimuli or distracted or odd residents at [facility]

## 2024-07-30 NOTE — BH CONSULTATION LIAISON ASSESSMENT NOTE - SUMMARY
54yo M, domiciled at [facility], past medical history of DM, HLD, HTN, hypothyroidism, past psychiatric history of schizoaffective disorder, past hx of substance use disorders, frequent ED visits and hospitalizations, hx past SA vs NSSIB by cutting and banging head, last psych hospitalization in April 2024 for agitation at living facility, seen by psych in ED in June 2024 and cleared, who presents to hospital for URI, found to be COVID+. Psych consulted for eval given psych history, anxiety/agitation. Per primary team can be irritable, shout, and throw things; not agitated or violent towards others.    On interview, patient was calm and cooperative. Patient did not present acutely depressed, manic or psychotic. He did not present agitated or with HI. He did not present with any self harm, restlessness, or SI and pt was future oriented. Patient was noted to be a limited historian but did endorse wanting to keep seeing his outpatient psychiatrist and keep taking psych medications. Presentation appeared similar to when he was seen in the ED by Dr. Moser June 2, 2024. Patient states that he would come back to the ED if he had SI or HI "I'd come here like I did this time." Simple safety planning done with patient. Feels safe returning home. Collateral obtained from residence and pt appears to be at baseline and they will accept patient back.   DDx schizoaffective disorder, r/o any delirium given COVID    Plan:  -Medical workup per primary team  -Continue current psych medications   -Currently is not an imminent danger to self or others; does not meet criteria for inpatient psych hospitalization at this time  -For anxiety/agitation, can consider Haldol 2.5-5mg q8 PRN, hold for qtc >500 or extrapyramidal symptoms. Do not make standing as pt is already on clozapine and invega sustenna. This would be for temporary management.  -Collateral obtained from residence, see  note. Safety planning done with pt.  -Delirium precautions:  Patient is currently at high risk for delirium.    Please utilize objective screening tool such as "4AT" or Confusion Assessment Method (CAM).    Please follow the following precautions:  - Avoid use of anticholinergic, antihistaminergic, benzodiazepine and opioid medications if possible as these can be deliriogenic. Avoid polypharmacy.  - Assess for pain and provide analgesia as needed  - Maintain normal sleep-wake cycle: daytime awake / night-time asleep, light/dark in room  - Encourage orientation to location, date, individuals  - Mobilize as tolerated during the day (i.e. transfer to chair / seated position even if unable to ambulate otherwise)  - Minimize excessive noise  - Ensure access to any sensory aides used prior to admission (i.e. glasses, hearing aids etc)  - Ensure communication with family, caregivers, HCPs as possible    Treatment of delirium involves treating the original medical etiology including but not limited to infection, electrolyte or metabolic disturbance, acute kidney/liver/cardiovascular disturbance, dehydration.

## 2024-07-30 NOTE — BH CONSULTATION LIAISON ASSESSMENT NOTE - NSBHCHARTREVIEWVS_PSY_A_CORE FT
Vital Signs Last 24 Hrs  T(C): 37.3 (30 Jul 2024 07:44), Max: 37.3 (30 Jul 2024 07:44)  T(F): 99.1 (30 Jul 2024 07:44), Max: 99.1 (30 Jul 2024 07:44)  HR: 139 (30 Jul 2024 10:14) (118 - 140)  BP: 134/81 (30 Jul 2024 10:14) (120/74 - 165/91)  BP(mean): 89 (29 Jul 2024 22:20) (89 - 89)  RR: 18 (30 Jul 2024 07:44) (17 - 18)  SpO2: 98% (30 Jul 2024 07:44) (94% - 98%)    Parameters below as of 30 Jul 2024 07:44  Patient On (Oxygen Delivery Method): room air

## 2024-07-30 NOTE — H&P ADULT - ASSESSMENT
Patient is a 52 y/o male w/pmhx schizophrenia, substance use disorder, Pre DM, presenting for a complaint of cough associated with green phlegm of 2 days duration.   RVP +ve for covid.     #Covid infection  -RVP +ve for covid  -no leukocytosis   -no fever or chills   -clear CXR  -No oxygen requirements   -supportive treatment     #Schizoaffective disorder  #Substance use disorder   -c/ home meds  -Divaloproex, Benztropine, Clozapine,  -Invega once monthly       #DVT ppx: Lovenox 40 mg sq od  #Diet DASH   Patient is a 54 y/o male w/pmhx schizophrenia, substance use disorder, Pre DM, presenting for a complaint of cough associated with green phlegm of 2 days duration.   RVP +ve for covid.     #Covid infection  -RVP +ve for covid  -no leukocytosis   -chills +ve, no documented fever    -clear CXR  -No oxygen requirements   -supportive treatment     #Schizoaffective disorder  #Substance use disorder   -c/ home meds  -Divaloproex, Benztropine, Clozapine  -Invega once monthly       #DVT ppx: Lovenox 40 mg sq od  #Diet DASH

## 2024-07-31 LAB
A1C WITH ESTIMATED AVERAGE GLUCOSE RESULT: 6.4 % — HIGH (ref 4–5.6)
ALBUMIN SERPL ELPH-MCNC: 3.8 G/DL — SIGNIFICANT CHANGE UP (ref 3.5–5.2)
ALBUMIN SERPL ELPH-MCNC: 4 G/DL — SIGNIFICANT CHANGE UP (ref 3.5–5.2)
ALBUMIN SERPL ELPH-MCNC: 4.1 G/DL — SIGNIFICANT CHANGE UP (ref 3.5–5.2)
ALP SERPL-CCNC: 74 U/L — SIGNIFICANT CHANGE UP (ref 30–115)
ALP SERPL-CCNC: 76 U/L — SIGNIFICANT CHANGE UP (ref 30–115)
ALP SERPL-CCNC: 77 U/L — SIGNIFICANT CHANGE UP (ref 30–115)
ALT FLD-CCNC: 11 U/L — SIGNIFICANT CHANGE UP (ref 0–41)
ANION GAP SERPL CALC-SCNC: 11 MMOL/L — SIGNIFICANT CHANGE UP (ref 7–14)
AST SERPL-CCNC: 13 U/L — SIGNIFICANT CHANGE UP (ref 0–41)
AST SERPL-CCNC: 14 U/L — SIGNIFICANT CHANGE UP (ref 0–41)
AST SERPL-CCNC: 15 U/L — SIGNIFICANT CHANGE UP (ref 0–41)
BASOPHILS # BLD AUTO: 0.05 K/UL — SIGNIFICANT CHANGE UP (ref 0–0.2)
BASOPHILS NFR BLD AUTO: 0.9 % — SIGNIFICANT CHANGE UP (ref 0–1)
BILIRUB DIRECT SERPL-MCNC: <0.2 MG/DL — SIGNIFICANT CHANGE UP (ref 0–0.3)
BILIRUB DIRECT SERPL-MCNC: <0.2 MG/DL — SIGNIFICANT CHANGE UP (ref 0–0.3)
BILIRUB INDIRECT FLD-MCNC: SIGNIFICANT CHANGE UP MG/DL (ref 0.2–1.2)
BILIRUB INDIRECT FLD-MCNC: SIGNIFICANT CHANGE UP MG/DL (ref 0.2–1.2)
BILIRUB SERPL-MCNC: 0.3 MG/DL — SIGNIFICANT CHANGE UP (ref 0.2–1.2)
BILIRUB SERPL-MCNC: <0.2 MG/DL — SIGNIFICANT CHANGE UP (ref 0.2–1.2)
BILIRUB SERPL-MCNC: <0.2 MG/DL — SIGNIFICANT CHANGE UP (ref 0.2–1.2)
BUN SERPL-MCNC: 7 MG/DL — LOW (ref 10–20)
CALCIUM SERPL-MCNC: 9 MG/DL — SIGNIFICANT CHANGE UP (ref 8.4–10.5)
CHLORIDE SERPL-SCNC: 104 MMOL/L — SIGNIFICANT CHANGE UP (ref 98–110)
CO2 SERPL-SCNC: 25 MMOL/L — SIGNIFICANT CHANGE UP (ref 17–32)
CREAT SERPL-MCNC: 0.8 MG/DL — SIGNIFICANT CHANGE UP (ref 0.7–1.5)
CREAT SERPL-MCNC: 0.9 MG/DL — SIGNIFICANT CHANGE UP (ref 0.7–1.5)
EGFR: 103 ML/MIN/1.73M2 — SIGNIFICANT CHANGE UP
EGFR: 106 ML/MIN/1.73M2 — SIGNIFICANT CHANGE UP
EOSINOPHIL # BLD AUTO: 0 K/UL — SIGNIFICANT CHANGE UP (ref 0–0.7)
EOSINOPHIL NFR BLD AUTO: 0 % — SIGNIFICANT CHANGE UP (ref 0–8)
ESTIMATED AVERAGE GLUCOSE: 137 MG/DL — HIGH (ref 68–114)
GLUCOSE BLDC GLUCOMTR-MCNC: 85 MG/DL — SIGNIFICANT CHANGE UP (ref 70–99)
GLUCOSE BLDC GLUCOMTR-MCNC: 92 MG/DL — SIGNIFICANT CHANGE UP (ref 70–99)
GLUCOSE BLDC GLUCOMTR-MCNC: 99 MG/DL — SIGNIFICANT CHANGE UP (ref 70–99)
GLUCOSE SERPL-MCNC: 110 MG/DL — HIGH (ref 70–99)
HCT VFR BLD CALC: 35.2 % — LOW (ref 42–52)
HGB BLD-MCNC: 10.9 G/DL — LOW (ref 14–18)
INR BLD: 1.03 RATIO — SIGNIFICANT CHANGE UP (ref 0.65–1.3)
INR BLD: 1.07 RATIO — SIGNIFICANT CHANGE UP (ref 0.65–1.3)
LACTATE SERPL-SCNC: 1.3 MMOL/L — SIGNIFICANT CHANGE UP (ref 0.7–2)
LYMPHOCYTES # BLD AUTO: 1.71 K/UL — SIGNIFICANT CHANGE UP (ref 1.2–3.4)
LYMPHOCYTES # BLD AUTO: 33.9 % — SIGNIFICANT CHANGE UP (ref 20.5–51.1)
MAGNESIUM SERPL-MCNC: 2.2 MG/DL — SIGNIFICANT CHANGE UP (ref 1.8–2.4)
MCHC RBC-ENTMCNC: 25.6 PG — LOW (ref 27–31)
MCHC RBC-ENTMCNC: 31 G/DL — LOW (ref 32–37)
MCV RBC AUTO: 82.8 FL — SIGNIFICANT CHANGE UP (ref 80–94)
MONOCYTES # BLD AUTO: 1.18 K/UL — HIGH (ref 0.1–0.6)
MONOCYTES NFR BLD AUTO: 23.5 % — HIGH (ref 1.7–9.3)
NEUTROPHILS # BLD AUTO: 1.97 K/UL — SIGNIFICANT CHANGE UP (ref 1.4–6.5)
NEUTROPHILS NFR BLD AUTO: 39.1 % — LOW (ref 42.2–75.2)
PHOSPHATE SERPL-MCNC: 3.7 MG/DL — SIGNIFICANT CHANGE UP (ref 2.1–4.9)
PLATELET # BLD AUTO: 239 K/UL — SIGNIFICANT CHANGE UP (ref 130–400)
PMV BLD: 9.8 FL — SIGNIFICANT CHANGE UP (ref 7.4–10.4)
POTASSIUM SERPL-MCNC: 4.6 MMOL/L — SIGNIFICANT CHANGE UP (ref 3.5–5)
POTASSIUM SERPL-SCNC: 4.6 MMOL/L — SIGNIFICANT CHANGE UP (ref 3.5–5)
PROT SERPL-MCNC: 6.7 G/DL — SIGNIFICANT CHANGE UP (ref 6–8)
PROT SERPL-MCNC: 6.7 G/DL — SIGNIFICANT CHANGE UP (ref 6–8)
PROT SERPL-MCNC: 7 G/DL — SIGNIFICANT CHANGE UP (ref 6–8)
PROTHROM AB SERPL-ACNC: 11.7 SEC — SIGNIFICANT CHANGE UP (ref 9.95–12.87)
PROTHROM AB SERPL-ACNC: 12.2 SEC — SIGNIFICANT CHANGE UP (ref 9.95–12.87)
RBC # BLD: 4.25 M/UL — LOW (ref 4.7–6.1)
RBC # FLD: 18.8 % — HIGH (ref 11.5–14.5)
SODIUM SERPL-SCNC: 140 MMOL/L — SIGNIFICANT CHANGE UP (ref 135–146)
WBC # BLD: 5.04 K/UL — SIGNIFICANT CHANGE UP (ref 4.8–10.8)
WBC # FLD AUTO: 5.04 K/UL — SIGNIFICANT CHANGE UP (ref 4.8–10.8)

## 2024-07-31 PROCEDURE — 93010 ELECTROCARDIOGRAM REPORT: CPT

## 2024-07-31 PROCEDURE — 99233 SBSQ HOSP IP/OBS HIGH 50: CPT

## 2024-07-31 RX ADMIN — CLOZAPINE 100 MILLIGRAM(S): 100 TABLET ORAL at 05:05

## 2024-07-31 RX ADMIN — Medication 1 MILLIGRAM(S): at 17:44

## 2024-07-31 RX ADMIN — ENOXAPARIN SODIUM 40 MILLIGRAM(S): 120 INJECTION SUBCUTANEOUS at 05:05

## 2024-07-31 RX ADMIN — DIVALPROEX SODIUM 500 MILLIGRAM(S): 125 CAPSULE, COATED PELLETS ORAL at 11:17

## 2024-07-31 RX ADMIN — DIVALPROEX SODIUM 750 MILLIGRAM(S): 125 CAPSULE, COATED PELLETS ORAL at 22:30

## 2024-07-31 RX ADMIN — CLOZAPINE 100 MILLIGRAM(S): 100 TABLET ORAL at 17:44

## 2024-07-31 RX ADMIN — REMDESIVIR 200 MILLIGRAM(S): 100 INJECTION, POWDER, LYOPHILIZED, FOR SOLUTION INTRAVENOUS at 18:39

## 2024-07-31 RX ADMIN — Medication 1 MILLIGRAM(S): at 05:05

## 2024-07-31 RX ADMIN — DEXTROSE MONOHYDRATE, SODIUM CHLORIDE, SODIUM LACTATE, CALCIUM CHLORIDE, MAGNESIUM CHLORIDE 75 MILLILITER(S): 1.5; 538; 448; 18.4; 5.08 SOLUTION INTRAPERITONEAL at 03:00

## 2024-07-31 NOTE — PROGRESS NOTE ADULT - ATTENDING COMMENTS
#Sepsis, present on admission  due to covid  cxr clear  no hypoxia, satting well on ra  rdv  monitor off steroids    #Tachycardia  appears sinus on ekg, tele  repeat ekg  tte  tsh noted  may need to introduce bblocker if persists    #Progress Note Handoff  Pending (specify): tachycardia, tte; rdv  Family discussion: d/w pt at bedside  Disposition: home vs snf

## 2024-07-31 NOTE — PROGRESS NOTE ADULT - ASSESSMENT
Patient is a 52 y/o male w/pmhx schizophrenia, substance use disorder, Pre DM, presenting for a complaint of cough associated with green phlegm of 2 days duration.   RVP +ve for covid.     #persistent tachycardia 2/2 psych disorder vs cardiac origin   - Patient reports anxiety  - No aggression towards others, but have observed episodes of aggression and throwing objects in his room (possibly responding to internal stimuli)  - Psych consult for optimization of medications  - Lactate 1.3  - f/u TTE   - Can consider Lopressor if continues tachycardia after RDV treatment    #Covid infection  -RVP +ve for covid  -no leukocytosis   -chills +ve, no documented fever    -clear CXR  -No oxygen requirements   -Per ID: start Remdesivir for 3 days, 200mg on day 1, and then 2 more days of 100mg     #Schizoaffective disorder  #Substance use disorder   -c/ home meds  -Divaloproex, Benztropine, Clozapine  -Invega once monthly       #DVT ppx: Lovenox 40 mg sq od  #Diet DASH    Pending:  TTE --> if abnormal, consult EP  RDV day 2

## 2024-08-01 DIAGNOSIS — A41.9 SEPSIS, UNSPECIFIED ORGANISM: ICD-10-CM

## 2024-08-01 DIAGNOSIS — R00.0 TACHYCARDIA, UNSPECIFIED: ICD-10-CM

## 2024-08-01 DIAGNOSIS — Z79.899 OTHER LONG TERM (CURRENT) DRUG THERAPY: ICD-10-CM

## 2024-08-01 DIAGNOSIS — I10 ESSENTIAL (PRIMARY) HYPERTENSION: ICD-10-CM

## 2024-08-01 DIAGNOSIS — F20.9 SCHIZOPHRENIA, UNSPECIFIED: ICD-10-CM

## 2024-08-01 DIAGNOSIS — R73.03 PREDIABETES: ICD-10-CM

## 2024-08-01 LAB
ALBUMIN SERPL ELPH-MCNC: 3.9 G/DL — SIGNIFICANT CHANGE UP (ref 3.5–5.2)
ALP SERPL-CCNC: 69 U/L — SIGNIFICANT CHANGE UP (ref 30–115)
ALT FLD-CCNC: 11 U/L — SIGNIFICANT CHANGE UP (ref 0–41)
ANION GAP SERPL CALC-SCNC: 8 MMOL/L — SIGNIFICANT CHANGE UP (ref 7–14)
AST SERPL-CCNC: 13 U/L — SIGNIFICANT CHANGE UP (ref 0–41)
BASOPHILS # BLD AUTO: 0.03 K/UL — SIGNIFICANT CHANGE UP (ref 0–0.2)
BASOPHILS NFR BLD AUTO: 0.8 % — SIGNIFICANT CHANGE UP (ref 0–1)
BILIRUB DIRECT SERPL-MCNC: <0.2 MG/DL — SIGNIFICANT CHANGE UP (ref 0–0.3)
BILIRUB INDIRECT FLD-MCNC: SIGNIFICANT CHANGE UP MG/DL (ref 0.2–1.2)
BILIRUB SERPL-MCNC: <0.2 MG/DL — SIGNIFICANT CHANGE UP (ref 0.2–1.2)
BUN SERPL-MCNC: 9 MG/DL — LOW (ref 10–20)
CALCIUM SERPL-MCNC: 9.4 MG/DL — SIGNIFICANT CHANGE UP (ref 8.4–10.4)
CHLORIDE SERPL-SCNC: 104 MMOL/L — SIGNIFICANT CHANGE UP (ref 98–110)
CO2 SERPL-SCNC: 28 MMOL/L — SIGNIFICANT CHANGE UP (ref 17–32)
CREAT SERPL-MCNC: 0.7 MG/DL — SIGNIFICANT CHANGE UP (ref 0.7–1.5)
EGFR: 111 ML/MIN/1.73M2 — SIGNIFICANT CHANGE UP
EOSINOPHIL # BLD AUTO: 0.25 K/UL — SIGNIFICANT CHANGE UP (ref 0–0.7)
EOSINOPHIL NFR BLD AUTO: 6.3 % — SIGNIFICANT CHANGE UP (ref 0–8)
GLUCOSE BLDC GLUCOMTR-MCNC: 114 MG/DL — HIGH (ref 70–99)
GLUCOSE BLDC GLUCOMTR-MCNC: 115 MG/DL — HIGH (ref 70–99)
GLUCOSE BLDC GLUCOMTR-MCNC: 133 MG/DL — HIGH (ref 70–99)
GLUCOSE BLDC GLUCOMTR-MCNC: 89 MG/DL — SIGNIFICANT CHANGE UP (ref 70–99)
GLUCOSE SERPL-MCNC: 97 MG/DL — SIGNIFICANT CHANGE UP (ref 70–99)
HCT VFR BLD CALC: 37.8 % — LOW (ref 42–52)
HGB BLD-MCNC: 11.5 G/DL — LOW (ref 14–18)
IMM GRANULOCYTES NFR BLD AUTO: 0 % — LOW (ref 0.1–0.3)
INR BLD: 1.05 RATIO — SIGNIFICANT CHANGE UP (ref 0.65–1.3)
LYMPHOCYTES # BLD AUTO: 2.3 K/UL — SIGNIFICANT CHANGE UP (ref 1.2–3.4)
LYMPHOCYTES # BLD AUTO: 57.6 % — HIGH (ref 20.5–51.1)
MAGNESIUM SERPL-MCNC: 2 MG/DL — SIGNIFICANT CHANGE UP (ref 1.8–2.4)
MCHC RBC-ENTMCNC: 25.5 PG — LOW (ref 27–31)
MCHC RBC-ENTMCNC: 30.4 G/DL — LOW (ref 32–37)
MCV RBC AUTO: 83.8 FL — SIGNIFICANT CHANGE UP (ref 80–94)
MONOCYTES # BLD AUTO: 0.59 K/UL — SIGNIFICANT CHANGE UP (ref 0.1–0.6)
MONOCYTES NFR BLD AUTO: 14.8 % — HIGH (ref 1.7–9.3)
NEUTROPHILS # BLD AUTO: 0.82 K/UL — LOW (ref 1.4–6.5)
NEUTROPHILS NFR BLD AUTO: 20.5 % — LOW (ref 42.2–75.2)
NRBC # BLD: 0 /100 WBCS — SIGNIFICANT CHANGE UP (ref 0–0)
PHOSPHATE SERPL-MCNC: 4 MG/DL — SIGNIFICANT CHANGE UP (ref 2.1–4.9)
PLATELET # BLD AUTO: 237 K/UL — SIGNIFICANT CHANGE UP (ref 130–400)
PMV BLD: 9.7 FL — SIGNIFICANT CHANGE UP (ref 7.4–10.4)
POTASSIUM SERPL-MCNC: 4.7 MMOL/L — SIGNIFICANT CHANGE UP (ref 3.5–5)
POTASSIUM SERPL-SCNC: 4.7 MMOL/L — SIGNIFICANT CHANGE UP (ref 3.5–5)
PROT SERPL-MCNC: 7 G/DL — SIGNIFICANT CHANGE UP (ref 6–8)
PROTHROM AB SERPL-ACNC: 12 SEC — SIGNIFICANT CHANGE UP (ref 9.95–12.87)
RBC # BLD: 4.51 M/UL — LOW (ref 4.7–6.1)
RBC # FLD: 18.7 % — HIGH (ref 11.5–14.5)
SODIUM SERPL-SCNC: 140 MMOL/L — SIGNIFICANT CHANGE UP (ref 135–146)
T3FREE SERPL-MCNC: 2.16 PG/ML — SIGNIFICANT CHANGE UP (ref 2–4.4)
T4 FREE SERPL-MCNC: 0.8 NG/DL — LOW (ref 0.9–1.8)
WBC # BLD: 3.99 K/UL — LOW (ref 4.8–10.8)
WBC # FLD AUTO: 3.99 K/UL — LOW (ref 4.8–10.8)

## 2024-08-01 PROCEDURE — 99232 SBSQ HOSP IP/OBS MODERATE 35: CPT

## 2024-08-01 RX ADMIN — Medication 1 MILLIGRAM(S): at 05:34

## 2024-08-01 RX ADMIN — Medication 1 MILLIGRAM(S): at 17:25

## 2024-08-01 RX ADMIN — DIVALPROEX SODIUM 500 MILLIGRAM(S): 125 CAPSULE, COATED PELLETS ORAL at 12:19

## 2024-08-01 RX ADMIN — CLOZAPINE 100 MILLIGRAM(S): 100 TABLET ORAL at 17:26

## 2024-08-01 RX ADMIN — CLOZAPINE 100 MILLIGRAM(S): 100 TABLET ORAL at 05:34

## 2024-08-01 RX ADMIN — ENOXAPARIN SODIUM 40 MILLIGRAM(S): 120 INJECTION SUBCUTANEOUS at 05:34

## 2024-08-01 RX ADMIN — REMDESIVIR 200 MILLIGRAM(S): 100 INJECTION, POWDER, LYOPHILIZED, FOR SOLUTION INTRAVENOUS at 17:25

## 2024-08-01 RX ADMIN — DIVALPROEX SODIUM 750 MILLIGRAM(S): 125 CAPSULE, COATED PELLETS ORAL at 22:01

## 2024-08-01 NOTE — PROGRESS NOTE ADULT - PROBLEM SELECTOR PLAN 1
due to covid  cxr clear  rdv  monitor off steroids  no hypoxia, satting well on ra  appreciate id  PT, discharge planning

## 2024-08-01 NOTE — PROGRESS NOTE ADULT - NSPROGADDITIONALINFOA_GEN_ALL_CORE
#Progress Note Handoff  Pending (specify): PT, rdv, d/c planning  Family discussion: d/w pt at bedside  Disposition: home vs snf .

## 2024-08-01 NOTE — PROGRESS NOTE ADULT - ASSESSMENT
Assessment:  · Assessment	    Patient is a 52 y/o male w/pmhx schizophrenia, substance use disorder, Pre DM, presenting for a complaint of cough associated with green phlegm of 2 days duration.   RVP +ve for covid.     #Covid infection  -RVP +ve for covid  -no leukocytosis   -chills +ve, no documented fever    -clear CXR  -No oxygen requirements   -supportive treatment   -tomorrow last day of remdesivir D3 as per ID    #Schizoaffective disorder  #Substance use disorder   -c/ home meds  -Divaloproex, Benztropine, Clozapine  -Invega once monthly       #DVT ppx: Lovenox 40 mg sq od  #Diet DASH

## 2024-08-02 ENCOUNTER — TRANSCRIPTION ENCOUNTER (OUTPATIENT)
Age: 54
End: 2024-08-02

## 2024-08-02 LAB
ALBUMIN SERPL ELPH-MCNC: 4 G/DL — SIGNIFICANT CHANGE UP (ref 3.5–5.2)
ALBUMIN SERPL ELPH-MCNC: 4 G/DL — SIGNIFICANT CHANGE UP (ref 3.5–5.2)
ALP SERPL-CCNC: 78 U/L — SIGNIFICANT CHANGE UP (ref 30–115)
ALP SERPL-CCNC: 79 U/L — SIGNIFICANT CHANGE UP (ref 30–115)
ALT FLD-CCNC: 10 U/L — SIGNIFICANT CHANGE UP (ref 0–41)
ALT FLD-CCNC: 9 U/L — SIGNIFICANT CHANGE UP (ref 0–41)
ANION GAP SERPL CALC-SCNC: 15 MMOL/L — HIGH (ref 7–14)
AST SERPL-CCNC: 10 U/L — SIGNIFICANT CHANGE UP (ref 0–41)
AST SERPL-CCNC: 11 U/L — SIGNIFICANT CHANGE UP (ref 0–41)
BASOPHILS # BLD AUTO: 0.02 K/UL — SIGNIFICANT CHANGE UP (ref 0–0.2)
BASOPHILS NFR BLD AUTO: 0.4 % — SIGNIFICANT CHANGE UP (ref 0–1)
BILIRUB DIRECT SERPL-MCNC: <0.2 MG/DL — SIGNIFICANT CHANGE UP (ref 0–0.3)
BILIRUB INDIRECT FLD-MCNC: SIGNIFICANT CHANGE UP MG/DL (ref 0.2–1.2)
BILIRUB SERPL-MCNC: <0.2 MG/DL — SIGNIFICANT CHANGE UP (ref 0.2–1.2)
BILIRUB SERPL-MCNC: <0.2 MG/DL — SIGNIFICANT CHANGE UP (ref 0.2–1.2)
BUN SERPL-MCNC: 9 MG/DL — LOW (ref 10–20)
CALCIUM SERPL-MCNC: 9.5 MG/DL — SIGNIFICANT CHANGE UP (ref 8.4–10.5)
CHLORIDE SERPL-SCNC: 101 MMOL/L — SIGNIFICANT CHANGE UP (ref 98–110)
CO2 SERPL-SCNC: 24 MMOL/L — SIGNIFICANT CHANGE UP (ref 17–32)
CREAT SERPL-MCNC: 0.6 MG/DL — LOW (ref 0.7–1.5)
EGFR: 116 ML/MIN/1.73M2 — SIGNIFICANT CHANGE UP
EOSINOPHIL # BLD AUTO: 0.29 K/UL — SIGNIFICANT CHANGE UP (ref 0–0.7)
EOSINOPHIL NFR BLD AUTO: 5.7 % — SIGNIFICANT CHANGE UP (ref 0–8)
GLUCOSE BLDC GLUCOMTR-MCNC: 100 MG/DL — HIGH (ref 70–99)
GLUCOSE BLDC GLUCOMTR-MCNC: 102 MG/DL — HIGH (ref 70–99)
GLUCOSE BLDC GLUCOMTR-MCNC: 132 MG/DL — HIGH (ref 70–99)
GLUCOSE BLDC GLUCOMTR-MCNC: 85 MG/DL — SIGNIFICANT CHANGE UP (ref 70–99)
GLUCOSE SERPL-MCNC: 121 MG/DL — HIGH (ref 70–99)
HCT VFR BLD CALC: 36.6 % — LOW (ref 42–52)
HGB BLD-MCNC: 11.3 G/DL — LOW (ref 14–18)
IMM GRANULOCYTES NFR BLD AUTO: 0.2 % — SIGNIFICANT CHANGE UP (ref 0.1–0.3)
INR BLD: 1 RATIO — SIGNIFICANT CHANGE UP (ref 0.65–1.3)
LYMPHOCYTES # BLD AUTO: 3.12 K/UL — SIGNIFICANT CHANGE UP (ref 1.2–3.4)
LYMPHOCYTES # BLD AUTO: 61.1 % — HIGH (ref 20.5–51.1)
MAGNESIUM SERPL-MCNC: 1.8 MG/DL — SIGNIFICANT CHANGE UP (ref 1.8–2.4)
MCHC RBC-ENTMCNC: 25.7 PG — LOW (ref 27–31)
MCHC RBC-ENTMCNC: 30.9 G/DL — LOW (ref 32–37)
MCV RBC AUTO: 83.2 FL — SIGNIFICANT CHANGE UP (ref 80–94)
MONOCYTES # BLD AUTO: 0.51 K/UL — SIGNIFICANT CHANGE UP (ref 0.1–0.6)
MONOCYTES NFR BLD AUTO: 10 % — HIGH (ref 1.7–9.3)
NEUTROPHILS # BLD AUTO: 1.16 K/UL — LOW (ref 1.4–6.5)
NEUTROPHILS NFR BLD AUTO: 22.6 % — LOW (ref 42.2–75.2)
NRBC # BLD: 0 /100 WBCS — SIGNIFICANT CHANGE UP (ref 0–0)
PLATELET # BLD AUTO: 257 K/UL — SIGNIFICANT CHANGE UP (ref 130–400)
PMV BLD: 9.8 FL — SIGNIFICANT CHANGE UP (ref 7.4–10.4)
POTASSIUM SERPL-MCNC: 4.6 MMOL/L — SIGNIFICANT CHANGE UP (ref 3.5–5)
POTASSIUM SERPL-SCNC: 4.6 MMOL/L — SIGNIFICANT CHANGE UP (ref 3.5–5)
PROT SERPL-MCNC: 6.7 G/DL — SIGNIFICANT CHANGE UP (ref 6–8)
PROT SERPL-MCNC: 7.5 G/DL — SIGNIFICANT CHANGE UP (ref 6–8)
PROTHROM AB SERPL-ACNC: 11.4 SEC — SIGNIFICANT CHANGE UP (ref 9.95–12.87)
RBC # BLD: 4.4 M/UL — LOW (ref 4.7–6.1)
RBC # FLD: 18.4 % — HIGH (ref 11.5–14.5)
SODIUM SERPL-SCNC: 140 MMOL/L — SIGNIFICANT CHANGE UP (ref 135–146)
WBC # BLD: 5.11 K/UL — SIGNIFICANT CHANGE UP (ref 4.8–10.8)
WBC # FLD AUTO: 5.11 K/UL — SIGNIFICANT CHANGE UP (ref 4.8–10.8)

## 2024-08-02 PROCEDURE — 99232 SBSQ HOSP IP/OBS MODERATE 35: CPT

## 2024-08-02 RX ORDER — CLOZAPINE 100 MG/1
1 TABLET ORAL
Qty: 0 | Refills: 0 | DISCHARGE
Start: 2024-08-02

## 2024-08-02 RX ADMIN — Medication 650 MILLIGRAM(S): at 21:56

## 2024-08-02 RX ADMIN — Medication 1 MILLIGRAM(S): at 05:18

## 2024-08-02 RX ADMIN — DIVALPROEX SODIUM 750 MILLIGRAM(S): 125 CAPSULE, COATED PELLETS ORAL at 21:12

## 2024-08-02 RX ADMIN — Medication 650 MILLIGRAM(S): at 21:12

## 2024-08-02 RX ADMIN — ENOXAPARIN SODIUM 40 MILLIGRAM(S): 120 INJECTION SUBCUTANEOUS at 05:18

## 2024-08-02 RX ADMIN — Medication 1 MILLIGRAM(S): at 17:41

## 2024-08-02 RX ADMIN — CLOZAPINE 100 MILLIGRAM(S): 100 TABLET ORAL at 17:41

## 2024-08-02 RX ADMIN — DIVALPROEX SODIUM 500 MILLIGRAM(S): 125 CAPSULE, COATED PELLETS ORAL at 11:29

## 2024-08-02 RX ADMIN — CLOZAPINE 100 MILLIGRAM(S): 100 TABLET ORAL at 05:18

## 2024-08-02 NOTE — DISCHARGE NOTE PROVIDER - NSDCMRMEDTOKEN_GEN_ALL_CORE_FT
benztropine 1 mg oral tablet: 1 tab(s) orally 2 times a day MDD: 2mg  cloZAPine 100 mg oral tablet: 1 tab(s) orally 2 times a day MDD: 200mg  divalproex sodium 250 mg oral delayed release tablet: 3 tab(s) orally once a day (at bedtime) MDD: 1250mg  divalproex sodium 500 mg oral delayed release tablet: 1 tab(s) orally once a day morning dose MDD: 1250mg  Invega Sustenna 234 mg/1.5 mL intramuscular suspension, extended release: 234 milligram(s) intramuscularly once a month   benztropine 1 mg oral tablet: 1 tab(s) orally 2 times a day MDD: 2mg  cloZAPine 100 mg oral tablet: 1 tab(s) orally 2 times a day  divalproex sodium 250 mg oral delayed release tablet: 3 tab(s) orally once a day (at bedtime) MDD: 1250mg  divalproex sodium 500 mg oral delayed release tablet: 1 tab(s) orally once a day morning dose MDD: 1250mg  Invega Sustenna 234 mg/1.5 mL intramuscular suspension, extended release: 234 milligram(s) intramuscularly once a month

## 2024-08-02 NOTE — DISCHARGE NOTE NURSING/CASE MANAGEMENT/SOCIAL WORK - NSDCPEFALRISK_GEN_ALL_CORE
For information on Fall & Injury Prevention, visit: [email or web address] OR  [email or web address] OR  https://www.cdc.gov/steadi/patient.html

## 2024-08-02 NOTE — PHYSICAL THERAPY INITIAL EVALUATION ADULT - GENERAL OBSERVATIONS, REHAB EVAL
3:16-3:41pm Pt encountered in semi-champion position in bed, A & O x 3 in NAD, +tele, no c/o pain and agreeable to PT. Pt observed indep in bed mobility, transfers and ambulation 50 ft (roombound only 2* airborne prec) without AD with normal gait pattern. Skilled PT not warranted secondary to pt is indep in mobility and no loss of balance noted during activity.

## 2024-08-02 NOTE — CHART NOTE - NSCHARTNOTEFT_GEN_A_CORE
-Patient was cleared for discharge from medical perspective but no bed is available at [facility] until Monday. d/c order had to be cancelled

## 2024-08-02 NOTE — DISCHARGE NOTE PROVIDER - NSDCCPCAREPLAN_GEN_ALL_CORE_FT
PRINCIPAL DISCHARGE DIAGNOSIS  Diagnosis: COVID  Assessment and Plan of Treatment: You presented with mild covid, no pulmonary involvemnt, no oxygen requirements, received 3 days of remdesivir.      SECONDARY DISCHARGE DIAGNOSES  Diagnosis: Hypomagnesemia  Assessment and Plan of Treatment:     Diagnosis: Tachycardia  Assessment and Plan of Treatment:

## 2024-08-02 NOTE — DISCHARGE NOTE PROVIDER - NSDCFUADDINST_GEN_ALL_CORE_FT
Patient is prediabetic, try lifestyle modifications for weight loss, low fat low carbs, caloric deficit diet

## 2024-08-02 NOTE — DISCHARGE NOTE PROVIDER - HOSPITAL COURSE
Patient is a 54 y/o male w/pmhx schizophrenia, substance use disorder, HTN, Pre DM, presenting for a complaint of cough. Pt is a resident at [facility], and states his cough began 2 days ago, with green phlegm. Pt denies fever, dysphagia, dyspnea. Pt has difficulty answering questions, avoids eye contact, stares blankly, and unresponsive to some questions. Pt denies SI/HI, and states he takes his Rx. Pt agrees when asked if he thinks his medications are helping him. Patient currently does not complain of any fever, chills, dyspnea, respiratory distress, oxygen requirements, nvd overnight. No cough, sputum. His oxygen requirements have been nill, he is on RA. CXR is clear, received 3 days of Remidesivir and cleared for discharge from an ID standpoint

## 2024-08-02 NOTE — DISCHARGE NOTE PROVIDER - CARE PROVIDER_API CALL
Michele Velasquez  Internal Medicine  [address] [place] [address]  Phone: [phone]  Fax: [phone]  Follow Up Time:

## 2024-08-02 NOTE — DISCHARGE NOTE NURSING/CASE MANAGEMENT/SOCIAL WORK - PATIENT PORTAL LINK FT
You can access the FollowMyHealth Patient Portal offered by [facility] by registering at the following website: [email or web address]. By joining [facility]’s FollowMyHealth portal, you will also be able to view your health information using other applications (apps) compatible with our system.

## 2024-08-02 NOTE — DISCHARGE NOTE NURSING/CASE MANAGEMENT/SOCIAL WORK - NSDCVIVACCINE_GEN_ALL_CORE_FT
Tdap; 21-Aug-2022 18:52; Kathrin Lebron (RN); [employer]; [id number] (Exp. Date: 22-Sep-2024); IntraMuscular; Deltoid Right.; 0.5 milliLiter(s); VIS (VIS Published: 09-May-2013, VIS Presented: 21-Aug-2022);

## 2024-08-02 NOTE — DISCHARGE NOTE PROVIDER - NSDCFUSCHEDAPPT_GEN_ALL_CORE_FT
Andreas Collins  [facility]mits  Scheduled Appointment: 08/13/2024    [facility]  P[address]IATRY  [address]  Scheduled Appointment: 08/13/2024    Dayton Longoria  [facility]  Scheduled Appointment: 08/21/2024    Dayton Longoria  [facility]  [address]  [address]  Scheduled Appointment: 08/21/2024    Mark Fox  [facility]  Scheduled Appointment: 08/30/2024    Mark Fox  [facility]  [address]  [address]  Scheduled Appointment: 08/30/2024    Shemar Robles  [facility]  Scheduled Appointment: 09/12/2024    Shemar Robles  [facility]  [address]  Scheduled Appointment: 09/12/2024    Mark Fox  [facility]  Scheduled Appointment: 09/13/2024    Mark Fox  [facility]  [address]  [address]  Scheduled Appointment: 09/13/2024

## 2024-08-03 LAB
GLUCOSE BLDC GLUCOMTR-MCNC: 105 MG/DL — HIGH (ref 70–99)
GLUCOSE BLDC GLUCOMTR-MCNC: 137 MG/DL — HIGH (ref 70–99)
GLUCOSE BLDC GLUCOMTR-MCNC: 84 MG/DL — SIGNIFICANT CHANGE UP (ref 70–99)
GLUCOSE BLDC GLUCOMTR-MCNC: 96 MG/DL — SIGNIFICANT CHANGE UP (ref 70–99)

## 2024-08-03 PROCEDURE — 99231 SBSQ HOSP IP/OBS SF/LOW 25: CPT

## 2024-08-03 RX ADMIN — Medication 1 MILLIGRAM(S): at 18:07

## 2024-08-03 RX ADMIN — DIVALPROEX SODIUM 750 MILLIGRAM(S): 125 CAPSULE, COATED PELLETS ORAL at 21:52

## 2024-08-03 RX ADMIN — Medication 1 MILLIGRAM(S): at 05:50

## 2024-08-03 RX ADMIN — ENOXAPARIN SODIUM 40 MILLIGRAM(S): 120 INJECTION SUBCUTANEOUS at 05:50

## 2024-08-03 RX ADMIN — CLOZAPINE 100 MILLIGRAM(S): 100 TABLET ORAL at 18:07

## 2024-08-03 RX ADMIN — DIVALPROEX SODIUM 500 MILLIGRAM(S): 125 CAPSULE, COATED PELLETS ORAL at 11:50

## 2024-08-03 RX ADMIN — CLOZAPINE 100 MILLIGRAM(S): 100 TABLET ORAL at 05:50

## 2024-08-03 NOTE — PROGRESS NOTE ADULT - ASSESSMENT
52 y/o male w/pmhx schizophrenia, substance use disorder, Pre DM, presenting for a complaint of cough associated with green phlegm of 2 days duration.   RVP +ve for covid.     A/P:   COVID-19 infection:   Fever improved  CXR showed no acute infiltrates.   Completed Remdesivir for 3 days.     Sinus tachycardia:   Patient has no fever, no evidence of new infection, hypovolemia  D-dimer normal, PE is unlikely.   TSH 4.37 mildly elevated.   Could be from COVID infection     Schizoaffective disorder  Substance use disorder   Divaloproex, Benztropine, Clozapine  -Invega once monthly       #DVT ppx: Lovenox 40 mg sq od  #Diet DASH    Pending discharge on Monday.

## 2024-08-04 LAB
GLUCOSE BLDC GLUCOMTR-MCNC: 111 MG/DL — HIGH (ref 70–99)
GLUCOSE BLDC GLUCOMTR-MCNC: 136 MG/DL — HIGH (ref 70–99)
GLUCOSE BLDC GLUCOMTR-MCNC: 138 MG/DL — HIGH (ref 70–99)
GLUCOSE BLDC GLUCOMTR-MCNC: 99 MG/DL — SIGNIFICANT CHANGE UP (ref 70–99)

## 2024-08-04 PROCEDURE — 99231 SBSQ HOSP IP/OBS SF/LOW 25: CPT

## 2024-08-04 RX ORDER — IBUPROFEN 200 MG
400 TABLET ORAL ONCE
Refills: 0 | Status: COMPLETED | OUTPATIENT
Start: 2024-08-04 | End: 2024-08-04

## 2024-08-04 RX ADMIN — ENOXAPARIN SODIUM 40 MILLIGRAM(S): 120 INJECTION SUBCUTANEOUS at 06:03

## 2024-08-04 RX ADMIN — DIVALPROEX SODIUM 500 MILLIGRAM(S): 125 CAPSULE, COATED PELLETS ORAL at 11:17

## 2024-08-04 RX ADMIN — Medication 1 MILLIGRAM(S): at 17:29

## 2024-08-04 RX ADMIN — Medication 650 MILLIGRAM(S): at 21:03

## 2024-08-04 RX ADMIN — Medication 400 MILLIGRAM(S): at 23:13

## 2024-08-04 RX ADMIN — Medication 1 MILLIGRAM(S): at 06:03

## 2024-08-04 RX ADMIN — DIVALPROEX SODIUM 750 MILLIGRAM(S): 125 CAPSULE, COATED PELLETS ORAL at 21:03

## 2024-08-04 RX ADMIN — CLOZAPINE 100 MILLIGRAM(S): 100 TABLET ORAL at 06:03

## 2024-08-04 RX ADMIN — CLOZAPINE 100 MILLIGRAM(S): 100 TABLET ORAL at 17:29

## 2024-08-04 NOTE — PROGRESS NOTE ADULT - ASSESSMENT
52 y/o male w/pmhx schizophrenia, substance use disorder, Pre DM, presenting for a complaint of cough associated with green phlegm of 2 days duration.   RVP +ve for covid.     A/P:   COVID-19 infection:   Fever improved  CXR showed no acute infiltrates.   Completed Remdesivir for 3 days.     Sinus tachycardia:   Patient has no fever, no evidence of new infection, hypovolemia  D-dimer normal, PE is unlikely.   TSH 4.37 mildly elevated.   Could be from COVID infection     Schizoaffective disorder  Substance use disorder   Divaloproex, Benztropine, Clozapine  Invega once monthly     #DVT ppx: Lovenox 40 mg sq od  #Diet DASH    Pending discharge on Monday.

## 2024-08-05 VITALS
TEMPERATURE: 98 F | DIASTOLIC BLOOD PRESSURE: 85 MMHG | RESPIRATION RATE: 18 BRPM | SYSTOLIC BLOOD PRESSURE: 121 MMHG | HEART RATE: 71 BPM

## 2024-08-05 LAB
CULTURE RESULTS: SIGNIFICANT CHANGE UP
GLUCOSE BLDC GLUCOMTR-MCNC: 106 MG/DL — HIGH (ref 70–99)
GLUCOSE BLDC GLUCOMTR-MCNC: 117 MG/DL — HIGH (ref 70–99)
SPECIMEN SOURCE: SIGNIFICANT CHANGE UP

## 2024-08-05 PROCEDURE — 99239 HOSP IP/OBS DSCHRG MGMT >30: CPT | Mod: GC

## 2024-08-05 RX ORDER — MELATONIN 3 MG
3 TABLET ORAL AT BEDTIME
Refills: 0 | Status: DISCONTINUED | OUTPATIENT
Start: 2024-08-05 | End: 2024-08-05

## 2024-08-05 RX ADMIN — Medication 1 MILLIGRAM(S): at 06:08

## 2024-08-05 RX ADMIN — Medication 3 MILLIGRAM(S): at 00:58

## 2024-08-05 RX ADMIN — DIVALPROEX SODIUM 500 MILLIGRAM(S): 125 CAPSULE, COATED PELLETS ORAL at 12:07

## 2024-08-05 RX ADMIN — ENOXAPARIN SODIUM 40 MILLIGRAM(S): 120 INJECTION SUBCUTANEOUS at 06:08

## 2024-08-05 RX ADMIN — CLOZAPINE 100 MILLIGRAM(S): 100 TABLET ORAL at 06:08

## 2024-08-05 NOTE — PROGRESS NOTE ADULT - SUBJECTIVE AND OBJECTIVE BOX
SUDHA PARKER  53y  Male      Patient is a 53y old  Male who presents with a chief complaint of Covid (02 Aug 2024 14:07)      INTERVAL HPI/OVERNIGHT EVENTS:  He feels ok, no new complaints.   Vital Signs Last 24 Hrs  T(C): 36.6 (03 Aug 2024 04:53), Max: 36.9 (02 Aug 2024 17:35)  T(F): 97.9 (03 Aug 2024 04:53), Max: 98.5 (02 Aug 2024 17:35)  HR: 107 (03 Aug 2024 13:58) (97 - 108)  BP: 124/86 (03 Aug 2024 13:58) (112/75 - 126/83)  BP(mean): 98 (02 Aug 2024 20:30) (98 - 98)  RR: 18 (03 Aug 2024 13:58) (18 - 18)  SpO2: 99% (03 Aug 2024 13:58) (92% - 99%)          08-02-24 @ 07:01  -  08-03-24 @ 07:00  --------------------------------------------------------  IN: 828 mL / OUT: 750 mL / NET: 78 mL    08-03-24 @ 07:01  -  08-03-24 @ 15:58  --------------------------------------------------------  IN: 0 mL / OUT: 1300 mL / NET: -1300 mL            Consultant(s) Notes Reviewed:  [x ] YES  [ ] NO          MEDICATIONS  (STANDING):  benztropine 1 milliGRAM(s) Oral two times a day  cloZAPine 100 milliGRAM(s) Oral two times a day  dextrose 5%. 1000 milliLiter(s) (50 mL/Hr) IV Continuous <Continuous>  dextrose 5%. 1000 milliLiter(s) (100 mL/Hr) IV Continuous <Continuous>  dextrose 50% Injectable 25 Gram(s) IV Push once  dextrose 50% Injectable 25 Gram(s) IV Push once  dextrose 50% Injectable 12.5 Gram(s) IV Push once  divalproex  milliGRAM(s) Oral at bedtime  divalproex  milliGRAM(s) Oral daily  enoxaparin Injectable 40 milliGRAM(s) SubCutaneous every 24 hours  glucagon  Injectable 1 milliGRAM(s) IntraMuscular once  insulin lispro (ADMELOG) corrective regimen sliding scale   SubCutaneous three times a day before meals    MEDICATIONS  (PRN):  acetaminophen     Tablet .. 650 milliGRAM(s) Oral every 6 hours PRN Temp greater or equal to 38C (100.4F), Mild Pain (1 - 3)  dextrose Oral Gel 15 Gram(s) Oral once PRN Blood Glucose LESS THAN 70 milliGRAM(s)/deciliter      LABS                          11.3   5.11  )-----------( 257      ( 02 Aug 2024 06:35 )             36.6     08-02    140  |  101  |  9<L>  ----------------------------<  121<H>  4.6   |  24  |  0.6<L>    Ca    9.5      02 Aug 2024 06:35  Mg     1.8     08-02    TPro  7.5  /  Alb  4.0  /  TBili  <0.2  /  DBili  <0.2  /  AST  11  /  ALT  9   /  AlkPhos  78  08-02      Urinalysis Basic - ( 02 Aug 2024 06:35 )    Color: x / Appearance: x / SG: x / pH: x  Gluc: 121 mg/dL / Ketone: x  / Bili: x / Urobili: x   Blood: x / Protein: x / Nitrite: x   Leuk Esterase: x / RBC: x / WBC x   Sq Epi: x / Non Sq Epi: x / Bacteria: x      PT/INR - ( 02 Aug 2024 06:35 )   PT: 11.40 sec;   INR: 1.00 ratio           Lactate Trend  07-30 @ 20:42 Lactate:1.3   07-30 @ 17:00 Lactate:1.5         CAPILLARY BLOOD GLUCOSE      POCT Blood Glucose.: 105 mg/dL (03 Aug 2024 11:38)      Culture - Blood (collected 07-30-24 @ 20:42)  Source: .Blood None  Preliminary Report (08-03-24 @ 10:01):    No growth at 72 Hours    Urinalysis with Rflx Culture (collected 07-29-24 @ 23:24)        RADIOLOGY & ADDITIONAL TESTS:    Imaging Personally Reviewed:  [ ] YES  [ ] NO    HEALTH ISSUES - PROBLEM Dx:  Sepsis    Tachycardia    Schizophrenia    HTN (hypertension)    Prediabetes    On deep vein thrombosis (DVT) prophylaxis            PHYSICAL EXAM:  GENERAL: NAD, well-developed.  HEAD:  Atraumatic, Normocephalic.  EYES: EOMI, PERRLA, conjunctiva and sclera clear.  NECK: Supple, No JVD.  CHEST/LUNG: Clear to auscultation bilaterally; No wheeze.  HEART: Regular rate and rhythm; S1 S2.   ABDOMEN: Soft, Nontender, Nondistended; Bowel sounds present.  EXTREMITIES:  2+ Peripheral Pulses, No clubbing, cyanosis, or edema.  PSYCH: AAOx3.  NEUROLOGY: non-focal.  SKIN: No rashes or lesions.
SUBJECTIVE/OVERNIGHT EVENTS  Today is hospital day 1d. This morning patient was seen and examined at bedside, resting comfortably in bed. No acute or major events overnight.  Patient is very anxious and have episodes of agitation  AAox4  Denies any chest pains, palpitations, SOB, N/V    MEDICATIONS  STANDING MEDICATIONS  benztropine 1 milliGRAM(s) Oral two times a day  cloZAPine 100 milliGRAM(s) Oral two times a day  dextrose 5%. 1000 milliLiter(s) IV Continuous <Continuous>  dextrose 5%. 1000 milliLiter(s) IV Continuous <Continuous>  dextrose 50% Injectable 25 Gram(s) IV Push once  dextrose 50% Injectable 25 Gram(s) IV Push once  dextrose 50% Injectable 12.5 Gram(s) IV Push once  divalproex  milliGRAM(s) Oral at bedtime  divalproex  milliGRAM(s) Oral daily  enoxaparin Injectable 40 milliGRAM(s) SubCutaneous every 24 hours  glucagon  Injectable 1 milliGRAM(s) IntraMuscular once  insulin lispro (ADMELOG) corrective regimen sliding scale   SubCutaneous three times a day before meals  lactated ringers. 1000 milliLiter(s) IV Continuous <Continuous>  lactated ringers. 1000 milliLiter(s) IV Continuous <Continuous>    PRN MEDICATIONS  acetaminophen     Tablet .. 650 milliGRAM(s) Oral every 6 hours PRN  dextrose Oral Gel 15 Gram(s) Oral once PRN    VITALS  T(F): 101 (07-30-24 @ 13:57), Max: 101 (07-30-24 @ 13:57)  HR: 139 (07-30-24 @ 10:14) (118 - 140)  BP: 134/81 (07-30-24 @ 10:14) (120/74 - 165/91)  RR: 18 (07-30-24 @ 07:44) (17 - 18)  SpO2: 98% (07-30-24 @ 07:44) (94% - 98%)  POCT Blood Glucose.: 136 mg/dL (07-30-24 @ 11:50)  POCT Blood Glucose.: 100 mg/dL (07-30-24 @ 08:27)    PHYSICAL EXAM  GENERAL  ( X ) NAD, lying in bed comfortably     (  ) obtunded     (  ) lethargic     (  ) somnolent    HEAD  (  ) Atraumatic     (  ) hematoma     (  ) laceration (specify location:       )     NECK  (  ) Supple     (  ) neck stiffness     (  ) nuchal rigidity     (  )  no JVD     (  ) JVD present ( -- cm)    HEART  Rate -->  ( X ) normal rate    (  ) bradycardic    (  ) tachycardic  Rhythm -->  ( X ) regular    (  ) regularly irregular    (  ) irregularly irregular  Murmurs -->  (  ) normal s1/s2    (  ) systolic murmur    (  ) diastolic murmur    (  ) continuous murmur     (  ) S3 present    (  ) S4 present    LUNGS  ( X )Unlabored respirations     (  ) tachypnea  ( X ) B/L air entry     (  ) decreased breath sounds in:  (location     )    ( X ) no adventitious sound     (  ) crackles     (  ) wheezing      (  ) rhonchi      (specify location:       )  (  ) chest wall tenderness (specify location:       )    ABDOMEN  ( X ) Soft     (  ) tense   |   ( X ) nondistended     (  ) distended   |   (  ) +BS     (  ) hypoactive bowel sounds     (  ) hyperactive bowel sounds  ( X ) nontender     (  ) RUQ tenderness     (  ) RLQ tenderness     (  ) LLQ tenderness     (  ) epigastric tenderness     (  ) diffuse tenderness  (  ) Splenomegaly      (  ) Hepatomegaly      (  ) Jaundice     (  ) ecchymosis     EXTREMITIES  (  ) Normal     (  ) Rash     (  ) ecchymosis     (  ) varicose veins      (  ) pitting edema     (  ) non-pitting edema   (  ) ulceration     (  ) gangrene:     (location:     )    NERVOUS SYSTEM  (  ) A&Ox3     (  ) confused     (  ) lethargic  CN II-XII:     (  ) Intact     (  ) focal deficits  (Specify:     )   Upper extremities:     (  ) strength X/5     (  ) focal deficit (specify:    )  Lower extremities:     (  ) strength  X/5    (  ) focal deficit (specify:    )    SKIN  (  ) No rashes or lesions     (  ) maculopapular rash     (  ) pustules     (  ) vesicles     (  ) ulcer     (  ) ecchymosis     (specify location:     )    (  ) Indwelling Coto Catheter   Date insterted:    Reason (  ) Critical illness     (  ) urinary retention    (  ) Accurate Ins/Outs Monitoring     (  ) CMO patient        LABS             10.9   6.16  )-----------( 226      ( 07-30-24 @ 09:55 )             34.9     142  |  104  |  10  -------------------------<  136   07-30-24 @ 09:55  4.2  |  23  |  0.8    Ca      9.6     07-30-24 @ 09:55  Mg     1.6     07-30-24 @ 09:55    TPro  7.1  /  Alb  4.3  /  TBili  <0.2  /  DBili  x   /  AST  15  /  ALT  11  /  AlkPhos  80  /  GGT  x     07-30-24 @ 09:55        Urinalysis Basic - ( 30 Jul 2024 09:55 )    Color: x / Appearance: x / SG: x / pH: x  Gluc: 136 mg/dL / Ketone: x  / Bili: x / Urobili: x   Blood: x / Protein: x / Nitrite: x   Leuk Esterase: x / RBC: x / WBC x   Sq Epi: x / Non Sq Epi: x / Bacteria: x          Urinalysis with Rflx Culture (collected 29 Jul 2024 23:24)      IMAGING
SUBJECTIVE/OVERNIGHT EVENTS  Today is hospital day 2d. This morning patient was seen and examined at bedside, resting comfortably in bed. No acute or major events overnight.  Aaox4  Denies any palpitations, chest pains, SOB, abdominal pains  Reports feeling better after first dose of RDV      MEDICATIONS  STANDING MEDICATIONS  benztropine 1 milliGRAM(s) Oral two times a day  cloZAPine 100 milliGRAM(s) Oral two times a day  dextrose 5%. 1000 milliLiter(s) IV Continuous <Continuous>  dextrose 5%. 1000 milliLiter(s) IV Continuous <Continuous>  dextrose 50% Injectable 25 Gram(s) IV Push once  dextrose 50% Injectable 25 Gram(s) IV Push once  dextrose 50% Injectable 12.5 Gram(s) IV Push once  divalproex  milliGRAM(s) Oral at bedtime  divalproex  milliGRAM(s) Oral daily  enoxaparin Injectable 40 milliGRAM(s) SubCutaneous every 24 hours  glucagon  Injectable 1 milliGRAM(s) IntraMuscular once  insulin lispro (ADMELOG) corrective regimen sliding scale   SubCutaneous three times a day before meals  lactated ringers. 1000 milliLiter(s) IV Continuous <Continuous>  remdesivir  IVPB 100 milliGRAM(s) IV Intermittent every 24 hours  remdesivir  IVPB   IV Intermittent     PRN MEDICATIONS  acetaminophen     Tablet .. 650 milliGRAM(s) Oral every 6 hours PRN  dextrose Oral Gel 15 Gram(s) Oral once PRN    VITALS  T(F): 98.6 (07-31-24 @ 07:47), Max: 103 (07-30-24 @ 15:45)  HR: 108 (07-31-24 @ 07:47) (108 - 127)  BP: 123/58 (07-31-24 @ 07:47) (111/70 - 135/83)  RR: 18 (07-31-24 @ 07:47) (18 - 18)  SpO2: 97% (07-31-24 @ 07:47) (96% - 97%)  POCT Blood Glucose.: 99 mg/dL (07-31-24 @ 07:59)  POCT Blood Glucose.: 133 mg/dL (07-30-24 @ 17:27)  POCT Blood Glucose.: 136 mg/dL (07-30-24 @ 11:50)    PHYSICAL EXAM  GENERAL  ( X ) NAD, lying in bed comfortably     (  ) obtunded     (  ) lethargic     (  ) somnolent    HEAD  (  ) Atraumatic     (  ) hematoma     (  ) laceration (specify location:       )     NECK  (  ) Supple     (  ) neck stiffness     (  ) nuchal rigidity     (  )  no JVD     (  ) JVD present ( -- cm)    HEART  Rate -->  ( X ) normal rate    (  ) bradycardic    (  ) tachycardic  Rhythm -->  ( X ) regular    (  ) regularly irregular    (  ) irregularly irregular  Murmurs -->  (  ) normal s1/s2    (  ) systolic murmur    (  ) diastolic murmur    (  ) continuous murmur     (  ) S3 present    (  ) S4 present    LUNGS  ( X )Unlabored respirations     (  ) tachypnea  ( X ) B/L air entry     (  ) decreased breath sounds in:  (location     )    (  ) no adventitious sound     (  ) crackles     (  ) wheezing      (  ) rhonchi      (specify location:       )  (  ) chest wall tenderness (specify location:       )    ABDOMEN  (  ) Soft     (  ) tense   |   (  ) nondistended     (  ) distended   |   (  ) +BS     (  ) hypoactive bowel sounds     (  ) hyperactive bowel sounds  (  ) nontender     (  ) RUQ tenderness     (  ) RLQ tenderness     (  ) LLQ tenderness     (  ) epigastric tenderness     (  ) diffuse tenderness  (  ) Splenomegaly      (  ) Hepatomegaly      (  ) Jaundice     (  ) ecchymosis     EXTREMITIES  (  ) Normal     (  ) Rash     (  ) ecchymosis     (  ) varicose veins      (  ) pitting edema     (  ) non-pitting edema   (  ) ulceration     (  ) gangrene:     (location:     )    NERVOUS SYSTEM  ( X ) A&Ox3     (  ) confused     (  ) lethargic  CN II-XII:     (  ) Intact     (  ) focal deficits  (Specify:     )   Upper extremities:     (  ) strength X/5     (  ) focal deficit (specify:    )  Lower extremities:     (  ) strength  X/5    (  ) focal deficit (specify:    )    SKIN  (  ) No rashes or lesions     (  ) maculopapular rash     (  ) pustules     (  ) vesicles     (  ) ulcer     (  ) ecchymosis     (specify location:     )    (  ) Indwelling Coto Catheter   Date insterted:    Reason (  ) Critical illness     (  ) urinary retention    (  ) Accurate Ins/Outs Monitoring     (  ) CMO patient        LABS             10.9   5.04  )-----------( 239      ( 07-31-24 @ 07:51 )             35.2     140  |  104  |  7   -------------------------<  110   07-31-24 @ 07:51  4.6  |  25  |  0.8    Ca      9.0     07-31-24 @ 07:51  Phos   3.7     07-31-24 @ 07:51  Mg     2.2     07-31-24 @ 07:51    TPro  6.7  /  Alb  4.0  /  TBili  0.3  /  DBili  <0.2  /  AST  13  /  ALT  11  /  AlkPhos  77  /  GGT  x     07-31-24 @ 07:51    PT/INR - ( 07-31-24 @ 07:51 )   PT: 12.20 sec;   INR: 1.07 ratio    Troponin T, High Sensitivity Result: 21 ng/L (07-30-24 @ 17:00)  Troponin T, High Sensitivity Result: 21 ng/L (07-30-24 @ 17:00)    Urinalysis Basic - ( 31 Jul 2024 07:51 )    Color: x / Appearance: x / SG: x / pH: x  Gluc: 110 mg/dL / Ketone: x  / Bili: x / Urobili: x   Blood: x / Protein: x / Nitrite: x   Leuk Esterase: x / RBC: x / WBC x   Sq Epi: x / Non Sq Epi: x / Bacteria: x          Urinalysis with Rflx Culture (collected 29 Jul 2024 23:24)      IMAGING    CXR 07/29  Impression:  No radiographic evidence of acute cardiopulmonary disease.    [facility] Duplex 07/30  IMPRESSION:  No evidence of deep venous thrombosis in either lower extremity.  
  SUDHA PARKER  53y  Male      Patient is a 53y old  Male who presents with a chief complaint of Covid (03 Aug 2024 15:55)      INTERVAL HPI/OVERNIGHT EVENTS:  He feels ok, no chest pain, fever or SOB.   Vital Signs Last 24 Hrs  T(C): 36.8 (04 Aug 2024 04:52), Max: 36.8 (03 Aug 2024 18:05)  T(F): 98.2 (04 Aug 2024 04:52), Max: 98.3 (03 Aug 2024 18:05)  HR: 92 (04 Aug 2024 04:52) (92 - 120)  BP: 103/71 (04 Aug 2024 04:52) (103/71 - 124/86)  BP(mean): --  RR: 18 (04 Aug 2024 04:52) (18 - 18)  SpO2: 99% (03 Aug 2024 13:58) (99% - 99%)          08-03-24 @ 07:01  -  08-04-24 @ 07:00  --------------------------------------------------------  IN: 590 mL / OUT: 1600 mL / NET: -1010 mL            Consultant(s) Notes Reviewed:  [x ] YES  [ ] NO          MEDICATIONS  (STANDING):  benztropine 1 milliGRAM(s) Oral two times a day  cloZAPine 100 milliGRAM(s) Oral two times a day  dextrose 5%. 1000 milliLiter(s) (100 mL/Hr) IV Continuous <Continuous>  dextrose 5%. 1000 milliLiter(s) (50 mL/Hr) IV Continuous <Continuous>  dextrose 50% Injectable 12.5 Gram(s) IV Push once  dextrose 50% Injectable 25 Gram(s) IV Push once  dextrose 50% Injectable 25 Gram(s) IV Push once  divalproex  milliGRAM(s) Oral at bedtime  divalproex  milliGRAM(s) Oral daily  enoxaparin Injectable 40 milliGRAM(s) SubCutaneous every 24 hours  glucagon  Injectable 1 milliGRAM(s) IntraMuscular once  insulin lispro (ADMELOG) corrective regimen sliding scale   SubCutaneous three times a day before meals    MEDICATIONS  (PRN):  acetaminophen     Tablet .. 650 milliGRAM(s) Oral every 6 hours PRN Temp greater or equal to 38C (100.4F), Mild Pain (1 - 3)  dextrose Oral Gel 15 Gram(s) Oral once PRN Blood Glucose LESS THAN 70 milliGRAM(s)/deciliter      LABS                  Lactate Trend  07-30 @ 20:42 Lactate:1.3   07-30 @ 17:00 Lactate:1.5         CAPILLARY BLOOD GLUCOSE      POCT Blood Glucose.: 99 mg/dL (04 Aug 2024 07:47)      Culture - Blood (collected 07-30-24 @ 20:42)  Source: .Blood None  Preliminary Report (08-04-24 @ 10:00):    No growth at 4 days    Urinalysis with Rflx Culture (collected 07-29-24 @ 23:24)        RADIOLOGY & ADDITIONAL TESTS:    Imaging Personally Reviewed:  [ ] YES  [ ] NO    HEALTH ISSUES - PROBLEM Dx:  Sepsis    Tachycardia    Schizophrenia    HTN (hypertension)    Prediabetes    On deep vein thrombosis (DVT) prophylaxis            PHYSICAL EXAM:  GENERAL: NAD, well-developed.  HEAD:  Atraumatic, Normocephalic.  EYES: EOMI, PERRLA, conjunctiva and sclera clear.  NECK: Supple, No JVD.  CHEST/LUNG: Clear to auscultation bilaterally; No wheeze.  HEART: Regular rate and rhythm; S1 S2.   ABDOMEN: Soft, Nontender, Nondistended; Bowel sounds present.  EXTREMITIES:  2+ Peripheral Pulses, No clubbing, cyanosis, or edema.  PSYCH: AAOx3.  NEUROLOGY: non-focal.  SKIN: No rashes or lesions.
SUDHA PARKER  53y  Male    Patient is a 54 y/o male w/pmhx schizophrenia, substance use disorder, HTN, Pre DM, presenting for a complaint of cough. Pt is a resident at [facility], and states his cough began 2 days ago, with green phlegm.   Pt denies fever, dysphagia, dyspnea. Pt has difficulty answering questions, avoids eye contact, stares blankly, and unresponsive to some questions. Pt denies SI/HI, and states he takes his Rx. Pt agrees when asked if he thinks his medications are helping him.    In ED:    Vitals: /81, , Temp 98.7, SaO2 97% on RA    WBC 7.6   Hb 11.6  Platelets 278  D-Dimer <150  Na 137  K 5.3, hemolyzed   Crea 1  Normal LFTs  Mg 1.6    UA -ve   RVP +ve for Covid  CXR clear lungs     s/p 1 L NSS in ED     INTERVAL HPI/OVERNIGHT EVENTS:  Patient does not complain of any fever, chills, dyspnea, respiratory distress, oxygen requirements, nvd overnight. No cough, sputum.     REVIEW OF SYSTEMS:  CONSTITUTIONAL: No fever, weight loss, or fatigue  EYES: No eye pain, visual disturbances, or discharge  ENMT:  No difficulty hearing, tinnitus, vertigo; No sinus or throat pain  NECK: No pain or stiffness  RESPIRATORY: No cough, wheezing, chills or hemoptysis; No shortness of breath  CARDIOVASCULAR: No chest pain, palpitations, dizziness, or leg swelling  GASTROINTESTINAL: No abdominal or epigastric pain. No diarrhea or constipation. No nausea, vomiting, or hematemesis. No melena or hematochezia.  GENITOURINARY: No dysuria, frequency, hematuria, or incontinence  NEUROLOGICAL: No headaches, memory loss, loss of strength, numbness, or tremors  MUSCULOSKELETAL: No joint pain or swelling; No muscle, back, or extremity pain  SKIN: No itching, burning, rashes, or lesions   LYMPH NODES: No enlarged glands  ENDOCRINE: No heat or cold intolerance; No hair loss  PSYCHIATRIC: No depression, anxiety, mood swings, or difficulty sleeping  HEME/LYMPH: No easy bruising, or bleeding gums  ALLERY AND IMMUNOLOGIC: No hives or eczema    Vital Signs Last 24 Hrs  T(C): 36.8 (01 Aug 2024 14:06), Max: 36.8 (31 Jul 2024 20:29)  T(F): 98.3 (01 Aug 2024 14:06), Max: 98.3 (31 Jul 2024 20:29)  HR: 103 (01 Aug 2024 14:06) (92 - 103)  BP: 111/78 (01 Aug 2024 14:06) (111/78 - 127/85)  BP(mean): 89 (01 Aug 2024 14:06) (89 - 89)  RR: 18 (01 Aug 2024 14:06) (18 - 18)  SpO2: 98% (31 Jul 2024 20:50) (98% - 98%)    Parameters below as of 31 Jul 2024 20:50  Patient On (Oxygen Delivery Method): room air        PHYSICAL EXAM:  GENERAL: NAD, well-groomed, well-developed  HEAD:  Atraumatic, Normocephalic  EYES: EOMI, PERRLA, conjunctiva and sclera clear  ENMT: Moist mucous membranes, Good dentition, No lesions  NECK: Supple, No JVD, Normal thyroid  NERVOUS SYSTEM:  Alert & Oriented X3, Good concentration; Motor Strength 5/5 B/L upper and lower extremities; DTRs 2+ intact and symmetric  CHEST/LUNG: Clear to auscultation bilaterally; No rales, rhonchi, wheezing, or rubs  HEART: Regular rate and rhythm; No murmurs, rubs, or gallops  ABDOMEN: Soft, Nontender, Nondistended; Bowel sounds present  EXTREMITIES:  2+ Peripheral Pulses, No clubbing, cyanosis, or edema  LYMPH: No lymphadenopathy noted  SKIN: No rashes or lesions    Consultant(s) Notes Reviewed:  [x ] YES  [ ] NO  Care Discussed with Consultants/Other Providers [ x] YES  [ ] NO    LABS:                        11.5   3.99  )-----------( 237      ( 01 Aug 2024 07:04 )             37.8     08-01    140  |  104  |  9<L>  ----------------------------<  97  4.7   |  28  |  0.7    Ca    9.4      01 Aug 2024 07:04  Phos  4.0     08-01  Mg     2.0     08-01    TPro  7.0  /  Alb  3.9  /  TBili  <0.2  /  DBili  <0.2  /  AST  13  /  ALT  11  /  AlkPhos  69  08-01    PT/INR - ( 01 Aug 2024 07:04 )   PT: 12.00 sec;   INR: 1.05 ratio           Urinalysis Basic - ( 01 Aug 2024 07:04 )    Color: x / Appearance: x / SG: x / pH: x  Gluc: 97 mg/dL / Ketone: x  / Bili: x / Urobili: x   Blood: x / Protein: x / Nitrite: x   Leuk Esterase: x / RBC: x / WBC x   Sq Epi: x / Non Sq Epi: x / Bacteria: x        CAPILLARY BLOOD GLUCOSE      POCT Blood Glucose.: 114 mg/dL (01 Aug 2024 16:34)  POCT Blood Glucose.: 115 mg/dL (01 Aug 2024 11:15)  POCT Blood Glucose.: 89 mg/dL (01 Aug 2024 08:01)      RADIOLOGY & ADDITIONAL TESTS:    Imaging Personally Reviewed:  [ ] YES  [ ] NO
  SUDHA PARKER  53y  Male      Patient is a 53y old  Male who presents with a chief complaint of Covid (04 Aug 2024 11:05)      INTERVAL HPI/OVERNIGHT EVENTS:  He feels ok, no overnight events, no fever.  Vital Signs Last 24 Hrs  T(C): 36.4 (05 Aug 2024 13:17), Max: 37.1 (04 Aug 2024 19:57)  T(F): 97.6 (05 Aug 2024 13:17), Max: 98.8 (04 Aug 2024 19:57)  HR: 71 (05 Aug 2024 13:17) (71 - 118)  BP: 121/85 (05 Aug 2024 13:17) (117/82 - 141/94)  BP(mean): --  RR: 18 (05 Aug 2024 13:17) (18 - 18)  SpO2: 97% (05 Aug 2024 06:14) (97% - 99%)    Parameters below as of 05 Aug 2024 06:14  Patient On (Oxygen Delivery Method): room air          08-04-24 @ 07:01  -  08-05-24 @ 07:00  --------------------------------------------------------  IN: 354 mL / OUT: 2300 mL / NET: -1946 mL    08-05-24 @ 07:01  - 08-05-24 @ 16:09  --------------------------------------------------------  IN: 480 mL / OUT: 400 mL / NET: 80 mL            Consultant(s) Notes Reviewed:  [x ] YES  [ ] NO          MEDICATIONS  (STANDING):  benztropine 1 milliGRAM(s) Oral two times a day  cloZAPine 100 milliGRAM(s) Oral two times a day  dextrose 5%. 1000 milliLiter(s) (50 mL/Hr) IV Continuous <Continuous>  dextrose 5%. 1000 milliLiter(s) (100 mL/Hr) IV Continuous <Continuous>  dextrose 50% Injectable 12.5 Gram(s) IV Push once  dextrose 50% Injectable 25 Gram(s) IV Push once  dextrose 50% Injectable 25 Gram(s) IV Push once  divalproex  milliGRAM(s) Oral at bedtime  divalproex  milliGRAM(s) Oral daily  enoxaparin Injectable 40 milliGRAM(s) SubCutaneous every 24 hours  glucagon  Injectable 1 milliGRAM(s) IntraMuscular once  insulin lispro (ADMELOG) corrective regimen sliding scale   SubCutaneous three times a day before meals    MEDICATIONS  (PRN):  acetaminophen     Tablet .. 650 milliGRAM(s) Oral every 6 hours PRN Temp greater or equal to 38C (100.4F), Mild Pain (1 - 3)  dextrose Oral Gel 15 Gram(s) Oral once PRN Blood Glucose LESS THAN 70 milliGRAM(s)/deciliter  melatonin 3 milliGRAM(s) Oral at bedtime PRN Insomnia      LABS                  Lactate Trend        CAPILLARY BLOOD GLUCOSE      POCT Blood Glucose.: 106 mg/dL (05 Aug 2024 11:54)      Culture - Blood (collected 07-30-24 @ 20:42)  Source: .Blood None  Final Report (08-05-24 @ 10:00):    No growth at 5 days    Urinalysis with Rflx Culture (collected 07-29-24 @ 23:24)        RADIOLOGY & ADDITIONAL TESTS:    Imaging Personally Reviewed:  [ ] YES  [ ] NO    HEALTH ISSUES - PROBLEM Dx:  Sepsis    Tachycardia    Schizophrenia    HTN (hypertension)    Prediabetes    On deep vein thrombosis (DVT) prophylaxis            PHYSICAL EXAM:  GENERAL: NAD, well-developed.  HEAD:  Atraumatic, Normocephalic.  EYES: EOMI, PERRLA, conjunctiva and sclera clear.  NECK: Supple, No JVD.  CHEST/LUNG: Clear to auscultation bilaterally; No wheeze.  HEART: Regular rate and rhythm; S1 S2.   ABDOMEN: Soft, Nontender, Nondistended; Bowel sounds present.  EXTREMITIES:  2+ Peripheral Pulses, No clubbing, cyanosis, or edema.  PSYCH: AAOx3.  NEUROLOGY: non-focal.  SKIN: No rashes or lesions.  
INTERVAL HPI/OVERNIGHT EVENTS:    SUBJECTIVE: Patient seen and examined at bedside.     no cp, sob, abd pain, fever  no sob, orthopnea, pnd, cough    OBJECTIVE:    VITAL SIGNS:  Vital Signs Last 24 Hrs  T(C): 36.8 (01 Aug 2024 14:06), Max: 36.8 (31 Jul 2024 20:29)  T(F): 98.3 (01 Aug 2024 14:06), Max: 98.3 (31 Jul 2024 20:29)  HR: 103 (01 Aug 2024 14:06) (92 - 103)  BP: 111/78 (01 Aug 2024 14:06) (106/71 - 127/85)  BP(mean): 89 (01 Aug 2024 14:06) (89 - 89)  RR: 18 (01 Aug 2024 14:06) (18 - 18)  SpO2: 98% (31 Jul 2024 20:50) (93% - 98%)    Parameters below as of 31 Jul 2024 20:50  Patient On (Oxygen Delivery Method): room air          PHYSICAL EXAM:    General: NAD  HEENT: NC/AT; PERRL, clear conjunctiva  Neck: supple  Respiratory: CTA b/l  Cardiovascular: +S1/S2; RRR  Abdomen: soft, NT/ND; +BS x4  Extremities: WWP, 2+ peripheral pulses b/l; no LE edema  Skin: normal color and turgor; no rash  Neurological:    MEDICATIONS:  MEDICATIONS  (STANDING):  benztropine 1 milliGRAM(s) Oral two times a day  cloZAPine 100 milliGRAM(s) Oral two times a day  dextrose 5%. 1000 milliLiter(s) (50 mL/Hr) IV Continuous <Continuous>  dextrose 5%. 1000 milliLiter(s) (100 mL/Hr) IV Continuous <Continuous>  dextrose 50% Injectable 25 Gram(s) IV Push once  dextrose 50% Injectable 25 Gram(s) IV Push once  dextrose 50% Injectable 12.5 Gram(s) IV Push once  divalproex  milliGRAM(s) Oral at bedtime  divalproex  milliGRAM(s) Oral daily  enoxaparin Injectable 40 milliGRAM(s) SubCutaneous every 24 hours  glucagon  Injectable 1 milliGRAM(s) IntraMuscular once  insulin lispro (ADMELOG) corrective regimen sliding scale   SubCutaneous three times a day before meals  remdesivir  IVPB 100 milliGRAM(s) IV Intermittent every 24 hours  remdesivir  IVPB   IV Intermittent     MEDICATIONS  (PRN):  acetaminophen     Tablet .. 650 milliGRAM(s) Oral every 6 hours PRN Temp greater or equal to 38C (100.4F), Mild Pain (1 - 3)  dextrose Oral Gel 15 Gram(s) Oral once PRN Blood Glucose LESS THAN 70 milliGRAM(s)/deciliter      ALLERGIES:  Allergies    fish (Unknown)  Navane (Unknown)  Thorazine (Unknown)  penicillin (Unknown)  pork (Unknown)    Intolerances        LABS:                        11.5   3.99  )-----------( 237      ( 01 Aug 2024 07:04 )             37.8     Hemoglobin: 11.5 g/dL (08-01 @ 07:04)  Hemoglobin: 10.9 g/dL (07-31 @ 07:51)  Hemoglobin: 10.9 g/dL (07-30 @ 09:55)  Hemoglobin: 11.6 g/dL (07-29 @ 20:58)    CBC Full  -  ( 01 Aug 2024 07:04 )  WBC Count : 3.99 K/uL  RBC Count : 4.51 M/uL  Hemoglobin : 11.5 g/dL  Hematocrit : 37.8 %  Platelet Count - Automated : 237 K/uL  Mean Cell Volume : 83.8 fL  Mean Cell Hemoglobin : 25.5 pg  Mean Cell Hemoglobin Concentration : 30.4 g/dL  Auto Neutrophil # : 0.82 K/uL  Auto Lymphocyte # : 2.30 K/uL  Auto Monocyte # : 0.59 K/uL  Auto Eosinophil # : 0.25 K/uL  Auto Basophil # : 0.03 K/uL  Auto Neutrophil % : 20.5 %  Auto Lymphocyte % : 57.6 %  Auto Monocyte % : 14.8 %  Auto Eosinophil % : 6.3 %  Auto Basophil % : 0.8 %    08-01    140  |  104  |  9<L>  ----------------------------<  97  4.7   |  28  |  0.7    Ca    9.4      01 Aug 2024 07:04  Phos  4.0     08-01  Mg     2.0     08-01    TPro  7.0  /  Alb  3.9  /  TBili  <0.2  /  DBili  <0.2  /  AST  13  /  ALT  11  /  AlkPhos  69  08-01    Creatinine Trend: 0.7<--, 0.8<--, 0.9<--, 0.8<--, 1.0<--  LIVER FUNCTIONS - ( 01 Aug 2024 07:04 )  Alb: 3.9 g/dL / Pro: 7.0 g/dL / ALK PHOS: 69 U/L / ALT: 11 U/L / AST: 13 U/L / GGT: x           PT/INR - ( 01 Aug 2024 07:04 )   PT: 12.00 sec;   INR: 1.05 ratio             hs Troponin:                21 <<== 07-30-24 @ 17:00            Urinalysis Basic - ( 01 Aug 2024 07:04 )    Color: x / Appearance: x / SG: x / pH: x  Gluc: 97 mg/dL / Ketone: x  / Bili: x / Urobili: x   Blood: x / Protein: x / Nitrite: x   Leuk Esterase: x / RBC: x / WBC x   Sq Epi: x / Non Sq Epi: x / Bacteria: x      CSF:                      EKG:   MICROBIOLOGY:    Culture - Blood (collected 30 Jul 2024 20:42)  Source: .Blood None  Preliminary Report (01 Aug 2024 10:01):    No growth at 24 hours    Urinalysis with Rflx Culture (collected 29 Jul 2024 23:24)      IMAGING:      Labs, imaging, EKG personally reviewed    RADIOLOGY & ADDITIONAL TESTS: Reviewed.

## 2024-08-05 NOTE — PROGRESS NOTE ADULT - ASSESSMENT
52 y/o male w/pmhx schizophrenia, substance use disorder, Pre DM, presenting for a complaint of cough associated with green phlegm of 2 days duration.   RVP +ve for covid.     A/P:   COVID-19 infection:   Fever improved  CXR showed no acute infiltrates.   Completed Remdesivir for 3 days.     Sinus tachycardia:   Patient has no fever, no evidence of new infection, hypovolemia  D-dimer normal, PE is unlikely.   TSH 4.37 mildly elevated.   Could be from COVID infection     Schizoaffective disorder  Substance use disorder   Divaloproex, Benztropine, Clozapine  Invega once monthly     #DVT ppx: Lovenox 40 mg sq od  #Diet DASH    Discharge today to his residence.

## 2024-08-05 NOTE — PROGRESS NOTE ADULT - PROVIDER SPECIALTY LIST ADULT
Hospitalist
Internal Medicine
Hospitalist
Hospitalist
Internal Medicine

## 2024-08-12 ENCOUNTER — INPATIENT (INPATIENT)
Facility: HOSPITAL | Age: 54
LOS: 6 days | Discharge: HOME | DRG: 423 | End: 2024-08-19
Attending: INTERNAL MEDICINE | Admitting: INTERNAL MEDICINE
Payer: MEDICAID

## 2024-08-12 VITALS
OXYGEN SATURATION: 98 % | RESPIRATION RATE: 18 BRPM | WEIGHT: 199.96 LBS | DIASTOLIC BLOOD PRESSURE: 96 MMHG | HEIGHT: 64 IN | TEMPERATURE: 98 F | SYSTOLIC BLOOD PRESSURE: 145 MMHG | HEART RATE: 107 BPM

## 2024-08-12 DIAGNOSIS — R41.82 ALTERED MENTAL STATUS, UNSPECIFIED: ICD-10-CM

## 2024-08-12 LAB
ALBUMIN SERPL ELPH-MCNC: 4 G/DL — SIGNIFICANT CHANGE UP (ref 3.5–5.2)
ALP SERPL-CCNC: 82 U/L — SIGNIFICANT CHANGE UP (ref 30–115)
ALT FLD-CCNC: 7 U/L — SIGNIFICANT CHANGE UP (ref 0–41)
ANION GAP SERPL CALC-SCNC: 11 MMOL/L — SIGNIFICANT CHANGE UP (ref 7–14)
APPEARANCE UR: CLEAR — SIGNIFICANT CHANGE UP
AST SERPL-CCNC: 12 U/L — SIGNIFICANT CHANGE UP (ref 0–41)
BASOPHILS # BLD AUTO: 0.02 K/UL — SIGNIFICANT CHANGE UP (ref 0–0.2)
BASOPHILS NFR BLD AUTO: 0.2 % — SIGNIFICANT CHANGE UP (ref 0–1)
BILIRUB SERPL-MCNC: 0.2 MG/DL — SIGNIFICANT CHANGE UP (ref 0.2–1.2)
BILIRUB UR-MCNC: NEGATIVE — SIGNIFICANT CHANGE UP
BUN SERPL-MCNC: 6 MG/DL — LOW (ref 10–20)
CALCIUM SERPL-MCNC: 9.6 MG/DL — SIGNIFICANT CHANGE UP (ref 8.4–10.5)
CHLORIDE SERPL-SCNC: 107 MMOL/L — SIGNIFICANT CHANGE UP (ref 98–110)
CO2 SERPL-SCNC: 25 MMOL/L — SIGNIFICANT CHANGE UP (ref 17–32)
COLOR SPEC: YELLOW — SIGNIFICANT CHANGE UP
CREAT SERPL-MCNC: 0.9 MG/DL — SIGNIFICANT CHANGE UP (ref 0.7–1.5)
DIFF PNL FLD: NEGATIVE — SIGNIFICANT CHANGE UP
EGFR: 103 ML/MIN/1.73M2 — SIGNIFICANT CHANGE UP
EOSINOPHIL # BLD AUTO: 0.13 K/UL — SIGNIFICANT CHANGE UP (ref 0–0.7)
EOSINOPHIL NFR BLD AUTO: 1.4 % — SIGNIFICANT CHANGE UP (ref 0–8)
GLUCOSE BLDC GLUCOMTR-MCNC: 162 MG/DL — HIGH (ref 70–99)
GLUCOSE SERPL-MCNC: 84 MG/DL — SIGNIFICANT CHANGE UP (ref 70–99)
GLUCOSE UR QL: NEGATIVE MG/DL — SIGNIFICANT CHANGE UP
HCT VFR BLD CALC: 37.7 % — LOW (ref 42–52)
HGB BLD-MCNC: 11.4 G/DL — LOW (ref 14–18)
IMM GRANULOCYTES NFR BLD AUTO: 0.3 % — SIGNIFICANT CHANGE UP (ref 0.1–0.3)
KETONES UR-MCNC: NEGATIVE MG/DL — SIGNIFICANT CHANGE UP
LEUKOCYTE ESTERASE UR-ACNC: NEGATIVE — SIGNIFICANT CHANGE UP
LYMPHOCYTES # BLD AUTO: 2.43 K/UL — SIGNIFICANT CHANGE UP (ref 1.2–3.4)
LYMPHOCYTES # BLD AUTO: 26.5 % — SIGNIFICANT CHANGE UP (ref 20.5–51.1)
MAGNESIUM SERPL-MCNC: 1.8 MG/DL — SIGNIFICANT CHANGE UP (ref 1.8–2.4)
MCHC RBC-ENTMCNC: 25.7 PG — LOW (ref 27–31)
MCHC RBC-ENTMCNC: 30.2 G/DL — LOW (ref 32–37)
MCV RBC AUTO: 84.9 FL — SIGNIFICANT CHANGE UP (ref 80–94)
MONOCYTES # BLD AUTO: 0.62 K/UL — HIGH (ref 0.1–0.6)
MONOCYTES NFR BLD AUTO: 6.8 % — SIGNIFICANT CHANGE UP (ref 1.7–9.3)
NEUTROPHILS # BLD AUTO: 5.95 K/UL — SIGNIFICANT CHANGE UP (ref 1.4–6.5)
NEUTROPHILS NFR BLD AUTO: 64.8 % — SIGNIFICANT CHANGE UP (ref 42.2–75.2)
NITRITE UR-MCNC: NEGATIVE — SIGNIFICANT CHANGE UP
NRBC # BLD: 0 /100 WBCS — SIGNIFICANT CHANGE UP (ref 0–0)
PH UR: 6.5 — SIGNIFICANT CHANGE UP (ref 5–8)
PLATELET # BLD AUTO: 266 K/UL — SIGNIFICANT CHANGE UP (ref 130–400)
PMV BLD: 9.4 FL — SIGNIFICANT CHANGE UP (ref 7.4–10.4)
POTASSIUM SERPL-MCNC: 4.7 MMOL/L — SIGNIFICANT CHANGE UP (ref 3.5–5)
POTASSIUM SERPL-SCNC: 4.7 MMOL/L — SIGNIFICANT CHANGE UP (ref 3.5–5)
PROT SERPL-MCNC: 7.3 G/DL — SIGNIFICANT CHANGE UP (ref 6–8)
PROT UR-MCNC: NEGATIVE MG/DL — SIGNIFICANT CHANGE UP
RBC # BLD: 4.44 M/UL — LOW (ref 4.7–6.1)
RBC # FLD: 19.6 % — HIGH (ref 11.5–14.5)
SODIUM SERPL-SCNC: 143 MMOL/L — SIGNIFICANT CHANGE UP (ref 135–146)
SP GR SPEC: 1.01 — SIGNIFICANT CHANGE UP (ref 1–1.03)
TROPONIN T, HIGH SENSITIVITY RESULT: 17 NG/L — SIGNIFICANT CHANGE UP (ref 6–21)
UROBILINOGEN FLD QL: 1 MG/DL — SIGNIFICANT CHANGE UP (ref 0.2–1)
VALPROATE SERPL-MCNC: 80 UG/ML — SIGNIFICANT CHANGE UP (ref 50–100)
WBC # BLD: 9.18 K/UL — SIGNIFICANT CHANGE UP (ref 4.8–10.8)
WBC # FLD AUTO: 9.18 K/UL — SIGNIFICANT CHANGE UP (ref 4.8–10.8)

## 2024-08-12 PROCEDURE — 97161 PT EVAL LOW COMPLEX 20 MIN: CPT | Mod: GP

## 2024-08-12 PROCEDURE — 99223 1ST HOSP IP/OBS HIGH 75: CPT

## 2024-08-12 PROCEDURE — 93010 ELECTROCARDIOGRAM REPORT: CPT

## 2024-08-12 PROCEDURE — 93005 ELECTROCARDIOGRAM TRACING: CPT

## 2024-08-12 PROCEDURE — 83036 HEMOGLOBIN GLYCOSYLATED A1C: CPT

## 2024-08-12 PROCEDURE — 84100 ASSAY OF PHOSPHORUS: CPT

## 2024-08-12 PROCEDURE — 80053 COMPREHEN METABOLIC PANEL: CPT

## 2024-08-12 PROCEDURE — 85025 COMPLETE CBC W/AUTO DIFF WBC: CPT

## 2024-08-12 PROCEDURE — 83735 ASSAY OF MAGNESIUM: CPT

## 2024-08-12 PROCEDURE — 82746 ASSAY OF FOLIC ACID SERUM: CPT

## 2024-08-12 PROCEDURE — 80061 LIPID PANEL: CPT

## 2024-08-12 PROCEDURE — 82607 VITAMIN B-12: CPT

## 2024-08-12 PROCEDURE — 80346 BENZODIAZEPINES1-12: CPT

## 2024-08-12 PROCEDURE — 70450 CT HEAD/BRAIN W/O DYE: CPT | Mod: 26,MC

## 2024-08-12 PROCEDURE — 80048 BASIC METABOLIC PNL TOTAL CA: CPT

## 2024-08-12 PROCEDURE — 99285 EMERGENCY DEPT VISIT HI MDM: CPT

## 2024-08-12 PROCEDURE — 82962 GLUCOSE BLOOD TEST: CPT

## 2024-08-12 PROCEDURE — 82140 ASSAY OF AMMONIA: CPT

## 2024-08-12 PROCEDURE — 95819 EEG AWAKE AND ASLEEP: CPT

## 2024-08-12 PROCEDURE — 80307 DRUG TEST PRSMV CHEM ANLYZR: CPT

## 2024-08-12 PROCEDURE — 84443 ASSAY THYROID STIM HORMONE: CPT

## 2024-08-12 PROCEDURE — 97116 GAIT TRAINING THERAPY: CPT | Mod: GP

## 2024-08-12 PROCEDURE — 80354 DRUG SCREENING FENTANYL: CPT

## 2024-08-12 PROCEDURE — 71045 X-RAY EXAM CHEST 1 VIEW: CPT | Mod: 26

## 2024-08-12 PROCEDURE — 80164 ASSAY DIPROPYLACETIC ACD TOT: CPT

## 2024-08-12 PROCEDURE — 36415 COLL VENOUS BLD VENIPUNCTURE: CPT

## 2024-08-12 PROCEDURE — 81003 URINALYSIS AUTO W/O SCOPE: CPT

## 2024-08-12 PROCEDURE — 97110 THERAPEUTIC EXERCISES: CPT | Mod: GP

## 2024-08-12 RX ORDER — METFORMIN HYDROCHLORIDE 850 MG/1
1 TABLET, FILM COATED ORAL
Refills: 0 | DISCHARGE

## 2024-08-12 RX ORDER — CLOZAPINE 200 MG/1
100 TABLET ORAL
Refills: 0 | Status: DISCONTINUED | OUTPATIENT
Start: 2024-08-12 | End: 2024-08-19

## 2024-08-12 RX ORDER — DIVALPROEX SODIUM 125 MG/1
500 CAPSULE, DELAYED RELEASE ORAL
Refills: 0 | Status: DISCONTINUED | OUTPATIENT
Start: 2024-08-12 | End: 2024-08-16

## 2024-08-12 RX ORDER — BENZTROPINE MESYLATE 2 MG/1
1 TABLET ORAL
Refills: 0 | Status: DISCONTINUED | OUTPATIENT
Start: 2024-08-12 | End: 2024-08-19

## 2024-08-12 RX ORDER — DEXTROSE 15 G/33 G
25 GEL IN PACKET (GRAM) ORAL ONCE
Refills: 0 | Status: DISCONTINUED | OUTPATIENT
Start: 2024-08-12 | End: 2024-08-19

## 2024-08-12 RX ORDER — PANTOPRAZOLE SODIUM 40 MG
40 TABLET, DELAYED RELEASE (ENTERIC COATED) ORAL
Refills: 0 | Status: DISCONTINUED | OUTPATIENT
Start: 2024-08-12 | End: 2024-08-19

## 2024-08-12 RX ORDER — DIVALPROEX SODIUM 125 MG/1
250 CAPSULE, DELAYED RELEASE ORAL THREE TIMES A DAY
Refills: 0 | Status: DISCONTINUED | OUTPATIENT
Start: 2024-08-12 | End: 2024-08-12

## 2024-08-12 RX ORDER — ENOXAPARIN SODIUM 100 MG/ML
40 INJECTION SUBCUTANEOUS EVERY 24 HOURS
Refills: 0 | Status: DISCONTINUED | OUTPATIENT
Start: 2024-08-12 | End: 2024-08-19

## 2024-08-12 RX ORDER — GLUCAGON INJECTION, SOLUTION 1 MG/.2ML
1 INJECTION, SOLUTION SUBCUTANEOUS ONCE
Refills: 0 | Status: DISCONTINUED | OUTPATIENT
Start: 2024-08-12 | End: 2024-08-19

## 2024-08-12 RX ORDER — DOCUSATE CALCIUM 240 MG/1
1 CAPSULE ORAL
Refills: 0 | DISCHARGE

## 2024-08-12 RX ORDER — METOPROLOL TARTRATE 100 MG/1
1 TABLET ORAL
Refills: 0 | DISCHARGE

## 2024-08-12 RX ORDER — SENNA 187 MG
2 TABLET ORAL AT BEDTIME
Refills: 0 | Status: DISCONTINUED | OUTPATIENT
Start: 2024-08-12 | End: 2024-08-19

## 2024-08-12 RX ORDER — POLYETHYLENE GLYCOL 3350 17 G/17G
17 POWDER, FOR SOLUTION ORAL AT BEDTIME
Refills: 0 | Status: DISCONTINUED | OUTPATIENT
Start: 2024-08-12 | End: 2024-08-19

## 2024-08-12 RX ORDER — DEXTROSE 15 G/33 G
15 GEL IN PACKET (GRAM) ORAL ONCE
Refills: 0 | Status: DISCONTINUED | OUTPATIENT
Start: 2024-08-12 | End: 2024-08-19

## 2024-08-12 RX ORDER — ACETAMINOPHEN 325 MG/1
650 TABLET ORAL EVERY 6 HOURS
Refills: 0 | Status: DISCONTINUED | OUTPATIENT
Start: 2024-08-12 | End: 2024-08-19

## 2024-08-12 RX ORDER — ASPIRIN 81 MG
81 TABLET, DELAYED RELEASE (ENTERIC COATED) ORAL DAILY
Refills: 0 | Status: DISCONTINUED | OUTPATIENT
Start: 2024-08-12 | End: 2024-08-19

## 2024-08-12 RX ORDER — ONDANSETRON 2 MG/ML
4 INJECTION, SOLUTION INTRAMUSCULAR; INTRAVENOUS EVERY 8 HOURS
Refills: 0 | Status: DISCONTINUED | OUTPATIENT
Start: 2024-08-12 | End: 2024-08-19

## 2024-08-12 RX ORDER — MAGNESIUM, ALUMINUM HYDROXIDE 200-225/5
30 SUSPENSION, ORAL (FINAL DOSE FORM) ORAL EVERY 4 HOURS
Refills: 0 | Status: DISCONTINUED | OUTPATIENT
Start: 2024-08-12 | End: 2024-08-19

## 2024-08-12 RX ORDER — METOPROLOL TARTRATE 100 MG/1
25 TABLET ORAL DAILY
Refills: 0 | Status: DISCONTINUED | OUTPATIENT
Start: 2024-08-12 | End: 2024-08-19

## 2024-08-12 RX ORDER — DIVALPROEX SODIUM 125 MG/1
250 CAPSULE, DELAYED RELEASE ORAL AT BEDTIME
Refills: 0 | Status: DISCONTINUED | OUTPATIENT
Start: 2024-08-12 | End: 2024-08-16

## 2024-08-12 RX ORDER — DEXTROSE 15 G/33 G
12.5 GEL IN PACKET (GRAM) ORAL ONCE
Refills: 0 | Status: DISCONTINUED | OUTPATIENT
Start: 2024-08-12 | End: 2024-08-19

## 2024-08-12 RX ORDER — LEVOTHYROXINE SODIUM 100 MCG
25 TABLET ORAL DAILY
Refills: 0 | Status: DISCONTINUED | OUTPATIENT
Start: 2024-08-12 | End: 2024-08-13

## 2024-08-12 RX ADMIN — Medication 25 GRAM(S): at 19:32

## 2024-08-12 RX ADMIN — DIVALPROEX SODIUM 250 MILLIGRAM(S): 125 CAPSULE, DELAYED RELEASE ORAL at 22:52

## 2024-08-12 RX ADMIN — CLOZAPINE 100 MILLIGRAM(S): 200 TABLET ORAL at 22:52

## 2024-08-12 RX ADMIN — BENZTROPINE MESYLATE 1 MILLIGRAM(S): 2 TABLET ORAL at 22:52

## 2024-08-12 NOTE — H&P ADULT - ASSESSMENT
53yoM w. hx of schizophrenia, seizures, presents to ED due to seizure? like activity per EMS, coming from [facility]. Pt. poor historian, states he did not have a seizure, states he was walking, fell weak and fell down. States he is taking his medications as prescribed. Slurring noted ( unknown if baseline). Attempted to contact NH but no one was able to state who or why EMS was called.    #Seizure like disorder  - cw home Valproaic acid 250 and 500mg at bedtime extended release (cont as 250mg tid)  - Valproic acid level is 80 (tagret )  - neurology consult requested EEG and to  home meds  - follow EEG    #Schizoaffective disorder  -c/ home meds  -Divaloproex, Benztropine, Clozapine  -Invega once monthly     #IHD  per echo in 2023:  - Echo has fixed RWMA at rest  - normal EF above 50%  - will get lipid profile   -  will start ASA 81 and atorvastatin 40mg    #DVT lovenox  #Diet DASH  #Amb as arden    NH resident    53yoM w. hx of schizophrenia, seizures, presents to ED due to seizure? like activity per EMS, coming from [facility]. Pt. poor historian, states he did not have a seizure, states he was walking, fell weak and fell down. States he is taking his medications as prescribed. Slurring noted ( unknown if baseline). Attempted to contact NH but no one was able to state who or why EMS was called.    #Seizure like disorder  - cw home Valproaic acid 250 and 500mg at bedtime extended release (cont as 250mg tid)  - Valproic acid level is 80 (tagret )  - neurology consult requested EEG and to  home meds  - follow EEG    #Schizoaffective disorder  -c/ home meds  -Divaloproex, Benztropine, Clozapine  -Invega once monthly     #IHD  per echo in 2023:  - Echo has fixed RWMA at rest  - normal EF above 50%  - will get lipid profile   -  will start ASA 81 and atorvastatin 40mg    #Hypothyroidism   - TSH was 8.56 Ft4 1.0 consistent with hypothyroidism yet last one Thyroid Stimulating Hormone, Serum: 4.37 uIU/mL (07.30.24 @ 17:00)   - Overally clinically appears euthyroid, questionable compliance as he has different staff members administering his meds at dfferent times of the day, and sometimes he tends to get up late and miss his morning meds which includes levothyroxine  - was on levothyroxine 25 mcgs daily per endo OP note    #type 2 DM, currently controlled  - under good control with A1c 6.4  - was on metformin 1000 mg BID not currently on ttt  - ISS and repeat a1c    #DVT lovenox  #Diet DASH  #Amb as arden    NH resident    53yoM w. hx of schizophrenia, seizures, presents to ED due to seizure? like activity per EMS, coming from [facility]. Pt. poor historian, states he did not have a seizure, states he was walking, fell weak and fell down. States he is taking his medications as prescribed. Slurring noted ( unknown if baseline). Attempted to contact NH but no one was able to state who or why EMS was called.    #Seizure like disorder  - cw home Valproaic acid 250 and 500mg at bedtime extended release (cont as 250mg tid)  - Valproic acid level is 80 (tagret )  - neurology consult requested EEG and to cw home meds  - follow EEG    #Schizoaffective disorder  -c/ home meds  -Divaloproex, Benztropine, Clozapine  -Invega once monthly     #IHD  per echo in 2023:  - Echo has fixed RWMA at rest  - normal EF above 50%  - will get lipid profile   -  will start ASA 81   - cw atorvastatin 40mg and metoprolol succ 25mg    #Hypothyroidism   - TSH was 8.56 Ft4 1.0 consistent with hypothyroidism yet last one Thyroid Stimulating Hormone, Serum: 4.37 uIU/mL (07.30.24 @ 17:00)   - Overally clinically appears euthyroid, questionable compliance as he has different staff members administering his meds at dfferent times of the day, and sometimes he tends to get up late and miss his morning meds which includes levothyroxine  - cw levothyroxine 25 mcgs daily per endo OP note    #type 2 DM, currently controlled  - under good control with A1c 6.4  - on metformin 1000 mg BID not currently on ttt  - ISS and repeat a1c    #DVT lovenox  #Diet DASH  #Amb as arden    NH resident

## 2024-08-12 NOTE — H&P ADULT - NSHPLABSRESULTS_GEN_ALL_CORE
08-12    143  |  107  |  6<L>  ----------------------------<  84  4.7   |  25  |  0.9    Ca    9.6      12 Aug 2024 16:54  Mg     1.8     08-12    TPro  7.3  /  Alb  4.0  /  TBili  0.2  /  DBili  x   /  AST  12  /  ALT  7   /  AlkPhos  82  08-12                          11.4   9.18  )-----------( 266      ( 12 Aug 2024 16:54 )             37.7

## 2024-08-12 NOTE — ED ADULT TRIAGE NOTE - CHIEF COMPLAINT QUOTE
Patient bibems from [address] [place] [address] in NAD a+ox3 - as per EMS staff called pt was found sleeping on floor and pt stated he thinks he had seizure. Pt with hx of seizures.

## 2024-08-12 NOTE — H&P ADULT - HISTORY OF PRESENT ILLNESS
53-year-old male past medical history of schizophrenia prediabetes hypertension ?seizures, on Depakote per chart, presents with fall.  Patient states he does not know what happened and thinks he might of had a seizure is not sure.  States he lives at [facility] and wants to go back home, requesting food.  Denies any pain or injury from the fall.  Does not know if he hit his head.  Does not know the last seizure.  No fever cough chest pain shortness of breath.  No headache neck pain back pain.  No numbness weakness blurry vision slurred speech altered mental status.  No abdominal pain nausea by diarrhea constipation.  No dysuria frequency hematuria.  Denies drinking alcohol.    Patient was DCed 8/2/2024 for pneumonia    ED had a CTH within normal  Valproic Acid Level, Serum: 80.0 ug/mL (08.12.24 @ 16:54)   seen by neurology reuqested EEG and to  home meds    Pt is a resident at [facility]

## 2024-08-12 NOTE — ED ADULT NURSE NOTE - NSFALLUNIVINTERV_ED_ALL_ED
Bed/Stretcher in lowest position, wheels locked, appropriate side rails in place/Call bell, personal items and telephone in reach/Instruct patient to call for assistance before getting out of bed/chair/stretcher/Non-slip footwear applied when patient is off stretcher/[place] to call system/Physically safe environment - no spills, clutter or unnecessary equipment/Purposeful proactive rounding/Room/bathroom lighting operational, light cord in reach

## 2024-08-12 NOTE — ED PROVIDER NOTE - PHYSICAL EXAMINATION
VITAL SIGNS: I have reviewed nursing notes and confirm.  CONSTITUTIONAL: well-appearing, non-toxic, NAD  SKIN: Warm dry, normal skin turgor  HEAD: NCAT  EYES: EOMI, PERRLA, no scleral icterus  NECK: Supple; non tender. Full ROM. No cervical LAD  CARD: RRR, no murmurs, rubs or gallops  RESP: clear to ausculation b/l.  No rales, rhonchi, or wheezing.  ABD: soft,  non-tender, non-distended, no rebound or guarding.   EXT: Full ROM  NEURO: normal motor. normal sensory. CN II-XII intact. Unable to asses gait. Slurred speech.   PSYCH: Cooperative, appropriate.

## 2024-08-12 NOTE — CONSULT NOTE ADULT - SUBJECTIVE AND OBJECTIVE BOX
Neurology Consult    Patient is a 53y old  Male who presents with a chief complaint of reported seizure activity    HPI:  53-year-old male past medical history of schizophrenia prediabetes hypertension, reported seizure history, on Depakote per chart, presents with fall.  Patient states he does not know what happened and thinks he might have tripped on his shoelace.  States he lives at [facility] and wants to go back home, requesting food.      PAST MEDICAL & SURGICAL HISTORY:  DM (diabetes mellitus)      HTN (hypertension)      Schizophrenia      Substance use disorder      Echocardiogram abnormal      No significant past surgical history          FAMILY HISTORY:  No pertinent family history in first degree relatives        Social History: (-) x 3    Allergies    fish (Unknown)  Thorazine (Unknown)  penicillin (Unknown)  pork (Unknown)  Navane (Unknown)    Intolerances        MEDICATIONS  (STANDING):    MEDICATIONS  (PRN):      Vital Signs Last 24 Hrs  T(C): 37.1 (12 Aug 2024 16:55), Max: 37.1 (12 Aug 2024 16:55)  T(F): 98.7 (12 Aug 2024 16:55), Max: 98.7 (12 Aug 2024 16:55)  HR: 102 (12 Aug 2024 16:55) (102 - 107)  BP: 143/93 (12 Aug 2024 16:55) (143/93 - 145/96)  BP(mean): --  RR: 18 (12 Aug 2024 16:55) (18 - 18)  SpO2: 95% (12 Aug 2024 16:55) (95% - 98%)    Parameters below as of 12 Aug 2024 16:55  Patient On (Oxygen Delivery Method): room air        Examination:  Cognitive/Language:  The patient is oriented to person, place, time and date.  Recent and remote memory intact.  Fund of knowledge is intact and normal.  Language with normal repetition, comprehension and naming.  Nondysarthric.    Eyes: intact VA, VFF.  EOMI w/o nystagmus, skew or reported double vision.  PERRL.  No ptosis/weakness of eyelid closure.    Face:  Facial sensation normal V1 - 3, no facial asymmetry.    Formal Muscle Strength Testing: (MRC grade R/L) 5/5 UE; 5/5 LE.   Sensory examination:   Intact to light touch in all extremities.  Cerebellum:   FTN/HKS intact with normal MALICK in all limbs.  No dysmetria or dysdiadokinesia.        NIHSS 0    Labs:   CBC Full  -  ( 12 Aug 2024 16:54 )  WBC Count : 9.18 K/uL  RBC Count : 4.44 M/uL  Hemoglobin : 11.4 g/dL  Hematocrit : 37.7 %  Platelet Count - Automated : 266 K/uL  Mean Cell Volume : 84.9 fL  Mean Cell Hemoglobin : 25.7 pg  Mean Cell Hemoglobin Concentration : 30.2 g/dL  Auto Neutrophil # : 5.95 K/uL  Auto Lymphocyte # : 2.43 K/uL  Auto Monocyte # : 0.62 K/uL  Auto Eosinophil # : 0.13 K/uL  Auto Basophil # : 0.02 K/uL  Auto Neutrophil % : 64.8 %  Auto Lymphocyte % : 26.5 %  Auto Monocyte % : 6.8 %  Auto Eosinophil % : 1.4 %  Auto Basophil % : 0.2 %    08-12    143  |  107  |  6<L>  ----------------------------<  84  4.7   |  25  |  0.9    Ca    9.6      12 Aug 2024 16:54  Mg     1.8     08-12    TPro  7.3  /  Alb  4.0  /  TBili  0.2  /  DBili  x   /  AST  12  /  ALT  7   /  AlkPhos  82  08-12    LIVER FUNCTIONS - ( 12 Aug 2024 16:54 )  Alb: 4.0 g/dL / Pro: 7.3 g/dL / ALK PHOS: 82 U/L / ALT: 7 U/L / AST: 12 U/L / GGT: x             Urinalysis Basic - ( 12 Aug 2024 16:54 )    Color: x / Appearance: x / SG: x / pH: x  Gluc: 84 mg/dL / Ketone: x  / Bili: x / Urobili: x   Blood: x / Protein: x / Nitrite: x   Leuk Esterase: x / RBC: x / WBC x   Sq Epi: x / Non Sq Epi: x / Bacteria: x          Neuroimaging:  NCHCT: CT Head No Cont:   ACC: [id number] EXAM:  CT BRAIN   ORDERED BY: SHANNAN COHEN     PROCEDURE DATE:  08/12/2024          INTERPRETATION:  CLINICAL INDICATION: Status post fall.    Technique: CT of the head was performed without contrast.    Multiple contiguous axial images were acquired from the skullbase to the   vertex without the administration of intravenous contrast.  Coronal and   sagittal reformations were made.    COMPARISON:  prior head CT dated 12/12/2023.    FINDINGS:    The ventricles and sulci are unremarkable in appearance.     There is no intraparenchymal hematoma, mass effect or midline shift. No   abnormal extra-axial fluid collections are present.    The calvarium is intact. The visualized intraorbital compartments,   paranasal sinuses and mastoid complexes appear free of acute disease.    IMPRESSION:  No acute intracranial pathology. No evidence of midline shift, mass   effect or intracranial hemorrhage.    --- End of Report ---            LILIA MORALES MD; Attending Radiologist  This document has been electronically signed. Aug 12 2024  4:02PM (08-12-24 @ 15:58)      08-12-24 @ 19:00

## 2024-08-12 NOTE — ED ADULT NURSE NOTE - CHIEF COMPLAINT QUOTE
Patient bibems from [address] [place] [address] in NAD a+ox3 - as per EMS staff called pt was found sleeping on floor and pt stated he thinks he had seizure. Pt with hx of seizures.

## 2024-08-12 NOTE — ED PROVIDER NOTE - ATTENDING CONTRIBUTION TO CARE
53-year-old male past medical history of schizophrenia prediabetes hypertension ?seizures, on Depakote per chart, presents with fall.  Patient states he does not know what happened and thinks he might of had a seizure is not sure.  States he lives at [address] and wants to go back home, requesting food.  Denies any pain or injury from the fall.  Does not know if he hit his head.  Does not know the last seizure.  No fever cough chest pain shortness of breath.  No headache neck pain back pain.  No numbness weakness blurry vision slurred speech altered mental status.  No abdominal pain nausea by diarrhea constipation.  No dysuria frequency hematuria.  Denies drinking alcohol.    On exam, AFVSS, Well appearing, No acute distress, NCAT, EOMI, PERRLA, MMM, Neck supple, LCTAB, RRR nl s1s2 No mrg, Abdomen Soft NTND, aaox3, CN 2-12 intact, No nystagmus.  5/5 motor x 4 ext, SILT x 4 extremities, No facial droop or slurred speech. No pronator drift.  Normal rapid alternating movement and finger nose finger bilaterally. No midline C/T/L tenderness to palpation or step off.  No LE edema or calf TTP,    A/P; seizure/fall, will do labs CT head chest x-ray EKG urine check Depakote level neurology consult reeval

## 2024-08-12 NOTE — ED PROVIDER NOTE - CLINICAL SUMMARY MEDICAL DECISION MAKING FREE TEXT BOX
Patient presents with seizure versus possible syncope, versus fall, patient poor historian and unable to obtain collateral from [facility], patient seen by neurology can get EEG as inpatient if patient admitted, will admit to medicine possible new onset seizures, neuro states patient may be on Depakote for psychiatric purposes not seizures    Labs and EKG were ordered and reviewed.  Imaging was ordered and reviewed by me.  Appropriate medications for patient's presenting complaints were ordered and effects were reassessed.  Patient's records (prior hospital, ED visit, and/or nursing home notes if available) were reviewed.  Additional history was obtained from EMS, family, and/or PCP (where available).  Escalation to admission/observation was considered.  Patient requires inpatient hospitalization - monitored setting.      ED CXR prelim, my independent interpretation - Dr. Pratibha James: [No PTX, No infiltrates, No Free air]  ED EKG: my independent interpretation - Dr. Pratibha James : [NSR, no STEMI]

## 2024-08-12 NOTE — H&P ADULT - NSHPPHYSICALEXAM_GEN_ALL_CORE
T(C): 37.1 (08-12-24 @ 16:55), Max: 37.1 (08-12-24 @ 16:55)  HR: 102 (08-12-24 @ 16:55) (102 - 107)  BP: 143/93 (08-12-24 @ 16:55) (143/93 - 145/96)  RR: 18 (08-12-24 @ 16:55) (18 - 18)  SpO2: 95% (08-12-24 @ 16:55) (95% - 98%)    CONSTITUTIONAL: Well groomed, no apparent distress  EYES: PERRLA and symmetric, EOMI, No conjunctival or scleral injection, non-icteric  ENMT: Oral mucosa with moist membranes. Normal dentition; no pharyngeal injection or exudates             NECK: Supple, symmetric and without tracheal deviation   RESP: No respiratory distress, no use of accessory muscles; CTA b/l, no WRR  CV: RRR, +S1S2, no MRG; no JVD; no peripheral edema  GI: Soft, NT, distended  SKIN: No rashes or ulcers noted; no subcutaneous nodules or induration palpable  NEURO: CN II-XII intact; normal reflexes in upper and lower extremities, sensation intact in upper and lower extremities b/l to light touch   PSYCH: A+O x 3,

## 2024-08-12 NOTE — ED PROVIDER NOTE - OBJECTIVE STATEMENT
53yoM w. hx of schizophrenia, seizures, presents to ED due to seizure? like activity per EMS, coming from [facility]. Pt. poor historian, states he did not have a seizure, states he was walking, fell weak and fell down. States he is taking his medications as prescribed. Slurring noted ( unknown if baseline). Attempted to contact NH but no one was able to state who or why EMS was called.

## 2024-08-12 NOTE — H&P ADULT - ATTENDING COMMENTS
53yoM w. hx of schizophrenia, seizures, presents to ED due to seizure? like activity per EMS, coming from [facility]. Pt. poor historian, states he did not have a seizure, states he was walking, fell weak and fell down. States he is taking his medications as prescribed. Slurring noted ( unknown if baseline). Attempted to contact NH but no one was able to state who or why EMS was called.      Agree  with assessment  except for changes below.   Vital Signs Last 24 Hrs  T(C): 36.9 (12 Aug 2024 22:50), Max: 37.1 (12 Aug 2024 16:55)  T(F): 98.4 (12 Aug 2024 22:50), Max: 98.7 (12 Aug 2024 16:55)  HR: 115 (12 Aug 2024 22:50) (102 - 115)  BP: 119/76 (12 Aug 2024 22:50) (119/76 - 145/96)  BP(mean): 90 (12 Aug 2024 22:50) (90 - 90)  RR: 18 (12 Aug 2024 22:50) (18 - 18)  SpO2: 96% (12 Aug 2024 22:50) (95% - 98%)    Parameters below as of 12 Aug 2024 16:55  Patient On (Oxygen Delivery Method): room air      IMPRESSION  Suspected  Seizure   - cw home Valproaic acid 250 and 500mg at bedtime extended release (cont as 250mg tid)  - Valproic acid level is 80 (tagret )  - neurology consult requested EEG and to cw home meds  - follow EEG  - Seizure 53yoM w. hx of schizophrenia, seizures, presents to ED due to seizure? like activity per EMS, coming from [facility]. Pt. poor historian, states he did not have a seizure, states he was walking, fell weak and fell down. States he is taking his medications as prescribed. Slurring noted ( unknown if baseline). Attempted to contact NH but no one was able to state who or why EMS was called.      Agree  with assessment  except for changes below.   Vital Signs Last 24 Hrs  T(C): 36.9 (12 Aug 2024 22:50), Max: 37.1 (12 Aug 2024 16:55)  T(F): 98.4 (12 Aug 2024 22:50), Max: 98.7 (12 Aug 2024 16:55)  HR: 115 (12 Aug 2024 22:50) (102 - 115)  BP: 119/76 (12 Aug 2024 22:50) (119/76 - 145/96)  BP(mean): 90 (12 Aug 2024 22:50) (90 - 90)  RR: 18 (12 Aug 2024 22:50) (18 - 18)  SpO2: 96% (12 Aug 2024 22:50) (95% - 98%)    Parameters below as of 12 Aug 2024 16:55  Patient On (Oxygen Delivery Method): room air    CT A/P: No acute intracranial pathology. No evidence of midline shift, mass   effect or intracranial hemorrhage.      IMPRESSION  Suspected  Seizure   - cw home Valproaic acid 250 and 500mg at bedtime extended release (cont as 250mg tid)  - Valproic acid level is 80 (tagret )  - neurology consult requested EEG and to cw home meds  - Follow EEG  - Seizure Precautions     Hx Schizoaffective Disorder  -c/ home meds  -Divaloproex, Benztropine, Clozapine  -Invega once monthly     Hx IHD  Per echo in 2023:  Echo has fixed RWMA at rest  - normal EF above 50%  - Get lipid profile   -  ASA 81   - cw atorvastatin 40mg and Metoprolol succ 25mg    Hx Hypothyroidism   - TSH was 8.56 Ft4 1.0 consistent with hypothyroidism yet last one Thyroid Stimulating Hormone, Serum: 4.37 uIU/mL (07.30.24 @ 17:00)   - cw levothyroxine 25 mcgs daily per endo OP note    Hx type 2 DM, currently controlled  Under good control with A1c 6.4  Metformin 1000 mg BID   Hold oral meds;  check fs  Start insulin  regimen if  serum Glucose IF FS >180,   Adjust insulin  Lantus/Lispro,  for  Goal 140-180  Hold for Hypoglycemia 53yoM w. hx of schizophrenia, seizures, presents to ED due to seizure? like activity per EMS, coming from [facility]. Pt. poor historian, states he did not have a seizure, states he was walking, fell weak and fell down. States he is taking his medications as prescribed. Slurring noted ( unknown if baseline). Attempted to contact NH but no one was able to state who or why EMS was called.      Agree  with assessment  except for changes below.   Vital Signs Last 24 Hrs  T(C): 36.9 (12 Aug 2024 22:50), Max: 37.1 (12 Aug 2024 16:55)  T(F): 98.4 (12 Aug 2024 22:50), Max: 98.7 (12 Aug 2024 16:55)  HR: 115 (12 Aug 2024 22:50) (102 - 115)  BP: 119/76 (12 Aug 2024 22:50) (119/76 - 145/96)  BP(mean): 90 (12 Aug 2024 22:50) (90 - 90)  RR: 18 (12 Aug 2024 22:50) (18 - 18)  SpO2: 96% (12 Aug 2024 22:50) (95% - 98%)    Parameters below as of 12 Aug 2024 16:55  Patient On (Oxygen Delivery Method): room air    CT A/P: No acute intracranial pathology. No evidence of midline shift, mass   effect or intracranial hemorrhage.      IMPRESSION  Suspected  Seizure   - cw home Valproaic acid 250 and 500mg at bedtime extended release (cont as 250mg tid)  - Valproic acid level is 80 (tagret )  Correct electrolytes (Target Na = 135-145 | Mg = >2.2 | K = 3.5-5)   - neurology consult requested EEG and to cw home meds  send lacate  - Follow EEG  - Seizure Precautions     Hx Schizoaffective Disorder  -c/ home meds  -Divaloproex, Benztropine, Clozapine  -Invega once monthly     Hx  HFpEF  Per echo in 2023:  Echo Shows fixed RWMA at rest  ECG: Sinus tachycardia Incomplete right bundle branch block  - normal EF above 50%  - Get lipid profile   -  ASA 81   - cw atorvastatin 40mg and Metoprolol succ 25mg    Hx Hypothyroidism   - TSH was 8.56 Ft4 1.0 consistent with hypothyroidism yet last one Thyroid Stimulating Hormone, Serum: 4.37 uIU/mL (07.30.24 @ 17:00)   - cw levothyroxine 25 mcgs daily per endo OP note    Hx type 2 DM, currently controlled  Under good control with A1c 6.4  Hold oral meds;  check fs  Start insulin  regimen if  serum Glucose IF FS >180,   Adjust insulin  Lantus/Lispro,  for  Goal 140-180  Hold for Hypoglycemia 53yoM w. hx of schizophrenia, seizures, presents to ED due to seizure? like activity per EMS, coming from [facility]. Pt. poor historian, states he did not have a seizure, states he was walking, fell weak and fell down. States he is taking his medications as prescribed. Slurring noted ( unknown if baseline). Attempted to contact NH but no one was able to state who or why EMS was called.      Agree  with assessment  except for changes below.   Vital Signs Last 24 Hrs  T(C): 36.9 (12 Aug 2024 22:50), Max: 37.1 (12 Aug 2024 16:55)  T(F): 98.4 (12 Aug 2024 22:50), Max: 98.7 (12 Aug 2024 16:55)  HR: 115 (12 Aug 2024 22:50) (102 - 115)  BP: 119/76 (12 Aug 2024 22:50) (119/76 - 145/96)  BP(mean): 90 (12 Aug 2024 22:50) (90 - 90)  RR: 18 (12 Aug 2024 22:50) (18 - 18)  SpO2: 96% (12 Aug 2024 22:50) (95% - 98%)    Parameters below as of 12 Aug 2024 16:55  Patient On (Oxygen Delivery Method): room air    CT A/P: No acute intracranial pathology. No evidence of midline shift, mass   effect or intracranial hemorrhage.    PHYSICAL EXAM  GENERAL: NAD,  HEAD:  NCAT, EOMI, MM  NECK: Supple, Nontender  NERVOUS SYSTEM:  AAOx2-3, NFD  CHEST/LUNG: +bs b/l, No wheezing   HEART: +s1s2 RRR  ABDOMEN: soft, NT/ND  EXTREMITIES:  pp, no edema  SKIN: age related skin changes       IMPRESSION  Suspected  Seizure   - cw home Valproaic acid 250 and 500mg at bedtime extended release (cont as 250mg tid)  - Valproic acid level is 80 (tagret )  Correct electrolytes (Target Na = 135-145 | Mg = >2.2 | K = 3.5-5)   - neurology consult requested EEG and to  home meds  send lacate  - Follow EEG  - Seizure Precautions     Hx Schizoaffective Disorder  -c/ home meds  -Divaloproex, Benztropine, Clozapine  -Invega once monthly     Hx  HFpEF  Per echo in 2023:  Echo Shows fixed RWMA at rest  ECG: Sinus tachycardia Incomplete right bundle branch block  - normal EF above 50%  - Get lipid profile   -  ASA 81   - cw atorvastatin 40mg and Metoprolol succ 25mg    Hx Hypothyroidism   - TSH was 8.56 Ft4 1.0 consistent with hypothyroidism yet last one Thyroid Stimulating Hormone, Serum: 4.37 uIU/mL (07.30.24 @ 17:00)   - cw levothyroxine 25 mcgs daily per endo OP note    Hx type 2 DM, currently controlled  Under good control with A1c 6.4  Hold oral meds;  check fs  Start insulin  regimen if  serum Glucose IF FS >180,   Adjust insulin  Lantus/Lispro,  for  Goal 140-180  Hold for Hypoglycemia    Seen on 08/12

## 2024-08-12 NOTE — ED PROVIDER NOTE - EKG/XRAY ADDITIONAL INFORMATION
ED CXR prelim, my independent interpretation - Dr. Pratibha James: [No PTX, No infiltrates, No Free air]   ED EKG: my independent interpretation - Dr. Pratibha James : [103 NSR, no STEMI]

## 2024-08-12 NOTE — CONSULT NOTE ADULT - ASSESSMENT
Impression:  53-year-old male past medical history of schizophrenia prediabetes hypertension, reported seizure history, on Depakote per chart, presents with fall. Diagnosed with  COVID in July. He does not know what happened and thinks he might have tripped on his shoelace.  States he lives at [address] and wants to go back home, requesting food.  CTH without acute intracranial pathology. Hospital staff was told patient might have had seizure like activity prior to fall but was not answering the phone for ED staff to elaborate on seizure characteristics.   Etiology of episode unclear patient reports mechanical fall but is not the best historian, NH unavailable at this time.     Suggestion:  Routine EEG  Seizure precautions  Keep magnesium >2  Continue home meds.  Medical management as per primary team.  Impression:  53-year-old male past medical history of schizophrenia prediabetes hypertension, reported seizure history, on Depakote per chart, presents with unwitnessed fall possibly mechanical.  Pt denies LOC but SNF staff reported patient might have had seizure like activity prior to fall but was not answering the phone for ED staff to elaborate on seizure characteristics.  Etiology of episode unclear patient reports mechanical fall but is not the best historian, NH unavailable at this time.  VPA levels therapeutic.  No focal neurologic deficits.    Suggestion:  Routine EEG  Seizure precautions  Keep magnesium >2  Continue home meds.  Medical management as per primary team.   if EEG (-), no further inpt neurologic w/u  continue Depakote at home dose for now

## 2024-08-13 LAB
A1C WITH ESTIMATED AVERAGE GLUCOSE RESULT: 6.2 % — HIGH (ref 4–5.6)
ALBUMIN SERPL ELPH-MCNC: 3.9 G/DL — SIGNIFICANT CHANGE UP (ref 3.5–5.2)
ALP SERPL-CCNC: 90 U/L — SIGNIFICANT CHANGE UP (ref 30–115)
ALT FLD-CCNC: 8 U/L — SIGNIFICANT CHANGE UP (ref 0–41)
AMMONIA BLD-MCNC: 56 UMOL/L — HIGH (ref 11–55)
ANION GAP SERPL CALC-SCNC: 11 MMOL/L — SIGNIFICANT CHANGE UP (ref 7–14)
AST SERPL-CCNC: 10 U/L — SIGNIFICANT CHANGE UP (ref 0–41)
BASOPHILS # BLD AUTO: 0.01 K/UL — SIGNIFICANT CHANGE UP (ref 0–0.2)
BASOPHILS NFR BLD AUTO: 0.1 % — SIGNIFICANT CHANGE UP (ref 0–1)
BILIRUB SERPL-MCNC: 0.2 MG/DL — SIGNIFICANT CHANGE UP (ref 0.2–1.2)
BUN SERPL-MCNC: 8 MG/DL — LOW (ref 10–20)
CALCIUM SERPL-MCNC: 9.5 MG/DL — SIGNIFICANT CHANGE UP (ref 8.4–10.4)
CHLORIDE SERPL-SCNC: 104 MMOL/L — SIGNIFICANT CHANGE UP (ref 98–110)
CHOLEST SERPL-MCNC: 170 MG/DL — SIGNIFICANT CHANGE UP
CO2 SERPL-SCNC: 27 MMOL/L — SIGNIFICANT CHANGE UP (ref 17–32)
CREAT SERPL-MCNC: 0.8 MG/DL — SIGNIFICANT CHANGE UP (ref 0.7–1.5)
EGFR: 106 ML/MIN/1.73M2 — SIGNIFICANT CHANGE UP
EOSINOPHIL # BLD AUTO: 0.15 K/UL — SIGNIFICANT CHANGE UP (ref 0–0.7)
EOSINOPHIL NFR BLD AUTO: 1.4 % — SIGNIFICANT CHANGE UP (ref 0–8)
ESTIMATED AVERAGE GLUCOSE: 131 MG/DL — HIGH (ref 68–114)
GLUCOSE BLDC GLUCOMTR-MCNC: 104 MG/DL — HIGH (ref 70–99)
GLUCOSE BLDC GLUCOMTR-MCNC: 118 MG/DL — HIGH (ref 70–99)
GLUCOSE BLDC GLUCOMTR-MCNC: 119 MG/DL — HIGH (ref 70–99)
GLUCOSE BLDC GLUCOMTR-MCNC: 124 MG/DL — HIGH (ref 70–99)
GLUCOSE SERPL-MCNC: 161 MG/DL — HIGH (ref 70–99)
HCT VFR BLD CALC: 34.5 % — LOW (ref 42–52)
HDLC SERPL-MCNC: 37 MG/DL — LOW
HGB BLD-MCNC: 10.6 G/DL — LOW (ref 14–18)
IMM GRANULOCYTES NFR BLD AUTO: 0.5 % — HIGH (ref 0.1–0.3)
LIPID PNL WITH DIRECT LDL SERPL: 113 MG/DL — HIGH
LYMPHOCYTES # BLD AUTO: 28.3 % — SIGNIFICANT CHANGE UP (ref 20.5–51.1)
LYMPHOCYTES # BLD AUTO: 3.02 K/UL — SIGNIFICANT CHANGE UP (ref 1.2–3.4)
MCHC RBC-ENTMCNC: 25.8 PG — LOW (ref 27–31)
MCHC RBC-ENTMCNC: 30.7 G/DL — LOW (ref 32–37)
MCV RBC AUTO: 83.9 FL — SIGNIFICANT CHANGE UP (ref 80–94)
MONOCYTES # BLD AUTO: 1.22 K/UL — HIGH (ref 0.1–0.6)
MONOCYTES NFR BLD AUTO: 11.4 % — HIGH (ref 1.7–9.3)
NEUTROPHILS # BLD AUTO: 6.21 K/UL — SIGNIFICANT CHANGE UP (ref 1.4–6.5)
NEUTROPHILS NFR BLD AUTO: 58.3 % — SIGNIFICANT CHANGE UP (ref 42.2–75.2)
NON HDL CHOLESTEROL: 133 MG/DL — HIGH
NRBC # BLD: 0 /100 WBCS — SIGNIFICANT CHANGE UP (ref 0–0)
PLATELET # BLD AUTO: 252 K/UL — SIGNIFICANT CHANGE UP (ref 130–400)
PMV BLD: 10 FL — SIGNIFICANT CHANGE UP (ref 7.4–10.4)
POTASSIUM SERPL-MCNC: 3.8 MMOL/L — SIGNIFICANT CHANGE UP (ref 3.5–5)
POTASSIUM SERPL-SCNC: 3.8 MMOL/L — SIGNIFICANT CHANGE UP (ref 3.5–5)
PROT SERPL-MCNC: 6.4 G/DL — SIGNIFICANT CHANGE UP (ref 6–8)
RBC # BLD: 4.11 M/UL — LOW (ref 4.7–6.1)
RBC # FLD: 19.6 % — HIGH (ref 11.5–14.5)
SODIUM SERPL-SCNC: 142 MMOL/L — SIGNIFICANT CHANGE UP (ref 135–146)
TRIGL SERPL-MCNC: 102 MG/DL — SIGNIFICANT CHANGE UP
WBC # BLD: 10.66 K/UL — SIGNIFICANT CHANGE UP (ref 4.8–10.8)
WBC # FLD AUTO: 10.66 K/UL — SIGNIFICANT CHANGE UP (ref 4.8–10.8)

## 2024-08-13 PROCEDURE — 99222 1ST HOSP IP/OBS MODERATE 55: CPT

## 2024-08-13 PROCEDURE — 93010 ELECTROCARDIOGRAM REPORT: CPT

## 2024-08-13 PROCEDURE — 99232 SBSQ HOSP IP/OBS MODERATE 35: CPT

## 2024-08-13 RX ORDER — CHLORHEXIDINE GLUCONATE 40 MG/ML
1 SOLUTION TOPICAL
Refills: 0 | Status: DISCONTINUED | OUTPATIENT
Start: 2024-08-13 | End: 2024-08-19

## 2024-08-13 RX ORDER — LEVOTHYROXINE SODIUM 100 MCG
100 TABLET ORAL DAILY
Refills: 0 | Status: DISCONTINUED | OUTPATIENT
Start: 2024-08-13 | End: 2024-08-19

## 2024-08-13 RX ADMIN — Medication 40 MILLIGRAM(S): at 21:32

## 2024-08-13 RX ADMIN — DIVALPROEX SODIUM 250 MILLIGRAM(S): 125 CAPSULE, DELAYED RELEASE ORAL at 21:32

## 2024-08-13 RX ADMIN — DIVALPROEX SODIUM 500 MILLIGRAM(S): 125 CAPSULE, DELAYED RELEASE ORAL at 06:47

## 2024-08-13 RX ADMIN — CLOZAPINE 100 MILLIGRAM(S): 200 TABLET ORAL at 17:18

## 2024-08-13 RX ADMIN — Medication 5 MILLIGRAM(S): at 21:32

## 2024-08-13 RX ADMIN — METOPROLOL TARTRATE 25 MILLIGRAM(S): 100 TABLET ORAL at 06:46

## 2024-08-13 RX ADMIN — DIVALPROEX SODIUM 500 MILLIGRAM(S): 125 CAPSULE, DELAYED RELEASE ORAL at 17:17

## 2024-08-13 RX ADMIN — BENZTROPINE MESYLATE 1 MILLIGRAM(S): 2 TABLET ORAL at 17:18

## 2024-08-13 RX ADMIN — Medication 2 TABLET(S): at 21:31

## 2024-08-13 RX ADMIN — CLOZAPINE 100 MILLIGRAM(S): 200 TABLET ORAL at 06:46

## 2024-08-13 RX ADMIN — Medication 25 MICROGRAM(S): at 06:46

## 2024-08-13 RX ADMIN — Medication 81 MILLIGRAM(S): at 11:16

## 2024-08-13 RX ADMIN — Medication 40 MILLIGRAM(S): at 06:56

## 2024-08-13 RX ADMIN — BENZTROPINE MESYLATE 1 MILLIGRAM(S): 2 TABLET ORAL at 06:46

## 2024-08-13 RX ADMIN — POLYETHYLENE GLYCOL 3350 17 GRAM(S): 17 POWDER, FOR SOLUTION ORAL at 21:31

## 2024-08-13 NOTE — PATIENT PROFILE ADULT - FALL HARM RISK - HARM RISK INTERVENTIONS

## 2024-08-13 NOTE — PROGRESS NOTE ADULT - ATTENDING COMMENTS
Pt is here because of questionable seizure like activity     Pt seen and examined at the bedside.     Vitals: HD stable, O2 stable  Gen: appropriate affect, no acute distress  Neuro: no focal defects, no sensory deficits  HEENT: EOMI, no JVD, normocephalic, atraumatic, no scleral icterus  Cardio: RRR, S1 S2 present, no murmurs, rubs, or gallops  Resp: lungs b/l CTA, chest wall intact  Abd: soft, nondistended, nontender  MSK: no gross joint abnormalities, no obvious swelling  Skin: no rashes or wounds  Heme: no ecchymosis or petechiae present    hx of seizure   IHD  schizophrenia  chronic anemia not requiring blood transfusion    -CT head normal  -valproic acid level at goal   -EEG pending     pain control  DVT ppx - lovenox   GI ppx  diet  Code status  DC planning     I personally reviewed labs and imaging and ordered necessary testing/medications  I discussed care of the patient with licensed providers  I personally reviewed chart and consultant recommendations  Pt has complex medical issues that require extensive diagnosis and intervention / threat to life  I personally spent 50 minutes in care of patient. Pt is here because of questionable seizure like activity     Pt seen and examined at the bedside. Pt has no acute complaints at this time    Vitals: HD stable, O2 stable  Gen: appropriate affect, no acute distress  Neuro: no focal defects, no sensory deficits  HEENT: EOMI, no JVD, normocephalic, atraumatic, no scleral icterus  Cardio: RRR, S1 S2 present, no murmurs, rubs, or gallops  Resp: lungs b/l CTA, chest wall intact  Abd: soft, nondistended, nontender  MSK: no gross joint abnormalities, no obvious swelling  Skin: no rashes or wounds  Heme: no ecchymosis or petechiae present    hx of seizure   suspected seizure activity   IHD  schizoaffective disorder  chronic anemia not requiring blood transfusion  hx of hypothyroidism     -CT head normal  -valproic acid level at goal   -obtain ammonia level  -EEG pending   -hypothrydoisim - synthroid at very low dose for patient's body weight - adjusting 8/13     DVT ppx - lovenox   DC planning - pending EEG    I personally reviewed labs and imaging (CT head) and ordered necessary testing/medications  I discussed care of the patient with licensed providers  I personally reviewed chart and consultant recommendations  I personally spent 50 minutes in care of patient.

## 2024-08-13 NOTE — PROGRESS NOTE ADULT - SUBJECTIVE AND OBJECTIVE BOX
SUBJECTIVE:    Patient is a 53y old Male who presents with a chief complaint of Seizure? (12 Aug 2024 21:45)    Currently admitted to medicine with the primary diagnosis of Seizure-like activity       Today is hospital day 1d. This morning he is resting comfortably in bed and reports no new issues or overnight events.     PAST MEDICAL & SURGICAL HISTORY  DM (diabetes mellitus)    HTN (hypertension)    Schizophrenia    Substance use disorder    Echocardiogram abnormal    No significant past surgical history      SOCIAL HISTORY:  Negative for smoking/alcohol/drug use.     ALLERGIES:  fish (Unknown)  Thorazine (Unknown)  penicillin (Unknown)  pork (Unknown)  Navane (Unknown)    MEDICATIONS:  STANDING MEDICATIONS  aspirin enteric coated 81 milliGRAM(s) Oral daily  atorvastatin 40 milliGRAM(s) Oral at bedtime  benztropine 1 milliGRAM(s) Oral two times a day  bisacodyl 5 milliGRAM(s) Oral at bedtime  chlorhexidine 2% Cloths 1 Application(s) Topical <User Schedule>  cloZAPine 100 milliGRAM(s) Oral two times a day  dextrose 5%. 1000 milliLiter(s) IV Continuous <Continuous>  dextrose 5%. 1000 milliLiter(s) IV Continuous <Continuous>  dextrose 50% Injectable 25 Gram(s) IV Push once  dextrose 50% Injectable 12.5 Gram(s) IV Push once  dextrose 50% Injectable 25 Gram(s) IV Push once  divalproex  milliGRAM(s) Oral two times a day  divalproex  milliGRAM(s) Oral at bedtime  enoxaparin Injectable 40 milliGRAM(s) SubCutaneous every 24 hours  glucagon  Injectable 1 milliGRAM(s) IntraMuscular once  insulin lispro (ADMELOG) corrective regimen sliding scale   SubCutaneous three times a day before meals  levothyroxine 25 MICROGram(s) Oral daily  metoprolol succinate ER 25 milliGRAM(s) Oral daily  pantoprazole    Tablet 40 milliGRAM(s) Oral before breakfast  polyethylene glycol 3350 17 Gram(s) Oral at bedtime  senna 2 Tablet(s) Oral at bedtime    PRN MEDICATIONS  acetaminophen     Tablet .. 650 milliGRAM(s) Oral every 6 hours PRN  aluminum hydroxide/magnesium hydroxide/simethicone Suspension 30 milliLiter(s) Oral every 4 hours PRN  dextrose Oral Gel 15 Gram(s) Oral once PRN  melatonin 3 milliGRAM(s) Oral at bedtime PRN  ondansetron Injectable 4 milliGRAM(s) IV Push every 8 hours PRN    VITALS:   T(F): 98.2  HR: 105  BP: 133/86  RR: 18  SpO2: 97%    LABS:                        10.6   10.66 )-----------( 252      ( 13 Aug 2024 08:39 )             34.5     08-13    142  |  104  |  8<L>  ----------------------------<  161<H>  3.8   |  27  |  0.8    Ca    9.5      13 Aug 2024 08:39  Mg     1.8     08-12    TPro  6.4  /  Alb  3.9  /  TBili  0.2  /  DBili  x   /  AST  10  /  ALT  8   /  AlkPhos  90  08-13      Urinalysis Basic - ( 13 Aug 2024 08:39 )    Color: x / Appearance: x / SG: x / pH: x  Gluc: 161 mg/dL / Ketone: x  / Bili: x / Urobili: x   Blood: x / Protein: x / Nitrite: x   Leuk Esterase: x / RBC: x / WBC x   Sq Epi: x / Non Sq Epi: x / Bacteria: x            Urinalysis with Rflx Culture (collected 12 Aug 2024 20:08)          RADIOLOGY:  < from: CT Head No Cont (08.12.24 @ 15:58) >  No acute intracranial pathology. No evidence of midline shift, mass   effect or intracranial hemorrhage.    PHYSICAL EXAM:  GEN: No acute distress  LUNGS: Clear to auscultation bilaterally   HEART: Regular  ABD: Soft, non-tender, non-distended.  EXT: NC/NC/NE/2+PP/FERNANDEZ/Skin Intact.   NEURO: AAOX3    Intravenous access: in place  NG tube: no  Coto Catheter: no

## 2024-08-14 DIAGNOSIS — F25.9 SCHIZOAFFECTIVE DISORDER, UNSPECIFIED: ICD-10-CM

## 2024-08-14 LAB
ANION GAP SERPL CALC-SCNC: 12 MMOL/L — SIGNIFICANT CHANGE UP (ref 7–14)
BASOPHILS # BLD AUTO: 0.03 K/UL — SIGNIFICANT CHANGE UP (ref 0–0.2)
BASOPHILS NFR BLD AUTO: 0.4 % — SIGNIFICANT CHANGE UP (ref 0–1)
BUN SERPL-MCNC: 6 MG/DL — LOW (ref 10–20)
CALCIUM SERPL-MCNC: 9.5 MG/DL — SIGNIFICANT CHANGE UP (ref 8.4–10.4)
CHLORIDE SERPL-SCNC: 104 MMOL/L — SIGNIFICANT CHANGE UP (ref 98–110)
CO2 SERPL-SCNC: 26 MMOL/L — SIGNIFICANT CHANGE UP (ref 17–32)
CREAT SERPL-MCNC: 0.8 MG/DL — SIGNIFICANT CHANGE UP (ref 0.7–1.5)
EGFR: 106 ML/MIN/1.73M2 — SIGNIFICANT CHANGE UP
EOSINOPHIL # BLD AUTO: 0.2 K/UL — SIGNIFICANT CHANGE UP (ref 0–0.7)
EOSINOPHIL NFR BLD AUTO: 2.4 % — SIGNIFICANT CHANGE UP (ref 0–8)
GLUCOSE BLDC GLUCOMTR-MCNC: 104 MG/DL — HIGH (ref 70–99)
GLUCOSE BLDC GLUCOMTR-MCNC: 108 MG/DL — HIGH (ref 70–99)
GLUCOSE BLDC GLUCOMTR-MCNC: 110 MG/DL — HIGH (ref 70–99)
GLUCOSE BLDC GLUCOMTR-MCNC: 86 MG/DL — SIGNIFICANT CHANGE UP (ref 70–99)
GLUCOSE SERPL-MCNC: 90 MG/DL — SIGNIFICANT CHANGE UP (ref 70–99)
HCT VFR BLD CALC: 34.3 % — LOW (ref 42–52)
HGB BLD-MCNC: 10.5 G/DL — LOW (ref 14–18)
IMM GRANULOCYTES NFR BLD AUTO: 0.6 % — HIGH (ref 0.1–0.3)
LYMPHOCYTES # BLD AUTO: 3.4 K/UL — SIGNIFICANT CHANGE UP (ref 1.2–3.4)
LYMPHOCYTES # BLD AUTO: 40.4 % — SIGNIFICANT CHANGE UP (ref 20.5–51.1)
MCHC RBC-ENTMCNC: 25.9 PG — LOW (ref 27–31)
MCHC RBC-ENTMCNC: 30.6 G/DL — LOW (ref 32–37)
MCV RBC AUTO: 84.5 FL — SIGNIFICANT CHANGE UP (ref 80–94)
MONOCYTES # BLD AUTO: 0.95 K/UL — HIGH (ref 0.1–0.6)
MONOCYTES NFR BLD AUTO: 11.3 % — HIGH (ref 1.7–9.3)
NEUTROPHILS # BLD AUTO: 3.78 K/UL — SIGNIFICANT CHANGE UP (ref 1.4–6.5)
NEUTROPHILS NFR BLD AUTO: 44.9 % — SIGNIFICANT CHANGE UP (ref 42.2–75.2)
NRBC # BLD: 0 /100 WBCS — SIGNIFICANT CHANGE UP (ref 0–0)
PCP SPEC-MCNC: SIGNIFICANT CHANGE UP
PLATELET # BLD AUTO: 243 K/UL — SIGNIFICANT CHANGE UP (ref 130–400)
PMV BLD: 10.3 FL — SIGNIFICANT CHANGE UP (ref 7.4–10.4)
POTASSIUM SERPL-MCNC: 4.8 MMOL/L — SIGNIFICANT CHANGE UP (ref 3.5–5)
POTASSIUM SERPL-SCNC: 4.8 MMOL/L — SIGNIFICANT CHANGE UP (ref 3.5–5)
RBC # BLD: 4.06 M/UL — LOW (ref 4.7–6.1)
RBC # FLD: 19.5 % — HIGH (ref 11.5–14.5)
SODIUM SERPL-SCNC: 142 MMOL/L — SIGNIFICANT CHANGE UP (ref 135–146)
TSH SERPL-MCNC: 5.77 UIU/ML — HIGH (ref 0.27–4.2)
WBC # BLD: 8.41 K/UL — SIGNIFICANT CHANGE UP (ref 4.8–10.8)
WBC # FLD AUTO: 8.41 K/UL — SIGNIFICANT CHANGE UP (ref 4.8–10.8)

## 2024-08-14 PROCEDURE — 95816 EEG AWAKE AND DROWSY: CPT | Mod: 26

## 2024-08-14 PROCEDURE — 99233 SBSQ HOSP IP/OBS HIGH 50: CPT

## 2024-08-14 PROCEDURE — 90792 PSYCH DIAG EVAL W/MED SRVCS: CPT | Mod: 95

## 2024-08-14 RX ORDER — LACTULOSE 10 G
10 PACKET (EA) ORAL EVERY 4 HOURS
Refills: 0 | Status: DISCONTINUED | OUTPATIENT
Start: 2024-08-14 | End: 2024-08-15

## 2024-08-14 RX ADMIN — BENZTROPINE MESYLATE 1 MILLIGRAM(S): 2 TABLET ORAL at 05:21

## 2024-08-14 RX ADMIN — Medication 2 TABLET(S): at 21:21

## 2024-08-14 RX ADMIN — BENZTROPINE MESYLATE 1 MILLIGRAM(S): 2 TABLET ORAL at 17:36

## 2024-08-14 RX ADMIN — Medication 10 GRAM(S): at 21:21

## 2024-08-14 RX ADMIN — DIVALPROEX SODIUM 500 MILLIGRAM(S): 125 CAPSULE, DELAYED RELEASE ORAL at 05:21

## 2024-08-14 RX ADMIN — DIVALPROEX SODIUM 500 MILLIGRAM(S): 125 CAPSULE, DELAYED RELEASE ORAL at 17:37

## 2024-08-14 RX ADMIN — Medication 10 GRAM(S): at 17:36

## 2024-08-14 RX ADMIN — Medication 100 MICROGRAM(S): at 05:21

## 2024-08-14 RX ADMIN — CHLORHEXIDINE GLUCONATE 1 APPLICATION(S): 40 SOLUTION TOPICAL at 05:24

## 2024-08-14 RX ADMIN — CLOZAPINE 100 MILLIGRAM(S): 200 TABLET ORAL at 05:21

## 2024-08-14 RX ADMIN — POLYETHYLENE GLYCOL 3350 17 GRAM(S): 17 POWDER, FOR SOLUTION ORAL at 21:21

## 2024-08-14 RX ADMIN — Medication 40 MILLIGRAM(S): at 21:21

## 2024-08-14 RX ADMIN — METOPROLOL TARTRATE 25 MILLIGRAM(S): 100 TABLET ORAL at 05:21

## 2024-08-14 RX ADMIN — Medication 5 MILLIGRAM(S): at 21:21

## 2024-08-14 RX ADMIN — Medication 40 MILLIGRAM(S): at 06:16

## 2024-08-14 RX ADMIN — DIVALPROEX SODIUM 250 MILLIGRAM(S): 125 CAPSULE, DELAYED RELEASE ORAL at 21:21

## 2024-08-14 NOTE — PROGRESS NOTE ADULT - SUBJECTIVE AND OBJECTIVE BOX
SUBJECTIVE/OVERNIGHT EVENTS  Today is hospital day 2d. This morning patient was seen and examined at bedside, resting comfortably in bed. No acute or major events overnight.      CODE STATUS: FULL      MEDICATIONS  STANDING MEDICATIONS  aspirin enteric coated 81 milliGRAM(s) Oral daily  atorvastatin 40 milliGRAM(s) Oral at bedtime  benztropine 1 milliGRAM(s) Oral two times a day  bisacodyl 5 milliGRAM(s) Oral at bedtime  chlorhexidine 2% Cloths 1 Application(s) Topical <User Schedule>  cloZAPine 100 milliGRAM(s) Oral two times a day  dextrose 5%. 1000 milliLiter(s) IV Continuous <Continuous>  dextrose 5%. 1000 milliLiter(s) IV Continuous <Continuous>  dextrose 50% Injectable 25 Gram(s) IV Push once  dextrose 50% Injectable 12.5 Gram(s) IV Push once  dextrose 50% Injectable 25 Gram(s) IV Push once  divalproex  milliGRAM(s) Oral at bedtime  divalproex  milliGRAM(s) Oral two times a day  enoxaparin Injectable 40 milliGRAM(s) SubCutaneous every 24 hours  glucagon  Injectable 1 milliGRAM(s) IntraMuscular once  insulin lispro (ADMELOG) corrective regimen sliding scale   SubCutaneous three times a day before meals  lactulose Syrup 10 Gram(s) Oral every 4 hours  levothyroxine 100 MICROGram(s) Oral daily  metoprolol succinate ER 25 milliGRAM(s) Oral daily  pantoprazole    Tablet 40 milliGRAM(s) Oral before breakfast  polyethylene glycol 3350 17 Gram(s) Oral at bedtime  senna 2 Tablet(s) Oral at bedtime    PRN MEDICATIONS  acetaminophen     Tablet .. 650 milliGRAM(s) Oral every 6 hours PRN  aluminum hydroxide/magnesium hydroxide/simethicone Suspension 30 milliLiter(s) Oral every 4 hours PRN  dextrose Oral Gel 15 Gram(s) Oral once PRN  ondansetron Injectable 4 milliGRAM(s) IV Push every 8 hours PRN    VITALS  T(F): 98.9 (08-14-24 @ 07:35), Max: 98.9 (08-14-24 @ 07:35)  HR: 92 (08-14-24 @ 07:35) (92 - 103)  BP: 114/78 (08-14-24 @ 07:35) (114/78 - 143/88)  RR: 18 (08-14-24 @ 07:35) (18 - 18)  SpO2: 97% (08-14-24 @ 07:35) (96% - 97%)  POCT Blood Glucose.: 86 mg/dL (08-14-24 @ 11:16)  POCT Blood Glucose.: 104 mg/dL (08-14-24 @ 07:44)  POCT Blood Glucose.: 118 mg/dL (08-13-24 @ 20:51)  POCT Blood Glucose.: 104 mg/dL (08-13-24 @ 16:57)    PHYSICAL EXAM        (  ) Indwelling Coto Catheter   Date insterted:    Reason (  ) Critical illness     (  ) urinary retention    (  ) Accurate Ins/Outs Monitoring     (  ) CMO patient    (  ) Central Line  Date inserted:  Location: (  ) Right IJ   (  ) Left IJ   (  ) Right Fem   (  ) Left Fem    (  ) SPC  (  ) pigtail  (  ) PEG tube  (  ) colostomy  (  ) jejunostomy  (  ) U-Dall    LABS             10.5   8.41  )-----------( 243      ( 08-14-24 @ 06:11 )             34.3     142  |  104  |  6   -------------------------<  90   08-14-24 @ 06:11  4.8  |  26  |  0.8    Ca      9.5     08-14-24 @ 06:11  Mg     1.8     08-12-24 @ 16:54    TPro  6.4  /  Alb  3.9  /  TBili  0.2  /  DBili  x   /  AST  10  /  ALT  8   /  AlkPhos  90  /  GGT  x     08-13-24 @ 08:39      Troponin T, High Sensitivity Result: 17 ng/L (08-12-24 @ 16:54)    Urinalysis Basic - ( 14 Aug 2024 06:11 )    Color: x / Appearance: x / SG: x / pH: x  Gluc: 90 mg/dL / Ketone: x  / Bili: x / Urobili: x   Blood: x / Protein: x / Nitrite: x   Leuk Esterase: x / RBC: x / WBC x   Sq Epi: x / Non Sq Epi: x / Bacteria: x          Urinalysis with Rflx Culture (collected 12 Aug 2024 20:08)      IMAGING SUBJECTIVE/OVERNIGHT EVENTS  Today is hospital day 2d. This morning patient was seen and examined at bedside, pt appears very lethargic but arousable.      CODE STATUS: FULL      MEDICATIONS  STANDING MEDICATIONS  aspirin enteric coated 81 milliGRAM(s) Oral daily  atorvastatin 40 milliGRAM(s) Oral at bedtime  benztropine 1 milliGRAM(s) Oral two times a day  bisacodyl 5 milliGRAM(s) Oral at bedtime  chlorhexidine 2% Cloths 1 Application(s) Topical <User Schedule>  cloZAPine 100 milliGRAM(s) Oral two times a day  dextrose 5%. 1000 milliLiter(s) IV Continuous <Continuous>  dextrose 5%. 1000 milliLiter(s) IV Continuous <Continuous>  dextrose 50% Injectable 25 Gram(s) IV Push once  dextrose 50% Injectable 12.5 Gram(s) IV Push once  dextrose 50% Injectable 25 Gram(s) IV Push once  divalproex  milliGRAM(s) Oral at bedtime  divalproex  milliGRAM(s) Oral two times a day  enoxaparin Injectable 40 milliGRAM(s) SubCutaneous every 24 hours  glucagon  Injectable 1 milliGRAM(s) IntraMuscular once  insulin lispro (ADMELOG) corrective regimen sliding scale   SubCutaneous three times a day before meals  lactulose Syrup 10 Gram(s) Oral every 4 hours  levothyroxine 100 MICROGram(s) Oral daily  metoprolol succinate ER 25 milliGRAM(s) Oral daily  pantoprazole    Tablet 40 milliGRAM(s) Oral before breakfast  polyethylene glycol 3350 17 Gram(s) Oral at bedtime  senna 2 Tablet(s) Oral at bedtime    PRN MEDICATIONS  acetaminophen     Tablet .. 650 milliGRAM(s) Oral every 6 hours PRN  aluminum hydroxide/magnesium hydroxide/simethicone Suspension 30 milliLiter(s) Oral every 4 hours PRN  dextrose Oral Gel 15 Gram(s) Oral once PRN  ondansetron Injectable 4 milliGRAM(s) IV Push every 8 hours PRN    VITALS  T(F): 98.9 (08-14-24 @ 07:35), Max: 98.9 (08-14-24 @ 07:35)  HR: 92 (08-14-24 @ 07:35) (92 - 103)  BP: 114/78 (08-14-24 @ 07:35) (114/78 - 143/88)  RR: 18 (08-14-24 @ 07:35) (18 - 18)  SpO2: 97% (08-14-24 @ 07:35) (96% - 97%)  POCT Blood Glucose.: 86 mg/dL (08-14-24 @ 11:16)  POCT Blood Glucose.: 104 mg/dL (08-14-24 @ 07:44)  POCT Blood Glucose.: 118 mg/dL (08-13-24 @ 20:51)  POCT Blood Glucose.: 104 mg/dL (08-13-24 @ 16:57)    PHYSICAL EXAM  GENERAL: Lethargic, arousable  HEAD:  Atraumatic, normocephalic  EYES: EOMI, PERRL  NECK: Supple, trachea midline, no JVD  HEART: Regular rate and rhythm  LUNGS: Unlabored respirations.  Clear to auscultation bilaterally, no crackles, wheezing, or rhonchi  ABDOMEN: Soft, nontender, nondistended, +BS  EXTREMITIES: 2+ peripheral pulses bilaterally. No clubbing, cyanosis, or edema  NERVOUS SYSTEM:  A&Ox3, moving all extremities, no focal deficits       (  ) Indwelling Coto Catheter   Date insterted:    Reason (  ) Critical illness     (  ) urinary retention    (  ) Accurate Ins/Outs Monitoring     (  ) CMO patient    (  ) Central Line  Date inserted:  Location: (  ) Right IJ   (  ) Left IJ   (  ) Right Fem   (  ) Left Fem    (  ) SPC  (  ) pigtail  (  ) PEG tube  (  ) colostomy  (  ) jejunostomy  (  ) U-Dall    LABS             10.5   8.41  )-----------( 243      ( 08-14-24 @ 06:11 )             34.3     142  |  104  |  6   -------------------------<  90   08-14-24 @ 06:11  4.8  |  26  |  0.8    Ca      9.5     08-14-24 @ 06:11  Mg     1.8     08-12-24 @ 16:54    TPro  6.4  /  Alb  3.9  /  TBili  0.2  /  DBili  x   /  AST  10  /  ALT  8   /  AlkPhos  90  /  GGT  x     08-13-24 @ 08:39      Troponin T, High Sensitivity Result: 17 ng/L (08-12-24 @ 16:54)    Urinalysis Basic - ( 14 Aug 2024 06:11 )    Color: x / Appearance: x / SG: x / pH: x  Gluc: 90 mg/dL / Ketone: x  / Bili: x / Urobili: x   Blood: x / Protein: x / Nitrite: x   Leuk Esterase: x / RBC: x / WBC x   Sq Epi: x / Non Sq Epi: x / Bacteria: x          Urinalysis with Rflx Culture (collected 12 Aug 2024 20:08)      IMAGING

## 2024-08-14 NOTE — BH CONSULTATION LIAISON ASSESSMENT NOTE - NSBHCONSULTWORKUP_PSY_A_CORE
[de-identified] : On general examination the patient is adequately groomed and nourished. The vital parameters are as recorded. \par There is no lymphedema or diffuse swelling, no varicose veins, no skin warmth/erythema/scars/swelling, no ulcers and no palpable lymph nodes or masses in both lower extremities. Bilateral pedal pulses are well palpable.\par Upper Extremity:\par Both right and left upper extremities are unremarkable in terms of skin rash, lesions, pigmentation, redness, tenderness, swelling, joint instability, abnormal deformity or crepitus. The overall range of motion, sensation, motor tone and strength testing are normal.\par \par Bilateral Knee Exam: \par The gait is bilateral stiff knee antalgic.\par Knee alignment:            Right neutral with no flexion deformity. \par Left 3 degrees varus with no flexion deformity.\par Left knee demonstrates no scars and the skin has no warmth, erythema, swelling or tenderness. \par RIght knee demonstrates well healed scar from TKR with no warmth, erythema, swelling, or tenderness. \par Both knees have a range of motion of\par Extension:                    Right 0 degrees     Left 0 degrees\par Flexion:                                   Right 120 degrees      Left 110 degrees\par There is left knee medial joint line tenderness. There is left knee mild effusion. \par Rosa's test is positive. Heaven test is positive.\par Lachman's test, Anterior/Posterior Drawer test and Pivot Shift Tests are negative. \par There is bilateral knee grade 1 MCL mediolateral laxity and no anteroposterior instability. \par Patella compression test is positive and patellofemoral tracking is normal with no lateral subluxation, apprehension or instability. \par Right knee quadriceps and hamstrings power is 4+.\par Left knee quadriceps and hamstrings power is 4+.\par \par Hip Exam:\par The gait and station is normal\par The patient has equal leg lengths and no pelvic tilt. Kishore/Erich test is 7 inches on the right and 7 inches on the left. Active SLR is 60 degrees on the right and 60 degrees on the left. Both hips demonstrate no scars and the skin has no signs of inflammation or tenderness. \par Both Hips have a normal range of motion of flexion to 100 degrees, abduction 40 degrees, adduction 20 degrees, external rotation 40 degrees, internal rotation 20 degrees with symmetrical motion in flexion and extension. There is no flexion contracture, deformity or instability. Labral impingement tests are negative.\par Both hips flexor, abductor and extensor power is normal. [de-identified] : MRI of left knee taken on 2/4/2020 reveals complex medial meniscus tear with extrusion of medial body to medial gutter, with reactive changes to the MCL, myxoid degeneration vs chronic partial tear of ACL, tricompartmental osteoarthritis severe in the medial compartment with high grade to full thickness cartilage loss, remote mild proximal LCL sprain, Mild prepatellar and pes anserine bursitis, small knee joint effusion.  \par The MRI images were now provided for review, and the images were discussed with the patient. There is insufficiency fracture of the medial tibial plateau, reactive bone edema. \par \par The following radiographs were ordered last visit and read by me during this patients visit. I reviewed each radiograph in detail with the patient and discussed the findings as highlighted below. \par AP, lateral and skyline views of the bilateral knees confirm left knee advanced degenerative joint disease with medial joint space narrowing and patellar osteophyte formation, left knee reveals well fixed and aligned left TKR with no signs of mechanical failure or periprosthetic fracture. \par AP view of the pelvis are within normal limits\par \par   yes

## 2024-08-14 NOTE — BH CONSULTATION LIAISON ASSESSMENT NOTE - NSBHCHARTREVIEWLAB_PSY_A_CORE FT
of note in particular-  8/12 depakote level 80  8/13 ekg hr112 qtc 461  8/13 ammonia 56  8/13 AST 10, ALT 8, plt 243, wbc 8.41  8/14 eeg mild slowing  Clinical Correlation:  Findings consistent with mild diffuse electrocerebral dysfunction secondary to nonspecific etiology

## 2024-08-14 NOTE — BH CONSULTATION LIAISON ASSESSMENT NOTE - HPI (INCLUDE ILLNESS QUALITY, SEVERITY, DURATION, TIMING, CONTEXT, MODIFYING FACTORS, ASSOCIATED SIGNS AND SYMPTOMS)
54yo M, domiciled at [facility], past medical history of DM, HLD, HTN, hypothyroidism, COVID, past psychiatric history schizoaffective disorder, hx of substance use disorder, hx past SA vs NSSIB by cutting and head banging, last psych hospitalization in April 2024 for agitation at residence, recent medical hospitalization for URI and found to be COVID+ who presents to the hospital for fall and seizure workup per chart. Per primary team, patient presented lethargic today and at baseline is more awake. Consult placed for psych eval and med management. Primary team asks if any psych medications could be contributing to lethargy.    Per chart review, patient was discharged in April 2024 on the following psych medications:  Discharge Medications   benztropine 1 mg oral tablet: 1 tab(s) orally 2 times a day MDD: 2mg  cloZAPine 100 mg oral tablet: 1 tab(s) orally 2 times a day MDD: 200mg  divalproex sodium 250 mg oral delayed release tablet: 3 tab(s) orally once a day (at bedtime) MDD: 1250mg  divalproex sodium 500 mg oral delayed release tablet: 1 tab(s) orally once a day morning dose MDD: 1250mg  Invega Sustenna 234 mg/1.5 mL intramuscular suspension, extended release: 234 milligram(s) intramuscularly once a month    iSTOP was checked. Ref [id number]  Prescription Information      PDI Filter:    PDI	Current Rx	Drug Type	Rx Written	Rx Dispensed	Drug	Quantity	Days Supply	Prescriber Name	Prescriber ALE #	Payment Method	Dispenser  A	Y	B	08/07/2024	08/07/2024	lorazepam 2 mg tablet	30	30	Ohn, Sangeetha MCCORMICK MD	[id number]	Medicaid	[facility]  A	N	B	07/01/2024	07/05/2024	lorazepam 2 mg tablet	30	30	Ohn, Sangeetha MCCORMICK MD	[id number]	Medicaid	[facility]  A	N	B	06/03/2024	06/07/2024	lorazepam 2 mg tablet	30	30	Ohn, Sangeetha MCCORMICK MD	[id number]	Medicaid	[facility]  A	N	B	05/10/2024	05/10/2024	lorazepam 2 mg tablet	30	30	ElmerMar	[id number]	Medicaid	[facility]  A	N	B	04/11/2024	04/12/2024	lorazepam 2 mg tablet	30	30	Ohn, Sangeetha MCCORMICK MD	[id number]	Medicaid	[facility]  A	N	B	03/06/2024	03/07/2024	lorazepam 2 mg tablet	30	30	Ohn, Sangeetha MCCORMICK MD	[id number]	Medicaid	[facility]  A	N	B	02/07/2024	02/07/2024	lorazepam 2 mg tablet	30	30	Ohn, Sangeetha MCCORMICK MD	[id number]	Medicaid	[facility]  A	N	B	01/08/2024	01/10/2024	lorazepam 2 mg tablet	30	30	Ohn, Sangeetha MCCORMICK MD	[id number]	Medicaid	[facility]  A	N	B	11/20/2023	12/13/2023	lorazepam 2 mg tablet	30	30	Ohn, Sangeetha MCCORMICK MD	[id number]	Medicaid	[facility]  A	N	B	11/10/2023	11/15/2023	lorazepam 2 mg tablet	30	30	Ohn, Sangeetha MCCORMICK MD	[id number]	Medicaid	[facility]  A	N	B	10/16/2023	10/18/2023	lorazepam 2 mg tablet	30	30	Ohn, Sangeetha MCCORMICK MD	[id number]	Medicaid	[facility]  A	N	B	09/05/2023	09/06/2023	lorazepam 2 mg tablet	30	30	Ohn, Sangeetha MCCORMICK MD	[id number]	Medicaid	[facility]    Seen in psych ED several times per notes. Seen by psych CL on 7/30/2024 and there was concern for delirium.    Attempted to call Dr. Pearson , no reply.    Called [facility] . Spoke to Nini Cardenas, the manager. She reports that patient was found down so was sent to the hospital. She does not endorse any changes from baseline psych functioning. She denies any recent agitation at residence or patient making any comments of self harm/harm to others. She denies patient having any history of seizure disorder. She reports that there have been no psych medication changes in several months, and I review the list of medications with her, which is the same as when collateral was obtained 7/30/2024 (see below). She denies any missed doses. She reports that medications are dispensed from the window and patients are watched to make sure they are taking them. She states that patient follows monthly with Dr. Pearson. She does not know his next appointment but states that this patient attends them regularly. She reports he received Invega Sustenna 234mg IM on 8/7 and is due for another dose on 9/4.     From 7/30 collateral SW note:  "Collateral states patient has been adherent to his medication. Patient is currently prescribed Clozaril 100mg BID, Atican 2mG QPM, Cogentin 1mG QPM, Depakote 250mg QPM and 500mg BID, and Lexapro 10mg QPM."    On interview, patient is lethargic appearing. He opens his eyes and says "hi," then falls back asleep. Unable to assess further.

## 2024-08-14 NOTE — BH CONSULTATION LIAISON ASSESSMENT NOTE - NSBHCHARTREVIEWVS_PSY_A_CORE FT
Vital Signs Last 24 Hrs  T(C): 37.2 (14 Aug 2024 07:35), Max: 37.2 (14 Aug 2024 07:35)  T(F): 98.9 (14 Aug 2024 07:35), Max: 98.9 (14 Aug 2024 07:35)  HR: 92 (14 Aug 2024 07:35) (92 - 103)  BP: 114/78 (14 Aug 2024 07:35) (114/78 - 143/88)  BP(mean): --  RR: 18 (14 Aug 2024 07:35) (18 - 18)  SpO2: 97% (14 Aug 2024 07:35) (96% - 97%)    Parameters below as of 14 Aug 2024 07:35  Patient On (Oxygen Delivery Method): room air

## 2024-08-14 NOTE — BH CONSULTATION LIAISON ASSESSMENT NOTE - CURRENT MEDICATION
MEDICATIONS  (STANDING):  aspirin enteric coated 81 milliGRAM(s) Oral daily  atorvastatin 40 milliGRAM(s) Oral at bedtime  benztropine 1 milliGRAM(s) Oral two times a day  bisacodyl 5 milliGRAM(s) Oral at bedtime  chlorhexidine 2% Cloths 1 Application(s) Topical <User Schedule>  cloZAPine 100 milliGRAM(s) Oral two times a day  dextrose 5%. 1000 milliLiter(s) (100 mL/Hr) IV Continuous <Continuous>  dextrose 5%. 1000 milliLiter(s) (50 mL/Hr) IV Continuous <Continuous>  dextrose 50% Injectable 25 Gram(s) IV Push once  dextrose 50% Injectable 12.5 Gram(s) IV Push once  dextrose 50% Injectable 25 Gram(s) IV Push once  divalproex  milliGRAM(s) Oral two times a day  divalproex  milliGRAM(s) Oral at bedtime  enoxaparin Injectable 40 milliGRAM(s) SubCutaneous every 24 hours  glucagon  Injectable 1 milliGRAM(s) IntraMuscular once  insulin lispro (ADMELOG) corrective regimen sliding scale   SubCutaneous three times a day before meals  lactulose Syrup 10 Gram(s) Oral every 4 hours  levothyroxine 100 MICROGram(s) Oral daily  metoprolol succinate ER 25 milliGRAM(s) Oral daily  pantoprazole    Tablet 40 milliGRAM(s) Oral before breakfast  polyethylene glycol 3350 17 Gram(s) Oral at bedtime  senna 2 Tablet(s) Oral at bedtime    MEDICATIONS  (PRN):  acetaminophen     Tablet .. 650 milliGRAM(s) Oral every 6 hours PRN Temp greater or equal to 38C (100.4F), Mild Pain (1 - 3)  aluminum hydroxide/magnesium hydroxide/simethicone Suspension 30 milliLiter(s) Oral every 4 hours PRN Dyspepsia  dextrose Oral Gel 15 Gram(s) Oral once PRN Blood Glucose LESS THAN 70 milliGRAM(s)/deciliter  ondansetron Injectable 4 milliGRAM(s) IV Push every 8 hours PRN Nausea and/or Vomiting

## 2024-08-14 NOTE — PROGRESS NOTE ADULT - SUBJECTIVE AND OBJECTIVE BOX
Patient is a 53y old  Male who presents with a chief complaint of  fall (14 Aug 2024 16:28)    Patient was seen and examined.  Pt drowsy but responds to verbal commands      PAST MEDICAL & SURGICAL HISTORY:  DM (diabetes mellitus)  HTN (hypertension)  Schizophrenia  Substance use disorder  Echocardiogram abnormal    No significant past surgical history    Allergies  fish (Unknown)  Thorazine (Unknown)  penicillin (Unknown)  pork (Unknown)  Navane (Unknown)    MEDICATIONS  (STANDING):  aspirin enteric coated 81 milliGRAM(s) Oral daily  atorvastatin 40 milliGRAM(s) Oral at bedtime  benztropine 1 milliGRAM(s) Oral two times a day  bisacodyl 5 milliGRAM(s) Oral at bedtime  chlorhexidine 2% Cloths 1 Application(s) Topical <User Schedule>  cloZAPine 100 milliGRAM(s) Oral two times a day  divalproex  milliGRAM(s) Oral at bedtime  divalproex  milliGRAM(s) Oral two times a day  enoxaparin Injectable 40 milliGRAM(s) SubCutaneous every 24 hours  glucagon  Injectable 1 milliGRAM(s) IntraMuscular once  insulin lispro (ADMELOG) corrective regimen sliding scale   SubCutaneous three times a day before meals  lactulose Syrup 10 Gram(s) Oral every 4 hours  levothyroxine 100 MICROGram(s) Oral daily  metoprolol succinate ER 25 milliGRAM(s) Oral daily  pantoprazole    Tablet 40 milliGRAM(s) Oral before breakfast  polyethylene glycol 3350 17 Gram(s) Oral at bedtime  senna 2 Tablet(s) Oral at bedtime    MEDICATIONS  (PRN):  acetaminophen     Tablet .. 650 milliGRAM(s) Oral every 6 hours PRN Temp greater or equal to 38C (100.4F), Mild Pain (1 - 3)  aluminum hydroxide/magnesium hydroxide/simethicone Suspension 30 milliLiter(s) Oral every 4 hours PRN Dyspepsia  dextrose Oral Gel 15 Gram(s) Oral once PRN Blood Glucose LESS THAN 70 milliGRAM(s)/deciliter  ondansetron Injectable 4 milliGRAM(s) IV Push every 8 hours PRN Nausea and/or Vomiting    Vital Signs Last 24 Hrs  T(C): 37.2  T(F): 98.9  HR: 92  BP: 114/78  BP(mean): --  RR: 18  SpO2: 97%    O/E: Drowsy  not in distress.  HEENT: atraumatic, EOMI.  Chest: clear.  CVS: SIS2 +, no murmur.  P/A: Soft, BS+  CNS: drowsy , responds to verbal commands  Ext: no edema feet.  All systems reviewed positive findings as above.      POCT Blood Glucose.: 110 mg/dL (14 Aug 2024 16:49)  POCT Blood Glucose.: 86 mg/dL (14 Aug 2024 11:16)  POCT Blood Glucose.: 104 mg/dL (14 Aug 2024 07:44)  POCT Blood Glucose.: 118 mg/dL (13 Aug 2024 20:51)                          10.5<L>  8.41  )-----------( 243      ( 14 Aug 2024 06:11 )             34.3<L>                        10.6<L>  10.66 )-----------( 252      ( 13 Aug 2024 08:39 )             34.5<L>    08-14    142  |  104  |  6<L>  ----------------------------<  90  4.8   |  26  |  0.8  08-13    142  |  104  |  8<L>  ----------------------------<  161<H>  3.8   |  27  |  0.8    Ca    9.5      14 Aug 2024 06:11  Ca    9.5      13 Aug 2024 08:39    TPro  6.4  /  Alb  3.9  /  TBili  0.2  /  DBili  x   /  AST  10  /  ALT  8   /  AlkPhos  90  08-13            Urinalysis Basic - ( 14 Aug 2024 06:11 )    Color: x / Appearance: x / SG: x / pH: x  Gluc: 90 mg/dL / Ketone: x  / Bili: x / Urobili: x   Blood: x / Protein: x / Nitrite: x   Leuk Esterase: x / RBC: x / WBC x   Sq Epi: x / Non Sq Epi: x / Bacteria: x        Urinalysis with Rflx Culture (collected 12 Aug 2024 20:08)

## 2024-08-14 NOTE — BH CONSULTATION LIAISON ASSESSMENT NOTE - NS_RISKASSESSMENTINTER_PSY_ALL_CORE
Punch Biopsy Wound Care    Your doctor has performed a punch biopsy today.  A band aid and antibiotic ointment has been placed over the site.  This should remain in place for 24 hours.  It is recommended that you keep the area dry for the first 24 hours.  After 24 hours, you may remove the band aid and wash the area with warm soap and water and apply Vaseline jelly.  Many patients prefer to use Neosporin or Bacitracin ointment.  This is acceptable; however know that you can develop an allergy to this medication even if you have used it safely for years.  It is important to keep the area moist.  Letting it dry out and get air slows healing time, will worsen the scar, and make it more difficult to remove the stitches if they were placed.  Band aid is optional after first 24 hours.      If you notice increasing redness, tenderness, pain, or yellow drainage at the biopsy or surgical site, please notify your doctor.  These are signs of an infection.    If your biopsy/surgical site is bleeding, apply firm pressure for 15 minutes straight.  Repeat for another 15 minutes, if it is still bleeding.   If the surgical site continues to bleed, then please contact your doctor.     [facility]  [facility] DERMATOLOGY  [address] [place] [address]  Dept: [phone]          Low Acute Suicide Risk

## 2024-08-14 NOTE — BH CONSULTATION LIAISON ASSESSMENT NOTE - COLLATERAL SOURCE
Subjective:       Patient ID: Eliezer Pearl is a 74 y.o. male.    Chief Complaint: No chief complaint on file.    HPI  I saw this 74 y.o. man who was sent to us for investigation of his mildly abnormal LFTs and imaging suggestive of NAFLD.  He was first seen in our clinic on 11/15/2019 and at that time he had stage 2 fibrosis on his fibroscan. His last fibroscan in Jan 2021 a=showed cirrhosis.    He was last seen in March 2021   NAFLD was discovered when he was investigated for polycythemia and thrombocytopenia by hematology.    Never jaundiced  Occasional mild ankle edema/no ascites  His latest fibroscan in Jan 2021 showed cirrhosis but he has no features of decompensation.    Body mass index is 32.32 kg/m².    Abdo US: 8/9/21  Cirrhotic appearance of the liver without suspicious focal lesion.  Mild splenomegaly.  Bilateral simple renal cysts.    EGD: 2/1/2021  No varices    PMH:  Cholecystectomy- open- jozef;y 1990s  thyroidectomy  Hypertension  Kidney stones  GALA    No DM/heart disease/lipds    SH:  Alcohol- rarely  Non-smoker    FH:  Mom- cirrhosis age 73 (multiple myeloma)- variceal bleeding  Son- Crohn's disease      Review of Systems   Constitutional: Negative for activity change, appetite change, chills, fatigue, fever and unexpected weight change.   HENT: Negative for hearing loss.    Eyes: Negative for discharge and visual disturbance.   Respiratory: Negative for cough, chest tightness, shortness of breath and wheezing.    Cardiovascular: Negative for chest pain, palpitations and leg swelling.   Gastrointestinal: Negative for abdominal distention, abdominal pain, constipation, diarrhea and nausea.   Genitourinary: Negative for dysuria and frequency.   Musculoskeletal: Negative for arthralgias and back pain.   Skin: Negative for pallor and rash.   Neurological: Negative for dizziness, tremors, speech difficulty and headaches.   Hematological: Negative for adenopathy.   Psychiatric/Behavioral: Negative for  agitation and confusion.           Lab Results   Component Value Date    ALT 73 (H) 08/09/2021    AST 36 08/09/2021    ALKPHOS 87 08/09/2021    BILITOT 1.2 (H) 08/09/2021     Past Medical History:   Diagnosis Date    Allergy     Arthritis     Cataract     Chronic kidney disease     Hypertension     Kidney stone     Liver disease     NAFLD    Obese     Polycythemia     Sleep apnea     Thyroid disease      Past Surgical History:   Procedure Laterality Date    APPENDECTOMY      COLONOSCOPY N/A 7/16/2019    Procedure: COLONOSCOPY;  Surgeon: Pankaj Rueda MD;  Location: [facility] ENDO;  Service: Endoscopy;  Laterality: N/A;    COLONOSCOPY W/ POLYPECTOMY      ESOPHAGOGASTRODUODENOSCOPY N/A 2/1/2021    Procedure: EGD (ESOPHAGOGASTRODUODENOSCOPY);  Surgeon: Pankaj Rueda MD;  Location: [facility] ENDO;  Service: Endoscopy;  Laterality: N/A;    GALLBLADDER SURGERY      KIDNEY STONE SURGERY      KNEE CARTILAGE SURGERY      THYROID SURGERY       Current Outpatient Medications   Medication Sig    allopurinoL (ZYLOPRIM) 100 MG tablet TAKE 1 TABLET DAILY    atorvastatin (LIPITOR) 10 MG tablet Take 1 tablet (10 mg total) by mouth every evening.    BIFIDOBACTERIUM INFANTIS (ALIGN ORAL) Take by mouth once daily.     cholecalciferol, vitamin D3, 125 mcg (5,000 unit) Tab Take 5,000 Units by mouth once daily.    fluticasone propionate (FLONASE) 50 mcg/actuation nasal spray 1 spray (50 mcg total) by Each Nostril route once daily.    hydrocortisone 2.5 % cream Apply topically 2 (two) times daily.    lisinopriL-hydrochlorothiazide (PRINZIDE,ZESTORETIC) 20-25 mg Tab TAKE 1 TABLET DAILY    metoprolol succinate (TOPROL-XL) 100 MG 24 hr tablet TAKE 1 TABLET DAILY    milk thistle 175 mg tablet Take 175 mg by mouth once daily.    omeprazole (PRILOSEC) 40 MG capsule Take 1 capsule (40 mg total) by mouth every morning.    potassium citrate (UROCIT-K 15) 15 mEq TbSR Take 1 tablet by mouth 2 (two) times daily.    SAW  PALMETTO ORAL Take 450 mg by mouth once daily.     tadalafiL (CIALIS) 20 MG Tab Take 1 tablet (20 mg total) by mouth once daily.    aspirin 81 MG Chew Take 1 tablet (81 mg total) by mouth once daily.    azelastine (ASTELIN) 137 mcg (0.1 %) nasal spray 1 spray (137 mcg total) by Nasal route 2 (two) times daily. (Patient taking differently: 1 spray by Nasal route 2 (two) times daily. As needed)    ofloxacin (OCUFLOX) 0.3 % ophthalmic solution      No current facility-administered medications for this visit.       Objective:      Physical Exam  HENT:      Head: Normocephalic.   Eyes:      Pupils: Pupils are equal, round, and reactive to light.   Neck:      Thyroid: No thyromegaly.   Cardiovascular:      Rate and Rhythm: Normal rate and regular rhythm.      Heart sounds: Normal heart sounds.   Pulmonary:      Effort: Pulmonary effort is normal.      Breath sounds: Normal breath sounds. No wheezing.   Abdominal:      General: There is no distension.      Palpations: Abdomen is soft. There is no mass.      Tenderness: There is no abdominal tenderness.   Lymphadenopathy:      Cervical: No cervical adenopathy.   Skin:     General: Skin is warm.      Findings: No erythema or rash.   Neurological:      Mental Status: He is alert and oriented to person, place, and time.   Psychiatric:         Behavior: Behavior normal.         Assessment:       1. Liver cirrhosis secondary to TAYLOR (nonalcoholic steatohepatitis)    2. NAFLD (nonalcoholic fatty liver disease)        Plan:   He has some risk factors for NAFLD but he is not a diabetic.  - fibroscan shows cirrhosis and his last platelet count in June 2020 was low.    Re- discussed the diagnosis of cirrhosis  - need for HCC screening  - advice re raw oysters  - has completed immunization against HBV    US in [place] and labs in Feb 2022  Repeat labs and US in Aug 2022 and clinic at the same time.   Professional collateral

## 2024-08-14 NOTE — BH CONSULTATION LIAISON ASSESSMENT NOTE - SUMMARY
54yo M, domiciled at [facility], past medical history of DM, HLD, HTN, hypothyroidism, COVID, past psychiatric history schizoaffective disorder, hx of substance use disorder, hx past SA vs NSSIB by cutting and head banging, last psych hospitalization in April 2024 for agitation, recent medical hospitalization for URI and found to be COVID+ who presents to the hospital for fall and seizure workup per chart. Per primary team, patient presented lethargic today and at baseline is more awake. Consult placed for psych eval and med management. Primary team asks if any psych medications could be contributing to lethargy.    Unable to complete interview of patient. Chart review done and collateral obtained. At this time, presentation is not likely due to primary psychiatric cause. Changes in mental status and recent medical issues increase concern for delirium, which can present with hypoactive symptoms as well. It is less likely that psychiatric medications could be contributing to lethargy given acute change, fluctuation, lack of changes in doses or missed doses for the past several months, and patient has been taking medications consistently per collateral. Would consider simplifying regimen and minimizing medications that could be deliriogenic, such as ativan, unless needed for other medical cause such as seizures. Would also r/o any substances given history of use although it is also not clear what is contributing to mental status change.    Recommendations:  -Continued medical and neurological workup that could contribute to delirium    -No changes to current psych medications, can continue clozapine 100mg BID, depakote 500mg daily, depakote 750mg qHS given pt has been on them for months and no recent changes to medications or missed doses. Changes in medications may risk psych decompensation, at the same time, if there is significant medical/neurological issue, would need to weigh risks/benefits of continuing med.    -Recommend repeat depakote level, make sure trough is drawn before the am dose    -Recommend repeat ammonia, although there could be other reasons why ammonia is high-normal, depakote can contribute to elevated ammonia.    -Recommend utox    -Can continue to hold lexapro, as pt was not on this during last IPP and due to deliriogenic potential. Would generally continue to hold ativan due to deliriogenic and sedating potential as well unless there is concern for seizure/withdrawal.    -Can continue to hold benztropine due to anticholinergic effect and deliriogenic potential, restart once mental status improves    -Delirium precautions below    -Does not meet criteria for involuntary psych hospitalization at this time. Presentation does not appear to be primary psych at this time. Safety and dispo planning per primary team given mental status.     Please utilize objective screening tool such as "4AT" or Confusion Assessment Method (CAM).    Please follow the following precautions:  - Avoid use of anticholinergic, antihistaminergic, benzodiazepine and opioid medications if possible as these can be deliriogenic. Avoid polypharmacy.  - Assess for pain and provide analgesia as needed  - Maintain normal sleep-wake cycle: daytime awake / night-time asleep, light/dark in room  - Encourage orientation to location, date, individuals  - Mobilize as tolerated during the day (i.e. transfer to chair / seated position even if unable to ambulate otherwise)  - Minimize excessive noise  - Ensure access to any sensory aides used prior to admission (i.e. glasses, hearing aids etc)  - Ensure communication with family, caregivers, HCPs as possible    Treatment of delirium involves treating the original medical etiology including but not limited to infection, electrolyte or metabolic disturbance, acute kidney/liver/cardiovascular disturbance, dehydration.

## 2024-08-14 NOTE — EEG REPORT - NS EEG TEXT BOX
[facility] Neurology  Inpatient Routine-EEG Report      Patient Name:	SUDHA PARKER    :	1970  MRN:	[id number]  Study Date/Time:	2024, 11:49:59 AM  Referred by:	-    Brief Clinical History:  SUDHA PARKER is a 53 year old Male; study performed to investigate for seizures or markers of epilepsy.   Diagnosis Code: R56.9 convulsions/seizure    Patient Medication:  LOVENOX    ECOTRIN    LIPITOR    COGENTIN    CLOZARIL    DEPAKOTE    INSULIN    TOPROL    TYLENOL    ZOFRAN    -    -    -    -    -    -      Acquisition Details:  Electroencephalography was acquired using a minimum of 21 channels on an [employer] system v 9.3.1 with electrode placement according to the standard International 10-20 system following ACNS (American Clinical Neurophysiology Society) guidelines.  Anterior temporal T1 and T2 electrodes were utilized whenever possible.   The [employer] automated spike & seizure detections were all reviewed in detail, in addition to the entire raw EEG.    Findings:  Background:  continuous.   Voltage:  Low (most <20uV LBP Peak-Trough)  Organization:  Rudimentary  Posterior Dominant Rhythm:  7-8 Hz symmetric, well-organized, and well-modulated  Variability:  Yes	Reactivity:  Yes  Sleep:  Absent.  Focal abnormalities:  No persistent asymmetries of voltage or frequency.  Interictal Activity:  None  Focal Slowing:  None  Generalized Slowing:  Mild  Events:  1)	No electrographic seizures or significant clinical events.  Provocations:  1)	Hyperventilation: was not performed.  2)	Photic stimulation: was not performed.  Impression:  Abnormal due to generalized slowing as above    Clinical Correlation:  Findings consistent with mild diffuse electrocerebral dysfunction secondary to nonspecific etiology    Keith Torres MD  Attending Neurologist, Division of Epilepsy

## 2024-08-15 ENCOUNTER — TRANSCRIPTION ENCOUNTER (OUTPATIENT)
Age: 54
End: 2024-08-15

## 2024-08-15 DIAGNOSIS — E83.42 HYPOMAGNESEMIA: ICD-10-CM

## 2024-08-15 DIAGNOSIS — U07.1 COVID-19: ICD-10-CM

## 2024-08-15 DIAGNOSIS — R73.03 PREDIABETES: ICD-10-CM

## 2024-08-15 DIAGNOSIS — E66.9 OBESITY, UNSPECIFIED: ICD-10-CM

## 2024-08-15 DIAGNOSIS — Z91.013 ALLERGY TO SEAFOOD: ICD-10-CM

## 2024-08-15 DIAGNOSIS — A41.89 OTHER SPECIFIED SEPSIS: ICD-10-CM

## 2024-08-15 DIAGNOSIS — Z88.0 ALLERGY STATUS TO PENICILLIN: ICD-10-CM

## 2024-08-15 DIAGNOSIS — I10 ESSENTIAL (PRIMARY) HYPERTENSION: ICD-10-CM

## 2024-08-15 DIAGNOSIS — Z91.018 ALLERGY TO OTHER FOODS: ICD-10-CM

## 2024-08-15 DIAGNOSIS — Z88.8 ALLERGY STATUS TO OTHER DRUGS, MEDICAMENTS AND BIOLOGICAL SUBSTANCES: ICD-10-CM

## 2024-08-15 DIAGNOSIS — F25.9 SCHIZOAFFECTIVE DISORDER, UNSPECIFIED: ICD-10-CM

## 2024-08-15 LAB
ALBUMIN SERPL ELPH-MCNC: 4 G/DL — SIGNIFICANT CHANGE UP (ref 3.5–5.2)
ALP SERPL-CCNC: 83 U/L — SIGNIFICANT CHANGE UP (ref 30–115)
ALT FLD-CCNC: 8 U/L — SIGNIFICANT CHANGE UP (ref 0–41)
AMMONIA BLD-MCNC: 72 UMOL/L — HIGH (ref 11–55)
ANION GAP SERPL CALC-SCNC: 14 MMOL/L — SIGNIFICANT CHANGE UP (ref 7–14)
AST SERPL-CCNC: 10 U/L — SIGNIFICANT CHANGE UP (ref 0–41)
BASOPHILS # BLD AUTO: 0.15 K/UL — SIGNIFICANT CHANGE UP (ref 0–0.2)
BASOPHILS NFR BLD AUTO: 1.8 % — HIGH (ref 0–1)
BILIRUB SERPL-MCNC: 1.7 MG/DL — HIGH (ref 0.2–1.2)
BUN SERPL-MCNC: 7 MG/DL — LOW (ref 10–20)
CALCIUM SERPL-MCNC: 9.1 MG/DL — SIGNIFICANT CHANGE UP (ref 8.4–10.4)
CHLORIDE SERPL-SCNC: 102 MMOL/L — SIGNIFICANT CHANGE UP (ref 98–110)
CO2 SERPL-SCNC: 25 MMOL/L — SIGNIFICANT CHANGE UP (ref 17–32)
CREAT SERPL-MCNC: 1 MG/DL — SIGNIFICANT CHANGE UP (ref 0.7–1.5)
EGFR: 91 ML/MIN/1.73M2 — SIGNIFICANT CHANGE UP
EOSINOPHIL # BLD AUTO: 0.37 K/UL — SIGNIFICANT CHANGE UP (ref 0–0.7)
EOSINOPHIL NFR BLD AUTO: 4.5 % — SIGNIFICANT CHANGE UP (ref 0–8)
FOLATE SERPL-MCNC: 5.4 NG/ML — SIGNIFICANT CHANGE UP
GLUCOSE BLDC GLUCOMTR-MCNC: 108 MG/DL — HIGH (ref 70–99)
GLUCOSE BLDC GLUCOMTR-MCNC: 110 MG/DL — HIGH (ref 70–99)
GLUCOSE BLDC GLUCOMTR-MCNC: 133 MG/DL — HIGH (ref 70–99)
GLUCOSE BLDC GLUCOMTR-MCNC: 136 MG/DL — HIGH (ref 70–99)
GLUCOSE SERPL-MCNC: 146 MG/DL — HIGH (ref 70–99)
HCT VFR BLD CALC: 35 % — LOW (ref 42–52)
HGB BLD-MCNC: 10.6 G/DL — LOW (ref 14–18)
LYMPHOCYTES # BLD AUTO: 3.67 K/UL — HIGH (ref 1.2–3.4)
LYMPHOCYTES # BLD AUTO: 44.6 % — SIGNIFICANT CHANGE UP (ref 20.5–51.1)
MAGNESIUM SERPL-MCNC: 1.8 MG/DL — SIGNIFICANT CHANGE UP (ref 1.8–2.4)
MCHC RBC-ENTMCNC: 25.7 PG — LOW (ref 27–31)
MCHC RBC-ENTMCNC: 30.3 G/DL — LOW (ref 32–37)
MCV RBC AUTO: 85 FL — SIGNIFICANT CHANGE UP (ref 80–94)
MONOCYTES # BLD AUTO: 0.95 K/UL — HIGH (ref 0.1–0.6)
MONOCYTES NFR BLD AUTO: 11.6 % — HIGH (ref 1.7–9.3)
NEUTROPHILS # BLD AUTO: 2.72 K/UL — SIGNIFICANT CHANGE UP (ref 1.4–6.5)
NEUTROPHILS NFR BLD AUTO: 33 % — LOW (ref 42.2–75.2)
PHOSPHATE SERPL-MCNC: 4 MG/DL — SIGNIFICANT CHANGE UP (ref 2.1–4.9)
PLATELET # BLD AUTO: 223 K/UL — SIGNIFICANT CHANGE UP (ref 130–400)
PMV BLD: 10.2 FL — SIGNIFICANT CHANGE UP (ref 7.4–10.4)
POTASSIUM SERPL-MCNC: 4.1 MMOL/L — SIGNIFICANT CHANGE UP (ref 3.5–5)
POTASSIUM SERPL-SCNC: 4.1 MMOL/L — SIGNIFICANT CHANGE UP (ref 3.5–5)
PROT SERPL-MCNC: 6.7 G/DL — SIGNIFICANT CHANGE UP (ref 6–8)
RBC # BLD: 4.12 M/UL — LOW (ref 4.7–6.1)
RBC # FLD: 19.3 % — HIGH (ref 11.5–14.5)
SODIUM SERPL-SCNC: 141 MMOL/L — SIGNIFICANT CHANGE UP (ref 135–146)
VALPROATE SERPL-MCNC: 97 UG/ML — SIGNIFICANT CHANGE UP (ref 50–100)
VIT B12 SERPL-MCNC: 415 PG/ML — SIGNIFICANT CHANGE UP (ref 232–1245)
WBC # BLD: 8.23 K/UL — SIGNIFICANT CHANGE UP (ref 4.8–10.8)
WBC # FLD AUTO: 8.23 K/UL — SIGNIFICANT CHANGE UP (ref 4.8–10.8)

## 2024-08-15 PROCEDURE — 99233 SBSQ HOSP IP/OBS HIGH 50: CPT

## 2024-08-15 PROCEDURE — 99233 SBSQ HOSP IP/OBS HIGH 50: CPT | Mod: 95

## 2024-08-15 RX ORDER — LACTULOSE 10 G
10 PACKET (EA) ORAL EVERY 6 HOURS
Refills: 0 | Status: DISCONTINUED | OUTPATIENT
Start: 2024-08-15 | End: 2024-08-19

## 2024-08-15 RX ORDER — FOLIC ACID 1 MG
1 TABLET ORAL DAILY
Refills: 0 | Status: DISCONTINUED | OUTPATIENT
Start: 2024-08-15 | End: 2024-08-19

## 2024-08-15 RX ORDER — ASPIRIN 81 MG
1 TABLET, DELAYED RELEASE (ENTERIC COATED) ORAL
Qty: 0 | Refills: 0 | DISCHARGE
Start: 2024-08-15

## 2024-08-15 RX ADMIN — CLOZAPINE 100 MILLIGRAM(S): 200 TABLET ORAL at 05:39

## 2024-08-15 RX ADMIN — METOPROLOL TARTRATE 25 MILLIGRAM(S): 100 TABLET ORAL at 05:39

## 2024-08-15 RX ADMIN — BENZTROPINE MESYLATE 1 MILLIGRAM(S): 2 TABLET ORAL at 05:39

## 2024-08-15 RX ADMIN — ENOXAPARIN SODIUM 40 MILLIGRAM(S): 100 INJECTION SUBCUTANEOUS at 05:39

## 2024-08-15 RX ADMIN — CLOZAPINE 100 MILLIGRAM(S): 200 TABLET ORAL at 17:13

## 2024-08-15 RX ADMIN — Medication 40 MILLIGRAM(S): at 05:39

## 2024-08-15 RX ADMIN — Medication 10 GRAM(S): at 05:41

## 2024-08-15 RX ADMIN — DIVALPROEX SODIUM 500 MILLIGRAM(S): 125 CAPSULE, DELAYED RELEASE ORAL at 05:39

## 2024-08-15 RX ADMIN — Medication 10 GRAM(S): at 09:20

## 2024-08-15 RX ADMIN — Medication 100 MICROGRAM(S): at 05:39

## 2024-08-15 RX ADMIN — Medication 10 GRAM(S): at 17:13

## 2024-08-15 RX ADMIN — DIVALPROEX SODIUM 250 MILLIGRAM(S): 125 CAPSULE, DELAYED RELEASE ORAL at 22:34

## 2024-08-15 RX ADMIN — Medication 5 MILLIGRAM(S): at 22:34

## 2024-08-15 RX ADMIN — DIVALPROEX SODIUM 500 MILLIGRAM(S): 125 CAPSULE, DELAYED RELEASE ORAL at 17:13

## 2024-08-15 RX ADMIN — BENZTROPINE MESYLATE 1 MILLIGRAM(S): 2 TABLET ORAL at 17:13

## 2024-08-15 RX ADMIN — CHLORHEXIDINE GLUCONATE 1 APPLICATION(S): 40 SOLUTION TOPICAL at 05:42

## 2024-08-15 RX ADMIN — POLYETHYLENE GLYCOL 3350 17 GRAM(S): 17 POWDER, FOR SOLUTION ORAL at 22:33

## 2024-08-15 RX ADMIN — Medication 40 MILLIGRAM(S): at 22:34

## 2024-08-15 RX ADMIN — Medication 1 MILLIGRAM(S): at 22:39

## 2024-08-15 RX ADMIN — Medication 2 TABLET(S): at 22:34

## 2024-08-15 NOTE — BH CONSULTATION LIAISON PROGRESS NOTE - NSBHFUPINTERVALHXFT_PSY_A_CORE
Interval chart reviewed, patient seen, case discussed. Per RN today is more awake. Eating. No behavioral issues. Yesterday was drowsy and the day before was walking up and down the hallway. Dose of clozapine held by primary team last night.    On interview, patient makes eye contact the screen and says "hi." AOx3, cooperative, and answers appropriately. Patient states he is "fine." Denies any difficulty with sleep. Denies depressed mood. States he wants to "go home." States "I fell by accident" but can't provide more details. Denies any pain. Denies auditory or visual hallucinations. Denies any SI/HI. Denies any concerns with his medications. States he sees Dr. Pearson regularly.   Interval chart reviewed, patient seen, case discussed. Per RN today is more awake. Eating. No behavioral issues. Yesterday was drowsy and the day before was walking up and down the hallway. Dose of clozapine held by primary team last night.    On interview, patient makes eye contact the screen and says "hi." AOx3, cooperative, and answers appropriately. Patient states he is "fine." Denies any difficulty with sleep. Denies depressed mood. States he wants to "go home." States "I fell by accident." Denies any pain. Denies auditory or visual hallucinations. Denies any SI/HI. Denies any concerns with his medications. States he sees Dr. Pearson regularly.

## 2024-08-15 NOTE — PHYSICAL THERAPY INITIAL EVALUATION ADULT - RANGE OF MOTION EXAMINATION, REHAB EVAL
no ROM deficits were identified Detail Level: Detailed General Sunscreen Counseling: I recommended a broad spectrum sunscreen with a SPF of 30 or higher. I explained that SPF 30 sunscreens block approximately 97 percent of the sun's harmful rays. Sunscreens should be applied at least 15 minutes prior to expected sun exposure and then every 2 hours after that as long as sun exposure continues. If swimming or exercising sunscreen should be reapplied every 45 minutes to an hour after getting wet or sweating. One ounce, or the equivalent of a shot glass full of sunscreen, is adequate to protect the skin not covered by a bathing suit. I also recommended a lip balm with a sunscreen as well. Sun protective clothing can be used in lieu of sunscreen but must be worn the entire time you are exposed to the sun's rays. Products Recommended: [employer], [employer] or Eucerin sunscreen

## 2024-08-15 NOTE — DISCHARGE NOTE PROVIDER - PROVIDER TOKENS
PROVIDER:[TOKEN:[[id number]],FOLLOWUP:[1 week]],PROVIDER:[TOKEN:[[id number]],FOLLOWUP:[1 week]] PROVIDER:[TOKEN:[[id number]],FOLLOWUP:[1 week]],PROVIDER:[TOKEN:[[id number]],FOLLOWUP:[2 weeks]]

## 2024-08-15 NOTE — PHYSICAL THERAPY INITIAL EVALUATION ADULT - ADDITIONAL COMMENTS
Per medical report pt was coming from [facility].  Pt report he lives at [facility].

## 2024-08-15 NOTE — BH CONSULTATION LIAISON PROGRESS NOTE - NSBHASSESSMENTFT_PSY_ALL_CORE
54yo M, domiciled at [facility], past medical history of DM, HLD, HTN, hypothyroidism, COVID, past psychiatric history schizoaffective disorder, hx of substance use disorder, hx past SA vs NSSIB by cutting and head banging, last psych hospitalization in April 2024 for agitation, recent medical hospitalization for URI and found to be COVID+ who presents to the hospital for fall and seizure workup per chart. Per primary team, patient presented lethargic today and at baseline is more awake. Consult placed for psych eval and med management. Primary team asks if any psych medications could be contributing to lethargy.    8/14- Unable to complete interview of patient. Chart review done and collateral obtained. At this time, presentation is not likely due to primary psychiatric cause. Changes in mental status and recent medical issues increase concern for delirium, which can present with hypoactive symptoms as well. It is less likely that psychiatric medications could be contributing to lethargy given acute change, fluctuation, lack of changes in doses or missed doses for the past several months, and patient has been taking medications consistently per collateral. Would consider simplifying regimen and minimizing medications that could be deliriogenic, such as ativan, unless needed for other medical cause such as seizures. Would also r/o any substances given history of use although it is also not clear what is contributing to mental status change.    8/15- More awake, AOx3, euthymic, no behavioral issues, denies SI/HI. Ate today per RN. Dose of clozapine was held last night. Unclear if this was contributing to lethargy given pt has been on this dose for months. No changes to medication recommendations.     Please note that treatment of clozapine is stopped more than 48 hours it needs to be re-titrated to avoid side effects.    Recommendations:  -Continued medical and neurological workup that could contribute to delirium    -No changes to current psych medications, can continue clozapine 100mg BID, depakote 500mg daily, depakote 750mg qHS given pt has been on them for months and no recent changes to medications or missed doses. Changes in medications may risk psych decompensation, at the same time, if there is significant medical/neurological issue, would need to weigh risks/benefits of continuing med.    -Recommend repeat depakote level, make sure trough is drawn before the am dose>>> done and therapeutic    -Recommend repeat ammonia, although there could be other reasons why ammonia is high-normal, depakote can contribute to elevated ammonia.    -Recommend utox >>> +benzos    -Can continue to hold lexapro, as pt was not on this during last IPP and due to deliriogenic potential. Would generally continue to hold ativan due to deliriogenic and sedating potential as well unless there is concern for seizure/withdrawal.    -Can continue to hold benztropine due to anticholinergic effect and deliriogenic potential, restart once mental status improves     -Delirium precautions below    -Does not meet criteria for involuntary psych hospitalization at this time. Presentation does not appear to be primary psych at this time. Safety and dispo planning per primary team given mental status.    Please utilize objective screening tool such as "4AT" or Confusion Assessment Method (CAM).    Please follow the following precautions:  - Avoid use of anticholinergic, antihistaminergic, benzodiazepine and opioid medications if possible as these can be deliriogenic. Avoid polypharmacy.  - Assess for pain and provide analgesia as needed  - Maintain normal sleep-wake cycle: daytime awake / night-time asleep, light/dark in room  - Encourage orientation to location, date, individuals  - Mobilize as tolerated during the day (i.e. transfer to chair / seated position even if unable to ambulate otherwise)  - Minimize excessive noise  - Ensure access to any sensory aides used prior to admission (i.e. glasses, hearing aids etc)  - Ensure communication with family, caregivers, HCPs as possible    Treatment of delirium involves treating the original medical etiology including but not limited to infection, electrolyte or metabolic disturbance, acute kidney/liver/cardiovascular disturbance, dehydration.      Possible related to clozapine 54yo M, domiciled at [facility], past medical history of DM, HLD, HTN, hypothyroidism, COVID, past psychiatric history schizoaffective disorder, hx of substance use disorder, hx past SA vs NSSIB by cutting and head banging, last psych hospitalization in April 2024 for agitation, recent medical hospitalization for URI and found to be COVID+ who presents to the hospital for fall and seizure workup per chart. Per primary team, patient presented lethargic today and at baseline is more awake. Consult placed for psych eval and med management. Primary team asks if any psych medications could be contributing to lethargy.    8/14- Unable to complete interview of patient. Chart review done and collateral obtained. At this time, presentation is not likely due to primary psychiatric cause. Changes in mental status and recent medical issues increase concern for delirium, which can present with hypoactive symptoms as well. It is less likely that psychiatric medications could be contributing to lethargy given acute change, fluctuation, lack of changes in doses or missed doses for the past several months, and patient has been taking medications consistently per collateral. Would consider simplifying regimen and minimizing medications that could be deliriogenic, such as ativan, unless needed for other medical cause such as seizures. Would also r/o any substances given history of use although it is also not clear what is contributing to mental status change.    8/15- More awake, AOx3, euthymic, no behavioral issues, denies SI/HI. Ate today per RN. Dose of clozapine was held last night. Unclear if this was contributing to lethargy given pt has been on this dose for months. No changes to recommendations.     Please note that treatment of clozapine is stopped more than 48 hours it needs to be re-titrated to avoid side effects.    Recommendations:  -Continued medical and neurological workup that could contribute to delirium    -No changes to current psych medications, can continue clozapine 100mg BID, depakote 500mg daily, depakote 750mg qHS given pt has been on them for months and no recent changes to medications or missed doses. Changes in medications may risk psych decompensation, at the same time, if there is significant medical/neurological issue, would need to weigh risks/benefits of continuing med.    -Recommend repeat depakote level, make sure trough is drawn before the am dose>>> done and therapeutic    -Recommend repeat ammonia, although there could be other reasons why ammonia is high-normal, depakote can contribute to elevated ammonia.    -Recommend utox >>> +benzos    -Can continue to hold lexapro, as pt was not on this during last IPP and due to deliriogenic potential. Would generally continue to hold ativan due to deliriogenic and sedating potential as well unless there is concern for seizure/withdrawal.    -Can continue to hold benztropine due to anticholinergic effect and deliriogenic potential, restart once mental status improves     -Delirium precautions below    -Does not meet criteria for involuntary psych hospitalization at this time. Presentation does not appear to be primary psych at this time. Safety and dispo planning per primary team given mental status.    Please utilize objective screening tool such as "4AT" or Confusion Assessment Method (CAM).    Please follow the following precautions:  - Avoid use of anticholinergic, antihistaminergic, benzodiazepine and opioid medications if possible as these can be deliriogenic. Avoid polypharmacy.  - Assess for pain and provide analgesia as needed  - Maintain normal sleep-wake cycle: daytime awake / night-time asleep, light/dark in room  - Encourage orientation to location, date, individuals  - Mobilize as tolerated during the day (i.e. transfer to chair / seated position even if unable to ambulate otherwise)  - Minimize excessive noise  - Ensure access to any sensory aides used prior to admission (i.e. glasses, hearing aids etc)  - Ensure communication with family, caregivers, HCPs as possible    Treatment of delirium involves treating the original medical etiology including but not limited to infection, electrolyte or metabolic disturbance, acute kidney/liver/cardiovascular disturbance, dehydration.      Possible related to clozapine 54yo M, domiciled at [facility], past medical history of DM, HLD, HTN, hypothyroidism, COVID, past psychiatric history schizoaffective disorder, hx of substance use disorder, hx past SA vs NSSIB by cutting and head banging, last psych hospitalization in April 2024 for agitation, recent medical hospitalization for URI and found to be COVID+ who presents to the hospital for fall and seizure workup per chart. Per primary team, patient presented lethargic today and at baseline is more awake. Consult placed for psych eval and med management. Primary team asks if any psych medications could be contributing to lethargy.    8/14- Unable to complete interview of patient. Chart review done and collateral obtained. At this time, presentation is not likely due to primary psychiatric cause. Changes in mental status and recent medical issues increase concern for delirium, which can present with hypoactive symptoms as well. It is less likely that psychiatric medications could be contributing to lethargy given acute change, fluctuation, lack of changes in doses or missed doses for the past several months, and patient has been taking medications consistently per collateral. Would consider simplifying regimen and minimizing medications that could be deliriogenic, such as ativan, unless needed for other medical cause such as seizures. Would also r/o any substances given history of use although it is also not clear what is contributing to mental status change.    8/15- More awake, AOx3, euthymic, no behavioral issues, denies SI/HI. Dose of clozapine was held last night. Unclear if this was contributing to lethargy given pt has been on this dose for months. No changes to recommendations.     Please note that treatment of clozapine is stopped more than 48 hours it needs to be re-titrated to avoid side effects.    Recommendations:  -Continued medical and neurological workup that could contribute to delirium    -No changes to current psych medications, can continue clozapine 100mg BID, depakote 500mg daily, depakote 750mg qHS given pt has been on them for months and no recent changes to medications or missed doses. Changes in medications may risk psych decompensation, at the same time, if there is significant medical/neurological issue, would need to weigh risks/benefits of continuing med.    -Recommend repeat depakote level, make sure trough is drawn before the am dose>>> done and therapeutic    -Recommend repeat ammonia, although there could be other reasons why ammonia is high-normal, depakote can contribute to elevated ammonia.    -Recommend utox >>> +benzos    -Can continue to hold lexapro, as pt was not on this during last IPP and due to deliriogenic potential. Would generally continue to hold ativan due to deliriogenic and sedating potential as well unless there is concern for seizure/withdrawal.    -Can continue to hold benztropine due to anticholinergic effect and deliriogenic potential, restart once mental status improves     -Delirium precautions below    -Does not meet criteria for involuntary psych hospitalization at this time. Presentation does not appear to be primary psych at this time. Safety and dispo planning per primary team given mental status.    Please utilize objective screening tool such as "4AT" or Confusion Assessment Method (CAM).    Please follow the following precautions:  - Avoid use of anticholinergic, antihistaminergic, benzodiazepine and opioid medications if possible as these can be deliriogenic. Avoid polypharmacy.  - Assess for pain and provide analgesia as needed  - Maintain normal sleep-wake cycle: daytime awake / night-time asleep, light/dark in room  - Encourage orientation to location, date, individuals  - Mobilize as tolerated during the day (i.e. transfer to chair / seated position even if unable to ambulate otherwise)  - Minimize excessive noise  - Ensure access to any sensory aides used prior to admission (i.e. glasses, hearing aids etc)  - Ensure communication with family, caregivers, HCPs as possible    Treatment of delirium involves treating the original medical etiology including but not limited to infection, electrolyte or metabolic disturbance, acute kidney/liver/cardiovascular disturbance, dehydration.      Possible related to clozapine 54yo M, domiciled at [facility], past medical history of DM, HLD, HTN, hypothyroidism, COVID, past psychiatric history schizoaffective disorder, hx of substance use disorder, hx past SA vs NSSIB by cutting and head banging, last psych hospitalization in April 2024 for agitation, recent medical hospitalization for URI and found to be COVID+ who presents to the hospital for fall and seizure workup per chart. Per primary team, patient presented lethargic today and at baseline is more awake. Consult placed for psych eval and med management. Primary team asks if any psych medications could be contributing to lethargy.    8/14- Unable to complete interview of patient. Chart review done and collateral obtained. At this time, presentation is not likely due to primary psychiatric cause. Changes in mental status and recent medical issues increase concern for delirium, which can present with hypoactive symptoms as well. It is less likely that psychiatric medications could be contributing to lethargy given acute change, fluctuation, lack of changes in doses or missed doses for the past several months, and patient has been taking medications consistently per collateral. Would consider simplifying regimen and minimizing medications that could be deliriogenic, such as ativan, unless needed for other medical cause such as seizures. Would also r/o any substances given history of use although it is also not clear what is contributing to mental status change.    8/15- More awake, AOx3, euthymic, no behavioral issues, denies SI/HI. Dose of clozapine was held last night. Unclear if this was contributing to lethargy given pt has been on this dose for months. No changes to recommendations.     Please note that if clozapine is stopped more than 48 hours it needs to be re-titrated to avoid side effects. Dose changes can also contribute to side effects.    Recommendations:  -Continued medical and neurological workup that could contribute to delirium    -No changes to current psych medications, can continue clozapine 100mg BID, depakote 500mg daily, depakote 750mg qHS given pt has been on them for months and no recent changes to medications or missed doses. Changes in medications may risk psych decompensation, at the same time, if there is significant medical/neurological issue, would need to weigh risks/benefits of continuing med.    -Recommend repeat depakote level, make sure trough is drawn before the am dose>>> done and therapeutic    -Recommend repeat ammonia, although there could be other reasons why ammonia is high-normal, depakote can contribute to elevated ammonia.    -Recommend utox >>> +benzos    -Can continue to hold lexapro, as pt was not on this during last IPP and due to deliriogenic potential. Would generally continue to hold ativan due to deliriogenic and sedating potential as well unless there is concern for seizure/withdrawal.    -Can continue to hold benztropine due to anticholinergic effect and deliriogenic potential, restart once mental status improves     -Delirium precautions below    -Does not meet criteria for involuntary psych hospitalization at this time. Presentation does not appear to be primary psych at this time. Safety and dispo planning per primary team given mental status.    Please utilize objective screening tool such as "4AT" or Confusion Assessment Method (CAM).    Please follow the following precautions:  - Avoid use of anticholinergic, antihistaminergic, benzodiazepine and opioid medications if possible as these can be deliriogenic. Avoid polypharmacy.  - Assess for pain and provide analgesia as needed  - Maintain normal sleep-wake cycle: daytime awake / night-time asleep, light/dark in room  - Encourage orientation to location, date, individuals  - Mobilize as tolerated during the day (i.e. transfer to chair / seated position even if unable to ambulate otherwise)  - Minimize excessive noise  - Ensure access to any sensory aides used prior to admission (i.e. glasses, hearing aids etc)  - Ensure communication with family, caregivers, HCPs as possible    Treatment of delirium involves treating the original medical etiology including but not limited to infection, electrolyte or metabolic disturbance, acute kidney/liver/cardiovascular disturbance, dehydration.      Possible related to clozapine 52yo M, domiciled at [facility], past medical history of DM, HLD, HTN, hypothyroidism, COVID, past psychiatric history schizoaffective disorder, hx of substance use disorder, hx past SA vs NSSIB by cutting and head banging, last psych hospitalization in April 2024 for agitation, recent medical hospitalization for URI and found to be COVID+ who presents to the hospital for fall and seizure workup per chart. Per primary team, patient presented lethargic today and at baseline is more awake. Consult placed for psych eval and med management. Primary team asks if any psych medications could be contributing to lethargy.    8/14- Unable to complete interview of patient. Chart review done and collateral obtained. At this time, presentation is not likely due to primary psychiatric cause. Changes in mental status and recent medical issues increase concern for delirium, which can present with hypoactive symptoms as well. It is less likely that psychiatric medications could be contributing to lethargy given acute change, fluctuation, lack of changes in doses or missed doses for the past several months, and patient has been taking medications consistently per collateral. Would consider simplifying regimen and minimizing medications that could be deliriogenic, such as ativan, unless needed for other medical cause such as seizures. Would also r/o any substances given history of use although it is also not clear what is contributing to mental status change.    8/15- More awake, AOx3, euthymic, no behavioral issues, denies SI/HI. Dose of clozapine was held last night. Unclear if this was contributing to lethargy given pt has been on this dose for months. No changes to recommendations.     Please note that if clozapine is stopped more than 48 hours it needs to be re-titrated to avoid side effects. Dose changes can also contribute to side effects.    Recommendations:  -Continued medical and neurological workup that could contribute to delirium    -No changes to current psych medications, can continue clozapine 100mg BID, depakote 500mg daily, depakote 750mg qHS given pt has been on them for months and no recent changes to medications or missed doses. Changes in medications may risk psych decompensation, at the same time, if there is significant medical/neurological issue, would need to weigh risks/benefits of continuing med.    -Recommend repeat depakote level, make sure trough is drawn before the am dose>>> done and therapeutic    -Recommend repeat ammonia, although there could be other reasons why ammonia is high-normal, depakote can contribute to elevated ammonia.    -Recommend utox >>> +benzos    -Can continue to hold lexapro, as pt was not on this during last IPP and due to deliriogenic potential. Would generally continue to hold ativan due to deliriogenic and sedating potential as well unless there is concern for seizure/withdrawal.    -Can continue to hold benztropine due to anticholinergic effect and deliriogenic potential, restart once mental status improves     -Delirium precautions below    -Does not meet criteria for involuntary psych hospitalization at this time. Presentation does not appear to be primary psych at this time. Safety and dispo planning per primary team given mental status.    Please utilize objective screening tool such as "4AT" or Confusion Assessment Method (CAM).    Please follow the following precautions:  - Avoid use of anticholinergic, antihistaminergic, benzodiazepine and opioid medications if possible as these can be deliriogenic. Avoid polypharmacy.  - Assess for pain and provide analgesia as needed  - Maintain normal sleep-wake cycle: daytime awake / night-time asleep, light/dark in room  - Encourage orientation to location, date, individuals  - Mobilize as tolerated during the day (i.e. transfer to chair / seated position even if unable to ambulate otherwise)  - Minimize excessive noise  - Ensure access to any sensory aides used prior to admission (i.e. glasses, hearing aids etc)  - Ensure communication with family, caregivers, HCPs as possible    Treatment of delirium involves treating the original medical etiology including but not limited to infection, electrolyte or metabolic disturbance, acute kidney/liver/cardiovascular disturbance, dehydration. 54yo M, domiciled at [facility], past medical history of DM, HLD, HTN, hypothyroidism, COVID, past psychiatric history schizoaffective disorder, hx of substance use disorder, hx past SA vs NSSIB by cutting and head banging, last psych hospitalization in April 2024 for agitation, recent medical hospitalization for URI and found to be COVID+ who presents to the hospital for fall and seizure workup per chart. Per primary team, patient presented lethargic today and at baseline is more awake. Consult placed for psych eval and med management. Primary team asks if any psych medications could be contributing to lethargy.    8/14- Unable to complete interview of patient. Chart review done and collateral obtained. At this time, presentation is not likely due to primary psychiatric cause. Changes in mental status and recent medical issues increase concern for delirium, which can present with hypoactive symptoms as well. It is less likely that psychiatric medications could be contributing to lethargy given acute change, fluctuation, lack of changes in doses or missed doses for the past several months, and patient has been taking medications consistently per collateral. Would consider simplifying regimen and minimizing medications that could be deliriogenic, such as ativan, unless needed for other medical cause such as seizures. Would also r/o any substances given history of use although it is also not clear what is contributing to mental status change.    8/15- More awake, AOx3, euthymic, no behavioral issues, denies SI/HI. Dose of clozapine was held last night. Unclear if this was contributing to lethargy given pt has been on this dose for months. No changes to recommendations.     Please note that if clozapine is stopped more than 48 hours it needs to be re-titrated to avoid side effects.  Recommendations:  -Continued medical and neurological workup that could contribute to delirium    -No changes to current psych medications, can continue clozapine 100mg BID, depakote 500mg daily, depakote 750mg qHS given pt has been on them for months and no recent changes to medications or missed doses. Changes in medications may risk psych decompensation, at the same time, if there is significant medical/neurological issue, would need to weigh risks/benefits of continuing med.    -Recommend repeat depakote level, make sure trough is drawn before the am dose>>> done and therapeutic    -Recommend repeat ammonia, although there could be other reasons why ammonia is high-normal, depakote can contribute to elevated ammonia.    -Recommend utox >>> +benzos    -Can continue to hold lexapro, as pt was not on this during last IPP and due to deliriogenic potential. Would generally continue to hold ativan due to deliriogenic and sedating potential as well unless there is concern for seizure/withdrawal.    -Can continue to hold benztropine due to anticholinergic effect and deliriogenic potential, restart once mental status improves     -Delirium precautions below    -Does not meet criteria for involuntary psych hospitalization at this time. Presentation does not appear to be primary psych at this time. Safety and dispo planning per primary team given mental status.    Please utilize objective screening tool such as "4AT" or Confusion Assessment Method (CAM).    Please follow the following precautions:  - Avoid use of anticholinergic, antihistaminergic, benzodiazepine and opioid medications if possible as these can be deliriogenic. Avoid polypharmacy.  - Assess for pain and provide analgesia as needed  - Maintain normal sleep-wake cycle: daytime awake / night-time asleep, light/dark in room  - Encourage orientation to location, date, individuals  - Mobilize as tolerated during the day (i.e. transfer to chair / seated position even if unable to ambulate otherwise)  - Minimize excessive noise  - Ensure access to any sensory aides used prior to admission (i.e. glasses, hearing aids etc)  - Ensure communication with family, caregivers, HCPs as possible    Treatment of delirium involves treating the original medical etiology including but not limited to infection, electrolyte or metabolic disturbance, acute kidney/liver/cardiovascular disturbance, dehydration. 52yo M, domiciled at [facility], past medical history of DM, HLD, HTN, hypothyroidism, COVID, past psychiatric history schizoaffective disorder, hx of substance use disorder, hx past SA vs NSSIB by cutting and head banging, last psych hospitalization in April 2024 for agitation, recent medical hospitalization for URI and found to be COVID+ who presents to the hospital for fall and seizure workup per chart. Per primary team, patient presented lethargic today and at baseline is more awake. Consult placed for psych eval and med management. Primary team asks if any psych medications could be contributing to lethargy.    8/14- Unable to complete interview of patient. Chart review done and collateral obtained. At this time, presentation is not likely due to primary psychiatric cause. Changes in mental status and recent medical issues increase concern for delirium, which can present with hypoactive symptoms as well. It is less likely that psychiatric medications could be contributing to lethargy given acute change, fluctuation, lack of changes in doses or missed doses for the past several months, and patient has been taking medications consistently per collateral. Would consider simplifying regimen and minimizing medications that could be deliriogenic, such as ativan, unless needed for other medical cause such as seizures. Would also r/o any substances given history of use although it is also not clear what is contributing to mental status change.    8/15- More awake, AOx3, euthymic, no behavioral issues, denies SI/HI. Dose of clozapine was held last night. Unclear if this was contributing to lethargy given pt has been on this dose for months. No changes to recommendations.     Please note that if clozapine is stopped more than 48 hours it needs to be re-titrated to avoid side effects. Gradual changes in medication doses help avoid side effects.    Recommendations:  -Continued medical and neurological workup that could contribute to delirium    -No changes to current psych medications, can continue clozapine 100mg BID, depakote 500mg daily, depakote 750mg qHS given pt has been on them for months and no recent changes to medications or missed doses. Changes in medications may risk psych decompensation, at the same time, if there is significant medical/neurological issue, would need to weigh risks/benefits of continuing med.    -Recommend repeat depakote level, make sure trough is drawn before the am dose>>> done and therapeutic    -Recommend repeat ammonia, although there could be other reasons why ammonia is high-normal, depakote can contribute to elevated ammonia.    -Recommend utox >>> +benzos    -Can continue to hold lexapro, as pt was not on this during last IPP and due to deliriogenic potential. Would generally continue to hold ativan due to deliriogenic and sedating potential as well unless there is concern for seizure/withdrawal.    -Can continue to hold benztropine due to anticholinergic effect and deliriogenic potential, restart once mental status improves     -Delirium precautions below    -Does not meet criteria for involuntary psych hospitalization at this time. Presentation does not appear to be primary psych at this time. Safety and dispo planning per primary team given mental status.    Please utilize objective screening tool such as "4AT" or Confusion Assessment Method (CAM).    Please follow the following precautions:  - Avoid use of anticholinergic, antihistaminergic, benzodiazepine and opioid medications if possible as these can be deliriogenic. Avoid polypharmacy.  - Assess for pain and provide analgesia as needed  - Maintain normal sleep-wake cycle: daytime awake / night-time asleep, light/dark in room  - Encourage orientation to location, date, individuals  - Mobilize as tolerated during the day (i.e. transfer to chair / seated position even if unable to ambulate otherwise)  - Minimize excessive noise  - Ensure access to any sensory aides used prior to admission (i.e. glasses, hearing aids etc)  - Ensure communication with family, caregivers, HCPs as possible    Treatment of delirium involves treating the original medical etiology including but not limited to infection, electrolyte or metabolic disturbance, acute kidney/liver/cardiovascular disturbance, dehydration.

## 2024-08-15 NOTE — DISCHARGE NOTE PROVIDER - HOSPITAL COURSE
HPI:  53yoM w. hx of schizophrenia, seizures, presents to ED due to seizure? like activity per EMS, coming from [facility]. Pt. poor historian, states he did not have a seizure, states he was walking, fell weak and fell down. States he is taking his medications as prescribed. Slurring noted ( unknown if baseline). Attempted to contact NH but no one was able to state who or why EMS was called.    #Suspected seizure like disorder  - Seizure precautions  - Keep magnesium >2  - c/w home Valproic acid 250 and 500mg at bedtime extended release (cont as 250mg tid)  - Valproic acid level is 80 (tagret )  - CT Head No Cont (24 @ 15:58) >No acute intracranial pathology. No evidence of midline shift, mass effect or intracranial hemorrhage.  - neurology consult requested EEG and to cw home meds  EE)	No electrographic seizures or significant clinical events.  Provocations:  1)	Hyperventilation: was not performed.  2)	Photic stimulation: was not performed.  Impression:  Abnormal due to generalized slowing as above    Clinical Correlation:  Findings consistent with mild diffuse electrocerebral dysfunction secondary to nonspecific etiology    #Lethargy  -Psych was consulted, no psychiatric medication changes were made.  c/w with home meds Divalproex, Benztropine, and clozapine    #Schizoaffective disorder  -c/ home meds  -Divalproex DR 500mg PO BID, Divalproex DR 250mg at bedtime, Benztropine 1mg PO BID, Clozapine 100mg PO BID  -Invega once monthly     #IHD  per echo in :  - Echo has fixed RWMA at rest  - normal EF above 50%  - will get lipid profile   -  will start ASA 81   - cw atorvastatin 40mg and metoprolol succ 25mg    #Hypothyroidism   - TSH was 8.56 Ft4 1.0 consistent with hypothyroidism yet last one Thyroid Stimulating Hormone, Serum: 4.37 uIU/mL (24 @ 17:00)   - Overall clinically appears euthyroid, questionable compliance as he has different staff members administering his meds at dfferent times of the day, and sometimes he tends to get up late and miss his morning meds which includes levothyroxine  - cw levothyroxine 25 mcgs daily per endo OP note    #type 2 DM, currently controlled  - under good control with A1c 6.4  - on metformin 1000 mg BID not currently on ttt  - ISS and a1c 6.      Discussion of discharge plan of care, including discharge diagnoses, medication reconciliation, and follow-ups was conducted with Dr. Hernesto Weiner on 08/15/24, and discharge plan was approved.   53yoM w. hx of schizophrenia, seizures, presents to ED due to seizure? like activity per EMS, coming from [facility]. Pt. poor historian, states he did not have a seizure, states he was walking, fell weak and fell down. States he is taking his medications as prescribed. Slurring noted ( unknown if baseline). Attempted to contact NH but no one was able to state who or why EMS was called.    # Fall  # Metabolic encephalopathy-resolved  # Schizoaffective disorder  - Ammonia, Serum: 56 umol/L (08.13.24 @ 18:15)  - Ammonia, Serum: 72 umol/L (08.15.24 @ 06:52)  -  orthostats neg  - Vitamin B12, Serum: 415 pg/mL (08.14.24 @ 10:42)  - Folate, Serum: 5.4 ng/mL (08.14.24 @ 10:42)  - start folic acid  - lactulose 15 ml q 8 , trend for 2-3 BM's /day  - PT eval: outpt PT  - c/w home meds  - Psych eval--No changes to current psych medications, can continue clozapine 100mg BID, depakote 500mg daily, depakote 750mg qHS given pt has been on them for months and no recent changes to medications or missed doses.    # H/o seizure disorder  - CT Head No Cont (08.12.24 @ 15:58) >No acute intracranial pathology. No evidence of midline shift, mass effect or intracranial hemorrhage.  - EEG: Findings consistent with mild diffuse electrocerebral dysfunction secondary to nonspecific etiology  - Seizure precautions  - VPA levels therapeutic.  - c/w depakote  - evaluated by neurology    # IHD  - c/w ASA, statin , metoprolol    # DM type 2  - A1C with Estimated Average Glucose Result: 6.2(08.13.24 @ 08:39)  - c/w home meds    # Hypothyroidism   - Thyroid Stimulating Hormone, Serum: 4.37 uIU/mL (07.30.24 @ 17:00)  - c/w synthroid           53yoM w. hx of schizophrenia, seizures, presents to ED due to seizure? like activity per EMS, coming from [facility]. Pt. poor historian, states he did not have a seizure, states he was walking, fell weak and fell down. States he is taking his medications as prescribed. Slurring noted ( unknown if baseline). Attempted to contact NH but no one was able to state who or why EMS was called.    # Fall  # Metabolic encephalopathy-resolved  # Schizoaffective disorder  -  orthostats neg  - Vitamin B12, Serum: 415 pg/mL (08.14.24 @ 10:42)  - Folate, Serum: 5.4 ng/mL (08.14.24 @ 10:42)  - start folic acid  - PT eval: outpt PT  - c/w home meds  - Psych eval--Continue clozapine 100mg BID, depakote 250mg BID (8am and 8pm)    # H/o seizure disorder  - CT Head No Cont (08.12.24 @ 15:58) >No acute intracranial pathology. No evidence of midline shift, mass effect or intracranial hemorrhage.  - EEG: Findings consistent with mild diffuse electrocerebral dysfunction secondary to nonspecific etiology  - Seizure precautions  - VPA levels therapeutic.  - c/w depakote 250mg BID   - evaluated by neurology    # IHD  - c/w ASA, statin , metoprolol    # DM type 2  - A1C with Estimated Average Glucose Result: 6.2(08.13.24 @ 08:39)  - c/w home meds    # Hypothyroidism   - Thyroid Stimulating Hormone, Serum: 4.37 uIU/mL (07.30.24 @ 17:00)  - c/w synthroid    Case was discussed with Dr. Eric, med rec done with attending, discharge approved at 15:00.        53yoM w. hx of schizophrenia, seizures, presents to ED due to seizure? like activity per EMS, coming from [facility]. Pt. poor historian, states he did not have a seizure, states he was walking, fell weak and fell down. States he is taking his medications as prescribed. Slurring noted ( unknown if baseline). Attempted to contact NH but no one was able to state who or why EMS was called.    # Fall  # Metabolic encephalopathy-resolved  # Schizoaffective disorder  - ammonia 56 -> 72 -> 137 (refused lactulose dose) -> 42  - orthostats neg  - Vitamin B12, Serum: 415 pg/mL (08.14.24 @ 10:42)  - Folate, Serum: 5.4 ng/mL (08.14.24 @ 10:42)  - c/w  folic acid  - c/w lactulose   - Continue clozapine 100mg BID  - Continue benztropine 1mg BID  - Ammonia, Serum: 51 umol/L (08.17.24 @ 07:41)  - Psych eval--Continue clozapine 100mg BID, restart depakote at reduced dose of 250mg BID (8am and 8pm)    # H/o seizure disorder  - CT Head No Cont (08.12.24 @ 15:58) >No acute intracranial pathology. No evidence of midline shift, mass effect or intracranial hemorrhage.  - EEG: Findings consistent with mild diffuse electrocerebral dysfunction secondary to nonspecific etiology  - Seizure precautions  - VPA levels therapeutic.  - c/w depakote 250mg BID   - evaluated by neurology    # IHD  - c/w ASA, statin , metoprolol    # DM type 2  - A1C with Estimated Average Glucose Result: 6.2(08.13.24 @ 08:39)  - c/w home meds    # Hypothyroidism   - Thyroid Stimulating Hormone, Serum: 4.37 uIU/mL (07.30.24 @ 17:00)  - c/w synthroid    Case was discussed with Dr. Eric, med rec done with attending, discharge approved at 15:00.      53yoM w. hx of schizophrenia, seizures, presents to ED due to seizure? like activity per EMS, coming from [facility]. Pt. poor historian, states he did not have a seizure, states he was walking, fell weak and fell down. States he is taking his medications as prescribed. Slurring noted ( unknown if baseline). Attempted to contact NH but no one was able to state who or why EMS was called.    # Fall  # Metabolic encephalopathy- resolved  # Hyperammonemia- resolved  # Schizoaffective disorder  - Ammonia, Serum: 56 umol/L (08.13.24 @ 18:15)  - Ammonia, Serum: 72 umol/L (08.15.24 @ 06:52  - Ammonia, Serum: 137 umol/L (08.16.24 @ 07:40)- pt had refused lactulose last night  - Ammonia, Serum: 42 umol/L (08.19.24 @ 11:11)  - orthostats neg  - Vitamin B12, Serum: 415 pg/mL (08.14.24 @ 10:42)  - Folate, Serum: 5.4 ng/mL (08.14.24 @ 10:42)  - c/w  folic acid  - c/w lactulose   - As per psychiatry restart depakote 250mg q12  - Continue clozapine 100mg BID  - Continue benztropine 1mg BID  - Ammonia, Serum: 51 umol/L (08.17.24 @ 07:41)  - outpt F/u with psychiatry for further adjustment in depakote dose    # H/o seizure disorder  - CT Head No Cont (08.12.24 @ 15:58) >No acute intracranial pathology. No evidence of midline shift, mass effect or intracranial hemorrhage.  - EEG: Findings consistent with mild diffuse electrocerebral dysfunction secondary to nonspecific etiology  - Seizure precautions  - evaluated by neurology    # IHD  - c/w ASA, statin , metoprolol    # DM type 2  - A1C with Estimated Average Glucose Result: 6.2(08.13.24 @ 08:39)  - c/w home meds    # Hypothyroidism   - Thyroid Stimulating Hormone, Serum: 4.37 uIU/mL (07.30.24 @ 17:00)  - c/w synthroid

## 2024-08-15 NOTE — BH CONSULTATION LIAISON PROGRESS NOTE - NSBHATTESTBILLING_PSY_A_CORE
55715-Ibrzxauhjm OBS or IP - moderate complexity OR 35-49 mins 15639-Kqnldxrkfc OBS or IP - high complexity OR 50-79 mins

## 2024-08-15 NOTE — DISCHARGE NOTE PROVIDER - NSDCCPCAREPLAN_GEN_ALL_CORE_FT
PRINCIPAL DISCHARGE DIAGNOSIS  Diagnosis: Seizure-like activity  Assessment and Plan of Treatment: You presented to the hospital after a fall likely due to a seizure like activity.  CT Head No Cont showed no acute intracranial pathology.   -Neurology was consulted and EEG was perfomed, findings consistent with mild diffuse electrocerebral dysfunction secondary to nonspecific etiology. Continue to take Keppra as prescribed.  -Psych was consulted and no changes were made to your medications.  -Follow up outpatient with your primary care doctor,  Return to the hospital if your condition persists or worsen.     PRINCIPAL DISCHARGE DIAGNOSIS  Diagnosis: Seizure-like activity  Assessment and Plan of Treatment: You presented to the hospital after a fall likely due to a seizure like activity.  - CT Head No Cont showed no acute intracranial pathology.   -Neurology was consulted and EEG was perfomed, findings consistent with mild diffuse electrocerebral dysfunction secondary to nonspecific etiology. Continue to take Keppra as prescribed.  -Psych was consulted and your depakote dose was decreased   -Follow up outpatient with your primary care doctor,  Return to the hospital if your condition persists or worsen.     PRINCIPAL DISCHARGE DIAGNOSIS  Diagnosis: Encephalopathy due to ammonia  Assessment and Plan of Treatment: You presented to the hospital after a fall due to altered mental status. You had a CT head that demonstrated no acute pathology.  Neurology was consulted and EEG was perfomed, findings consistent with mild diffuse electrocerebral dysfunction secondary to nonspecific etiology. Continue to take Keppra as prescribed. You were found to have elevated ammonia levels, you were started on lactulose with improvement in your ammonia. Psych was consulted and your depakote dose was decreased . Follow up outpatient with your primary care doctor and with neurology   - Call 911 or report to the emergency department if you have sudden onset severe headache, vision problems such as blurry or double vision or vision that is going dark, vertigo, numbness or weakness anywhere in your body, slurred speech or difficulty walking

## 2024-08-15 NOTE — DISCHARGE NOTE PROVIDER - NSDCFUSCHEDAPPT_GEN_ALL_CORE_FT
Dayton Longoria  [facility] PreAdmits  Scheduled Appointment: 08/21/2024    Dayton Longoria  [facility]  GASTRO  [address]  Scheduled Appointment: 08/21/2024    Andreas Collins  [facility] PreAdmits  Scheduled Appointment: 08/27/2024    [facility]  PODIATRY  [address]  Scheduled Appointment: 08/27/2024    Mark Fox  [facility] PreAdmits  Scheduled Appointment: 08/30/2024    Mark Fox  [facility]  INTMED  [address]  Scheduled Appointment: 08/30/2024    Shemar Robles  [facility] PreAdmits  Scheduled Appointment: 09/12/2024    Shemar Robles  [facility]  ENDOCRIN [address]  Scheduled Appointment: 09/12/2024    Mark Fxo  [facility] PreAdmits  Scheduled Appointment: 09/13/2024    Mark Fox  [facility]  INTMED  [address]  Scheduled Appointment: 09/13/2024     Dayton Longoria  [facility] PreAdmits  Scheduled Appointment: 08/21/2024    Dayton Longoria  [facility]  GASTRO  [address]  Scheduled Appointment: 08/21/2024    Andreas Collins  [facility] PreAdmits  Scheduled Appointment: 08/27/2024    [facility]  PODIATRY  [address]  Scheduled Appointment: 08/27/2024    Shemar Robles  [facility] PreAdmits  Scheduled Appointment: 09/12/2024    Shemar Robles  [facility]  ENDOCRIN [address]  Scheduled Appointment: 09/12/2024    Mark Fox  [facility] PreAdmits  Scheduled Appointment: 09/13/2024    Mark Fox  [facility]  INTMED  [address]  Scheduled Appointment: 09/13/2024    Mark Fox  [facility] PreAdmits  Scheduled Appointment: 09/16/2024    Mark Fox  [facility]  INTMED  [address]  Scheduled Appointment: 09/16/2024

## 2024-08-15 NOTE — DISCHARGE NOTE PROVIDER - NSDCMRMEDTOKEN_GEN_ALL_CORE_FT
benztropine 1 mg oral tablet: 1 tab(s) orally 2 times a day MDD: 2mg  bisacodyl 5 mg oral tablet: 1 tab(s) orally once a day  cloZAPine 100 mg oral tablet: 1 tab(s) orally 2 times a day  divalproex sodium 250 mg oral delayed release tablet: 3 tab(s) orally once a day (at bedtime) MDD: 1250mg  divalproex sodium 500 mg oral delayed release tablet: 1 tab(s) orally once a day morning dose MDD: 1250mg  docusate sodium 100 mg oral capsule: 1 cap(s) orally once a day  Invega Sustenna 234 mg/1.5 mL intramuscular suspension, extended release: 234 milligram(s) intramuscularly once a month  levothyroxine 25 mcg (0.025 mg) oral tablet: 1 tab(s) orally once a day  MetFORMIN (Eqv-Fortamet) 1000 mg oral tablet, extended release: 1 tab(s) orally 2 times a day  metoprolol succinate 25 mg oral tablet, extended release: 1 tab(s) orally once a day   aspirin 81 mg oral delayed release tablet: 1 tab(s) orally once a day  atorvastatin 40 mg oral tablet: 1 tab(s) orally once a day (at bedtime)  benztropine 1 mg oral tablet: 1 tab(s) orally 2 times a day MDD: 2mg  bisacodyl 5 mg oral tablet: 1 tab(s) orally once a day  cloZAPine 100 mg oral tablet: 1 tab(s) orally 2 times a day  divalproex sodium 250 mg oral delayed release tablet: 3 tab(s) orally once a day (at bedtime) MDD: 1250mg  divalproex sodium 500 mg oral delayed release tablet: 1 tab(s) orally once a day morning dose MDD: 1250mg  docusate sodium 100 mg oral capsule: 1 cap(s) orally once a day  Invega Sustenna 234 mg/1.5 mL intramuscular suspension, extended release: 234 milligram(s) intramuscularly once a month  levothyroxine 25 mcg (0.025 mg) oral tablet: 1 tab(s) orally once a day  MetFORMIN (Eqv-Fortamet) 1000 mg oral tablet, extended release: 1 tab(s) orally 2 times a day  metoprolol succinate 25 mg oral tablet, extended release: 1 tab(s) orally once a day   aspirin 81 mg oral delayed release tablet: 1 tab(s) orally once a day  atorvastatin 40 mg oral tablet: 1 tab(s) orally once a day (at bedtime)  benztropine 1 mg oral tablet: 1 tab(s) orally 2 times a day MDD: 2mg  bisacodyl 5 mg oral tablet: 1 tab(s) orally once a day  cloZAPine 100 mg oral tablet: 1 tab(s) orally 2 times a day  divalproex sodium 250 mg oral tablet, extended release: 1 tab(s) orally every 12 hours at 8:00am and 8:00pm  docusate sodium 100 mg oral capsule: 1 cap(s) orally once a day  folic acid 1 mg oral tablet: 1 tab(s) orally once a day  Invega Sustenna 234 mg/1.5 mL intramuscular suspension, extended release: 234 milligram(s) intramuscularly once a month  levothyroxine 25 mcg (0.025 mg) oral tablet: 1 tab(s) orally once a day  MetFORMIN (Eqv-Fortamet) 1000 mg oral tablet, extended release: 1 tab(s) orally 2 times a day  metoprolol succinate 25 mg oral tablet, extended release: 1 tab(s) orally once a day   aspirin 81 mg oral delayed release tablet: 1 tab(s) orally once a day  atorvastatin 40 mg oral tablet: 1 tab(s) orally once a day (at bedtime)  benztropine 1 mg oral tablet: 1 tab(s) orally 2 times a day MDD: 2mg  bisacodyl 5 mg oral tablet: 1 tab(s) orally once a day  cloZAPine 100 mg oral tablet: 1 tab(s) orally 2 times a day  divalproex sodium 250 mg oral tablet, extended release: 1 tab(s) orally every 12 hours at 8:00am and 8:00pm  docusate sodium 100 mg oral capsule: 1 cap(s) orally once a day  folic acid 1 mg oral tablet: 1 tab(s) orally once a day  Invega Sustenna 234 mg/1.5 mL intramuscular suspension, extended release: 234 milligram(s) intramuscularly once a month  lactulose 10 g/15 mL oral syrup: 15 milliliter(s) orally every 6 hours  levothyroxine 100 mcg (0.1 mg) oral tablet: 1 tab(s) orally once a day  MetFORMIN (Eqv-Fortamet) 1000 mg oral tablet, extended release: 1 tab(s) orally 2 times a day  metoprolol succinate 25 mg oral tablet, extended release: 1 tab(s) orally once a day   aspirin 81 mg oral delayed release tablet: 1 tab(s) orally once a day  atorvastatin 40 mg oral tablet: 1 tab(s) orally once a day (at bedtime)  benztropine 1 mg oral tablet: 1 tab(s) orally 2 times a day MDD: 2mg  bisacodyl 5 mg oral tablet: 1 tab(s) orally once a day  cloZAPine 100 mg oral tablet: 1 tab(s) orally 2 times a day  divalproex sodium 250 mg oral tablet, extended release: 1 tab(s) orally every 12 hours at 8:00am and 8:00pm  docusate sodium 100 mg oral capsule: 1 cap(s) orally once a day  folic acid 1 mg oral tablet: 1 tab(s) orally once a day  Invega Sustenna 234 mg/1.5 mL intramuscular suspension, extended release: 234 milligram(s) intramuscularly once a month  lactulose 10 g/15 mL oral syrup: 15 milliliter(s) orally every 6 hours  levothyroxine 25 mcg (0.025 mg) oral tablet: 1 tab(s) orally once a day  MetFORMIN (Eqv-Fortamet) 1000 mg oral tablet, extended release: 1 tab(s) orally 2 times a day  metoprolol succinate 25 mg oral tablet, extended release: 1 tab(s) orally once a day

## 2024-08-15 NOTE — BH CONSULTATION LIAISON PROGRESS NOTE - NSBHCONSULTMEDAGITATION_PSY_A_CORE FT
52yo M, domiciled at [facility], past medical history of DM, HLD, HTN, hypothyroidism, COVID, past psychiatric history schizoaffective disorder, hx of substance use disorder, hx past SA vs NSSIB by cutting and head banging, last psych hospitalization in April 2024 for agitation, recent medical hospitalization for URI and found to be COVID+ who presents to the hospital for fall and seizure workup per chart. Per primary team, patient presented lethargic today and at baseline is more awake. Consult placed for psych eval and med management. Primary team asks if any psych medications could be contributing to lethargy.    Unable to complete interview of patient. Chart review done and collateral obtained. At this time, presentation is not likely due to primary psychiatric cause. Changes in mental status and recent medical issues increase concern for delirium, which can present with hypoactive symptoms as well. It is less likely that psychiatric medications could be contributing to lethargy given acute change, fluctuation, lack of changes in doses or missed doses for the past several months, and patient has been taking medications consistently per collateral. Would consider simplifying regimen and minimizing medications that could be deliriogenic, such as ativan, unless needed for other medical cause such as seizures. Would also r/o any substances given history of use although it is also not clear what is contributing to mental status change.    Recommendations:  -Continued medical and neurological workup that could contribute to delirium    -No changes to current psych medications, can continue clozapine 100mg BID, depakote 500mg daily, depakote 750mg qHS given pt has been on them for months and no recent changes to medications or missed doses. Changes in medications may risk psych decompensation, at the same time, if there is significant medical/neurological issue, would need to weigh risks/benefits of continuing med.    -Recommend repeat depakote level, make sure trough is drawn before the am dose    -Recommend repeat ammonia, although there could be other reasons why ammonia is high-normal, depakote can contribute to elevated ammonia.    -Recommend utox    -Can continue to hold lexapro, as pt was not on this during last IPP and due to deliriogenic potential. Would generally continue to hold ativan due to deliriogenic and sedating potential as well unless there is concern for seizure/withdrawal.    -Can continue to hold benztropine due to anticholinergic effect and deliriogenic potential, restart once mental status improves    -Delirium precautions below    -Does not meet criteria for involuntary psych hospitalization at this time. Presentation does not appear to be primary psych at this time. Safety and dispo planning per primary team given mental status.    Please utilize objective screening tool such as "4AT" or Confusion Assessment Method (CAM).    Please follow the following precautions:  - Avoid use of anticholinergic, antihistaminergic, benzodiazepine and opioid medications if possible as these can be deliriogenic. Avoid polypharmacy.  - Assess for pain and provide analgesia as needed  - Maintain normal sleep-wake cycle: daytime awake / night-time asleep, light/dark in room  - Encourage orientation to location, date, individuals  - Mobilize as tolerated during the day (i.e. transfer to chair / seated position even if unable to ambulate otherwise)  - Minimize excessive noise  - Ensure access to any sensory aides used prior to admission (i.e. glasses, hearing aids etc)  - Ensure communication with family, caregivers, HCPs as possible    Treatment of delirium involves treating the original medical etiology including but not limited to infection, electrolyte or metabolic disturbance, acute kidney/liver/cardiovascular disturbance, dehydration. For acute agitation, can consider Haldol 2.5-5mg q8 PRN, hold for qtc >500 or extrapyramidal symptoms. Do not make standing as pt is already on clozapine and invega sustenna. This would be for temporary management.

## 2024-08-15 NOTE — PHYSICAL THERAPY INITIAL EVALUATION ADULT - SIT-TO-STAND BALANCE
[de-identified] : Patient arrived for EGD. She has complaints of dysphagia. MBS was performed. No regurgitation of food.
good balance

## 2024-08-15 NOTE — DISCHARGE NOTE PROVIDER - NSDCFUADDINST_GEN_ALL_CORE_FT
- needs depakote trough for further dose adjustment in depakote  - pt started on lactulose for Hyperammonemia, titrate to 2-3 BM's/day

## 2024-08-15 NOTE — DISCHARGE NOTE PROVIDER - CARE PROVIDERS DIRECT ADDRESSES
[email or web address]

## 2024-08-15 NOTE — PHYSICAL THERAPY INITIAL EVALUATION ADULT - GENERAL OBSERVATIONS, REHAB EVAL
7:45-8:10am Pt encountered in semi-champion position in bed, A & O x 3 in NAD, no c/o pain, and agreeable to PT. Pt observed indep in bed mobility and transfers. Pt ambulated 200 ft supervision without AD with decreased martinez/step-length/heel strike. Pt demonstrated increased stability and indep when using RW during ambulation. Pt is in agreement to use RW due to h/o fall when he was at the facility as per pt. No skilled PT warranted at this time secondary pt is indep in mobility/ambulation using RW. Nursing staff will provide maintenance ambulation as needed using RW. Stairs is not a barrier to DC.

## 2024-08-15 NOTE — PHYSICAL THERAPY INITIAL EVALUATION ADULT - LEVEL OF CONSCIOUSNESS, REHAB EVAL
Sammyer saw the patient for palliative care and home hospice care but the patient doesn't qualify for home hospice and declined Palliative care,caller stated patient actually doesn't meet the criteria for skilled nursing.   Patient is his own person and doesn't want a nurse to visit his family.     alert

## 2024-08-15 NOTE — CHART NOTE - NSCHARTNOTEFT_GEN_A_CORE
Patient AOx4 refusing lab draws. Spoke with patient discussing risks of doing so, including death, using teach-back method; patient in full understanding. Will continue to try to convince patient to get lab draws.

## 2024-08-15 NOTE — PHYSICAL THERAPY INITIAL EVALUATION ADULT - PERTINENT HX OF CURRENT PROBLEM, REHAB EVAL
53yoM w. hx of schizophrenia, seizures, presents to ED due to seizure? like activity per EMS, coming from [facility]. Pt. poor historian, states he did not have a seizure, states he was walking, fell weak and fell down. States he is taking his medications as prescribed. Slurring noted ( unknown if baseline). Attempted to contact NH but no one was able to state who or why EMS was called.

## 2024-08-15 NOTE — PROGRESS NOTE ADULT - SUBJECTIVE AND OBJECTIVE BOX
Patient is a 53y old  Male who presents with a chief complaint of  fall (14 Aug 2024 16:28)    Patient was seen and examined.  Pt awake, alert today  Denies any complaints    PAST MEDICAL & SURGICAL HISTORY:  DM (diabetes mellitus)  HTN (hypertension)  Schizophrenia  Substance use disorder  Echocardiogram abnormal    No significant past surgical history    Allergies  fish (Unknown)  Thorazine (Unknown)  penicillin (Unknown)  pork (Unknown)  Navane (Unknown)    MEDICATIONS  (STANDING):  aspirin enteric coated 81 milliGRAM(s) Oral daily  atorvastatin 40 milliGRAM(s) Oral at bedtime  benztropine 1 milliGRAM(s) Oral two times a day  bisacodyl 5 milliGRAM(s) Oral at bedtime  chlorhexidine 2% Cloths 1 Application(s) Topical <User Schedule>  cloZAPine 100 milliGRAM(s) Oral two times a day  divalproex  milliGRAM(s) Oral two times a day  divalproex  milliGRAM(s) Oral at bedtime  enoxaparin Injectable 40 milliGRAM(s) SubCutaneous every 24 hours  glucagon  Injectable 1 milliGRAM(s) IntraMuscular once  insulin lispro (ADMELOG) corrective regimen sliding scale   SubCutaneous three times a day before meals  lactulose Syrup 10 Gram(s) Oral every 6 hours  levothyroxine 100 MICROGram(s) Oral daily  metoprolol succinate ER 25 milliGRAM(s) Oral daily  pantoprazole    Tablet 40 milliGRAM(s) Oral before breakfast  polyethylene glycol 3350 17 Gram(s) Oral at bedtime  senna 2 Tablet(s) Oral at bedtime    MEDICATIONS  (PRN):  acetaminophen     Tablet .. 650 milliGRAM(s) Oral every 6 hours PRN Temp greater or equal to 38C (100.4F), Mild Pain (1 - 3)  aluminum hydroxide/magnesium hydroxide/simethicone Suspension 30 milliLiter(s) Oral every 4 hours PRN Dyspepsia  dextrose Oral Gel 15 Gram(s) Oral once PRN Blood Glucose LESS THAN 70 milliGRAM(s)/deciliter  ondansetron Injectable 4 milliGRAM(s) IV Push every 8 hours PRN Nausea and/or Vomiting    T(C): 37 (08-15-24 @ 15:46), Max: 37 (08-15-24 @ 00:26)  HR: 101 (08-15-24 @ 15:46) (92 - 101)  BP: 117/82 (08-15-24 @ 15:46) (116/76 - 127/84)  RR: 18 (08-15-24 @ 15:46) (18 - 19)  SpO2: 98% (08-15-24 @ 15:46) (96% - 98%)    O/E: awake, alert not in distress.  HEENT: atraumatic, EOMI.  Chest: clear.  CVS: SIS2 +, no murmur.  P/A: Soft, BS+  CNS: awake, alet  Ext: no edema feet.  All systems reviewed positive findings as above.                          10.6<L>  8.23  )-----------( 223      ( 15 Aug 2024 06:52 )             35.0<L>                        10.5<L>  8.41  )-----------( 243      ( 14 Aug 2024 06:11 )             34.3<L>  08-15    141  |  102  |  7<L>  ----------------------------<  146<H>  4.1   |  25  |  1.0  08-14    142  |  104  |  6<L>  ----------------------------<  90  4.8   |  26  |  0.8    Ca    9.1      15 Aug 2024 06:52  Ca    9.5      14 Aug 2024 06:11  Phos  4.0     08-15  Mg     1.8     08-15    TPro  6.7  /  Alb  4.0  /  TBili  1.7<H>  /  DBili  x   /  AST  10  /  ALT  8   /  AlkPhos  83  08-15

## 2024-08-15 NOTE — DISCHARGE NOTE PROVIDER - ATTENDING DISCHARGE PHYSICAL EXAMINATION:
T(C): 36.8 (08-19-24 @ 08:00), Max: 36.8 (08-19-24 @ 08:00)  HR: 91 (08-19-24 @ 08:00) (68 - 93)  BP: 129/79 (08-19-24 @ 08:00) (109/73 - 135/79)  RR: 18 (08-19-24 @ 08:00) (18 - 19)  SpO2: 99% (08-19-24 @ 08:00) (98% - 99%)    O/E: awake, alert.  HEENT: atraumatic, EOMI.  Chest: clear.  CVS: SIS2 +, no murmur.  P/A: Soft, BS+  CNS: awake, alert  Ext: no edema feet.  All systems reviewed positive findings as above.

## 2024-08-15 NOTE — DISCHARGE NOTE PROVIDER - CARE PROVIDER_API CALL
Femi Espinal  Internal Medicine  [address] [place] [address]  Phone: [phone]  Fax: [phone]  Follow Up Time: 1 week    Bert Guerrier  Neurology  [address] [place] [address], [address]  [place] [address]  Phone: [phone]  Fax: [phone]  Follow Up Time: 1 week   Femi Espinal  Internal Medicine  [address] [place] [address]  Phone: [phone]  Fax: [phone]  Follow Up Time: 1 week    Bert Guerrier  Neurology  [address] [place] [address], [address]  [place] [address]  Phone: [phone]  Fax: [phone]  Follow Up Time: 2 weeks

## 2024-08-16 LAB
ALBUMIN SERPL ELPH-MCNC: 3.8 G/DL — SIGNIFICANT CHANGE UP (ref 3.5–5.2)
ALP SERPL-CCNC: 80 U/L — SIGNIFICANT CHANGE UP (ref 30–115)
ALT FLD-CCNC: 7 U/L — SIGNIFICANT CHANGE UP (ref 0–41)
AMMONIA BLD-MCNC: 137 UMOL/L — HIGH (ref 11–55)
ANION GAP SERPL CALC-SCNC: 6 MMOL/L — LOW (ref 7–14)
AST SERPL-CCNC: 9 U/L — SIGNIFICANT CHANGE UP (ref 0–41)
BASOPHILS # BLD AUTO: 0.02 K/UL — SIGNIFICANT CHANGE UP (ref 0–0.2)
BASOPHILS NFR BLD AUTO: 0.3 % — SIGNIFICANT CHANGE UP (ref 0–1)
BILIRUB SERPL-MCNC: 0.3 MG/DL — SIGNIFICANT CHANGE UP (ref 0.2–1.2)
BUN SERPL-MCNC: 6 MG/DL — LOW (ref 10–20)
CALCIUM SERPL-MCNC: 9.2 MG/DL — SIGNIFICANT CHANGE UP (ref 8.4–10.4)
CHLORIDE SERPL-SCNC: 106 MMOL/L — SIGNIFICANT CHANGE UP (ref 98–110)
CO2 SERPL-SCNC: 29 MMOL/L — SIGNIFICANT CHANGE UP (ref 17–32)
CREAT SERPL-MCNC: 0.8 MG/DL — SIGNIFICANT CHANGE UP (ref 0.7–1.5)
EGFR: 106 ML/MIN/1.73M2 — SIGNIFICANT CHANGE UP
EOSINOPHIL # BLD AUTO: 0.2 K/UL — SIGNIFICANT CHANGE UP (ref 0–0.7)
EOSINOPHIL NFR BLD AUTO: 2.9 % — SIGNIFICANT CHANGE UP (ref 0–8)
GLUCOSE BLDC GLUCOMTR-MCNC: 102 MG/DL — HIGH (ref 70–99)
GLUCOSE BLDC GLUCOMTR-MCNC: 109 MG/DL — HIGH (ref 70–99)
GLUCOSE BLDC GLUCOMTR-MCNC: 147 MG/DL — HIGH (ref 70–99)
GLUCOSE BLDC GLUCOMTR-MCNC: 197 MG/DL — HIGH (ref 70–99)
GLUCOSE SERPL-MCNC: 105 MG/DL — HIGH (ref 70–99)
HCT VFR BLD CALC: 34.6 % — LOW (ref 42–52)
HGB BLD-MCNC: 10.6 G/DL — LOW (ref 14–18)
IMM GRANULOCYTES NFR BLD AUTO: 0.4 % — HIGH (ref 0.1–0.3)
LYMPHOCYTES # BLD AUTO: 3.73 K/UL — HIGH (ref 1.2–3.4)
LYMPHOCYTES # BLD AUTO: 54.8 % — HIGH (ref 20.5–51.1)
MAGNESIUM SERPL-MCNC: 2 MG/DL — SIGNIFICANT CHANGE UP (ref 1.8–2.4)
MCHC RBC-ENTMCNC: 25.9 PG — LOW (ref 27–31)
MCHC RBC-ENTMCNC: 30.6 G/DL — LOW (ref 32–37)
MCV RBC AUTO: 84.4 FL — SIGNIFICANT CHANGE UP (ref 80–94)
MONOCYTES # BLD AUTO: 0.76 K/UL — HIGH (ref 0.1–0.6)
MONOCYTES NFR BLD AUTO: 11.2 % — HIGH (ref 1.7–9.3)
NEUTROPHILS # BLD AUTO: 2.07 K/UL — SIGNIFICANT CHANGE UP (ref 1.4–6.5)
NEUTROPHILS NFR BLD AUTO: 30.4 % — LOW (ref 42.2–75.2)
NRBC # BLD: 0 /100 WBCS — SIGNIFICANT CHANGE UP (ref 0–0)
PHOSPHATE SERPL-MCNC: 3.8 MG/DL — SIGNIFICANT CHANGE UP (ref 2.1–4.9)
PLATELET # BLD AUTO: 231 K/UL — SIGNIFICANT CHANGE UP (ref 130–400)
PMV BLD: 9.8 FL — SIGNIFICANT CHANGE UP (ref 7.4–10.4)
POTASSIUM SERPL-MCNC: 4.4 MMOL/L — SIGNIFICANT CHANGE UP (ref 3.5–5)
POTASSIUM SERPL-SCNC: 4.4 MMOL/L — SIGNIFICANT CHANGE UP (ref 3.5–5)
PROT SERPL-MCNC: 6.4 G/DL — SIGNIFICANT CHANGE UP (ref 6–8)
RBC # BLD: 4.1 M/UL — LOW (ref 4.7–6.1)
RBC # FLD: 18.9 % — HIGH (ref 11.5–14.5)
SODIUM SERPL-SCNC: 141 MMOL/L — SIGNIFICANT CHANGE UP (ref 135–146)
VALPROATE SERPL-MCNC: 106 UG/ML — CRITICAL HIGH (ref 50–100)
WBC # BLD: 6.81 K/UL — SIGNIFICANT CHANGE UP (ref 4.8–10.8)
WBC # FLD AUTO: 6.81 K/UL — SIGNIFICANT CHANGE UP (ref 4.8–10.8)

## 2024-08-16 PROCEDURE — 99233 SBSQ HOSP IP/OBS HIGH 50: CPT | Mod: 95

## 2024-08-16 PROCEDURE — 99233 SBSQ HOSP IP/OBS HIGH 50: CPT

## 2024-08-16 RX ADMIN — Medication 5 MILLIGRAM(S): at 22:10

## 2024-08-16 RX ADMIN — CHLORHEXIDINE GLUCONATE 1 APPLICATION(S): 40 SOLUTION TOPICAL at 05:16

## 2024-08-16 RX ADMIN — Medication 100 MICROGRAM(S): at 05:04

## 2024-08-16 RX ADMIN — Medication 0: at 07:50

## 2024-08-16 RX ADMIN — Medication 0: at 11:54

## 2024-08-16 RX ADMIN — Medication 2 TABLET(S): at 22:10

## 2024-08-16 RX ADMIN — Medication 1 MILLIGRAM(S): at 11:58

## 2024-08-16 RX ADMIN — Medication 81 MILLIGRAM(S): at 11:58

## 2024-08-16 RX ADMIN — METOPROLOL TARTRATE 25 MILLIGRAM(S): 100 TABLET ORAL at 05:04

## 2024-08-16 RX ADMIN — Medication 40 MILLIGRAM(S): at 22:09

## 2024-08-16 RX ADMIN — CLOZAPINE 100 MILLIGRAM(S): 200 TABLET ORAL at 18:15

## 2024-08-16 RX ADMIN — Medication 10 GRAM(S): at 05:03

## 2024-08-16 RX ADMIN — CLOZAPINE 100 MILLIGRAM(S): 200 TABLET ORAL at 05:03

## 2024-08-16 RX ADMIN — Medication 40 MILLIGRAM(S): at 05:03

## 2024-08-16 RX ADMIN — Medication 10 GRAM(S): at 18:15

## 2024-08-16 RX ADMIN — BENZTROPINE MESYLATE 1 MILLIGRAM(S): 2 TABLET ORAL at 18:15

## 2024-08-16 RX ADMIN — ENOXAPARIN SODIUM 40 MILLIGRAM(S): 100 INJECTION SUBCUTANEOUS at 05:03

## 2024-08-16 RX ADMIN — DIVALPROEX SODIUM 500 MILLIGRAM(S): 125 CAPSULE, DELAYED RELEASE ORAL at 05:04

## 2024-08-16 RX ADMIN — Medication 1: at 17:00

## 2024-08-16 RX ADMIN — BENZTROPINE MESYLATE 1 MILLIGRAM(S): 2 TABLET ORAL at 05:03

## 2024-08-16 RX ADMIN — Medication 10 GRAM(S): at 11:57

## 2024-08-16 RX ADMIN — Medication: at 17:10

## 2024-08-16 NOTE — BH CONSULTATION LIAISON PROGRESS NOTE - NSBHASSESSMENTFT_PSY_ALL_CORE
54yo M, domiciled at [facility], past medical history of DM, HLD, HTN, hypothyroidism, COVID, past psychiatric history schizoaffective disorder, hx of substance use disorder, hx past SA vs NSSIB by cutting and head banging, last psych hospitalization in April 2024 for agitation, recent medical hospitalization for URI and found to be COVID+ who presents to the hospital for fall and seizure workup per chart. Per primary team, patient presented lethargic today and at baseline is more awake. Consult placed for psych eval and med management. Primary team asks if any psych medications could be contributing to lethargy.    8/14- Unable to complete interview of patient. Chart review done and collateral obtained. At this time, presentation is not likely due to primary psychiatric cause. Changes in mental status and recent medical issues increase concern for delirium, which can present with hypoactive symptoms as well. It is less likely that psychiatric medications could be contributing to lethargy given acute change, fluctuation, lack of changes in doses or missed doses for the past several months, and patient has been taking medications consistently per collateral. Would consider simplifying regimen and minimizing medications that could be deliriogenic, such as ativan, unless needed for other medical cause such as seizures. Would also r/o any substances given history of use although it is also not clear what is contributing to mental status change.    8/15- More awake, AOx3, euthymic, no behavioral issues, denies SI/HI. Dose of clozapine was held last night. Unclear if this was contributing to lethargy given pt has been on this dose for months. No changes to recommendations.     8/16- Continues to be more awake, AOx3 and euthymic. No behavioral issues. Denies SI/HI. Taking clozapine and depakote. Ammonia elevated today 137.    Recommendations:  -Continued medical and neurological workup that could contribute to delirium, including increase in ammonia.     -Given elevated ammonia and fluctuations in mental status, hold depakote and repeating ammonia level at this time. Unclear if depakote is contributing to elevated ammonia. Continue to monitor mental status (as pt has been more awake despite elevated ammonia). Changes in medications may risk psych decompensation, at the same time, if there is significant medical/neurological issue, would need to weigh risks/benefits of continuing med.    -Monitor ammonia and depakote levels     -Continue clozapine 100mg BID    -Continue benztropine 1mg BID      -Can continue to hold lexapro, as pt was not on this during last IPP and due to deliriogenic potential. Would generally continue to hold ativan due to deliriogenic and sedating potential as well unless there is concern for seizure/withdrawal.      -Delirium precautions below    -Does not meet criteria for involuntary psych hospitalization at this time. Presentation does not appear to be primary psych at this time. Safety and dispo planning per primary team given mental status.    Please utilize objective screening tool such as "4AT" or Confusion Assessment Method (CAM).    Please follow the following precautions:  - Avoid use of anticholinergic, antihistaminergic, benzodiazepine and opioid medications if possible as these can be deliriogenic. Avoid polypharmacy.  - Assess for pain and provide analgesia as needed  - Maintain normal sleep-wake cycle: daytime awake / night-time asleep, light/dark in room  - Encourage orientation to location, date, individuals  - Mobilize as tolerated during the day (i.e. transfer to chair / seated position even if unable to ambulate otherwise)  - Minimize excessive noise  - Ensure access to any sensory aides used prior to admission (i.e. glasses, hearing aids etc)  - Ensure communication with family, caregivers, HCPs as possible    Treatment of delirium involves treating the original medical etiology including but not limited to infection, electrolyte or metabolic disturbance, acute kidney/liver/cardiovascular disturbance, dehydration. 54yo M, domiciled at [facility], past medical history of DM, HLD, HTN, hypothyroidism, COVID, past psychiatric history schizoaffective disorder, hx of substance use disorder, hx past SA vs NSSIB by cutting and head banging, last psych hospitalization in April 2024 for agitation, recent medical hospitalization for URI and found to be COVID+ who presents to the hospital for fall and seizure workup per chart. Per primary team, patient presented lethargic today and at baseline is more awake. Consult placed for psych eval and med management. Primary team asks if any psych medications could be contributing to lethargy.    8/14- Unable to complete interview of patient. Chart review done and collateral obtained. At this time, presentation is not likely due to primary psychiatric cause. Changes in mental status and recent medical issues increase concern for delirium, which can present with hypoactive symptoms as well. It is less likely that psychiatric medications could be contributing to lethargy given acute change, fluctuation, lack of changes in doses or missed doses for the past several months, and patient has been taking medications consistently per collateral. Would consider simplifying regimen and minimizing medications that could be deliriogenic, such as ativan, unless needed for other medical cause such as seizures. Would also r/o any substances given history of use although it is also not clear what is contributing to mental status change.    8/15- More awake, AOx3, euthymic, no behavioral issues, denies SI/HI. Dose of clozapine was held last night. Unclear if this was contributing to lethargy given pt has been on this dose for months. No changes to recommendations.     8/16- Continues to be more awake, AOx3 and euthymic. No behavioral issues. Denies SI/HI. Taking clozapine and depakote. Ammonia elevated today 137.    Recommendations:  -Continued medical and neurological workup that could contribute to delirium, including increase in ammonia.     -Given elevated ammonia and fluctuations in mental status, hold depakote for now and repeat ammonia level. Unclear if depakote is contributing to elevated ammonia. Continue to monitor mental status (as pt has been more awake despite elevated ammonia). Changes in medications may risk psych decompensation, at the same time, if there is significant medical/neurological issue, would need to weigh risks/benefits of continuing med.    -Monitor ammonia and depakote levels     -Continue clozapine 100mg BID    -Continue benztropine 1mg BID      -Can continue to hold lexapro, as pt was not on this during last IPP and due to deliriogenic potential. Would generally continue to hold ativan due to deliriogenic and sedating potential as well unless there is concern for seizure/withdrawal.      -Delirium precautions below    -Does not meet criteria for involuntary psych hospitalization at this time. Presentation does not appear to be primary psych at this time. Safety and dispo planning per primary team given mental status.    Please utilize objective screening tool such as "4AT" or Confusion Assessment Method (CAM).    Please follow the following precautions:  - Avoid use of anticholinergic, antihistaminergic, benzodiazepine and opioid medications if possible as these can be deliriogenic. Avoid polypharmacy.  - Assess for pain and provide analgesia as needed  - Maintain normal sleep-wake cycle: daytime awake / night-time asleep, light/dark in room  - Encourage orientation to location, date, individuals  - Mobilize as tolerated during the day (i.e. transfer to chair / seated position even if unable to ambulate otherwise)  - Minimize excessive noise  - Ensure access to any sensory aides used prior to admission (i.e. glasses, hearing aids etc)  - Ensure communication with family, caregivers, HCPs as possible    Treatment of delirium involves treating the original medical etiology including but not limited to infection, electrolyte or metabolic disturbance, acute kidney/liver/cardiovascular disturbance, dehydration.

## 2024-08-16 NOTE — PROGRESS NOTE ADULT - SUBJECTIVE AND OBJECTIVE BOX
SUBJECTIVE/OVERNIGHT EVENTS  Today is hospital day 4d. This morning patient was seen and examined at bedside, resting comfortably in bed. No acute or major events overnight. Pt appears very drowsy but arousable.      CODE STATUS: FULL      MEDICATIONS  STANDING MEDICATIONS  aspirin enteric coated 81 milliGRAM(s) Oral daily  atorvastatin 40 milliGRAM(s) Oral at bedtime  benztropine 1 milliGRAM(s) Oral two times a day  bisacodyl 5 milliGRAM(s) Oral at bedtime  chlorhexidine 2% Cloths 1 Application(s) Topical <User Schedule>  cloZAPine 100 milliGRAM(s) Oral two times a day  dextrose 5%. 1000 milliLiter(s) IV Continuous <Continuous>  dextrose 5%. 1000 milliLiter(s) IV Continuous <Continuous>  dextrose 50% Injectable 25 Gram(s) IV Push once  dextrose 50% Injectable 12.5 Gram(s) IV Push once  dextrose 50% Injectable 25 Gram(s) IV Push once  enoxaparin Injectable 40 milliGRAM(s) SubCutaneous every 24 hours  folic acid 1 milliGRAM(s) Oral daily  glucagon  Injectable 1 milliGRAM(s) IntraMuscular once  insulin lispro (ADMELOG) corrective regimen sliding scale   SubCutaneous three times a day before meals  lactulose Syrup 10 Gram(s) Oral every 6 hours  levothyroxine 100 MICROGram(s) Oral daily  metoprolol succinate ER 25 milliGRAM(s) Oral daily  pantoprazole    Tablet 40 milliGRAM(s) Oral before breakfast  polyethylene glycol 3350 17 Gram(s) Oral at bedtime  senna 2 Tablet(s) Oral at bedtime    PRN MEDICATIONS  acetaminophen     Tablet .. 650 milliGRAM(s) Oral every 6 hours PRN  aluminum hydroxide/magnesium hydroxide/simethicone Suspension 30 milliLiter(s) Oral every 4 hours PRN  dextrose Oral Gel 15 Gram(s) Oral once PRN  ondansetron Injectable 4 milliGRAM(s) IV Push every 8 hours PRN    VITALS  T(F): 98.5 (08-16-24 @ 15:40), Max: 98.5 (08-16-24 @ 15:40)  HR: 90 (08-16-24 @ 15:40) (88 - 101)  BP: 115/80 (08-16-24 @ 15:40) (114/80 - 159/100)  RR: 19 (08-16-24 @ 15:40) (18 - 19)  SpO2: 95% (08-16-24 @ 15:40) (93% - 99%)  POCT Blood Glucose.: 147 mg/dL (08-16-24 @ 11:23)  POCT Blood Glucose.: 102 mg/dL (08-16-24 @ 07:37)  POCT Blood Glucose.: 110 mg/dL (08-15-24 @ 20:56)  POCT Blood Glucose.: 133 mg/dL (08-15-24 @ 16:32)    PHYSICAL EXAM  GENERAL: drowsy but arousable  HEAD:  Atraumatic, normocephalic  EYES: EOMI, PERRL  NECK: Supple, trachea midline, no JVD  HEART: Regular rate and rhythm  LUNGS: Unlabored respirations.  Clear to auscultation bilaterally, no crackles, wheezing, or rhonchi  ABDOMEN: Soft, nontender, nondistended, +BS  EXTREMITIES: 2+ peripheral pulses bilaterally. No clubbing, cyanosis, or edema  NERVOUS SYSTEM:  Drowsy, moving all extremities, no focal deficits     (  ) Indwelling [place]oto Catheter   Date insterted:    Reason (  ) Critical illness     (  ) urinary retention    (  ) Accurate Ins/Outs Monitoring     (  ) CMO patient    (  ) Central Line  Date inserted:  Location: (  ) Right IJ   (  ) Left IJ   (  ) Right Fem   (  ) Left Fem    (  ) SPC  (  ) pigtail  (  ) PEG tube  (  ) colostomy  (  ) jejunostomy  (  ) U-Dall    LABS             10.6   6.81  )-----------( 231      ( 08-16-24 @ 07:40 )             34.6     141  |  106  |  6   -------------------------<  105   08-16-24 @ 07:40  4.4  |  29  |  0.8    Ca      9.2     08-16-24 @ 07:40  Phos   3.8     08-16-24 @ 07:40  Mg     2.0     08-16-24 @ 07:40    TPro  6.4  /  Alb  3.8  /  TBili  0.3  /  DBili  x   /  AST  9   /  ALT  7   /  AlkPhos  80  /  GGT  x     08-16-24 @ 07:40        Urinalysis Basic - ( 16 Aug 2024 07:40 )    Color: x / Appearance: x / SG: x / pH: x  Gluc: 105 mg/dL / Ketone: x  / Bili: x / Urobili: x   Blood: x / Protein: x / Nitrite: x   Leuk Esterase: x / RBC: x / WBC x   Sq Epi: x / Non Sq Epi: x / Bacteria: x          IMAGING

## 2024-08-16 NOTE — PROGRESS NOTE ADULT - SUBJECTIVE AND OBJECTIVE BOX
Patient is a 53y old  Male who presents with a chief complaint of  fall (14 Aug 2024 16:28)    Patient was seen and examined.  Drowsy this AM, refused lactulose last night    PAST MEDICAL & SURGICAL HISTORY:  DM (diabetes mellitus)  HTN (hypertension)  Schizophrenia  Substance use disorder  Echocardiogram abnormal    No significant past surgical history    Allergies  fish (Unknown)  Thorazine (Unknown)  penicillin (Unknown)  pork (Unknown)  Navane (Unknown)    MEDICATIONS  (STANDING):  aspirin enteric coated 81 milliGRAM(s) Oral daily  atorvastatin 40 milliGRAM(s) Oral at bedtime  benztropine 1 milliGRAM(s) Oral two times a day  bisacodyl 5 milliGRAM(s) Oral at bedtime  chlorhexidine 2% Cloths 1 Application(s) Topical <User Schedule>  cloZAPine 100 milliGRAM(s) Oral two times a day  enoxaparin Injectable 40 milliGRAM(s) SubCutaneous every 24 hours  folic acid 1 milliGRAM(s) Oral daily  glucagon  Injectable 1 milliGRAM(s) IntraMuscular once  insulin lispro (ADMELOG) corrective regimen sliding scale   SubCutaneous three times a day before meals  lactulose Syrup 10 Gram(s) Oral every 6 hours  levothyroxine 100 MICROGram(s) Oral daily  metoprolol succinate ER 25 milliGRAM(s) Oral daily  pantoprazole    Tablet 40 milliGRAM(s) Oral before breakfast  polyethylene glycol 3350 17 Gram(s) Oral at bedtime  senna 2 Tablet(s) Oral at bedtime    MEDICATIONS  (PRN):  acetaminophen     Tablet .. 650 milliGRAM(s) Oral every 6 hours PRN Temp greater or equal to 38C (100.4F), Mild Pain (1 - 3)  aluminum hydroxide/magnesium hydroxide/simethicone Suspension 30 milliLiter(s) Oral every 4 hours PRN Dyspepsia  dextrose Oral Gel 15 Gram(s) Oral once PRN Blood Glucose LESS THAN 70 milliGRAM(s)/deciliter  ondansetron Injectable 4 milliGRAM(s) IV Push every 8 hours PRN Nausea and/or Vomiting    T(C): 36.2 (08-16-24 @ 07:52), Max: 37 (08-15-24 @ 15:46)  HR: 88 (08-16-24 @ 07:52) (88 - 101)  BP: 114/80 (08-16-24 @ 07:52) (114/80 - 159/100)  RR: 18 (08-16-24 @ 07:52) (18 - 18)  SpO2: 93% (08-16-24 @ 09:33) (93% - 99%)    O/E: Drowsy,responds to verbal commands not in distress.  HEENT: atraumatic, EOMI.  Chest: clear.  CVS: SIS2 +, no murmur.  P/A: Soft, BS+  CNS: Drowsy responds to verbal commands  Ext: no edema feet.  All systems reviewed positive findings as above.                             10.6<L>  6.81  )-----------( 231      ( 16 Aug 2024 07:40 )             34.6<L>                        10.6<L>  8.23  )-----------( 223      ( 15 Aug 2024 06:52 )             35.0<L>  08-16    141  |  106  |  6<L>  ----------------------------<  105<H>  4.4   |  29  |  0.8  08-15    141  |  102  |  7<L>  ----------------------------<  146<H>  4.1   |  25  |  1.0    Ca    9.2      16 Aug 2024 07:40  Ca    9.1      15 Aug 2024 06:52  Phos  3.8     08-16  Mg     2.0     08-16    TPro  6.4  /  Alb  3.8  /  TBili  0.3  /  DBili  x   /  AST  9   /  ALT  7   /  AlkPhos  80  08-16  TPro  6.7  /  Alb  4.0  /  TBili  1.7<H>  /  DBili  x   /  AST  10  /  ALT  8   /  AlkPhos  83  08-15

## 2024-08-16 NOTE — BH CONSULTATION LIAISON PROGRESS NOTE - NSBHCONSULTMEDAGITATION_PSY_A_CORE FT
For acute agitation, can consider Haldol 2.5-5mg q8 PRN, hold for qtc >500 or extrapyramidal symptoms. Do not make standing as pt is already on clozapine and invega sustenna. This would be for temporary management.

## 2024-08-16 NOTE — BH CONSULTATION LIAISON PROGRESS NOTE - NSBHFUPINTERVALHXFT_PSY_A_CORE
Interval chart reviewed, patient seen, case discussed. Ammonia level elevated 137 today.     On interview, patient is alert, standing and walking. Asks for his sheets to be cleaned as he spilled food. Patient was AOx3. Calm and cooperative on interivew. Reports he slept at night. States he feels "okay" and "I want to go home." He denies any pain anywhere. He denies feeling confused or dizzy. He denies any hallucinations. He denies any self harm, SI, HI.

## 2024-08-17 LAB
ALBUMIN SERPL ELPH-MCNC: 4.2 G/DL — SIGNIFICANT CHANGE UP (ref 3.5–5.2)
ALP SERPL-CCNC: 87 U/L — SIGNIFICANT CHANGE UP (ref 30–115)
ALT FLD-CCNC: 7 U/L — SIGNIFICANT CHANGE UP (ref 0–41)
AMMONIA BLD-MCNC: 51 UMOL/L — SIGNIFICANT CHANGE UP (ref 11–55)
ANION GAP SERPL CALC-SCNC: 12 MMOL/L — SIGNIFICANT CHANGE UP (ref 7–14)
AST SERPL-CCNC: 10 U/L — SIGNIFICANT CHANGE UP (ref 0–41)
BASOPHILS # BLD AUTO: 0.03 K/UL — SIGNIFICANT CHANGE UP (ref 0–0.2)
BASOPHILS NFR BLD AUTO: 0.5 % — SIGNIFICANT CHANGE UP (ref 0–1)
BILIRUB SERPL-MCNC: 0.2 MG/DL — SIGNIFICANT CHANGE UP (ref 0.2–1.2)
BUN SERPL-MCNC: 5 MG/DL — LOW (ref 10–20)
CALCIUM SERPL-MCNC: 9.6 MG/DL — SIGNIFICANT CHANGE UP (ref 8.4–10.5)
CHLORIDE SERPL-SCNC: 100 MMOL/L — SIGNIFICANT CHANGE UP (ref 98–110)
CO2 SERPL-SCNC: 24 MMOL/L — SIGNIFICANT CHANGE UP (ref 17–32)
CREAT SERPL-MCNC: 0.8 MG/DL — SIGNIFICANT CHANGE UP (ref 0.7–1.5)
EGFR: 106 ML/MIN/1.73M2 — SIGNIFICANT CHANGE UP
EOSINOPHIL # BLD AUTO: 0.19 K/UL — SIGNIFICANT CHANGE UP (ref 0–0.7)
EOSINOPHIL NFR BLD AUTO: 2.9 % — SIGNIFICANT CHANGE UP (ref 0–8)
GLUCOSE BLDC GLUCOMTR-MCNC: 103 MG/DL — HIGH (ref 70–99)
GLUCOSE BLDC GLUCOMTR-MCNC: 113 MG/DL — HIGH (ref 70–99)
GLUCOSE BLDC GLUCOMTR-MCNC: 166 MG/DL — HIGH (ref 70–99)
GLUCOSE BLDC GLUCOMTR-MCNC: 94 MG/DL — SIGNIFICANT CHANGE UP (ref 70–99)
GLUCOSE SERPL-MCNC: 122 MG/DL — HIGH (ref 70–99)
HCT VFR BLD CALC: 35.7 % — LOW (ref 42–52)
HGB BLD-MCNC: 10.9 G/DL — LOW (ref 14–18)
IMM GRANULOCYTES NFR BLD AUTO: 0.5 % — HIGH (ref 0.1–0.3)
LYMPHOCYTES # BLD AUTO: 3.53 K/UL — HIGH (ref 1.2–3.4)
LYMPHOCYTES # BLD AUTO: 54.7 % — HIGH (ref 20.5–51.1)
MAGNESIUM SERPL-MCNC: 1.9 MG/DL — SIGNIFICANT CHANGE UP (ref 1.8–2.4)
MCHC RBC-ENTMCNC: 25.8 PG — LOW (ref 27–31)
MCHC RBC-ENTMCNC: 30.5 G/DL — LOW (ref 32–37)
MCV RBC AUTO: 84.4 FL — SIGNIFICANT CHANGE UP (ref 80–94)
MONOCYTES # BLD AUTO: 0.73 K/UL — HIGH (ref 0.1–0.6)
MONOCYTES NFR BLD AUTO: 11.3 % — HIGH (ref 1.7–9.3)
NEUTROPHILS # BLD AUTO: 1.94 K/UL — SIGNIFICANT CHANGE UP (ref 1.4–6.5)
NEUTROPHILS NFR BLD AUTO: 30.1 % — LOW (ref 42.2–75.2)
NRBC # BLD: 0 /100 WBCS — SIGNIFICANT CHANGE UP (ref 0–0)
PHOSPHATE SERPL-MCNC: 3.6 MG/DL — SIGNIFICANT CHANGE UP (ref 2.1–4.9)
PLATELET # BLD AUTO: 271 K/UL — SIGNIFICANT CHANGE UP (ref 130–400)
PMV BLD: 9.9 FL — SIGNIFICANT CHANGE UP (ref 7.4–10.4)
POTASSIUM SERPL-MCNC: 4.3 MMOL/L — SIGNIFICANT CHANGE UP (ref 3.5–5)
POTASSIUM SERPL-SCNC: 4.3 MMOL/L — SIGNIFICANT CHANGE UP (ref 3.5–5)
PROT SERPL-MCNC: 7.3 G/DL — SIGNIFICANT CHANGE UP (ref 6–8)
RBC # BLD: 4.23 M/UL — LOW (ref 4.7–6.1)
RBC # FLD: 19 % — HIGH (ref 11.5–14.5)
SODIUM SERPL-SCNC: 136 MMOL/L — SIGNIFICANT CHANGE UP (ref 135–146)
VALPROATE SERPL-MCNC: 54 UG/ML — SIGNIFICANT CHANGE UP (ref 50–100)
WBC # BLD: 6.45 K/UL — SIGNIFICANT CHANGE UP (ref 4.8–10.8)
WBC # FLD AUTO: 6.45 K/UL — SIGNIFICANT CHANGE UP (ref 4.8–10.8)

## 2024-08-17 PROCEDURE — 99232 SBSQ HOSP IP/OBS MODERATE 35: CPT

## 2024-08-17 RX ADMIN — BENZTROPINE MESYLATE 1 MILLIGRAM(S): 2 TABLET ORAL at 05:53

## 2024-08-17 RX ADMIN — CHLORHEXIDINE GLUCONATE 1 APPLICATION(S): 40 SOLUTION TOPICAL at 06:01

## 2024-08-17 RX ADMIN — Medication 1 MILLIGRAM(S): at 12:00

## 2024-08-17 RX ADMIN — CLOZAPINE 100 MILLIGRAM(S): 200 TABLET ORAL at 05:54

## 2024-08-17 RX ADMIN — CLOZAPINE 100 MILLIGRAM(S): 200 TABLET ORAL at 17:54

## 2024-08-17 RX ADMIN — Medication 81 MILLIGRAM(S): at 12:00

## 2024-08-17 RX ADMIN — METOPROLOL TARTRATE 25 MILLIGRAM(S): 100 TABLET ORAL at 05:53

## 2024-08-17 RX ADMIN — Medication 40 MILLIGRAM(S): at 05:53

## 2024-08-17 RX ADMIN — Medication 10 GRAM(S): at 05:52

## 2024-08-17 RX ADMIN — Medication 0: at 16:56

## 2024-08-17 RX ADMIN — Medication 1: at 08:15

## 2024-08-17 RX ADMIN — Medication 40 MILLIGRAM(S): at 22:16

## 2024-08-17 RX ADMIN — Medication 0: at 11:56

## 2024-08-17 RX ADMIN — ENOXAPARIN SODIUM 40 MILLIGRAM(S): 100 INJECTION SUBCUTANEOUS at 05:53

## 2024-08-17 RX ADMIN — Medication 100 MICROGRAM(S): at 05:54

## 2024-08-17 RX ADMIN — Medication 10 GRAM(S): at 17:54

## 2024-08-17 RX ADMIN — BENZTROPINE MESYLATE 1 MILLIGRAM(S): 2 TABLET ORAL at 17:54

## 2024-08-17 RX ADMIN — Medication 10 GRAM(S): at 12:00

## 2024-08-17 NOTE — PROGRESS NOTE ADULT - SUBJECTIVE AND OBJECTIVE BOX
Patient is a 53y old  Male who presents with a chief complaint of  fall (14 Aug 2024 16:28)    Patient was seen and examined.  Pt awake, alert this AM    PAST MEDICAL & SURGICAL HISTORY:  DM (diabetes mellitus)  HTN (hypertension)  Schizophrenia  Substance use disorder  Echocardiogram abnormal    No significant past surgical history    Allergies  fish (Unknown)  Thorazine (Unknown)  penicillin (Unknown)  pork (Unknown)  Navane (Unknown)    MEDICATIONS  (STANDING):  aspirin enteric coated 81 milliGRAM(s) Oral daily  atorvastatin 40 milliGRAM(s) Oral at bedtime  benztropine 1 milliGRAM(s) Oral two times a day  bisacodyl 5 milliGRAM(s) Oral at bedtime  chlorhexidine 2% Cloths 1 Application(s) Topical <User Schedule>  cloZAPine 100 milliGRAM(s) Oral two times a day  enoxaparin Injectable 40 milliGRAM(s) SubCutaneous every 24 hours  folic acid 1 milliGRAM(s) Oral daily  glucagon  Injectable 1 milliGRAM(s) IntraMuscular once  insulin lispro (ADMELOG) corrective regimen sliding scale   SubCutaneous three times a day before meals  lactulose Syrup 10 Gram(s) Oral every 6 hours  levothyroxine 100 MICROGram(s) Oral daily  metoprolol succinate ER 25 milliGRAM(s) Oral daily  pantoprazole    Tablet 40 milliGRAM(s) Oral before breakfast  polyethylene glycol 3350 17 Gram(s) Oral at bedtime  senna 2 Tablet(s) Oral at bedtime    MEDICATIONS  (PRN):  acetaminophen     Tablet .. 650 milliGRAM(s) Oral every 6 hours PRN Temp greater or equal to 38C (100.4F), Mild Pain (1 - 3)  aluminum hydroxide/magnesium hydroxide/simethicone Suspension 30 milliLiter(s) Oral every 4 hours PRN Dyspepsia  dextrose Oral Gel 15 Gram(s) Oral once PRN Blood Glucose LESS THAN 70 milliGRAM(s)/deciliter  ondansetron Injectable 4 milliGRAM(s) IV Push every 8 hours PRN Nausea and/or Vomiting    T(C): 36.9 (08-17-24 @ 07:30), Max: 36.9 (08-16-24 @ 15:40)  HR: 99 (08-17-24 @ 07:30) (75 - 99)  BP: 119/79 (08-17-24 @ 07:30) (115/80 - 148/97)  RR: 19 (08-17-24 @ 07:30) (19 - 19)  SpO2: 99% (08-17-24 @ 07:30) (95% - 99%)    O/E: awake, alert.  HEENT: atraumatic, EOMI.  Chest: clear.  CVS: SIS2 +, no murmur.  P/A: Soft, BS+  CNS: awake, alert  Ext: no edema feet.  All systems reviewed positive findings as above.                             10.9<L>  6.45  )-----------( 271      ( 17 Aug 2024 07:41 )             35.7<L>                        10.6<L>  6.81  )-----------( 231      ( 16 Aug 2024 07:40 )             34.6<L>  08-17    136  |  100  |  5<L>  ----------------------------<  122<H>  4.3   |  24  |  0.8  08-16    141  |  106  |  6<L>  ----------------------------<  105<H>  4.4   |  29  |  0.8    Ca    9.6      17 Aug 2024 07:41  Ca    9.2      16 Aug 2024 07:40  Phos  3.6     08-17  Mg     1.9     08-17    TPro  7.3  /  Alb  4.2  /  TBili  0.2  /  DBili  x   /  AST  10  /  ALT  7   /  AlkPhos  87  08-17  TPro  6.4  /  Alb  3.8  /  TBili  0.3  /  DBili  x   /  AST  9   /  ALT  7   /  AlkPhos  80  08-16

## 2024-08-18 LAB
ALBUMIN SERPL ELPH-MCNC: 3.7 G/DL — SIGNIFICANT CHANGE UP (ref 3.5–5.2)
ALP SERPL-CCNC: 83 U/L — SIGNIFICANT CHANGE UP (ref 30–115)
ALT FLD-CCNC: 8 U/L — SIGNIFICANT CHANGE UP (ref 0–41)
ANION GAP SERPL CALC-SCNC: 11 MMOL/L — SIGNIFICANT CHANGE UP (ref 7–14)
AST SERPL-CCNC: 10 U/L — SIGNIFICANT CHANGE UP (ref 0–41)
BASOPHILS # BLD AUTO: 0.02 K/UL — SIGNIFICANT CHANGE UP (ref 0–0.2)
BASOPHILS NFR BLD AUTO: 0.4 % — SIGNIFICANT CHANGE UP (ref 0–1)
BILIRUB SERPL-MCNC: 0.4 MG/DL — SIGNIFICANT CHANGE UP (ref 0.2–1.2)
BUN SERPL-MCNC: 6 MG/DL — LOW (ref 10–20)
CALCIUM SERPL-MCNC: 9.1 MG/DL — SIGNIFICANT CHANGE UP (ref 8.4–10.5)
CHLORIDE SERPL-SCNC: 107 MMOL/L — SIGNIFICANT CHANGE UP (ref 98–110)
CO2 SERPL-SCNC: 23 MMOL/L — SIGNIFICANT CHANGE UP (ref 17–32)
CREAT SERPL-MCNC: 0.8 MG/DL — SIGNIFICANT CHANGE UP (ref 0.7–1.5)
EGFR: 106 ML/MIN/1.73M2 — SIGNIFICANT CHANGE UP
EOSINOPHIL # BLD AUTO: 0.18 K/UL — SIGNIFICANT CHANGE UP (ref 0–0.7)
EOSINOPHIL NFR BLD AUTO: 3.2 % — SIGNIFICANT CHANGE UP (ref 0–8)
GLUCOSE BLDC GLUCOMTR-MCNC: 106 MG/DL — HIGH (ref 70–99)
GLUCOSE BLDC GLUCOMTR-MCNC: 117 MG/DL — HIGH (ref 70–99)
GLUCOSE BLDC GLUCOMTR-MCNC: 126 MG/DL — HIGH (ref 70–99)
GLUCOSE BLDC GLUCOMTR-MCNC: 78 MG/DL — SIGNIFICANT CHANGE UP (ref 70–99)
GLUCOSE SERPL-MCNC: 102 MG/DL — HIGH (ref 70–99)
HCT VFR BLD CALC: 36.8 % — LOW (ref 42–52)
HGB BLD-MCNC: 11.1 G/DL — LOW (ref 14–18)
IMM GRANULOCYTES NFR BLD AUTO: 0.4 % — HIGH (ref 0.1–0.3)
LYMPHOCYTES # BLD AUTO: 3.02 K/UL — SIGNIFICANT CHANGE UP (ref 1.2–3.4)
LYMPHOCYTES # BLD AUTO: 53.5 % — HIGH (ref 20.5–51.1)
MAGNESIUM SERPL-MCNC: 1.8 MG/DL — SIGNIFICANT CHANGE UP (ref 1.8–2.4)
MCHC RBC-ENTMCNC: 25.9 PG — LOW (ref 27–31)
MCHC RBC-ENTMCNC: 30.2 G/DL — LOW (ref 32–37)
MCV RBC AUTO: 85.8 FL — SIGNIFICANT CHANGE UP (ref 80–94)
MONOCYTES # BLD AUTO: 0.76 K/UL — HIGH (ref 0.1–0.6)
MONOCYTES NFR BLD AUTO: 13.5 % — HIGH (ref 1.7–9.3)
NEUTROPHILS # BLD AUTO: 1.64 K/UL — SIGNIFICANT CHANGE UP (ref 1.4–6.5)
NEUTROPHILS NFR BLD AUTO: 29 % — LOW (ref 42.2–75.2)
NRBC # BLD: 0 /100 WBCS — SIGNIFICANT CHANGE UP (ref 0–0)
PHOSPHATE SERPL-MCNC: 3.7 MG/DL — SIGNIFICANT CHANGE UP (ref 2.1–4.9)
PLATELET # BLD AUTO: 262 K/UL — SIGNIFICANT CHANGE UP (ref 130–400)
PMV BLD: 9.9 FL — SIGNIFICANT CHANGE UP (ref 7.4–10.4)
POTASSIUM SERPL-MCNC: 4.9 MMOL/L — SIGNIFICANT CHANGE UP (ref 3.5–5)
POTASSIUM SERPL-SCNC: 4.9 MMOL/L — SIGNIFICANT CHANGE UP (ref 3.5–5)
PROT SERPL-MCNC: 6.6 G/DL — SIGNIFICANT CHANGE UP (ref 6–8)
RBC # BLD: 4.29 M/UL — LOW (ref 4.7–6.1)
RBC # FLD: 19.4 % — HIGH (ref 11.5–14.5)
SODIUM SERPL-SCNC: 141 MMOL/L — SIGNIFICANT CHANGE UP (ref 135–146)
WBC # BLD: 5.64 K/UL — SIGNIFICANT CHANGE UP (ref 4.8–10.8)
WBC # FLD AUTO: 5.64 K/UL — SIGNIFICANT CHANGE UP (ref 4.8–10.8)

## 2024-08-18 PROCEDURE — 99232 SBSQ HOSP IP/OBS MODERATE 35: CPT

## 2024-08-18 RX ORDER — DIVALPROEX SODIUM 125 MG/1
250 CAPSULE, DELAYED RELEASE ORAL
Refills: 0 | Status: DISCONTINUED | OUTPATIENT
Start: 2024-08-18 | End: 2024-08-19

## 2024-08-18 RX ORDER — DEXTROSE 15 G/33 G
50 GEL IN PACKET (GRAM) ORAL ONCE
Refills: 0 | Status: COMPLETED | OUTPATIENT
Start: 2024-08-18 | End: 2024-08-18

## 2024-08-18 RX ADMIN — Medication 10 GRAM(S): at 23:02

## 2024-08-18 RX ADMIN — Medication 40 MILLIGRAM(S): at 22:51

## 2024-08-18 RX ADMIN — CHLORHEXIDINE GLUCONATE 1 APPLICATION(S): 40 SOLUTION TOPICAL at 05:55

## 2024-08-18 RX ADMIN — Medication 2 TABLET(S): at 22:52

## 2024-08-18 RX ADMIN — Medication 0: at 17:38

## 2024-08-18 RX ADMIN — Medication 0: at 08:39

## 2024-08-18 RX ADMIN — Medication 40 MILLIGRAM(S): at 05:50

## 2024-08-18 RX ADMIN — Medication 10 GRAM(S): at 17:41

## 2024-08-18 RX ADMIN — ENOXAPARIN SODIUM 40 MILLIGRAM(S): 100 INJECTION SUBCUTANEOUS at 05:50

## 2024-08-18 RX ADMIN — Medication 5 MILLIGRAM(S): at 22:51

## 2024-08-18 RX ADMIN — BENZTROPINE MESYLATE 1 MILLIGRAM(S): 2 TABLET ORAL at 05:50

## 2024-08-18 RX ADMIN — Medication 1 MILLIGRAM(S): at 12:48

## 2024-08-18 RX ADMIN — Medication 100 MICROGRAM(S): at 05:50

## 2024-08-18 RX ADMIN — Medication 81 MILLIGRAM(S): at 12:47

## 2024-08-18 RX ADMIN — BENZTROPINE MESYLATE 1 MILLIGRAM(S): 2 TABLET ORAL at 17:42

## 2024-08-18 RX ADMIN — DIVALPROEX SODIUM 250 MILLIGRAM(S): 125 CAPSULE, DELAYED RELEASE ORAL at 22:51

## 2024-08-18 RX ADMIN — Medication 50 MILLILITER(S): at 23:02

## 2024-08-18 RX ADMIN — Medication 0: at 12:41

## 2024-08-18 RX ADMIN — CLOZAPINE 100 MILLIGRAM(S): 200 TABLET ORAL at 05:50

## 2024-08-18 RX ADMIN — CLOZAPINE 100 MILLIGRAM(S): 200 TABLET ORAL at 17:42

## 2024-08-18 RX ADMIN — Medication 10 GRAM(S): at 05:49

## 2024-08-18 RX ADMIN — Medication 10 GRAM(S): at 12:47

## 2024-08-18 RX ADMIN — METOPROLOL TARTRATE 25 MILLIGRAM(S): 100 TABLET ORAL at 05:50

## 2024-08-18 NOTE — BH CONSULTATION LIAISON PROGRESS NOTE - NSBHCHARTREVIEWLAB_PSY_A_CORE FT
08-18    141  |  107  |  6<L>  ----------------------------<  102<H>  4.9   |  23  |  0.8    Ca    9.1      18 Aug 2024 07:50  Phos  3.7     08-18  Mg     1.8     08-18    TPro  6.6  /  Alb  3.7  /  TBili  0.4  /  DBili  x   /  AST  10  /  ALT  8   /  AlkPhos  83  08-18                        11.1   5.64  )-----------( 262      ( 18 Aug 2024 07:50 )             36.8     Ammonia, Serum (08.17.24 @ 07:41)   Ammonia, Serum: 51 umol/L    Valproic Acid Level, Serum (08.17.24 @ 07:41)   Valproic Acid Level, Serum: 54.0 ug/mL

## 2024-08-18 NOTE — PROGRESS NOTE ADULT - PROVIDER SPECIALTY LIST ADULT
Hospitalist
Internal Medicine
Internal Medicine
Hospitalist
Hospitalist
Internal Medicine
Hospitalist
Hospitalist

## 2024-08-18 NOTE — PROGRESS NOTE ADULT - TIME BILLING
Direct patient care. Discussed on rounds with Housestaff
Direct patient care. Discussed on rounds with Housestaff
Direct patient care. Discussed on rounds with Housestaff. Discussed with psychiatry attending
Direct patient care. Discussed on rounds with housestaff
Direct patient . Discussed on rounds with Housestaff. Discussed with psychiatry attending

## 2024-08-18 NOTE — BH CONSULTATION LIAISON PROGRESS NOTE - NSBHASSESSMENTFT_PSY_ALL_CORE
52yo M, domiciled at [facility], past medical history of DM, HLD, HTN, hypothyroidism, COVID, past psychiatric history schizoaffective disorder, hx of substance use disorder, hx past SA vs NSSIB by cutting and head banging, last psych hospitalization in April 2024 for agitation, recent medical hospitalization for URI and found to be COVID+ who presents to the hospital for fall and seizure workup per chart. Per primary team, patient presented lethargic today and at baseline is more awake. Consult placed for psych eval and med management. Primary team asks if any psych medications could be contributing to lethargy.    8/14- Unable to complete interview of patient. Chart review done and collateral obtained. At this time, presentation is not likely due to primary psychiatric cause. Changes in mental status and recent medical issues increase concern for delirium, which can present with hypoactive symptoms as well. It is less likely that psychiatric medications could be contributing to lethargy given acute change, fluctuation, lack of changes in doses or missed doses for the past several months, and patient has been taking medications consistently per collateral. Would consider simplifying regimen and minimizing medications that could be deliriogenic, such as ativan, unless needed for other medical cause such as seizures. Would also r/o any substances given history of use although it is also not clear what is contributing to mental status change.    8/15- More awake, AOx3, euthymic, no behavioral issues, denies SI/HI. Dose of clozapine was held last night. Unclear if this was contributing to lethargy given pt has been on this dose for months. No changes to recommendations.     8/16- Continues to be more awake, AOx3 and euthymic. No behavioral issues. Denies SI/HI. Taking clozapine and depakote. Ammonia elevated today 137.    8/18- Patient lethargic but answers all questions appropriately, AOX4, calm, cooperative. Denies SI/HI/AVH. Continuing with clozaril 100mg BID. ANC today was 1640 but declining since admission; unclear if related to clozaril as patient has been on clozaril for many years. Patient last received depakote 500mg morning Aug 16, where VPA was 106 and ammonia 137. Repeat labs Aug 17 show ammonia down to 51 and VPA down to 54. Increased VPA level can be explained as patient last received VPA 500mg Aug 16 05:04 and VPA bloodwork was collected 09:05 leading to incorrect trough value. Increased ammonia can be explained by seizure which is what led to patient being initially hospitalized, and not necessarily caused by depakote. Recommend restarting depakote at 250mg BID with repeat ammonia levels. Patient does not meet criteria for inpatient psychiatric hospitalization at this time, and does not require constant observation. No psychiatric contraindications to discharge. [facility] psychiatry will sign off on this patient, please feel free to call back with any questions.  52yo M, domiciled at [facility], past medical history of DM, HLD, HTN, hypothyroidism, COVID, past psychiatric history schizoaffective disorder, hx of substance use disorder, hx past SA vs NSSIB by cutting and head banging, last psych hospitalization in April 2024 for agitation, recent medical hospitalization for URI and found to be COVID+ who presents to the hospital for fall and seizure workup per chart. Per primary team, patient presented lethargic today and at baseline is more awake. Consult placed for psych eval and med management. Primary team asks if any psych medications could be contributing to lethargy.    8/14- Unable to complete interview of patient. Chart review done and collateral obtained. At this time, presentation is not likely due to primary psychiatric cause. Changes in mental status and recent medical issues increase concern for delirium, which can present with hypoactive symptoms as well. It is less likely that psychiatric medications could be contributing to lethargy given acute change, fluctuation, lack of changes in doses or missed doses for the past several months, and patient has been taking medications consistently per collateral. Would consider simplifying regimen and minimizing medications that could be deliriogenic, such as ativan, unless needed for other medical cause such as seizures. Would also r/o any substances given history of use although it is also not clear what is contributing to mental status change.    8/15- More awake, AOx3, euthymic, no behavioral issues, denies SI/HI. Dose of clozapine was held last night. Unclear if this was contributing to lethargy given pt has been on this dose for months. No changes to recommendations.     8/16- Continues to be more awake, AOx3 and euthymic. No behavioral issues. Denies SI/HI. Taking clozapine and depakote. Ammonia elevated today 137.    8/18- Patient lethargic but answers all questions appropriately, AOX4, calm, cooperative. Denies SI/HI/AVH. Continuing with clozaril 100mg BID. ANC today was 1640 but declining since admission; unclear if related to clozaril as patient has been on clozaril for many years. Patient last received depakote 500mg morning Aug 16, where VPA was 106 and ammonia 137. Repeat labs Aug 17 show ammonia down to 51 and VPA down to 54. Increased VPA level can be explained as patient last received VPA 500mg Aug 16 05:04 and VPA bloodwork was collected 09:05 leading to incorrect trough value. Increased ammonia can be explained by seizure which is what led to patient being initially hospitalized, and not necessarily caused by depakote. Recommend restarting depakote at 250mg BID with repeat ammonia levels. Patient does not meet criteria for inpatient psychiatric hospitalization at this time, and does not require constant observation. [facility] will continue to follow.

## 2024-08-18 NOTE — BH CONSULTATION LIAISON PROGRESS NOTE - NSBHFUPINTERVALHXFT_PSY_A_CORE
Patient was seen and evaluated this morning. Chart was reviewed and no acute events were noted. No PRN medications were administered overnight. Upon approach, patient is lying in bed comfortably. Patient is lethargic, but arouses to voice and engages with interviewer. Patient is cooperative and answers all questions appropriately. Patient denies any current passive or active suicidal ideation, intent or plan. Patient denies any persecutory delusions and denies any auditory or visual hallucinations. Patient reports good sleep and appetite. Denies side effects from medications. Patient reports feeling better and wishes to return to his residence. Reports since arriving to hospital he has improvement to his energy.

## 2024-08-18 NOTE — PROGRESS NOTE ADULT - ASSESSMENT
53yoM w. hx of schizophrenia, seizures, presents to ED due to seizure? like activity per EMS, coming from [facility]. Pt. poor historian, states he did not have a seizure, states he was walking, fell weak and fell down. States he is taking his medications as prescribed. Slurring noted ( unknown if baseline). Attempted to contact NH but no one was able to state who or why EMS was called.    #Suspected seizure like disorder  - cw home Valproaic acid 250 and 500mg at bedtime extended release (cont as 250mg tid)  - Valproic acid level is 80 (tagret )  - neurology consult requested EEG and to cw home meds  - follow EEG    #Schizoaffective disorder  -c/ home meds  -Divaloproex, Benztropine, Clozapine  -Invega once monthly     #IHD  per echo in 2023:  - Echo has fixed RWMA at rest  - normal EF above 50%  - will get lipid profile   -  will start ASA 81   - cw atorvastatin 40mg and metoprolol succ 25mg    #Hypothyroidism   - TSH was 8.56 Ft4 1.0 consistent with hypothyroidism yet last one Thyroid Stimulating Hormone, Serum: 4.37 uIU/mL (07.30.24 @ 17:00)   - Overally clinically appears euthyroid, questionable compliance as he has different staff members administering his meds at dfferent times of the day, and sometimes he tends to get up late and miss his morning meds which includes levothyroxine  - cw levothyroxine 25 mcgs daily per endo OP note    #type 2 DM, currently controlled  - under good control with A1c 6.4  - on metformin 1000 mg BID not currently on ttt  - ISS and repeat a1c    Handoff:  - f/u EEG  
53yoM w. hx of schizophrenia, seizures, presents to ED due to seizure? like activity per EMS, coming from [facility]. Pt. poor historian, states he did not have a seizure, states he was walking, fell weak and fell down. States he is taking his medications as prescribed. Slurring noted ( unknown if baseline). Attempted to contact NH but no one was able to state who or why EMS was called.    # Fall  # Metabolic encephalopathy  # Hyperammonemia  - Ammonia, Serum: 56 umol/L (08.13.24 @ 18:15)  - Ammonia, Serum: 72 umol/L (08.15.24 @ 06:52  - Ammonia, Serum: 137 umol/L (08.16.24 @ 07:40)- pt had refused lactulose last night  - repeat ammonia levels in AM  - c/w lactulose   - hold home Valproic acid 250 and 500mg at bedtime extended release  - Repeat Valproic acid level in AM (target )  - c/w folic acid  - orthostats neg  - Continue to monitor mental status  - Per psych: For acute agitation, can consider Haldol 2.5-5mg q8 PRN, hold for qtc >500 or extrapyramidal symptoms. Do not make standing as pt is already on clozapine and invega sustenna.    # H/o seizure disorder  - CT Head No Cont: No acute intracranial pathology. No evidence of midline shift, mass effect or intracranial hemorrhage.  - EEG: Findings consistent with mild diffuse electrocerebral dysfunction secondary to nonspecific etiology  - Seizure precautions  - VPA levels therapeutic.  - hold depakote  - evaluated by neurology    #Schizoaffective disorder  - hold depakote for now  - c/w clozapine 100mg BID and benztropine 1mg BID  -Invega once monthly     #IHD  per echo in 2023:  - Echo has fixed RWMA at rest  - normal EF above 50%  - c/w start ASA 81   - cw atorvastatin 40mg and metoprolol succ 25mg    #Hypothyroidism   - TSH was 8.56 Ft4 1.0, TSH Serum: 4.37 uIU/mL (07.30.24 @ 17:00)   - cw levothyroxine 25 mcgs daily per endo OP note    #type 2 DM, currently controlled  - on metformin 1000 mg BID  - ISS and a1c 6.2    # Disposition:  Return to [facility] [place] [place] [facility].  Code: Full    Handoff:  - F/u valproic acid trough level  - F/u ammonia level  - F/u PT eval  
53-year-old male past medical history of schizophrenia prediabetes hypertension ?seizures, on Depakote per chart, presents with fall.  Patient states he does not know what happened and thinks he might of had a seizure is not sure.      # Fall  # Metabolic encephalopathy  # Hyperammonemia  # Schizoaffective disorder  - Ammonia, Serum: 56 umol/L (08.13.24 @ 18:15)  - Ammonia, Serum: 72 umol/L (08.15.24 @ 06:52  - Ammonia, Serum: 137 umol/L (08.16.24 @ 07:40)- pt had refused lactulose last night  - orthostats neg  - Vitamin B12, Serum: 415 pg/mL (08.14.24 @ 10:42)  - Folate, Serum: 5.4 ng/mL (08.14.24 @ 10:42)  - c/w  folic acid  - c/w lactulose   - Hold depakote as per discussion with psychiatry attending  - Continue clozapine 100mg BID  -Continue benztropine 1mg BID  - repeat ammonia levels in AM    # H/o seizure disorder  - CT Head No Cont (08.12.24 @ 15:58) >No acute intracranial pathology. No evidence of midline shift, mass effect or intracranial hemorrhage.  - EEG: Findings consistent with mild diffuse electrocerebral dysfunction secondary to nonspecific etiology  - Seizure precautions  - VPA levels therapeutic.  - c/w depakote  - evaluated by neurology    # IHD  - c/w ASA, statin , metoprolol    # DM type 2  - A1C with Estimated Average Glucose Result: 6.2(08.13.24 @ 08:39)  - monitor FS  - c/w insulin    # Hypothyroidism   - Thyroid Stimulating Hormone, Serum: 4.37 uIU/mL (07.30.24 @ 17:00)  - c/w synthroid    # DVT prophylaxis    # Full code    # Pending:  F/u ammonia level in AM  # Disposition:  [place] [facility] [place] [facility].            
53-year-old male past medical history of schizophrenia prediabetes hypertension ?seizures, on Depakote per chart, presents with fall.  Patient states he does not know what happened and thinks he might of had a seizure is not sure.      # Fall  # Metabolic encephalopathy  # Schizoaffective disorder  - Ammonia, Serum: 56 umol/L (08.13.24 @ 18:15)  - check orthostats  - F/u B12, folate  - PT eval  - c/w home meds  - Psych eval-for optimization of meds    # H/o seizure disorder  - CT Head No Cont (08.12.24 @ 15:58) >No acute intracranial pathology. No evidence of midline shift, mass effect or intracranial hemorrhage.  - EEG: Findings consistent with mild diffuse electrocerebral dysfunction secondary to nonspecific etiology  - Seizure precautions  - VPA levels therapeutic.  - c/w depakote  - evaluated by neurology    # IHD  - c/w ASA, statin , metoprolol    # DM type 2  - A1C with Estimated Average Glucose Result: 6.2(08.13.24 @ 08:39)  - monitor FS  - c/w insulin    # Hypothyroidism   - Thyroid Stimulating Hormone, Serum: 4.37 uIU/mL (07.30.24 @ 17:00)  - c/w synthroid    # DVT prophylaxis    # Full code    # Pending: psch eval, orthosats, PT eval, B12, folate  # Disposition:  Return to [facility] [place] [facility].            
53-year-old male past medical history of schizophrenia prediabetes hypertension ?seizures, on Depakote per chart, presents with fall.  Patient states he does not know what happened and thinks he might of had a seizure is not sure.      # Fall  # Metabolic encephalopathy- resolved  # Hyperammonemia- resolved  # Schizoaffective disorder  - Ammonia, Serum: 56 umol/L (08.13.24 @ 18:15)  - Ammonia, Serum: 72 umol/L (08.15.24 @ 06:52  - Ammonia, Serum: 137 umol/L (08.16.24 @ 07:40)- pt had refused lactulose last night  - orthostats neg  - Vitamin B12, Serum: 415 pg/mL (08.14.24 @ 10:42)  - Folate, Serum: 5.4 ng/mL (08.14.24 @ 10:42)  - c/w  folic acid  - c/w lactulose   - As per psychiatry restart depakote 250mg q12, repeat VPA and ammonia levels  - Continue clozapine 100mg BID  - Continue benztropine 1mg BID  - Ammonia, Serum: 51 umol/L (08.17.24 @ 07:41)    # H/o seizure disorder  - CT Head No Cont (08.12.24 @ 15:58) >No acute intracranial pathology. No evidence of midline shift, mass effect or intracranial hemorrhage.  - EEG: Findings consistent with mild diffuse electrocerebral dysfunction secondary to nonspecific etiology  - Seizure precautions  - VPA levels therapeutic.  - evaluated by neurology    # IHD  - c/w ASA, statin , metoprolol    # DM type 2  - A1C with Estimated Average Glucose Result: 6.2(08.13.24 @ 08:39)  - monitor FS  - c/w insulin    # Hypothyroidism   - Thyroid Stimulating Hormone, Serum: 4.37 uIU/mL (07.30.24 @ 17:00)  - c/w synthroid    # DVT prophylaxis    # Full code    # Pending:  repeat ammonia, VPA levels in AM  # Disposition:  Return to [facility] [place] [place] [facility].            
53-year-old male past medical history of schizophrenia prediabetes hypertension ?seizures, on Depakote per chart, presents with fall.  Patient states he does not know what happened and thinks he might of had a seizure is not sure.      # Fall  # Metabolic encephalopathy- resolved  # Hyperammonemia- resolved  # Schizoaffective disorder  - Ammonia, Serum: 56 umol/L (08.13.24 @ 18:15)  - Ammonia, Serum: 72 umol/L (08.15.24 @ 06:52  - Ammonia, Serum: 137 umol/L (08.16.24 @ 07:40)- pt had refused lactulose last night  - orthostats neg  - Vitamin B12, Serum: 415 pg/mL (08.14.24 @ 10:42)  - Folate, Serum: 5.4 ng/mL (08.14.24 @ 10:42)  - c/w  folic acid  - c/w lactulose   - Hold depakote as per discussion with psychiatry attending  - Continue clozapine 100mg BID  -Continue benztropine 1mg BID  - Ammonia, Serum: 51 umol/L (08.17.24 @ 07:41)  - psych F/u    # H/o seizure disorder  - CT Head No Cont (08.12.24 @ 15:58) >No acute intracranial pathology. No evidence of midline shift, mass effect or intracranial hemorrhage.  - EEG: Findings consistent with mild diffuse electrocerebral dysfunction secondary to nonspecific etiology  - Seizure precautions  - VPA levels therapeutic.  - evaluated by neurology    # IHD  - c/w ASA, statin , metoprolol    # DM type 2  - A1C with Estimated Average Glucose Result: 6.2(08.13.24 @ 08:39)  - monitor FS  - c/w insulin    # Hypothyroidism   - Thyroid Stimulating Hormone, Serum: 4.37 uIU/mL (07.30.24 @ 17:00)  - c/w synthroid    # DVT prophylaxis    # Full code    # Pending:  [facility] F/u  # Disposition:  [place] [facility] [place] [place] [facility].            
53-year-old male past medical history of schizophrenia prediabetes hypertension ?seizures, on Depakote per chart, presents with fall.  Patient states he does not know what happened and thinks he might of had a seizure is not sure.      # Fall  # Metabolic encephalopathy  # Schizoaffective disorder  - Ammonia, Serum: 56 umol/L (08.13.24 @ 18:15)  - Ammonia, Serum: 72 umol/L (08.15.24 @ 06:52)- pt had refused lactulse  -  orthostats neg  - Vitamin B12, Serum: 415 pg/mL (08.14.24 @ 10:42)  - Folate, Serum: 5.4 ng/mL (08.14.24 @ 10:42)  - start folic acid  - PT  to reevaluate in AM  - c/w lactulose   - repeat ammonia level in AM, repeat Depakote trough  - c/w home meds  - Psych eval-No changes to current psych medications, can continue clozapine 100mg BID, depakote 500mg daily, depakote 750mg qHS given pt has been on them for months and no recent changes to medications or missed doses.  - psych to reevaluate him in AM, discussed with psychiatry attending    # H/o seizure disorder  - CT Head No Cont (08.12.24 @ 15:58) >No acute intracranial pathology. No evidence of midline shift, mass effect or intracranial hemorrhage.  - EEG: Findings consistent with mild diffuse electrocerebral dysfunction secondary to nonspecific etiology  - Seizure precautions  - VPA levels therapeutic.  - c/w depakote  - evaluated by neurology    # IHD  - c/w ASA, statin , metoprolol    # DM type 2  - A1C with Estimated Average Glucose Result: 6.2(08.13.24 @ 08:39)  - monitor FS  - c/w insulin    # Hypothyroidism   - Thyroid Stimulating Hormone, Serum: 4.37 uIU/mL (07.30.24 @ 17:00)  - c/w synthroid    # DVT prophylaxis    # Full code    # Pending: , PT eval,  F/u ammonia and depakote trough  # Disposition:  Return to [facility] [place] [place] [facility].            
53yoM w. hx of schizophrenia, seizures, presents to ED due to seizure? like activity per EMS, coming from [facility]. Pt. poor historian, states he did not have a seizure, states he was walking, fell weak and fell down. States he is taking his medications as prescribed. Slurring noted ( unknown if baseline). Attempted to contact NH but no one was able to state who or why EMS was called.    #Suspected seizure like disorder  - Seizure precautions  - Keep magnesium >2  - cw home Valproic acid 250 and 500mg at bedtime extended release (cont as 250mg tid)  - Valproic acid level is 80 (tagret )  - neurology consult requested EEG and to cw home meds  EE)	No electrographic seizures or significant clinical events.  Provocations:  1)	Hyperventilation: was not performed.  2)	Photic stimulation: was not performed.  Impression:  Abnormal due to generalized slowing as above    Clinical Correlation:  Findings consistent with mild diffuse electrocerebral dysfunction secondary to nonspecific etiology    #Schizoaffective disorder  -c/ home meds  -Divaloproex, Benztropine, Clozapine  -Invega once monthly     #IHD  per echo in :  - Echo has fixed RWMA at rest  - normal EF above 50%  - will get lipid profile   -  will start ASA 81   - cw atorvastatin 40mg and metoprolol succ 25mg    #Hypothyroidism   - TSH was 8.56 Ft4 1.0 consistent with hypothyroidism yet last one Thyroid Stimulating Hormone, Serum: 4.37 uIU/mL (24 @ 17:00)   - Overall clinically appears euthyroid, questionable compliance as he has different staff members administering his meds at dfferent times of the day, and sometimes he tends to get up late and miss his morning meds which includes levothyroxine  - cw levothyroxine 25 mcgs daily per endo OP note    #type 2 DM, currently controlled  - under good control with A1c 6.4  - on metformin 1000 mg BID not currently on ttt  - ISS and a1c 6.2    Handoff:  - F/u ammonia level, valproic acid level  - f/u urine tox  - f/u psych recs

## 2024-08-18 NOTE — PROGRESS NOTE ADULT - SUBJECTIVE AND OBJECTIVE BOX
Patient is a 53y old  Male who presents with a chief complaint of  fall (14 Aug 2024 16:28)    Patient was seen and examined.  Pt awake, alert     PAST MEDICAL & SURGICAL HISTORY:  DM (diabetes mellitus)  HTN (hypertension)  Schizophrenia  Substance use disorder  Echocardiogram abnormal    No significant past surgical history    Allergies  fish (Unknown)  Thorazine (Unknown)  penicillin (Unknown)  pork (Unknown)  Navane (Unknown)    MEDICATIONS  (STANDING):  aspirin enteric coated 81 milliGRAM(s) Oral daily  atorvastatin 40 milliGRAM(s) Oral at bedtime  benztropine 1 milliGRAM(s) Oral two times a day  bisacodyl 5 milliGRAM(s) Oral at bedtime  chlorhexidine 2% Cloths 1 Application(s) Topical <User Schedule>  cloZAPine 100 milliGRAM(s) Oral two times a day  dextrose 5%. 1000 milliLiter(s) (100 mL/Hr) IV Continuous <Continuous>  dextrose 5%. 1000 milliLiter(s) (50 mL/Hr) IV Continuous <Continuous>  dextrose 50% Injectable 25 Gram(s) IV Push once  dextrose 50% Injectable 12.5 Gram(s) IV Push once  dextrose 50% Injectable 25 Gram(s) IV Push once  enoxaparin Injectable 40 milliGRAM(s) SubCutaneous every 24 hours  folic acid 1 milliGRAM(s) Oral daily  glucagon  Injectable 1 milliGRAM(s) IntraMuscular once  insulin lispro (ADMELOG) corrective regimen sliding scale   SubCutaneous three times a day before meals  lactulose Syrup 10 Gram(s) Oral every 6 hours  levothyroxine 100 MICROGram(s) Oral daily  metoprolol succinate ER 25 milliGRAM(s) Oral daily  pantoprazole    Tablet 40 milliGRAM(s) Oral before breakfast  polyethylene glycol 3350 17 Gram(s) Oral at bedtime  senna 2 Tablet(s) Oral at bedtime    MEDICATIONS  (PRN):  acetaminophen     Tablet .. 650 milliGRAM(s) Oral every 6 hours PRN Temp greater or equal to 38C (100.4F), Mild Pain (1 - 3)  aluminum hydroxide/magnesium hydroxide/simethicone Suspension 30 milliLiter(s) Oral every 4 hours PRN Dyspepsia  dextrose Oral Gel 15 Gram(s) Oral once PRN Blood Glucose LESS THAN 70 milliGRAM(s)/deciliter  ondansetron Injectable 4 milliGRAM(s) IV Push every 8 hours PRN Nausea and/or Vomiting    T(C): 36.6 (08-18-24 @ 08:00), Max: 36.9 (08-17-24 @ 16:15)  HR: 84 (08-18-24 @ 08:00) (84 - 103)  BP: 108/73 (08-18-24 @ 08:00) (100/66 - 122/81)  RR: 18 (08-18-24 @ 08:00) (17 - 20)  SpO2: 97% (08-18-24 @ 08:00) (95% - 97%)    O/E: awake, alert.  HEENT: atraumatic, EOMI.  Chest: clear.  CVS: SIS2 +, no murmur.  P/A: Soft, BS+  CNS: awake, alert  Ext: no edema feet.  All systems reviewed positive findings as above.                               11.1<L>  5.64  )-----------( 262      ( 18 Aug 2024 07:50 )             36.8<L>                        10.9<L>  6.45  )-----------( 271      ( 17 Aug 2024 07:41 )             35.7<L>  08-18    141  |  107  |  6<L>  ----------------------------<  102<H>  4.9   |  23  |  0.8  08-17    136  |  100  |  5<L>  ----------------------------<  122<H>  4.3   |  24  |  0.8    Ca    9.1      18 Aug 2024 07:50  Ca    9.6      17 Aug 2024 07:41  Phos  3.7     08-18  Mg     1.8     08-18    TPro  6.6  /  Alb  3.7  /  TBili  0.4  /  DBili  x   /  AST  10  /  ALT  8   /  AlkPhos  83  08-18  TPro  7.3  /  Alb  4.2  /  TBili  0.2  /  DBili  x   /  AST  10  /  ALT  7   /  AlkPhos  87  08-17

## 2024-08-18 NOTE — BH CONSULTATION LIAISON PROGRESS NOTE - NSBHCONSULTRECOMMENDOTHER_PSY_A_CORE FT
Recommendations:  - Continued medical and neurological workup that could contribute to delirium, including increase in ammonia.   - restart depakote extended release at 250mg BID and monitor ammonia levels and valproic acid levels  - Continue clozapine 100mg BID  - Continue benztropine 1mg BID  - Can continue to hold lexapro, as pt was not on this during last IPP and due to deliriogenic potential. Would generally continue to hold ativan due to deliriogenic and sedating potential as well unless there is concern for seizure/withdrawal.

## 2024-08-18 NOTE — BH CONSULTATION LIAISON PROGRESS NOTE - NSBHCHARTREVIEWINVESTIGATE_PSY_A_CORE FT
Ventricular Rate 112 BPM    Atrial Rate 112 BPM    P-R Interval 140 ms    QRS Duration 102 ms    Q-T Interval 338 ms    QTC Calculation(Bazett) 461 ms    P Axis 66 degrees    R Axis 53 degrees    T Axis -5 degrees    Diagnosis Line Sinus tachycardia  Incomplete right bundle branch block  Nonspecific T wave abnormality  Abnormal ECG    Confirmed by Cheyenne Tyler MD ([id number]) on 8/13/2024 4:05:29 AM

## 2024-08-19 ENCOUNTER — TRANSCRIPTION ENCOUNTER (OUTPATIENT)
Age: 54
End: 2024-08-19

## 2024-08-19 VITALS
HEART RATE: 109 BPM | DIASTOLIC BLOOD PRESSURE: 82 MMHG | OXYGEN SATURATION: 97 % | SYSTOLIC BLOOD PRESSURE: 118 MMHG | RESPIRATION RATE: 18 BRPM | TEMPERATURE: 99 F

## 2024-08-19 LAB
ALBUMIN SERPL ELPH-MCNC: 4.2 G/DL — SIGNIFICANT CHANGE UP (ref 3.5–5.2)
ALP SERPL-CCNC: 90 U/L — SIGNIFICANT CHANGE UP (ref 30–115)
ALT FLD-CCNC: 10 U/L — SIGNIFICANT CHANGE UP (ref 0–41)
AMMONIA BLD-MCNC: 42 UMOL/L — SIGNIFICANT CHANGE UP (ref 11–55)
ANION GAP SERPL CALC-SCNC: 10 MMOL/L — SIGNIFICANT CHANGE UP (ref 7–14)
AST SERPL-CCNC: 12 U/L — SIGNIFICANT CHANGE UP (ref 0–41)
BASOPHILS # BLD AUTO: 0.03 K/UL — SIGNIFICANT CHANGE UP (ref 0–0.2)
BASOPHILS NFR BLD AUTO: 0.4 % — SIGNIFICANT CHANGE UP (ref 0–1)
BILIRUB SERPL-MCNC: <0.2 MG/DL — SIGNIFICANT CHANGE UP (ref 0.2–1.2)
BUN SERPL-MCNC: 6 MG/DL — LOW (ref 10–20)
CALCIUM SERPL-MCNC: 9.9 MG/DL — SIGNIFICANT CHANGE UP (ref 8.4–10.5)
CHLORIDE SERPL-SCNC: 104 MMOL/L — SIGNIFICANT CHANGE UP (ref 98–110)
CO2 SERPL-SCNC: 27 MMOL/L — SIGNIFICANT CHANGE UP (ref 17–32)
CREAT SERPL-MCNC: 0.8 MG/DL — SIGNIFICANT CHANGE UP (ref 0.7–1.5)
EGFR: 106 ML/MIN/1.73M2 — SIGNIFICANT CHANGE UP
EOSINOPHIL # BLD AUTO: 0.22 K/UL — SIGNIFICANT CHANGE UP (ref 0–0.7)
EOSINOPHIL NFR BLD AUTO: 3.1 % — SIGNIFICANT CHANGE UP (ref 0–8)
GLUCOSE BLDC GLUCOMTR-MCNC: 109 MG/DL — HIGH (ref 70–99)
GLUCOSE BLDC GLUCOMTR-MCNC: 111 MG/DL — HIGH (ref 70–99)
GLUCOSE BLDC GLUCOMTR-MCNC: 145 MG/DL — HIGH (ref 70–99)
GLUCOSE SERPL-MCNC: 114 MG/DL — HIGH (ref 70–99)
HCT VFR BLD CALC: 37.2 % — LOW (ref 42–52)
HGB BLD-MCNC: 11.3 G/DL — LOW (ref 14–18)
IMM GRANULOCYTES NFR BLD AUTO: 0.6 % — HIGH (ref 0.1–0.3)
LYMPHOCYTES # BLD AUTO: 3.51 K/UL — HIGH (ref 1.2–3.4)
LYMPHOCYTES # BLD AUTO: 48.8 % — SIGNIFICANT CHANGE UP (ref 20.5–51.1)
MAGNESIUM SERPL-MCNC: 1.9 MG/DL — SIGNIFICANT CHANGE UP (ref 1.8–2.4)
MCHC RBC-ENTMCNC: 25.6 PG — LOW (ref 27–31)
MCHC RBC-ENTMCNC: 30.4 G/DL — LOW (ref 32–37)
MCV RBC AUTO: 84.4 FL — SIGNIFICANT CHANGE UP (ref 80–94)
MONOCYTES # BLD AUTO: 1.08 K/UL — HIGH (ref 0.1–0.6)
MONOCYTES NFR BLD AUTO: 15 % — HIGH (ref 1.7–9.3)
NEUTROPHILS # BLD AUTO: 2.31 K/UL — SIGNIFICANT CHANGE UP (ref 1.4–6.5)
NEUTROPHILS NFR BLD AUTO: 32.1 % — LOW (ref 42.2–75.2)
NRBC # BLD: 0 /100 WBCS — SIGNIFICANT CHANGE UP (ref 0–0)
PHOSPHATE SERPL-MCNC: 4.7 MG/DL — SIGNIFICANT CHANGE UP (ref 2.1–4.9)
PLATELET # BLD AUTO: 285 K/UL — SIGNIFICANT CHANGE UP (ref 130–400)
PMV BLD: 9.7 FL — SIGNIFICANT CHANGE UP (ref 7.4–10.4)
POTASSIUM SERPL-MCNC: 4.6 MMOL/L — SIGNIFICANT CHANGE UP (ref 3.5–5)
POTASSIUM SERPL-SCNC: 4.6 MMOL/L — SIGNIFICANT CHANGE UP (ref 3.5–5)
PROT SERPL-MCNC: 7.3 G/DL — SIGNIFICANT CHANGE UP (ref 6–8)
RBC # BLD: 4.41 M/UL — LOW (ref 4.7–6.1)
RBC # FLD: 19.1 % — HIGH (ref 11.5–14.5)
SODIUM SERPL-SCNC: 141 MMOL/L — SIGNIFICANT CHANGE UP (ref 135–146)
WBC # BLD: 7.19 K/UL — SIGNIFICANT CHANGE UP (ref 4.8–10.8)
WBC # FLD AUTO: 7.19 K/UL — SIGNIFICANT CHANGE UP (ref 4.8–10.8)

## 2024-08-19 PROCEDURE — 99233 SBSQ HOSP IP/OBS HIGH 50: CPT | Mod: 95

## 2024-08-19 PROCEDURE — 99239 HOSP IP/OBS DSCHRG MGMT >30: CPT

## 2024-08-19 RX ORDER — LACTULOSE 10 G
15 PACKET (EA) ORAL
Qty: 1800 | Refills: 0
Start: 2024-08-19 | End: 2024-09-17

## 2024-08-19 RX ORDER — DIVALPROEX SODIUM 125 MG/1
1 CAPSULE, DELAYED RELEASE ORAL
Qty: 60 | Refills: 0
Start: 2024-08-19 | End: 2024-09-17

## 2024-08-19 RX ORDER — FOLIC ACID 1 MG
1 TABLET ORAL
Qty: 30 | Refills: 0
Start: 2024-08-19 | End: 2024-09-17

## 2024-08-19 RX ORDER — LEVOTHYROXINE SODIUM 100 MCG
1 TABLET ORAL
Refills: 0 | DISCHARGE

## 2024-08-19 RX ORDER — HYDROXYZINE HCL 25 MG
25 TABLET ORAL ONCE
Refills: 0 | Status: COMPLETED | OUTPATIENT
Start: 2024-08-19 | End: 2024-08-19

## 2024-08-19 RX ORDER — LEVOTHYROXINE SODIUM 100 MCG
1 TABLET ORAL
Qty: 30 | Refills: 0
Start: 2024-08-19 | End: 2024-09-17

## 2024-08-19 RX ORDER — LEVOTHYROXINE SODIUM 100 MCG
1 TABLET ORAL
Qty: 30 | Refills: 1
Start: 2024-08-19 | End: 2024-10-17

## 2024-08-19 RX ORDER — ASPIRIN 81 MG
1 TABLET, DELAYED RELEASE (ENTERIC COATED) ORAL
Qty: 30 | Refills: 0
Start: 2024-08-19 | End: 2024-09-17

## 2024-08-19 RX ADMIN — METOPROLOL TARTRATE 25 MILLIGRAM(S): 100 TABLET ORAL at 06:26

## 2024-08-19 RX ADMIN — BENZTROPINE MESYLATE 1 MILLIGRAM(S): 2 TABLET ORAL at 06:28

## 2024-08-19 RX ADMIN — Medication 25 MILLIGRAM(S): at 03:19

## 2024-08-19 RX ADMIN — CHLORHEXIDINE GLUCONATE 1 APPLICATION(S): 40 SOLUTION TOPICAL at 06:28

## 2024-08-19 RX ADMIN — Medication 10 GRAM(S): at 06:26

## 2024-08-19 RX ADMIN — ENOXAPARIN SODIUM 40 MILLIGRAM(S): 100 INJECTION SUBCUTANEOUS at 06:25

## 2024-08-19 RX ADMIN — Medication 10 GRAM(S): at 17:30

## 2024-08-19 RX ADMIN — Medication 40 MILLIGRAM(S): at 06:27

## 2024-08-19 RX ADMIN — Medication 0: at 12:16

## 2024-08-19 RX ADMIN — Medication 0: at 16:50

## 2024-08-19 RX ADMIN — Medication 0: at 08:45

## 2024-08-19 RX ADMIN — Medication 1 MILLIGRAM(S): at 12:51

## 2024-08-19 RX ADMIN — Medication 100 MICROGRAM(S): at 06:26

## 2024-08-19 RX ADMIN — BENZTROPINE MESYLATE 1 MILLIGRAM(S): 2 TABLET ORAL at 17:31

## 2024-08-19 RX ADMIN — Medication 81 MILLIGRAM(S): at 12:50

## 2024-08-19 RX ADMIN — Medication 5 MILLIGRAM(S): at 03:19

## 2024-08-19 RX ADMIN — DIVALPROEX SODIUM 250 MILLIGRAM(S): 125 CAPSULE, DELAYED RELEASE ORAL at 08:49

## 2024-08-19 RX ADMIN — Medication 10 GRAM(S): at 12:50

## 2024-08-19 RX ADMIN — CLOZAPINE 100 MILLIGRAM(S): 200 TABLET ORAL at 06:27

## 2024-08-19 RX ADMIN — CLOZAPINE 100 MILLIGRAM(S): 200 TABLET ORAL at 17:31

## 2024-08-19 NOTE — DISCHARGE NOTE NURSING/CASE MANAGEMENT/SOCIAL WORK - NSDCPEFALRISK_GEN_ALL_CORE
For information on Fall & Injury Prevention, visit: [email or web address] OR  [email or web address] OR  https://www.cdc.gov/steadi/patient.html

## 2024-08-19 NOTE — BH CONSULTATION LIAISON PROGRESS NOTE - NSBHCONSULTMEDAGITATION_PSY_A_CORE FT
For acute agitation, can consider Haldol 2.5-5mg q8 PRN, hold for qtc >500 or extrapyramidal symptoms. Do not make standing as pt is already on clozapine and invega sustenna long acting injectable. This would be for temporary management.

## 2024-08-19 NOTE — DISCHARGE NOTE NURSING/CASE MANAGEMENT/SOCIAL WORK - NSPROEXTENSIONSOFSELF_GEN_A_NUR
Date Performed:  03/18/2024    REASON FOR ADMISSION:  Severe mitral valve regurgitation.    HISTORY OF PRESENT ILLNESS:  This is a pleasant 81-year-old man with a known history of coronary artery disease for which he has undergone coronary artery bypass grafting in 2007.  He has a history of ischemic cardiomyopathy and atrial fibrillation following coronary artery bypass grafting and underwent AICD placement in March 2008, generator change on 03/04/2015 and then 03/31/2021.  His last LV ejection fraction is at 25% on 01/30/2024, but he was noted to have severe mitral valve regurgitation.  Transesophageal echocardiogram was completed, which once again shows severe mitral valve regurgitation.  The patient was brought into the hospital for left heart cath today.    The patient underwent the above on 02/20/2024.  He was noted to have atretic LIMA to the LAD with occluded vein graft to the diagonal and patent vein graft to the OM2 and OM3 and patent graft to the PDA branch of the RCA.  He underwent angioplasty and stenting of the left circumflex today without any complication and is being set up for transcatheter mitral valve repair.    The patient appears to be comfortable.  Left femoral groin sheath is in place.  He denies any chest pain or shortness of breath.  His family is at his bedside.      Past medical History :   Past Medical History:   Diagnosis Date    CAD (coronary artery disease) 05/17/2011    CAD (coronary artery disease) 02/2024    severe TVD    Closed fracture of left calcaneus 10/22/2015    Coronary atherosclerosis of autologous vein bypass graft 12/01/2011    Diverticulosis 07/12/2018    GERD (gastroesophageal reflux disease) 08/05/2013    History  of basal cell carcinoma 07/26/2012    ICD (implantable cardiac defibrillator) in place 12/01/2011    Ischemic cardiomyopathy 02/14/2015    Left carotid bruit 08/05/2013    Malignant neoplasm (CMD)     basal cell skin cancer    Mitral valve regurgitation 2024     severe    Mixed hyperlipidemia 08/05/2013    Myocardial infarction (CMD)     Neurologic cardiac syncope 05/17/2011    (+) tilt    Nuclear sclerosis of both eyes 08/18/2015    Other dyschromia 07/26/2012    Other seborrheic keratosis 07/26/2012    PAF (paroxysmal atrial fibrillation) (CMD) 02/02/2020    Smoker 08/04/2014    Special screening for malignant neoplasms, colon 12/05/2005    colonoscopy/    Statin intolerance 08/04/2014    Tobacco use disorder 08/05/2013    Undiagnosed cardiac murmurs 08/05/2013    Ventricular tachycardia (CMD) 01/08/2018     Past Surgical History:   Procedure Laterality Date    Cdl cath poss ptca  03/18/2024    Colonoscopy diagnostic  12/05/2005    normal colonoscopy/    Colonoscopy diagnostic  07/12/2018    Dr Espana recall 10 years    Coronary artery bypass graft  2007    CABG x 6 vessel    Echo heart transesophageal  02/21/2024    Esophagogastroduodenoscopy transoral flex w/bx single or mult  07/12/2018    Dr Espana     Flexible sigmoidoscopy diagnostic include specimens  1979    Flex Sig w/o Bx    Icd generator change  2015    Icd generator change  04/01/2021    Icd implant  2008    Past surgical history  02/1999     right ear sx       Allergies :  ALLERGIES:   Allergen Reactions    Statins MYALGIA       Current medications :   Current Facility-Administered Medications   Medication Dose Route Frequency Provider Last Rate Last Admin    aspirin (ECOTRIN) enteric coated tablet 81 mg  81 mg Oral Daily Jose L Lam MD        bumetanide (BUMEX) tablet 1 mg  1 mg Oral BID Jose L Lam MD        carvedilol (COREG) tablet 6.25 mg  6.25 mg Oral BID  Jose L Lam MD        fenofibrate micronized (LOFIBRA) capsule 134 mg  134 mg Oral Daily with breakfast Jose L Lam MD   134 mg at 03/18/24 2034    lisinopril (ZESTRIL) tablet 2.5 mg  2.5 mg Oral Daily Jose L Lam MD        [START ON 3/19/2024] clopidogrel (PLAVIX) tablet 75 mg  75 mg Oral Daily  Jose L Lam MD        sodium chloride 0.9 % injection 2 mL  2 mL Intracatheter 2 times per day Jose L Lam MD        [START ON 3/19/2024] insulin lispro (ADMELOG,HumaLOG) - Correction Dose   Subcutaneous TID  Ekaterina Diaz MD           Family history :   Family History   Problem Relation Age of Onset    Heart Mother         Heart Valve Replacement & Pacemaker    Diabetes Father     Cancer Brother     Heart Brother     Patient is unaware of any medical problems Daughter     Patient is unaware of any medical problems Son     Patient is unaware of any medical problems Son        Social history :   Social History     Tobacco Use    Smoking status: Every Day     Current packs/day: 0.25     Average packs/day: 0.3 packs/day for 51.2 years (12.8 ttl pk-yrs)     Types: Cigarettes     Start date: 1/1/1973     Passive exposure: Never    Smokeless tobacco: Never    Tobacco comments:     1-2 cigarettes a week   Substance Use Topics    Alcohol use: Yes     Alcohol/week: 2.0 - 3.0 standard drinks of alcohol     Types: 2 - 3 Cans of beer per week     Comment: usually 1-2 drinks per week       Review of systems :   GENERAL: Denies any fever, chills, sweats, weakness  HEENT: Denies problems with vision, swallowing or hearing  RESPIRATORY: Denies cough, +shortness of breath  CARDIAC: Denies chest pain, palpitations, orthopnea  GASTROINTESTINAL: Denies abdominal pain; nausea, vomiting, diarrhea  GENITOURINARY: Denies dysuria, frequency, urgency, hematuria  MUSCULOSKELETAL: Denies pain, swelling, redness of extremities  SKIN: Denies rash, denies pruritus  NEUROLOGIC: Denies headaches, seizures, syncope  PSYCHIATRIC: Denies feelings of anxiety or depression.   HEMATOLOGIC: Denies bleeding or bruising  ONCOLOGIC: No history of malignancy    Vitals :   Vitals:    03/18/24 2033   BP: 93/52   Pulse:    Resp:    Temp:        Physical Examination :    General : Awake. No acute distress.   HEENT : Head is normocephalic,  none atraumatic. EDBI. Oral mucosa is moist. No scleral icterus  Neck : Supple, No JVD, No LAD. No thyromegaly  Lungs : Clear to auscultation bilaterally. No wheezes, crackles, or rhonchi. No usage of accessory muscles of respiration  Heart : Regular rate and rhythm. 3/6 apical pansystolic murmur, gallops, or rubs. No edema in bilateral lower extremities  Abdomen : Soft, positive bowel sounds, Non tender, Non distended. No hepatosplenomegaly  Musculoskeletal : Normal ROM in bilateral shoulder, elbow, wrist, hip, and knee joints.  Skin : No lesions, rashes, or ulcers. No erythema or cyanosis.  Neurological : Cranial nerves 2-12 are grossly non focal. Muscle strength is 5/5 in all extremities.  Lymphatics: No cervical, axillary or inguinal adenopathy  Psychiatric : Alert and oriented X 3. Normal affect    Laboratory data :    Recent Labs   Lab 03/18/24  1557   WBC 6.6   HCT 34.1*   HGB 11.1*      SODIUM 138   POTASSIUM 4.5   CHLORIDE 102   CO2 34*   CALCIUM 9.4   GLUCOSE 73   BUN 63*   CREATININE 1.23*       Imaging :     Results for orders placed during the hospital encounter of 08/03/22    XR Chest AP or PA    Narrative  EXAM: XR CHEST AP OR PA    DATE OF EXAM: 8/3/2022 9:36 AM.    COMPARISON: 3/18/2021.    CLINICAL INFORMATION: Shortness of breath.    FINDINGS: Heart is moderately enlarged. There are changes of a CABG.  Pulmonary vessels are congested. Diffuse prominence interstitial markings.  Small bilateral pleural effusions.    Cardiac pacemaker in place.    Impression  Findings consistent with congestive failure. Question element  of underlying interstitial lung disease    No results found for this or any previous visit.    No results found for this or any previous visit.    No results found for this or any previous visit.      IMPRESSION/RECOMMENDATIONS:    Ischemic cardiomyopathy.    Left ventricular ejection fraction between 25% to 30%.  GDMT    Severe mitral valve regurgitation, likely primary.    TMVR  planing  Monitor blood pressure.    Coronary artery disease, status post coronary artery bypass grafting in 2007 with atretic left internal mammary artery to the left anterior descending and subtotal occlusion of the bypass graft to diagonal-1 with patent bypass graft to obtuse marginal-1, diagonal-2, and posterior descending artery status post angioplasty and stenting of the left circumflex today.    Dual antiplatelet agents.  High-dose statins.  Telemetry monitoring.  Sheath removal as per protocol.    Diabetes mellitus type II.    Hold metformin.  Insulin sliding scale.  Continue Jardiance for guideline directed medical treatment.    Hyperlipidemia.      Monitor.    Discussed with RN/family at bedside.        Dictated By:  Ekaterina Diaz MD  Signing Provider:  Ekaterina Diaz MD    TZZ/AQT  D:  03/18/2024 07:22:08 PM  T:  03/18/2024 07:51:05 PM  Job:  [id number]

## 2024-08-19 NOTE — BH CONSULTATION LIAISON PROGRESS NOTE - NSBHASSESSMENTFT_PSY_ALL_CORE
54yo M, domiciled at [facility], past medical history of DM, HLD, HTN, hypothyroidism, COVID, past psychiatric history schizoaffective disorder, hx of substance use disorder, hx past SA vs NSSIB by cutting and head banging, last psych hospitalization in April 2024 for agitation, recent medical hospitalization for URI and found to be COVID+ who presents to the hospital for fall and seizure workup per chart. Per primary team, patient presented lethargic today and at baseline is more awake. Consult placed for psych eval and med management. Primary team asks if any psych medications could be contributing to lethargy.    8/14- Unable to complete interview of patient. Chart review done and collateral obtained. At this time, presentation is not likely due to primary psychiatric cause. Changes in mental status and recent medical issues increase concern for delirium, which can present with hypoactive symptoms as well. It is less likely that psychiatric medications could be contributing to lethargy given acute change, fluctuation, lack of changes in doses or missed doses for the past several months, and patient has been taking medications consistently per collateral. Would consider simplifying regimen and minimizing medications that could be deliriogenic, such as ativan, unless needed for other medical cause such as seizures. Would also r/o any substances given history of use although it is also not clear what is contributing to mental status change.    8/15- More awake, AOx3, euthymic, no behavioral issues, denies SI/HI. Dose of clozapine was held last night. Unclear if this was contributing to lethargy given pt has been on this dose for months. No changes to recommendations.     8/16- Continues to be more awake, AOx3 and euthymic. No behavioral issues. Denies SI/HI. Taking clozapine and depakote. Ammonia elevated today 137.    8/18- Patient lethargic but answers all questions appropriately, AOX4, calm, cooperative. Denies SI/HI/AVH. Continuing with clozaril 100mg BID. ANC today was 1640 but declining since admission; unclear if related to clozaril as patient has been on clozaril for many years. Patient last received depakote 500mg morning Aug 16, where VPA was 106 and ammonia 137. Repeat labs Aug 17 show ammonia down to 51 and VPA down to 54. Increased VPA level can be explained as patient last received VPA 500mg Aug 16 05:04 and VPA bloodwork was collected 09:05 leading to incorrect trough value. Increased ammonia can be explained by seizure which is what led to patient being initially hospitalized, and not necessarily caused by depakote. Recommend restarting depakote at 250mg BID with repeat ammonia levels. Patient does not meet criteria for inpatient psychiatric hospitalization at this time, and does not require constant observation. TeleCL will continue to follow.    8/19- Restarted on Depakote. Awake on this interview. Watching TV. Had episode of anxiety overnight. On interview today, presents calm, cooperative, AOx4. Answers appropriately. Denies SI/HI/AVH. Pending ammonia and trough depakote levels. Currently does not meet criteria for involuntary psych admission at this time.  54yo M, domiciled at [facility], past medical history of DM, HLD, HTN, hypothyroidism, COVID, past psychiatric history schizoaffective disorder, hx of substance use disorder, hx past SA vs NSSIB by cutting and head banging, last psych hospitalization in April 2024 for agitation, recent medical hospitalization for URI and found to be COVID+ who presents to the hospital for fall and seizure workup per chart. Per primary team, patient presented lethargic today and at baseline is more awake. Consult placed for psych eval and med management. Primary team asks if any psych medications could be contributing to lethargy.    8/14- Unable to complete interview of patient. Chart review done and collateral obtained. At this time, presentation is not likely due to primary psychiatric cause. Changes in mental status and recent medical issues increase concern for delirium, which can present with hypoactive symptoms as well. It is less likely that psychiatric medications could be contributing to lethargy given acute change, fluctuation, lack of changes in doses or missed doses for the past several months, and patient has been taking medications consistently per collateral. Would consider simplifying regimen and minimizing medications that could be deliriogenic, such as ativan, unless needed for other medical cause such as seizures. Would also r/o any substances given history of use although it is also not clear what is contributing to mental status change.    8/15- More awake, AOx3, euthymic, no behavioral issues, denies SI/HI. Dose of clozapine was held last night. Unclear if this was contributing to lethargy given pt has been on this dose for months. No changes to recommendations.     8/16- Continues to be more awake, AOx3 and euthymic. No behavioral issues. Denies SI/HI. Taking clozapine and depakote. Ammonia elevated today 137.    8/18- Patient lethargic but answers all questions appropriately, AOX4, calm, cooperative. Denies SI/HI/AVH. Continuing with clozaril 100mg BID. ANC today was 1640 but declining since admission; unclear if related to clozaril as patient has been on clozaril for many years. Patient last received depakote 500mg morning Aug 16, where VPA was 106 and ammonia 137. Repeat labs Aug 17 show ammonia down to 51 and VPA down to 54. Increased VPA level can be explained as patient last received VPA 500mg Aug 16 05:04 and VPA bloodwork was collected 09:05 leading to incorrect trough value. Increased ammonia can be explained by seizure which is what led to patient being initially hospitalized, and not necessarily caused by depakote. Recommend restarting depakote at 250mg BID with repeat ammonia levels. Patient does not meet criteria for inpatient psychiatric hospitalization at this time, and does not require constant observation. TeleCL will continue to follow.    8/19- Restarted on valproic acid, now ER. Awake on this interview. Watching TV. Had episode of anxiety overnight. On interview today, presents calm, cooperative, AOx4. Answers appropriately. Denies SI/HI/AVH. Pending ammonia and trough depakote levels. Currently does not meet criteria for involuntary psych admission at this time.  54yo M, domiciled at [facility], past medical history of DM, HLD, HTN, hypothyroidism, COVID, past psychiatric history schizoaffective disorder, hx of substance use disorder, hx past SA vs NSSIB by cutting and head banging, last psych hospitalization in April 2024 for agitation, recent medical hospitalization for URI and found to be COVID+ who presents to the hospital for fall and seizure workup per chart. Per primary team, patient presented lethargic today and at baseline is more awake. Consult placed for psych eval and med management. Primary team asks if any psych medications could be contributing to lethargy.    8/14- Unable to complete interview of patient. Chart review done and collateral obtained. At this time, presentation is not likely due to primary psychiatric cause. Changes in mental status and recent medical issues increase concern for delirium, which can present with hypoactive symptoms as well. It is less likely that psychiatric medications could be contributing to lethargy given acute change, fluctuation, lack of changes in doses or missed doses for the past several months, and patient has been taking medications consistently per collateral. Would consider simplifying regimen and minimizing medications that could be deliriogenic, such as ativan, unless needed for other medical cause such as seizures. Would also r/o any substances given history of use although it is also not clear what is contributing to mental status change.    8/15- More awake, AOx3, euthymic, no behavioral issues, denies SI/HI. Dose of clozapine was held last night. Unclear if this was contributing to lethargy given pt has been on this dose for months. No changes to recommendations.     8/16- Continues to be more awake, AOx3 and euthymic. No behavioral issues. Denies SI/HI. Taking clozapine and depakote. Ammonia elevated today 137.    8/18- Patient lethargic but answers all questions appropriately, AOX4, calm, cooperative. Denies SI/HI/AVH. Continuing with clozaril 100mg BID. ANC today was 1640 but declining since admission; unclear if related to clozaril as patient has been on clozaril for many years. Patient last received depakote 500mg morning Aug 16, where VPA was 106 and ammonia 137. Repeat labs Aug 17 show ammonia down to 51 and VPA down to 54. Increased VPA level can be explained as patient last received VPA 500mg Aug 16 05:04 and VPA bloodwork was collected 09:05 leading to incorrect trough value. Increased ammonia can be explained by seizure which is what led to patient being initially hospitalized, and not necessarily caused by depakote. Recommend restarting depakote at 250mg BID with repeat ammonia levels. Patient does not meet criteria for inpatient psychiatric hospitalization at this time, and does not require constant observation. TeleCL will continue to follow.    8/19- Restarted on valproic acid, now ER. Awake on this interview. Watching TV. Had episode of anxiety overnight. On interview today, presents calm, cooperative, AOx4. Answers appropriately. Denies SI/HI/AVH. Pending ammonia and trough VPA levels. Currently does not meet criteria for involuntary psych admission at this time.  54yo M, domiciled at [facility], past medical history of DM, HLD, HTN, hypothyroidism, COVID, past psychiatric history schizoaffective disorder, hx of substance use disorder, hx past SA vs NSSIB by cutting and head banging, last psych hospitalization in April 2024 for agitation, recent medical hospitalization for URI and found to be COVID+ who presents to the hospital for fall and seizure workup per chart. Per primary team, patient presented lethargic today and at baseline is more awake. Consult placed for psych eval and med management. Primary team asks if any psych medications could be contributing to lethargy.    8/14- Unable to complete interview of patient. Chart review done and collateral obtained. At this time, presentation is not likely due to primary psychiatric cause. Changes in mental status and recent medical issues increase concern for delirium, which can present with hypoactive symptoms as well. It is less likely that psychiatric medications could be contributing to lethargy given acute change, fluctuation, lack of changes in doses or missed doses for the past several months, and patient has been taking medications consistently per collateral. Would consider simplifying regimen and minimizing medications that could be deliriogenic, such as ativan, unless needed for other medical cause such as seizures. Would also r/o any substances given history of use although it is also not clear what is contributing to mental status change.    8/15- More awake, AOx3, euthymic, no behavioral issues, denies SI/HI. Dose of clozapine was held last night. Unclear if this was contributing to lethargy given pt has been on this dose for months. No changes to recommendations.     8/16- Continues to be more awake, AOx3 and euthymic. No behavioral issues. Denies SI/HI. Taking clozapine and depakote. Ammonia elevated today 137.    8/18- Patient lethargic but answers all questions appropriately, AOX4, calm, cooperative. Denies SI/HI/AVH. Continuing with clozaril 100mg BID. ANC today was 1640 but declining since admission; unclear if related to clozaril as patient has been on clozaril for many years. Patient last received depakote 500mg morning Aug 16, where VPA was 106 and ammonia 137. Repeat labs Aug 17 show ammonia down to 51 and VPA down to 54. Increased VPA level can be explained as patient last received VPA 500mg Aug 16 05:04 and VPA bloodwork was collected 09:05 leading to incorrect trough value. Increased ammonia can be explained by seizure which is what led to patient being initially hospitalized, and not necessarily caused by depakote. Recommend restarting depakote at 250mg BID with repeat ammonia levels. Patient does not meet criteria for inpatient psychiatric hospitalization at this time, and does not require constant observation. TeleCL will continue to follow.    8/19- Restarted on valproic acid, now ER. Awake on this interview. Watching TV. Had episode of anxiety overnight. On interview today, presents calm, cooperative, AOx4. Answers appropriately. Denies SI/HI/AVH. Does not present agitated or aggressive. Pending ammonia and trough VPA levels. Currently does not meet criteria for involuntary psych admission at this time.  54yo M, domiciled at [facility], past medical history of DM, HLD, HTN, hypothyroidism, COVID, past psychiatric history schizoaffective disorder, hx of substance use disorder, hx past SA vs NSSIB by cutting and head banging, last psych hospitalization in April 2024 for agitation, recent medical hospitalization for URI and found to be COVID+ who presents to the hospital for fall and seizure workup per chart. Per primary team, patient presented lethargic today and at baseline is more awake. Consult placed for psych eval and med management. Primary team asks if any psych medications could be contributing to lethargy.    8/14- Unable to complete interview of patient. Chart review done and collateral obtained. At this time, presentation is not likely due to primary psychiatric cause. Changes in mental status and recent medical issues increase concern for delirium, which can present with hypoactive symptoms as well. It is less likely that psychiatric medications could be contributing to lethargy given acute change, fluctuation, lack of changes in doses or missed doses for the past several months, and patient has been taking medications consistently per collateral. Would consider simplifying regimen and minimizing medications that could be deliriogenic, such as ativan, unless needed for other medical cause such as seizures. Would also r/o any substances given history of use although it is also not clear what is contributing to mental status change.    8/15- More awake, AOx3, euthymic, no behavioral issues, denies SI/HI. Dose of clozapine was held last night. Unclear if this was contributing to lethargy given pt has been on this dose for months. No changes to recommendations.     8/16- Continues to be more awake, AOx3 and euthymic. No behavioral issues. Denies SI/HI. Taking clozapine and depakote. Ammonia elevated today 137.    8/18- Patient lethargic but answers all questions appropriately, AOX4, calm, cooperative. Denies SI/HI/AVH. Continuing with clozaril 100mg BID. ANC today was 1640 but declining since admission; unclear if related to clozaril as patient has been on clozaril for many years. Patient last received depakote 500mg morning Aug 16, where VPA was 106 and ammonia 137. Repeat labs Aug 17 show ammonia down to 51 and VPA down to 54. Increased VPA level can be explained as patient last received VPA 500mg Aug 16 05:04 and VPA bloodwork was collected 09:05 leading to incorrect trough value. Increased ammonia can be explained by seizure which is what led to patient being initially hospitalized, and not necessarily caused by depakote. Recommend restarting depakote at 250mg BID with repeat ammonia levels. Patient does not meet criteria for inpatient psychiatric hospitalization at this time, and does not require constant observation. TeleCL will continue to follow.    8/19- Restarted on valproic acid, now ER. Awake on this interview. Watching TV. Had episode of anxiety overnight. On interview today, presents calm, cooperative, AOx4. Answers appropriately. Denies SI/HI/AH/VH. Does not present agitated or aggressive. Pending ammonia and trough VPA levels. Currently does not meet criteria for involuntary psych admission at this time.

## 2024-08-19 NOTE — BH CONSULTATION LIAISON PROGRESS NOTE - NSBHFUPINTERVALHXFT_PSY_A_CORE
Interval chart reviewed. Patient was seen over the weekend by psych, see notes. Patient has been restarted back on depakote. Per RN today received hydoxyzine PRN overnight for anxiety. Has been awake and alert, walking around. Continues to take psych medications.     On interview this morning, patient is awake, calm, cooperative, answering questions appropriately. He is AOx4 and watching TV. States that he had a bit of a "rough night" but is "feeling better now." Patient reports that he was feeling anxious. States that he wants to go home. He reports eating and sleeping well other than last night. He denies any auditory or visual hallucinations. He denies any persecutory thoughts. Does not present paranoid, guarded, or responding to internal stimuli. He reports his mood is stable. He denies any thoughts of self harm or harm to others.  Interval chart reviewed. Patient was seen over the weekend by psych, see notes. Patient has been restarted back on valproic acid, now ER. Per RN today received hydoxyzine PRN overnight for anxiety. Has been awake and alert, walking around. Continues to take psych medications.     On interview this morning, patient is awake, calm, cooperative, answering questions appropriately. He is AOx4 and watching TV. States that he had a bit of a "rough night" but is "feeling better now." Patient reports that he was feeling anxious. States that he wants to go home. He reports eating and sleeping well other than last night. He denies any auditory or visual hallucinations. He denies any persecutory thoughts. Does not present paranoid, guarded, or responding to internal stimuli. He reports his mood is stable. He denies any thoughts of self harm or harm to others.  Interval chart reviewed. Patient was seen over the weekend by psych, see notes. Patient has been restarted back on valproic acid, now ER. Per RN today received hydoxyzine PRN overnight for anxiety. Has been awake and alert, walking around. Continues to take psych medications.     On interview this morning, patient is awake, calm, cooperative, answering questions appropriately. He is AOx4 and watching TV. States that he had a bit of a "rough night" but is "feeling better now." Patient reports that he was feeling anxious. States that he wants to go home. He reports eating and sleeping well other than last night. He denies any auditory or visual hallucinations. He denies any persecutory thoughts. Does not present paranoid, guarded, or responding to internal stimuli. He reports his mood is stable. He denies any AH/VH/SI/HI. Interval chart reviewed. Patient was seen over the weekend by psych, see notes. Patient has been restarted back on valproic acid, now ER. Per RN today received hydoxyzine PRN overnight for anxiety. Has been awake and alert, walking around. Continues to take psych medications.     On interview this morning, patient is awake, calm, cooperative, answering questions appropriately. He is AOx4 and watching TV. States that he had a bit of a "rough night" but is "feeling better now." Patient reports that he was feeling anxious. States that he wants to go home. He reports eating and sleeping well other than last night. He denies any auditory or visual hallucinations. He denies any persecutory thoughts. Does not present paranoid, guarded, or responding to internal stimuli. He reports his mood is stable. He denies any SI/HI.

## 2024-08-19 NOTE — BH CONSULTATION LIAISON PROGRESS NOTE - NSBHCONSULTRECOMMENDOTHER_PSY_A_CORE FT
Recommendations (continue current recommendations):  - Continued medical and neurological workup that could contribute to delirium, including increase in ammonia.   - Continue depakote extended release at 250mg BID and monitor ammonia levels and valproic acid levels.  	> Pending ammonia and valproic acid levels  - Continue clozapine 100mg BID  - Continue benztropine 1mg BID  - Can continue to hold lexapro, as pt was not on this during last IPP and due to deliriogenic potential. Would generally continue to hold ativan due to deliriogenic and sedating potential as well unless there is concern for seizure/withdrawal.   Recommendations (continue current recommendations):  - Continued medical and neurological workup that could contribute to delirium, including increase in ammonia.   -Increase depakote ER to 250qAM/ 500mg qHS. Has been on higher doses of VPA in the past. Monitor ammonia levels (which can be affected by other medical factors, ex seizure). Monitor trough VPA levels.   - Continue clozapine 100mg BID  - Continue benztropine 1mg BID  - Can continue to hold lexapro, as pt was not on this during last IPP and due to deliriogenic potential. Would generally continue to hold ativan due to deliriogenic and sedating potential as well unless there is concern for seizure/withdrawal.   Recommendations:  - Continued medical and neurological workup that could contribute to delirium, including increase in ammonia.   -Increase depakote ER to 250qAM/ 500mg qHS. Has been on higher doses of VPA in the past. Monitor ammonia levels (which can be affected by other medical factors, ex seizure). Monitor trough VPA levels.   - Continue clozapine 100mg BID  - Continue benztropine 1mg BID  - Can continue to hold lexapro, as pt was not on this during last IPP and due to deliriogenic potential. Would generally continue to hold ativan due to deliriogenic and sedating potential as well unless there is concern for seizure/withdrawal.   Recommendations:  - Continued medical and neurological workup that could contribute to delirium, including increase in ammonia.   -Increase depakote ER to 250qAM/ 500mg qHS. Has been on higher doses of VPA in the past. Monitor ammonia levels (which can be affected by other medical factors, ex seizure).   - Monitor trough VPA levels. Generally it is more accurate after several days of a dose change to ensure steady state.   - Continue clozapine 100mg BID  - Continue benztropine 1mg BID  - Can continue to hold lexapro, as pt was not on this during last IPP and due to deliriogenic potential. Would generally continue to hold ativan due to deliriogenic and sedating potential as well unless there is concern for seizure/withdrawal.   Recommendations:  - Continued medical and neurological workup that could contribute to delirium, including increase in ammonia.   - Continue depakote extended release at 250mg BID. Of note pt has been on higher doses in the past, may end up need titration for psych symptoms as tolerated. Also ER formulation can eventually be consolidated to one dose, as tolerated.  - Monitor ammonia levels (which can be affected by other medical factors, ex seizure).   - Monitor trough VPA levels. Generally it is more accurate after several days of a dose change to ensure steady state.   - Continue clozapine 100mg BID  - Continue benztropine 1mg BID  - Can continue to hold lexapro, as pt was not on this during last IPP and due to deliriogenic potential. Would generally continue to hold ativan due to deliriogenic and sedating potential as well unless there is concern for seizure/withdrawal.   Recommendations:  - Continued medical and neurological workup that could contribute to delirium, including increase in ammonia.   - Continue depakote extended release at 250mg BID. Has been on higher doses in the past and needs continued medication management. Also ER formulation can eventually be consolidated to one dose, as tolerated.  - Monitor ammonia levels (which can be affected by other medical factors, ex seizure).   - Monitor trough VPA levels. Generally it is more accurate after several days of a dose change to ensure steady state.   - Continue clozapine 100mg BID  - Continue benztropine 1mg BID  - Can continue to hold lexapro, as pt was not on this during last IPP and due to deliriogenic potential. Would generally continue to hold ativan due to deliriogenic and sedating potential as well unless there is concern for seizure/withdrawal.

## 2024-08-19 NOTE — DISCHARGE NOTE NURSING/CASE MANAGEMENT/SOCIAL WORK - PATIENT PORTAL LINK FT
You can access the FollowMyHealth Patient Portal offered by [facility] by registering at the following website: [email or web address]. By joining [facility]’s FollowMyHealth portal, you will also be able to view your health information using other applications (apps) compatible with our system.

## 2024-08-19 NOTE — DISCHARGE NOTE NURSING/CASE MANAGEMENT/SOCIAL WORK - NSDCVIVACCINE_GEN_ALL_CORE_FT
Tdap; 21-Aug-2022 18:52; Kathrin Lebron (RN); [employer]; [id number] (Exp. Date: 22-Sep-2024); IntraMuscular; Deltoid Right.; 0.5 milliLiter(s); VIS (VIS Published: 09-May-2013, VIS Presented: 21-Aug-2022);

## 2024-08-19 NOTE — BH CONSULTATION LIAISON PROGRESS NOTE - NSBHINDICATION_PSY_ALL_CORE
for confusion, as per nursing and primary team

## 2024-08-19 NOTE — BH CONSULTATION LIAISON PROGRESS NOTE - NSBHROSSYSTEMS_PSY_ALL_CORE
Today you were seen in the Breast Multidisciplinary Cancer Clinic.    The Providers you met today were:  Surgeon: Dr Abelardo Hudson ([phone])   Medical Oncologist: Dr Jonathon Barbosa  ([phone])  Radiation Oncologist:  Dr Anil Chew ([phone])  Plastics/Reconstruction: MYRNA Chance ([phone])  Cancer Nurse Navigator: Angeline Stern RN  ([phone])    Today we discussed the followin) Type of Cancer: Invasive ductal carcinoma of the left breast, estrogen, progesterone and HER2 receptors are pending.     2) Recommendations for Treatment:   Dr Hudson reviewed breast anatomy and the pathology of your cancer. He reviewed the size and location of the breast cancer and recommendation for a partial mastectomy with the option of an oncoplastic reduction.   Dr Barbosa reviewed the use of medications to treat breast cancer and prevent future recurrence.  If prognostic markers are triple negative or HER2 +, then chemotherapy may be recommended prior to surgery to help reduce the size of the cancer first.  We will follow up with you on this piece as soon as results are available.   Dr Chew reviewed the use of radiation therapy following surgery to prevent local recurrence in the breast. He will meet with you again after surgery to review the final pathology and treatment recommendations.       We recommended the following be completed prior to initiation of  treatment:  1) Genetic counseling /testing  2) Consult Dr Joseph to review possible reduction  3) Schedule surgery, covid test and post op appt  4) Pre- op clearance appt with your primary care provider  5) Lab and EKG as ordered by Dr Hudson prior to surgery  6) Hibiclens shower night before and morning of surgery, follow information provided in the STAAR kit.      Today you were provided with the following materials:  1) Be a Survivor and Breast 360 web resource card  2) Breast cancer pathology and treatment forms from Dr Hudson  3) [facility] 
Psychiatric
Resource Folder with printed surgery and     support references  4) Hibiclens surgical scrub and STAAR protocol kit with carbohydrate drink      Our desire is to provide you timely and safe care.  If you have any questions or concerns about the plan or about what was discussed today, please do not hesitate to call me. Also, if you think it has been a while since you have heard from us about the scheduling of tests or procedures, PLEASE call.     Angeline Stern RN BSN OCN    Breast Cancer Nurse Navigator  [phone]  
Psychiatric

## 2024-08-19 NOTE — BH CONSULTATION LIAISON PROGRESS NOTE - NSBHCHARTREVIEWVS_PSY_A_CORE FT
Vital Signs Last 24 Hrs  T(C): 36.7 (15 Aug 2024 07:51), Max: 37 (15 Aug 2024 00:26)  T(F): 98.1 (15 Aug 2024 07:51), Max: 98.6 (15 Aug 2024 00:26)  HR: 92 (15 Aug 2024 07:51) (92 - 94)  BP: 116/76 (15 Aug 2024 07:51) (116/76 - 127/84)  BP(mean): --  RR: 18 (15 Aug 2024 07:51) (18 - 19)  SpO2: 97% (15 Aug 2024 07:51) (96% - 97%)    Parameters below as of 15 Aug 2024 07:51  Patient On (Oxygen Delivery Method): room air    
Vital Signs Last 24 Hrs  T(C): 36.6 (18 Aug 2024 08:00), Max: 36.9 (17 Aug 2024 16:15)  T(F): 97.8 (18 Aug 2024 08:00), Max: 98.5 (17 Aug 2024 16:15)  HR: 84 (18 Aug 2024 08:00) (84 - 103)  BP: 108/73 (18 Aug 2024 08:00) (100/66 - 122/81)  BP(mean): --  RR: 18 (18 Aug 2024 08:00) (17 - 20)  SpO2: 97% (18 Aug 2024 08:00) (95% - 97%)    Parameters below as of 18 Aug 2024 08:00  Patient On (Oxygen Delivery Method): room air    
Vital Signs Last 24 Hrs  T(C): 36.2 (16 Aug 2024 07:52), Max: 37 (15 Aug 2024 15:46)  T(F): 97.1 (16 Aug 2024 07:52), Max: 98.6 (15 Aug 2024 15:46)  HR: 88 (16 Aug 2024 07:52) (88 - 101)  BP: 114/80 (16 Aug 2024 07:52) (114/80 - 159/100)  BP(mean): --  RR: 18 (16 Aug 2024 07:52) (18 - 18)  SpO2: 93% (16 Aug 2024 09:33) (93% - 99%)    Parameters below as of 16 Aug 2024 09:33  Patient On (Oxygen Delivery Method): room air    
Vital Signs Last 24 Hrs  T(C): 36.8 (19 Aug 2024 08:00), Max: 36.8 (19 Aug 2024 08:00)  T(F): 98.3 (19 Aug 2024 08:00), Max: 98.3 (19 Aug 2024 08:00)  HR: 91 (19 Aug 2024 08:00) (68 - 93)  BP: 129/79 (19 Aug 2024 08:00) (109/73 - 135/79)  BP(mean): --  RR: 18 (19 Aug 2024 08:00) (18 - 19)  SpO2: 99% (19 Aug 2024 08:00) (97% - 99%)    Parameters below as of 19 Aug 2024 08:00  Patient On (Oxygen Delivery Method): room air

## 2024-08-19 NOTE — MEDICAL STUDENT PROGRESS NOTE(EDUCATION) - ASSESSMENT
# Fall  # Metabolic encephalopathy  # Hyperammonemia  - Ammonia, Serum: 56 umol/L (08.13.24 @ 18:15)  - Ammonia, Serum: 72 umol/L (08.15.24 @ 06:52  - Ammonia, Serum: 137 umol/L (08.16.24 @ 07:40)- pt had refused lactulose last night  - repeat ammonia levels in AM  - c/w lactulose   - hold home Valproic acid 250 and 500mg at bedtime extended release  - Repeat Valproic acid level in AM (target )  - c/w folic acid  - orthostats neg  - Continue to monitor mental status  - Per psych: For acute agitation, can consider Haldol 2.5-5mg q8 PRN, hold for qtc >500 or extrapyramidal symptoms. Do not make standing as pt is already on clozapine and invega sustenna.    # H/o seizure disorder  - CT Head No Cont: No acute intracranial pathology. No evidence of midline shift, mass effect or intracranial hemorrhage.  - EEG: Findings consistent with mild diffuse electrocerebral dysfunction secondary to nonspecific etiology  - Seizure precautions  - VPA levels therapeutic.  - hold depakote  - evaluated by neurology    #Schizoaffective disorder  - hold depakote for now  - c/w clozapine 100mg BID and benztropine 1mg BID  -Invega once monthly     #IHD  per echo in 2023:  - Echo has fixed RWMA at rest  - normal EF above 50%  - c/w start ASA 81   - cw atorvastatin 40mg and metoprolol succ 25mg    #Hypothyroidism   - TSH was 8.56 Ft4 1.0, TSH Serum: 4.37 uIU/mL (07.30.24 @ 17:00)   - cw levothyroxine 25 mcgs daily per endo OP note    #type 2 DM, currently controlled  - on metformin 1000 mg BID  - ISS and a1c 6.2    # Disposition:  Return to [facility].  Code: Full    Handoff:  - F/u valproic acid trough level  - F/u ammonia level  - F/u PT eval   # Fall  # Metabolic encephalopathy- resolved  # Hyperammonemia- resolved  # Schizoaffective disorder  - Ammonia, Serum: 56 umol/L (08.13.24 @ 18:15)  - Ammonia, Serum: 72 umol/L (08.15.24 @ 06:52  - Ammonia, Serum: 137 umol/L (08.16.24 @ 07:40)- pt had refused lactulose last night  - orthostats neg  - Vitamin B12, Serum: 415 pg/mL (08.14.24 @ 10:42)  - Folate, Serum: 5.4 ng/mL (08.14.24 @ 10:42)  - c/w  folic acid  - c/w lactulose   - As per psychiatry restart depakote 250mg q12, repeat VPA and ammonia levels  - Continue clozapine 100mg BID  - Continue benztropine 1mg BID  - Ammonia, Serum: 51 umol/L (08.17.24 @ 07:41)    # H/o seizure disorder  - EEG: Findings consistent with mild diffuse electrocerebral dysfunction secondary to nonspecific etiology  - Seizure precautions  - VPA levels therapeutic.  - evaluated by neurology    # IHD  - c/w ASA, statin , metoprolol    # DM type 2  - A1C with Estimated Average Glucose Result: 6.2(08.13.24 @ 08:39)  - monitor FS  - c/w insulin    # Hypothyroidism   - c/w synthroid    # DVT prophylaxis    # Full code    # Pending:  repeat ammonia, VPA levels in AM  # Disposition:  Return to [facility].   # Fall  # Metabolic encephalopathy- resolved  # Hyperammonemia- resolved  # Schizoaffective disorder  - Ammonia, Serum: 51 umol/L (08.17.24 @ 07:41)  - c/w  folic acid  - c/w lactulose   - c/w clozapine 100mg BID  - c/w benztropine 1mg BID  - As per psychiatry: restart depakote 250mg q12, repeat VPA and ammonia levels  - repeat depakote trough      # H/o seizure disorder  - EEG: Findings consistent with mild diffuse electrocerebral dysfunction secondary to nonspecific etiology  - Seizure precautions  - VPA levels therapeutic.  - evaluated by neurology    # IHD  - c/w statin   - c/w metoprolol    # DM type 2  - monitor FS  - c/w insulin    # Hypothyroidism   - c/w synthroid    # DVT prophylaxis    # Full code    # Pending:  repeat ammonia, VPA levels in AM  # Disposition:  Return to [facility].   # Fall  # Metabolic encephalopathy- resolved  # Hyperammonemia- resolved  - Ammonia, Serum: 51 umol/L (08.17.24 @ 07:41)  - c/w  folic acid  - c/w lactulose   - As per psychiatry: restart depakote 250mg q12, repeat VPA and ammonia levels  - repeat depakote trough      # Schizoaffective disorder  - c/w clozapine 100mg BID  - c/w benztropine 1mg BID      # H/o seizure disorder  - EEG: Findings consistent with mild diffuse electrocerebral dysfunction secondary to nonspecific etiology  - Seizure precautions  - VPA levels therapeutic.  - evaluated by neurology    # IHD  - c/w statin   - c/w metoprolol    # DM type 2  - monitor FS  - c/w insulin    # Hypothyroidism   - c/w synthroid

## 2024-08-19 NOTE — BH CONSULTATION LIAISON PROGRESS NOTE - NSBHCONSULTFOLLOWAFTERCARE_PSY_A_CORE FT
does not meet criteria for inpatient psych at this time  f/up with outpatient psychiatrist  does not meet criteria for inpatient psych at this time  f/up with outpatient psychiatrist for continued med management does not meet criteria for inpatient psych at this time  f/up with outpatient psychiatrist for continued management

## 2024-08-19 NOTE — MEDICAL STUDENT PROGRESS NOTE(EDUCATION) - SUBJECTIVE AND OBJECTIVE BOX
SUBJECTIVE/OVERNIGHT EVENTS  Today is hospital day 7d. This morning patient was seen and examined at bedside, resting comfortably in bed. No acute or major events overnight.      MEDICATIONS  STANDING MEDICATIONS  aspirin enteric coated 81 milliGRAM(s) Oral daily  atorvastatin 40 milliGRAM(s) Oral at bedtime  benztropine 1 milliGRAM(s) Oral two times a day  bisacodyl 5 milliGRAM(s) Oral at bedtime  chlorhexidine 2% Cloths 1 Application(s) Topical <User Schedule>  cloZAPine 100 milliGRAM(s) Oral two times a day  dextrose 5%. 1000 milliLiter(s) IV Continuous <Continuous>  dextrose 5%. 1000 milliLiter(s) IV Continuous <Continuous>  dextrose 50% Injectable 25 Gram(s) IV Push once  dextrose 50% Injectable 12.5 Gram(s) IV Push once  dextrose 50% Injectable 25 Gram(s) IV Push once  divalproex  milliGRAM(s) Oral <User Schedule>  enoxaparin Injectable 40 milliGRAM(s) SubCutaneous every 24 hours  folic acid 1 milliGRAM(s) Oral daily  glucagon  Injectable 1 milliGRAM(s) IntraMuscular once  insulin lispro (ADMELOG) corrective regimen sliding scale   SubCutaneous three times a day before meals  lactulose Syrup 10 Gram(s) Oral every 6 hours  levothyroxine 100 MICROGram(s) Oral daily  melatonin 5 milliGRAM(s) Oral at bedtime  metoprolol succinate ER 25 milliGRAM(s) Oral daily  pantoprazole    Tablet 40 milliGRAM(s) Oral before breakfast  polyethylene glycol 3350 17 Gram(s) Oral at bedtime  senna 2 Tablet(s) Oral at bedtime    PRN MEDICATIONS  acetaminophen     Tablet .. 650 milliGRAM(s) Oral every 6 hours PRN  aluminum hydroxide/magnesium hydroxide/simethicone Suspension 30 milliLiter(s) Oral every 4 hours PRN  dextrose Oral Gel 15 Gram(s) Oral once PRN  ondansetron Injectable 4 milliGRAM(s) IV Push every 8 hours PRN    VITALS  T(F): 98.3 (08-19-24 @ 08:00), Max: 98.3 (08-19-24 @ 08:00)  HR: 91 (08-19-24 @ 08:00) (68 - 93)  BP: 129/79 (08-19-24 @ 08:00) (109/73 - 135/79)  RR: 18 (08-19-24 @ 08:00) (18 - 19)  SpO2: 99% (08-19-24 @ 08:00) (97% - 99%)  POCT Blood Glucose.: 109 mg/dL (08-19-24 @ 11:18)  POCT Blood Glucose.: 145 mg/dL (08-19-24 @ 07:32)  POCT Blood Glucose.: 111 mg/dL (08-19-24 @ 03:06)  POCT Blood Glucose.: 78 mg/dL (08-18-24 @ 21:25)  POCT Blood Glucose.: 126 mg/dL (08-18-24 @ 16:59)  POCT Blood Glucose.: 117 mg/dL (08-18-24 @ 11:38)    PHYSICAL EXAM  GENERAL  NAD, lying in bed comfortably     HEAD  Atraumatic    NECK  Supple, no neck stiffness, no nuchal rigidity, no JVD     HEART  Regular rate and rhthym, no murmurs rubs or gallops    LUNGS  Clear to auscultation bilaterally, no wheezing rales or rhonci    ABDOMEN  soft, nontender, nondistended, +BS    EXTREMITIES  no edema    NERVOUS SYSTEM  A&Ox3     CN II-XII:     (x) Intact     (  ) focal deficits  (Specify:     )       SKIN  No rashes or lesions        Urinalysis Basic - ( 19 Aug 2024 06:41 )    Color: x / Appearance: x / SG: x / pH: x  Gluc: 114 mg/dL / Ketone: x  / Bili: x / Urobili: x   Blood: x / Protein: x / Nitrite: x   Leuk Esterase: x / RBC: x / WBC x   Sq Epi: x / Non Sq Epi: x / Bacteria: x          IMAGING

## 2024-08-19 NOTE — BH CONSULTATION LIAISON PROGRESS NOTE - NSBHCHARTREVIEWLAB_PSY_A_CORE FT
8/19 calculated anc 2307  8/19 trough depakote level (before medication), pending collection  8/19 ammonia collected, pending result 8/19 calculated anc 2307  8/19 trough VPA level (before medication), pending collection  8/19 ammonia collected, pending result

## 2024-08-19 NOTE — BH CONSULTATION LIAISON PROGRESS NOTE - CURRENT MEDICATION
MEDICATIONS  (STANDING):  aspirin enteric coated 81 milliGRAM(s) Oral daily  atorvastatin 40 milliGRAM(s) Oral at bedtime  benztropine 1 milliGRAM(s) Oral two times a day  bisacodyl 5 milliGRAM(s) Oral at bedtime  chlorhexidine 2% Cloths 1 Application(s) Topical <User Schedule>  cloZAPine 100 milliGRAM(s) Oral two times a day  dextrose 5%. 1000 milliLiter(s) (50 mL/Hr) IV Continuous <Continuous>  dextrose 5%. 1000 milliLiter(s) (100 mL/Hr) IV Continuous <Continuous>  dextrose 50% Injectable 25 Gram(s) IV Push once  dextrose 50% Injectable 12.5 Gram(s) IV Push once  dextrose 50% Injectable 25 Gram(s) IV Push once  divalproex  milliGRAM(s) Oral two times a day  divalproex  milliGRAM(s) Oral at bedtime  enoxaparin Injectable 40 milliGRAM(s) SubCutaneous every 24 hours  glucagon  Injectable 1 milliGRAM(s) IntraMuscular once  insulin lispro (ADMELOG) corrective regimen sliding scale   SubCutaneous three times a day before meals  lactulose Syrup 10 Gram(s) Oral every 4 hours  levothyroxine 100 MICROGram(s) Oral daily  metoprolol succinate ER 25 milliGRAM(s) Oral daily  pantoprazole    Tablet 40 milliGRAM(s) Oral before breakfast  polyethylene glycol 3350 17 Gram(s) Oral at bedtime  senna 2 Tablet(s) Oral at bedtime    MEDICATIONS  (PRN):  acetaminophen     Tablet .. 650 milliGRAM(s) Oral every 6 hours PRN Temp greater or equal to 38C (100.4F), Mild Pain (1 - 3)  aluminum hydroxide/magnesium hydroxide/simethicone Suspension 30 milliLiter(s) Oral every 4 hours PRN Dyspepsia  dextrose Oral Gel 15 Gram(s) Oral once PRN Blood Glucose LESS THAN 70 milliGRAM(s)/deciliter  ondansetron Injectable 4 milliGRAM(s) IV Push every 8 hours PRN Nausea and/or Vomiting  
MEDICATIONS  (STANDING):  aspirin enteric coated 81 milliGRAM(s) Oral daily  atorvastatin 40 milliGRAM(s) Oral at bedtime  benztropine 1 milliGRAM(s) Oral two times a day  bisacodyl 5 milliGRAM(s) Oral at bedtime  chlorhexidine 2% Cloths 1 Application(s) Topical <User Schedule>  cloZAPine 100 milliGRAM(s) Oral two times a day  dextrose 5%. 1000 milliLiter(s) (100 mL/Hr) IV Continuous <Continuous>  dextrose 5%. 1000 milliLiter(s) (50 mL/Hr) IV Continuous <Continuous>  dextrose 50% Injectable 25 Gram(s) IV Push once  dextrose 50% Injectable 12.5 Gram(s) IV Push once  dextrose 50% Injectable 25 Gram(s) IV Push once  divalproex  milliGRAM(s) Oral <User Schedule>  enoxaparin Injectable 40 milliGRAM(s) SubCutaneous every 24 hours  folic acid 1 milliGRAM(s) Oral daily  glucagon  Injectable 1 milliGRAM(s) IntraMuscular once  insulin lispro (ADMELOG) corrective regimen sliding scale   SubCutaneous three times a day before meals  lactulose Syrup 10 Gram(s) Oral every 6 hours  levothyroxine 100 MICROGram(s) Oral daily  melatonin 5 milliGRAM(s) Oral at bedtime  metoprolol succinate ER 25 milliGRAM(s) Oral daily  pantoprazole    Tablet 40 milliGRAM(s) Oral before breakfast  polyethylene glycol 3350 17 Gram(s) Oral at bedtime  senna 2 Tablet(s) Oral at bedtime    MEDICATIONS  (PRN):  acetaminophen     Tablet .. 650 milliGRAM(s) Oral every 6 hours PRN Temp greater or equal to 38C (100.4F), Mild Pain (1 - 3)  aluminum hydroxide/magnesium hydroxide/simethicone Suspension 30 milliLiter(s) Oral every 4 hours PRN Dyspepsia  dextrose Oral Gel 15 Gram(s) Oral once PRN Blood Glucose LESS THAN 70 milliGRAM(s)/deciliter  ondansetron Injectable 4 milliGRAM(s) IV Push every 8 hours PRN Nausea and/or Vomiting  
MEDICATIONS  (STANDING):  aspirin enteric coated 81 milliGRAM(s) Oral daily  atorvastatin 40 milliGRAM(s) Oral at bedtime  benztropine 1 milliGRAM(s) Oral two times a day  bisacodyl 5 milliGRAM(s) Oral at bedtime  chlorhexidine 2% Cloths 1 Application(s) Topical <User Schedule>  cloZAPine 100 milliGRAM(s) Oral two times a day  dextrose 5%. 1000 milliLiter(s) (100 mL/Hr) IV Continuous <Continuous>  dextrose 5%. 1000 milliLiter(s) (50 mL/Hr) IV Continuous <Continuous>  dextrose 50% Injectable 25 Gram(s) IV Push once  dextrose 50% Injectable 12.5 Gram(s) IV Push once  dextrose 50% Injectable 25 Gram(s) IV Push once  enoxaparin Injectable 40 milliGRAM(s) SubCutaneous every 24 hours  folic acid 1 milliGRAM(s) Oral daily  glucagon  Injectable 1 milliGRAM(s) IntraMuscular once  insulin lispro (ADMELOG) corrective regimen sliding scale   SubCutaneous three times a day before meals  lactulose Syrup 10 Gram(s) Oral every 6 hours  levothyroxine 100 MICROGram(s) Oral daily  metoprolol succinate ER 25 milliGRAM(s) Oral daily  pantoprazole    Tablet 40 milliGRAM(s) Oral before breakfast  polyethylene glycol 3350 17 Gram(s) Oral at bedtime  senna 2 Tablet(s) Oral at bedtime    MEDICATIONS  (PRN):  acetaminophen     Tablet .. 650 milliGRAM(s) Oral every 6 hours PRN Temp greater or equal to 38C (100.4F), Mild Pain (1 - 3)  aluminum hydroxide/magnesium hydroxide/simethicone Suspension 30 milliLiter(s) Oral every 4 hours PRN Dyspepsia  dextrose Oral Gel 15 Gram(s) Oral once PRN Blood Glucose LESS THAN 70 milliGRAM(s)/deciliter  ondansetron Injectable 4 milliGRAM(s) IV Push every 8 hours PRN Nausea and/or Vomiting  
MEDICATIONS  (STANDING):  aspirin enteric coated 81 milliGRAM(s) Oral daily  atorvastatin 40 milliGRAM(s) Oral at bedtime  benztropine 1 milliGRAM(s) Oral two times a day  bisacodyl 5 milliGRAM(s) Oral at bedtime  chlorhexidine 2% Cloths 1 Application(s) Topical <User Schedule>  cloZAPine 100 milliGRAM(s) Oral two times a day  dextrose 5%. 1000 milliLiter(s) (100 mL/Hr) IV Continuous <Continuous>  dextrose 5%. 1000 milliLiter(s) (50 mL/Hr) IV Continuous <Continuous>  dextrose 50% Injectable 25 Gram(s) IV Push once  dextrose 50% Injectable 12.5 Gram(s) IV Push once  dextrose 50% Injectable 25 Gram(s) IV Push once  divalproex  milliGRAM(s) Oral two times a day  divalproex  milliGRAM(s) Oral at bedtime  enoxaparin Injectable 40 milliGRAM(s) SubCutaneous every 24 hours  folic acid 1 milliGRAM(s) Oral daily  glucagon  Injectable 1 milliGRAM(s) IntraMuscular once  insulin lispro (ADMELOG) corrective regimen sliding scale   SubCutaneous three times a day before meals  lactulose Syrup 10 Gram(s) Oral every 6 hours  levothyroxine 100 MICROGram(s) Oral daily  metoprolol succinate ER 25 milliGRAM(s) Oral daily  pantoprazole    Tablet 40 milliGRAM(s) Oral before breakfast  polyethylene glycol 3350 17 Gram(s) Oral at bedtime  senna 2 Tablet(s) Oral at bedtime    MEDICATIONS  (PRN):  acetaminophen     Tablet .. 650 milliGRAM(s) Oral every 6 hours PRN Temp greater or equal to 38C (100.4F), Mild Pain (1 - 3)  aluminum hydroxide/magnesium hydroxide/simethicone Suspension 30 milliLiter(s) Oral every 4 hours PRN Dyspepsia  dextrose Oral Gel 15 Gram(s) Oral once PRN Blood Glucose LESS THAN 70 milliGRAM(s)/deciliter  ondansetron Injectable 4 milliGRAM(s) IV Push every 8 hours PRN Nausea and/or Vomiting

## 2024-08-21 ENCOUNTER — NON-APPOINTMENT (OUTPATIENT)
Age: 54
End: 2024-08-21

## 2024-08-21 ENCOUNTER — OUTPATIENT (OUTPATIENT)
Dept: OUTPATIENT SERVICES | Facility: HOSPITAL | Age: 54
LOS: 1 days | End: 2024-08-21
Payer: MEDICAID

## 2024-08-21 ENCOUNTER — APPOINTMENT (OUTPATIENT)
Dept: GASTROENTEROLOGY | Facility: CLINIC | Age: 54
End: 2024-08-21
Payer: MEDICAID

## 2024-08-21 VITALS
TEMPERATURE: 96.8 F | OXYGEN SATURATION: 100 % | HEIGHT: 65 IN | WEIGHT: 190 LBS | HEART RATE: 112 BPM | BODY MASS INDEX: 31.65 KG/M2 | SYSTOLIC BLOOD PRESSURE: 104 MMHG | DIASTOLIC BLOOD PRESSURE: 75 MMHG

## 2024-08-21 DIAGNOSIS — Z12.11 ENCOUNTER FOR SCREENING FOR MALIGNANT NEOPLASM OF COLON: ICD-10-CM

## 2024-08-21 DIAGNOSIS — Z00.00 ENCOUNTER FOR GENERAL ADULT MEDICAL EXAMINATION WITHOUT ABNORMAL FINDINGS: ICD-10-CM

## 2024-08-21 PROCEDURE — 99203 OFFICE O/P NEW LOW 30 MIN: CPT

## 2024-08-21 NOTE — REASON FOR VISIT
Patient 84% on room air, sitting in bed. Replaced nasal cannula at 1L and oxygen 92%.   [Initial Evaluation] : an initial evaluation

## 2024-08-22 NOTE — HISTORY OF PRESENT ILLNESS
[FreeTextEntry1] : 53 years old male patient with PMH of DM, HTN, HLD, hypothyroidism, bipolar disorder came from [facility] for a screening colonoscopy.  Denies any recent weight loos, change in bowel habits, hematochezia/melena No FHx of GI cancers Not on any blood thinners. No previous EGD/colonoscopy

## 2024-08-22 NOTE — ASSESSMENT
[FreeTextEntry1] : 53 years old male patient with PMH of DM, HTN, HLD, hypothyroidism, bipolar disorder came from [facility] for a screening colonoscopy.   #Screening colonoscopy - No GI complaints  - No alarm symptoms  - Not on blood thinners - Patient refused doing colonoscopy despite extensive explanation, he understands the risks and benefits including and not limited to missing malignancy.  - Advised on alternative screening options: sigmoidoscopy, CT, FIT test -> opted for FIT test - Mentions having a FIT test 2 years ago which was negative - Will send for FIT

## 2024-08-22 NOTE — HISTORY OF PRESENT ILLNESS
[FreeTextEntry1] : 53 years old male patient with PMH of DM, HTN, HLD, hypothyroidism, bipolar disorder came from [facility] for a screening colonoscopy.  Denies any recent weight loos, change in bowel habits, hematochezia/melena No FHx of GI cancers Not on any blood thinners. No previous EGD/colonoscopy

## 2024-08-22 NOTE — ASSESSMENT
[FreeTextEntry1] : 53 years old male patient with PMH of DM, HTN, HLD, hypothyroidism, bipolar disorder came from [facility] for a screening colonoscopy.   #Screening colonoscopy - No GI complaints  - No alarm symptoms  - Not on blood thinners - Patient refused doing colonoscopy despite extensive explanation, he understands the risks and benefits including and not limited to missing malignancy.  - Advised on alternative screening options: sigmoidoscopy, CT, FIT test -> opted for FIT test - Mentions having a FIT test 2 years ago which was negative - Will send for FIT

## 2024-08-22 NOTE — PHYSICAL EXAM
[Normal] : no joint swelling and grossly normal strength and tone [No CVA Tenderness] : no CVA  tenderness [No Spinal Tenderness] : no spinal tenderness

## 2024-08-27 ENCOUNTER — APPOINTMENT (OUTPATIENT)
Dept: PODIATRY | Facility: CLINIC | Age: 54
End: 2024-08-27

## 2024-08-27 DIAGNOSIS — I11.0 HYPERTENSIVE HEART DISEASE WITH HEART FAILURE: ICD-10-CM

## 2024-08-27 DIAGNOSIS — I50.32 CHRONIC DIASTOLIC (CONGESTIVE) HEART FAILURE: ICD-10-CM

## 2024-08-27 DIAGNOSIS — E11.9 TYPE 2 DIABETES MELLITUS WITHOUT COMPLICATIONS: ICD-10-CM

## 2024-08-27 DIAGNOSIS — Z91.013 ALLERGY TO SEAFOOD: ICD-10-CM
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
## 2024-08-27 DIAGNOSIS — Z91.018 ALLERGY TO OTHER FOODS: ICD-10-CM

## 2024-08-27 DIAGNOSIS — G40.909 EPILEPSY, UNSPECIFIED, NOT INTRACTABLE, WITHOUT STATUS EPILEPTICUS: ICD-10-CM

## 2024-08-27 DIAGNOSIS — G93.41 METABOLIC ENCEPHALOPATHY: ICD-10-CM

## 2024-08-27 DIAGNOSIS — Z88.8 ALLERGY STATUS TO OTHER DRUGS, MEDICAMENTS AND BIOLOGICAL SUBSTANCES: ICD-10-CM

## 2024-08-27 DIAGNOSIS — E72.20 DISORDER OF UREA CYCLE METABOLISM, UNSPECIFIED: ICD-10-CM

## 2024-08-27 DIAGNOSIS — I25.9 CHRONIC ISCHEMIC HEART DISEASE, UNSPECIFIED: ICD-10-CM

## 2024-08-27 DIAGNOSIS — E03.9 HYPOTHYROIDISM, UNSPECIFIED: ICD-10-CM

## 2024-08-27 DIAGNOSIS — Z88.0 ALLERGY STATUS TO PENICILLIN: ICD-10-CM

## 2024-08-27 DIAGNOSIS — F25.9 SCHIZOAFFECTIVE DISORDER, UNSPECIFIED: ICD-10-CM
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
## 2024-08-27 NOTE — ED BEHAVIORAL HEALTH ASSESSMENT NOTE - NS ED BHA HOMICIDALITY PRESENT CURRENT IDEATION
Yes How Severe Are Your Spot(S)?: mild What Type Of Note Output Would You Prefer (Optional)?: Bullet Format What Is The Reason For Today's Visit?: Full Body Skin Examination What Is The Reason For Today's Visit? (Being Monitored For X): the development of new lesions

## 2024-08-28 DIAGNOSIS — Z12.11 ENCOUNTER FOR SCREENING FOR MALIGNANT NEOPLASM OF COLON: ICD-10-CM

## 2024-09-12 ENCOUNTER — EMERGENCY (EMERGENCY)
Facility: HOSPITAL | Age: 54
LOS: 0 days | Discharge: HOME | End: 2024-09-12
Attending: STUDENT IN AN ORGANIZED HEALTH CARE EDUCATION/TRAINING PROGRAM
Payer: MEDICAID

## 2024-09-12 VITALS
RESPIRATION RATE: 18 BRPM | HEART RATE: 112 BPM | SYSTOLIC BLOOD PRESSURE: 137 MMHG | OXYGEN SATURATION: 99 % | WEIGHT: 164.91 LBS | DIASTOLIC BLOOD PRESSURE: 90 MMHG | HEIGHT: 64 IN | TEMPERATURE: 99 F

## 2024-09-12 DIAGNOSIS — F25.9 SCHIZOAFFECTIVE DISORDER, UNSPECIFIED: ICD-10-CM

## 2024-09-12 LAB
ANION GAP SERPL CALC-SCNC: 15 MMOL/L — HIGH (ref 7–14)
APAP SERPL-MCNC: <5 UG/ML — LOW (ref 10–30)
APPEARANCE UR: CLEAR — SIGNIFICANT CHANGE UP
BASOPHILS # BLD AUTO: 0.02 K/UL — SIGNIFICANT CHANGE UP (ref 0–0.2)
BASOPHILS NFR BLD AUTO: 0.3 % — SIGNIFICANT CHANGE UP (ref 0–1)
BILIRUB UR-MCNC: NEGATIVE — SIGNIFICANT CHANGE UP
BUN SERPL-MCNC: 7 MG/DL — LOW (ref 10–20)
CALCIUM SERPL-MCNC: 9.3 MG/DL — SIGNIFICANT CHANGE UP (ref 8.4–10.4)
CHLORIDE SERPL-SCNC: 100 MMOL/L — SIGNIFICANT CHANGE UP (ref 98–110)
CO2 SERPL-SCNC: 23 MMOL/L — SIGNIFICANT CHANGE UP (ref 17–32)
COLOR SPEC: YELLOW — SIGNIFICANT CHANGE UP
CREAT SERPL-MCNC: 0.8 MG/DL — SIGNIFICANT CHANGE UP (ref 0.7–1.5)
DIFF PNL FLD: NEGATIVE — SIGNIFICANT CHANGE UP
EGFR: 106 ML/MIN/1.73M2 — SIGNIFICANT CHANGE UP
EOSINOPHIL # BLD AUTO: 0.19 K/UL — SIGNIFICANT CHANGE UP (ref 0–0.7)
EOSINOPHIL NFR BLD AUTO: 2.4 % — SIGNIFICANT CHANGE UP (ref 0–8)
ETHANOL SERPL-MCNC: <10 MG/DL — SIGNIFICANT CHANGE UP
GLUCOSE SERPL-MCNC: 99 MG/DL — SIGNIFICANT CHANGE UP (ref 70–99)
GLUCOSE UR QL: NEGATIVE MG/DL — SIGNIFICANT CHANGE UP
HCT VFR BLD CALC: 36.9 % — LOW (ref 42–52)
HGB BLD-MCNC: 11 G/DL — LOW (ref 14–18)
IMM GRANULOCYTES NFR BLD AUTO: 0.3 % — SIGNIFICANT CHANGE UP (ref 0.1–0.3)
KETONES UR-MCNC: NEGATIVE MG/DL — SIGNIFICANT CHANGE UP
LEUKOCYTE ESTERASE UR-ACNC: NEGATIVE — SIGNIFICANT CHANGE UP
LYMPHOCYTES # BLD AUTO: 3.32 K/UL — SIGNIFICANT CHANGE UP (ref 1.2–3.4)
LYMPHOCYTES # BLD AUTO: 41.9 % — SIGNIFICANT CHANGE UP (ref 20.5–51.1)
MCHC RBC-ENTMCNC: 25.5 PG — LOW (ref 27–31)
MCHC RBC-ENTMCNC: 29.8 G/DL — LOW (ref 32–37)
MCV RBC AUTO: 85.6 FL — SIGNIFICANT CHANGE UP (ref 80–94)
MONOCYTES # BLD AUTO: 0.58 K/UL — SIGNIFICANT CHANGE UP (ref 0.1–0.6)
MONOCYTES NFR BLD AUTO: 7.3 % — SIGNIFICANT CHANGE UP (ref 1.7–9.3)
NEUTROPHILS # BLD AUTO: 3.8 K/UL — SIGNIFICANT CHANGE UP (ref 1.4–6.5)
NEUTROPHILS NFR BLD AUTO: 47.8 % — SIGNIFICANT CHANGE UP (ref 42.2–75.2)
NITRITE UR-MCNC: NEGATIVE — SIGNIFICANT CHANGE UP
NRBC # BLD: 0 /100 WBCS — SIGNIFICANT CHANGE UP (ref 0–0)
PH UR: 6 — SIGNIFICANT CHANGE UP (ref 5–8)
PLATELET # BLD AUTO: 275 K/UL — SIGNIFICANT CHANGE UP (ref 130–400)
PMV BLD: 10.2 FL — SIGNIFICANT CHANGE UP (ref 7.4–10.4)
POTASSIUM SERPL-MCNC: 4.1 MMOL/L — SIGNIFICANT CHANGE UP (ref 3.5–5)
POTASSIUM SERPL-SCNC: 4.1 MMOL/L — SIGNIFICANT CHANGE UP (ref 3.5–5)
PROT UR-MCNC: NEGATIVE MG/DL — SIGNIFICANT CHANGE UP
RBC # BLD: 4.31 M/UL — LOW (ref 4.7–6.1)
RBC # FLD: 17.6 % — HIGH (ref 11.5–14.5)
SALICYLATES SERPL-MCNC: <0.3 MG/DL — LOW (ref 4–30)
SARS-COV-2 RNA SPEC QL NAA+PROBE: SIGNIFICANT CHANGE UP
SODIUM SERPL-SCNC: 138 MMOL/L — SIGNIFICANT CHANGE UP (ref 135–146)
SP GR SPEC: 1.01 — SIGNIFICANT CHANGE UP (ref 1–1.03)
UROBILINOGEN FLD QL: 1 MG/DL — SIGNIFICANT CHANGE UP (ref 0.2–1)
WBC # BLD: 7.93 K/UL — SIGNIFICANT CHANGE UP (ref 4.8–10.8)
WBC # FLD AUTO: 7.93 K/UL — SIGNIFICANT CHANGE UP (ref 4.8–10.8)

## 2024-09-12 PROCEDURE — G0378: CPT

## 2024-09-12 PROCEDURE — 99223 1ST HOSP IP/OBS HIGH 75: CPT

## 2024-09-12 PROCEDURE — 81003 URINALYSIS AUTO W/O SCOPE: CPT

## 2024-09-12 PROCEDURE — 36415 COLL VENOUS BLD VENIPUNCTURE: CPT

## 2024-09-12 PROCEDURE — 80048 BASIC METABOLIC PNL TOTAL CA: CPT

## 2024-09-12 PROCEDURE — 99285 EMERGENCY DEPT VISIT HI MDM: CPT | Mod: 25

## 2024-09-12 PROCEDURE — 87635 SARS-COV-2 COVID-19 AMP PRB: CPT

## 2024-09-12 PROCEDURE — 93010 ELECTROCARDIOGRAM REPORT: CPT

## 2024-09-12 PROCEDURE — 90792 PSYCH DIAG EVAL W/MED SRVCS: CPT | Mod: 95

## 2024-09-12 PROCEDURE — 93005 ELECTROCARDIOGRAM TRACING: CPT

## 2024-09-12 PROCEDURE — 85025 COMPLETE CBC W/AUTO DIFF WBC: CPT

## 2024-09-12 PROCEDURE — 80307 DRUG TEST PRSMV CHEM ANLYZR: CPT

## 2024-09-12 RX ORDER — CLOZAPINE 200 MG/1
100 TABLET ORAL ONCE
Refills: 0 | Status: COMPLETED | OUTPATIENT
Start: 2024-09-12 | End: 2024-09-12

## 2024-09-12 RX ORDER — DIVALPROEX SODIUM 125 MG/1
250 CAPSULE, DELAYED RELEASE ORAL ONCE
Refills: 0 | Status: COMPLETED | OUTPATIENT
Start: 2024-09-12 | End: 2024-09-12

## 2024-09-12 RX ADMIN — DIVALPROEX SODIUM 250 MILLIGRAM(S): 125 CAPSULE, DELAYED RELEASE ORAL at 23:37

## 2024-09-12 NOTE — ED PROVIDER NOTE - CLINICAL SUMMARY MEDICAL DECISION MAKING FREE TEXT BOX
53 yr old m that presents with si/hi. labs. psych reeval. Labs and EKG were ordered and reviewed.   Appropriate medications for patient's presenting complaints were ordered and effects were reassessed.  Patient's records (prior hospital, ED visit, and/or nursing home notes if available) were reviewed.  Additional history was obtained from EMS, family, and/or PCP (where available).  Pt placed in obs for psych reeval.

## 2024-09-12 NOTE — ED BEHAVIORAL HEALTH ASSESSMENT NOTE - PRIMARY DX
PAST MEDICAL HISTORY:  CVA (cerebral vascular accident)     HTN (hypertension)     
Schizoaffective disorder, unspecified type

## 2024-09-12 NOTE — ED ADULT NURSE NOTE - CHIEF COMPLAINT QUOTE
pt BIBA from [address] [place] [address] c/o hearing voices, suicidal and homicidal thoughts. pt skipped medication today   1:1 initiated in triage

## 2024-09-12 NOTE — ED ADULT NURSE NOTE - OBJECTIVE STATEMENT
pt sent from [address] [place] [address],  of psych disorder. pt states he is having thoughts of hurting himself and other people. Pt on medication but didn't take it today

## 2024-09-12 NOTE — ED PROVIDER NOTE - ATTENDING APP SHARED VISIT CONTRIBUTION OF CARE
53 yr old m w/ a pmh significant for schizophrenia, seizures, who presents with si and hi. Pt states that he also has been having visual and auditory hallucinations. Pt denies any other medical complaints.    VITAL SIGNS: I have reviewed nursing notes and confirm.  CONSTITUTIONAL: non-toxic, well appearing  SKIN: no rash, no petechiae.  EYES:  EOMI, pink conjunctiva, anicteric  ENT: tongue midline, no exudates, MMM  NECK: Supple; no meningismus, no JVD  CARD: RRR, no murmurs, equal radial pulses bilaterally 2+  RESP: CTAB, no respiratory distress  ABD: Soft, non-tender, non-distended, no peritoneal signs, no HSM, no CVA tenderness  EXT: Normal ROM x4. No edema. No calves tenderness  NEURO: Alert, oriented x3. CN2-12 intact, equal strength bilaterally  PSYCH: Cooperative, appropriate.       53 yr old m that presents with si/hi. psych labs. psych eval. reassess. dispo pending. 53 yr old m w/ a pmh significant for schizophrenia, seizures, who presents with si and hi. Pt states that he also has been having visual and auditory hallucinations. Pt denies any other medical complaints.    VITAL SIGNS: I have reviewed nursing notes and confirm.  CONSTITUTIONAL: non-toxic, well appearing  SKIN: no rash, no petechiae.  EYES:  EOMI, pink conjunctiva, anicteric  ENT: tongue midline, no exudates, MMM  NECK: Supple; no meningismus, no JVD  CARD: RRR, no murmurs, equal radial pulses bilaterally 2+  RESP: CTAB, no respiratory distress  ABD: Soft, non-tender, non-distended, no peritoneal signs, no HSM, no CVA tenderness  EXT: Normal ROM x4. No edema. No calves tenderness  NEURO: Alert, oriented x3. CN2-12 intact, equal strength bilaterally  PSYCH: Cooperative, appropriate.       53 yr old m that presents with si/hi. labs. psych reeval. reassess. dispo pending.

## 2024-09-12 NOTE — ED CDU PROVIDER DISPOSITION NOTE - CLINICAL COURSE
Patient is pending disposition after being evaluated by psychiatry.  Patient resting comfortably.  Psychiatry cleared patient for safe discharge.  Patient given his medication here in the emergency department.

## 2024-09-12 NOTE — ED BEHAVIORAL HEALTH ASSESSMENT NOTE - DETAILS
Mother with hx of attempted suicide Deferred See hpi Has documented allergy to penicillin and thorazine Self-referred See separate  doc

## 2024-09-12 NOTE — ED ADULT TRIAGE NOTE - WEIGHT IN KG
(MULTIVITAMIN ADULT PO) 2024  Yes Yes   Sig: Take by mouth   SYMBICORT 160-4.5 MCG/ACT AERO Past Month  Yes No   albuterol sulfate HFA (PROVENTIL;VENTOLIN;PROAIR) 108 (90 Base) MCG/ACT inhaler Past Month  Yes No   Sig: TAKE 1 PUFF EVERY 4 HOURS AS NEEDED   amLODIPine (NORVASC) 10 MG tablet Past Month  Yes No   Sig: Take 1 tablet by mouth daily   cetirizine (ZYRTEC) 10 MG tablet Past Month  Yes No   Patient not taking: Reported on 2023   chlorthalidone (HYGROTEN) 50 MG tablet Past Month  No No   Sig: Take 1 tablet by mouth daily   clobetasol prop emollient base 0.05 % CREA Past Month  No No   Sig: Apply to affected area twice a day   hydroxychloroquine (PLAQUENIL) 200 MG tablet Past Month  No No   Sig: Take 1 tablet by mouth daily   meloxicam (MOBIC) 15 MG tablet Past Month  Yes No   Si tablet as needed ceived the following from [facility]: Outside name: meloxicam (MOBIC) 15 mg tablet   metoprolol succinate (TOPROL XL) 50 MG extended release tablet Past Month  No No   Sig: Take 1 tablet by mouth daily   montelukast (SINGULAIR) 10 MG tablet Past Month  Yes No   Sig: Take 1 tablet by mouth every evening      Facility-Administered Medications: None       PHYSICAL EXAM:  The patient is examined immediately prior to the procedure.  There were no vitals filed for this visit.  Gen: Appears comfortable, no distress.  Pulm: comfortable respirations with no abnormal audible breath sounds  HEART: well perfused, no abnormal audible heart sounds  GI: abdomen flat.    PLAN:  Informed consent discussion held, patient afforded an opportunity to ask questions and all questions answered.  After being advised of the risks, benefits, and alternatives, the patient requested that we proceed and indicated so on a written consent form.      Will proceed with procedure as planned.  Yassine Chua Jr, MD    74.8

## 2024-09-12 NOTE — ED CDU PROVIDER DISPOSITION NOTE - PATIENT PORTAL LINK FT
You can access the FollowMyHealth Patient Portal offered by [facility] by registering at the following website: [email or web address]. By joining [employer]’s FollowMyHealth portal, you will also be able to view your health information using other applications (apps) compatible with our system.

## 2024-09-12 NOTE — ED BEHAVIORAL HEALTH ASSESSMENT NOTE - NSACTIVEVENT_PSY_ALL_CORE
Health Maintenance Due   Topic Date Due   • DM/CKD Microalbumin  Never done   • Shingles Vaccine (1 of 2) Never done   • DTaP/Tdap/Td Vaccine (1 - Tdap) 12/27/2006   • Medicare Advantage- Medicare Wellness Visit  01/01/2022   • COVID-19 Vaccine (2 - Pfizer risk series) 02/24/2022       Patient is due for topics listed above, he wishes to proceed with Immunization(s) COVID-19 and DM/CKD Microalbumin, but is not proceeding with Immunization(s) Dtap/Tdap/Td and Shingles at this time.    Triggering events leading to humiliation, shame, and/or despair (e.g., Loss of relationship, financial or health status) (real or anticipated)

## 2024-09-12 NOTE — ED CDU PROVIDER DISPOSITION NOTE - NSFOLLOWUPINSTRUCTIONS_ED_ALL_ED_FT
Follow-up with your psychiatrist this week.    If you begin to experience any worsening suicidal or homicidal ideations, as well as worsening hallucinations, return to the emergency department immediately.    Suicide Prevention    WHAT YOU NEED TO KNOW:    What do I need to know about suicide prevention? You may see suicide as the only way to escape emotional or physical pain and suffering. Help is available from people who care about you, and from professionals trained in suicide prevention. Prevention includes everything you and others can do to stop you from taking your life.    What are the warning signs of suicide? The following can help you and others recognize that you are struggling:    Talking about your plan for attempting suicide, or wanting to read or write about death or suicide    Cutting yourself, burning your skin with cigarettes, or driving recklessly    Drug or alcohol use, not taking your prescribed medicine, or taking too much    Not wanting to spend time with others or doing things you enjoy, feeling bored, or not wanting anyone to praise you    Changes in your appetite, sleep habits, energy levels, or weight    Feeling angry, or lashing out at others    A need to give away or throw away your belongings    Often skipping work    Suddenly not taking medicine for a mental illness without talking to your healthcare provider    Suddenly not going to therapy  The following are always available to help you:    Contact a suicide prevention organization:    For the [facility] Suicide and Crisis Lifeline:  Call or text [employer]    Send a chat on https://[employer].org/chat    Call 8-879-876-0271 (1-800-273-TALK)    For the Suicide Hotline, call 1-199.797.3315 (1-677-KGPAOBP)  What should I do if I am having thoughts of suicide?    Talk to someone you trust. Be honest about your thoughts and feelings about suicide. You can call a suicide prevention center if you do not want to talk to someone you know.    Contact your therapist. Your therapist will help you create a crisis plan to follow if you have thoughts about hurting yourself. The plan will include the names of people to call during a crisis. Share your plan with friends and family. Ask someone to stay with you if a crisis occurs. Your healthcare provider can give you a list of therapists if you do not have one.    Ask someone to remove items that can be harmful. Do not keep medicines, weapons, or alcohol in your home.  How are suicidal thoughts treated?    Medicines may be given to prevent mood swings, or to decrease anxiety or depression. You will need to take all medicines as directed. Do not stop taking your medicine without talking to your healthcare provider. A sudden stop can be harmful. It may take 4 to 6 weeks for the medicine to help you feel better.    Suicide risk assessment means healthcare providers will ask questions about your suicide thoughts and plans. They will ask how often you think about suicide and if you have tried it before. They will ask if you have begun to hurt yourself, such as with cutting or reckless driving. They may ask if you have access to weapons or drugs.    A safety plan includes a list of people or groups to contact if you have suicidal feelings again. The list may include friends, family members, a spiritual leader, and others you trust. You may be asked to make a verbal agreement or sign a contract that you will not try to harm yourself.    A therapist can help you identify and change negative feelings or beliefs about yourself. This may help change the way you feel and act. A therapist can also help you find ways to cope with things that cannot be changed. A therapist can also help if you use alcohol or drugs and need help to quit. Drugs and alcohol can make suicidal feelings worse and make you more likely to act on them.  What are some positive things I can do for myself?    Exercise as directed. Exercise can lift your mood, give you more energy, and make it easier to sleep.   FAMILY WALKING FOR EXERCISE      Eat a variety of healthy foods. Healthy foods include fruits, vegetables, whole-grain breads, lean meats, fish, low-fat dairy products, and beans. Try to eat regularly even if you do not feel hungry. Depression can increase from a lack of nutrition or if you are hungry for long periods of time.  Healthy Foods      Create a sleep routine. Try to go to bed and wake up at the same time every day. Let your healthcare provider know if you are having trouble sleeping.  Where can I find support and more information?    [address] Suicide and Crisis Lifeline  [address]  [place], [place]  Phone: 6-520-042  Web Address: http://www.suicidepreventionlifeline.org OR https://[employer]/chat/  Suicide Awareness Voices of Education  [address]  [place], [place]  Phone: 1-488.126.1204  Web Address: http://www.save.org or https://save.org/find-help/international-resources/  Call your local emergency number (911 in the [place]), or ask someone to call if:    You do something on purpose to hurt yourself.    You make a plan to attempt suicide.  When should I or someone close to me call my doctor or therapist?    You act out in anger, are reckless, or are abusing alcohol or drugs.    You have serious thoughts of suicide, even with treatment.    You have more thoughts of suicide when you are alone.    You stop eating, or you begin to smoke cigarettes or drink alcohol.    You have questions or concerns about your condition or care.  CARE AGREEMENT:    You have the right to help plan your care. Learn about your health condition and how it may be treated. Discuss treatment options with your healthcare providers to decide what care you want to receive. You always have the right to refuse treatment.    © [employer] 1973, 2024

## 2024-09-12 NOTE — ED CDU PROVIDER DISPOSITION NOTE - ATTENDING CONTRIBUTION TO CARE
53 yr old m that presents with si/hi. labs. psych reeval. Labs and EKG were ordered and reviewed.   Appropriate medications for patient's presenting complaints were ordered and effects were reassessed.  Patient's records (prior hospital, ED visit, and/or nursing home notes if available) were reviewed.  Additional history was obtained from EMS, family, and/or PCP (where available).  Pt placed in obs for psych reeval. pt evaluated and cleared by psych.

## 2024-09-12 NOTE — ED BEHAVIORAL HEALTH ASSESSMENT NOTE - HPI (INCLUDE ILLNESS QUALITY, SEVERITY, DURATION, TIMING, CONTEXT, MODIFYING FACTORS, ASSOCIATED SIGNS AND SYMPTOMS)
Pt is a 54 yo M, single, non-caregiver, domiciled at [facility]([phone]; [phone]), disabled, PMH significant for DM, HLD, HTN, hypothyroidism, PPHx of schizoaffective disorder, hx of substance misuse,  high utilizer of inpt/ED services with frequent ED visits, prior psych admissions(last known at [facility] in April, 2024), hx of suicide attempts vs NSSIB by cutting and head banging, no known aggression, in outpt treatment, who presents to the ED BIBA a/b self for SI/HI/AH/VH.    On assessment, the pt is A&Ox4, presents as calm, euthymic, and future-oriented, with impaired articulation but no e/o internal preoccupation or agitation, and states he came to the ED, because he's been having increased difficulty sleeping in his residence. He states he frequently sees "shadows" in his room at night, which disappear when he turns on the lights, and sometime has urges to suffocate his roommate with a pillow, because he often "slams the door" in their room. However, the pt denies any plan or intent to harm or kill his roommate or anyone else and verbalizes understanding that to do so is morally wrong and he could face legal consequences. The pt otherwise reports taking his meds as scheduled, has been eating well, has good energy, and hdenies current or recent SI with plan or intent, AH, or PI, denies recent SIB, aggression, or substance use, and denies access to firearms. Pt is a 54 yo M, single, non-caregiver, domiciled at [facility]([phone]; [phone]), disabled, PMH significant for DM, HLD, HTN, hypothyroidism, PPHx of schizoaffective disorder, hx of substance misuse,  high utilizer of inpt/ED services with frequent ED visits, prior psych admissions(last known at [facility] in April, 2024), hx of suicide attempts vs NSSIB by cutting and head banging, no known aggression, in outpt treatment, who presents to the ED BIBA a/b self for SI/HI/AH/VH.    On assessment, the pt is A&Ox4, presents as calm, euthymic, and future-oriented, with impaired articulation but no e/o internal preoccupation or agitation, and states he came to the ED, because he's been having increased difficulty sleeping in his residence. He states he frequently sees "shadows" in his room at night, which disappear when he turns on the lights, and sometimes has urges to suffocate his roommate with a pillow, because he often "slams the door" in their room. However, the pt denies any plan or intent to harm or kill his roommate or anyone else and verbalizes understanding that to do so is morally wrong and he could face legal consequences. The pt otherwise reports taking his meds as scheduled, describes mood as "OK", has been eating well, and has good energy. He then asks if he could stay in the hospital for a few days to sleep since he's become frustrated with his roommate. However, when informed that this is not an appropriate indication for hospitalization, the pt is agreeable to return to his residence, and requests to leave now so that he can get to bed early. He denies current SI or HI or urges to harm himself or others and agrees to continue his meds and to return to the ED for acute safety concerns.    On ROS, he denies current or recent SI with plan or intent, AH, or PI, denies recent SIB, aggression, or substance use, and denies access to firearms.    RAFAELA spoke with staff at pt's residence - see  note for details.    This writer also spoke with Jung, a staff member at the pt's residence, who was not present when the pt activated EMS tonight, but states he knows pt well. Collateral states the pt has recently been functioning at baseline, has been adherent with night nighttime meds, has not exhibited any aggressive or suicidal behaviors, and there have been no acute safety concerns. Collateral states they are amenable to have pt return to the residence tonight. This writer informed collateral that the pt would be psych cleared and administered his nighttime meds prior to discharge.

## 2024-09-12 NOTE — ED CDU PROVIDER INITIAL DAY NOTE - OBJECTIVE STATEMENT
Patient is a 53-year-old male PMH schizophrenia, seizures, who presents ED with complaints of suicidal and homicidal ideations, as well as visual and auditory hallucinations.  Patient endorses thoughts of killing himself, stating he thinks about jumping off a bridge but has no definite plan.  Patient also endorses that he fantasizes about killing his roommate by suffocating him with a pillow while he is asleep.  Endorses hallucinations, stating he will get up in the middle night to go to the bathroom when he comes back elucidates that there is another man in his bed.  Also endorses he hears a man's voice telling him to kill himself.  Denies EtOH or other substance use.  Denies fever, chills, CP, SOB, abdominal pain. Patient awaiting psych disposition. Resting comfortably.

## 2024-09-12 NOTE — ED BEHAVIORAL HEALTH ASSESSMENT NOTE - SUMMARY
Pt is a 54 yo M, single, non-caregiver, domiciled at [facility]([phone]; [phone]), disabled, PMH significant for DM, HLD, HTN, hypothyroidism, PPHx of schizoaffective disorder, hx of substance misuse,  high utilizer of inpt/ED services with frequent ED visits, prior psych admissions(last known at [facility] in April, 2024), hx of suicide attempts vs NSSIB by cutting and head banging, no known aggression, in outpt treatment, who presents to the ED BIBA a/b self for SI/HI/AH/VH. However, on assessment, the pt's chief complaint is his growing frustration with his roommate, who reportedly makes noise that interrupts the pt's sleep, and requests to stay in the hospital to avoid returning to his residence. He otherwise denies current SI/HI or hallucinations, does not exhibit neurovegetative symptoms of an acute mood or psychotic disorder on exam, and has remained in good behavioral control in the ED. At this time, the pt remains at a chronically elevated risk of harm given his prior psych hx/admissions, hx of pSA/NSSIB, and male gender, which is further elevated by modifiable risk factors of poor coping skills, low frustration tolerance, and distress tied to his living situation. However, his risk is mitigated by the fact he presents as future-oriented and help-seeking, is able to safety plan, has been adherent with meds, denies current SI/HI, has support from staff at his residence, is connected to care, and currently does not appear acutely psychotic, manic, or depressed. In addition, collateral from his residence indicates the pt has been functioning at baseline and report no acute safety concerns. In light of these factors, the pt does not evidence signs of acute psychiatric decompensation and does not present as an imminent risk of harm to self or others at this time. As such, the pt does not meet criteria for involuntary psychiatric hospitalization and is now requesting to leave. While in the ED, the pt's risk was further mitigated by administering him his nighttime meds, engaging him in safety planning, and instructing him to continue with his meds and to return to the ED for acute safety concerns in the future. The pt is psychiatrically cleared for discharge.

## 2024-09-12 NOTE — ED CDU PROVIDER INITIAL DAY NOTE - PHYSICAL EXAMINATION
VITAL SIGNS: I have reviewed nursing notes and confirm.  CONSTITUTIONAL: In no acute distress.  SKIN: Skin exam is warm and dry.  HEAD: Normocephalic; atraumatic.  EYES: PERRL, EOM intact; conjunctiva and sclera clear.  ENT: No nasal discharge; airway clear.   NECK: Supple; non tender.  CARD: S1, S2; Regular rate and rhythm.  RESP: CTAB. Speaking in full sentences.   ABD: Normal bowel sounds; soft; non-distended; non-tender.  EXT: Normal ROM.   NEURO: Alert, oriented. Grossly unremarkable. No focal deficits.   PSYCH: Cooperative

## 2024-09-12 NOTE — ED ADULT TRIAGE NOTE - CHIEF COMPLAINT QUOTE
pt BIBA from [address] [place] [address] c/o hearing voices, suicidal and homicidal thoughts. pt skipped medication today   1:1 initiated in triage

## 2024-09-12 NOTE — ED PROVIDER NOTE - OBJECTIVE STATEMENT
Patient is a 53-year-old male PMH schizophrenia, seizures, who presents ED with complaints of suicidal and homicidal ideations, as well as visual and auditory hallucinations.  Patient endorses thoughts of killing himself, stating he thinks about jumping off a bridge but has no definite plan.  Patient also endorses that he fantasizes about killing his roommate by suffocating him with a pillow while he is asleep.  Endorses hallucinations, stating he will get up in the middle night to go to the bathroom when he comes back elucidates that there is another man in his bed.  Also endorses he hears a man's voice telling him to kill himself.  Denies EtOH or other substance use.  Denies fever, chills, CP, SOB, abdominal pain.

## 2024-09-13 VITALS
DIASTOLIC BLOOD PRESSURE: 79 MMHG | HEART RATE: 116 BPM | RESPIRATION RATE: 17 BRPM | OXYGEN SATURATION: 97 % | TEMPERATURE: 98 F | SYSTOLIC BLOOD PRESSURE: 126 MMHG

## 2024-09-13 RX ADMIN — CLOZAPINE 100 MILLIGRAM(S): 200 TABLET ORAL at 00:24

## 2024-09-13 NOTE — BH SOCIAL WORK INITIAL PSYCHOSOCIAL EVALUATION - INSURANCE HELP
Service to cardio, established pt of Dr. Prince, seen at [facility]. Left pt vm w/assistance of [place]  and sent a portal message requesting a call back to schedule apt with Dr. Prince or Lia.    no

## 2024-09-13 NOTE — ED BEHAVIORAL HEALTH NOTE - BEHAVIORAL HEALTH NOTE
This writer contacted [facility] [phone] and briefly spoke to Gracie, patient's counselor in the residence. Collateral stated that patient was endorsing suicidal ideation, thoughts of paranoia, and AH and requested he go to the ED, as he felt his medications were not right. Pt has been resident since apprx 2009. Pt prescribed: clozapine, ativan, depakote, and lexapro. Pt is mostly compliant with his medication, however, for the past few weeks, pt has been sporadically refusing his morning medication. Before any other info was able to be obtained, the call failed; tried calling back the residence multiple times and was unable to get through, consistently receiving error messages.     In an attempt to obtain more collateral information, this writer contacted emergency contacted listed in chart Camelia Eric [phone] and left message at 10:20pm. Unsure of relationship to patient.

## 2024-09-13 NOTE — ED ADULT NURSE REASSESSMENT NOTE - NS ED NURSE REASSESS COMMENT FT1
patient made for discharge and left with transportation, with all his property and ambulates with a steady gait, Patient signed all discharge paper. Denies SI/Hi/

## 2024-09-16 ENCOUNTER — OUTPATIENT (OUTPATIENT)
Dept: OUTPATIENT SERVICES | Facility: HOSPITAL | Age: 54
LOS: 1 days | End: 2024-09-16

## 2024-09-16 ENCOUNTER — APPOINTMENT (OUTPATIENT)
Dept: INTERNAL MEDICINE | Facility: CLINIC | Age: 54
End: 2024-09-16

## 2024-09-16 VITALS
SYSTOLIC BLOOD PRESSURE: 99 MMHG | HEIGHT: 65 IN | OXYGEN SATURATION: 99 % | TEMPERATURE: 97.3 F | HEART RATE: 100 BPM | DIASTOLIC BLOOD PRESSURE: 58 MMHG | BODY MASS INDEX: 32.82 KG/M2 | WEIGHT: 197 LBS

## 2024-09-16 DIAGNOSIS — E03.9 HYPOTHYROIDISM, UNSPECIFIED: ICD-10-CM

## 2024-09-16 DIAGNOSIS — E11.9 TYPE 2 DIABETES MELLITUS W/OUT COMPLICATIONS: ICD-10-CM

## 2024-09-16 DIAGNOSIS — Z00.00 ENCOUNTER FOR GENERAL ADULT MEDICAL EXAMINATION WITHOUT ABNORMAL FINDINGS: ICD-10-CM

## 2024-09-16 RX ORDER — KRILL/OM-3/DHA/EPA/PHOSPHO/AST 1000-230MG
81 CAPSULE ORAL DAILY
Qty: 30 | Refills: 0 | Status: ACTIVE | COMMUNITY
Start: 2024-09-16

## 2024-09-16 NOTE — REVIEW OF SYSTEMS
[Fever] : no fever [Vision Problems] : no vision problems [Sore Throat] : no sore throat [Chest Pain] : no chest pain [Shortness Of Breath] : no shortness of breath [Cough] : no cough [Abdominal Pain] : no abdominal pain [Dysuria] : no dysuria [Joint Pain] : no joint pain [Skin Rash] : no skin rash [Headache] : no headache

## 2024-09-16 NOTE — HISTORY OF PRESENT ILLNESS
[FreeTextEntry1] : Follow up appointment [de-identified] : 53 years old male patient with PMH of DM, HTN, HLD, hypothyroidism, bipolar disorder came here for follow up appt.  Reports no active complaints. Denies chest pain, SOB, abd pain.

## 2024-09-16 NOTE — PHYSICAL EXAM
[No Acute Distress] : no acute distress [Well Nourished] : well nourished [Well Developed] : well developed [Well-Appearing] : well-appearing [EOMI] : extraocular movements intact [Normal Outer Ear/Nose] : the outer ears and nose were normal in appearance [No JVD] : no jugular venous distention [No Respiratory Distress] : no respiratory distress  [No Accessory Muscle Use] : no accessory muscle use [Clear to Auscultation] : lungs were clear to auscultation bilaterally [Normal Rate] : normal rate  [Regular Rhythm] : with a regular rhythm [Normal S1, S2] : normal S1 and S2 [No Murmur] : no murmur heard [No Edema] : there was no peripheral edema [Soft] : abdomen soft [Non Tender] : non-tender [Non-distended] : non-distended [No CVA Tenderness] : no CVA  tenderness [No Spinal Tenderness] : no spinal tenderness [No Joint Swelling] : no joint swelling [Grossly Normal Strength/Tone] : grossly normal strength/tone [Coordination Grossly Intact] : coordination grossly intact [No Focal Deficits] : no focal deficits [Normal Affect] : the affect was normal [Normal Insight/Judgement] : insight and judgment were intact

## 2024-09-16 NOTE — ASSESSMENT
[FreeTextEntry1] : 53 years old male patient with PMH of DM, HTN, HLD, hypothyroidism, bipolar disorder came here for follow up appt. Reports no active complaints.  #DM - A1c 6.4% - Continue metformin 1000 bid and diet mods - F/u endocrine  #Hypothyroidism - TSH 6.98 - Continue levothyroxine, f/u endocrine  #HTN - BP well controlled, relative soft today and asymptomatic - Continue metoprolol succ 25 qd  #Bipolar/schizo - Continue meds per [facility] psychiatrists - On Depakote, and also Lexapro  #HLD - Continue atorvastatin  Misc - Saw GI, declined colonoscopy, they ordered FIT - F/u in 3 months

## 2024-09-20 ENCOUNTER — APPOINTMENT (OUTPATIENT)
Dept: INTERNAL MEDICINE | Facility: CLINIC | Age: 54
End: 2024-09-20

## 2024-09-28 ENCOUNTER — EMERGENCY (EMERGENCY)
Facility: HOSPITAL | Age: 54
LOS: 0 days | Discharge: HOME | End: 2024-09-29
Attending: EMERGENCY MEDICINE
Payer: MEDICAID

## 2024-09-28 VITALS
OXYGEN SATURATION: 100 % | HEIGHT: 64 IN | DIASTOLIC BLOOD PRESSURE: 70 MMHG | TEMPERATURE: 98 F | SYSTOLIC BLOOD PRESSURE: 126 MMHG | HEART RATE: 117 BPM | RESPIRATION RATE: 16 BRPM

## 2024-09-28 DIAGNOSIS — F25.9 SCHIZOAFFECTIVE DISORDER, UNSPECIFIED: ICD-10-CM

## 2024-09-28 DIAGNOSIS — R42 DIZZINESS AND GIDDINESS: ICD-10-CM

## 2024-09-28 DIAGNOSIS — R55 SYNCOPE AND COLLAPSE: ICD-10-CM

## 2024-09-28 DIAGNOSIS — Z91.014 ALLERGY TO MAMMALIAN MEATS: ICD-10-CM

## 2024-09-28 DIAGNOSIS — R00.0 TACHYCARDIA, UNSPECIFIED: ICD-10-CM

## 2024-09-28 DIAGNOSIS — Z88.4 ALLERGY STATUS TO ANESTHETIC AGENT: ICD-10-CM

## 2024-09-28 DIAGNOSIS — Z88.0 ALLERGY STATUS TO PENICILLIN: ICD-10-CM

## 2024-09-28 DIAGNOSIS — R56.9 UNSPECIFIED CONVULSIONS: ICD-10-CM

## 2024-09-28 DIAGNOSIS — Z88.8 ALLERGY STATUS TO OTHER DRUGS, MEDICAMENTS AND BIOLOGICAL SUBSTANCES: ICD-10-CM

## 2024-09-28 DIAGNOSIS — I10 ESSENTIAL (PRIMARY) HYPERTENSION: ICD-10-CM

## 2024-09-28 DIAGNOSIS — E78.5 HYPERLIPIDEMIA, UNSPECIFIED: ICD-10-CM

## 2024-09-28 LAB
ALBUMIN SERPL ELPH-MCNC: 5.4 G/DL — HIGH (ref 3.5–5.2)
ALP SERPL-CCNC: 120 U/L — HIGH (ref 30–115)
ALT FLD-CCNC: 9 U/L — SIGNIFICANT CHANGE UP (ref 0–41)
ANION GAP SERPL CALC-SCNC: 18 MMOL/L — HIGH (ref 7–14)
AST SERPL-CCNC: 12 U/L — SIGNIFICANT CHANGE UP (ref 0–41)
BASOPHILS # BLD AUTO: 0.03 K/UL — SIGNIFICANT CHANGE UP (ref 0–0.2)
BASOPHILS NFR BLD AUTO: 0.4 % — SIGNIFICANT CHANGE UP (ref 0–1)
BILIRUB SERPL-MCNC: <0.2 MG/DL — SIGNIFICANT CHANGE UP (ref 0.2–1.2)
BUN SERPL-MCNC: 10 MG/DL — SIGNIFICANT CHANGE UP (ref 10–20)
CALCIUM SERPL-MCNC: 10.8 MG/DL — HIGH (ref 8.4–10.4)
CHLORIDE SERPL-SCNC: 98 MMOL/L — SIGNIFICANT CHANGE UP (ref 98–110)
CO2 SERPL-SCNC: 27 MMOL/L — SIGNIFICANT CHANGE UP (ref 17–32)
CREAT SERPL-MCNC: 0.9 MG/DL — SIGNIFICANT CHANGE UP (ref 0.7–1.5)
EGFR: 102 ML/MIN/1.73M2 — SIGNIFICANT CHANGE UP
EOSINOPHIL # BLD AUTO: 0.13 K/UL — SIGNIFICANT CHANGE UP (ref 0–0.7)
EOSINOPHIL NFR BLD AUTO: 1.7 % — SIGNIFICANT CHANGE UP (ref 0–8)
GLUCOSE SERPL-MCNC: 106 MG/DL — HIGH (ref 70–99)
HCT VFR BLD CALC: 46.2 % — SIGNIFICANT CHANGE UP (ref 42–52)
HGB BLD-MCNC: 13.9 G/DL — LOW (ref 14–18)
IMM GRANULOCYTES NFR BLD AUTO: 0.4 % — HIGH (ref 0.1–0.3)
LACTATE SERPL-SCNC: 3.2 MMOL/L — HIGH (ref 0.7–2)
LYMPHOCYTES # BLD AUTO: 3.21 K/UL — SIGNIFICANT CHANGE UP (ref 1.2–3.4)
LYMPHOCYTES # BLD AUTO: 41.9 % — SIGNIFICANT CHANGE UP (ref 20.5–51.1)
MCHC RBC-ENTMCNC: 25.4 PG — LOW (ref 27–31)
MCHC RBC-ENTMCNC: 30.1 G/DL — LOW (ref 32–37)
MCV RBC AUTO: 84.3 FL — SIGNIFICANT CHANGE UP (ref 80–94)
MONOCYTES # BLD AUTO: 0.34 K/UL — SIGNIFICANT CHANGE UP (ref 0.1–0.6)
MONOCYTES NFR BLD AUTO: 4.4 % — SIGNIFICANT CHANGE UP (ref 1.7–9.3)
NEUTROPHILS # BLD AUTO: 3.92 K/UL — SIGNIFICANT CHANGE UP (ref 1.4–6.5)
NEUTROPHILS NFR BLD AUTO: 51.2 % — SIGNIFICANT CHANGE UP (ref 42.2–75.2)
NRBC # BLD: 0 /100 WBCS — SIGNIFICANT CHANGE UP (ref 0–0)
PLATELET # BLD AUTO: 371 K/UL — SIGNIFICANT CHANGE UP (ref 130–400)
PMV BLD: 10.4 FL — SIGNIFICANT CHANGE UP (ref 7.4–10.4)
POTASSIUM SERPL-MCNC: 4.1 MMOL/L — SIGNIFICANT CHANGE UP (ref 3.5–5)
POTASSIUM SERPL-SCNC: 4.1 MMOL/L — SIGNIFICANT CHANGE UP (ref 3.5–5)
PROT SERPL-MCNC: 9.4 G/DL — HIGH (ref 6–8)
RBC # BLD: 5.48 M/UL — SIGNIFICANT CHANGE UP (ref 4.7–6.1)
RBC # FLD: 17.2 % — HIGH (ref 11.5–14.5)
SODIUM SERPL-SCNC: 143 MMOL/L — SIGNIFICANT CHANGE UP (ref 135–146)
TROPONIN T, HIGH SENSITIVITY RESULT: 16 NG/L — SIGNIFICANT CHANGE UP (ref 6–21)
VALPROATE SERPL-MCNC: 67 UG/ML — SIGNIFICANT CHANGE UP (ref 50–100)
WBC # BLD: 7.66 K/UL — SIGNIFICANT CHANGE UP (ref 4.8–10.8)
WBC # FLD AUTO: 7.66 K/UL — SIGNIFICANT CHANGE UP (ref 4.8–10.8)

## 2024-09-28 PROCEDURE — 93010 ELECTROCARDIOGRAM REPORT: CPT

## 2024-09-28 PROCEDURE — 83605 ASSAY OF LACTIC ACID: CPT

## 2024-09-28 PROCEDURE — 99285 EMERGENCY DEPT VISIT HI MDM: CPT

## 2024-09-28 PROCEDURE — 70450 CT HEAD/BRAIN W/O DYE: CPT | Mod: MC

## 2024-09-28 PROCEDURE — 71045 X-RAY EXAM CHEST 1 VIEW: CPT

## 2024-09-28 PROCEDURE — 71045 X-RAY EXAM CHEST 1 VIEW: CPT | Mod: 26

## 2024-09-28 PROCEDURE — 70450 CT HEAD/BRAIN W/O DYE: CPT | Mod: 26,MC

## 2024-09-28 PROCEDURE — 80053 COMPREHEN METABOLIC PANEL: CPT

## 2024-09-28 PROCEDURE — 36415 COLL VENOUS BLD VENIPUNCTURE: CPT

## 2024-09-28 PROCEDURE — 93005 ELECTROCARDIOGRAM TRACING: CPT

## 2024-09-28 PROCEDURE — 82962 GLUCOSE BLOOD TEST: CPT

## 2024-09-28 PROCEDURE — 96374 THER/PROPH/DIAG INJ IV PUSH: CPT

## 2024-09-28 PROCEDURE — 99285 EMERGENCY DEPT VISIT HI MDM: CPT | Mod: 25

## 2024-09-28 PROCEDURE — 84484 ASSAY OF TROPONIN QUANT: CPT

## 2024-09-28 PROCEDURE — 85025 COMPLETE CBC W/AUTO DIFF WBC: CPT

## 2024-09-28 PROCEDURE — 80164 ASSAY DIPROPYLACETIC ACD TOT: CPT

## 2024-09-28 PROCEDURE — G0378: CPT

## 2024-09-28 RX ORDER — ACETAMINOPHEN 325 MG/1
650 TABLET ORAL ONCE
Refills: 0 | Status: COMPLETED | OUTPATIENT
Start: 2024-09-28 | End: 2024-09-28

## 2024-09-28 RX ADMIN — Medication 25 GRAM(S): at 22:45

## 2024-09-28 RX ADMIN — Medication 1000 MILLILITER(S): at 22:00

## 2024-09-28 RX ADMIN — ACETAMINOPHEN 650 MILLIGRAM(S): 325 TABLET ORAL at 20:17

## 2024-09-28 NOTE — ED PROVIDER NOTE - DIFFERENTIAL DIAGNOSIS
The differential diagnosis for patients clinical presentation includes but is not limited to: seizure, syncope Differential Diagnosis

## 2024-09-28 NOTE — ED PROVIDER NOTE - CLINICAL SUMMARY MEDICAL DECISION MAKING FREE TEXT BOX
53 yo M presented to ED with seizure. Labs and EKG were ordered and reviewed.  Imaging was ordered and reviewed by me.  Appropriate medications for patient's presenting complaints were ordered and effects were reassessed.  Patient's records (prior hospital, ED visit, and/or nursing home notes if available) were reviewed.  Additional history was obtained from EMS, family, and/or PCP (where available).  Escalation to admission/observation was considered. Neurology recommending EEG. Placed in ED OBS.

## 2024-09-28 NOTE — ED ADULT TRIAGE NOTE - HEIGHT IN FEET
Consent (Lip)/Introductory Paragraph: The rationale for Mohs was explained to the patient and consent was obtained. The risks, benefits and alternatives to therapy were discussed in detail. Specifically, the risks of lip deformity, changes in the oral aperture, infection, scarring, bleeding, prolonged wound healing, incomplete removal, allergy to anesthesia, nerve injury and recurrence were addressed. Prior to the procedure, the treatment site was clearly identified and confirmed by the patient. All components of Universal Protocol/PAUSE Rule completed. 5

## 2024-09-28 NOTE — ED PROVIDER NOTE - CARE PLAN
1 Principal Discharge DX:	Seizure   Principal Discharge DX:	Seizure  Assessment and plan of treatment:	Plan- ekg labs ct neuro

## 2024-09-28 NOTE — CONSULT NOTE ADULT - ASSESSMENT
53 yo M with PMHx of schizoaffective disorder, HTN, DLD, preDM, reported seizure history presented for a seizure like episode. Patient is now at baseline, AAOx3. No focal neurological deficits on exam. Prior EEG showed generalized slowing, no epileptiform activities. Based on patient's vague descriptions, Low suspicion but still need to r/o seizure activities this time.    Recommendations  - Obtain routine EEG  - c/w home Depakote 250mg BID and other home meds  - Seizure precaution. Maintain Mg >2  - If rEEG is negative, no further inpatient neurologic workup    Case discussed with attending Dr. Bella.

## 2024-09-28 NOTE — ED PROVIDER NOTE - PHYSICAL EXAMINATION
Security called to bring pt motorized w/c to room as pt is being discharged.    GENERAL: Well-developed; well-nourished; in no acute distress. AO  SKIN: warm, well perfused  HEAD: Normocephalic; atraumatic.  EYES: no conjunctival erythema, ocular motions intact and appropriate  ENT: airway clear.  CARD: Regular rate and rhythm.   RESP: LCTAB; No wheezes or crackles  ABD: soft, nontender, and nondistended  Upper EXT: Normal ROM. Rad pulses 2+ symm, sensation intact and symm  Lower EXT: moving b/l LEs, sensation intact and symm  NEURO: No focal neuro def, cerebellar exam normal

## 2024-09-28 NOTE — ED PROVIDER NOTE - ATTENDING CONTRIBUTION TO CARE
54-year-old male past medical history schizoaffective disorder, hypertension, hyperlipidemia, prediabetes, seizure disorder on Depakote presents to the emergency department for evaluation of possible seizure.  Patient reports had full body shaking and passed out.  Denies tongue biting or incontinence.  Patient admits to not taking his Depakote regularly, does not remember who his neurologist is.  No chest pain, shortness breath, nausea, vomiting, fevers    Vital Signs: I have reviewed the initial vital signs.  Constitutional: Patient in no acute distress.  Integumentary: No rash.  ENT: MMM  NECK: Supple.   Cardiovascular: RRR, radial pulses 2/4 bilaterally.   Respiratory: CTA b/l.   Gastrointestinal: Abdomen soft, non-tender, non-distended,  no CVAT.   Musculoskeletal: FROM, distal pulses intact b/l.   Neurologic: AAOx3, motor 5/5 and sensation intact throughout upper and lower ext, CN II-XII intact, No facial droop or slurring of speech. No focal deficits.

## 2024-09-28 NOTE — ED PROVIDER NOTE - NSICDXPASTSURGICALHX_GEN_ALL_CORE_FT
HPI:  Back Pain  This is a chronic problem. The current episode started more than 1 year ago. The problem has been waxing and waning since onset. The pain is present in the lumbar spine and sacro-iliac. The quality of the pain is described as aching and shooting. The pain radiates to the left thigh. The pain is at a severity of 6/10. The pain is moderate. The symptoms are aggravated by standing, twisting and position. Associated symptoms include numbness, paresthesias and tingling. He has tried home exercises, NSAIDs, muscle relaxant and ice for the symptoms. The treatment provided mild relief. Demi José III is a 65 y.o. male who presents for evaluation and possible treatment at the request of his PCP Dr. Essie Hoover. He reports chronic history of neck and low back pain with his back pain being most severe. It is shooting sharp and radiates into the left lower extremity. He reports pins-and-needles sensation and numbness into the left lower extremity. Pain is exacerbated by bending twisting and turning. He denies any weakness. He reports no associated bowel bladder changes. Neck pain is centered axially and is described as a dull pain without radicular component. Denies any regular headaches, reports of dizziness, relates no cognitive changes. Denies any recent trauma strength stretch ice on a regular basis. He reports most recently he had L5-S1 GLENDY with benefit. Chief Complaint   Patient presents with   • Neck - Pain     Neck pain is just "centered" which is dull pain. Pain score 5   • Back Pain     Lower lumbar pain that radiates down left side. Patient states shooting, sharp, pins and needles and numb.  Pain score 6     Past Medical History:   Diagnosis Date   • Coronary artery disease    • Depression    • Dermoid cyst of face     resolved 04/19/2016   • Hyperlipidemia    • Myocardial infarction ([address]) 04/15/2016    99% RCA block   • Presenile dementia, depressed type ([address])     onst 2009, resolved 2016   • Psychiatric disorder     depression      Past Surgical History:   Procedure Laterality Date   • APPENDECTOMY     • COLONOSCOPY N/A 2016    Procedure: COLONOSCOPY;  Surgeon: Elizabeth Crawford MD;  Location: [address] [place] [address] GI LAB; Service:    • CORONARY ANGIOPLASTY WITH STENT PLACEMENT Right 04/15/2016   • EPIDURAL BLOCK INJECTION N/A 3/31/2023    Procedure: L5 S1 LUMBAR epidural steroid injection (88572); Surgeon: Toni Flores DO;  Location: [facility] MAIN OR;  Service: Pain Management    • ESOPHAGOGASTRODUODENOSCOPY N/A 2016    Procedure: ESOPHAGOGASTRODUODENOSCOPY (EGD); Surgeon: Elizabeth Crawford MD;  Location: [facility] GI LAB; Service:    • LUMBAR EPIDURAL INJECTION N/A 2023    Procedure: L5 S1  LUMBAR epidural steroid injection (09877);   Surgeon: Toni Flores DO;  Location: [facility] MAIN OR;  Service: Pain Management      Family History   Problem Relation Age of Onset   • Arthritis Mother    • Hypertension Mother    • Hypertension Father    • Heart disease Father    • Coronary artery disease Father    • Depression Father    • Hypertension Brother    • Cancer Maternal Grandfather      Social History     Socioeconomic History   • Marital status: /Civil Union     Spouse name: None   • Number of children: None   • Years of education: None   • Highest education level: None   Occupational History   • None   Tobacco Use   • Smoking status: Former     Packs/day: 0.50     Years: 15.00     Total pack years: 7.50     Types: Cigarettes     Start date:      Quit date: 4/15/2016     Years since quittin.5   • Smokeless tobacco: Never   Vaping Use   • Vaping Use: Never used   Substance and Sexual Activity   • Alcohol use: Not Currently     Comment: 2-3 beers every cpl days   • Drug use: Yes     Frequency: 2.0 times per week     Types: Marijuana     Comment: daily    • Sexual activity: None   Other Topics Concern   • None   Social History Narrative    Lack of exercise    Pets/animals: Dog    Sleeps 8-10 hours a day             Social Determinants of Health     Financial Resource Strain: Not on file   Food Insecurity: Not on file   Transportation Needs: Not on file   Physical Activity: Not on file   Stress: Not on file   Social Connections: Not on file   Intimate Partner Violence: Not on file   Housing Stability: Not on file       Current Outpatient Medications:   •  aspirin 81 mg chewable tablet, Chew 81 mg daily, Disp: , Rfl:   •  desvenlafaxine (PRISTIQ) 100 mg 24 hr tablet, Take 1 tablet (100 mg total) by mouth daily, Disp: 90 tablet, Rfl: 0  •  rosuvastatin (CRESTOR) 20 MG tablet, Take 1 tablet (20 mg total) by mouth daily, Disp: 90 tablet, Rfl: 0  Allergies as of 10/10/2023 - Reviewed 10/10/2023   Allergen Reaction Noted   • Amoxicillin Itching 04/15/2016   • Lipitor [atorvastatin]  12/13/2016         The following portions of the patient's history were reviewed and updated as appropriate: allergies, current medications, past family history, past medical history, past social history, past surgical history, and problem list.    Review of Systems   Constitutional: Negative. Musculoskeletal:  Positive for back pain. Neurological:  Positive for tingling, numbness and paresthesias. Psychiatric/Behavioral: Negative. Physical Exam:  Physical Exam  Vitals reviewed. Constitutional:       Appearance: Normal appearance. Cardiovascular:      Rate and Rhythm: Normal rate. Pulses: Normal pulses. Pulmonary:      Effort: Pulmonary effort is normal.   Musculoskeletal:      Lumbar back: Spasms and tenderness present. Decreased range of motion. Positive left straight leg raise test. Negative right straight leg raise test.        Back:    Skin:     General: Skin is warm and dry. Neurological:      General: No focal deficit present. Mental Status: He is alert and oriented to person, place, and time. Sensory: Sensation is intact.       Motor: Motor function is intact. Gait: Gait abnormal.      Deep Tendon Reflexes: Reflexes are normal and symmetric. Psychiatric:         Mood and Affect: Mood normal.         Behavior: Behavior normal.     MRI LUMBAR SPINE WITHOUT CONTRAST     INDICATION: R22.42: Localized swelling, mass and lump, left lower limb  R60.0: Localized edema  M79.605: Pain in left leg  R29.898: Other symptoms and signs involving the musculoskeletal system  R29.2: Abnormal reflex  R26.9: Unspecified abnormalities of gait and mobility  M54.16: Radiculopathy, lumbar region. COMPARISON:  None. TECHNIQUE:  Multiplanar, multisequence imaging of the lumbar spine was performed. .          IMAGE QUALITY:  Diagnostic     FINDINGS:     VERTEBRAL BODIES:  There are 5 lumbar type vertebral bodies. Normal alignment of the lumbar spine. No spondylolysis or spondylolisthesis. No scoliosis. No compression fracture. Normal marrow signal is identified within the visualized bony   structures. No discrete marrow lesion. SACRUM:  Normal signal within the sacrum. No evidence of insufficiency or stress fracture. DISTAL CORD AND CONUS:  Normal size and signal within the distal cord and conus. PARASPINAL SOFT TISSUES:  Paraspinal soft tissues are unremarkable. LOWER THORACIC DISC SPACES:  Normal disc height and signal.  No disc herniation, canal stenosis or foraminal narrowing. LUMBAR DISC SPACES:     L1-L2:  No central or foraminal narrowing. L2-L3:  Mild facet hypertrophy with mild annular bulge. No significant central or foraminal narrowing. L3-L4:  Moderate facet hypertrophy with mild annular bulge with small marginal osteophytes result in mild left foraminal narrowing. Central canal patent. L4-L5:  Moderate facet hypertrophy identified with mild annular bulging and marginal osteophytes which result in moderate to severe bilateral foraminal narrowing. Mild central stenosis.      L5-S1:  Moderate left and mild right facet hypertrophy with degenerative disc osteophyte complex and marginal osteophytes resulting in moderate to severe left foraminal narrowing and minimal right foraminal narrowing. OTHER FINDINGS:  None. IMPRESSION:     Spondylotic degenerative changes result in moderate to severe bilateral foraminal narrowing at L4-5 and moderate to severe left foraminal narrowing at L5-S1. No greater than mild central or foraminal narrowing elsewhere. Assessment:  Diagnoses and all orders for this visit:    Chronic bilateral low back pain with left-sided sciatica    Neck pain    Myofascial pain         Treatment:  16155  Manipulation of the left innominate, sacrum, L5 4. Flexion distraction well-tolerated. Discussion:  I reviewed past medical notes including diagnostics. Discussed case in detail with patient today. Trial of chiropractic care reassessment 4 to 6 weeks. No follow-ups on file. PAST SURGICAL HISTORY:  No significant past surgical history

## 2024-09-28 NOTE — ED PROVIDER NOTE - OBJECTIVE STATEMENT
54-year-old male, past medical history of diarrhea, seizures, from [facility],, brought in by EMS for syncope versus seizure.  Patient states that he has been having suspected seizure-like episodes for the past month.  Had a seizure episode prior to arrival, witnessed by staff, called by a staff member on his way down, no head trauma.  Normally supposed to be on Depakote twice daily however noncompliant with medications because he is "too lazy to take it."  Denies fevers, chills, chest pain, abdominal pain, diarrhea, dysuria. 54-year-old male, past medical history of diarrhea, seizures, from [facility], brought in by EMS for syncope versus seizure.  Patient states that he has been having suspected seizure-like episodes for the past month.  Had a seizure episode prior to arrival, witnessed by staff, caught by a staff member on his way down, no head trauma.  Normally supposed to be on Depakote twice daily however noncompliant with medications because he is "too lazy to take it."  Denies fevers, chills, chest pain, abdominal pain, diarrhea, dysuria.

## 2024-09-28 NOTE — CONSULT NOTE ADULT - SUBJECTIVE AND OBJECTIVE BOX
NEUROLOGY CONSULT    HPI:  53 yo M with PMHx of schizoaffective disorder, HTN, DLD, preDM, reported seizure history presented for a seizure like episode. Patient stood up from a chair, started to have shaking arms and then he passed out. He was out for 5 - 10 min, then woke up and returned to baseline. Per report, NH staff saw him shaking while he was out for couple minutes. Denies confusion after the episode. Denies tongue biting, urinary/fecal incontinence. Patient currently is alert and oriented in the ED, reports compliance with Depakote at home, but missed doses yesterday. Patient has a similar presentation a month ago, EEG showing generalized slowing, no epileptiform activities.      MEDICATIONS  Home Medications:  bisacodyl 5 mg oral tablet: 1 tab(s) orally once a day (12 Aug 2024 22:44)  cloZAPine 100 mg oral tablet: 1 tab(s) orally 2 times a day (02 Aug 2024 16:04)  docusate sodium 100 mg oral capsule: 1 cap(s) orally once a day (12 Aug 2024 22:44)  Invega Sustenna 234 mg/1.5 mL intramuscular suspension, extended release: 234 milligram(s) intramuscularly once a month (30 Jul 2024 04:19)  MetFORMIN (Eqv-Fortamet) 1000 mg oral tablet, extended release: 1 tab(s) orally 2 times a day (12 Aug 2024 22:44)  metoprolol succinate 25 mg oral tablet, extended release: 1 tab(s) orally once a day (12 Aug 2024 22:44)    MEDICATIONS  (STANDING):  magnesium sulfate  IVPB 2 Gram(s) IV Intermittent Once    FAMILY HISTORY:  No pertinent family history in first degree relatives    SOCIAL HISTORY: negative for tobacco, alcohol, or ilicit drug use.    Allergies  fish (Unknown)  pork (Unknown)  penicillin (Unknown)  Thorazine (Unknown)  Navane (Unknown)    Neurological Examination:  General:  Appearance is consistent with chronologic age.   Cognitive/Language:  Awake, alert, and oriented to person, place, time and date.  Recent and remote memory intact.  Fund of knowledge is appropriate.  Naming, repetition and comprehension intact. Nondysarthric.    Cranial Nerves  - Eyes: Visual fields full.  EOMI w/o nystagmus, skew or reported double vision.  PERRL.  No ptosis/weakness of eyelid closure.    - Face:  Facial sensation normal V1 - 3, no facial asymmetry.    - Ears/Nose/Throat:  Hearing grossly intact b/l to finger rub.  Palate elevates midline.  Tongue and uvula midline.   Motor exam: Normal tone and bulk. No tenderness, twitching, tremors or involuntary movements.            Upper extremity                  Bicep     Tricep     HG                                                 R      5/5 5/5 5/5                                                    L       5/5 5/5          5/5              Lower extremity                   HF        KE        DF         PF                                                  R     5/5 5/5 5/5 5/5                                               L      5/5 5/5 5/5 5/5    Sensory examination:  Intact to light touch and pinprick, pain, temperature in all extremities.  Reflexes: 2+ b/l biceps, triceps, patella and achilles.  Plantar response downgoing b/l.  Cerebellum: FTN/HKS intact.  No dysmetria.  Gait narrow based and normal.    LABS:                        13.9   7.66  )-----------( 371      ( 28 Sep 2024 20:57 )             46.2     09-28    143  |  98  |  10  ----------------------------<  106[H]  4.1   |  27  |  0.9    Ca    10.8[H]      28 Sep 2024 20:57    TPro  9.4[H]  /  Alb  5.4[H]  /  TBili  <0.2  /  DBili  x   /  AST  12  /  ALT  9   /  AlkPhos  120[H]  09-28    Hemoglobin A1C:   Vitamin B12     CAPILLARY BLOOD GLUCOSE      Urinalysis Basic - ( 28 Sep 2024 20:57 )    Color: x / Appearance: x / SG: x / pH: x  Gluc: 106 mg/dL / Ketone: x  / Bili: x / Urobili: x   Blood: x / Protein: x / Nitrite: x   Leuk Esterase: x / RBC: x / WBC x   Sq Epi: x / Non Sq Epi: x / Bacteria: x        RADIOLOGY, EKG AND ADDITIONAL TESTS:  CTH No acute intracranial abnormality.

## 2024-09-28 NOTE — CONSULT NOTE ADULT - ATTENDING COMMENTS
Patient with self-reported history of seizures; says he had seizure as a child and has been seizure free until last month? Had ED visit in August for unwitnessed fall, neurology evaluated at that time - EEG with generalized slowing but no interictal activity. Patient on Depakote 250mg BID, level 67? today - may not be true trough level given low dose of depakote. Patient back to baseline during my evaluation. Unclear if current episodes are true seizures. Would increase depakote to 500mg q12h for now and have patient followup in neurology clinic.

## 2024-09-28 NOTE — ED ADULT NURSE NOTE - NSFALLHARMRISKINTERV_ED_ALL_ED
Assistance OOB with selected safe patient handling equipment if applicable/Assistance with ambulation/Communicate risk of Fall with Harm to all staff, patient, and family/Encourage patient to sit up slowly, dangle for a short time, stand at bedside before walking/Monitor gait and stability/Orthostatic vital signs/Provide visual cue: red socks, yellow wristband, yellow gown, etc/Reinforce activity limits and safety measures with patient and family/Bed in lowest position, wheels locked, appropriate side rails in place/Call bell, personal items and telephone in reach/Instruct patient to call for assistance before getting out of bed/chair/stretcher/Non-slip footwear applied when patient is off stretcher/[place] to call system/Physically safe environment - no spills, clutter or unnecessary equipment/Purposeful Proactive Rounding/Room/bathroom lighting operational, light cord in reach

## 2024-09-29 VITALS
TEMPERATURE: 98 F | SYSTOLIC BLOOD PRESSURE: 137 MMHG | RESPIRATION RATE: 20 BRPM | HEART RATE: 118 BPM | OXYGEN SATURATION: 99 % | DIASTOLIC BLOOD PRESSURE: 81 MMHG

## 2024-09-29 LAB
GLUCOSE BLDC GLUCOMTR-MCNC: 131 MG/DL — HIGH (ref 70–99)
TROPONIN T, HIGH SENSITIVITY RESULT: 14 NG/L — SIGNIFICANT CHANGE UP (ref 6–21)

## 2024-09-29 PROCEDURE — 99282 EMERGENCY DEPT VISIT SF MDM: CPT

## 2024-09-29 PROCEDURE — 99236 HOSP IP/OBS SAME DATE HI 85: CPT

## 2024-09-29 RX ORDER — DIVALPROEX SODIUM 125 MG/1
1 CAPSULE, DELAYED RELEASE ORAL
Qty: 60 | Refills: 0
Start: 2024-09-29 | End: 2024-10-28

## 2024-09-29 RX ORDER — DIVALPROEX SODIUM 125 MG/1
250 CAPSULE, DELAYED RELEASE ORAL ONCE
Refills: 0 | Status: COMPLETED | OUTPATIENT
Start: 2024-09-29 | End: 2024-09-29

## 2024-09-29 RX ORDER — HYDROXYZINE HCL 25 MG
25 TABLET ORAL ONCE
Refills: 0 | Status: COMPLETED | OUTPATIENT
Start: 2024-09-29 | End: 2024-09-29

## 2024-09-29 RX ADMIN — Medication 25 MILLIGRAM(S): at 02:59

## 2024-09-29 RX ADMIN — DIVALPROEX SODIUM 250 MILLIGRAM(S): 125 CAPSULE, DELAYED RELEASE ORAL at 05:11

## 2024-09-29 NOTE — ED CDU PROVIDER DISPOSITION NOTE - NSFOLLOWUPINSTRUCTIONS_ED_ALL_ED_FT
return if symptoms  persist or gets worse  take medication as directed   follow up primary care and neurology

## 2024-09-29 NOTE — ED CDU PROVIDER INITIAL DAY NOTE - PROGRESS NOTE DETAILS
patient signed out from dr. plascencia, awaiting neurology /eeg, spoke to jose siddiqui states will discuss with team if eeg is necessary and can be done outpatient. patient comfortable, will continue to monitor spoke to jose siddiqui neurology team states can be d/c . eeg outpatient , increase depakote from 250mg bid to 500mg bid spoke to jose siddiqui neurology team states can be d/c . eeg outpatient , increase depakote from 250mg bid to 500mg bid. spoke to ms bellamy from [facility], informed of neurology and ed plan and management and changes of prescription, states understand will follow up as recommended Patient with tachycardia on discharge vitals.  Patient reports "I always have a fast heart rate."  Patient afebrile.  Fingerstick normal.  Patient with no additional complaints.  Patient to be discharged and aware of his tachycardia.

## 2024-09-29 NOTE — ED CDU PROVIDER DISPOSITION NOTE - ATTENDING CONTRIBUTION TO CARE
Intact
Patient presented from [address] with known seizure disorder, status post seizure.  On initial ED evaluation, neuroexam nonfocal.  Patient reports he is noncompliant with medications however is given his medications at the facility.  Valproic acid level within normal limits.  All labs reviewed.  CT head with no acute pathology.  Chest x-ray no acute pathology.  EKG with sinus tachycardia and no ischemia.  Neurology recommendations appreciated.  Will increase Depakote dose, discussed with facility and discharged back to the facility.

## 2024-09-29 NOTE — ED CDU PROVIDER INITIAL DAY NOTE - PHYSICAL EXAMINATION
GENERAL: Well-developed; well-nourished; in no acute distress. AO  SKIN: warm, well perfused  HEAD: Normocephalic; atraumatic.  EYES: no conjunctival erythema, ocular motions intact and appropriate  ENT: airway clear.  CARD: Regular rate and rhythm.   RESP: LCTAB; No wheezes or crackles  ABD: soft, nontender, and nondistended  Upper EXT: Normal ROM. Rad pulses 2+ symm, sensation intact and symm  Lower EXT: moving b/l LEs, sensation intact and symm  NEURO: No focal neuro def, cerebellar exam normal

## 2024-09-29 NOTE — ED CDU PROVIDER DISPOSITION NOTE - PATIENT PORTAL LINK FT
You can access the FollowMyHealth Patient Portal offered by [facility] by registering at the following website: [email or web address]. By joining [facility]’s FollowMyHealth portal, you will also be able to view your health information using other applications (apps) compatible with our system.

## 2024-09-29 NOTE — ED CDU PROVIDER DISPOSITION NOTE - CARE PROVIDER_API CALL
Anthony Jean  Neurology  [address] [place] [address], [address]  [place] [address]  Phone: [phone]  Fax: [phone]  Follow Up Time: 7-10 Days

## 2024-09-29 NOTE — ED CDU PROVIDER DISPOSITION NOTE - CLINICAL COURSE
Patient presented from [address] with known seizure disorder, status post seizure.  On initial ED evaluation, neuroexam nonfocal.  Patient reports he is noncompliant with medications however is given his medications at the facility.  Valproic acid level within normal limits.  All labs reviewed.  CT head with no acute pathology.  Chest x-ray no acute pathology.  EKG with sinus tachycardia and no ischemia.  Neurology recommendations appreciated.  Will increase Depakote dose, discussed with facility and discharged back to the facility.

## 2024-09-29 NOTE — ED CDU PROVIDER INITIAL DAY NOTE - OBJECTIVE STATEMENT
54-year-old male, past medical history of diarrhea, seizures, from [facility], brought in by EMS for syncope versus seizure.  Patient states that he has been having suspected seizure-like episodes for the past month.  Had a seizure episode prior to arrival, witnessed by staff, caught by a staff member on his way down, no head trauma.  Normally supposed to be on Depakote twice daily however noncompliant with medications because he is "too lazy to take it."  Denies fevers, chills, chest pain, abdominal pain, diarrhea, dysuria.

## 2024-10-07 NOTE — ED ADULT TRIAGE NOTE - RESPIRATORY RATE (BREATHS/MIN)
Priyank Ko is calling to request a refill on the following medication(s):    Medication Request:  Requested Prescriptions     Pending Prescriptions Disp Refills    atorvastatin (LIPITOR) 10 MG tablet [Pharmacy Med Name: ATORVASTATIN TAB 10MG] 39 tablet 3     Sig: TAKE 1 TABLET THREE TIMES  WEEKLY    lisinopril-hydroCHLOROthiazide (PRINZIDE;ZESTORETIC) 20-12.5 MG per tablet [Pharmacy Med Name: LISINOP/HCTZ TAB 20-12.5] 90 tablet 3     Sig: TAKE 1 TABLET DAILY       Last Visit Date (If Applicable):  9/6/2024    Next Visit Date:    9/9/2025              
16

## 2024-10-10 ENCOUNTER — APPOINTMENT (OUTPATIENT)
Dept: ENDOCRINOLOGY | Facility: CLINIC | Age: 54
End: 2024-10-10

## 2024-10-10 ENCOUNTER — OUTPATIENT (OUTPATIENT)
Dept: OUTPATIENT SERVICES | Facility: HOSPITAL | Age: 54
LOS: 1 days | End: 2024-10-10
Payer: MEDICAID

## 2024-10-10 VITALS
SYSTOLIC BLOOD PRESSURE: 121 MMHG | DIASTOLIC BLOOD PRESSURE: 83 MMHG | HEART RATE: 95 BPM | WEIGHT: 204 LBS | BODY MASS INDEX: 33.95 KG/M2

## 2024-10-10 DIAGNOSIS — E03.9 HYPOTHYROIDISM, UNSPECIFIED: ICD-10-CM

## 2024-10-10 DIAGNOSIS — E11.9 TYPE 2 DIABETES MELLITUS W/OUT COMPLICATIONS: ICD-10-CM

## 2024-10-10 DIAGNOSIS — Z00.00 ENCOUNTER FOR GENERAL ADULT MEDICAL EXAMINATION WITHOUT ABNORMAL FINDINGS: ICD-10-CM

## 2024-10-10 PROCEDURE — 99214 OFFICE O/P EST MOD 30 MIN: CPT

## 2024-10-11 DIAGNOSIS — E03.9 HYPOTHYROIDISM, UNSPECIFIED: ICD-10-CM

## 2024-10-11 DIAGNOSIS — E11.65 TYPE 2 DIABETES MELLITUS WITH HYPERGLYCEMIA: ICD-10-CM

## 2024-10-22 ENCOUNTER — EMERGENCY (EMERGENCY)
Facility: HOSPITAL | Age: 54
LOS: 0 days | Discharge: HOME | End: 2024-10-23
Attending: STUDENT IN AN ORGANIZED HEALTH CARE EDUCATION/TRAINING PROGRAM
Payer: MEDICAID

## 2024-10-22 VITALS
SYSTOLIC BLOOD PRESSURE: 136 MMHG | DIASTOLIC BLOOD PRESSURE: 85 MMHG | TEMPERATURE: 98 F | HEART RATE: 85 BPM | OXYGEN SATURATION: 100 % | HEIGHT: 64 IN | RESPIRATION RATE: 18 BRPM | WEIGHT: 220.02 LBS

## 2024-10-22 DIAGNOSIS — Z01.89 ENCOUNTER FOR OTHER SPECIFIED SPECIAL EXAMINATIONS: ICD-10-CM

## 2024-10-22 DIAGNOSIS — Z88.4 ALLERGY STATUS TO ANESTHETIC AGENT: ICD-10-CM

## 2024-10-22 DIAGNOSIS — I10 ESSENTIAL (PRIMARY) HYPERTENSION: ICD-10-CM

## 2024-10-22 DIAGNOSIS — Z88.0 ALLERGY STATUS TO PENICILLIN: ICD-10-CM

## 2024-10-22 DIAGNOSIS — Z88.8 ALLERGY STATUS TO OTHER DRUGS, MEDICAMENTS AND BIOLOGICAL SUBSTANCES: ICD-10-CM

## 2024-10-22 DIAGNOSIS — E11.9 TYPE 2 DIABETES MELLITUS WITHOUT COMPLICATIONS: ICD-10-CM

## 2024-10-22 DIAGNOSIS — Z91.013 ALLERGY TO SEAFOOD: ICD-10-CM

## 2024-10-22 DIAGNOSIS — Z91.018 ALLERGY TO OTHER FOODS: ICD-10-CM

## 2024-10-22 DIAGNOSIS — F20.9 SCHIZOPHRENIA, UNSPECIFIED: ICD-10-CM

## 2024-10-22 PROCEDURE — 99282 EMERGENCY DEPT VISIT SF MDM: CPT

## 2024-10-22 PROCEDURE — 99283 EMERGENCY DEPT VISIT LOW MDM: CPT

## 2024-10-22 NOTE — ED PROVIDER NOTE - PATIENT PORTAL LINK FT
You can access the FollowMyHealth Patient Portal offered by [facility] by registering at the following website: [email or web address]. By joining [employer]’s FollowMyHealth portal, you will also be able to view your health information using other applications (apps) compatible with our system.

## 2024-10-22 NOTE — ED PROVIDER NOTE - CARE PROVIDER_API CALL
Angela Fox  Psychiatry  [address] [place] [id number]  Phone: [phone]  Fax: ([phone]  Follow Up Time: 1-3 Days

## 2024-10-22 NOTE — ED PROVIDER NOTE - OBJECTIVE STATEMENT
53 yo male, PMHx of schizophrenia, seizures, HTN, DM, presenting for evaluation.  He states he was getting vaccines and felt unwell so the staff called the ambulance but he states he feels well and denies any symptoms at this time.  Denies fevers, chills, chest pain, shortness of breath, nausea, vomiting, abdominal pain, weakness, headache.

## 2024-10-22 NOTE — ED PROVIDER NOTE - PHYSICAL EXAMINATION
CONSTITUTIONAL: Well-developed; well-nourished; in no acute distress.   SKIN: warm, dry  HEAD: Normocephalic; atraumatic.  EYES: no conjunctival erythema  ENT: No nasal discharge; airway clear.  NECK: Supple  CARD: S1, S2 normal; no murmurs, gallops, or rubs. Regular rate and rhythm.   RESP: No wheezes, rales or rhonchi.  ABD: soft ntnd  EXT: Normal ROM.  No clubbing, cyanosis or edema.   NEURO: Alert, oriented, grossly unremarkable  PSYCH: Cooperative, appropriate.

## 2024-10-22 NOTE — ED ADULT NURSE NOTE - OBJECTIVE STATEMENT
Patient reports he was getting vaccines today and felt nauseous so his staff called EMS but now denies any symptoms and feels well.

## 2024-10-31 NOTE — ED BEHAVIORAL HEALTH ASSESSMENT NOTE - NSBHMSEAFFRANGE_PSY_A_CORE
Patient triaged at [facility] on 4/3/2022   Received fax from [facility] stated that patients dialysis access has no thrill, no bruit. Called and spoke to charge nurse and stated that his Graft is clotted and they started using his Catheter yesterday, 10/30/2024. Patient is scheduled on 11/5/2024 to see Dr. Agee to discuss US results. Sent message to Dr. Agee to make her aware.    Labile Blunted

## 2024-11-19 NOTE — BH DISCHARGE NOTE NURSING/SOCIAL WORK/PSYCH REHAB - NSCDUDCCRISIS_PSY_A_CORE
[facility]  1 (191) [facility] ([phone])  Text "WELL" to 17009  Website: www.[employer]/.National Suicide Prevention Lifeline 3 (229) 505-1742/.  [employer]  1 (514) LIF[employer] (862-1392)/988 Suicide and Crisis Lifeline
Opt out

## 2024-12-16 ENCOUNTER — APPOINTMENT (OUTPATIENT)
Dept: INTERNAL MEDICINE | Facility: CLINIC | Age: 54
End: 2024-12-16

## 2025-01-23 ENCOUNTER — APPOINTMENT (OUTPATIENT)
Dept: ENDOCRINOLOGY | Facility: CLINIC | Age: 55
End: 2025-01-23

## 2025-01-23 ENCOUNTER — OUTPATIENT (OUTPATIENT)
Dept: OUTPATIENT SERVICES | Facility: HOSPITAL | Age: 55
LOS: 1 days | End: 2025-01-23
Payer: MEDICAID

## 2025-01-23 VITALS
DIASTOLIC BLOOD PRESSURE: 74 MMHG | WEIGHT: 203 LBS | HEART RATE: 69 BPM | BODY MASS INDEX: 33.78 KG/M2 | SYSTOLIC BLOOD PRESSURE: 138 MMHG

## 2025-01-23 DIAGNOSIS — Z00.00 ENCOUNTER FOR GENERAL ADULT MEDICAL EXAMINATION WITHOUT ABNORMAL FINDINGS: ICD-10-CM

## 2025-01-23 DIAGNOSIS — E11.9 TYPE 2 DIABETES MELLITUS W/OUT COMPLICATIONS: ICD-10-CM

## 2025-01-23 DIAGNOSIS — E03.9 HYPOTHYROIDISM, UNSPECIFIED: ICD-10-CM

## 2025-01-23 PROCEDURE — 82043 UR ALBUMIN QUANTITATIVE: CPT

## 2025-01-23 PROCEDURE — 84443 ASSAY THYROID STIM HORMONE: CPT

## 2025-01-23 PROCEDURE — 80053 COMPREHEN METABOLIC PANEL: CPT

## 2025-01-23 PROCEDURE — 83036 HEMOGLOBIN GLYCOSYLATED A1C: CPT

## 2025-01-23 PROCEDURE — 99214 OFFICE O/P EST MOD 30 MIN: CPT

## 2025-01-23 PROCEDURE — 36415 COLL VENOUS BLD VENIPUNCTURE: CPT

## 2025-01-23 PROCEDURE — 80061 LIPID PANEL: CPT

## 2025-01-23 PROCEDURE — 84439 ASSAY OF FREE THYROXINE: CPT

## 2025-01-29 LAB
ALBUMIN SERPL ELPH-MCNC: 4.2 G/DL
ALP BLD-CCNC: 100 U/L
ALT SERPL-CCNC: 8 U/L
ANION GAP SERPL CALC-SCNC: 16 MMOL/L
AST SERPL-CCNC: 11 U/L
BILIRUB SERPL-MCNC: <0.2 MG/DL
BUN SERPL-MCNC: 6 MG/DL
CALCIUM SERPL-MCNC: 9.9 MG/DL
CHLORIDE SERPL-SCNC: 103 MMOL/L
CHOLEST SERPL-MCNC: 268 MG/DL
CO2 SERPL-SCNC: 22 MMOL/L
CREAT SERPL-MCNC: 0.7 MG/DL
CREAT SPEC-SCNC: 72 MG/DL
EGFR: 110 ML/MIN/1.73M2
ESTIMATED AVERAGE GLUCOSE: 134 MG/DL
GLUCOSE SERPL-MCNC: 103 MG/DL
HBA1C MFR BLD HPLC: 6.3 %
HDLC SERPL-MCNC: 40 MG/DL
LDLC SERPL CALC-MCNC: 184 MG/DL
MICROALBUMIN 24H UR DL<=1MG/L-MCNC: <1.2 MG/DL
MICROALBUMIN/CREAT 24H UR-RTO: NORMAL MG/G
NONHDLC SERPL-MCNC: 228 MG/DL
POTASSIUM SERPL-SCNC: 4.3 MMOL/L
PROT SERPL-MCNC: 7.1 G/DL
SODIUM SERPL-SCNC: 141 MMOL/L
T4 FREE SERPL-MCNC: 1 NG/DL
TRIGL SERPL-MCNC: 231 MG/DL
TSH SERPL-ACNC: 4.7 UIU/ML

## 2025-01-29 RX ORDER — LANCETS 33 GAUGE
EACH MISCELLANEOUS
Qty: 200 | Refills: 2 | Status: ACTIVE | COMMUNITY
Start: 2025-01-29 | End: 1900-01-01

## 2025-01-29 RX ORDER — BLOOD SUGAR DIAGNOSTIC
STRIP MISCELLANEOUS
Qty: 200 | Refills: 2 | Status: ACTIVE | COMMUNITY
Start: 2025-01-29 | End: 1900-01-01

## 2025-01-29 RX ORDER — LANCING DEVICE/LANCETS
KIT MISCELLANEOUS
Qty: 1 | Refills: 0 | Status: ACTIVE | COMMUNITY
Start: 2025-01-29 | End: 1900-01-01

## 2025-01-29 RX ORDER — BLOOD-GLUCOSE METER
EACH MISCELLANEOUS
Qty: 1 | Refills: 0 | Status: ACTIVE | COMMUNITY
Start: 2025-01-29 | End: 1900-01-01

## 2025-01-30 DIAGNOSIS — E03.9 HYPOTHYROIDISM, UNSPECIFIED: ICD-10-CM

## 2025-01-30 DIAGNOSIS — E11.65 TYPE 2 DIABETES MELLITUS WITH HYPERGLYCEMIA: ICD-10-CM

## 2025-02-03 ENCOUNTER — APPOINTMENT (OUTPATIENT)
Dept: INTERNAL MEDICINE | Facility: CLINIC | Age: 55
End: 2025-02-03

## 2025-02-10 ENCOUNTER — APPOINTMENT (OUTPATIENT)
Dept: PODIATRY | Facility: CLINIC | Age: 55
End: 2025-02-10

## 2025-02-20 ENCOUNTER — OUTPATIENT (OUTPATIENT)
Dept: OUTPATIENT SERVICES | Facility: HOSPITAL | Age: 55
LOS: 1 days | End: 2025-02-20
Payer: MEDICAID

## 2025-02-20 ENCOUNTER — APPOINTMENT (OUTPATIENT)
Dept: PODIATRY | Facility: CLINIC | Age: 55
End: 2025-02-20
Payer: MEDICAID

## 2025-02-20 DIAGNOSIS — Z00.00 ENCOUNTER FOR GENERAL ADULT MEDICAL EXAMINATION WITHOUT ABNORMAL FINDINGS: ICD-10-CM

## 2025-02-20 DIAGNOSIS — B35.1 TINEA UNGUIUM: ICD-10-CM

## 2025-02-20 DIAGNOSIS — E11.9 TYPE 2 DIABETES MELLITUS W/OUT COMPLICATIONS: ICD-10-CM

## 2025-02-20 PROCEDURE — 99213 OFFICE O/P EST LOW 20 MIN: CPT | Mod: GC

## 2025-02-20 PROCEDURE — 99213 OFFICE O/P EST LOW 20 MIN: CPT

## 2025-02-22 ENCOUNTER — EMERGENCY (EMERGENCY)
Facility: HOSPITAL | Age: 55
LOS: 0 days | Discharge: HOME | End: 2025-02-23
Attending: EMERGENCY MEDICINE
Payer: MEDICAID

## 2025-02-22 VITALS
TEMPERATURE: 98 F | OXYGEN SATURATION: 99 % | SYSTOLIC BLOOD PRESSURE: 135 MMHG | WEIGHT: 220.02 LBS | RESPIRATION RATE: 18 BRPM | HEART RATE: 102 BPM | DIASTOLIC BLOOD PRESSURE: 76 MMHG | HEIGHT: 66 IN

## 2025-02-22 DIAGNOSIS — Z91.013 ALLERGY TO SEAFOOD: ICD-10-CM

## 2025-02-22 DIAGNOSIS — Z88.8 ALLERGY STATUS TO OTHER DRUGS, MEDICAMENTS AND BIOLOGICAL SUBSTANCES: ICD-10-CM

## 2025-02-22 DIAGNOSIS — R51.9 HEADACHE, UNSPECIFIED: ICD-10-CM

## 2025-02-22 DIAGNOSIS — Z91.014 ALLERGY TO MAMMALIAN MEATS: ICD-10-CM

## 2025-02-22 DIAGNOSIS — R00.0 TACHYCARDIA, UNSPECIFIED: ICD-10-CM

## 2025-02-22 DIAGNOSIS — Z88.0 ALLERGY STATUS TO PENICILLIN: ICD-10-CM

## 2025-02-22 DIAGNOSIS — U07.1 COVID-19: ICD-10-CM

## 2025-02-22 PROCEDURE — 80076 HEPATIC FUNCTION PANEL: CPT

## 2025-02-22 PROCEDURE — 93005 ELECTROCARDIOGRAM TRACING: CPT

## 2025-02-22 PROCEDURE — 36415 COLL VENOUS BLD VENIPUNCTURE: CPT

## 2025-02-22 PROCEDURE — 0241U: CPT

## 2025-02-22 PROCEDURE — 80048 BASIC METABOLIC PNL TOTAL CA: CPT

## 2025-02-22 PROCEDURE — 99285 EMERGENCY DEPT VISIT HI MDM: CPT | Mod: 25

## 2025-02-22 PROCEDURE — 85025 COMPLETE CBC W/AUTO DIFF WBC: CPT

## 2025-02-22 PROCEDURE — 99284 EMERGENCY DEPT VISIT MOD MDM: CPT

## 2025-02-22 PROCEDURE — 36000 PLACE NEEDLE IN VEIN: CPT

## 2025-02-22 PROCEDURE — 71045 X-RAY EXAM CHEST 1 VIEW: CPT

## 2025-02-22 NOTE — ED ADULT TRIAGE NOTE - CHIEF COMPLAINT QUOTE
He's from [address] [place] [address], they gave him a COVID test today and it just came back positive. They wanted us to give him another test, we don't have any - EMS  Patient reports nasal congestion since yesterday, denies fever, denies cough

## 2025-02-22 NOTE — ED ADULT TRIAGE NOTE - ISOLATION DROPLET OTHER
[Weight management counseling provided] : Weight management [Activity counseling provided] : activity [Healthy eating counseling provided] : healthy eating [Good understanding] : Patient has a good understanding of disease, goals and obesity follow-up plan [None] : None + COVID test

## 2025-02-23 VITALS — OXYGEN SATURATION: 98 % | HEART RATE: 107 BPM

## 2025-02-23 LAB
ALBUMIN SERPL ELPH-MCNC: 4.1 G/DL — SIGNIFICANT CHANGE UP (ref 3.5–5.2)
ALP SERPL-CCNC: 104 U/L — SIGNIFICANT CHANGE UP (ref 30–115)
ALT FLD-CCNC: 9 U/L — SIGNIFICANT CHANGE UP (ref 0–41)
ANION GAP SERPL CALC-SCNC: 11 MMOL/L — SIGNIFICANT CHANGE UP (ref 7–14)
AST SERPL-CCNC: 11 U/L — SIGNIFICANT CHANGE UP (ref 0–41)
BASOPHILS # BLD AUTO: 0.04 K/UL — SIGNIFICANT CHANGE UP (ref 0–0.2)
BASOPHILS NFR BLD AUTO: 0.5 % — SIGNIFICANT CHANGE UP (ref 0–1)
BILIRUB DIRECT SERPL-MCNC: <0.2 MG/DL — SIGNIFICANT CHANGE UP (ref 0–0.3)
BILIRUB INDIRECT FLD-MCNC: SIGNIFICANT CHANGE UP MG/DL (ref 0.2–1.2)
BILIRUB SERPL-MCNC: <0.2 MG/DL — SIGNIFICANT CHANGE UP (ref 0.2–1.2)
BUN SERPL-MCNC: 9 MG/DL — LOW (ref 10–20)
CALCIUM SERPL-MCNC: 9.7 MG/DL — SIGNIFICANT CHANGE UP (ref 8.4–10.4)
CHLORIDE SERPL-SCNC: 103 MMOL/L — SIGNIFICANT CHANGE UP (ref 98–110)
CO2 SERPL-SCNC: 27 MMOL/L — SIGNIFICANT CHANGE UP (ref 17–32)
CREAT SERPL-MCNC: 0.8 MG/DL — SIGNIFICANT CHANGE UP (ref 0.7–1.5)
EGFR: 106 ML/MIN/1.73M2 — SIGNIFICANT CHANGE UP
EOSINOPHIL # BLD AUTO: 0.19 K/UL — SIGNIFICANT CHANGE UP (ref 0–0.7)
EOSINOPHIL NFR BLD AUTO: 2.3 % — SIGNIFICANT CHANGE UP (ref 0–8)
FLUAV AG NPH QL: SIGNIFICANT CHANGE UP
FLUBV AG NPH QL: SIGNIFICANT CHANGE UP
GLUCOSE SERPL-MCNC: 90 MG/DL — SIGNIFICANT CHANGE UP (ref 70–99)
HCT VFR BLD CALC: 36.1 % — LOW (ref 42–52)
HGB BLD-MCNC: 11.2 G/DL — LOW (ref 14–18)
IMM GRANULOCYTES NFR BLD AUTO: 0.5 % — HIGH (ref 0.1–0.3)
LYMPHOCYTES # BLD AUTO: 2.58 K/UL — SIGNIFICANT CHANGE UP (ref 1.2–3.4)
LYMPHOCYTES # BLD AUTO: 31.4 % — SIGNIFICANT CHANGE UP (ref 20.5–51.1)
MCHC RBC-ENTMCNC: 24.8 PG — LOW (ref 27–31)
MCHC RBC-ENTMCNC: 31 G/DL — LOW (ref 32–37)
MCV RBC AUTO: 79.9 FL — LOW (ref 80–94)
MONOCYTES # BLD AUTO: 0.84 K/UL — HIGH (ref 0.1–0.6)
MONOCYTES NFR BLD AUTO: 10.2 % — HIGH (ref 1.7–9.3)
NEUTROPHILS # BLD AUTO: 4.53 K/UL — SIGNIFICANT CHANGE UP (ref 1.4–6.5)
NEUTROPHILS NFR BLD AUTO: 55.1 % — SIGNIFICANT CHANGE UP (ref 42.2–75.2)
NRBC BLD AUTO-RTO: 0 /100 WBCS — SIGNIFICANT CHANGE UP (ref 0–0)
PLATELET # BLD AUTO: 254 K/UL — SIGNIFICANT CHANGE UP (ref 130–400)
PMV BLD: 10.8 FL — HIGH (ref 7.4–10.4)
POTASSIUM SERPL-MCNC: 4.2 MMOL/L — SIGNIFICANT CHANGE UP (ref 3.5–5)
POTASSIUM SERPL-SCNC: 4.2 MMOL/L — SIGNIFICANT CHANGE UP (ref 3.5–5)
PROT SERPL-MCNC: 6.7 G/DL — SIGNIFICANT CHANGE UP (ref 6–8)
RBC # BLD: 4.52 M/UL — LOW (ref 4.7–6.1)
RBC # FLD: 19.2 % — HIGH (ref 11.5–14.5)
RSV RNA NPH QL NAA+NON-PROBE: SIGNIFICANT CHANGE UP
SARS-COV-2 RNA SPEC QL NAA+PROBE: DETECTED
SODIUM SERPL-SCNC: 141 MMOL/L — SIGNIFICANT CHANGE UP (ref 135–146)
WBC # BLD: 8.22 K/UL — SIGNIFICANT CHANGE UP (ref 4.8–10.8)
WBC # FLD AUTO: 8.22 K/UL — SIGNIFICANT CHANGE UP (ref 4.8–10.8)

## 2025-02-23 PROCEDURE — 71045 X-RAY EXAM CHEST 1 VIEW: CPT | Mod: 26

## 2025-02-23 PROCEDURE — 93010 ELECTROCARDIOGRAM REPORT: CPT

## 2025-02-23 RX ORDER — BACTERIOSTATIC SODIUM CHLORIDE 0.9 %
1000 VIAL (ML) INJECTION ONCE
Refills: 0 | Status: COMPLETED | OUTPATIENT
Start: 2025-02-23 | End: 2025-02-23

## 2025-02-23 RX ORDER — ACETAMINOPHEN 160 MG/5ML
975 SUSPENSION ORAL ONCE
Refills: 0 | Status: COMPLETED | OUTPATIENT
Start: 2025-02-23 | End: 2025-02-23

## 2025-02-23 RX ADMIN — Medication 1000 MILLILITER(S): at 05:00

## 2025-02-23 RX ADMIN — ACETAMINOPHEN 975 MILLIGRAM(S): 160 SUSPENSION ORAL at 02:51

## 2025-02-23 NOTE — ED PROVIDER NOTE - CARE PLAN
1 Principal Discharge DX:	Nasal congestion  Secondary Diagnosis:	Mild headache   Principal Discharge DX:	Acute COVID-19  Secondary Diagnosis:	Mild headache  Secondary Diagnosis:	Nasal congestion

## 2025-02-23 NOTE — ED PROVIDER NOTE - CARE PROVIDER_API CALL
Angela Fox  Psychiatry  [address] [place] [id number]  Phone: [phone]  Fax: ()-  Follow Up Time: 4-6 Days

## 2025-02-23 NOTE — ED PROVIDER NOTE - CLINICAL SUMMARY MEDICAL DECISION MAKING FREE TEXT BOX
Patient reevaluated feels well heart rate reviewed sinus tachycardia with a rate of 107.  Patient is COVID-positive.  Heart rate likely secondary to infection.  In my opinion management to be done as an outpatient with supportive care as discussed with the patient.  Indication return to the ED were discussed

## 2025-02-23 NOTE — ED PROVIDER NOTE - PATIENT PORTAL LINK FT
You can access the FollowMyHealth Patient Portal offered by [facility] by registering at the following website: [email or web address]. By joining [employer]’s FollowMyHealth portal, you will also be able to view your health information using other applications (apps) compatible with our system. You can access the FollowMyHealth Patient Portal offered by [facility] by registering at the following website: [email or web address]. By joining [employer]’s FollowMyHealth portal, you will also be able to view your health information using other applications (apps) compatible with our system.

## 2025-02-23 NOTE — ED ADULT NURSE NOTE - CHIEF COMPLAINT QUOTE
He's from [address] [place] [address], they gave him a COVID test today and it just came back positive. They wanted us to give him another test, we don't have any - EMS  Patient reports nasal congestion since yesterday, denies fever, denies cough

## 2025-02-23 NOTE — ED PROVIDER NOTE - OBJECTIVE STATEMENT
Patient stated that, he was having headache and nasal congestion, so had COVID-19 test done and was positive, so was sent to ED for confirmation of COVID-19 test. Patient states that, headache is not that bad, all he wanted is Tylenol. Patient denies any other associated symptoms. 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 Patient denies cp/sob/abd pain. Denies any other symptoms. Patient is ambulatory in ED and wanted sandwich to eat and wanted COVID test collected and wanted to go back.

## 2025-02-23 NOTE — ED PROVIDER NOTE - NSFOLLOWUPINSTRUCTIONS_ED_ALL_ED_FT
Please follow with your PCP ASAP for reevaluation and reminder of the care.   Please return to ED immediately for any chest pain, trouble breathing, nausea, vomiting, headache, dizziness, abdominal pain, or any other symptoms/concerns.    [employer] CareNotes®  :  [facility]        COVID-19 (CORONAVIRUS DISEASE 2019) - AfterCare(R) Instructions(ER/ED)    COVID-19 (Coronavirus Disease 2019)    WHAT YOU NEED TO KNOW:    COVID-19 is the disease caused by a coronavirus first discovered in December 2019. The virus has changed into several new forms (called variants) since it was discovered. A variant may be more easily spread or cause more severe illness than the original form.    DISCHARGE INSTRUCTIONS:    Call your local emergency number (911 in the [place]) if:    You have trouble breathing or shortness of breath at rest.    You have chest pain or pressure that lasts longer than 5 minutes.    You become confused or hard to wake.  Return to the emergency department if:    You have trouble staying awake during the day.    Your nail beds, face, fingers, or toes look blue or darker than usual.  Call your doctor if:    You have a fever.    You have questions or concerns about your condition or care.  Medicines: You may need any of the following:    Decongestants help reduce nasal congestion and help you breathe more easily. If you take decongestant pills, they may make you feel restless or cause problems with your sleep. Do not use decongestant sprays for more than a few days.    Cough suppressants help reduce coughing. Ask your healthcare provider which type of cough medicine is best for you.    Acetaminophen decreases pain and fever. It is available without a doctor's order. Ask how much to take and how often to take it. Follow directions. Read the labels of all other medicines you are using to see if they also contain acetaminophen, or ask your doctor or pharmacist. Acetaminophen can cause liver damage if not taken correctly.    NSAIDs, such as ibuprofen, help decrease swelling, pain, and fever. This medicine is available with or without a doctor's order. NSAIDs can cause stomach bleeding or kidney problems in certain people. If you take blood thinner medicine, always ask your healthcare provider if NSAIDs are safe for you. Always read the medicine label and follow directions.    Antiviral medicines may be given if you are at risk for developing severe or life-threatening symptoms.    Blood thinners help prevent blood clots. Clots can cause strokes, heart attacks, and death. Many types of blood thinners are available. Your healthcare provider will give you specific instructions for the type you are given. The following are general safety guidelines to follow while you are taking a blood thinner:  Watch for bleeding and bruising. Watch for bleeding from your gums or nose. Watch for blood in your urine and bowel movements. Use a soft washcloth on your skin, and a soft toothbrush to brush your teeth. This can keep your skin and gums from bleeding. If you shave, use an electric shaver. Do not play contact sports.    Tell your dentist and other healthcare providers that you take a blood thinner. Wear a bracelet or necklace that says you take this medicine.    Do not start or stop any other medicines or supplements unless your healthcare provider tells you to. Many medicines and supplements cannot be used with blood thinners.    Take your blood thinner exactly as prescribed by your healthcare provider. Do not skip a dose or take less than prescribed. Tell your provider right away if you forget to take your blood thinner, or if you take too much.    Take your medicine as directed. Contact your healthcare provider if you think your medicine is not helping or if you have side effects. Tell your provider if you are allergic to any medicine. Keep a list of the medicines, vitamins, and herbs you take. Include the amounts, and when and why you take them. Bring the list or the pill bottles to follow-up visits. Carry your medicine list with you in case of an emergency.  What you need to know about COVID-19 vaccines: Healthcare providers recommend vaccination, even if you already had COVID-19.    Get a COVID-19 vaccine as directed. At least 1 dose of an updated vaccine is recommended for everyone 6 months or older. COVID-19 vaccines are given as a shot in 1 to 3 doses, depending on the age of the person who receives it. COVID-19 vaccines are updated throughout the year. Your healthcare provider can help you schedule all needed doses as updated vaccines become available.  Recommended COVID-19 Immunization Schedule      Continue to protect yourself and others. You can become infected even after you get the vaccine. You may also be able to pass the virus to others without knowing you are infected.    After you get the vaccine, check local, national, and international travel rules. You may need to be tested before you travel. Some countries require proof of a negative test before you travel. You may also need to quarantine after you return.    Medicine may be given to prevent infection. The medicine can be given if you are at high risk for infection and cannot get the vaccine. It can also be given if your immune system does not respond well to the vaccine.  How the virus spreads: The virus can be spread starting 2 to 3 days before symptoms begin. Close personal contact with an infected person increases your risk for infection. This means being within 6 feet (2 meters) of the person for at least 15 minutes over 24 hours.    The virus travels in droplets that form when a person talks, sings, coughs, or sneezes. The droplets can also float in the air for minutes or hours. Infection happens when you breathe in the droplets or get them in your eyes or nose.    Person-to-person contact can spread the virus. For example, a person with the virus on his or her hands can spread it by shaking hands with someone.    The virus can stay on objects and surfaces for hours to days. You may become infected by touching the object or surface and then touching your eyes or mouth.  Help lower your risk for COVID-19 during an active outbreak:    Stay home if you are sick or think you may have COVID-19. It is important to stay home if you are waiting for a testing appointment or for test results.    Wash your hands often throughout the day. Use soap and water. Rub your soapy hands together, lacing your fingers, for at least 20 seconds. Rinse with warm, running water. Dry your hands with a clean towel or paper towel. Use hand  that contains alcohol if soap and water are not available. Teach children how to wash their hands and use hand .  Handwashing      Cover sneezes and coughs. Turn your face away and cover your mouth and nose with a tissue. Throw the tissue away. Use the bend of your arm if a tissue is not available. Then wash your hands well with soap and water or use hand . Teach children how to cover a cough or sneeze.    Wear a face covering (mask) when needed. Use a cloth covering with at least 2 layers. You can also create layers by putting a cloth covering over a disposable non-medical mask. Cover your mouth and your nose.  How to Wear a Face Covering (Mask)      Try to keep space between you and others when you are out of the house. Avoid crowds as much as possible. Wear a face covering when you must be around a large group and cannot keep space between you and others.    Clean and disinfect high-touch surfaces and objects often. Use disinfecting wipes, or make a solution of 4 teaspoons of bleach in 1 quart (4 cups) of water.    Ask about other vaccines you may need. Get the influenza (flu) vaccine as soon as recommended each year, usually starting in September or October. Get the pneumonia and respiratory syncytial virus (RSV) vaccines, if recommended. Your healthcare provider can tell you if you should also get other vaccines, and when to get them.  Prevent COVID-19 Infection  Follow up with your doctor as directed: Write down your questions so you remember to ask them during your visits.    For more information:    Centers for Disease Control and Prevention  [address] [place] [address]  Phone: 1-482.557.9009  Web Address: http://www.cdc.gov  © [employer] 1973, 2025    	  back to top

## 2025-02-23 NOTE — ED PROVIDER NOTE - NSFOLLOWUPCLINICS_GEN_ALL_ED_FT
[facility]  [facility] [place] [address]  Phone: [phone]  Fax:   Follow Up Time: Urgent

## 2025-02-23 NOTE — ED PROVIDER NOTE - PROGRESS NOTE DETAILS
Patient Medication list obtained from patient residence. These are the medications, patient is currently on:   Benztropine, Metoprolol, Atorvastatin, Metformin, Levothyroxine, Divalproex, Escitalopram. Patient repeat HR noted and discussed with him, on cardiac Monitor HR is 116 and saturation on RA is 96%.  Discussed with patient and he agreed with Labs/CXR/EKG, IVF and reevaluation. Patient Medication list obtained from patient residence. These are the medications, patient is currently on:   Benztropine, Metoprolol, Atorvastatin, Metformin, Levothyroxine, Divalproex, Escitalopram.  I discussed with patient about the benefits of the Palxlovid, but patient declined to start treatment with Palxlovid for COVID-19.

## 2025-02-23 NOTE — ED PROVIDER NOTE - EKG/XRAY ADDITIONAL INFORMATION
EKG shows sinus tachycardia, HR is 112, QRS is 100, QTC is 436 and no significant ST/T wave changes noted.   Chest X-rays reviewed and interpreted by me Dr. Cardenas and shows no actue findings. No Pneumothorax, no free air, no effusions, and these findings discussed with patient.

## 2025-02-23 NOTE — ED PROVIDER NOTE - DIFFERENTIAL DIAGNOSIS
Differential Diagnosis Electrolyte abnormalities, dehydration, PA, hyperglycemia, hypoglycemia, symptomatic anemia.  r/o COVID-19 and r/o pneumonia.

## 2025-03-07 DIAGNOSIS — E11.9 TYPE 2 DIABETES MELLITUS WITHOUT COMPLICATIONS: ICD-10-CM

## 2025-03-07 DIAGNOSIS — B35.1 TINEA UNGUIUM: ICD-10-CM
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
## 2025-03-07 NOTE — ED PROVIDER NOTE - ATTENDING APP SHARED VISIT CONTRIBUTION OF CARE
Pt presents to the Ed with c/o left-sided facial swelling that she noticed this morning when she got up. Pt states the swelling has gotten slightly worse than when she first woke up this morning. Pt states this has never happened to her before. Pt states it is causing her no pain and is just tender to the touch. VSS.   54 yo m with pmh of htn, dm, hypothyroidism, schizoaffective do presents with sob, cough , and weakness.  pt says started 3-4 days ago.  pt says productive cough.  no cp.  no n/v/d, no abd pain.  pt says NAVA.  found to have fever in ED.  exam: nad, ncat, perrl, eomi, mmm, rrr, scattered rales, abd soft, nt, nd aox3, no calf tenderness, no pitting edema imp: pt with cough, sob and weakness, febrile in ED, r/o pna/sepsis, labs, ivf, and cxr.  no hypoxia. 52 yo m with pmh of htn, dm, hypothyroidism, schizoaffective do presents with sob, cough , and weakness.  pt says started 3-4 days ago.  pt says productive cough.  no cp.  no n/v/d, no abd pain.  pt says NAVA.  found to have fever in ED.  exam: nad, ncat, perrl, eomi, mmm, rrr, scattered rales, abd soft, nt, nd aox3, no calf tenderness, no pitting edema imp: pt with cough, sob and weakness, febrile in ED, r/o pna/sepsis, labs, ivf, and cxr.  no hypoxia.

## 2025-04-10 ENCOUNTER — APPOINTMENT (OUTPATIENT)
Dept: ENDOCRINOLOGY | Facility: CLINIC | Age: 55
End: 2025-04-10

## 2025-04-10 ENCOUNTER — OUTPATIENT (OUTPATIENT)
Dept: OUTPATIENT SERVICES | Facility: HOSPITAL | Age: 55
LOS: 1 days | End: 2025-04-10
Payer: MEDICAID

## 2025-04-10 VITALS
WEIGHT: 193 LBS | HEIGHT: 65 IN | DIASTOLIC BLOOD PRESSURE: 87 MMHG | BODY MASS INDEX: 32.15 KG/M2 | HEART RATE: 95 BPM | SYSTOLIC BLOOD PRESSURE: 133 MMHG

## 2025-04-10 DIAGNOSIS — E78.5 HYPERLIPIDEMIA, UNSPECIFIED: ICD-10-CM

## 2025-04-10 DIAGNOSIS — Z00.00 ENCOUNTER FOR GENERAL ADULT MEDICAL EXAMINATION WITHOUT ABNORMAL FINDINGS: ICD-10-CM

## 2025-04-10 DIAGNOSIS — E11.9 TYPE 2 DIABETES MELLITUS W/OUT COMPLICATIONS: ICD-10-CM

## 2025-04-10 DIAGNOSIS — E03.9 HYPOTHYROIDISM, UNSPECIFIED: ICD-10-CM

## 2025-04-10 PROCEDURE — 99214 OFFICE O/P EST MOD 30 MIN: CPT

## 2025-04-11 ENCOUNTER — APPOINTMENT (OUTPATIENT)
Dept: INTERNAL MEDICINE | Facility: CLINIC | Age: 55
End: 2025-04-11

## 2025-04-15 DIAGNOSIS — E03.4 ATROPHY OF THYROID (ACQUIRED): ICD-10-CM

## 2025-04-15 DIAGNOSIS — E11.65 TYPE 2 DIABETES MELLITUS WITH HYPERGLYCEMIA: ICD-10-CM

## 2025-04-17 NOTE — ED PROVIDER NOTE - NS_EDPROVIDERDISPOUSERTYPE_ED_A_ED
"Subjective:      Maria L Joseph is a 17 y.o. female here for acute care visit.     Vitals:    04/17/25 1512   BP: 114/67   Pulse: 82   Temp: 98.2 °F (36.8 °C)       HPI: Patient here for acute care visit with had no chief complaint listed for this encounter.    16 y/o female with known diagnosis of adjustment disorder here today for med f/u. Pt was increased to Lexapro 15mg at last visit after noted improvement in mood on Lexapro 10mg. She states she can tell an improvement in the increased dose and likes that it has improved her overall mood. No SI/HI, no signficant crying episodes. She states she will still worry a lot about the next upcoming "big thing" until that is over and then will worry about what comes next. School work is improved as is sleep. No negative side effects noted. No other concerns today.     Past Medical History:   Diagnosis Date    Allergic rhinitis 12/22/2023    Osteochondritis dissecans of left knee 06/09/2023    Reactive airway disease     albuterol    Sensory processing difficulty        has a current medication list which includes the following prescription(s): albuterol sulfate, escitalopram oxalate, polyethylene glycol 3350, and propranolol.    ROS see HPI      Objective:     Gen: Well nourished, alert and responsive  HEENT: Normocephalic, atraumatic. Nose wnl, no rhinorrhea. MMM.  Resp: Lungs CTAB with normal respiratory effort, no wheezes or rhonchi.  CV: HRRR, no m/r/g.  Neuro/MS: Normal strength and ROM  Skin: no rash or jaundice    Assessment:        1. Adjustment disorder with mixed anxiety and depressed mood         Plan:     Discussed treatment options, will increase dose to lexapro 20mg PO qAM. May consider adding Buspirone if still room to improve at f/u. F/U in 1 month or sooner prn, virtually or in person.     "
Attending Attestation (For Attendings USE Only)...

## 2025-04-23 NOTE — ED BEHAVIORAL HEALTH ASSESSMENT NOTE - HPI (INCLUDE ILLNESS QUALITY, SEVERITY, DURATION, TIMING, CONTEXT, MODIFYING FACTORS, ASSOCIATED SIGNS AND SYMPTOMS)
What Type Of Note Output Would You Prefer (Optional)?: Standard Output
Hpi Title: Evaluation of Skin Lesions
54 yo male living at [facility] ([phone]) with PMH DM, HLD, HTN, hypothyroidism, and psychiatric h/o schizoaffective disorder, past substance use disorders, frequent ED visits and hospitalizations, past suicide attempts vs NSSIB by cutting, banging head, on Clozaril and Invega Sustenna, who was BIB EMS from his residence after patient endorsed SI.    Patient is a limited historian. He reported he asked for a soda at his group home and he moved which spilled the soda so he got frustrated. He reported that people at his residence talk bad things about him which makes him sad. He also reported that he does not have a family which is stressful for him. When asked why he was having SI, he said, he was thinking about it, but "I will not do it." He asked that he wants to get admitted to have some time away from his residence because some people are talking bad things about him. I educated him that more information about his mental illness is required to make that determination. He did not provide further information and got frustrated and walked away from the camera.     Collaterals from [facility] ([phone]: No response.

## 2025-05-01 ENCOUNTER — EMERGENCY (EMERGENCY)
Facility: HOSPITAL | Age: 55
LOS: 0 days | Discharge: HOME | End: 2025-05-02
Attending: EMERGENCY MEDICINE
Payer: MEDICAID

## 2025-05-01 ENCOUNTER — OUTPATIENT (OUTPATIENT)
Dept: OUTPATIENT SERVICES | Facility: HOSPITAL | Age: 55
LOS: 1 days | End: 2025-05-01
Payer: MEDICAID

## 2025-05-01 VITALS
WEIGHT: 160.06 LBS | OXYGEN SATURATION: 100 % | HEART RATE: 78 BPM | SYSTOLIC BLOOD PRESSURE: 119 MMHG | DIASTOLIC BLOOD PRESSURE: 77 MMHG | RESPIRATION RATE: 18 BRPM | TEMPERATURE: 98 F

## 2025-05-01 DIAGNOSIS — E03.9 HYPOTHYROIDISM, UNSPECIFIED: ICD-10-CM

## 2025-05-01 LAB
ALBUMIN SERPL ELPH-MCNC: 4.2 G/DL — SIGNIFICANT CHANGE UP (ref 3.5–5.2)
ALP SERPL-CCNC: 112 U/L — SIGNIFICANT CHANGE UP (ref 30–115)
ALT FLD-CCNC: 8 U/L — SIGNIFICANT CHANGE UP (ref 0–41)
ANION GAP SERPL CALC-SCNC: 13 MMOL/L — SIGNIFICANT CHANGE UP (ref 7–14)
APPEARANCE UR: CLEAR — SIGNIFICANT CHANGE UP
AST SERPL-CCNC: 18 U/L — SIGNIFICANT CHANGE UP (ref 0–41)
BASOPHILS # BLD AUTO: 0.03 K/UL — SIGNIFICANT CHANGE UP (ref 0–0.2)
BASOPHILS NFR BLD AUTO: 0.3 % — SIGNIFICANT CHANGE UP (ref 0–1)
BILIRUB SERPL-MCNC: 0.2 MG/DL — SIGNIFICANT CHANGE UP (ref 0.2–1.2)
BILIRUB UR-MCNC: NEGATIVE — SIGNIFICANT CHANGE UP
BUN SERPL-MCNC: 5 MG/DL — LOW (ref 10–20)
CALCIUM SERPL-MCNC: 9.8 MG/DL — SIGNIFICANT CHANGE UP (ref 8.4–10.5)
CHLORIDE SERPL-SCNC: 100 MMOL/L — SIGNIFICANT CHANGE UP (ref 98–110)
CO2 SERPL-SCNC: 25 MMOL/L — SIGNIFICANT CHANGE UP (ref 17–32)
COLOR SPEC: YELLOW — SIGNIFICANT CHANGE UP
CREAT SERPL-MCNC: 0.8 MG/DL — SIGNIFICANT CHANGE UP (ref 0.7–1.5)
DIFF PNL FLD: NEGATIVE — SIGNIFICANT CHANGE UP
EGFR: 106 ML/MIN/1.73M2 — SIGNIFICANT CHANGE UP
EGFR: 106 ML/MIN/1.73M2 — SIGNIFICANT CHANGE UP
EOSINOPHIL # BLD AUTO: 0.12 K/UL — SIGNIFICANT CHANGE UP (ref 0–0.7)
EOSINOPHIL NFR BLD AUTO: 1.3 % — SIGNIFICANT CHANGE UP (ref 0–8)
GLUCOSE SERPL-MCNC: 119 MG/DL — HIGH (ref 70–99)
GLUCOSE UR QL: NEGATIVE MG/DL — SIGNIFICANT CHANGE UP
HCT VFR BLD CALC: 35.7 % — LOW (ref 42–52)
HGB BLD-MCNC: 11.4 G/DL — LOW (ref 14–18)
IMM GRANULOCYTES NFR BLD AUTO: 0.2 % — SIGNIFICANT CHANGE UP (ref 0.1–0.3)
KETONES UR-MCNC: NEGATIVE MG/DL — SIGNIFICANT CHANGE UP
LACTATE SERPL-SCNC: 2 MMOL/L — SIGNIFICANT CHANGE UP (ref 0.7–2)
LEUKOCYTE ESTERASE UR-ACNC: NEGATIVE — SIGNIFICANT CHANGE UP
LIDOCAIN IGE QN: 33 U/L — SIGNIFICANT CHANGE UP (ref 7–60)
LYMPHOCYTES # BLD AUTO: 3.25 K/UL — SIGNIFICANT CHANGE UP (ref 1.2–3.4)
LYMPHOCYTES # BLD AUTO: 36 % — SIGNIFICANT CHANGE UP (ref 20.5–51.1)
MCHC RBC-ENTMCNC: 24.6 PG — LOW (ref 27–31)
MCHC RBC-ENTMCNC: 31.9 G/DL — LOW (ref 32–37)
MCV RBC AUTO: 77.1 FL — LOW (ref 80–94)
MONOCYTES # BLD AUTO: 0.84 K/UL — HIGH (ref 0.1–0.6)
MONOCYTES NFR BLD AUTO: 9.3 % — SIGNIFICANT CHANGE UP (ref 1.7–9.3)
NEUTROPHILS # BLD AUTO: 4.76 K/UL — SIGNIFICANT CHANGE UP (ref 1.4–6.5)
NEUTROPHILS NFR BLD AUTO: 52.9 % — SIGNIFICANT CHANGE UP (ref 42.2–75.2)
NITRITE UR-MCNC: NEGATIVE — SIGNIFICANT CHANGE UP
NRBC BLD AUTO-RTO: 0 /100 WBCS — SIGNIFICANT CHANGE UP (ref 0–0)
PH UR: 6.5 — SIGNIFICANT CHANGE UP (ref 5–8)
PLATELET # BLD AUTO: 286 K/UL — SIGNIFICANT CHANGE UP (ref 130–400)
PMV BLD: 10.1 FL — SIGNIFICANT CHANGE UP (ref 7.4–10.4)
POTASSIUM SERPL-MCNC: 4.3 MMOL/L — SIGNIFICANT CHANGE UP (ref 3.5–5)
POTASSIUM SERPL-SCNC: 4.3 MMOL/L — SIGNIFICANT CHANGE UP (ref 3.5–5)
PROT SERPL-MCNC: 7.3 G/DL — SIGNIFICANT CHANGE UP (ref 6–8)
PROT UR-MCNC: NEGATIVE MG/DL — SIGNIFICANT CHANGE UP
RBC # BLD: 4.63 M/UL — LOW (ref 4.7–6.1)
RBC # FLD: 17.3 % — HIGH (ref 11.5–14.5)
SODIUM SERPL-SCNC: 138 MMOL/L — SIGNIFICANT CHANGE UP (ref 135–146)
SP GR SPEC: 1.01 — SIGNIFICANT CHANGE UP (ref 1–1.03)
UROBILINOGEN FLD QL: 1 MG/DL — SIGNIFICANT CHANGE UP (ref 0.2–1)
WBC # BLD: 9.02 K/UL — SIGNIFICANT CHANGE UP (ref 4.8–10.8)
WBC # FLD AUTO: 9.02 K/UL — SIGNIFICANT CHANGE UP (ref 4.8–10.8)

## 2025-05-01 PROCEDURE — 84443 ASSAY THYROID STIM HORMONE: CPT

## 2025-05-01 PROCEDURE — 71260 CT THORAX DX C+: CPT | Mod: 26

## 2025-05-01 PROCEDURE — 74177 CT ABD & PELVIS W/CONTRAST: CPT | Mod: MC

## 2025-05-01 PROCEDURE — 80061 LIPID PANEL: CPT

## 2025-05-01 PROCEDURE — 80053 COMPREHEN METABOLIC PANEL: CPT

## 2025-05-01 PROCEDURE — 83605 ASSAY OF LACTIC ACID: CPT

## 2025-05-01 PROCEDURE — 71260 CT THORAX DX C+: CPT | Mod: MC

## 2025-05-01 PROCEDURE — 85027 COMPLETE CBC AUTOMATED: CPT

## 2025-05-01 PROCEDURE — 81003 URINALYSIS AUTO W/O SCOPE: CPT

## 2025-05-01 PROCEDURE — 96360 HYDRATION IV INFUSION INIT: CPT | Mod: XU

## 2025-05-01 PROCEDURE — 83036 HEMOGLOBIN GLYCOSYLATED A1C: CPT

## 2025-05-01 PROCEDURE — 99284 EMERGENCY DEPT VISIT MOD MDM: CPT | Mod: 25

## 2025-05-01 PROCEDURE — 99285 EMERGENCY DEPT VISIT HI MDM: CPT

## 2025-05-01 PROCEDURE — 36415 COLL VENOUS BLD VENIPUNCTURE: CPT

## 2025-05-01 PROCEDURE — 74177 CT ABD & PELVIS W/CONTRAST: CPT | Mod: 26

## 2025-05-01 PROCEDURE — 83690 ASSAY OF LIPASE: CPT

## 2025-05-01 PROCEDURE — 82043 UR ALBUMIN QUANTITATIVE: CPT

## 2025-05-01 PROCEDURE — 85025 COMPLETE CBC W/AUTO DIFF WBC: CPT

## 2025-05-01 PROCEDURE — 96361 HYDRATE IV INFUSION ADD-ON: CPT

## 2025-05-01 PROCEDURE — 84439 ASSAY OF FREE THYROXINE: CPT

## 2025-05-01 RX ORDER — ACETAMINOPHEN 500 MG/5ML
975 LIQUID (ML) ORAL ONCE
Refills: 0 | Status: COMPLETED | OUTPATIENT
Start: 2025-05-01 | End: 2025-05-01

## 2025-05-01 RX ORDER — SODIUM CHLORIDE 9 G/1000ML
1000 INJECTION, SOLUTION INTRAVENOUS ONCE
Refills: 0 | Status: COMPLETED | OUTPATIENT
Start: 2025-05-01 | End: 2025-05-01

## 2025-05-01 RX ADMIN — Medication 975 MILLIGRAM(S): at 22:55

## 2025-05-01 RX ADMIN — SODIUM CHLORIDE 1000 MILLILITER(S): 9 INJECTION, SOLUTION INTRAVENOUS at 22:55

## 2025-05-01 RX ADMIN — SODIUM CHLORIDE 1000 MILLILITER(S): 9 INJECTION, SOLUTION INTRAVENOUS at 21:10

## 2025-05-01 NOTE — ED ADULT NURSE NOTE - NSFALLRISKINTERV_ED_ALL_ED

## 2025-05-01 NOTE — ED PROVIDER NOTE - PATIENT PORTAL LINK FT
You can access the FollowMyHealth Patient Portal offered by [facility] by registering at the following website: [email or web address]. By joining [employer]’s FollowMyHealth portal, you will also be able to view your health information using other applications (apps) compatible with our system.

## 2025-05-01 NOTE — ED PROVIDER NOTE - ATTENDING APP SHARED VISIT CONTRIBUTION OF CARE
54-year-old male with PMH HTN, HLD, schizoaffective disorder presents for evaluation of left lower quadrant abdominal pain for 2 days.  Pain is constant and nonradiating, worse with movement. Patient denies any fevers or chills, nausea, vomiting, diarrhea or urinary complaints.  States he is tolerating p.o. as usual.  No chest pain or shortness of breath or URI symptoms.  Patient states he hit the left side of his abdomen on the table corner 2 days earlier.    VITAL SIGNS: noted  CONSTITUTIONAL: Well-developed; well-nourished; in no acute distress  HEAD: Normocephalic; atraumatic  EYES: PERRL, EOM intact; conjunctiva and sclera clear  ENT: No nasal discharge; airway clear. MMM  NECK: Supple; non tender.    CARD: S1, S2 normal; no murmurs, gallops, or rubs. Regular rate and rhythm  RESP: CTAB/L, no wheezes, rales or rhonchi  ABD: Normal bowel sounds; soft; non-distended; mild LLQ abd tenderness, no R/G, no CVA tenderness  EXT: Normal ROM. No calf tenderness or edema. Distal pulses intact  NEURO: Alert, oriented. Grossly unremarkable. No focal deficits  SKIN: Skin exam is warm and dry, no acute rash  MS: No midline spinal tenderness

## 2025-05-01 NOTE — ED PROVIDER NOTE - CLINICAL SUMMARY MEDICAL DECISION MAKING FREE TEXT BOX
Patient evaluated for abdominal pain, labs and imaging reviewed.  No acute findings and patient reported feeling better and well to go home. Advised close follow-up with PMD and patient agreed.  Strict return precautions advised and patient verbalized understanding.

## 2025-05-01 NOTE — ED PROVIDER NOTE - OBJECTIVE STATEMENT
54 year old male, past medical history schizoaffective disorder, htn, hdl, who presents with L-sided abd pain. patient reports pain that began yesterday, worse with movement. denies f/c, chest pain, shortness of breath, nausea/vomiting/diarrhea, urinary symptoms, skin changes. patient states he hit the L side of his abdomen on corner of table. no falls or head injury.

## 2025-05-01 NOTE — ED PROVIDER NOTE - PHYSICAL EXAMINATION
CONSTITUTIONAL: Well-developed; well-nourished; in no acute distress, nontoxic appearing  SKIN: skin exam is warm and dry  HEAD: Normocephalic; atraumatic  NECK: ROM intact.  CARD: S1, S2 normal, no murmur  RESP: Good air movement bilaterally  ABD: soft; non-distended; +L sided abd tenderness, no skin changes  EXT: Normal ROM. no chest wall tenderness or skin changes, no skin tenting  NEURO: awake, alert, following commands, oriented, grossly unremarkable. No Focal deficits. GCS 15.   PSYCH: Cooperative, appropriate.

## 2025-05-01 NOTE — ED PROVIDER NOTE - CARE PROVIDER_API CALL
Angela Fox  Psychiatry  [address] [place] [id number]  Phone: [phone]  Fax: [phone]  Follow Up Time: Routine

## 2025-05-02 DIAGNOSIS — E03.9 HYPOTHYROIDISM, UNSPECIFIED: ICD-10-CM

## 2025-05-04 ENCOUNTER — EMERGENCY (EMERGENCY)
Facility: HOSPITAL | Age: 55
LOS: 0 days | Discharge: HOME | End: 2025-05-05
Attending: STUDENT IN AN ORGANIZED HEALTH CARE EDUCATION/TRAINING PROGRAM
Payer: MEDICAID

## 2025-05-04 VITALS
OXYGEN SATURATION: 98 % | TEMPERATURE: 98 F | RESPIRATION RATE: 19 BRPM | WEIGHT: 164.91 LBS | SYSTOLIC BLOOD PRESSURE: 142 MMHG | DIASTOLIC BLOOD PRESSURE: 97 MMHG | HEART RATE: 124 BPM

## 2025-05-04 LAB
ANION GAP SERPL CALC-SCNC: 13 MMOL/L — SIGNIFICANT CHANGE UP (ref 7–14)
ANISOCYTOSIS BLD QL: SLIGHT — SIGNIFICANT CHANGE UP
APAP SERPL-MCNC: <5 UG/ML — LOW (ref 10–30)
APPEARANCE UR: CLEAR — SIGNIFICANT CHANGE UP
BASOPHILS # BLD AUTO: 0.02 K/UL — SIGNIFICANT CHANGE UP (ref 0–0.2)
BASOPHILS NFR BLD AUTO: 0.2 % — SIGNIFICANT CHANGE UP (ref 0–1)
BILIRUB UR-MCNC: NEGATIVE — SIGNIFICANT CHANGE UP
BUN SERPL-MCNC: 6 MG/DL — LOW (ref 10–20)
BURR CELLS BLD QL SMEAR: PRESENT — SIGNIFICANT CHANGE UP
BURR CELLS BLD QL SMEAR: SLIGHT — SIGNIFICANT CHANGE UP
CALCIUM SERPL-MCNC: 9.6 MG/DL — SIGNIFICANT CHANGE UP (ref 8.4–10.5)
CHLORIDE SERPL-SCNC: 103 MMOL/L — SIGNIFICANT CHANGE UP (ref 98–110)
CO2 SERPL-SCNC: 24 MMOL/L — SIGNIFICANT CHANGE UP (ref 17–32)
COLOR SPEC: YELLOW — SIGNIFICANT CHANGE UP
CREAT SERPL-MCNC: 0.7 MG/DL — SIGNIFICANT CHANGE UP (ref 0.7–1.5)
DIFF PNL FLD: NEGATIVE — SIGNIFICANT CHANGE UP
EGFR: 110 ML/MIN/1.73M2 — SIGNIFICANT CHANGE UP
EGFR: 110 ML/MIN/1.73M2 — SIGNIFICANT CHANGE UP
ELLIPTOCYTES BLD QL SMEAR: SLIGHT — SIGNIFICANT CHANGE UP
EOSINOPHIL # BLD AUTO: 0.1 K/UL — SIGNIFICANT CHANGE UP (ref 0–0.7)
EOSINOPHIL NFR BLD AUTO: 1.1 % — SIGNIFICANT CHANGE UP (ref 0–8)
ETHANOL SERPL-MCNC: <10 MG/DL — SIGNIFICANT CHANGE UP
GLUCOSE SERPL-MCNC: 119 MG/DL — HIGH (ref 70–99)
GLUCOSE UR QL: NEGATIVE MG/DL — SIGNIFICANT CHANGE UP
HCT VFR BLD CALC: 36.6 % — LOW (ref 42–52)
HGB BLD-MCNC: 11.7 G/DL — LOW (ref 14–18)
HYPOCHROMIA BLD QL: SIGNIFICANT CHANGE UP
IMM GRANULOCYTES NFR BLD AUTO: 0.3 % — SIGNIFICANT CHANGE UP (ref 0.1–0.3)
KETONES UR-MCNC: NEGATIVE MG/DL — SIGNIFICANT CHANGE UP
LEUKOCYTE ESTERASE UR-ACNC: NEGATIVE — SIGNIFICANT CHANGE UP
LYMPHOCYTES # BLD AUTO: 2.69 K/UL — SIGNIFICANT CHANGE UP (ref 1.2–3.4)
LYMPHOCYTES # BLD AUTO: 29.4 % — SIGNIFICANT CHANGE UP (ref 20.5–51.1)
MACROCYTES BLD QL: SLIGHT — SIGNIFICANT CHANGE UP
MANUAL SMEAR VERIFICATION: SIGNIFICANT CHANGE UP
MCHC RBC-ENTMCNC: 24.8 PG — LOW (ref 27–31)
MCHC RBC-ENTMCNC: 32 G/DL — SIGNIFICANT CHANGE UP (ref 32–37)
MCV RBC AUTO: 77.5 FL — LOW (ref 80–94)
MONOCYTES # BLD AUTO: 0.72 K/UL — HIGH (ref 0.1–0.6)
MONOCYTES NFR BLD AUTO: 7.9 % — SIGNIFICANT CHANGE UP (ref 1.7–9.3)
NEUTROPHILS # BLD AUTO: 5.6 K/UL — SIGNIFICANT CHANGE UP (ref 1.4–6.5)
NEUTROPHILS NFR BLD AUTO: 61.1 % — SIGNIFICANT CHANGE UP (ref 42.2–75.2)
NITRITE UR-MCNC: NEGATIVE — SIGNIFICANT CHANGE UP
NRBC BLD AUTO-RTO: 0 /100 WBCS — SIGNIFICANT CHANGE UP (ref 0–0)
OVALOCYTES BLD QL SMEAR: SLIGHT — SIGNIFICANT CHANGE UP
PH UR: 6 — SIGNIFICANT CHANGE UP (ref 5–8)
PLAT MORPH BLD: NORMAL — SIGNIFICANT CHANGE UP
PLATELET # BLD AUTO: 275 K/UL — SIGNIFICANT CHANGE UP (ref 130–400)
PMV BLD: 10.4 FL — SIGNIFICANT CHANGE UP (ref 7.4–10.4)
POIKILOCYTOSIS BLD QL AUTO: SIGNIFICANT CHANGE UP
POLYCHROMASIA BLD QL SMEAR: SIGNIFICANT CHANGE UP
POTASSIUM SERPL-MCNC: 4 MMOL/L — SIGNIFICANT CHANGE UP (ref 3.5–5)
POTASSIUM SERPL-SCNC: 4 MMOL/L — SIGNIFICANT CHANGE UP (ref 3.5–5)
PROT UR-MCNC: NEGATIVE MG/DL — SIGNIFICANT CHANGE UP
RBC # BLD: 4.72 M/UL — SIGNIFICANT CHANGE UP (ref 4.7–6.1)
RBC # FLD: 17.4 % — HIGH (ref 11.5–14.5)
RBC BLD AUTO: ABNORMAL
SALICYLATES SERPL-MCNC: <0.3 MG/DL — LOW (ref 4–30)
SMUDGE CELLS # BLD: PRESENT — SIGNIFICANT CHANGE UP
SODIUM SERPL-SCNC: 140 MMOL/L — SIGNIFICANT CHANGE UP (ref 135–146)
SP GR SPEC: 1.01 — SIGNIFICANT CHANGE UP (ref 1–1.03)
UROBILINOGEN FLD QL: 1 MG/DL — SIGNIFICANT CHANGE UP (ref 0.2–1)
VARIANT LYMPHS # BLD: 1.8 % — SIGNIFICANT CHANGE UP (ref 0–5)
VARIANT LYMPHS NFR BLD MANUAL: 1.8 % — SIGNIFICANT CHANGE UP (ref 0–5)
WBC # BLD: 9.16 K/UL — SIGNIFICANT CHANGE UP (ref 4.8–10.8)
WBC # FLD AUTO: 9.16 K/UL — SIGNIFICANT CHANGE UP (ref 4.8–10.8)

## 2025-05-04 PROCEDURE — 85025 COMPLETE CBC W/AUTO DIFF WBC: CPT

## 2025-05-04 PROCEDURE — 99285 EMERGENCY DEPT VISIT HI MDM: CPT | Mod: 25

## 2025-05-04 PROCEDURE — 81003 URINALYSIS AUTO W/O SCOPE: CPT

## 2025-05-04 PROCEDURE — 93005 ELECTROCARDIOGRAM TRACING: CPT

## 2025-05-04 PROCEDURE — 93010 ELECTROCARDIOGRAM REPORT: CPT

## 2025-05-04 PROCEDURE — 99285 EMERGENCY DEPT VISIT HI MDM: CPT

## 2025-05-04 PROCEDURE — 80048 BASIC METABOLIC PNL TOTAL CA: CPT

## 2025-05-04 PROCEDURE — 82962 GLUCOSE BLOOD TEST: CPT

## 2025-05-04 PROCEDURE — 80307 DRUG TEST PRSMV CHEM ANLYZR: CPT

## 2025-05-04 PROCEDURE — 36415 COLL VENOUS BLD VENIPUNCTURE: CPT

## 2025-05-04 PROCEDURE — 87635 SARS-COV-2 COVID-19 AMP PRB: CPT

## 2025-05-04 NOTE — ED PROVIDER NOTE - PROGRESS NOTE DETAILS
Authored by Dr. Donn Garnica spoke with dr. ríos from telepsych, will hold to am for collateral, s/o provided to dr. collins pending re-eval by psyc MS–patient seen and cleared by psychiatry.  Plan for discharge back to facility.

## 2025-05-04 NOTE — ED ADULT TRIAGE NOTE - NS_BH TRG QUESTION8_ED_ALL_ED
Depression (without Suicidality or Psychosis)/Charley (includes Bipolar Disorder)/Anxiety (includes Panic, OCD)/Behavioral Problems (includes ADHD, Oppositionality)

## 2025-05-04 NOTE — ED PROVIDER NOTE - PATIENT PORTAL LINK FT
You can access the FollowMyHealth Patient Portal offered by [facility] by registering at the following website: [email or web address]. By joining [employer]’s FollowMyHealth portal, you will also be able to view your health information using other applications (apps) compatible with our system.

## 2025-05-04 NOTE — ED PROVIDER NOTE - PHYSICAL EXAMINATION
VITAL SIGNS: I have reviewed nursing notes and confirm.  CONSTITUTIONAL: Non-toxic, in NAD  SKIN: Warm dry, normal skin turgor  HEAD: NCAT  EYES: No conjunctival injection, scleral anicteric  ENT: Moist mucous membranes, normal pharynx with no erythema or exudates  NECK: Supple; full ROM. Nontender. No cervical LAD  CARD: RRR, no murmurs, rubs or gallops  RESP: Clear to ausculation bilaterally.  No rales, rhonchi, or wheezing.  ABD: Soft, non-distended, non-tender,  EXT: Full ROM  NEURO: Normal motor, normal sensory.   PSYCH: Cooperative, appropriate.

## 2025-05-04 NOTE — ED ADULT NURSE NOTE - CHIEF COMPLAINT QUOTE
Pt was sent from [facility] for hyperglycemia, RN stated that they do not have glucometer and she did not check patient's sugar, EMS had FS of 151. FS in triage is 137 mg/dl.  Pt c/o being depressed, hearing voices and rage, was banging his head against the door in ambulance bay. Pt stated that he hasn't been sleeping due to constant voices of his mother telling him to kill himself by hanging.

## 2025-05-04 NOTE — ED PROVIDER NOTE - OBJECTIVE STATEMENT
Patient is a 54-year-old male with past medical history of Schizophrenia, HTN, and HLD presenting for hallucinations. Patient endorses worsening insomnia associated with auditory and visual hallucinations of seeing his mother for the past several days. Patient sates the voices are intrusive and tonight he felt like he wanted to end his life because of the hallucinations and believing his mother was telling him to kill himself. Patient is also reporting banging his head against the wall for the past two days, which is something he use to do in the past. Patient did not endorse a specific plan. Patient denies alcohol or substance use.

## 2025-05-04 NOTE — ED PROVIDER NOTE - ATTENDING CONTRIBUTION TO CARE
54-year-old man history of schizoaffective disorder hypertension hyperlipidemia is presenting here complaining of auditory and visual hallucinations.  Patient states he is seeing various things like beetles and then he turns his head none are no longer there.  Also has been having auditory hallucinations that he hears his mother saying he should kill himself.  Patient placed on a one-to-one plan for labs EKG and telepsych eval

## 2025-05-04 NOTE — ED PROVIDER NOTE - CLINICAL SUMMARY MEDICAL DECISION MAKING FREE TEXT BOX
54-year-old male with past medical history of Schizophrenia, HTN, and HLD presenting for hallucinations. Patient endorses worsening insomnia associated with auditory and visual hallucinations of seeing his mother for the past several days. Signed out to me to follow-up on psychiatry consultation.  Psychiatry cleared patient for discharge patient medically stable for discharge discharged with outpatient psychiatric follow-up.

## 2025-05-04 NOTE — ED ADULT TRIAGE NOTE - RESPIRATORY RATE (BREATHS/MIN)
Physical Therapy  Visit Type: treatment  Precautions:  Medical precautions:  fall risk; standard precautions.    Therapist wearing gloves, eye protection and surgical mask during session.    Patient was NOT wearing mask throughout duration of therapy session within patient room.    6/14/21: Patient is an 82 year old female s/p 6/14/21 L4-5 posterior spinal fusion, left hemilaminectomy and fusion.  NO BRACE ORDERED      Lines:     Basic: capped IV and telemetry (BLE SCDs)      Lines in chart and on patient reviewed, cautions maintained throughout session.  Upper Extremity:    Avoid arms overhead  Spine:     Lumbar: no lifting greater than 10 #, no twisting and no bending    NO BRACE ORDERED  Safety Measures: chair alarm, bed rails and bed alarm (gait belt)    SUBJECTIVE  Patient agreed to participate in therapy this date.  \"I am just so tired. Sometimes dizzy.\"    C/o pain in back 8/10 RN notified for medication.    Prior Living Situation  Information Provided By:: pt  Type of Home: House  Home Layout: Two level(ranch w/basement)  # Steps in the Home: 13  Number of Rails: 1  Lives With: Alone  Transportation: Drives  Bathroom Shower/Tub: Tub/Shower unit  Bathroom Toilet: Standard  Bathroom Equipment: Grab bars in shower  Bathroom Accessibility: Entry Level  Laundry on Main Level: No(basement)    Prior Communication/Cognition  Prior Cognition: Intact    Prior Function  Prior ADL: Independent  Bed Mobility: Independent  Transfers: Independent  Ambulation in the Home: Independent  Ambulation in the Community: Independent  Steps into the Home: Independent  Steps within the Home: Independent  Car Transfers: Independent  Prior Homemaking/IADLs: Independent(pt cooked, cleaned, shopped, laundry)    Patient / Family Goal: return to previous functional status, maximize function and return home    Pain     Location: back    At onset of session (out of 10): 8  RN informed on pain level     OBJECTIVE   Level of consciousness:  drowsy and alert (varied; knew month/year approx day)    Oriented to person, place and situation     Arousal alertness: appropriate responses to stimuli    Affect/Behavior: calm, cooperative and sleepy  Functional Communication/Cognition    Overall status:  Impaired (drowsy intermittently)    Form of communication:  Verbal and nods/gestures appropriately   Attention span:  Attends with cues to redirect    Commands: follows one step commands with increased time and follows one step commands with repetition.    Transition between tasks: transition with cues.  Range of Motion (measured in degrees unless otherwise noted, active unless indicated)  WFL: RLE, LLE  Strength (out of 5 unless otherwise indicated)   WFL: LLE, RLE  Balance    Sitting: Static: supervision double lower extremity support and back unsupported    Standing - Firm Surface - Eyes Open: Static: minimal assist double upper extremity support (RW)  Sensation - Lower Extremity  L1 (back, over trochanter and groin):     Light Touch: Left: intact Right: intact  L2 (back, front of thigh to knee):     Light Touch: Left: intact Right: intact  L3 (back, upper buttock, anterior thigh, knee, medial lower leg):     Light Touch: Left: intact Right: intact  L4 (medial buttock, lateral thigh, medial leg, dorsum of foot, great toe):     Light Touch: Left: intact Right: intact  L5 (buttock, posterior and lateral thigh, lateral aspect of leg, dorsum of foot, medial half of sole, 1-3rd toes):     Light Touch: Left: intact Right: intact   S1 (buttock, thigh, posterior leg):     Light Touch: Left: intact Right: intact    Bed Mobility:      Repositioning in bed: with verbal cues and moderate assist      Sit to side-lying: with verbal cues and minimal assist  Training completed:    Tasks: sit to supine and rolling left    Education details: body mechanics, patient safety and patient requires additional training  Transfers:    Assistive devices: gait belt, 2-wheeled walker and  1 person    Sit to stand: minimal assist, with verbal cues and with tactile cues    Stand to sit: minimal assist, with verbal cues and with tactile cues  Training completed:    Tasks: sit to stand and stand to sit    Education details: body mechanics, patient safety and patient requires additional training  Gait/Ambulation:     Assistance: minimal assist   Assistive device: gait belt, 2-wheeled walker and 1 person    Distance (ft): 10; 10; 20    Pattern: step through    Type: decreased loreto, step through and antalgic    Deviation in foot placement: wide base of support    Stance phase: Left: decreased push off and decreased heel strike; Right: decreased push off and decreased heel strike    Swing phase: Left: decreased step length; Right: decreased step length    Surface: even  Training Completed:    Tasks: gait training on level surfaces, assistive device use and maintaining precautions    Education details: body mechanics, patient safety and patient requires additional training      Interventions     Supine    Lower Extremity: Bilateral: AROM,   Seated    Lower Extremity: Bilateral: AROM and AAROM,   Training provided: activity tolerance, bed mobility training, body mechanics, compensatory techniques, gait training, positioning, safety training and transfer training  Educated in spinal precautions with cues provided throughout for safety and proper maintenance. Handout already at the bedside, will need re-education due to intermittent drowsiness.  Skilled input: Verbal instruction/cues, tactile instruction/cues and posture correction  Verbal Consent: Writer verbally educated and received verbal consent for hand placement, positioning of patient, and techniques to be performed today from patient for clothing adjustments for techniques and therapist position for techniques as described above and how they are pertinent to the patient's plan of care.       ASSESSMENT    Impairments: pain, safety awareness, balance  deficits, strength and activity tolerance  Functional Limitations: all functional mobility  Patient seen in 5005/01    Pt sit <> stand RW min assist. Ambulated 10 ft x 2, then 20 ft RW min assist. Completed bed mobility min to mod assist with sit > sidelying > supine. Performed therapeutic exercises. Patient session extended due to intermittent drowsiness and slow decreased movements, rests.        Discharge Recommendations   Recommendation for Discharge: PT IL: Patient needs daily, skilled therapy for 1-3 hours a day by at least two disciplines        PT/OT Mobility Equipment for Discharge: RW if she does not have   PT/OT ADL Equipment for Discharge: tbd pending progress  PT Identified Barriers to Discharge: weakness, lethargy, patient lives alone     Therapy Diagnosis:  Other Abnormalities of Gait and Mobility    Skilled therapy is required to address these limitations in attempt to maximize the patient's independence.  Progress: progressing toward goals    End of Session:   Location: in bed  Safety measures: alarm system in place/re-engaged, bed rails x3, call light within reach and equipment intact  Handoff to: nurse    PLAN    Suggestions for next session as indicated: PT Frequency: 5 days/week  A minimum of 8 minutes per session x 1 week.    Interventions: balance, bed mobility, body mechanics, compensatory technique education, energy conservation, functional transfer training, gait training, HEP train/position, neuromuscular re-education, patient/family training, safety education, stairs retraining and strengthening  Agreement to plan and goals: patient agrees with goals and treatment plan        GOALS  Review Date: 6/22/2021  Long Term Goals: (to be met by time of discharge from hospital)  Maintain precautions: Patient able to maintain precautions with minimal cues.  Status: progressing/ongoing  Sidelying to sit: Patient will complete bed mobility for sidelying to sit contact guard or touching/steadying.   Status: progressing/ongoing  Sit to sidelying: Patient will complete bed mobility for sit to sidelying contact guard or touching/steadying.  Status: progressing/ongoing  Sit to stand: Patient will complete sit to stand transfer with 2-wheeled walker, set up.   Status: progressing/ongoing  Stand to sit: Patient will complete stand to sit transfer with 2-wheeled walker, set up.   Status: progressing/ongoing  Ambulation (even): Patient will ambulate on even surface for 25 feet with 2-wheeled walker, set up.   Status: progressing/ongoing    Pt to negotiate stairs w/ rail and assist in order to enter home/reach basement laundry. ONGOING/PROGRESSING  Documented in the chart in the following areas: Assessment. Patient Education.      Therapy procedure time and total treatment time can be found documented on the Time Entry flowsheet   19

## 2025-05-04 NOTE — ED ADULT NURSE NOTE - NSFALLHARMRISKINTERV_ED_ALL_ED
No lymphadedenopathy Assistance with ambulation/Communicate risk of Fall with Harm to all staff, patient, and family/Monitor gait and stability/Provide visual cue: red socks, yellow wristband, yellow gown, etc/Reinforce activity limits and safety measures with patient and family/Bed in lowest position, wheels locked, appropriate side rails in place/Call bell, personal items and telephone in reach/Instruct patient to call for assistance before getting out of bed/chair/stretcher/Non-slip footwear applied when patient is off stretcher/[place] to call system/Physically safe environment - no spills, clutter or unnecessary equipment/Purposeful Proactive Rounding/Room/bathroom lighting operational, light cord in reach

## 2025-05-04 NOTE — ED ADULT TRIAGE NOTE - CHIEF COMPLAINT QUOTE
Pt was sent from [facility] for hyperglycemia, RN stated that they do not have glucometer and she did not check patient's sugar, EMS had FS of 151. FS in triage is 137 mg/dl.  Pt c/o being depressed, hearing voices and rage, was banging his head against the door in ambulance bay. Pt stated that he hasn't been sleeping due to constant voices of his mother telling him to kill himself by hanging.

## 2025-05-05 VITALS
OXYGEN SATURATION: 99 % | TEMPERATURE: 98 F | RESPIRATION RATE: 18 BRPM | DIASTOLIC BLOOD PRESSURE: 80 MMHG | SYSTOLIC BLOOD PRESSURE: 142 MMHG | HEART RATE: 106 BPM

## 2025-05-05 DIAGNOSIS — F25.9 SCHIZOAFFECTIVE DISORDER, UNSPECIFIED: ICD-10-CM

## 2025-05-05 LAB — SARS-COV-2 RNA SPEC QL NAA+PROBE: SIGNIFICANT CHANGE UP

## 2025-05-05 PROCEDURE — 90792 PSYCH DIAG EVAL W/MED SRVCS: CPT | Mod: 95

## 2025-05-05 NOTE — ED BEHAVIORAL HEALTH PROGRESS NOTE - DETAILS:
Patient reevaluated this morning. He appears euthymic, calm, cooperative, with child-like affect and speech impairment. He endorses not sleeping for 2 weeks as his neighbors goes to sleep at 8pm and wakes up at 5am. He now requests inpatient psychiatric admission to "get some rest." He reports worsening "paranoia, where [he] looks over [his] shoulder constantly." He also mentions command auditory hallucinations of his mother telling him to kill himself, now with plan to tie sheet on door knob and choke himself. He is able to resist CAH by "touching the mirror" to ground himself. He states if he were to be discharged he would "keep away from people, especially those that do drugs." He also mention NSSIB by banging his head into window when he gets agitated. He mentions some medication nonadherence, saying he sometimes does not go down to get medication in time. He expresses frustration with the wait time and multiple evaluations, offered reassurance and support.  Patient reevaluated this morning. He appears euthymic, calm, cooperative, with child-like affect and speech impairment. He endorses not sleeping for 2 weeks as his neighbors goes to sleep at 8pm and wakes up at 5am. He now requests inpatient psychiatric admission to "get some rest." He reports worsening "paranoia, where [he] looks over [his] shoulder constantly." He also mentions command auditory hallucinations of his mother telling him to kill himself, now with plan to tie sheet on door knob and choke himself. He is able to resist CAH by "touching the mirror" to ground himself. He states if he were to be discharged he would "keep away from people, especially those that do drugs." He also mention NSSIB by banging his head into window when he gets agitated. He mentions some medication nonadherence, saying he sometimes does not go down to get medication in time. He expresses frustration with the wait time and multiple evaluations, offered reassurance and support.     Patient reevaluated later in the morning. He exclaims, "I slept! I feel so much better." He now readily safety plans, requesting to go home and "clean his room." He denies any current SI, HI, AH, VH. Strict return precautions were discussed. Patient mentions he can call his counselor or escalate to emergency services.

## 2025-05-05 NOTE — ED BEHAVIORAL HEALTH PROGRESS NOTE - RISK ASSESSMENT
Warning signs: isolation, anxiety  Static risk factors: male gender,  race, history of psychiatric hospitalizations, history of psychotic disorder, history of previous SA   Modifiable risk factors: psychiatric illness, recent psychosocial stressors, medication nonadherance  Protective factors: seeking out treatment/hopefulness, no firearm in residence, no co-morbid substance use, domiciled status, domiciled at psychiatric residence, connection to care, future-orientation, lack of current suicidality or homicidally, lack of urges to self-harm or engage in NSSIB, identifies various reasons for living, willingness and ability to readily engage in safety planning  Access to lethal: denies  Level of risk: low

## 2025-05-05 NOTE — ED BEHAVIORAL HEALTH ASSESSMENT NOTE - PSYCHIATRIC ISSUES AND PLAN (INCLUDE STANDING AND PRN MEDICATION)
Restart home meds as itemized in the HPI Restart home meds as itemized in the HPI. For agitation, can offer PO/IM Haldol 5 mg/Ativan 2 mg Q6H PRN. Hold antipsychotics if QTC>500

## 2025-05-05 NOTE — ED BEHAVIORAL HEALTH PROGRESS NOTE - SUMMARY
Pt is a 55 yo M, single, non-caregiver, domiciled at [facility]([phone]), disabled, PMH significant for DM, HLD, HTN, hypothyroidism, PPHx of schizoaffective disorder, ID, hx of substance misuse, +hx of being a high utilizer of inpt/ED services with frequent ED visits, prior psych admissions(last known at [facility] in April, 2024), hx of suicide attempts vs NSSIB by cutting and head banging, no known aggression, in outpt treatment, has been largely adherent with meds, who presents to the ED BIBA a/b staff at his residence. On assessment, the pt is vague about what happened that led staff to activate 911 on his behalf, but does report that over the last two weeks he's had ego-dystonic intrusive thoughts to end his life vs CAHKS, poor sleep, feeling like he can't trust people he sees driving outside his residence, and questionable VH of bugs as per chart.. In addition, he reports recent self harm by banging his head yesterday. Despite his reported symptoms, however, he adamantly denies recent SI/HI, reports good adherence with meds, reports feeling safe at his residence, and is requesting to return home. Collateral provided by overnight staff was limited as they did not know the specific circumstances leading to staff to activate EMS and recommended calling back during the day to obtain these details. Collateral also recommends this as daytime staff generally know more about the  pt and about his recent functioning. Given the concerns about recent intrusive thoughts vs CAH of his mother's voice telling him to end his life and the limited information about the reason that staff activated EMS, will recommend holding pt in the ED to obtain this additional collateral and to learn more about the pt's recent functioning and check for acute safety concerns.

## 2025-05-05 NOTE — ED BEHAVIORAL HEALTH PROGRESS NOTE - CASE SUMMARY/FORMULATION (CLEARLY DOCUMENT RATIONALE FOR DISPOSITION CHANGE)
Pt is a 53 yo M, single, non-caregiver, domiciled at [facility]([phone]), disabled, PMH significant for DM, HLD, HTN, hypothyroidism, PPHx of schizoaffective disorder, ID, hx of substance misuse, +hx of being a high utilizer of inpt/ED services with frequent ED visits, prior psych admissions(last known at [facility] in April, 2024), hx of suicide attempts vs NSSIB by cutting and head banging, no known aggression, in outpt treatment, has been largely adherent with meds, who presents to the ED after patient reported back pain to residence. Psychiatry was consulted as patient reporting insomnia with AH.     Upon reevaluation, patient appears oriented, euthymic, cooperative, conversational, and linear. Patient initially presented to ED for back pain, then reported vague command hallucinations to staff during initial evaluation, now denying SI/HI/AH/VH upon subsequent evaluation. Patient is likely presenting with known schizoaffective disorder, complicated by poor sleep, psychosocial stressors, and limited coping strategies. Patient was offered voluntary inpatient admission, but now denies need for hospitalization as he "got some rest" and feels good. He does not meet criteria for involuntary inpatient psychiatric admission as he is not an acute risk of danger to himself or others; he reports his CAH have now resolved. Patient is able to safety plan and is future oriented. Patient's residence also denies any safety concerns, saying he has been adherent with medication, presenting at baseline. Patient is psychiatrically cleared for discharge.

## 2025-05-05 NOTE — ED ADULT NURSE REASSESSMENT NOTE - NS ED NURSE REASSESS COMMENT FT1
received pt from RN, pt a/ox4, continued on a 1:1 sit for safety. pt denies Si/Hi, denies current hallucinations. states he hasn't slept in 2 weeks. pt pleasant and cooperative. safety measures in place

## 2025-05-05 NOTE — ED BEHAVIORAL HEALTH ASSESSMENT NOTE - ABUSE / TRAUMA HISTORY
Emergency Department TeleTriage Encounter Note      CHIEF COMPLAINT    Chief Complaint   Patient presents with    Headache     Pt states that she has a headache post concussion from last week. Pt states that her head is pounding and has pressure.        VITAL SIGNS   Initial Vitals [07/20/23 1608]   BP Pulse Resp Temp SpO2   136/84 61 16 97.7 °F (36.5 °C) 100 %      MAP       --            ALLERGIES    Review of patient's allergies indicates:  No Known Allergies    PROVIDER TRIAGE NOTE  Patient has been having post-concussion headaches since injury 1 month ago. Headaches have been worse last 3 days. She took ibuprofen today and it helped some.       ORDERS  Labs Reviewed - No data to display    ED Orders (720h ago, onward)      None              Virtual Visit Note: The provider triage portion of this emergency department evaluation and documentation was performed via [employer] [place], a HIPAA-compliant telemedicine application, in concert with a tele-presenter in the room. A face to face patient evaluation with one of my colleagues will occur once the patient is placed in an emergency department room.      DISCLAIMER: This note was prepared with [employer] voice recognition transcription software. Garbled syntax, mangled pronouns, and other bizarre constructions may be attributed to that software system.     Deferred

## 2025-05-05 NOTE — ED BEHAVIORAL HEALTH ASSESSMENT NOTE - SUMMARY
Pt is a 53 yo M, single, non-caregiver, domiciled at [facility]([phone]), disabled, PMH significant for DM, HLD, HTN, hypothyroidism, PPHx of schizoaffective disorder, hx of substance misuse, +hx of being a high utilizer of inpt/ED services with frequent ED visits, prior psych admissions(last known at [facility] in April, 2024), hx of suicide attempts vs NSSIB by cutting and head banging, no known aggression, in outpt treatment, has been largely adherent with meds, who presents to the ED BIBA a/b staff at his residence. On assessment, the pt is vague about what happened that led staff to activate 911 on his behalf, but does report that over the last two weeks he's had ego-dystonic intrusive thoughts to end his life vs CAHKS, poor sleep, feeling like he can't trust people he sees driving outside his residence, and questionable VH of bugs as per chart.. In addition, he reports recent self harm by banging his head yesterday. Despite his reported symptoms, however, he adamantly denies recent SI/HI, reports good adherence with meds, and reports feeling safe at his residence. Collateral provided by overnight staff was limited as they did not know the specific circumstances leading to staff to activate EMS and recommended calling back during the day to obtain these details. Collateral also recommends this as daytime staff generally know more about the  pt and about his recent functioning. As such, will recommend holding pt in the ED to obtain additional collateral from staff at [facility] during daytime hours to learn more about the pt's recent functioning and to check for acute safety concerns. Pt is a 53 yo M, single, non-caregiver, domiciled at [facility]([phone]), disabled, PMH significant for DM, HLD, HTN, hypothyroidism, PPHx of schizoaffective disorder, ID, hx of substance misuse, +hx of being a high utilizer of inpt/ED services with frequent ED visits, prior psych admissions(last known at [facility] in April, 2024), hx of suicide attempts vs NSSIB by cutting and head banging, no known aggression, in outpt treatment, has been largely adherent with meds, who presents to the ED BIBA a/b staff at his residence. On assessment, the pt is vague about what happened that led staff to activate 911 on his behalf, but does report that over the last two weeks he's had ego-dystonic intrusive thoughts to end his life vs CAHKS, poor sleep, feeling like he can't trust people he sees driving outside his residence, and questionable VH of bugs as per chart.. In addition, he reports recent self harm by banging his head yesterday. Despite his reported symptoms, however, he adamantly denies recent SI/HI, reports good adherence with meds, reports feeling safe at his residence, and is requesting to return home. Collateral provided by overnight staff was limited as they did not know the specific circumstances leading to staff to activate EMS and recommended calling back during the day to obtain these details. Collateral also recommends this as daytime staff generally know more about the  pt and about his recent functioning. Given the concerns about recent intrusive thoughts vs CAH of his mother's voice telling him to end his life and the limited information about the reason that staff activated EMS, will recommend holding pt in the ED to obtain this additional collateral and to learn more about the pt's recent functioning and check for acute safety concerns.

## 2025-05-05 NOTE — ED BEHAVIORAL HEALTH PROGRESS NOTE - NSBHMSEPERCEPT_PSY_A_CORE
Patient placed on continuous cardiac monitor, automatic blood pressure cuff and continuous pulse oximeter.   Auditory hallucinations No abnormalities

## 2025-05-05 NOTE — ED BEHAVIORAL HEALTH ASSESSMENT NOTE - NSSUICPROTFACT_PSY_ALL_CORE
Supportive social network of family or friends Supportive social network of family or friends/Fear of death or the actual act of killing self/Positive therapeutic relationships

## 2025-05-05 NOTE — ED BEHAVIORAL HEALTH ASSESSMENT NOTE - DETAILS
Mother with hx of attempted suicide See hpi Has documented allergy to penicillin and thorazine Deferred Unable to reach staff who activated EMS after hours ED provider updated

## 2025-05-05 NOTE — ED BEHAVIORAL HEALTH ASSESSMENT NOTE - DIFFERENTIAL
schizoaffective disorder; hx of substance misuse schizoaffective disorder; ID, hx of substance misuse

## 2025-05-05 NOTE — ED BEHAVIORAL HEALTH NOTE - BEHAVIORAL HEALTH NOTE
Collateral (Mala, Director of [facility]) has requested that the information provided remain confidential: Yes [X] No []  Collateral(Mala, Director of [facility]) has provided information that patient is/may be unaware of: Yes [X] No []    Patient gives permission to obtain collateral from _____:  (  ) Yes  (X)  No  Rationale for overriding objection            (  ) Lack of capacity. Details: ________            (X) Assessing risk of danger to self/others. Details: ________    Rationale for selecting specific collateral source            (X) Potential to impact risk of danger to self/others and no alternative equivalent. Details: _____”    ========================  FOR EACH COLLATERAL:  ========================  NAME: Mala  NUMBER: [phone]  RELATIONSHIP: Director of [facility]   ========================    [facility], reports that staff activated 911 last night because pt endorsed lower back pain. She denies any suicidality or violence last night. Mala Reports patient has been doing well for the past year. She reports at baseline, pt is attention seeking, and has made previous comments like this such as suicidality versus auditory hallucinations. She reports does not use drugs or alcohol, and has been fully compliant with medication. She denies any recent change in mood/functioning/paranoid thoughts. She denies any immediate concerns about pt being discharged at this time.

## 2025-05-05 NOTE — ED BEHAVIORAL HEALTH PROGRESS NOTE - NSBHATTESTCOMMENTATTENDFT_PSY_A_CORE
I agree with findings, assessment and recommendations as per Dr Orosco above.    Briefly, pt is a 55 yo M, single, non-caregiver, domiciled at [facility]([phone]), disabled, PMH significant for DM, HLD, HTN, hypothyroidism, PPHx of schizoaffective disorder, ID, hx of substance misuse, +hx of being a high utilizer of inpt/ED services with frequent ED visits, prior psych admissions (last known at [facility] in April, 2024), hx of suicide attempts vs NSSIB by cutting and head banging, no known aggression, in outpt treatment, has been largely adherent with meds, who presents to the ED BIBA a/b staff at his residence. Upon assessment patient denies any symptoms, he reports feeling well with rest and would like to go back to residence where he feels safe and cared for. There are no acute psychiatric concerns at this time.

## 2025-05-05 NOTE — ED BEHAVIORAL HEALTH ASSESSMENT NOTE - HPI (INCLUDE ILLNESS QUALITY, SEVERITY, DURATION, TIMING, CONTEXT, MODIFYING FACTORS, ASSOCIATED SIGNS AND SYMPTOMS)
Pt is a 54 yo M, single, non-caregiver, domiciled at [facility]([phone]; [phone]), disabled, PMH significant for DM, HLD, HTN, hypothyroidism, PPHx of schizoaffective disorder, hx of substance misuse,  high utilizer of inpt/ED services with frequent ED visits, prior psych admissions(last known at [facility] in April, 2024), hx of suicide attempts vs NSSIB by cutting and head banging, no known aggression, in outpt treatment, who presents to the ED BIBA a/b self for SI/HI/AH/VH.    On assessment, the pt is A&Ox4, presents as calm, euthymic, and future-oriented, with impaired articulation but no e/o internal preoccupation or agitation, and states he came to the ED, because he's been having increased difficulty sleeping in his residence. He states he frequently sees "shadows" in his room at night, which disappear when he turns on the lights, and sometimes has urges to suffocate his roommate with a pillow, because he often "slams the door" in their room. However, the pt denies any plan or intent to harm or kill his roommate or anyone else and verbalizes understanding that to do so is morally wrong and he could face legal consequences. The pt otherwise reports taking his meds as scheduled, describes mood as "OK", has been eating well, and has good energy. He then asks if he could stay in the hospital for a few days to sleep since he's become frustrated with his roommate. However, when informed that this is not an appropriate indication for hospitalization, the pt is agreeable to return to his residence, and requests to leave now so that he can get to bed early. He denies current SI or HI or urges to harm himself or others and agrees to continue his meds and to return to the ED for acute safety concerns.    On ROS, he denies current or recent SI with plan or intent, AH, or PI, denies recent SIB, aggression, or substance use, and denies access to firearms.    RAFAELA spoke with staff at pt's residence - see  note for details.    This writer also spoke with Jung, a staff member at the pt's residence, who was not present when the pt activated EMS tonight, but states he knows pt well. Collateral states the pt has recently been functioning at baseline, has been adherent with night nighttime meds, has not exhibited any aggressive or suicidal behaviors, and there have been no acute safety concerns. Collateral states they are amenable to have pt return to the residence tonight. This writer informed collateral that the pt would be psych cleared and administered his nighttime meds prior to discharge. Pt is a 53 yo M, single, non-caregiver, domiciled at [facility]([phone]), disabled, PMH significant for DM, HLD, HTN, hypothyroidism, PPHx of schizoaffective disorder, hx of substance misuse, +hx of being a high utilizer of inpt/ED services with frequent ED visits, prior psych admissions(last known at [facility] in April, 2024), hx of suicide attempts vs NSSIB by cutting and head banging, no known aggression, in outpt treatment, has been largely adherent with meds, who presents to the ED BIBA a/b staff at his residence.    On assessment, the pt is A&Ox4, presents as calm, euthymic, and future-oriented, with impaired articulation, but no e/o internal preoccupation or agitation, and states that staff activated EMS on his behalf last night after he "zoned out" for unclear reasons. The pt states that over the last two weeks, he's been having difficulty sleeping, has been hearing his mother's voice inside his head telling him to kill himself, and has been paranoid about the people he sees driving outside his home. The pt also states he banged his head out of frustration yesterday, but denies doing with any suicidal intent. The pt states that despite hearing his mother's voice telling him to kill himself, he's had no recent SI/HI, and states he feels safe at his residence despite having some paranoia about the people outside his window. The pt also states he's been adherent with his medications, has not been aggressive toward anyone else, and has not used any substances. This writer offered inpatient psychiatric hospitalization to the pt, which he declined, and states he prefers to return to his residence.      This writer spoke with Khai, a staff member at the pt's residence, without the pt's consent due to safety concerns(recent self harm, CAHKS  vs intrusive thoughts to end his life). Collateral states he was not present when staff activated EMS last night, but states that the pt has recently seemed more disengaged  this past week, has been wearing a "blank stare" on his face, and has also lost weight(unclear how much). However, collateral states he's been adherent with meds, has not been aggressive toward others, and has not expressed SI as far as he's aware. Collateral acknowledges that since he doesn't know the circumstances leading staff to activate EMS for the pt that writer should call back during the day as daytime staff can provide these details and generally know more about the pt better and his recent functioning.     Staff did provide the pt's med list:    Clozapine 100 mg/400 mg qAM/qPM  Lexapro 10 mg qHS  Depakote 250 mg BID  Invega Sustenna 234 mg q4wk(did not know when pt received last dose)  Cogentin 1 mg BID, Ativan 2 mg qHS  Levothyroxine 25 mcg qHS  Lipitor 10 mg qHS  Colace 100 mg BID  Metformin 1000 mg BID  ASA 81 mg daily  Bisacodyl 5 mg qHS  Metoprolol  25 mg daily  Tylenol 325 mg PRN BID  Lactulose 10 gm/15 mL solution q6H PRN for constipation Pt is a 53 yo M, single, non-caregiver, domiciled at [facility]([phone]), disabled, PMH significant for DM, HLD, HTN, hypothyroidism, PPHx of schizoaffective disorder, ID, hx of substance misuse, +hx of being a high utilizer of inpt/ED services with frequent ED visits, prior psych admissions(last known at [facility] in April, 2024), hx of suicide attempts vs NSSIB by cutting and head banging, no known aggression, in outpt treatment, has been largely adherent with meds, who presents to the ED BIBA a/b staff at his residence.    On assessment, the pt is A&Ox4, presents as calm, child-like, euthymic, and future-oriented, with impaired articulation, but no e/o internal preoccupation or agitation, and states that staff activated EMS on his behalf last night after he "zoned out" for unclear reasons. The pt states that over the last two weeks, he's been having difficulty sleeping, has been hearing his mother's voice inside his head telling him to kill himself, and has been paranoid about the people he sees driving outside his home. The pt also states he banged his head out of frustration yesterday, but denies doing with any suicidal intent. The pt states that despite hearing his mother's voice telling him to kill himself, he's had no recent SI/HI, and states he feels safe at his residence despite having some paranoia about the people outside his window. The pt also states he's been adherent with his medications, has not been aggressive toward anyone else, and has not used any substances. This writer offered inpatient psychiatric hospitalization to the pt, which he declined, and states he prefers to return to his residence.      This writer spoke with Khai, a staff member at the pt's residence, without the pt's consent due to safety concerns(recent self harm, CAHKS  vs intrusive thoughts to end his life). Collateral states he was not present when staff activated EMS last night, but states that the pt has recently seemed more disengaged  this past week, has been wearing a "blank stare" on his face, and has also lost weight(unclear how much). However, collateral states he's been adherent with meds, has not been aggressive toward others, and has not expressed SI as far as he's aware. Collateral acknowledges that since he doesn't know the circumstances leading staff to activate EMS for the pt that writer should call back during the day as daytime staff can provide these details and generally know more about the pt better and his recent functioning.     Staff did provide the pt's med list:    Clozapine 100 mg/400 mg qAM/qPM  Lexapro 10 mg qHS  Depakote 250 mg BID  Invega Sustenna 234 mg q4wk(did not know when pt received last dose)  Cogentin 1 mg BID, Ativan 2 mg qHS  Levothyroxine 25 mcg qHS  Lipitor 10 mg qHS  Colace 100 mg BID  Metformin 1000 mg BID  ASA 81 mg daily  Bisacodyl 5 mg qHS  Metoprolol  25 mg daily  Tylenol 325 mg PRN BID  Lactulose 10 gm/15 mL solution q6H PRN for constipation

## 2025-05-07 DIAGNOSIS — Z88.8 ALLERGY STATUS TO OTHER DRUGS, MEDICAMENTS AND BIOLOGICAL SUBSTANCES: ICD-10-CM

## 2025-05-07 DIAGNOSIS — R44.1 VISUAL HALLUCINATIONS: ICD-10-CM

## 2025-05-07 DIAGNOSIS — Z88.0 ALLERGY STATUS TO PENICILLIN: ICD-10-CM

## 2025-05-07 DIAGNOSIS — R00.0 TACHYCARDIA, UNSPECIFIED: ICD-10-CM

## 2025-05-07 DIAGNOSIS — F25.9 SCHIZOAFFECTIVE DISORDER, UNSPECIFIED: ICD-10-CM

## 2025-05-07 DIAGNOSIS — G47.00 INSOMNIA, UNSPECIFIED: ICD-10-CM

## 2025-05-07 DIAGNOSIS — E78.5 HYPERLIPIDEMIA, UNSPECIFIED: ICD-10-CM

## 2025-05-07 DIAGNOSIS — Z91.014 ALLERGY TO MAMMALIAN MEATS: ICD-10-CM

## 2025-05-07 DIAGNOSIS — I10 ESSENTIAL (PRIMARY) HYPERTENSION: ICD-10-CM

## 2025-05-08 NOTE — ED PROVIDER NOTE - CLINICAL SUMMARY MEDICAL DECISION MAKING FREE TEXT BOX
50 yo M presented to ED For suicidal ideations. labs wnl. psychiatry examined patient and cleared him for discharge. he will fu as an outpatient. strict return precautions given. dc home No

## 2025-05-18 ENCOUNTER — EMERGENCY (EMERGENCY)
Facility: HOSPITAL | Age: 55
LOS: 0 days | Discharge: HOME | End: 2025-05-18
Attending: STUDENT IN AN ORGANIZED HEALTH CARE EDUCATION/TRAINING PROGRAM
Payer: MEDICAID

## 2025-05-18 VITALS
TEMPERATURE: 98 F | HEART RATE: 123 BPM | OXYGEN SATURATION: 99 % | WEIGHT: 125 LBS | DIASTOLIC BLOOD PRESSURE: 86 MMHG | SYSTOLIC BLOOD PRESSURE: 135 MMHG | RESPIRATION RATE: 18 BRPM

## 2025-05-18 VITALS
RESPIRATION RATE: 18 BRPM | TEMPERATURE: 98 F | OXYGEN SATURATION: 98 % | SYSTOLIC BLOOD PRESSURE: 140 MMHG | DIASTOLIC BLOOD PRESSURE: 91 MMHG | HEART RATE: 106 BPM

## 2025-05-18 DIAGNOSIS — M54.9 DORSALGIA, UNSPECIFIED: ICD-10-CM

## 2025-05-18 DIAGNOSIS — Z91.014 ALLERGY TO MAMMALIAN MEATS: ICD-10-CM

## 2025-05-18 DIAGNOSIS — Z88.8 ALLERGY STATUS TO OTHER DRUGS, MEDICAMENTS AND BIOLOGICAL SUBSTANCES: ICD-10-CM
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
## 2025-05-18 DIAGNOSIS — E11.9 TYPE 2 DIABETES MELLITUS WITHOUT COMPLICATIONS: ICD-10-CM

## 2025-05-18 DIAGNOSIS — Z88.0 ALLERGY STATUS TO PENICILLIN: ICD-10-CM

## 2025-05-18 DIAGNOSIS — I10 ESSENTIAL (PRIMARY) HYPERTENSION: ICD-10-CM

## 2025-05-18 DIAGNOSIS — F20.9 SCHIZOPHRENIA, UNSPECIFIED: ICD-10-CM
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
## 2025-05-18 PROCEDURE — 99284 EMERGENCY DEPT VISIT MOD MDM: CPT

## 2025-05-18 PROCEDURE — 99283 EMERGENCY DEPT VISIT LOW MDM: CPT

## 2025-05-18 RX ORDER — LIDOCAINE HYDROCHLORIDE 20 MG/ML
1 JELLY TOPICAL
Qty: 1 | Refills: 0
Start: 2025-05-18 | End: 2025-05-22

## 2025-05-18 RX ORDER — LIDOCAINE HYDROCHLORIDE 20 MG/ML
1 JELLY TOPICAL ONCE
Refills: 0 | Status: COMPLETED | OUTPATIENT
Start: 2025-05-18 | End: 2025-05-18

## 2025-05-18 RX ORDER — IBUPROFEN 200 MG
600 TABLET ORAL ONCE
Refills: 0 | Status: COMPLETED | OUTPATIENT
Start: 2025-05-18 | End: 2025-05-18

## 2025-05-18 RX ORDER — METHOCARBAMOL 500 MG/1
2 TABLET, FILM COATED ORAL
Qty: 32 | Refills: 0
Start: 2025-05-18 | End: 2025-05-21

## 2025-05-18 RX ORDER — ACETAMINOPHEN 500 MG/5ML
650 LIQUID (ML) ORAL ONCE
Refills: 0 | Status: COMPLETED | OUTPATIENT
Start: 2025-05-18 | End: 2025-05-18

## 2025-05-18 RX ORDER — LIDOCAINE HYDROCHLORIDE 20 MG/ML
1 JELLY TOPICAL
Refills: 0 | DISCHARGE

## 2025-05-18 RX ORDER — METHOCARBAMOL 500 MG/1
2 TABLET, FILM COATED ORAL
Refills: 0
Start: 2025-05-18

## 2025-05-18 RX ORDER — METHOCARBAMOL 500 MG/1
1500 TABLET, FILM COATED ORAL ONCE
Refills: 0 | Status: COMPLETED | OUTPATIENT
Start: 2025-05-18 | End: 2025-05-18

## 2025-05-18 RX ADMIN — Medication 650 MILLIGRAM(S): at 15:38

## 2025-05-18 RX ADMIN — LIDOCAINE HYDROCHLORIDE 1 PATCH: 20 JELLY TOPICAL at 15:38

## 2025-05-18 RX ADMIN — METHOCARBAMOL 1500 MILLIGRAM(S): 500 TABLET, FILM COATED ORAL at 15:38

## 2025-05-18 RX ADMIN — Medication 600 MILLIGRAM(S): at 15:38

## 2025-05-18 NOTE — ED ADULT NURSE NOTE - OBJECTIVE STATEMENT
Pt with C/O lower  back pain on and off for 2 weeks worse for 3 days .Pt denies chest pain denies SOB no N/V no dizziness ,denies fever chills denies abdomen pain no urinary problem denies injury no falls . .

## 2025-05-18 NOTE — ED PROVIDER NOTE - PHYSICAL EXAMINATION
CONSTITUTIONAL: well-appearing, well nourished, non-toxic, NAD  HEAD: NCAT  EYES: EOMI, no scleral icterus  NECK: Full ROM.   EXT: Full ROM  NEURO: normal motor. normal sensory. holding left back with ambulation   PSYCH: Cooperative, appropriate.

## 2025-05-18 NOTE — ED PROVIDER NOTE - PATIENT PORTAL LINK FT
You can access the FollowMyHealth Patient Portal offered by [facility] by registering at the following website: [email or web address]. By joining [facility]’s FollowMyHealth portal, you will also be able to view your health information using other applications (apps) compatible with our system.

## 2025-05-18 NOTE — ED PROVIDER NOTE - OBJECTIVE STATEMENT
54-year-old male history of HTN, DM and schizophrenia presents for left-sided back pain x 2 days.  Patient endorses an atraumatic pain to his left flank which causes him to hold his back when walking.  He denies falls, injuries or straining.

## 2025-05-18 NOTE — ED ADULT TRIAGE NOTE - NS ED NURSE BANDS TYPE
Called and talked with patient. Conveyed message. Explained to daughter she will need to wait until the covid test results come back before she can schedule the CT. Daughter stated she understood and had no other questions at this time.    Name band;

## 2025-05-18 NOTE — ED ADULT NURSE NOTE - NSFALLUNIVINTERV_ED_ALL_ED
Bed/Stretcher in lowest position, wheels locked, appropriate side rails in place/Call bell, personal items and telephone in reach/Instruct patient to call for assistance before getting out of bed/chair/stretcher/Non-slip footwear applied when patient is off stretcher/[place] to call system/Physically safe environment - no spills, clutter or unnecessary equipment/Purposeful proactive rounding/Room/bathroom lighting operational, light cord in reach

## 2025-05-18 NOTE — ED PROVIDER NOTE - CLINICAL SUMMARY MEDICAL DECISION MAKING FREE TEXT BOX
.    54-year-old male, PMH as noted, presents with atraumatic left-sided back pain x 2 days.  Pain is worse with movement and lifting.  No chest pain, shortness of breath, urinary complaints, rash.    Exam as noted above.  Patient is NAD, RRR, no midline spinal tenderness.    Patient received medications Emergency Department with significant improvement of symptoms.    Impression back pain  Pt stable for dc w/ continued oupt w/up, pmd f/up, and care as discussed.  Patient understands plan and signs and symptoms for ED return. DC home.      .

## 2025-05-21 NOTE — ED ADULT NURSE NOTE - BOWEL SOUNDS LLQ
Vessel(s): left iliac artery and left CFA. Injected with hand injections. Single view taken. present

## 2025-05-22 ENCOUNTER — APPOINTMENT (OUTPATIENT)
Dept: PODIATRY | Facility: CLINIC | Age: 55
End: 2025-05-22

## 2025-05-26 ENCOUNTER — EMERGENCY (EMERGENCY)
Facility: HOSPITAL | Age: 55
LOS: 0 days | Discharge: HOME | End: 2025-05-27
Attending: EMERGENCY MEDICINE
Payer: MEDICAID

## 2025-05-26 VITALS
HEART RATE: 82 BPM | SYSTOLIC BLOOD PRESSURE: 127 MMHG | RESPIRATION RATE: 18 BRPM | OXYGEN SATURATION: 99 % | DIASTOLIC BLOOD PRESSURE: 85 MMHG | TEMPERATURE: 98 F

## 2025-05-26 DIAGNOSIS — R09.81 NASAL CONGESTION: ICD-10-CM

## 2025-05-26 DIAGNOSIS — Z88.0 ALLERGY STATUS TO PENICILLIN: ICD-10-CM

## 2025-05-26 DIAGNOSIS — Z86.16 PERSONAL HISTORY OF COVID-19: ICD-10-CM

## 2025-05-26 DIAGNOSIS — J06.9 ACUTE UPPER RESPIRATORY INFECTION, UNSPECIFIED: ICD-10-CM

## 2025-05-26 DIAGNOSIS — Z88.4 ALLERGY STATUS TO ANESTHETIC AGENT: ICD-10-CM

## 2025-05-26 DIAGNOSIS — Z88.8 ALLERGY STATUS TO OTHER DRUGS, MEDICAMENTS AND BIOLOGICAL SUBSTANCES: ICD-10-CM

## 2025-05-26 DIAGNOSIS — R05.1 ACUTE COUGH: ICD-10-CM

## 2025-05-26 DIAGNOSIS — Z91.014 ALLERGY TO MAMMALIAN MEATS: ICD-10-CM

## 2025-05-26 LAB
FLUAV AG NPH QL: SIGNIFICANT CHANGE UP
FLUBV AG NPH QL: SIGNIFICANT CHANGE UP
RSV RNA NPH QL NAA+NON-PROBE: SIGNIFICANT CHANGE UP
SARS-COV-2 RNA SPEC QL NAA+PROBE: SIGNIFICANT CHANGE UP
SOURCE RESPIRATORY: SIGNIFICANT CHANGE UP

## 2025-05-26 PROCEDURE — 71046 X-RAY EXAM CHEST 2 VIEWS: CPT

## 2025-05-26 PROCEDURE — 99284 EMERGENCY DEPT VISIT MOD MDM: CPT

## 2025-05-26 PROCEDURE — 0241U: CPT

## 2025-05-26 PROCEDURE — 99283 EMERGENCY DEPT VISIT LOW MDM: CPT | Mod: 25

## 2025-05-26 PROCEDURE — 71046 X-RAY EXAM CHEST 2 VIEWS: CPT | Mod: 26

## 2025-05-26 RX ORDER — DEXTROMETHORPHAN HBR, GUAIFENESIN 200 MG/10ML
200 LIQUID ORAL ONCE
Refills: 0 | Status: COMPLETED | OUTPATIENT
Start: 2025-05-26 | End: 2025-05-26

## 2025-05-26 RX ADMIN — DEXTROMETHORPHAN HBR, GUAIFENESIN 200 MILLIGRAM(S): 200 LIQUID ORAL at 22:44

## 2025-05-26 NOTE — ED PROVIDER NOTE - EKG/XRAY ADDITIONAL INFORMATION
Chest X-rays reviewed and interpreted by me Dr. Cardenas and shows no actue findings. No Pneumothorax, no free air, no effusions, and these findings discussed with patient.

## 2025-05-26 NOTE — ED ADULT NURSE NOTE - CHIEF COMPLAINT QUOTE
PT presents to the ED BIBEMS from [address] w/ c/o Cough. Pt states "he thinks he has covid because he had a cough today"

## 2025-05-26 NOTE — ED ADULT NURSE NOTE - NSFALLLASTSIX_ED_ALL_ED
Encounter Date: 10/21/2022       History     Chief Complaint   Patient presents with    Sore Throat     For 2 days     35 y/o AAF presents pov with c/o a sore throat time 2 days.    Review of patient's allergies indicates:  No Known Allergies  History reviewed. No pertinent past medical history.  History reviewed. No pertinent surgical history.  History reviewed. No pertinent family history.  Social History     Tobacco Use    Smoking status: Every Day     Packs/day: 1.00     Years: 15.00     Pack years: 15.00     Types: Cigarettes     Passive exposure: Never    Smokeless tobacco: Never   Substance Use Topics    Alcohol use: Yes     Comment: rarely    Drug use: Never     Review of Systems   Constitutional:  Negative for fever.   HENT:  Positive for congestion, postnasal drip, rhinorrhea and sore throat. Negative for dental problem, drooling, ear discharge, ear pain, facial swelling, hearing loss, mouth sores, nosebleeds, sinus pressure, sinus pain, sneezing, tinnitus, trouble swallowing and voice change.    Respiratory:  Negative for apnea, cough, choking, chest tightness, shortness of breath, wheezing and stridor.    Cardiovascular:  Negative for chest pain, palpitations and leg swelling.   All other systems reviewed and are negative.    Physical Exam     Initial Vitals [10/21/22 1016]   BP Pulse Resp Temp SpO2   130/88 74 18 98.5 °F (36.9 °C) 99 %      MAP       --         Physical Exam    Nursing note and vitals reviewed.  Constitutional: She appears well-developed and well-nourished. No distress.   HENT:   Head: Normocephalic.   Right Ear: Tympanic membrane and ear canal normal.   Left Ear: Tympanic membrane and ear canal normal.   Nose: Rhinorrhea present. Right sinus exhibits no maxillary sinus tenderness and no frontal sinus tenderness. Left sinus exhibits no maxillary sinus tenderness and no frontal sinus tenderness.   Mouth/Throat: Uvula is midline, oropharynx is clear and moist and mucous membranes are  normal.   Eyes: Conjunctivae and EOM are normal.   Neck: Neck supple.   Normal range of motion.  Cardiovascular:  Normal rate, regular rhythm, normal heart sounds and intact distal pulses.     Exam reveals no gallop and no friction rub.       No murmur heard.  Pulmonary/Chest: Breath sounds normal. No respiratory distress. She has no wheezes. She has no rhonchi. She has no rales. She exhibits no tenderness.   Musculoskeletal:         General: Normal range of motion.      Cervical back: Normal range of motion and neck supple.     Lymphadenopathy:     She has no cervical adenopathy.   Neurological: She is alert and oriented to person, place, and time. She has normal strength.   Skin: Skin is warm and dry. Capillary refill takes less than 2 seconds.   Psychiatric: She has a normal mood and affect. Her behavior is normal. Judgment and thought content normal.       Medical Screening Exam   See Full Note    ED Course   Procedures  Labs Reviewed   THROAT SCREEN, RAPID STREP - Normal   SARS-COV2 (COVID) W/ FLU ANTIGEN - Normal    Narrative:     Negative SARS-CoV results should not be used as the sole basis for treatment or patient management decisions; negative results should be considered in the context of a patient's recent exposures, history and the presene of clinical signs and symptoms consistent with COVID-19.  Negative results should be treated as presumptive and confirmed by molecular assay, if necessary for patient management.          Imaging Results    None          Medications - No data to display                    Clinical Impression:   Final diagnoses:  [J02.9] Sore throat (Primary)      ED Disposition Condition    Discharge Stable          ED Prescriptions       Medication Sig Dispense Start Date End Date Auth. Provider    fluticasone propionate (FLONASE) 50 mcg/actuation nasal spray 1 spray (50 mcg total) by Each Nostril route 2 (two) times daily as needed for Rhinitis. 15 g 10/21/2022 -- Kyrie Auguste  [facility]          Follow-up Information    None          Kyrie Auguste, [facility]  10/21/22 1120     No.

## 2025-05-26 NOTE — ED ADULT TRIAGE NOTE - CHIEF COMPLAINT QUOTE
PT presents to the ED BIBEMS from [address] w/ c/o Cough. Pt states "he thinks he has covid because he had a cough today"

## 2025-05-26 NOTE — ED PROVIDER NOTE - PATIENT PORTAL LINK FT
You can access the FollowMyHealth Patient Portal offered by [facility] by registering at the following website: [email or web address]. By joining [employer]’s FollowMyHealth portal, you will also be able to view your health information using other applications (apps) compatible with our system.

## 2025-05-26 NOTE — ED PROVIDER NOTE - NSFOLLOWUPINSTRUCTIONS_ED_ALL_ED_FT
Please follow with your PCP ASAP for reevaluation and reminder of the care.   Please return to ED immediately for any chest pain, trouble breathing, nausea, vomiting, headache, dizziness, abdominal pain, or any other symptoms/concerns.    Upper Respiratory Infection, Adult  An upper respiratory infection (URI) is a common viral infection of the nose, throat, and upper air passages that lead to the lungs. The most common type of URI is the common cold. URIs usually get better on their own, without medical treatment.    What are the causes?  A URI is caused by a virus. You may catch a virus by:    Breathing in droplets from an infected person's cough or sneeze.  Touching something that has been exposed to the virus (contaminated) and then touching your mouth, nose, or eyes.    What increases the risk?  You are more likely to get a URI if:    You are very young or very old.  It is nancy or winter.  You have close contact with others, such as at a , school, or health care facility.  You smoke.  You have long-term (chronic) heart or lung disease.  You have a weakened disease-fighting (immune) system.  You have nasal allergies or asthma.  You are experiencing a lot of stress.  You work in an area that has poor air circulation.  You have poor nutrition.    What are the signs or symptoms?  A URI usually involves some of the following symptoms:    Runny or stuffy (congested) nose.  Sneezing.  Cough.  Sore throat.  Headache.  Fatigue.  Fever.  Loss of appetite.  Pain in your forehead, behind your eyes, and over your cheekbones (sinus pain).  Muscle aches.  Redness or irritation of the eyes.  Pressure in the ears or face.    How is this diagnosed?  This condition may be diagnosed based on your medical history and symptoms, and a physical exam. Your health care provider may use a cotton swab to take a mucus sample from your nose (nasal swab). This sample can be tested to determine what virus is causing the illness.    How is this treated?  URIs usually get better on their own within 7–10 days. You can take steps at home to relieve your symptoms. Medicines cannot cure URIs, but your health care provider may recommend certain medicines to help relieve symptoms, such as:    Over-the-counter cold medicines.  Cough suppressants. Coughing is a type of defense against infection that helps to clear the respiratory system, so take these medicines only as recommended by your health care provider.  Fever-reducing medicines.    Follow these instructions at home:  Activity     Rest as needed.  If you have a fever, stay home from work or school until your fever is gone or until your health care provider says you are no longer contagious. Your health care provider may have you wear a face mask to prevent your infection from spreading.  Relieving symptoms     Gargle with a salt-water mixture 3–4 times a day or as needed. To make a salt-water mixture, completely dissolve ½–1 tsp of salt in 1 cup of warm water.  Use a cool-mist humidifier to add moisture to the air. This can help you breathe more easily.  Eating and drinking     Drink enough fluid to keep your urine pale yellow.  ImageEat soups and other clear broths.  General instructions     Take over-the-counter and prescription medicines only as told by your health care provider. These include cold medicines, fever reducers, and cough suppressants.  Do not use any products that contain nicotine or tobacco, such as cigarettes and e-cigarettes. If you need help quitting, ask your health care provider.   Stay away from secondhand smoke.  Stay up to date on all immunizations, including the yearly (annual) flu vaccine.  ImageKeep all follow-up visits as told by your health care provider. This is important.  How to prevent the spread of infection to others     ImageURIs can be passed from person to person (are contagious). To prevent the infection from spreading:    Wash your hands often with soap and water. If soap and water are not available, use hand .  Avoid touching your mouth, face, eyes, or nose.  Cough or sneeze into a tissue or your sleeve or elbow instead of into your hand or into the air.    Contact a health care provider if:  You are getting worse instead of better.  You have a fever or chills.  Your mucus is brown or red.  You have yellow or brown discharge coming from your nose.  You have pain in your face, especially when you bend forward.  You have swollen neck glands.  You have pain while swallowing.  You have white areas in the back of your throat.  Get help right away if:  You have shortness of breath that gets worse.  You have severe or persistent:    Headache.  Ear pain.  Sinus pain.  Chest pain.    You have chronic lung disease along with any of the following:    Wheezing.  Prolonged cough.  Coughing up blood.  A change in your usual mucus.    You have a stiff neck.  You have changes in your:    Vision.  Hearing.  Thinking.  Mood.    Summary  An upper respiratory infection (URI) is a common infection of the nose, throat, and upper air passages that lead to the lungs.  A URI is caused by a virus.  URIs usually get better on their own within 7–10 days.  Medicines cannot cure URIs, but your health care provider may recommend certain medicines to help relieve symptoms.  This information is not intended to replace advice given to you by your health care provider. Make sure you discuss any questions you have with your health care provider.

## 2025-05-26 NOTE — ED PROVIDER NOTE - NSFOLLOWUPCLINICS_GEN_ALL_ED_FT
[facility]  Medicine  [address], [place]   Phone: [phone]  Fax:   Follow Up Time: Urgent

## 2025-05-26 NOTE — ED ADULT NURSE NOTE - NSFALLUNIVINTERV_ED_ALL_ED
Bed/Stretcher in lowest position, wheels locked, appropriate side rails in place/Call bell, personal items and telephone in reach/Instruct patient to call for assistance before getting out of bed/chair/stretcher/Non-slip footwear applied when patient is off stretcher/[place] to call system/Physically safe environment - no spills, clutter or unnecessary equipment/Purposeful proactive rounding/Room/bathroom lighting operational, light cord in reach

## 2025-05-26 NOTE — ED PROVIDER NOTE - OBJECTIVE STATEMENT
Patient is c/o cough/nasal congestion Patient is c/o cough/nasal congestion x one day. denies cp/sob/n/v/f/c. Patient with similar symptoms in the past, when he was diagnosed with COVID-19; so had come to ED for evaluation. Patient denies n/v/abd pain/diarrhea. Denies recent travel/sick contacts. Denies lower ext pain/swelling.

## 2025-06-09 ENCOUNTER — APPOINTMENT (OUTPATIENT)
Dept: INTERNAL MEDICINE | Facility: CLINIC | Age: 55
End: 2025-06-09

## 2025-06-23 ENCOUNTER — EMERGENCY (EMERGENCY)
Facility: HOSPITAL | Age: 55
LOS: 0 days | Discharge: HOME | End: 2025-06-23
Attending: EMERGENCY MEDICINE
Payer: MEDICAID

## 2025-06-23 VITALS
TEMPERATURE: 98 F | RESPIRATION RATE: 17 BRPM | SYSTOLIC BLOOD PRESSURE: 130 MMHG | DIASTOLIC BLOOD PRESSURE: 87 MMHG | OXYGEN SATURATION: 98 % | HEART RATE: 99 BPM

## 2025-06-23 VITALS
TEMPERATURE: 98 F | SYSTOLIC BLOOD PRESSURE: 135 MMHG | RESPIRATION RATE: 18 BRPM | HEART RATE: 115 BPM | WEIGHT: 188.72 LBS | DIASTOLIC BLOOD PRESSURE: 90 MMHG | OXYGEN SATURATION: 97 % | HEIGHT: 67 IN

## 2025-06-23 DIAGNOSIS — E11.9 TYPE 2 DIABETES MELLITUS WITHOUT COMPLICATIONS: ICD-10-CM

## 2025-06-23 DIAGNOSIS — Z91.014 ALLERGY TO MAMMALIAN MEATS: ICD-10-CM

## 2025-06-23 DIAGNOSIS — F20.9 SCHIZOPHRENIA, UNSPECIFIED: ICD-10-CM

## 2025-06-23 DIAGNOSIS — Z88.8 ALLERGY STATUS TO OTHER DRUGS, MEDICAMENTS AND BIOLOGICAL SUBSTANCES: ICD-10-CM

## 2025-06-23 DIAGNOSIS — M25.571 PAIN IN RIGHT ANKLE AND JOINTS OF RIGHT FOOT: ICD-10-CM

## 2025-06-23 DIAGNOSIS — Z98.890 OTHER SPECIFIED POSTPROCEDURAL STATES: ICD-10-CM
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
## 2025-06-23 DIAGNOSIS — Z88.0 ALLERGY STATUS TO PENICILLIN: ICD-10-CM

## 2025-06-23 DIAGNOSIS — I10 ESSENTIAL (PRIMARY) HYPERTENSION: ICD-10-CM
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
## 2025-06-23 DIAGNOSIS — S82.831A OTHER FRACTURE OF UPPER AND LOWER END OF RIGHT FIBULA, INITIAL ENCOUNTER FOR CLOSED FRACTURE: ICD-10-CM

## 2025-06-23 PROCEDURE — 99284 EMERGENCY DEPT VISIT MOD MDM: CPT

## 2025-06-23 PROCEDURE — 73630 X-RAY EXAM OF FOOT: CPT | Mod: RT

## 2025-06-23 PROCEDURE — 99284 EMERGENCY DEPT VISIT MOD MDM: CPT | Mod: 25

## 2025-06-23 PROCEDURE — 73610 X-RAY EXAM OF ANKLE: CPT | Mod: RT

## 2025-06-23 PROCEDURE — 73630 X-RAY EXAM OF FOOT: CPT | Mod: 26,RT

## 2025-06-23 PROCEDURE — 73610 X-RAY EXAM OF ANKLE: CPT | Mod: 26,RT

## 2025-06-23 RX ORDER — IBUPROFEN 200 MG
600 TABLET ORAL ONCE
Refills: 0 | Status: COMPLETED | OUTPATIENT
Start: 2025-06-23 | End: 2025-06-23

## 2025-06-23 RX ADMIN — Medication 600 MILLIGRAM(S): at 20:11

## 2025-06-23 NOTE — ED ADULT TRIAGE NOTE - CHIEF COMPLAINT QUOTE
BIBEMs from [facility]  building 4 second floor, for Right ankle pain    hx Bipolar

## 2025-06-23 NOTE — ED PROVIDER NOTE - CARE PROVIDER_API CALL
Lisandro Stout  Orthopaedic Surgery  [address] [place] [address]  Phone: [phone]  Fax: [phone]  Follow Up Time:

## 2025-06-23 NOTE — ED ADULT NURSE NOTE - CHIEF COMPLAINT QUOTE
BIBEMs from [facility]  building 4 second floor, for Right ankle pain    hx Bipolar

## 2025-06-23 NOTE — ED PROVIDER NOTE - CLINICAL SUMMARY MEDICAL DECISION MAKING FREE TEXT BOX
Patient presented with chronic ongoing R ankle pain that has worsened over the past 3 days as documented. Otherwise on arrival patient afebrile, HD stable, fully neurovascularly intact, full active and passive ROM of the ankle, no external signs of infection on exam, no fluctuance. Xrays obtained and negative for acute bony abnormality and pain controlled with motrin in the ED. Given the above, will discharge home with outpatient follow up. Patient agreeable with plan. Agrees to return to ED for any new or worsening symptoms.

## 2025-06-23 NOTE — ED PROVIDER NOTE - PHYSICAL EXAMINATION
Vital Signs: I have reviewed the initial vital signs.  CONSTITUTIONAL: Pt in no acute distress  SKIN: Skin exam is warm and dry, no acute rash.  HEAD: Normocephalic; atraumatic.  NECK: Supple; non tender. FROM  MSK: +tenderness right ATFL. Normal ROM. NVI. No gross deformity. Pt able to ambulate in ED. No 5th metatarsal tenderness. No tib/fib tenderness.

## 2025-06-23 NOTE — ED PROVIDER NOTE - OBJECTIVE STATEMENT
54-year-old male past medical history of schizophrenia, HTN, diabetes, chronic right ankle pain s/p fixation, presents with right ankle pain X 3 days.  Patient denies known trauma and fall.  Patient states he has been able to ambulate with mild discomfort.  Patient has no other complaints.

## 2025-06-23 NOTE — ED PROVIDER NOTE - PATIENT PORTAL LINK FT
You can access the FollowMyHealth Patient Portal offered by [facility] by registering at the following website: [email or web address]. By joining [employer]’s FollowMyHealth portal, you will also be able to view your health information using other applications (apps) compatible with our system.

## 2025-06-23 NOTE — ED PROVIDER NOTE - NSFOLLOWUPINSTRUCTIONS_ED_ALL_ED_FT
Our Emergency Department Referral Coordinators will be reaching out to you in the next 24-48 hours from 9:00am to 5:00pm with a follow up appointment. Please expect a phone call from the hospital in that time frame. If you do not receive a call or if you have any questions or concerns, you can reach them at   [phone]    Follow up with orthopedics referral within 1 week. Take Ibuprofen every 6 hours as needed for pain.     Sprain    A sprain is a stretch or tear in one of the tough, fiber-like tissues (ligaments) in your body. This is caused by an injury to the area such as a twisting mechanism. Symptoms include pain, swelling, or bruising. Rest that area over the next several days and slowly resume activity when tolerated. Ice can help with swelling and pain.     SEEK IMMEDIATE MEDICAL CARE IF YOU HAVE ANY OF THE FOLLOWING SYMPTOMS: worsening pain, inability to move that body part, numbness or tingling.     RICE Therapy for Routine Care of Injuries  Many injuries can be cared for with rest, ice, compression, and elevation. This is also called RICE therapy.    RICE therapy includes:  Resting the injured body part.  Putting ice on the injury.  Putting pressure on the injury. This is also called compression.  Raising the injured part. This is also called elevation.  RICE therapy can help reduce pain and swelling.    Supplies needed:  Ice.  Plastic bag.  Towel.  Elastic bandage.  Pillow or pillows to raise the injured body part.  How to care for your injury with RICE therapy  Rest    Try to rest the injured part of your body.  You can go back to your normal activities when your health care provider says it's okay to do them and when you can do them without pain.  Ask what things are safe for you to do.  Some injuries heal better with early movement instead of resting. If you rest the injury too much, it may not heal as well. Ask your provider if you should do exercises to help your injury get better.    Ice    Bag of ice on a towel on the skin.  Putting ice on your injury can help to lessen swelling and pain. Do not apply ice directly to your skin. Use ice on as many days as told by your provider.  If told, put ice on the area.  Put ice in a plastic bag.  Place a towel between your skin and the bag.  Leave the ice on for 20 minutes, 2–3 times a day.  If your skin turns bright red, take off the ice right away to prevent skin damage. The risk of damage is higher if you can't feel pain, heat, or cold.  Compression    A person wrapping a bandage around an ankle.  Put pressure, also called compression, on your injured area. This can be done with an elastic bandage. If this type of bandage has been put on your injury:  Follow instructions on the package the bandage came in about how to use it.  Do not wrap the bandage too tightly.  Wrap the bandage more loosely if part of your body beyond the bandage looks blue, or is swollen, cold, painful, or loses feeling.  Take off the bandage and put it on again every 3–4 hours or as told by your provider.  Call your provider if the bandage seems to make your injury worse.  Elevation    Raise the injured area above the level of your heart while you're sitting or lying down. Use a pillow to support your injured area as needed.    Follow these instructions at home:  If your symptoms get worse or last a long time, make a follow-up appointment with your provider. You may need to have imaging tests, such as X-rays or an MRI.  If you have imaging tests, ask how to get your results when they are ready.  Contact a health care provider if:  You keep having pain and swelling.  Your symptoms get worse.  Get help right away if:  You have sudden, very bad pain at your injury or lower than your injury.  You have tingling or numbness at your injury or lower than your injury, and it does not go away when you take the bandage off.  This information is not intended to replace advice given to you by your health care provider. Make sure you discuss any questions you have with your health care provider.

## 2025-06-30 ENCOUNTER — OUTPATIENT (OUTPATIENT)
Dept: OUTPATIENT SERVICES | Facility: HOSPITAL | Age: 55
LOS: 1 days | End: 2025-06-30
Payer: MEDICAID

## 2025-06-30 ENCOUNTER — APPOINTMENT (OUTPATIENT)
Dept: INTERNAL MEDICINE | Facility: CLINIC | Age: 55
End: 2025-06-30

## 2025-06-30 VITALS — DIASTOLIC BLOOD PRESSURE: 92 MMHG | SYSTOLIC BLOOD PRESSURE: 139 MMHG

## 2025-06-30 VITALS
OXYGEN SATURATION: 98 % | HEART RATE: 113 BPM | DIASTOLIC BLOOD PRESSURE: 101 MMHG | WEIGHT: 189 LBS | HEIGHT: 65 IN | BODY MASS INDEX: 31.49 KG/M2 | SYSTOLIC BLOOD PRESSURE: 145 MMHG | TEMPERATURE: 97.2 F

## 2025-06-30 DIAGNOSIS — Z00.00 ENCOUNTER FOR GENERAL ADULT MEDICAL EXAMINATION WITHOUT ABNORMAL FINDINGS: ICD-10-CM

## 2025-06-30 PROCEDURE — 99214 OFFICE O/P EST MOD 30 MIN: CPT

## 2025-06-30 RX ORDER — METOPROLOL SUCCINATE 50 MG/1
50 TABLET, EXTENDED RELEASE ORAL DAILY
Qty: 90 | Refills: 0 | Status: ACTIVE | COMMUNITY
Start: 2025-06-30 | End: 1900-01-01

## 2025-07-09 DIAGNOSIS — I10 ESSENTIAL (PRIMARY) HYPERTENSION: ICD-10-CM

## 2025-07-09 DIAGNOSIS — E78.5 HYPERLIPIDEMIA, UNSPECIFIED: ICD-10-CM

## 2025-07-09 DIAGNOSIS — E03.9 HYPOTHYROIDISM, UNSPECIFIED: ICD-10-CM

## 2025-07-09 DIAGNOSIS — E11.9 TYPE 2 DIABETES MELLITUS WITHOUT COMPLICATIONS: ICD-10-CM

## 2025-07-10 NOTE — BH DISCHARGE NOTE NURSING/SOCIAL WORK/PSYCH REHAB - NSDCNEXTLEVELWHO_PSY_ALL_CORE_FT
Subjective     Yecenia Rodriguez is a 37 y.o. y.o. female  who presents for a postpartum visit. She is 4 weeks postpartum following a low cervical transverse  section on 25. I have fully reviewed the prenatal and intrapartum course. The delivery was at 39 gestational weeks. Outcome: primary  section, low transverse incision. Anesthesia: epidural. Postpartum course has been complicated by incisional infection requiring antibiotics that has since cleared up.. She also has cHTN and taking labetalol 400mg BID. Baby's course has been uncomplicated. Baby is feeding by bottle - Enfamil Neuropro. Bleeding staining only. Bowel function is normal. Bladder function is normal. Patient is not sexually active. Contraception method is abstinence. Postpartum depression screening: negative.    The following portions of the patient's history were reviewed and updated as appropriate: allergies, current medications, past family history, past medical history, past social history, past surgical history, and problem list.    Review of Systems  Pertinent items are noted in HPI..    Objective   Menstrual History:  OB History          2    Para   1    Term   1            AB   1    Living   1         SAB        IAB   1    Ectopic        Multiple   0    Live Births   1           Obstetric Comments   Menarche: 15      Menses: unsure of birth control                Patient's last menstrual period was 2024 (exact date).            Vitals:    07/10/25 1354   BP: 124/78   Pulse:    SpO2:        Physical Exam  Constitutional:       Appearance: She is well-developed.     Cardiovascular:      Rate and Rhythm: Normal rate and regular rhythm.      Heart sounds: Normal heart sounds. No murmur heard.     No friction rub. No gallop.   Pulmonary:      Effort: Pulmonary effort is normal. No respiratory distress.      Breath sounds: No wheezing.   Abdominal:      Palpations: Abdomen is soft.      Tenderness:  There is no abdominal tenderness.     Musculoskeletal:         General: No tenderness.     Neurological:      Mental Status: She is alert and oriented to person, place, and time.     Skin:     Comments: Well healed incision, no signs of ongoing infection   Vitals reviewed.         Assessment/Plan         Problem List Items Addressed This Visit       Chronic hypertension affecting pregnancy    Plan to drop labetalol from 400mg BID to 200mg BID. She will then follow up with her PCP for further adjustments.         Status post primary low transverse  section     Other Visit Diagnoses         Postpartum state    -  Primary    Normal postpartum exam.             Fax to [facility] [phone]  email - [place] [facility] [place] [place]

## 2025-07-16 ENCOUNTER — EMERGENCY (EMERGENCY)
Facility: HOSPITAL | Age: 55
LOS: 0 days | Discharge: HOME | End: 2025-07-16
Attending: STUDENT IN AN ORGANIZED HEALTH CARE EDUCATION/TRAINING PROGRAM
Payer: MEDICAID

## 2025-07-16 VITALS
SYSTOLIC BLOOD PRESSURE: 128 MMHG | HEIGHT: 67 IN | DIASTOLIC BLOOD PRESSURE: 87 MMHG | RESPIRATION RATE: 18 BRPM | TEMPERATURE: 98 F | HEART RATE: 98 BPM | OXYGEN SATURATION: 100 % | WEIGHT: 175.05 LBS

## 2025-07-16 DIAGNOSIS — R07.89 OTHER CHEST PAIN: ICD-10-CM

## 2025-07-16 DIAGNOSIS — Z88.8 ALLERGY STATUS TO OTHER DRUGS, MEDICAMENTS AND BIOLOGICAL SUBSTANCES: ICD-10-CM

## 2025-07-16 DIAGNOSIS — M54.6 PAIN IN THORACIC SPINE: ICD-10-CM

## 2025-07-16 DIAGNOSIS — I10 ESSENTIAL (PRIMARY) HYPERTENSION: ICD-10-CM

## 2025-07-16 DIAGNOSIS — R07.81 PLEURODYNIA: ICD-10-CM

## 2025-07-16 DIAGNOSIS — F20.9 SCHIZOPHRENIA, UNSPECIFIED: ICD-10-CM

## 2025-07-16 DIAGNOSIS — Z91.014 ALLERGY TO MAMMALIAN MEATS: ICD-10-CM

## 2025-07-16 DIAGNOSIS — E11.9 TYPE 2 DIABETES MELLITUS WITHOUT COMPLICATIONS: ICD-10-CM

## 2025-07-16 DIAGNOSIS — Z88.0 ALLERGY STATUS TO PENICILLIN: ICD-10-CM

## 2025-07-16 PROCEDURE — 71045 X-RAY EXAM CHEST 1 VIEW: CPT | Mod: 26

## 2025-07-16 PROCEDURE — 99284 EMERGENCY DEPT VISIT MOD MDM: CPT

## 2025-07-16 PROCEDURE — 96372 THER/PROPH/DIAG INJ SC/IM: CPT

## 2025-07-16 PROCEDURE — 71045 X-RAY EXAM CHEST 1 VIEW: CPT

## 2025-07-16 PROCEDURE — 99283 EMERGENCY DEPT VISIT LOW MDM: CPT | Mod: 25

## 2025-07-16 RX ORDER — METHOCARBAMOL 500 MG/1
2 TABLET, FILM COATED ORAL
Qty: 12 | Refills: 0
Start: 2025-07-16 | End: 2025-07-17

## 2025-07-16 RX ORDER — METHOCARBAMOL 500 MG/1
1500 TABLET, FILM COATED ORAL ONCE
Refills: 0 | Status: COMPLETED | OUTPATIENT
Start: 2025-07-16 | End: 2025-07-16

## 2025-07-16 RX ORDER — LIDOCAINE HYDROCHLORIDE 20 MG/ML
1 JELLY TOPICAL ONCE
Refills: 0 | Status: COMPLETED | OUTPATIENT
Start: 2025-07-16 | End: 2025-07-16

## 2025-07-16 RX ORDER — LIDOCAINE HYDROCHLORIDE 20 MG/ML
1 JELLY TOPICAL
Qty: 7 | Refills: 0
Start: 2025-07-16 | End: 2025-07-22

## 2025-07-16 RX ORDER — IBUPROFEN 200 MG
1 TABLET ORAL
Qty: 20 | Refills: 0
Start: 2025-07-16 | End: 2025-07-20

## 2025-07-16 RX ORDER — KETOROLAC TROMETHAMINE 30 MG/ML
60 INJECTION, SOLUTION INTRAMUSCULAR; INTRAVENOUS ONCE
Refills: 0 | Status: DISCONTINUED | OUTPATIENT
Start: 2025-07-16 | End: 2025-07-16

## 2025-07-16 RX ADMIN — METHOCARBAMOL 1500 MILLIGRAM(S): 500 TABLET, FILM COATED ORAL at 18:06

## 2025-07-16 RX ADMIN — KETOROLAC TROMETHAMINE 60 MILLIGRAM(S): 30 INJECTION, SOLUTION INTRAMUSCULAR; INTRAVENOUS at 18:05

## 2025-07-16 RX ADMIN — LIDOCAINE HYDROCHLORIDE 1 PATCH: 20 JELLY TOPICAL at 18:05

## 2025-07-16 NOTE — ED PROVIDER NOTE - CLINICAL SUMMARY MEDICAL DECISION MAKING FREE TEXT BOX
54 yr old m that presents that presents with back pain. Will obtain imaging to rule out any fx. pain meds. pt reports improvement of pain after pain medication. will dc pt. pt with no red flags.  Appropriate medications for patient's presenting complaints were ordered and effects were reassessed.  Patient's records (prior hospital, ED visit, and/or nursing home notes if available) were reviewed.  Additional history was obtained from EMS, family, and/or PCP (where available).  Escalation to admission/observation was considered.  However patient feels much better and is comfortable with discharge.  Appropriate follow-up was arranged.    I have discussed the discharge plan with the patient. The patient agrees with the plan, as discussed.  The patient understands Emergency Department diagnosis is a preliminary diagnosis often based on limited information and that the patient must adhere to the follow-up plan as discussed.  The patient understands that if the symptoms worsen or if prescribed medications do not have the desired/planned effect that the patient may return to the Emergency Department at any time for further evaluation and treatment.

## 2025-07-16 NOTE — ED ADULT NURSE NOTE - NSFALLRISKASMTTYPE_ED_ALL_ED
Initial (On Arrival)

## 2025-07-16 NOTE — ED ADULT NURSE NOTE - CHIEF COMPLAINT QUOTE
PT from [facility], as per EMS, PT complaining of back pain for two weeks

## 2025-07-16 NOTE — ED PROVIDER NOTE - WET READ LAUNCH FT
[facility] Call Center    Phone Message    May a detailed message be left on voicemail: yes    Reason for Call: Order(s): Other: Urine test   Reason for requested: Pt wife called in believes pt has a UTI and would like a Urine test ordered  Date needed: as soon as possible  Provider name: Ayaz      Action Taken: Message routed to:  [facility] ([facility]): uro    There is 1 Wet Read(s) to document. There are no Wet Read(s) to document.

## 2025-07-16 NOTE — ED ADULT NURSE NOTE - NSFALLUNIVINTERV_ED_ALL_ED
Bed/Stretcher in lowest position, wheels locked, appropriate side rails in place/Call bell, personal items and telephone in reach/Instruct patient to call for assistance before getting out of bed/chair/stretcher/Non-slip footwear applied when patient is off stretcher/[place] to call system/Physically safe environment - no spills, clutter or unnecessary equipment/Purposeful proactive rounding/Room/bathroom lighting operational, light cord in reach

## 2025-07-16 NOTE — ED PROVIDER NOTE - OBJECTIVE STATEMENT
55yo male with PMHx schiozphrenia, HTN, DM presents c/o L rib/back pain worse with certain movements and deep breaths. Pt reports he feels as if he "slept wrong."  Pain relieves if he sits still. Denies SOB, CP, cough, fever or chills. Denies any trauma

## 2025-07-16 NOTE — ED PROVIDER NOTE - PATIENT PORTAL LINK FT
You can access the FollowMyHealth Patient Portal offered by [facility] by registering at the following website: [email or web address]. By joining [employer]’s FollowMyHealth portal, you will also be able to view your health information using other applications (apps) compatible with our system.

## 2025-07-16 NOTE — ED PROVIDER NOTE - NSCAREINITIATED _GEN_ER
Not sure why she is on this. Unlikely that this will be Oked. Needs to come in if she wants to have us advocate for this.    Mckenzie Newton(PA)

## 2025-07-16 NOTE — ED PROVIDER NOTE - ATTENDING APP SHARED VISIT CONTRIBUTION OF CARE
54 yr old m w/ a pmh significant for schizophrenia, htn, dm, who presents with back pain. Pt states that for the past couple of days he has been having reproducible R sided back pain. Pt denies any numbness, tingling, nausea, vomiting, trauma or any other medical complaints.     VITAL SIGNS: I have reviewed nursing notes and confirm.  CONSTITUTIONAL: non-toxic, well appearing  SKIN: no rash, no petechiae.  EYES: EOMI, pink conjunctiva, anicteric  ENT: tongue midline, no exudates, MMM  NECK: Supple; no meningismus, no JVD  CARD: RRR, no murmurs, equal radial pulses bilaterally 2+  RESP: CTAB, no respiratory distress  ABD: Soft, non-tender, non-distended, no peritoneal signs, no HSM, no CVA tenderness  EXT: Normal ROM x4. No edema. No calves tenderness no midline cervical/thoracic/lumbar tendenress  NEURO: Alert, oriented x3. CN2-12 intact, equal strength bilaterally, nl gait.  PSYCH: Cooperative, appropriate.        54 yr old m that presents that presents with back pain. Will obtain imaging to rule out any fx. pain meds. pt reports improvement of pain after pain medication. will dc pt. 54 yr old m w/ a pmh significant for schizophrenia, htn, dm, who presents with back pain. Pt states that for the past couple of days he has been having reproducible R sided back pain. Pt denies any numbness, tingling, nausea, vomiting, trauma or any other medical complaints.     VITAL SIGNS: I have reviewed nursing notes and confirm.  CONSTITUTIONAL: non-toxic, well appearing  SKIN: no rash, no petechiae.  EYES: EOMI, pink conjunctiva, anicteric  ENT: tongue midline, no exudates, MMM  NECK: Supple; no meningismus, no JVD  CARD: RRR, no murmurs, equal radial pulses bilaterally 2+  RESP: CTAB, no respiratory distress  ABD: Soft, non-tender, non-distended, no peritoneal signs, no HSM, no CVA tenderness  EXT: Normal ROM x4. No edema. No calves tenderness no midline cervical/thoracic/lumbar tenderness  NEURO: Alert, oriented x3. CN2-12 intact, equal strength bilaterally, nl gait.  PSYCH: Cooperative, appropriate.        54 yr old m that presents that presents with back pain. Will obtain imaging to rule out any fx. pain meds. pt reports improvement of pain after pain medication. will dc pt.

## 2025-07-16 NOTE — ED PROVIDER NOTE - PHYSICAL EXAMINATION
CONST: Well appearing in NAD  EYES: PERRL, EOMI, Sclera and conjunctiva clear.   CARD: Normal S1 S2; Normal rate and rhythm  RESP: Equal BS B/L, No wheezes, rhonchi or rales. No distress  GI: Soft, non-tender, non-distended.  MS: Normal ROM in all extremities. No midline spinal tenderness. Mild tenderness L lateral intercostals 5-7  SKIN: Warm, dry, no acute rashes. Good turgor  NEURO: A&Ox3, No focal deficits. Strength 5/5 with no sensory deficits.

## 2025-07-20 NOTE — ED ADULT TRIAGE NOTE - INTERNATIONAL TRAVEL
Physical Therapy  Facility/Department: [facility]  Physical Therapy Initial Assessment    Name: Viktoriya Osorio  : 1956  MRN: [id number]  Date of Service: 2025    Discharge Recommendations:  Subacute/Skilled Nursing Facility (trial)   PT Equipment Recommendations  Equipment Needed: No    Assessment  Assessment: Pt from home with spouse, admitted for UTI.  Pt with history significant for Alzheimer's dementia and PLOF unknown.  Pt significantly limited by cognition, inconsistently following commands and initiating tasks.  Pt resistive to mobility, trying to return to supine position multiple times.  Pt only able to achieve partial stance to rolling walker with Max A x2, resistive to standing.  Unable to assess further mobility.  Cognition is a barrier.  Recommend trial SNF at d/c pending cognition once infection clears.  IF home, pt would need 24hr capable assist and home PT to ensure safety.  Will follow.  Treatment Diagnosis: Decreased mobility associated with UTI.  Decision Making: High Complexity  Requires PT Follow-Up: Yes    Plan  General Plan:  (2-5)  Current Treatment Recommendations: Strengthening, Balance training, Functional mobility training, Transfer training, Gait training, Neuromuscular re-education, Home exercise program, Safety education & training, Patient/Caregiver education & training, Co-Treatment    Safety Devices  Type of Devices: Left in bed, Bed alarm in place, Call light within reach, Nurse notified  Restraints  Restraints Initially in Place: Yes  Restraints: B soft wrist restraints reapplied after therapy    Restrictions  Other Position/Activity Restrictions: Up as tolerated     Subjective  Chart Reviewed: Yes  Additional Pertinent Hx: Pt to ED  with poor PO intake.  CXR (-).  Work-up (+) for UTI.  PMH:  HTN, DM2 c/b CKD3, HLD, CARL[facility] ALBERTO, Alzheimer's dementia    Diagnosis: UTI    Subjective  Subjective: Pt found supine in bed, fidgeting with a pair of socks.  Pt confused with  No

## 2025-07-21 ENCOUNTER — OUTPATIENT (OUTPATIENT)
Dept: OUTPATIENT SERVICES | Facility: HOSPITAL | Age: 55
LOS: 1 days | End: 2025-07-21
Payer: MEDICAID

## 2025-07-21 ENCOUNTER — APPOINTMENT (OUTPATIENT)
Dept: INTERNAL MEDICINE | Facility: CLINIC | Age: 55
End: 2025-07-21

## 2025-07-21 VITALS
OXYGEN SATURATION: 98 % | WEIGHT: 182 LBS | TEMPERATURE: 97 F | SYSTOLIC BLOOD PRESSURE: 144 MMHG | HEART RATE: 110 BPM | DIASTOLIC BLOOD PRESSURE: 78 MMHG | BODY MASS INDEX: 30.29 KG/M2

## 2025-07-21 VITALS — SYSTOLIC BLOOD PRESSURE: 136 MMHG | DIASTOLIC BLOOD PRESSURE: 84 MMHG

## 2025-07-21 DIAGNOSIS — I10 ESSENTIAL (PRIMARY) HYPERTENSION: ICD-10-CM

## 2025-07-21 DIAGNOSIS — E11.9 TYPE 2 DIABETES MELLITUS W/OUT COMPLICATIONS: ICD-10-CM

## 2025-07-21 DIAGNOSIS — Z00.00 ENCOUNTER FOR GENERAL ADULT MEDICAL EXAMINATION WITHOUT ABNORMAL FINDINGS: ICD-10-CM

## 2025-07-21 DIAGNOSIS — E78.5 HYPERLIPIDEMIA, UNSPECIFIED: ICD-10-CM

## 2025-07-21 PROCEDURE — 99214 OFFICE O/P EST MOD 30 MIN: CPT

## 2025-07-23 DIAGNOSIS — I10 ESSENTIAL (PRIMARY) HYPERTENSION: ICD-10-CM

## 2025-07-23 DIAGNOSIS — E78.5 HYPERLIPIDEMIA, UNSPECIFIED: ICD-10-CM

## 2025-07-23 DIAGNOSIS — E11.9 TYPE 2 DIABETES MELLITUS WITHOUT COMPLICATIONS: ICD-10-CM

## 2025-07-30 ENCOUNTER — APPOINTMENT (OUTPATIENT)
Dept: GASTROENTEROLOGY | Facility: CLINIC | Age: 55
End: 2025-07-30

## 2025-08-02 ENCOUNTER — EMERGENCY (EMERGENCY)
Facility: HOSPITAL | Age: 55
LOS: 0 days | Discharge: HOME | End: 2025-08-02
Attending: EMERGENCY MEDICINE
Payer: MEDICAID

## 2025-08-02 VITALS
DIASTOLIC BLOOD PRESSURE: 77 MMHG | RESPIRATION RATE: 20 BRPM | HEIGHT: 67 IN | TEMPERATURE: 98 F | OXYGEN SATURATION: 99 % | HEART RATE: 130 BPM | SYSTOLIC BLOOD PRESSURE: 120 MMHG

## 2025-08-02 VITALS
DIASTOLIC BLOOD PRESSURE: 85 MMHG | SYSTOLIC BLOOD PRESSURE: 133 MMHG | OXYGEN SATURATION: 96 % | RESPIRATION RATE: 17 BRPM | HEART RATE: 117 BPM

## 2025-08-02 DIAGNOSIS — R00.0 TACHYCARDIA, UNSPECIFIED: ICD-10-CM

## 2025-08-02 DIAGNOSIS — Z01.89 ENCOUNTER FOR OTHER SPECIFIED SPECIAL EXAMINATIONS: ICD-10-CM

## 2025-08-02 DIAGNOSIS — R00.2 PALPITATIONS: ICD-10-CM

## 2025-08-02 DIAGNOSIS — E11.9 TYPE 2 DIABETES MELLITUS WITHOUT COMPLICATIONS: ICD-10-CM

## 2025-08-02 DIAGNOSIS — F20.9 SCHIZOPHRENIA, UNSPECIFIED: ICD-10-CM

## 2025-08-02 DIAGNOSIS — Z88.0 ALLERGY STATUS TO PENICILLIN: ICD-10-CM

## 2025-08-02 DIAGNOSIS — I10 ESSENTIAL (PRIMARY) HYPERTENSION: ICD-10-CM

## 2025-08-02 DIAGNOSIS — Z91.014 ALLERGY TO MAMMALIAN MEATS: ICD-10-CM

## 2025-08-02 DIAGNOSIS — Z88.4 ALLERGY STATUS TO ANESTHETIC AGENT: ICD-10-CM

## 2025-08-02 DIAGNOSIS — Z88.8 ALLERGY STATUS TO OTHER DRUGS, MEDICAMENTS AND BIOLOGICAL SUBSTANCES: ICD-10-CM

## 2025-08-02 PROCEDURE — 93005 ELECTROCARDIOGRAM TRACING: CPT

## 2025-08-02 PROCEDURE — 99284 EMERGENCY DEPT VISIT MOD MDM: CPT

## 2025-08-02 PROCEDURE — 93010 ELECTROCARDIOGRAM REPORT: CPT

## 2025-08-02 PROCEDURE — 99285 EMERGENCY DEPT VISIT HI MDM: CPT | Mod: 25

## 2025-08-02 RX ORDER — LORAZEPAM 4 MG/ML
2 VIAL (ML) INJECTION ONCE
Refills: 0 | Status: DISCONTINUED | OUTPATIENT
Start: 2025-08-02 | End: 2025-08-02

## 2025-08-02 RX ADMIN — Medication 2 MILLIGRAM(S): at 21:25

## 2025-08-03 ENCOUNTER — INPATIENT (INPATIENT)
Facility: HOSPITAL | Age: 55
LOS: 8 days | Discharge: HOME | DRG: 750 | End: 2025-08-12
Attending: PSYCHIATRY & NEUROLOGY | Admitting: PSYCHIATRY & NEUROLOGY
Payer: MEDICAID

## 2025-08-03 VITALS
HEART RATE: 103 BPM | RESPIRATION RATE: 18 BRPM | TEMPERATURE: 98 F | HEIGHT: 67 IN | SYSTOLIC BLOOD PRESSURE: 137 MMHG | OXYGEN SATURATION: 98 % | WEIGHT: 225.09 LBS | DIASTOLIC BLOOD PRESSURE: 67 MMHG

## 2025-08-03 LAB
ANION GAP SERPL CALC-SCNC: 14 MMOL/L — SIGNIFICANT CHANGE UP (ref 7–14)
APAP SERPL-MCNC: <5 UG/ML — LOW (ref 10–30)
APPEARANCE UR: CLEAR — SIGNIFICANT CHANGE UP
BASOPHILS # BLD AUTO: 0.03 K/UL — SIGNIFICANT CHANGE UP (ref 0–0.2)
BASOPHILS NFR BLD AUTO: 0.4 % — SIGNIFICANT CHANGE UP (ref 0–1)
BILIRUB UR-MCNC: NEGATIVE — SIGNIFICANT CHANGE UP
BUN SERPL-MCNC: 4 MG/DL — LOW (ref 10–20)
CALCIUM SERPL-MCNC: 9.3 MG/DL — SIGNIFICANT CHANGE UP (ref 8.4–10.5)
CHLORIDE SERPL-SCNC: 99 MMOL/L — SIGNIFICANT CHANGE UP (ref 98–110)
CO2 SERPL-SCNC: 22 MMOL/L — SIGNIFICANT CHANGE UP (ref 17–32)
COLOR SPEC: YELLOW — SIGNIFICANT CHANGE UP
CREAT SERPL-MCNC: 0.8 MG/DL — SIGNIFICANT CHANGE UP (ref 0.7–1.5)
DIFF PNL FLD: NEGATIVE — SIGNIFICANT CHANGE UP
EGFR: 106 ML/MIN/1.73M2 — SIGNIFICANT CHANGE UP
EGFR: 106 ML/MIN/1.73M2 — SIGNIFICANT CHANGE UP
EOSINOPHIL # BLD AUTO: 0.18 K/UL — SIGNIFICANT CHANGE UP (ref 0–0.7)
EOSINOPHIL NFR BLD AUTO: 2.4 % — SIGNIFICANT CHANGE UP (ref 0–8)
ETHANOL SERPL-MCNC: <10 MG/DL — SIGNIFICANT CHANGE UP
FLUAV AG NPH QL: SIGNIFICANT CHANGE UP
FLUBV AG NPH QL: SIGNIFICANT CHANGE UP
GLUCOSE SERPL-MCNC: 118 MG/DL — HIGH (ref 70–99)
GLUCOSE UR QL: NEGATIVE MG/DL — SIGNIFICANT CHANGE UP
HCT VFR BLD CALC: 35.8 % — LOW (ref 42–52)
HGB BLD-MCNC: 11.3 G/DL — LOW (ref 14–18)
IMM GRANULOCYTES NFR BLD AUTO: 0.1 % — SIGNIFICANT CHANGE UP (ref 0.1–0.3)
KETONES UR QL: NEGATIVE MG/DL — SIGNIFICANT CHANGE UP
LEUKOCYTE ESTERASE UR-ACNC: NEGATIVE — SIGNIFICANT CHANGE UP
LYMPHOCYTES # BLD AUTO: 3.76 K/UL — HIGH (ref 1.2–3.4)
LYMPHOCYTES # BLD AUTO: 50.7 % — SIGNIFICANT CHANGE UP (ref 20.5–51.1)
MCHC RBC-ENTMCNC: 24.4 PG — LOW (ref 27–31)
MCHC RBC-ENTMCNC: 31.6 G/DL — LOW (ref 32–37)
MCV RBC AUTO: 77.2 FL — LOW (ref 80–94)
MONOCYTES # BLD AUTO: 0.7 K/UL — HIGH (ref 0.1–0.6)
MONOCYTES NFR BLD AUTO: 9.4 % — HIGH (ref 1.7–9.3)
NEUTROPHILS # BLD AUTO: 2.73 K/UL — SIGNIFICANT CHANGE UP (ref 1.4–6.5)
NEUTROPHILS NFR BLD AUTO: 37 % — LOW (ref 42.2–75.2)
NITRITE UR-MCNC: NEGATIVE — SIGNIFICANT CHANGE UP
NRBC BLD AUTO-RTO: 0 /100 WBCS — SIGNIFICANT CHANGE UP (ref 0–0)
PH UR: 6.5 — SIGNIFICANT CHANGE UP (ref 5–8)
PLATELET # BLD AUTO: 253 K/UL — SIGNIFICANT CHANGE UP (ref 130–400)
PMV BLD: 9.8 FL — SIGNIFICANT CHANGE UP (ref 7.4–10.4)
POTASSIUM SERPL-MCNC: 3.9 MMOL/L — SIGNIFICANT CHANGE UP (ref 3.5–5)
POTASSIUM SERPL-SCNC: 3.9 MMOL/L — SIGNIFICANT CHANGE UP (ref 3.5–5)
PROT UR-MCNC: NEGATIVE MG/DL — SIGNIFICANT CHANGE UP
RBC # BLD: 4.64 M/UL — LOW (ref 4.7–6.1)
RBC # FLD: 18.6 % — HIGH (ref 11.5–14.5)
RSV RNA NPH QL NAA+NON-PROBE: SIGNIFICANT CHANGE UP
SALICYLATES SERPL-MCNC: <0.3 MG/DL — LOW (ref 4–30)
SARS-COV-2 RNA SPEC QL NAA+PROBE: SIGNIFICANT CHANGE UP
SODIUM SERPL-SCNC: 135 MMOL/L — SIGNIFICANT CHANGE UP (ref 135–146)
SOURCE RESPIRATORY: SIGNIFICANT CHANGE UP
SP GR SPEC: <1.005 — LOW (ref 1–1.03)
UROBILINOGEN FLD QL: 0.2 MG/DL — SIGNIFICANT CHANGE UP (ref 0.2–1)
WBC # BLD: 7.41 K/UL — SIGNIFICANT CHANGE UP (ref 4.8–10.8)
WBC # FLD AUTO: 7.41 K/UL — SIGNIFICANT CHANGE UP (ref 4.8–10.8)

## 2025-08-03 PROCEDURE — 90792 PSYCH DIAG EVAL W/MED SRVCS: CPT | Mod: 95

## 2025-08-03 PROCEDURE — 93010 ELECTROCARDIOGRAM REPORT: CPT

## 2025-08-03 PROCEDURE — 99285 EMERGENCY DEPT VISIT HI MDM: CPT

## 2025-08-03 RX ORDER — LORAZEPAM 4 MG/ML
2 VIAL (ML) INJECTION ONCE
Refills: 0 | Status: DISCONTINUED | OUTPATIENT
Start: 2025-08-03 | End: 2025-08-03

## 2025-08-03 RX ADMIN — Medication 2 MILLIGRAM(S): at 22:29

## 2025-08-04 DIAGNOSIS — F20.9 SCHIZOPHRENIA, UNSPECIFIED: ICD-10-CM

## 2025-08-04 PROCEDURE — 80076 HEPATIC FUNCTION PANEL: CPT

## 2025-08-04 PROCEDURE — 80164 ASSAY DIPROPYLACETIC ACD TOT: CPT

## 2025-08-04 PROCEDURE — 82962 GLUCOSE BLOOD TEST: CPT

## 2025-08-04 PROCEDURE — 99214 OFFICE O/P EST MOD 30 MIN: CPT | Mod: 95

## 2025-08-04 PROCEDURE — 80307 DRUG TEST PRSMV CHEM ANLYZR: CPT

## 2025-08-04 PROCEDURE — 93005 ELECTROCARDIOGRAM TRACING: CPT

## 2025-08-04 PROCEDURE — 85025 COMPLETE CBC W/AUTO DIFF WBC: CPT

## 2025-08-04 PROCEDURE — 82140 ASSAY OF AMMONIA: CPT

## 2025-08-04 PROCEDURE — 80159 DRUG ASSAY CLOZAPINE: CPT

## 2025-08-04 PROCEDURE — 99223 1ST HOSP IP/OBS HIGH 75: CPT

## 2025-08-04 PROCEDURE — 83036 HEMOGLOBIN GLYCOSYLATED A1C: CPT

## 2025-08-04 PROCEDURE — 80354 DRUG SCREENING FENTANYL: CPT

## 2025-08-04 PROCEDURE — 80061 LIPID PANEL: CPT

## 2025-08-04 PROCEDURE — 36415 COLL VENOUS BLD VENIPUNCTURE: CPT

## 2025-08-04 RX ORDER — LEVOTHYROXINE SODIUM 300 MCG
25 TABLET ORAL DAILY
Refills: 0 | Status: DISCONTINUED | OUTPATIENT
Start: 2025-08-04 | End: 2025-08-05

## 2025-08-04 RX ORDER — BENZTROPINE MESYLATE 2 MG
1 TABLET ORAL
Refills: 0 | Status: DISCONTINUED | OUTPATIENT
Start: 2025-08-04 | End: 2025-08-05

## 2025-08-04 RX ORDER — MAGNESIUM, ALUMINUM HYDROXIDE 200-200 MG
30 TABLET,CHEWABLE ORAL EVERY 4 HOURS
Refills: 0 | Status: DISCONTINUED | OUTPATIENT
Start: 2025-08-04 | End: 2025-08-12

## 2025-08-04 RX ORDER — METOPROLOL SUCCINATE 50 MG/1
25 TABLET, EXTENDED RELEASE ORAL DAILY
Refills: 0 | Status: DISCONTINUED | OUTPATIENT
Start: 2025-08-04 | End: 2025-08-12

## 2025-08-04 RX ORDER — ATORVASTATIN CALCIUM 80 MG/1
10 TABLET, FILM COATED ORAL AT BEDTIME
Refills: 0 | Status: DISCONTINUED | OUTPATIENT
Start: 2025-08-04 | End: 2025-08-05

## 2025-08-04 RX ORDER — ESCITALOPRAM OXALATE 20 MG/1
10 TABLET ORAL DAILY
Refills: 0 | Status: DISCONTINUED | OUTPATIENT
Start: 2025-08-04 | End: 2025-08-05

## 2025-08-04 RX ORDER — METFORMIN HYDROCHLORIDE 850 MG/1
1000 TABLET ORAL
Refills: 0 | Status: DISCONTINUED | OUTPATIENT
Start: 2025-08-04 | End: 2025-08-12

## 2025-08-04 RX ORDER — ASPIRIN 325 MG
81 TABLET ORAL DAILY
Refills: 0 | Status: DISCONTINUED | OUTPATIENT
Start: 2025-08-04 | End: 2025-08-04

## 2025-08-04 RX ORDER — LORAZEPAM 4 MG/ML
2 VIAL (ML) INJECTION EVERY 6 HOURS
Refills: 0 | Status: DISCONTINUED | OUTPATIENT
Start: 2025-08-04 | End: 2025-08-11

## 2025-08-04 RX ORDER — METFORMIN HYDROCHLORIDE 850 MG/1
1000 TABLET ORAL
Refills: 0 | Status: DISCONTINUED | OUTPATIENT
Start: 2025-08-04 | End: 2025-08-04

## 2025-08-04 RX ORDER — ESCITALOPRAM OXALATE 20 MG/1
10 TABLET ORAL AT BEDTIME
Refills: 0 | Status: DISCONTINUED | OUTPATIENT
Start: 2025-08-04 | End: 2025-08-06

## 2025-08-04 RX ORDER — ASPIRIN 325 MG
81 TABLET ORAL DAILY
Refills: 0 | Status: DISCONTINUED | OUTPATIENT
Start: 2025-08-04 | End: 2025-08-05

## 2025-08-04 RX ORDER — CLOZAPINE 100 MG
400 TABLET ORAL AT BEDTIME
Refills: 0 | Status: DISCONTINUED | OUTPATIENT
Start: 2025-08-04 | End: 2025-08-07

## 2025-08-04 RX ORDER — DIPHENHYDRAMINE HCL 12.5MG/5ML
50 ELIXIR ORAL EVERY 6 HOURS
Refills: 0 | Status: DISCONTINUED | OUTPATIENT
Start: 2025-08-04 | End: 2025-08-12

## 2025-08-04 RX ORDER — HALOPERIDOL 10 MG/1
5 TABLET ORAL EVERY 6 HOURS
Refills: 0 | Status: DISCONTINUED | OUTPATIENT
Start: 2025-08-04 | End: 2025-08-05

## 2025-08-04 RX ORDER — BISACODYL 5 MG
5 TABLET, DELAYED RELEASE (ENTERIC COATED) ORAL AT BEDTIME
Refills: 0 | Status: DISCONTINUED | OUTPATIENT
Start: 2025-08-04 | End: 2025-08-04

## 2025-08-04 RX ORDER — LORAZEPAM 4 MG/ML
2 VIAL (ML) INJECTION AT BEDTIME
Refills: 0 | Status: DISCONTINUED | OUTPATIENT
Start: 2025-08-04 | End: 2025-08-06

## 2025-08-04 RX ORDER — ACETAMINOPHEN 500 MG/5ML
650 LIQUID (ML) ORAL EVERY 6 HOURS
Refills: 0 | Status: DISCONTINUED | OUTPATIENT
Start: 2025-08-04 | End: 2025-08-12

## 2025-08-04 RX ORDER — BENZTROPINE MESYLATE 2 MG
1 TABLET ORAL
Refills: 0 | Status: DISCONTINUED | OUTPATIENT
Start: 2025-08-04 | End: 2025-08-04

## 2025-08-04 RX ORDER — BISACODYL 5 MG
5 TABLET, DELAYED RELEASE (ENTERIC COATED) ORAL AT BEDTIME
Refills: 0 | Status: DISCONTINUED | OUTPATIENT
Start: 2025-08-04 | End: 2025-08-12

## 2025-08-04 RX ORDER — CLOZAPINE 100 MG
400 TABLET ORAL AT BEDTIME
Refills: 0 | Status: DISCONTINUED | OUTPATIENT
Start: 2025-08-04 | End: 2025-08-04

## 2025-08-04 RX ORDER — METOPROLOL SUCCINATE 50 MG/1
25 TABLET, EXTENDED RELEASE ORAL DAILY
Refills: 0 | Status: DISCONTINUED | OUTPATIENT
Start: 2025-08-04 | End: 2025-08-04

## 2025-08-04 RX ORDER — LEVOTHYROXINE SODIUM 300 MCG
25 TABLET ORAL DAILY
Refills: 0 | Status: DISCONTINUED | OUTPATIENT
Start: 2025-08-04 | End: 2025-08-04

## 2025-08-04 RX ORDER — CLOZAPINE 100 MG
100 TABLET ORAL DAILY
Refills: 0 | Status: DISCONTINUED | OUTPATIENT
Start: 2025-08-04 | End: 2025-08-04

## 2025-08-04 RX ORDER — CLOZAPINE 100 MG
100 TABLET ORAL DAILY
Refills: 0 | Status: DISCONTINUED | OUTPATIENT
Start: 2025-08-04 | End: 2025-08-05

## 2025-08-04 RX ADMIN — METOPROLOL SUCCINATE 25 MILLIGRAM(S): 50 TABLET, EXTENDED RELEASE ORAL at 06:24

## 2025-08-04 RX ADMIN — Medication 1 MILLIGRAM(S): at 19:48

## 2025-08-04 RX ADMIN — Medication 2 MILLIGRAM(S): at 23:30

## 2025-08-04 RX ADMIN — Medication 400 MILLIGRAM(S): at 05:30

## 2025-08-04 RX ADMIN — Medication 5 MILLIGRAM(S): at 23:22

## 2025-08-04 RX ADMIN — Medication 81 MILLIGRAM(S): at 12:52

## 2025-08-04 RX ADMIN — METFORMIN HYDROCHLORIDE 1000 MILLIGRAM(S): 850 TABLET ORAL at 19:47

## 2025-08-04 RX ADMIN — Medication 25 MICROGRAM(S): at 06:24

## 2025-08-04 RX ADMIN — Medication 250 MILLIGRAM(S): at 19:48

## 2025-08-04 RX ADMIN — METFORMIN HYDROCHLORIDE 1000 MILLIGRAM(S): 850 TABLET ORAL at 06:24

## 2025-08-04 RX ADMIN — ATORVASTATIN CALCIUM 10 MILLIGRAM(S): 80 TABLET, FILM COATED ORAL at 23:22

## 2025-08-04 RX ADMIN — Medication 400 MILLIGRAM(S): at 23:23

## 2025-08-04 RX ADMIN — Medication 1 MILLIGRAM(S): at 06:24

## 2025-08-04 RX ADMIN — ESCITALOPRAM OXALATE 10 MILLIGRAM(S): 20 TABLET ORAL at 23:23

## 2025-08-04 RX ADMIN — Medication 100 MILLIGRAM(S): at 12:52

## 2025-08-05 PROCEDURE — 93010 ELECTROCARDIOGRAM REPORT: CPT

## 2025-08-05 PROCEDURE — 99232 SBSQ HOSP IP/OBS MODERATE 35: CPT

## 2025-08-05 RX ORDER — ASPIRIN 325 MG
81 TABLET ORAL DAILY
Refills: 0 | Status: DISCONTINUED | OUTPATIENT
Start: 2025-08-05 | End: 2025-08-12

## 2025-08-05 RX ORDER — LACTULOSE 10 G/15ML
10 SOLUTION ORAL EVERY 6 HOURS
Refills: 0 | Status: DISCONTINUED | OUTPATIENT
Start: 2025-08-05 | End: 2025-08-12

## 2025-08-05 RX ORDER — OLANZAPINE 10 MG/1
5 TABLET ORAL EVERY 6 HOURS
Refills: 0 | Status: DISCONTINUED | OUTPATIENT
Start: 2025-08-05 | End: 2025-08-12

## 2025-08-05 RX ORDER — LIDOCAINE HYDROCHLORIDE 20 MG/ML
1 JELLY TOPICAL DAILY
Refills: 0 | Status: DISCONTINUED | OUTPATIENT
Start: 2025-08-05 | End: 2025-08-12

## 2025-08-05 RX ORDER — ATORVASTATIN CALCIUM 80 MG/1
40 TABLET, FILM COATED ORAL AT BEDTIME
Refills: 0 | Status: DISCONTINUED | OUTPATIENT
Start: 2025-08-05 | End: 2025-08-12

## 2025-08-05 RX ORDER — PALIPERIDONE 9 MG/1
234 TABLET, EXTENDED RELEASE ORAL ONCE
Refills: 0 | Status: DISCONTINUED | OUTPATIENT
Start: 2025-08-07 | End: 2025-08-07

## 2025-08-05 RX ORDER — CLOZAPINE 100 MG
100 TABLET ORAL DAILY
Refills: 0 | Status: DISCONTINUED | OUTPATIENT
Start: 2025-08-05 | End: 2025-08-12

## 2025-08-05 RX ORDER — BENZTROPINE MESYLATE 2 MG
1 TABLET ORAL
Refills: 0 | Status: DISCONTINUED | OUTPATIENT
Start: 2025-08-05 | End: 2025-08-12

## 2025-08-05 RX ORDER — LEVOTHYROXINE SODIUM 300 MCG
25 TABLET ORAL
Refills: 0 | Status: DISCONTINUED | OUTPATIENT
Start: 2025-08-05 | End: 2025-08-12

## 2025-08-05 RX ADMIN — Medication 100 MILLIGRAM(S): at 11:24

## 2025-08-05 RX ADMIN — METFORMIN HYDROCHLORIDE 1000 MILLIGRAM(S): 850 TABLET ORAL at 11:24

## 2025-08-05 RX ADMIN — Medication 1 MILLIGRAM(S): at 11:25

## 2025-08-05 RX ADMIN — Medication 81 MILLIGRAM(S): at 11:25

## 2025-08-05 RX ADMIN — METOPROLOL SUCCINATE 25 MILLIGRAM(S): 50 TABLET, EXTENDED RELEASE ORAL at 11:24

## 2025-08-05 RX ADMIN — Medication 5 MILLIGRAM(S): at 20:46

## 2025-08-05 RX ADMIN — Medication 1 MILLIGRAM(S): at 20:38

## 2025-08-05 RX ADMIN — Medication 2 MILLIGRAM(S): at 20:38

## 2025-08-05 RX ADMIN — Medication 400 MILLIGRAM(S): at 20:38

## 2025-08-05 RX ADMIN — Medication 250 MILLIGRAM(S): at 20:38

## 2025-08-05 RX ADMIN — METFORMIN HYDROCHLORIDE 1000 MILLIGRAM(S): 850 TABLET ORAL at 17:43

## 2025-08-05 RX ADMIN — Medication 25 MICROGRAM(S): at 11:25

## 2025-08-05 RX ADMIN — ATORVASTATIN CALCIUM 40 MILLIGRAM(S): 80 TABLET, FILM COATED ORAL at 20:38

## 2025-08-05 RX ADMIN — Medication 250 MILLIGRAM(S): at 11:24

## 2025-08-05 RX ADMIN — ESCITALOPRAM OXALATE 10 MILLIGRAM(S): 20 TABLET ORAL at 20:38

## 2025-08-06 LAB
A1C WITH ESTIMATED AVERAGE GLUCOSE RESULT: 6.4 % — HIGH (ref 4–5.6)
ALBUMIN SERPL ELPH-MCNC: 3.7 G/DL — SIGNIFICANT CHANGE UP (ref 3.5–5.2)
ALP SERPL-CCNC: 125 U/L — HIGH (ref 30–115)
ALT FLD-CCNC: 12 U/L — SIGNIFICANT CHANGE UP (ref 0–41)
AST SERPL-CCNC: 13 U/L — SIGNIFICANT CHANGE UP (ref 0–41)
BASOPHILS # BLD AUTO: 0.02 K/UL — SIGNIFICANT CHANGE UP (ref 0–0.2)
BASOPHILS NFR BLD AUTO: 0.3 % — SIGNIFICANT CHANGE UP (ref 0–2)
BILIRUB DIRECT SERPL-MCNC: <0.2 MG/DL — SIGNIFICANT CHANGE UP (ref 0–0.3)
BILIRUB INDIRECT FLD-MCNC: 0 MG/DL — SIGNIFICANT CHANGE UP (ref 0.2–1.2)
BILIRUB SERPL-MCNC: <0.2 MG/DL — SIGNIFICANT CHANGE UP (ref 0.2–1.2)
CHOLEST SERPL-MCNC: 150 MG/DL — SIGNIFICANT CHANGE UP
EOSINOPHIL # BLD AUTO: 0.22 K/UL — SIGNIFICANT CHANGE UP (ref 0–0.5)
EOSINOPHIL NFR BLD AUTO: 3.3 % — SIGNIFICANT CHANGE UP (ref 0–6)
ESTIMATED AVERAGE GLUCOSE: 137 MG/DL — HIGH (ref 68–114)
HCT VFR BLD CALC: 35.6 % — LOW (ref 39–50)
HDLC SERPL-MCNC: 31 MG/DL — LOW
HGB BLD-MCNC: 10.9 G/DL — LOW (ref 13–17)
IMM GRANULOCYTES # BLD AUTO: 0.01 K/UL — SIGNIFICANT CHANGE UP (ref 0–0.07)
IMM GRANULOCYTES NFR BLD AUTO: 0.2 % — SIGNIFICANT CHANGE UP (ref 0–0.9)
LDLC SERPL-MCNC: 92 MG/DL — SIGNIFICANT CHANGE UP
LIPID PNL WITH DIRECT LDL SERPL: 92 MG/DL — SIGNIFICANT CHANGE UP
LYMPHOCYTES # BLD AUTO: 3.1 K/UL — SIGNIFICANT CHANGE UP (ref 1–3.3)
LYMPHOCYTES NFR BLD AUTO: 46.7 % — HIGH (ref 13–44)
MCHC RBC-ENTMCNC: 23.9 PG — LOW (ref 27–34)
MCHC RBC-ENTMCNC: 30.6 G/DL — LOW (ref 32–36)
MCV RBC AUTO: 78.1 FL — LOW (ref 80–100)
MONOCYTES # BLD AUTO: 0.61 K/UL — SIGNIFICANT CHANGE UP (ref 0–0.9)
MONOCYTES NFR BLD AUTO: 9.2 % — SIGNIFICANT CHANGE UP (ref 2–14)
NEUTROPHILS # BLD AUTO: 2.68 K/UL — SIGNIFICANT CHANGE UP (ref 1.8–7.4)
NEUTROPHILS NFR BLD AUTO: 40.3 % — LOW (ref 43–77)
NONHDLC SERPL-MCNC: 119 MG/DL — SIGNIFICANT CHANGE UP
NRBC # BLD AUTO: 0 K/UL — SIGNIFICANT CHANGE UP (ref 0–0)
NRBC # FLD: 0 K/UL — SIGNIFICANT CHANGE UP (ref 0–0)
NRBC BLD AUTO-RTO: 0 /100 WBCS — SIGNIFICANT CHANGE UP (ref 0–0)
PLATELET # BLD AUTO: 257 K/UL — SIGNIFICANT CHANGE UP (ref 150–400)
PMV BLD: 9.7 FL — SIGNIFICANT CHANGE UP (ref 7–13)
PROT SERPL-MCNC: 6.6 G/DL — SIGNIFICANT CHANGE UP (ref 6–8)
RBC # BLD: 4.56 M/UL — SIGNIFICANT CHANGE UP (ref 4.2–5.8)
RBC # FLD: 19.4 % — HIGH (ref 10.3–14.5)
TRIGL SERPL-MCNC: 153 MG/DL — HIGH
VALPROATE SERPL-MCNC: 28 UG/ML — LOW (ref 50–100)
WBC # BLD: 6.64 K/UL — SIGNIFICANT CHANGE UP (ref 3.8–10.5)
WBC # FLD AUTO: 6.64 K/UL — SIGNIFICANT CHANGE UP (ref 3.8–10.5)

## 2025-08-06 PROCEDURE — 99231 SBSQ HOSP IP/OBS SF/LOW 25: CPT

## 2025-08-06 RX ORDER — BACITRACIN 500 UNIT/G
1 OINTMENT (GRAM) TOPICAL
Refills: 0 | Status: DISCONTINUED | OUTPATIENT
Start: 2025-08-06 | End: 2025-08-12

## 2025-08-06 RX ORDER — NICOTINE POLACRILEX 4 MG/1
2 GUM, CHEWING ORAL
Refills: 0 | Status: DISCONTINUED | OUTPATIENT
Start: 2025-08-06 | End: 2025-08-12

## 2025-08-06 RX ORDER — MELATONIN 5 MG
5 TABLET ORAL AT BEDTIME
Refills: 0 | Status: DISCONTINUED | OUTPATIENT
Start: 2025-08-06 | End: 2025-08-12

## 2025-08-06 RX ADMIN — Medication 1 MILLIGRAM(S): at 11:51

## 2025-08-06 RX ADMIN — Medication 81 MILLIGRAM(S): at 11:51

## 2025-08-06 RX ADMIN — METOPROLOL SUCCINATE 25 MILLIGRAM(S): 50 TABLET, EXTENDED RELEASE ORAL at 11:51

## 2025-08-06 RX ADMIN — Medication 100 MILLIGRAM(S): at 11:51

## 2025-08-06 RX ADMIN — Medication 250 MILLIGRAM(S): at 11:50

## 2025-08-06 RX ADMIN — Medication 5 MILLIGRAM(S): at 21:55

## 2025-08-06 RX ADMIN — Medication 1 APPLICATION(S): at 21:54

## 2025-08-06 RX ADMIN — NICOTINE POLACRILEX 2 MILLIGRAM(S): 4 GUM, CHEWING ORAL at 22:12

## 2025-08-06 RX ADMIN — ATORVASTATIN CALCIUM 40 MILLIGRAM(S): 80 TABLET, FILM COATED ORAL at 21:54

## 2025-08-06 RX ADMIN — Medication 250 MILLIGRAM(S): at 21:55

## 2025-08-06 RX ADMIN — Medication 1 MILLIGRAM(S): at 21:55

## 2025-08-06 RX ADMIN — Medication 25 MICROGRAM(S): at 11:51

## 2025-08-06 RX ADMIN — METFORMIN HYDROCHLORIDE 1000 MILLIGRAM(S): 850 TABLET ORAL at 20:00

## 2025-08-06 RX ADMIN — METFORMIN HYDROCHLORIDE 1000 MILLIGRAM(S): 850 TABLET ORAL at 11:50

## 2025-08-07 LAB
AMMONIA BLD-MCNC: 31 UMOL/L — SIGNIFICANT CHANGE UP (ref 11–55)
AMPHET UR-MCNC: NEGATIVE — SIGNIFICANT CHANGE UP
BARBITURATES UR SCN-MCNC: NEGATIVE — SIGNIFICANT CHANGE UP
BENZODIAZ UR-MCNC: NEGATIVE — SIGNIFICANT CHANGE UP
COCAINE METAB.OTHER UR-MCNC: NEGATIVE — SIGNIFICANT CHANGE UP
DRUG SCREEN 1, URINE RESULT: SIGNIFICANT CHANGE UP
FENTANYL UR QL: NEGATIVE — SIGNIFICANT CHANGE UP
METHADONE UR-MCNC: NEGATIVE — SIGNIFICANT CHANGE UP
OPIATES UR-MCNC: NEGATIVE — SIGNIFICANT CHANGE UP
OXYCODONE UR-MCNC: NEGATIVE — SIGNIFICANT CHANGE UP
PCP UR-MCNC: NEGATIVE — SIGNIFICANT CHANGE UP
PROPOXYPHENE QUALITATIVE URINE RESULT: NEGATIVE — SIGNIFICANT CHANGE UP
THC UR QL: NEGATIVE — SIGNIFICANT CHANGE UP

## 2025-08-07 PROCEDURE — 99231 SBSQ HOSP IP/OBS SF/LOW 25: CPT

## 2025-08-07 PROCEDURE — 93010 ELECTROCARDIOGRAM REPORT: CPT

## 2025-08-07 RX ORDER — PALIPERIDONE 9 MG/1
234 TABLET, EXTENDED RELEASE ORAL ONCE
Refills: 0 | Status: COMPLETED | OUTPATIENT
Start: 2025-08-08 | End: 2025-08-08

## 2025-08-07 RX ORDER — CLOZAPINE 100 MG
300 TABLET ORAL AT BEDTIME
Refills: 0 | Status: DISCONTINUED | OUTPATIENT
Start: 2025-08-07 | End: 2025-08-08

## 2025-08-07 RX ADMIN — Medication 1 MILLIGRAM(S): at 08:53

## 2025-08-07 RX ADMIN — Medication 250 MILLIGRAM(S): at 21:05

## 2025-08-07 RX ADMIN — Medication 300 MILLIGRAM(S): at 20:56

## 2025-08-07 RX ADMIN — Medication 100 MILLIGRAM(S): at 08:53

## 2025-08-07 RX ADMIN — Medication 25 MICROGRAM(S): at 06:00

## 2025-08-07 RX ADMIN — METOPROLOL SUCCINATE 25 MILLIGRAM(S): 50 TABLET, EXTENDED RELEASE ORAL at 08:53

## 2025-08-07 RX ADMIN — Medication 81 MILLIGRAM(S): at 08:53

## 2025-08-07 RX ADMIN — Medication 5 MILLIGRAM(S): at 00:05

## 2025-08-07 RX ADMIN — Medication 2 MILLIGRAM(S): at 17:16

## 2025-08-07 RX ADMIN — Medication 250 MILLIGRAM(S): at 08:54

## 2025-08-07 RX ADMIN — METFORMIN HYDROCHLORIDE 1000 MILLIGRAM(S): 850 TABLET ORAL at 20:55

## 2025-08-07 RX ADMIN — Medication 1 APPLICATION(S): at 08:52

## 2025-08-07 RX ADMIN — ATORVASTATIN CALCIUM 40 MILLIGRAM(S): 80 TABLET, FILM COATED ORAL at 20:57

## 2025-08-07 RX ADMIN — Medication 1 APPLICATION(S): at 20:56

## 2025-08-07 RX ADMIN — METFORMIN HYDROCHLORIDE 1000 MILLIGRAM(S): 850 TABLET ORAL at 07:14

## 2025-08-07 RX ADMIN — NICOTINE POLACRILEX 2 MILLIGRAM(S): 4 GUM, CHEWING ORAL at 17:00

## 2025-08-07 RX ADMIN — Medication 5 MILLIGRAM(S): at 20:58

## 2025-08-07 RX ADMIN — Medication 1 MILLIGRAM(S): at 20:57

## 2025-08-08 PROCEDURE — 93010 ELECTROCARDIOGRAM REPORT: CPT

## 2025-08-08 PROCEDURE — 99231 SBSQ HOSP IP/OBS SF/LOW 25: CPT

## 2025-08-08 RX ORDER — CLOZAPINE 100 MG
200 TABLET ORAL AT BEDTIME
Refills: 0 | Status: DISCONTINUED | OUTPATIENT
Start: 2025-08-08 | End: 2025-08-12

## 2025-08-08 RX ADMIN — Medication 25 MICROGRAM(S): at 06:51

## 2025-08-08 RX ADMIN — METOPROLOL SUCCINATE 25 MILLIGRAM(S): 50 TABLET, EXTENDED RELEASE ORAL at 08:24

## 2025-08-08 RX ADMIN — Medication 1 APPLICATION(S): at 08:25

## 2025-08-08 RX ADMIN — Medication 100 MILLIGRAM(S): at 08:24

## 2025-08-08 RX ADMIN — ATORVASTATIN CALCIUM 40 MILLIGRAM(S): 80 TABLET, FILM COATED ORAL at 22:22

## 2025-08-08 RX ADMIN — Medication 1 MILLIGRAM(S): at 22:35

## 2025-08-08 RX ADMIN — Medication 81 MILLIGRAM(S): at 08:23

## 2025-08-08 RX ADMIN — METFORMIN HYDROCHLORIDE 1000 MILLIGRAM(S): 850 TABLET ORAL at 22:24

## 2025-08-08 RX ADMIN — Medication 250 MILLIGRAM(S): at 22:22

## 2025-08-08 RX ADMIN — Medication 5 MILLIGRAM(S): at 22:22

## 2025-08-08 RX ADMIN — Medication 250 MILLIGRAM(S): at 08:23

## 2025-08-08 RX ADMIN — METFORMIN HYDROCHLORIDE 1000 MILLIGRAM(S): 850 TABLET ORAL at 08:24

## 2025-08-08 RX ADMIN — Medication 1 APPLICATION(S): at 22:35

## 2025-08-08 RX ADMIN — PALIPERIDONE 234 MILLIGRAM(S): 9 TABLET, EXTENDED RELEASE ORAL at 09:36

## 2025-08-08 RX ADMIN — Medication 200 MILLIGRAM(S): at 22:35

## 2025-08-08 RX ADMIN — Medication 1 MILLIGRAM(S): at 08:24

## 2025-08-08 RX ADMIN — Medication 5 MILLIGRAM(S): at 22:24

## 2025-08-09 LAB — GLUCOSE BLDC GLUCOMTR-MCNC: 104 MG/DL — HIGH (ref 70–99)

## 2025-08-09 PROCEDURE — 99232 SBSQ HOSP IP/OBS MODERATE 35: CPT

## 2025-08-09 RX ADMIN — ATORVASTATIN CALCIUM 40 MILLIGRAM(S): 80 TABLET, FILM COATED ORAL at 20:10

## 2025-08-09 RX ADMIN — Medication 1 MILLIGRAM(S): at 20:10

## 2025-08-09 RX ADMIN — Medication 1 APPLICATION(S): at 08:18

## 2025-08-09 RX ADMIN — METFORMIN HYDROCHLORIDE 1000 MILLIGRAM(S): 850 TABLET ORAL at 08:18

## 2025-08-09 RX ADMIN — METOPROLOL SUCCINATE 25 MILLIGRAM(S): 50 TABLET, EXTENDED RELEASE ORAL at 08:18

## 2025-08-09 RX ADMIN — Medication 1 APPLICATION(S): at 20:11

## 2025-08-09 RX ADMIN — METFORMIN HYDROCHLORIDE 1000 MILLIGRAM(S): 850 TABLET ORAL at 20:10

## 2025-08-09 RX ADMIN — Medication 100 MILLIGRAM(S): at 08:18

## 2025-08-09 RX ADMIN — Medication 81 MILLIGRAM(S): at 08:18

## 2025-08-09 RX ADMIN — Medication 1 MILLIGRAM(S): at 08:18

## 2025-08-09 RX ADMIN — Medication 5 MILLIGRAM(S): at 20:11

## 2025-08-09 RX ADMIN — Medication 200 MILLIGRAM(S): at 20:10

## 2025-08-09 RX ADMIN — Medication 25 MICROGRAM(S): at 06:41

## 2025-08-09 RX ADMIN — Medication 250 MILLIGRAM(S): at 08:18

## 2025-08-09 RX ADMIN — Medication 250 MILLIGRAM(S): at 20:10

## 2025-08-10 LAB — GLUCOSE BLDC GLUCOMTR-MCNC: 100 MG/DL — HIGH (ref 70–99)

## 2025-08-10 RX ORDER — DIPHENHYDRAMINE HCL 12.5MG/5ML
50 ELIXIR ORAL ONCE
Refills: 0 | Status: COMPLETED | OUTPATIENT
Start: 2025-08-10 | End: 2025-08-10

## 2025-08-10 RX ADMIN — Medication 1 APPLICATION(S): at 11:30

## 2025-08-10 RX ADMIN — METFORMIN HYDROCHLORIDE 1000 MILLIGRAM(S): 850 TABLET ORAL at 21:57

## 2025-08-10 RX ADMIN — Medication 25 MICROGRAM(S): at 06:04

## 2025-08-10 RX ADMIN — METFORMIN HYDROCHLORIDE 1000 MILLIGRAM(S): 850 TABLET ORAL at 11:30

## 2025-08-10 RX ADMIN — METOPROLOL SUCCINATE 25 MILLIGRAM(S): 50 TABLET, EXTENDED RELEASE ORAL at 11:32

## 2025-08-10 RX ADMIN — Medication 250 MILLIGRAM(S): at 21:56

## 2025-08-10 RX ADMIN — Medication 1 MILLIGRAM(S): at 11:31

## 2025-08-10 RX ADMIN — Medication 50 MILLIGRAM(S): at 21:56

## 2025-08-10 RX ADMIN — ATORVASTATIN CALCIUM 40 MILLIGRAM(S): 80 TABLET, FILM COATED ORAL at 21:57

## 2025-08-10 RX ADMIN — Medication 81 MILLIGRAM(S): at 11:32

## 2025-08-10 RX ADMIN — Medication 250 MILLIGRAM(S): at 11:30

## 2025-08-10 RX ADMIN — Medication 1 APPLICATION(S): at 20:27

## 2025-08-10 RX ADMIN — Medication 5 MILLIGRAM(S): at 22:00

## 2025-08-10 RX ADMIN — Medication 100 MILLIGRAM(S): at 11:33

## 2025-08-10 RX ADMIN — Medication 200 MILLIGRAM(S): at 21:57

## 2025-08-10 RX ADMIN — Medication 1 MILLIGRAM(S): at 21:57

## 2025-08-10 RX ADMIN — Medication 5 MILLIGRAM(S): at 21:56

## 2025-08-11 LAB
CLOZAPINE SERPL-MCNC: 815 NG/ML — HIGH (ref 350–600)
CLOZAPINE SPEC-SCNC: 1085 NG/ML — SIGNIFICANT CHANGE UP
GLUCOSE BLDC GLUCOMTR-MCNC: 88 MG/DL — SIGNIFICANT CHANGE UP (ref 70–99)
NORCLOZAPINE SERPL-MCNC: 270 NG/ML — SIGNIFICANT CHANGE UP

## 2025-08-11 PROCEDURE — 99231 SBSQ HOSP IP/OBS SF/LOW 25: CPT

## 2025-08-11 RX ORDER — CLOZAPINE 100 MG
400 TABLET ORAL
Refills: 0 | DISCHARGE

## 2025-08-11 RX ORDER — ATORVASTATIN CALCIUM 80 MG/1
1 TABLET, FILM COATED ORAL
Qty: 14 | Refills: 0
Start: 2025-08-11 | End: 2025-08-24

## 2025-08-11 RX ORDER — CLOZAPINE 100 MG
1 TABLET ORAL
Qty: 14 | Refills: 0
Start: 2025-08-11 | End: 2025-08-24

## 2025-08-11 RX ORDER — METOPROLOL SUCCINATE 50 MG/1
1 TABLET, EXTENDED RELEASE ORAL
Qty: 14 | Refills: 0
Start: 2025-08-11 | End: 2025-08-24

## 2025-08-11 RX ORDER — LEVOTHYROXINE SODIUM 300 MCG
1 TABLET ORAL
Qty: 14 | Refills: 0
Start: 2025-08-11 | End: 2025-08-24

## 2025-08-11 RX ORDER — METFORMIN HYDROCHLORIDE 850 MG/1
1 TABLET ORAL
Qty: 28 | Refills: 0
Start: 2025-08-11 | End: 2025-08-24

## 2025-08-11 RX ORDER — BENZTROPINE MESYLATE 2 MG
1 TABLET ORAL
Qty: 28 | Refills: 0
Start: 2025-08-11 | End: 2025-08-24

## 2025-08-11 RX ORDER — NICOTINE POLACRILEX 4 MG/1
1 GUM, CHEWING ORAL
Qty: 1 | Refills: 0
Start: 2025-08-11 | End: 2025-08-24

## 2025-08-11 RX ORDER — ASPIRIN 325 MG
1 TABLET ORAL
Qty: 14 | Refills: 0
Start: 2025-08-11 | End: 2025-08-24

## 2025-08-11 RX ORDER — MELATONIN 5 MG
1 TABLET ORAL
Qty: 14 | Refills: 0
Start: 2025-08-11 | End: 2025-08-24

## 2025-08-11 RX ADMIN — METFORMIN HYDROCHLORIDE 1000 MILLIGRAM(S): 850 TABLET ORAL at 08:18

## 2025-08-11 RX ADMIN — Medication 1 MILLIGRAM(S): at 21:25

## 2025-08-11 RX ADMIN — Medication 100 MILLIGRAM(S): at 08:19

## 2025-08-11 RX ADMIN — NICOTINE POLACRILEX 2 MILLIGRAM(S): 4 GUM, CHEWING ORAL at 18:15

## 2025-08-11 RX ADMIN — Medication 1 MILLIGRAM(S): at 08:19

## 2025-08-11 RX ADMIN — Medication 200 MILLIGRAM(S): at 21:25

## 2025-08-11 RX ADMIN — Medication 25 MICROGRAM(S): at 06:29

## 2025-08-11 RX ADMIN — Medication 1 APPLICATION(S): at 08:20

## 2025-08-11 RX ADMIN — Medication 2 MILLIGRAM(S): at 13:18

## 2025-08-11 RX ADMIN — Medication 30 MILLILITER(S): at 13:35

## 2025-08-11 RX ADMIN — Medication 5 MILLIGRAM(S): at 21:25

## 2025-08-11 RX ADMIN — Medication 250 MILLIGRAM(S): at 08:19

## 2025-08-11 RX ADMIN — Medication 250 MILLIGRAM(S): at 21:25

## 2025-08-11 RX ADMIN — METFORMIN HYDROCHLORIDE 1000 MILLIGRAM(S): 850 TABLET ORAL at 21:25

## 2025-08-11 RX ADMIN — Medication 1 APPLICATION(S): at 21:25

## 2025-08-11 RX ADMIN — ATORVASTATIN CALCIUM 40 MILLIGRAM(S): 80 TABLET, FILM COATED ORAL at 21:25

## 2025-08-11 RX ADMIN — Medication 81 MILLIGRAM(S): at 08:19

## 2025-08-11 RX ADMIN — Medication 30 MILLILITER(S): at 23:05

## 2025-08-12 VITALS — TEMPERATURE: 98 F

## 2025-08-12 LAB — GLUCOSE BLDC GLUCOMTR-MCNC: 93 MG/DL — SIGNIFICANT CHANGE UP (ref 70–99)

## 2025-08-12 PROCEDURE — 99238 HOSP IP/OBS DSCHRG MGMT 30/<: CPT

## 2025-08-12 RX ADMIN — Medication 81 MILLIGRAM(S): at 08:17

## 2025-08-12 RX ADMIN — Medication 1 APPLICATION(S): at 08:28

## 2025-08-12 RX ADMIN — METFORMIN HYDROCHLORIDE 1000 MILLIGRAM(S): 850 TABLET ORAL at 08:16

## 2025-08-12 RX ADMIN — Medication 100 MILLIGRAM(S): at 08:18

## 2025-08-12 RX ADMIN — Medication 1 MILLIGRAM(S): at 08:16

## 2025-08-12 RX ADMIN — Medication 25 MICROGRAM(S): at 06:45

## 2025-08-12 RX ADMIN — METOPROLOL SUCCINATE 25 MILLIGRAM(S): 50 TABLET, EXTENDED RELEASE ORAL at 08:17

## 2025-08-12 RX ADMIN — Medication 250 MILLIGRAM(S): at 08:17

## 2025-08-16 LAB
CLOZAPINE SERPL-MCNC: 419 NG/ML — SIGNIFICANT CHANGE UP (ref 350–600)
CLOZAPINE SPEC-SCNC: 557 NG/ML — SIGNIFICANT CHANGE UP
NORCLOZAPINE SERPL-MCNC: 138 NG/ML — SIGNIFICANT CHANGE UP

## 2025-08-19 DIAGNOSIS — Z88.8 ALLERGY STATUS TO OTHER DRUGS, MEDICAMENTS AND BIOLOGICAL SUBSTANCES: ICD-10-CM

## 2025-08-19 DIAGNOSIS — Z88.0 ALLERGY STATUS TO PENICILLIN: ICD-10-CM

## 2025-08-19 DIAGNOSIS — R45.851 SUICIDAL IDEATIONS: ICD-10-CM

## 2025-08-19 DIAGNOSIS — F25.9 SCHIZOAFFECTIVE DISORDER, UNSPECIFIED: ICD-10-CM

## 2025-08-19 DIAGNOSIS — E78.5 HYPERLIPIDEMIA, UNSPECIFIED: ICD-10-CM

## 2025-08-19 DIAGNOSIS — E11.9 TYPE 2 DIABETES MELLITUS WITHOUT COMPLICATIONS: ICD-10-CM

## 2025-08-19 DIAGNOSIS — R62.50 UNSPECIFIED LACK OF EXPECTED NORMAL PHYSIOLOGICAL DEVELOPMENT IN CHILDHOOD: ICD-10-CM

## 2025-08-19 DIAGNOSIS — Z91.014 ALLERGY TO MAMMALIAN MEATS: ICD-10-CM

## 2025-08-19 DIAGNOSIS — E03.9 HYPOTHYROIDISM, UNSPECIFIED: ICD-10-CM

## 2025-08-19 DIAGNOSIS — Z79.84 LONG TERM (CURRENT) USE OF ORAL HYPOGLYCEMIC DRUGS: ICD-10-CM

## 2025-08-19 DIAGNOSIS — I10 ESSENTIAL (PRIMARY) HYPERTENSION: ICD-10-CM

## 2025-08-19 DIAGNOSIS — Z11.52 ENCOUNTER FOR SCREENING FOR COVID-19: ICD-10-CM
